# Patient Record
Sex: FEMALE | Race: WHITE | NOT HISPANIC OR LATINO | Employment: UNEMPLOYED | URBAN - NONMETROPOLITAN AREA
[De-identification: names, ages, dates, MRNs, and addresses within clinical notes are randomized per-mention and may not be internally consistent; named-entity substitution may affect disease eponyms.]

---

## 2023-03-10 ENCOUNTER — HOSPITAL ENCOUNTER (INPATIENT)
Facility: HOSPITAL | Age: 68
LOS: 6 days | Discharge: ANOTHER HOSPITAL | DRG: 948 | End: 2023-03-16
Attending: FAMILY MEDICINE | Admitting: FAMILY MEDICINE
Payer: MEDICARE

## 2023-03-10 PROBLEM — R53.81 DEBILITY: Status: ACTIVE | Noted: 2023-03-10

## 2023-03-10 LAB
GLUCOSE BLDC GLUCOMTR-MCNC: 105 MG/DL (ref 70–130)
GLUCOSE BLDC GLUCOMTR-MCNC: 122 MG/DL (ref 70–130)

## 2023-03-10 PROCEDURE — 82962 GLUCOSE BLOOD TEST: CPT

## 2023-03-10 PROCEDURE — 94799 UNLISTED PULMONARY SVC/PX: CPT

## 2023-03-10 PROCEDURE — 94660 CPAP INITIATION&MGMT: CPT

## 2023-03-10 PROCEDURE — 99223 1ST HOSP IP/OBS HIGH 75: CPT | Performed by: FAMILY MEDICINE

## 2023-03-10 PROCEDURE — 92610 EVALUATE SWALLOWING FUNCTION: CPT

## 2023-03-10 RX ORDER — AMOXICILLIN 250 MG
2 CAPSULE ORAL 2 TIMES DAILY
Status: DISCONTINUED | OUTPATIENT
Start: 2023-03-10 | End: 2023-03-16 | Stop reason: HOSPADM

## 2023-03-10 RX ORDER — SPIRONOLACTONE 25 MG/1
50 TABLET ORAL DAILY
Status: DISCONTINUED | OUTPATIENT
Start: 2023-03-11 | End: 2023-03-16

## 2023-03-10 RX ORDER — BUSPIRONE HYDROCHLORIDE 5 MG/1
5 TABLET ORAL 3 TIMES DAILY
COMMUNITY
Start: 2023-01-12 | End: 2023-03-22 | Stop reason: HOSPADM

## 2023-03-10 RX ORDER — MELATONIN
1 DAILY
COMMUNITY
Start: 2023-01-11

## 2023-03-10 RX ORDER — ISOSORBIDE MONONITRATE 30 MG/1
1 TABLET, EXTENDED RELEASE ORAL DAILY
Status: ON HOLD | COMMUNITY
Start: 2023-01-11 | End: 2023-03-22 | Stop reason: SDUPTHER

## 2023-03-10 RX ORDER — OMEGA-3S/DHA/EPA/FISH OIL/D3 300MG-1000
1000 CAPSULE ORAL DAILY
Status: DISCONTINUED | OUTPATIENT
Start: 2023-03-10 | End: 2023-03-16 | Stop reason: HOSPADM

## 2023-03-10 RX ORDER — ATENOLOL 50 MG/1
50 TABLET ORAL DAILY
COMMUNITY
Start: 2023-01-11 | End: 2023-03-22 | Stop reason: HOSPADM

## 2023-03-10 RX ORDER — INSULIN ASPART 100 [IU]/ML
0-9 INJECTION, SOLUTION INTRAVENOUS; SUBCUTANEOUS
Status: DISCONTINUED | OUTPATIENT
Start: 2023-03-10 | End: 2023-03-14

## 2023-03-10 RX ORDER — FERROUS SULFATE 325(65) MG
325 TABLET ORAL 2 TIMES DAILY WITH MEALS
Status: DISCONTINUED | OUTPATIENT
Start: 2023-03-10 | End: 2023-03-16 | Stop reason: HOSPADM

## 2023-03-10 RX ORDER — PANTOPRAZOLE SODIUM 40 MG/1
40 TABLET, DELAYED RELEASE ORAL
Status: DISCONTINUED | OUTPATIENT
Start: 2023-03-10 | End: 2023-03-16 | Stop reason: HOSPADM

## 2023-03-10 RX ORDER — HYDROXYZINE PAMOATE 25 MG/1
1 CAPSULE ORAL 3 TIMES DAILY
COMMUNITY
Start: 2023-01-12

## 2023-03-10 RX ORDER — MINOXIDIL 10 MG/1
7.5 TABLET ORAL EVERY 12 HOURS SCHEDULED
Status: DISCONTINUED | OUTPATIENT
Start: 2023-03-10 | End: 2023-03-11

## 2023-03-10 RX ORDER — ISOSORBIDE DINITRATE 20 MG/1
20 TABLET ORAL
Status: DISCONTINUED | OUTPATIENT
Start: 2023-03-10 | End: 2023-03-16 | Stop reason: HOSPADM

## 2023-03-10 RX ORDER — CLOPIDOGREL BISULFATE 75 MG/1
75 TABLET ORAL DAILY
Status: DISCONTINUED | OUTPATIENT
Start: 2023-03-11 | End: 2023-03-16 | Stop reason: HOSPADM

## 2023-03-10 RX ORDER — GUAIFENESIN 600 MG/1
600 TABLET, EXTENDED RELEASE ORAL EVERY 12 HOURS SCHEDULED
Status: DISCONTINUED | OUTPATIENT
Start: 2023-03-10 | End: 2023-03-16 | Stop reason: HOSPADM

## 2023-03-10 RX ORDER — ASPIRIN 81 MG/1
81 TABLET, COATED ORAL DAILY
COMMUNITY
Start: 2023-01-11

## 2023-03-10 RX ORDER — CLONIDINE HYDROCHLORIDE 0.1 MG/1
1 TABLET ORAL 3 TIMES DAILY
COMMUNITY
Start: 2023-01-11 | End: 2023-03-22 | Stop reason: HOSPADM

## 2023-03-10 RX ORDER — CLOPIDOGREL BISULFATE 75 MG/1
1 TABLET ORAL DAILY
COMMUNITY
Start: 2023-01-11 | End: 2023-03-22 | Stop reason: HOSPADM

## 2023-03-10 RX ORDER — HYDROXYZINE HYDROCHLORIDE 25 MG/1
25 TABLET, FILM COATED ORAL 3 TIMES DAILY PRN
Status: DISCONTINUED | OUTPATIENT
Start: 2023-03-10 | End: 2023-03-16 | Stop reason: HOSPADM

## 2023-03-10 RX ORDER — BUMETANIDE 1 MG/1
4 TABLET ORAL DAILY
Status: DISCONTINUED | OUTPATIENT
Start: 2023-03-11 | End: 2023-03-16 | Stop reason: HOSPADM

## 2023-03-10 RX ORDER — DEXTROSE MONOHYDRATE 25 G/50ML
25 INJECTION, SOLUTION INTRAVENOUS
Status: DISCONTINUED | OUTPATIENT
Start: 2023-03-10 | End: 2023-03-16 | Stop reason: HOSPADM

## 2023-03-10 RX ORDER — DOCUSATE SODIUM 100 MG/1
100 CAPSULE, LIQUID FILLED ORAL 2 TIMES DAILY
Status: DISCONTINUED | OUTPATIENT
Start: 2023-03-10 | End: 2023-03-16 | Stop reason: HOSPADM

## 2023-03-10 RX ORDER — HYDRALAZINE HYDROCHLORIDE 50 MG/1
1 TABLET, FILM COATED ORAL 3 TIMES DAILY
COMMUNITY
Start: 2023-01-11 | End: 2023-03-22 | Stop reason: HOSPADM

## 2023-03-10 RX ORDER — CLONIDINE HYDROCHLORIDE 0.3 MG/1
0.3 TABLET ORAL EVERY 8 HOURS SCHEDULED
Status: DISCONTINUED | OUTPATIENT
Start: 2023-03-10 | End: 2023-03-16 | Stop reason: HOSPADM

## 2023-03-10 RX ORDER — ASPIRIN 81 MG/1
81 TABLET ORAL DAILY
Status: DISCONTINUED | OUTPATIENT
Start: 2023-03-10 | End: 2023-03-16 | Stop reason: HOSPADM

## 2023-03-10 RX ORDER — UREA 10 %
1 LOTION (ML) TOPICAL 2 TIMES DAILY
COMMUNITY
Start: 2023-01-11 | End: 2023-03-22 | Stop reason: HOSPADM

## 2023-03-10 RX ORDER — HYDRALAZINE HYDROCHLORIDE 50 MG/1
100 TABLET, FILM COATED ORAL EVERY 6 HOURS SCHEDULED
Status: DISCONTINUED | OUTPATIENT
Start: 2023-03-10 | End: 2023-03-16 | Stop reason: HOSPADM

## 2023-03-10 RX ORDER — NICOTINE POLACRILEX 4 MG
15 LOZENGE BUCCAL
Status: DISCONTINUED | OUTPATIENT
Start: 2023-03-10 | End: 2023-03-16 | Stop reason: HOSPADM

## 2023-03-10 RX ORDER — ATORVASTATIN CALCIUM 40 MG/1
80 TABLET, FILM COATED ORAL NIGHTLY
Status: DISCONTINUED | OUTPATIENT
Start: 2023-03-10 | End: 2023-03-16 | Stop reason: HOSPADM

## 2023-03-10 RX ORDER — ASCORBIC ACID 500 MG
500 TABLET ORAL 2 TIMES DAILY
COMMUNITY
Start: 2023-01-11 | End: 2023-03-22 | Stop reason: HOSPADM

## 2023-03-10 RX ORDER — AMLODIPINE BESYLATE 10 MG/1
10 TABLET ORAL
Status: DISCONTINUED | OUTPATIENT
Start: 2023-03-11 | End: 2023-03-16 | Stop reason: HOSPADM

## 2023-03-10 RX ORDER — METOPROLOL TARTRATE 50 MG/1
50 TABLET, FILM COATED ORAL EVERY 8 HOURS
Status: DISCONTINUED | OUTPATIENT
Start: 2023-03-10 | End: 2023-03-16 | Stop reason: HOSPADM

## 2023-03-10 RX ORDER — POLYETHYLENE GLYCOL 3350 17 G/17G
17 POWDER, FOR SOLUTION ORAL DAILY PRN
Status: DISCONTINUED | OUTPATIENT
Start: 2023-03-10 | End: 2023-03-14

## 2023-03-10 RX ORDER — BUSPIRONE HYDROCHLORIDE 5 MG/1
7.5 TABLET ORAL 3 TIMES DAILY
Status: DISCONTINUED | OUTPATIENT
Start: 2023-03-10 | End: 2023-03-16 | Stop reason: HOSPADM

## 2023-03-10 RX ORDER — BUMETANIDE 1 MG/1
2 TABLET ORAL EVERY EVENING
Status: DISCONTINUED | OUTPATIENT
Start: 2023-03-10 | End: 2023-03-12

## 2023-03-10 RX ORDER — METOPROLOL SUCCINATE 25 MG/1
1 TABLET, EXTENDED RELEASE ORAL DAILY
COMMUNITY
Start: 2023-01-11 | End: 2023-03-22 | Stop reason: HOSPADM

## 2023-03-10 RX ORDER — NIFEDIPINE 30 MG/1
1 TABLET, EXTENDED RELEASE ORAL DAILY
COMMUNITY
Start: 2023-01-11 | End: 2023-03-22 | Stop reason: HOSPADM

## 2023-03-10 RX ORDER — ACETAMINOPHEN 325 MG/1
650 TABLET ORAL EVERY 4 HOURS PRN
Status: DISCONTINUED | OUTPATIENT
Start: 2023-03-10 | End: 2023-03-16 | Stop reason: HOSPADM

## 2023-03-10 RX ADMIN — ISOSORBIDE DINITRATE 20 MG: 20 TABLET ORAL at 18:15

## 2023-03-10 RX ADMIN — GUAIFENESIN 600 MG: 600 TABLET, EXTENDED RELEASE ORAL at 20:55

## 2023-03-10 RX ADMIN — METOPROLOL TARTRATE 50 MG: 50 TABLET, FILM COATED ORAL at 16:55

## 2023-03-10 RX ADMIN — HYDRALAZINE HYDROCHLORIDE 100 MG: 50 TABLET, FILM COATED ORAL at 23:39

## 2023-03-10 RX ADMIN — METOPROLOL TARTRATE 50 MG: 50 TABLET, FILM COATED ORAL at 21:14

## 2023-03-10 RX ADMIN — CLONIDINE HYDROCHLORIDE 0.3 MG: 0.3 TABLET ORAL at 16:55

## 2023-03-10 RX ADMIN — MINOXIDIL 7.5 MG: 10 TABLET ORAL at 20:56

## 2023-03-10 RX ADMIN — APIXABAN 5 MG: 5 TABLET, FILM COATED ORAL at 20:55

## 2023-03-10 RX ADMIN — BUSPIRONE HYDROCHLORIDE 7.5 MG: 5 TABLET ORAL at 16:54

## 2023-03-10 RX ADMIN — BUMETANIDE 2 MG: 1 TABLET ORAL at 16:51

## 2023-03-10 RX ADMIN — ASPIRIN 81 MG: 81 TABLET, COATED ORAL at 18:13

## 2023-03-10 RX ADMIN — HYDRALAZINE HYDROCHLORIDE 100 MG: 50 TABLET, FILM COATED ORAL at 18:12

## 2023-03-10 RX ADMIN — CLONIDINE HYDROCHLORIDE 0.3 MG: 0.3 TABLET ORAL at 21:14

## 2023-03-10 RX ADMIN — ACETAMINOPHEN 650 MG: 325 TABLET ORAL at 20:53

## 2023-03-10 RX ADMIN — Medication 1000 UNITS: at 18:13

## 2023-03-10 RX ADMIN — BUSPIRONE HYDROCHLORIDE 7.5 MG: 5 TABLET ORAL at 20:55

## 2023-03-10 RX ADMIN — PANTOPRAZOLE SODIUM 40 MG: 40 TABLET, DELAYED RELEASE ORAL at 16:55

## 2023-03-10 RX ADMIN — DOCUSATE SODIUM 50 MG AND SENNOSIDES 8.6 MG 2 TABLET: 8.6; 5 TABLET, FILM COATED ORAL at 20:54

## 2023-03-10 RX ADMIN — ATORVASTATIN CALCIUM 80 MG: 40 TABLET, FILM COATED ORAL at 20:55

## 2023-03-10 RX ADMIN — DOCUSATE SODIUM 100 MG: 100 CAPSULE ORAL at 20:55

## 2023-03-10 RX ADMIN — FERROUS SULFATE TAB 325 MG (65 MG ELEMENTAL FE) 325 MG: 325 (65 FE) TAB at 18:13

## 2023-03-10 NOTE — SIGNIFICANT NOTE
03/10/23 1340   Living Environment   People in Home spouse   Name(s) of People in Home Sree Amaya-spouse   Current Living Arrangements home   Primary Care Provided by self   Provides Primary Care For no one   Family Caregiver if Needed spouse   Family Caregiver Names Sree Amaya-spouse   Quality of Family Relationships helpful;involved;supportive   Able to Return to Prior Arrangements yes   Living Arrangement Comments SS spoke to pt who states living at home with spouse Sree Amaya.  Pt has one child  and one son Xavi Ortiz living.  Son lives in [place], [place].  Pt receives Social Security income, Medicare A and B.  Pt does not have prescription insurance.  Pt says her PCP is Dr. Jay in [place], [place].  [facility] uses [facility] in [place].  Spouse Aki Amaya is POA.  Pt does not have a living will.  Pt used rollator at times and independent with ADL's prior to admission.  Pt and spouse do not have a vehicle.  Spouse walks to stores.  Pt voiced having transportation barriers to MD appointments but she is aware of [facility].   Resource/Environmental Concerns   Resource/Environmental Concerns reliable transportation   Transportation Concerns other (see comments)  (Pt and spouse do not have a vehicle.  Friend may transport pt home at discharge or she may need public transit.)   Transition Planning   Patient/Family Anticipates Transition to home with family   Patient/Family Anticipated Services at Transition home health care   Transportation Anticipated family or friend will provide;public transportation  (Pt says a friend may transport her home at discharge but if friend is not available she will need public transportation.)   Discharge Needs Assessment (IRF)   Current Outpatient/Agency/Support Group   (Pt did not receive home health or outpatient therapy prior to admission.)   Concerns to be Addressed adjustment to diagnosis/illness   Equipment Currently Used at Home glucometer;bp  cuff;commode;oxygen;rollator;walker, rolling;other (see comments)  ([employer] provides home O2 at 3L NC continuous.  Pt has an elevated toilet seat.)   Current Discharge Risk chronically ill   Discharge Coordination/Progress Pt plans to return home with spouse Aki assisting with caregiving needs.  Team conference to be held on 3-14-23.  SS will follow and assist with discharge planning.

## 2023-03-10 NOTE — H&P
[facility] HOSPITALIST HISTORY AND PHYSICAL    Patient Identification:  Name:  Pati Cortes  Age:  67 y.o.  Sex:  female  :  1955  MRN:  [id number]   Visit Number:  [id number]  Room number:  108/1S  Primary Care Physician:  Hitesh Fisher MD     Subjective     3/10/2023   Chief complaint:  No chief complaint on file.      History of presenting illness:  67 y.o. female  This pt is seen today for post admission physician evaluation to the inpatient rehabilitation facility.  Patient is a 67-year-old female with a history of hypertension, hyperlipidemia, CAD with stent in , chronic kidney disease stage IV and depression.  Patient states that she back in January developed chest pain and thinks that she had a heart attack.  Patient did develop acute hypoxic respiratory failure and eventually did have COVID-19 pneumonia and required intubation with mechanical ventilation.  Patient was transferred to [facility] on  and she did have a left pleural effusion and pulmonary edema with an echocardiogram showing EF of 20 to 25%.  Patient was treated with remdesivir and Decadron.  She was also treated for superimposed bacterial pneumonia with Zosyn and this was later changed to Levaquin and doxycycline.  Patient was extubated on .  Patient states that at baseline she is normally on 3 L per nasal cannula at home but is requiring 4 to 5 L at this time.  They discussed left heart catheterization but they decided against this due to renal dysfunction.  She has been diuresed during this time and is has required hemodialysis although she has not required this for the last several days apparently.  They did leave dialysis catheter in place at this time.  Patient did have worsening anemia during her stay in EGD showed normal esophagus esophagus but did have findings consistent with gastritis.  Patient was transferred to [facility] on .  Patient was found  to have a DVT in the right internal jugular neck catheter was removed was placed on heparin drip and was transitioned to Eliquis on February 28.  Patient apparently did receive hemodialysis on February 24 with last hemodialysis being on February 28.  Apparently her renal function has improved somewhat.  Was treated for UTI with cefepime.  Patient did have episode of encephalopathy which was thought to be secondary to cefepime and she did not but this is improved over the last several days and it was thought patient be a good candidate for inpatient physical rehab at this point.  Patient states she does use BiPAP at night      Patient continued to progress medically.  Physical therapy and Occupational therapy evaluations were completed with recommended acute inpatient rehabilitation referral for continued functional mobility intervention and reeducation.  Acute rehab referral ordered for continued medical monitoring and management post prolonged hospitalization, continued respiratory status monitoring, lab monitoring, pain mgt needs, bowel/bladder care with new medication education, skin monitoring and breakdown prevention along with ongoing medical comorbidities that require ongoing care.    The preadmission mini-FIM score as assessed by the referring facility as follows: Eating 4; grooming 3; bathing 3; dressing upper body 3; dressing lower body 2; transfers to bed chair wheelchair 3; transfers to toilet to; locomotion 3; bladder management 3; bowel management 3; comprehension 7; expression 7; substractions 7; problem solving 7; memory 7.  Estimated length of stay 14 days    ---------------------------------------------------------------------------------------------------------------------   Review of Systems:  General: Generalized weakness  HEENT: Negative  Heart: History of coronary disease with stent placement in the LAD in 2003.  CHF.  Has some shortness of breath but denies edema in the lower  extremities  Lungs: Chronically requires O2--3 L at home.  Currently on 5 L at this time  Abdomen: Negative except for GERD symptoms  : Negative, has history of recurrent UTIs  Musculoskeletal: Osteoarthritis involving the knees  Neuro: Generalized weakness  Skin: Negative  ---------------------------------------------------------------------------------------------------------------------   Past Medical History:   Diagnosis Date   • Arthritis    • Diabetes mellitus (HCC)    • Stroke (HCC)      History reviewed. No pertinent surgical history.  History reviewed. No pertinent family history.  Social History     Socioeconomic History   • Marital status:    Tobacco Use   • Smoking status: Never   • Smokeless tobacco: Never   Substance and Sexual Activity   • Alcohol use: Never   • Drug use: Never   • Sexual activity: Defer     ---------------------------------------------------------------------------------------------------------------------   Allergies:  Patient has no known allergies.  ---------------------------------------------------------------------------------------------------------------------   Medications below are reported home medications pulling from within the system; at this time, these medications have not been reconciled unless otherwise specified and are in the verification process for further verifcation as current home medications.    Prior to Admission Medications     None        Objective     Vital Signs:       No data found.     on   ;      There is no height or weight on file to calculate BMI.    Wt Readings from Last 3 Encounters:   No data found for Wt      ---------------------------------------------------------------------------------------------------------------------     Physical exam:  Constitutional:   Chronically ill-appearing female in no acute distress  HEENT: Normocephalic atraumatic  Neck: Supple   Cardiovascular: Regular rate and rhythm with a grade 3/6 systolic  ejection murmur  Pulmonary/Chest: Clear to auscultation  Abdominal: Positive bowel sounds soft.   Musculoskeletal: No arthropathy  Neurological: Generalized weakness but no focal deficits 4 out of 5 strength noted  Skin: No rash  Peripheral vascular: No edema  Genitourinary::  ---------------------------------------------------------------------------------------------------------------------    No orders to display           Last echocardiogram:    --------------------------------------------------------------------------------------------------------------------  Labs:                Invalid input(s): PROTCrCl cannot be calculated (No successful lab value found.).  No results found for: AMMONIA          No results found for: HGBA1C, POCGLU  No results found for: TSH, FREET4  No results found for: PREGTESTUR, PREGSERUM, HCG, HCGQUANT  Pain Management Panel    There is no flowsheet data to display.       Brief Urine Lab Results     None        No results found for: BLOODCX  No results found for: URINECX  No results found for: WOUNDCX  No results found for: STOOLCX    Last Urine Toxicity    There is no flowsheet data to display.         I have personally looked at the labs and they are summarized above.  ----------------------------------------------------------------------------------------------------------------------  Detailed radiology reports for the last 24 hours:    Imaging Results (Last 24 Hours)     ** No results found for the last 24 hours. **        Final impressions for the last 30 days of radiology reports:    CT Abdomen Pelvis Without Contrast    Result Date: 2/7/2023  Pleural effusions and bibasilar consolidation probably secondary to pneumonia. Follow-up to complete resolution recommended. Anasarca and ascites of uncertain etiology. Mildly distended urinary bladder despite Funes catheter placement. The Funes may be obstructed. Images reviewed, interpreted, and dictated by JANIA Adrian,  MD    XR Abdomen 1 View    Result Date: 2/10/2023  The enteric feeding tube tip is just postpyloric, terminating in the first portion of the duodenum. Images personally reviewed, interpreted and dictated by WOLF Skelton M.D.    IR CENTRAL VENOUS    Result Date: 2/21/2023  Successful placement of a R IJ 24 cm tunneled catheter with ultrasound and fluoroscopic guidance. The patient tolerated the procedure well and left the department in good condition. There were no immediate complications. Images reviewed, interpreted, and dictated by Momo Schreiber MD    XR chest AP portable    Result Date: 2/17/2023  1. Cardiomegaly without edema. 2. Mild improved aeration of the lung bases with persistent residual atelectasis and/or pneumonia. 3. Trace effusions. Images reviewed, interpreted, dictated and electronically signed by Zeeshan Walker MD    FL esoph swallow funct with cine video    Result Date: 2/16/2023  Modified barium swallow under fluoroscopic guidance. Please see speech pathologist's report for further details and recommendations. Images reviewed, interpreted, and dictated by Dr. Zeeshan Walker. Transcribed by Kevin Mejia PA-C.    XR chest AP portable    Result Date: 2/16/2023  1. Cardiomegaly. 2. Basilar airspace opacities, left greater than right which may represent atelectasis or pneumonia. Overall, there is improved aeration compared to the prior study. Images reviewed, interpreted, dictated and electronically signed by Zeeshan Walker MD    XR chest AP portable    Result Date: 2/14/2023  Decreased lung volumes with persistent effusions/pneumonia, left greater than right. Images reviewed, interpreted, and dictated by Dr. Zeeshan Walker. Transcribed by Josh Montero PA-C.    XR chest AP portable    Result Date: 2/10/2023  No significant interval change. Films reviewed, interpreted, and dictated by Dr. Goldsmith. Transcribed by Gricel Nj PA-C.    XR chest AP portable    Result Date: 2/9/2023  Cardiomegaly with  improving but persistent vascular congestion and pulmonary edema. Overall, there is improved aeration of the lungs. Images reviewed, interpreted, dictated and electronically signed by Zeeshan Walker MD    IR CENTRAL VENOUS    Result Date: 2/8/2023  Successful placement of a non-tunneled central venous access catheter/temporary dialysis catheter via the right internal jugular vein without complications. Line is ready to use. Images personally reviewed, interpreted and dictated by BOB Diaz.    XR chest AP portable    Result Date: 2/8/2023  Interval worsening as above. Continued follow up recommended. Images reviewed, interpreted, and dictated by Dr. JANIA Adrian. Transcribed by Shilpi Kirk PA-C.    I have personally looked at the radiology images and read the final radiology report.    Assessment & Plan    Debility after prolonged hospitalization will require physical therapy 90 minutes/day 5 to 6 days/week for up with therapeutic exercise, range of motion, endurance, gait training, safety, stair navigation and strengthening.  Will require occupational therapy 90 minutes/day 5 to 6 days/week with dressing, ADLs, feeding, home skills, safety and toileting techniques.  We expect patient to be able to safely perform actives of daily living using adaptive equipment for improved functional mobility.  Independent and safe bowel bladder function.  Able to navigate home community territory safe without injury or falls.  Anticipate patient going home with assistance [place]oc require home health with PT, nursing services.  May require bedside commode and wheelchair and walker at discharge.    Acute on chronic hypoxic respiratory failure--continue O2 at this time.  Will continue diuresis as much of this is likely secondary to CHF.  Patient also probably has some postinflammatory changes from COVID-19.  Patient was treated for bacterial pneumonia as well during her acute care stay.    CHF with reduced  EF--history of EF of 20 to 25%.  Past history of coronary disease--continue patient on Bumex 4 mg in the morning 2 mg in the afternoon.  Hydralazine 100 mg 4 times daily, Isordil 20 mg 3 times daily; metoprolol 50 mg 3 times daily; Aldactone 50 mg daily    Chronic kidney disease recent acute kidney injury ((stage IV)--hemodialysis catheter is still in place.  Patient last hemodialysis was on February 28.  We will continue patient on Bumex and Aldactone at this time.  We will to monitor potassium levels very closely.  Consult nephrology    Severe hypertension Norvasc 10 mg daily clonidine 0.3 mg 3 times daily hydralazine 100 mg 4 times a day, metoprolol 50 mg 3 times daily, minoxidil 7.5 mg twice daily    Coronary artery disease continue Lipitor 80 mg daily, metoprolol, Isordil    History of DVT Eliquis 5 mg twice daily    Diabetes mellitus sliding scale coverage    GERD continue Protonix 40 mg twice daily        VTE Prophylaxis:   Mechanical Order History:     None      Pharmalogical Order History:      Ordered     Dose Route Frequency Stop    03/10/23 7043  apixaban (ELIQUIS) tablet 5 mg         5 mg PO Every 12 Hours Scheduled --                Falls Risk Assessment high risk for fall secondary to generalized weakness        Kody Cowart MD  [facility]  03/10/23  14:00 EST

## 2023-03-10 NOTE — PROGRESS NOTES
Patient Assessment Instrument  Quality Indicators - Admission FY 2023    Section A. Ethnicity/ Race/Language  Ethnicity:  Race:  Preferred Language:    Section A. Transportation  Issues Due to Lack of Transportation:   Yes, it has kept me from medical  appointments or from getting my medications    Section B. Hearing and Vision        Section B. Health Literacy        Section C. Cognitive Patterns      Section C. Signs and Symptoms of Delirium (from CAM)      Section D. Mood      Section D. Social Isolation      Section [id number]. Prior Functioning      Section [id number]. Prior Device Use  Walker    Section MG0611. Self Care Performance      Section [id number]. Self Care Discharge Goals      Section KR3391. Mobility Performance      Section NS1479. Mobility Discharge Goals      Section H. Bladder and Bowel      Section I. Active Diagnosis      Section J. Health Conditions      Section J. Health Conditions (Pain)      Section K. Swallowing/Nutritional Status    Nutritional Approaches on Admission:    Section M. Skin Conditions      Section N. Medication        Section O. Special Treatments, Procedures, and Programs    Signed by: VITALIY Louise

## 2023-03-10 NOTE — PROGRESS NOTES
Case Management  Inpatient Rehabilitation Plan of Care and Discharge Plan Note    Rehabilitation Diagnosis:  debility  Date of Onset:  1-21-23    Medical Summary:  debility, acute respiratory failure    Plan of Care      Expected Intensity:  Average of 3 hours of therapy 5 days/week.  Interdisciplinary Team:  Interdisciplinary Team: Medical Supervision and 24 Hour Rehabilitation Nursing.,  Physical Therapy:, Occupational Therapy:, Social Work, Therapeutic Recreation.,  Respiratory Therapy.  Physical Therapy Intensity/Duration: PT 1.5 hours per day/5 days per week  Occupational Therapy Intensity/Duration: OT 1.5 hours per day/5 days per week  Estimated Length of Stay/Anticipated Discharge Date: 14 days  Anticipated Discharge Destination:  Anticipated discharge destination from inpatient rehabilitation is community  discharge with assistance. Pt plans to return home with spouse assisting with  caregiving needs.      Based on the patient's medical and functional status, their prognosis and  expected level of functional improvement is:  fair-good    Signed by: VITALIY Louise

## 2023-03-10 NOTE — PROGRESS NOTES
"Patient Assessment Instrument  Quality Indicators - Admission FY 2023    Section A. Ethnicity/ Race/Language  Ethnicity:  Not of , /a, [place] Origin  Race:  White  Preferred Language:  Requests  to Communicate:   No    Section A. Transportation      Section B. Hearing and Vision  Expression of Ideas and Wants: Expresses complex messages without difficulty and  with speech that is clear and easy to understand.  Understanding Verbal and Non-Verbal Content: Understands: Clear comprehension  without cues or repetitions.  Ability to Hear:  Adequate - no difficulty in normal conversation, social  interaction, listening to TV  Ability to See in Adequate Light:  Adequate - sees fine detail, such as regular  print in newspapers/books    Section B. Health Literacy  Frequency of Needing Assistance Reading:  Rarely      Section C. Cognitive Patterns  Brief Interview for Mental Status (BIMS) was conducted.  Repetition of Three Words: Three words  Able to report correct year: Correct  Able to report correct month: Accurate within 5 days  Able to report correct day of the week: Correct  Able to recall \"sock\": Yes, no cue required  Able to recall \"blue\": Yes, no cue required  Able to recall \"bed\": Yes, no cue required    BIMS SUMMARY SCORE: 15 Cognitively intact Patient was able to complete the Brief  Interview for Mental Status    Section C. Signs and Symptoms of Delirium (from CAM)  Evidence of Acute Change in Mental Status:   No  Inattention: Behavior not present  Thinking Disorganized or Incoherent:   Behavior not present  Altered Level of Consciousness:   Behavior not present    Section D. Mood  Presence of little interest or pleasure in doing things:   No  Frequency of having little interest or pleasure in doing things:   Never or 1  day  Presence of feeling down, depressed, or hopeless:   No  Frequency of feeling down, depressed, or hopeless:   Never or 1 day   Interview Ended. Above responses do " not meet criteria to continue.  Total Severity Score:   00    Section D. Social Isolation  Frequecy of Feeling Lonely or Isolated:  Never    Section [id number]. Prior Functioning      Section NA3767. Prior Device Use      Section FQ9760. Self Care Performance      Section LP0639. Self Care Discharge Goals      Section HZ9209. Mobility Performance      Section CP7072. Mobility Discharge Goals      Section H. Bladder and Bowel      Section I. Active Diagnosis      Section J. Health Conditions      Section J. Health Conditions (Pain)  Pain Effect on Sleep:   Occasionally  Pain Interference with Therapy Activities:   Rarely or not at all  Pain Interference with Day-to-Day Activities:   Rarely or not at all    Section K. Swallowing/Nutritional Status    Nutritional Approaches on Admission:    Section M. Skin Conditions  Unhealed Pressure Ulcer/Injuries at Stage 1 or Higher on Admission:  No.    Section N. Medication    Potential Clinically Significant Medication Issues: No issues found during  review      Section O. Special Treatments, Procedures, and Programs  Dialysis: Hemodialysis   Oxygen Therapy: Continuous    Signed by: Darlyn Alarcon RN

## 2023-03-10 NOTE — THERAPY EVALUATION
Inpatient Rehabilitation - Speech Language Pathology   Swallow Initial Evaluation [facility]   CLINICAL DYSPHAGIA ASSESSMENT     Patient Name: Pati Cortes  : 1955  MRN: [id number]  Today's Date: 3/10/2023             Admit Date: 3/10/2023    Visit Dx:   No diagnosis found.  Patient Active Problem List   Diagnosis   • Debility     Past Medical History:   Diagnosis Date   • Arthritis    • Diabetes mellitus (HCC)    • Stroke (HCC)      History reviewed. No pertinent surgical history.    Pati Cortes  is seen at bedside this pm on inpatient physical rehabilitation unit to assess safety/efficacy of swallowing fnx, determine safest/least restrictive diet tolerance.     PMH is significant for h/o HTN, HLD, CAD w/ stent in , CKD IV, depression.  She reported h/o chest pain 2023, and thinks that she had a heart attack.  She developed acute hypoxic respiratory failure, COVID-19, COVID-19 PNA and required oral intubation with mechanical ventilation. She was transferred to [facility] on , w/ L pleural effusion and pulmonary edema. She was also treated for superimposed bacterial pneumonia. She was extubated on . She did require HD at times. She is s/p EGD 2/2 worsening anemia which revealed a normal esophagus, findings consistent w/ gastritis. She was transferred to [facility] on , she was found to have a DVT in the right internal jugular, thus neck catheter was removed and she was placed on heparin drip and has since transitioned to Eliquis. She was also treated for UTI. W/ improving status she was thought patient be a good candidate for inpatient physical rehab at this point, transferred to [facility] inpatient physical rehab on this date. She is referred for dysphagia assessment. She is currently on a mechanical soft diet, chopped meats, thin liquids. She denies any overt s/s aspiration w/ po intake, eager for possible diet upgrade. Per chart review she did  participate in MBS at previous facility 2/16/23 w/ no aspiration.     Social History     Socioeconomic History   • Marital status:    Tobacco Use   • Smoking status: Never   • Smokeless tobacco: Never   Substance and Sexual Activity   • Alcohol use: Never   • Drug use: Never   • Sexual activity: Defer      No recent chest xray available for review.     Diet Orders (active) (From admission, onward)     Start     Ordered    03/10/23 1343  Diet: Cardiac Diets, Diabetic Diets; Healthy Heart (2-3 Na+); Consistent Carbohydrate; Texture: Soft to Chew (NDD 3); Soft to Chew: Chopped Meat; Fluid Consistency: Thin (IDDSI 0)  Diet Effective Now         03/10/23 1343              She is observed on 4.5L O2 via NC w/o complications.     Pt is positioned upright and centered in bed to accept multiple po presentations of ice chips, solid cracker, puree and thin liquids via spoon, cup and straw. She is able to self feed.     Facial/oral structures are symmetrical upon observation. Lingual protrusion reveals no deviation. Oral mucosa are moist, pink, and clean. Secretions are clear, thin, and well controlled. OROM/EDIN is wfl to imitate oral postures. Gag is not assessed. Volitional cough is intact w/ adequate  intensity, clear in quality, non-productive. Voice is adequate in intensity, clear in quality w/ intelligible speech. She is oriented to person, place and time, follows directives and participates in conversational exchanges.     Upon po presentations, adequate bolus anticipation and acceptance w/ good labial seal for bolus clearance via spoon bowl, cup rim stability and suction via straw. Bolus formation, manipulation and control are wfl w/ rotary mastication pattern. A-p transit is timely w/o oral residue. No overt s/s aspiration evidenced before the swallow.     Pharyngeal swallow is timely w/ adequate hyolaryngeal elevation per palpation. No overt s/s aspiration evidenced across this assessment. No silent aspiration  suspected as pt is w/o changes in vocal quality, respirations or secretions post po presentations. Pt denies odynophagia.    Impression: Per this assessment, Ms. Cortes presents w/ wfl oropharyngeal swallow w/o s/s aspiration. No s/s indicative of silent aspiration. No odynophagia reported. Recommend upgrade to least restrictive regular consistency, thin liquids.      SLP Recommendation and Plan  1. Regular consistency, thin liquids.    2. Meds whole in puree/thins.   3. Upright and centered for all po intake.  4. LOREN precautions.  5. Oral care protocol.  No further formal SLP f/u warranted at this time.    D/w pt results and recommendations w/ verbal agreement.    Thank you for allowing me to participate in the care of your patient-  Dana White M.A., CCC-SLP      EDUCATION  The patient has been educated in the following areas:   Dysphagia (Swallowing Impairment).     Time Calculation:    Time Calculation- SLP     Row Name 03/10/23 1611             Time Calculation- SLP    SLP Start Time 1600  -CJ      SLP Stop Time 1612  -      SLP Time Calculation (min) 12 min  -            User Key  (r) = Recorded By, (t) = Taken By, (c) = Cosigned By    Initials Name Provider Type    Dana Koo MA,CCC-SLP Speech and Language Pathologist                Therapy Charges for Today     Code Description Service Date Service Provider Modifiers Qty    [id number]  ST EVAL ORAL PHARYNG SWALLOW 1 3/10/2023 Dana White MA,CCC-SLP GN 1               Dana White MA,LIDA-SLP  3/10/2023

## 2023-03-10 NOTE — PLAN OF CARE
Problem: Rehabilitation (IRF) Plan of Care  Goal: Plan of Care Review  Outcome: Ongoing, Progressing     Problem: Rehabilitation (IRF) Plan of Care  Goal: Patient-Specific Goal (Individualized)  Outcome: Ongoing, Progressing   Goal Outcome Evaluation:

## 2023-03-10 NOTE — PROGRESS NOTES
Occupational Therapy:    Physical Therapy:    Speech Language Pathology: Individual: 12 minutes.    Signed by: EMILIA Gonzalez

## 2023-03-10 NOTE — PROGRESS NOTES
Rehabilitation Nursing  Inpatient Rehabilitation Plan of Care Note    Plan of Care  Body Function Structure    Skin Integrity (Active)  Current Status (3/10/2023 12:36:00 PM): Risk for skin breakdown  Weekly Goal: No skin breakdown  Discharge Goal: No skin breakdown    Safety    Potential for Injury (Active)  Current Status (3/10/2023 12:36:00 PM): Potential for falls  Weekly Goal: No falls  Discharge Goal: No falls    Signed by: Darlyn Alarcon RN

## 2023-03-11 ENCOUNTER — APPOINTMENT (OUTPATIENT)
Dept: GENERAL RADIOLOGY | Facility: HOSPITAL | Age: 68
DRG: 948 | End: 2023-03-11
Payer: MEDICARE

## 2023-03-11 LAB
ALBUMIN SERPL-MCNC: 2.5 G/DL (ref 3.5–5.2)
ALBUMIN/GLOB SERPL: 0.9 G/DL
ALP SERPL-CCNC: 106 U/L (ref 39–117)
ALT SERPL W P-5'-P-CCNC: 5 U/L (ref 1–33)
ANION GAP SERPL CALCULATED.3IONS-SCNC: 10.9 MMOL/L (ref 5–15)
AST SERPL-CCNC: 12 U/L (ref 1–32)
BASOPHILS # BLD AUTO: 0.06 10*3/MM3 (ref 0–0.2)
BASOPHILS NFR BLD AUTO: 0.6 % (ref 0–1.5)
BILIRUB SERPL-MCNC: 0.3 MG/DL (ref 0–1.2)
BUN SERPL-MCNC: 47 MG/DL (ref 8–23)
BUN/CREAT SERPL: 23.4 (ref 7–25)
CALCIUM SPEC-SCNC: 9.2 MG/DL (ref 8.6–10.5)
CHLORIDE SERPL-SCNC: 95 MMOL/L (ref 98–107)
CO2 SERPL-SCNC: 29.1 MMOL/L (ref 22–29)
CREAT SERPL-MCNC: 2.01 MG/DL (ref 0.57–1)
DEPRECATED RDW RBC AUTO: 53.6 FL (ref 37–54)
EGFRCR SERPLBLD CKD-EPI 2021: 26.6 ML/MIN/1.73
EOSINOPHIL # BLD AUTO: 0.07 10*3/MM3 (ref 0–0.4)
EOSINOPHIL NFR BLD AUTO: 0.8 % (ref 0.3–6.2)
ERYTHROCYTE [DISTWIDTH] IN BLOOD BY AUTOMATED COUNT: 16 % (ref 12.3–15.4)
GLOBULIN UR ELPH-MCNC: 2.9 GM/DL
GLUCOSE BLDC GLUCOMTR-MCNC: 106 MG/DL (ref 70–130)
GLUCOSE BLDC GLUCOMTR-MCNC: 124 MG/DL (ref 70–130)
GLUCOSE BLDC GLUCOMTR-MCNC: 97 MG/DL (ref 70–130)
GLUCOSE SERPL-MCNC: 93 MG/DL (ref 65–99)
HCT VFR BLD AUTO: 25.1 % (ref 34–46.6)
HGB BLD-MCNC: 7.7 G/DL (ref 12–15.9)
IMM GRANULOCYTES # BLD AUTO: 0.21 10*3/MM3 (ref 0–0.05)
IMM GRANULOCYTES NFR BLD AUTO: 2.3 % (ref 0–0.5)
IRON 24H UR-MRATE: 29 MCG/DL (ref 37–145)
IRON SATN MFR SERPL: 14 % (ref 20–50)
LYMPHOCYTES # BLD AUTO: 2.1 10*3/MM3 (ref 0.7–3.1)
LYMPHOCYTES NFR BLD AUTO: 22.7 % (ref 19.6–45.3)
MAGNESIUM SERPL-MCNC: 2 MG/DL (ref 1.6–2.4)
MCH RBC QN AUTO: 28.3 PG (ref 26.6–33)
MCHC RBC AUTO-ENTMCNC: 30.7 G/DL (ref 31.5–35.7)
MCV RBC AUTO: 92.3 FL (ref 79–97)
MONOCYTES # BLD AUTO: 1.03 10*3/MM3 (ref 0.1–0.9)
MONOCYTES NFR BLD AUTO: 11.1 % (ref 5–12)
NEUTROPHILS NFR BLD AUTO: 5.8 10*3/MM3 (ref 1.7–7)
NEUTROPHILS NFR BLD AUTO: 62.5 % (ref 42.7–76)
NRBC BLD AUTO-RTO: 0 /100 WBC (ref 0–0.2)
PLATELET # BLD AUTO: 470 10*3/MM3 (ref 140–450)
PMV BLD AUTO: 10.1 FL (ref 6–12)
POTASSIUM SERPL-SCNC: 3.8 MMOL/L (ref 3.5–5.2)
PROT SERPL-MCNC: 5.4 G/DL (ref 6–8.5)
RBC # BLD AUTO: 2.72 10*6/MM3 (ref 3.77–5.28)
SODIUM SERPL-SCNC: 135 MMOL/L (ref 136–145)
TIBC SERPL-MCNC: 213 MCG/DL (ref 298–536)
TRANSFERRIN SERPL-MCNC: 143 MG/DL (ref 200–360)
WBC NRBC COR # BLD: 9.27 10*3/MM3 (ref 3.4–10.8)

## 2023-03-11 PROCEDURE — 83540 ASSAY OF IRON: CPT | Performed by: INTERNAL MEDICINE

## 2023-03-11 PROCEDURE — 94660 CPAP INITIATION&MGMT: CPT

## 2023-03-11 PROCEDURE — 94799 UNLISTED PULMONARY SVC/PX: CPT

## 2023-03-11 PROCEDURE — 82962 GLUCOSE BLOOD TEST: CPT

## 2023-03-11 PROCEDURE — 71045 X-RAY EXAM CHEST 1 VIEW: CPT

## 2023-03-11 PROCEDURE — 97110 THERAPEUTIC EXERCISES: CPT

## 2023-03-11 PROCEDURE — 97162 PT EVAL MOD COMPLEX 30 MIN: CPT

## 2023-03-11 PROCEDURE — 90662 IIV NO PRSV INCREASED AG IM: CPT | Performed by: FAMILY MEDICINE

## 2023-03-11 PROCEDURE — 85025 COMPLETE CBC W/AUTO DIFF WBC: CPT | Performed by: FAMILY MEDICINE

## 2023-03-11 PROCEDURE — 84466 ASSAY OF TRANSFERRIN: CPT | Performed by: INTERNAL MEDICINE

## 2023-03-11 PROCEDURE — 94761 N-INVAS EAR/PLS OXIMETRY MLT: CPT

## 2023-03-11 PROCEDURE — 80053 COMPREHEN METABOLIC PANEL: CPT | Performed by: FAMILY MEDICINE

## 2023-03-11 PROCEDURE — G0008 ADMIN INFLUENZA VIRUS VAC: HCPCS | Performed by: FAMILY MEDICINE

## 2023-03-11 PROCEDURE — 97530 THERAPEUTIC ACTIVITIES: CPT | Performed by: OCCUPATIONAL THERAPIST

## 2023-03-11 PROCEDURE — 83735 ASSAY OF MAGNESIUM: CPT | Performed by: FAMILY MEDICINE

## 2023-03-11 PROCEDURE — 97116 GAIT TRAINING THERAPY: CPT

## 2023-03-11 PROCEDURE — 25010000002 INFLUENZA VAC HIGH-DOSE QUAD 0.7 ML SUSPENSION PREFILLED SYRINGE: Performed by: FAMILY MEDICINE

## 2023-03-11 PROCEDURE — 97166 OT EVAL MOD COMPLEX 45 MIN: CPT | Performed by: OCCUPATIONAL THERAPIST

## 2023-03-11 PROCEDURE — 97535 SELF CARE MNGMENT TRAINING: CPT | Performed by: OCCUPATIONAL THERAPIST

## 2023-03-11 PROCEDURE — 99231 SBSQ HOSP IP/OBS SF/LOW 25: CPT | Performed by: FAMILY MEDICINE

## 2023-03-11 RX ORDER — MINOXIDIL 10 MG/1
5 TABLET ORAL EVERY 12 HOURS SCHEDULED
Status: DISCONTINUED | OUTPATIENT
Start: 2023-03-11 | End: 2023-03-12

## 2023-03-11 RX ORDER — CHOLECALCIFEROL (VITAMIN D3) 125 MCG
5 CAPSULE ORAL NIGHTLY
Status: DISCONTINUED | OUTPATIENT
Start: 2023-03-11 | End: 2023-03-16 | Stop reason: HOSPADM

## 2023-03-11 RX ADMIN — PANTOPRAZOLE SODIUM 40 MG: 40 TABLET, DELAYED RELEASE ORAL at 18:06

## 2023-03-11 RX ADMIN — CLOPIDOGREL BISULFATE 75 MG: 75 TABLET, FILM COATED ORAL at 09:48

## 2023-03-11 RX ADMIN — HYDRALAZINE HYDROCHLORIDE 100 MG: 50 TABLET, FILM COATED ORAL at 23:28

## 2023-03-11 RX ADMIN — BUSPIRONE HYDROCHLORIDE 7.5 MG: 5 TABLET ORAL at 09:44

## 2023-03-11 RX ADMIN — METOPROLOL TARTRATE 50 MG: 50 TABLET, FILM COATED ORAL at 14:00

## 2023-03-11 RX ADMIN — ACETAMINOPHEN 650 MG: 325 TABLET ORAL at 13:55

## 2023-03-11 RX ADMIN — CLONIDINE HYDROCHLORIDE 0.3 MG: 0.3 TABLET ORAL at 14:00

## 2023-03-11 RX ADMIN — FERROUS SULFATE TAB 325 MG (65 MG ELEMENTAL FE) 325 MG: 325 (65 FE) TAB at 09:43

## 2023-03-11 RX ADMIN — DOCUSATE SODIUM 100 MG: 100 CAPSULE ORAL at 09:48

## 2023-03-11 RX ADMIN — MINOXIDIL 5 MG: 10 TABLET ORAL at 21:04

## 2023-03-11 RX ADMIN — METOPROLOL TARTRATE 50 MG: 50 TABLET, FILM COATED ORAL at 05:26

## 2023-03-11 RX ADMIN — ISOSORBIDE DINITRATE 20 MG: 20 TABLET ORAL at 18:06

## 2023-03-11 RX ADMIN — BUMETANIDE 2 MG: 1 TABLET ORAL at 18:04

## 2023-03-11 RX ADMIN — HYDRALAZINE HYDROCHLORIDE 100 MG: 50 TABLET, FILM COATED ORAL at 05:25

## 2023-03-11 RX ADMIN — BUSPIRONE HYDROCHLORIDE 7.5 MG: 5 TABLET ORAL at 21:04

## 2023-03-11 RX ADMIN — GUAIFENESIN 600 MG: 600 TABLET, EXTENDED RELEASE ORAL at 09:48

## 2023-03-11 RX ADMIN — INFLUENZA A VIRUS A/VICTORIA/2570/2019 IVR-215 (H1N1) ANTIGEN (FORMALDEHYDE INACTIVATED), INFLUENZA A VIRUS A/DARWIN/9/2021 SAN-010 (H3N2) ANTIGEN (FORMALDEHYDE INACTIVATED), INFLUENZA B VIRUS B/PHUKET/3073/2013 ANTIGEN (FORMALDEHYDE INACTIVATED), AND INFLUENZA B VIRUS B/MICHIGAN/01/2021 ANTIGEN (FORMALDEHYDE INACTIVATED) 0.7 ML: 60; 60; 60; 60 INJECTION, SUSPENSION INTRAMUSCULAR at 05:46

## 2023-03-11 RX ADMIN — MINOXIDIL 7.5 MG: 10 TABLET ORAL at 09:48

## 2023-03-11 RX ADMIN — ACETAMINOPHEN 650 MG: 325 TABLET ORAL at 21:03

## 2023-03-11 RX ADMIN — ISOSORBIDE DINITRATE 20 MG: 20 TABLET ORAL at 14:00

## 2023-03-11 RX ADMIN — ATORVASTATIN CALCIUM 80 MG: 40 TABLET, FILM COATED ORAL at 21:04

## 2023-03-11 RX ADMIN — BUSPIRONE HYDROCHLORIDE 7.5 MG: 5 TABLET ORAL at 18:03

## 2023-03-11 RX ADMIN — HYDRALAZINE HYDROCHLORIDE 100 MG: 50 TABLET, FILM COATED ORAL at 13:59

## 2023-03-11 RX ADMIN — HYDROXYZINE HYDROCHLORIDE 25 MG: 25 TABLET ORAL at 21:03

## 2023-03-11 RX ADMIN — PANTOPRAZOLE SODIUM 40 MG: 40 TABLET, DELAYED RELEASE ORAL at 06:30

## 2023-03-11 RX ADMIN — CLONIDINE HYDROCHLORIDE 0.3 MG: 0.3 TABLET ORAL at 05:25

## 2023-03-11 RX ADMIN — CLONIDINE HYDROCHLORIDE 0.3 MG: 0.3 TABLET ORAL at 21:04

## 2023-03-11 RX ADMIN — ISOSORBIDE DINITRATE 20 MG: 20 TABLET ORAL at 09:43

## 2023-03-11 RX ADMIN — APIXABAN 5 MG: 5 TABLET, FILM COATED ORAL at 21:03

## 2023-03-11 RX ADMIN — Medication 5 MG: at 21:04

## 2023-03-11 RX ADMIN — ASPIRIN 81 MG: 81 TABLET, COATED ORAL at 09:44

## 2023-03-11 RX ADMIN — METOPROLOL TARTRATE 50 MG: 50 TABLET, FILM COATED ORAL at 21:04

## 2023-03-11 RX ADMIN — Medication 1000 UNITS: at 09:47

## 2023-03-11 RX ADMIN — HYDRALAZINE HYDROCHLORIDE 100 MG: 50 TABLET, FILM COATED ORAL at 18:06

## 2023-03-11 RX ADMIN — SPIRONOLACTONE 50 MG: 25 TABLET ORAL at 09:49

## 2023-03-11 RX ADMIN — DOCUSATE SODIUM 50 MG AND SENNOSIDES 8.6 MG 2 TABLET: 8.6; 5 TABLET, FILM COATED ORAL at 09:49

## 2023-03-11 RX ADMIN — AMLODIPINE BESYLATE 10 MG: 10 TABLET ORAL at 09:43

## 2023-03-11 RX ADMIN — FERROUS SULFATE TAB 325 MG (65 MG ELEMENTAL FE) 325 MG: 325 (65 FE) TAB at 18:06

## 2023-03-11 RX ADMIN — BUMETANIDE 4 MG: 1 TABLET ORAL at 09:44

## 2023-03-11 RX ADMIN — GUAIFENESIN 600 MG: 600 TABLET, EXTENDED RELEASE ORAL at 21:04

## 2023-03-11 RX ADMIN — APIXABAN 5 MG: 5 TABLET, FILM COATED ORAL at 09:44

## 2023-03-11 NOTE — THERAPY EVALUATION
Inpatient Rehabilitation - Physical Therapy Initial Evaluation        [facility]     Patient Name: Pati Cortes  : 1955  MRN: [id number]    Today's Date: 3/11/2023                    Admit Date: 3/10/2023      Visit Dx:   No diagnosis found.    Patient Active Problem List   Diagnosis   • Debility       Past Medical History:   Diagnosis Date   • Arthritis    • Diabetes mellitus (HCC)    • Stroke (HCC)        History reviewed. No pertinent surgical history.    PT ASSESSMENT (last 12 hours)     IRF PT Evaluation and Treatment     Row Name 23 1500          PT Time and Intention    Document Type initial evaluation;daily treatment  -AG     Mode of Treatment individual therapy;physical therapy  -AG     Patient/Family/Caregiver Comments/Observations pt. supine on 5L O2 nc; anxious and emotional, however, agreeable to participation.  -AG     Row Name 23 1500          General Information    Patient Profile Reviewed yes  -AG     Existing Precautions/Restrictions fall;oxygen therapy device and L/min  -AG     Row Name 23 1500          Previous Level of Function/Home Environm    BADLs, Premorbid Functional Level partial assistance  -AG     Bed Mobility, Premorbid Functional Level independent  -AG     Transfers, Premorbid Functional Level independent  -AG     Household Ambulation, Premorbid Functional Level independent;uses device or equipment  use of rollator  -AG     Stairs, Premorbid Functional Level partial assistance;uses device or equipment  -AG     Community Ambulation, Premorbid Functional Level --  did not perform per pt. report  -AG     Row Name 23 1500          Living Environment    Current Living Arrangements home  -AG     Home Accessibility stairs to enter home  -AG     People in Home child(jovan), adult;significant other  patient's son lives in the home  -AG     Primary Care Provided by self;spouse/significant other  -AG     Row Name 23 1500          Home Main Entrance    Number of  Stairs, Main Entrance four  -AG     Stair Railings, Main Entrance railings on both sides of stairs  -[facility] Name 03/11/23 1500          Home Use of Assistive/Adaptive Equipment    Equipment Currently Used at Home bp cuff;glucometer;oxygen;rollator;commode  BSC  -[facility] Name 03/11/23 1500          Pain Assessment    Pretreatment Pain Rating 0/10 - no pain  -AG     Posttreatment Pain Rating 0/10 - no pain  -AG     Row Name 03/11/23 1500          Cognition/Psychosocial    Affect/Mental Status (Cognition) anxious;emotionally labile  -     Orientation Status (Cognition) oriented x 3  -AG     Follows Commands (Cognition) verbal cues/prompting required;WFL  -     Personal Safety Interventions fall prevention program maintained;gait belt;nonskid shoes/slippers when out of bed;supervised activity  -[facility] Name 03/11/23 1500          Range of Motion Comprehensive    General Range of Motion no range of motion deficits identified  -AG     Row Name 03/11/23 1500          Strength Comprehensive (MMT)    General Manual Muscle Testing (MMT) Assessment lower extremity strength deficits identified  -AG     Row Name 03/11/23 1500          Lower Extremity (Manual Muscle Testing)    Lower Extremity: Manual Muscle Testing (MMT) --  B LE grossly 4-/5  -[facility] Name 03/11/23 1500          Sensory    Additional Documentation Sensory Assessment (Somatosensory) (Group)  -AG     Row Name 03/11/23 1500          Sensory Assessment (Somatosensory)    Sensory Assessment (Somatosensory) LE sensation intact  -AG     Row Name 03/11/23 1500          Mobility    Extremity Weight-bearing Status right lower extremity;left lower extremity  -     Left Lower Extremity (Weight-bearing Status) full weight-bearing (FWB)  -     Right Lower Extremity (Weight-bearing Status) full weight-bearing (FWB)  -AG     Row Name 03/11/23 1500          Bed Mobility    Bed Mobility supine-sit;sit-supine;scooting/bridging;rolling right;rolling left  -      Rolling Left [place] (Bed Mobility) standby assist  -AG     Rolling Right [place] (Bed Mobility) standby assist  -AG     Scooting/Bridging [place] (Bed Mobility) standby assist  -AG     Supine-Sit [place] (Bed Mobility) standby assist  -AG     Sit-Supine [place] (Bed Mobility) standby assist  -AG     Assistive Device (Bed Mobility) bed rails  -AG     Row Name 03/11/23 1500          Transfer Assessment/Treatment    Transfers bed-chair transfer;sit-stand transfer;stand-sit transfer;stand pivot/stand step transfer  -AG     Row Name 03/11/23 1500          Bed-Chair Transfer    Bed-Chair [place] (Transfers) verbal cues;nonverbal cues (demo/gesture);minimum assist (75% patient effort)  -AG     Assistive Device (Bed-Chair Transfers) walker, front-wheeled  -AG     Row Name 03/11/23 1500          Sit-Stand Transfer    Sit-Stand [place] (Transfers) verbal cues;nonverbal cues (demo/gesture);minimum assist (75% patient effort)  -AG     Assistive Device (Sit-Stand Transfers) walker, front-wheeled  -AG     Row Name 03/11/23 1500          Stand-Sit Transfer    Stand-Sit [place] (Transfers) verbal cues;nonverbal cues (demo/gesture);minimum assist (75% patient effort)  -AG     Assistive Device (Stand-Sit Transfers) walker, front-wheeled  -AG     Row Name 03/11/23 1500          Stand Pivot/Stand Step Transfer    Stand Pivot/Stand Step [place] (Transfers) verbal cues;nonverbal cues (demo/gesture);minimum assist (75% patient effort)  -AG     Assistive Device (Stand Pivot Stand Step Transfer) walker, front-wheeled  -AG     Row Name 03/11/23 1500          Toilet Transfer    Type (Toilet Transfer) stand pivot/stand step  -AG     [place] Level (Toilet Transfer) verbal cues;nonverbal cues (demo/gesture);minimum assist (75% patient effort)  -AG     Row Name 03/11/23 1500          Gait/Stairs (Locomotion)    Gait/Stairs Locomotion gait/ambulation independence;gait/ambulation assistive  device;gait pattern;distance ambulated;gait deviations  -AG     [place] Level (Gait) verbal cues;nonverbal cues (demo/gesture);minimum assist (75% patient effort)  -AG     Assistive Device (Gait) walker, front-wheeled  -AG     Distance in Feet (Gait) 5  -AG     Pattern (Gait) step-to  -AG     Row Name 03/11/23 1500          Safety Issues, Functional Mobility    Impairments Affecting Function (Mobility) balance;endurance/activity tolerance;strength  -AG     Row Name 03/11/23 1500          Balance    Balance Assessment sitting static balance;sitting dynamic balance;sit to stand dynamic balance;standing static balance;standing dynamic balance  -AG     Static Sitting Balance independent  -AG     Position, Sitting Balance sitting in chair;sitting edge of bed  -AG     Sit to Stand Dynamic Balance standby assist  -AG     Static Standing Balance verbal cues;non-verbal cues (demo/gesture);contact guard;minimal assist  -AG     Dynamic Standing Balance verbal cues;non-verbal cues (demo/gesture);minimal assist  -AG     Position/Device Used, Standing Balance walker, front-wheeled  -AG     Row Name 03/11/23 1500          Motor Skills    Motor Skills functional endurance;coordination  -AG     Therapeutic Exercise hip;knee;ankle  -AG     Row Name 03/11/23 1500          Hip (Therapeutic Exercise)    Hip (Therapeutic Exercise) strengthening exercise  -AG     Hip Strengthening (Therapeutic Exercise) bilateral;flexion;extension;aBduction;aDduction;marching while seated;sitting  -AG     Row Name 03/11/23 1500          Knee (Therapeutic Exercise)    Knee (Therapeutic Exercise) strengthening exercise  -AG     Knee Strengthening (Therapeutic Exercise) bilateral;flexion;extension;marching while seated;LAQ (long arc quad);sitting  -AG     Row Name 03/11/23 1500          Ankle (Therapeutic Exercise)    Ankle (Therapeutic Exercise) strengthening exercise  -AG     Ankle Strengthening (Therapeutic Exercise) bilateral;dorsiflexion;sitting   -AG     Row Name 03/11/23 1500          Positioning and Restraints    Pre-Treatment Position in bed  -AG     Post Treatment Position wheelchair  -AG     In Wheelchair sitting;with OT;legs elevated  -AG     Row Name 03/11/23 1500          Therapy Assessment/Plan (PT)    Patient's Goals For Discharge return home  -AG     Row Name 03/11/23 1500          Therapy Assessment/Plan (PT)    Functional Level at Time of Evaluation (PT) min A  -AG     PT Diagnosis (PT) impaired functional mobility, endurance  -AG     Rehab Potential/Prognosis (PT) good, to achieve stated therapy goals  -AG     Frequency of Treatment (PT) 5 times per week  -AG     Estimated Duration of Therapy (PT) 2 weeks;3 weeks  -AG     Problem List (PT) problems related to;balance;mobility;strength  functional endurance  -AG     Activity Limitations Related to Problem List (PT) unable to ambulate safely;unable to transfer safely;BADLs not performed adequately or safely  -AG     Comment, Therapy Assessment/Plan (PT) PT evaluation complete. Pt. exhibits decr LE strength, functional endurance and mobility.  Pt. will benefit from skilled PT to address deficits.  -AG     Row Name 03/11/23 1500          Therapy Plan Review/Discharge Plan (PT)    Therapy Plan Review (PT) evaluation/treatment results reviewed;care plan/treatment goals reviewed;risks/benefits reviewed;current/potential barriers reviewed;participants voiced agreement with care plan;participants included;patient  -AG     Anticipated Equipment Needs at Discharge (PT Eval) wheelchair  TBD  -AG     Anticipated Discharge Disposition (PT) home with assist;home with home health  -AG     Row Name 03/11/23 1500          IRF PT Goals    Bed Mobility Goal Selection (PT-IRF) bed mobility, PT goal 1  -AG     Transfer Goal Selection (PT-IRF) transfers, PT goal 1  -AG     Gait (Walking Locomotion) Goal Selection (PT-IRF) gait, PT goal 1  -AG     Row Name 03/11/23 1500          Bed Mobility Goal 1 (PT-IRF)     Activity/Assistive Device (Bed Mobility Goal 1, PT-IRF) rolling to left;rolling to right;scooting;sit to supine;supine to sit  -AG     [place] Level (Bed Mobility Goal 1, PT-IRF) independent  -AG     Time Frame (Bed Mobility Goal 1, PT-IRF) by discharge  -AG     Row Name 03/11/23 1500          Transfer Goal 1 (PT-IRF)    Activity/Assistive Device (Transfer Goal 1, PT-IRF) sit-to-stand/stand-to-sit;bed-to-chair/chair-to-bed;toilet  appropriate a.d.  -AG     [place] Level (Transfer Goal 1, PT-IRF) standby assist  -AG     Time Frame (Transfer Goal 1, PT-IRF) by discharge  -AG     Row Name 03/11/23 1500          Gait/Walking Locomotion Goal 1 (PT-IRF)    Activity/Assistive Device (Gait/Walking Locomotion Goal 1, PT-IRF) gait (walking locomotion);assistive device use;decrease fall risk;diminish gait deviation;improve balance and speed;increase endurance/gait distance  appropriate a.d.  -AG     Gait/Walking Locomotion Distance Goal 1 (PT-IRF) 150  -AG     [place] Level (Gait/Walking Locomotion Goal 1, PT-IRF) standby assist  -AG     Time Frame (Gait/Walking Locomotion Goal 1, PT-IRF) by discharge  -AG           User Key  (r) = Recorded By, (t) = Taken By, (c) = Cosigned By    Initials Name Provider Type    Andreina Phelps PT Physical Therapist                 Physical Therapy Education     Title: PT OT SLP Therapies (Done)     Topic: Physical Therapy (Done)     Point: Mobility training (Done)     Learning Progress Summary           Patient Acceptance, E,D, VU,NR by  at 3/11/2023 1455                   Point: Home exercise program (Done)     Learning Progress Summary           Patient Acceptance, E,D, VU,NR by  at 3/11/2023 1455                   Point: Body mechanics (Done)     Learning Progress Summary           Patient Acceptance, E,D, VU,NR by  at 3/11/2023 1455                   Point: Precautions (Done)     Learning Progress Summary           Patient Acceptance, E,D, VU,NR by  at 3/11/2023  1455                               User Key     Initials Effective Dates Name Provider Type Discipline    AG 06/16/21 -  Andreina Daigle, PT Physical Therapist PT                PT Recommendation and Plan    Frequency of Treatment (PT): 5 times per week  Anticipated Equipment Needs at Discharge (PT Eval): wheelchair (TBD)                  Time Calculation:      PT Charges     Row Name 03/11/23 1457             Time Calculation    PT Received On 03/11/23  -      PT - Next Appointment 03/13/23  -      PT Goal Re-Cert Due Date 03/18/23  -            User Key  (r) = Recorded By, (t) = Taken By, (c) = Cosigned By    Initials Name Provider Type    AG Andreina Daigle, PT Physical Therapist                Therapy Charges for Today     Code Description Service Date Service Provider Modifiers Qty    86236968512 HC PT EVAL MOD COMPLEXITY 3 3/11/2023 Andreina Daigle, PT GP 1    91140551340 HC GAIT TRAINING EA 15 MIN 3/11/2023 Andreina Daigle, PT GP 1    [id number]  PT THER PROC EA 15 MIN 3/11/2023 Andreina Daigle, PT GP 2                   Andreina Daigle PT  3/11/2023

## 2023-03-11 NOTE — PLAN OF CARE
Goal Outcome Evaluation:  Plan of Care Reviewed With: patient        Progress: no change  Outcome Evaluation: NEW ADMIT; PLAN OF CARE INITIATED; MONITOR

## 2023-03-11 NOTE — PROGRESS NOTES
Patient Assessment Instrument  Quality Indicators - Admission FY 2023    Section A. Ethnicity/ Race/Language  Ethnicity:  Race:  Preferred Language:    Section A. Transportation      Section B. Hearing and Vision        Section B. Health Literacy        Section C. Cognitive Patterns      Section C. Signs and Symptoms of Delirium (from CAM)      Section D. Mood      Section D. Social Isolation      Section [id number]. Prior Functioning      Section [id number]. Prior Device Use      Section [id number]. Self Care Performance     Eating: [place] provides verbal cues and/or touching/steadying and/or contact  guard assistance as patient completes activity.   Oral Hygiene: [place] provides verbal cues and/or touching/steadying and/or  contact guard assistance as patient completes activity.   Toileting Hygiene: [place] does all of the effort. Patient does none of the  effort to complete the activity. Or, the assistance of 2 or more helpers is  required for the patient to complete the activity.   Shower/Bathe Self: [place] does more than half the effort. [place] lifts or holds  trunk or limbs and provides more than half the effort.   Upper Body Dressing: [place] does less than half the effort. [place] lifts, holds  or supports trunk or limbs but provides less than half the effort.   Lower Body Dressing: [place] does more than half the effort. [place] lifts or  holds trunk or limbs and provides more than half the effort.   Putting On/Taking Off Footwear: [place] does all of the effort. Patient does  none of the effort to complete the activity. Or, the assistance of 2 or more  helpers is required for the patient to complete the activity.    Section [id number]. Self Care Discharge Goals     Eating: [place] sets up or cleans up; patient completes activity. [place] assists  only prior to or following the activity.   Oral Hygiene: [place] sets up or cleans up; patient completes activity. [place]  assists only prior to or following the activity.   Toileting  Hygiene: [place] does less than half the effort. [place] lifts, holds  or supports trunk or limbs but provides less than half the effort.   Shower/Bathe Self: [place] does less than half the effort. [place] lifts, holds  or supports trunk or limbs but provides less than half the effort.   Upper Body Dressing: [place] sets up or cleans up; patient completes activity.  [place] assists only prior to or following the activity.   Lower Body Dressing: [place] does less than half the effort. [place] lifts, holds  or supports trunk or limbs but provides less than half the effort.   Putting On/Taking Off Footwear: [place] does less than half the effort. [place]  lifts, holds or supports trunk or limbs but provides less than half the effort.    Section [id number]. Mobility Performance      Section [id number]. Mobility Discharge Goals      Section H. Bladder and Bowel      Section I. Active Diagnosis      Section J. Health Conditions      Section J. Health Conditions (Pain)      Section K. Swallowing/Nutritional Status    Nutritional Approaches on Admission:    Section M. Skin Conditions      Section N. Medication        Section O. Special Treatments, Procedures, and Programs    Signed by: Keli Xie, Occupational Therapist

## 2023-03-11 NOTE — PLAN OF CARE
Goal Outcome Evaluation:  Plan of Care Reviewed With: patient        Progress: improving     Problem: Rehabilitation (IRF) Plan of Care  Goal: Plan of Care Review  Outcome: Ongoing, Progressing  Flowsheets (Taken 3/11/2023 1246)  Progress: improving  Plan of Care Reviewed With: patient  Goal: Patient-Specific Goal (Individualized)  Outcome: Ongoing, Progressing  Goal: Absence of New-Onset Illness or Injury  Outcome: Ongoing, Progressing  Intervention: Prevent Fall and Fall Injury  Recent Flowsheet Documentation  Taken 3/11/2023 1403 by Ruiz Raygoza, RN  Safety Promotion/Fall Prevention: safety round/check completed  Taken 3/11/2023 1205 by Ruiz Raygoza, RN  Safety Promotion/Fall Prevention: safety round/check completed  Taken 3/11/2023 1002 by Ruiz Raygoza, RN  Safety Promotion/Fall Prevention: safety round/check completed  Taken 3/11/2023 0750 by Ruiz Raygoza, RN  Safety Promotion/Fall Prevention:   safety round/check completed   nonskid shoes/slippers when out of bed   gait belt   fall prevention program maintained   clutter free environment maintained   assistive device/personal items within reach  Intervention: Prevent VTE (Venous Thromboembolism)  Recent Flowsheet Documentation  Taken 3/11/2023 0750 by Ruiz Raygoza, RN  VTE Prevention/Management: (Eliquis) other (see comments)  Goal: Optimal Comfort and Wellbeing  Outcome: Ongoing, Progressing  Goal: Home and Community Transition Plan Established  Outcome: Ongoing, Progressing  Goal: Plan of Care Review  Outcome: Ongoing, Progressing  Flowsheets (Taken 3/11/2023 1246)  Progress: improving  Plan of Care Reviewed With: patient  Goal: Patient-Specific Goal (Individualized)  Outcome: Ongoing, Progressing  Goal: Absence of New-Onset Illness or Injury  Outcome: Ongoing, Progressing  Intervention: Prevent Fall and Fall Injury  Recent Flowsheet Documentation  Taken 3/11/2023 1403 by Ruiz Raygoza, RN  Safety Promotion/Fall Prevention: safety  round/check completed  Taken 3/11/2023 1205 by Ruiz Raygoza, RN  Safety Promotion/Fall Prevention: safety round/check completed  Taken 3/11/2023 1002 by Ruiz Raygoza RN  Safety Promotion/Fall Prevention: safety round/check completed  Taken 3/11/2023 0750 by Ruiz Raygoza RN  Safety Promotion/Fall Prevention:   safety round/check completed   nonskid shoes/slippers when out of bed   gait belt   fall prevention program maintained   clutter free environment maintained   assistive device/personal items within reach  Intervention: Prevent VTE (Venous Thromboembolism)  Recent Flowsheet Documentation  Taken 3/11/2023 0750 by Ruiz Raygoza, RN  VTE Prevention/Management: (Eliquis) other (see comments)  Goal: Optimal Comfort and Wellbeing  Outcome: Ongoing, Progressing  Goal: Home and Community Transition Plan Established  Outcome: Ongoing, Progressing     Problem: Skin Injury Risk Increased  Goal: Skin Health and Integrity  Outcome: Ongoing, Progressing  Intervention: Promote and Optimize Oral Intake  Recent Flowsheet Documentation  Taken 3/11/2023 0750 by Ruiz Raygoza, RN  Oral Nutrition Promotion: physical activity promoted  Intervention: Optimize Skin Protection  Recent Flowsheet Documentation  Taken 3/11/2023 0750 by Ruiz Raygoza, RN  Pressure Reduction Techniques: pressure points protected  Pressure Reduction Devices: pressure-redistributing mattress utilized  Skin Protection: incontinence pads utilized     Problem: Mobility Impairment  Goal: Optimal Mobility Bypro and Safety  Outcome: Ongoing, Progressing     Problem: Fall Injury Risk  Goal: Absence of Fall and Fall-Related Injury  Outcome: Ongoing, Progressing  Intervention: Promote Injury-Free Environment  Recent Flowsheet Documentation  Taken 3/11/2023 1403 by Ruiz Raygoza, RN  Safety Promotion/Fall Prevention: safety round/check completed  Taken 3/11/2023 1205 by Ruiz Raygoza, RN  Safety Promotion/Fall Prevention: safety  round/check completed  Taken 3/11/2023 1002 by Ruiz Raygoza, RN  Safety Promotion/Fall Prevention: safety round/check completed  Taken 3/11/2023 0750 by Ruiz Raygoza, RN  Safety Promotion/Fall Prevention:   safety round/check completed   nonskid shoes/slippers when out of bed   gait belt   fall prevention program maintained   clutter free environment maintained   assistive device/personal items within reach

## 2023-03-11 NOTE — THERAPY EVALUATION
Inpatient Rehabilitation - Occupational Therapy Initial Evaluation     Ruben     Patient Name: Pati Cortes  : 1955  MRN: [id number]    Today's Date: 3/11/2023                 Admit Date: 3/10/2023       No diagnosis found.    Patient Active Problem List   Diagnosis   • Debility       Past Medical History:   Diagnosis Date   • Arthritis    • Diabetes mellitus (HCC)    • Stroke (HCC)        History reviewed. No pertinent surgical history.          IRF OT ASSESSMENT FLOWSHEET (last 12 hours)     IRF OT Evaluation and Treatment     Row Name 23 1200          OT Time and Intention    Document Type initial evaluation;daily treatment  -     Mode of Treatment individual therapy;occupational therapy  -     Patient Effort good  -     Symptoms Noted During/After Treatment fatigue  -     Row Name 23 1200          General Information    Patient/Family/Caregiver Comments/Observations patient agreeable to therapy. patient tolerated with rest breaks. patient seen by OT with diagnosis of debility due to multiple med problems and hospitalization. patient seen up in chair in therapy gym.  -     Existing Precautions/Restrictions fall;oxygen therapy device and L/min  -     Row Name 23 1200          Previous Level of Function/Home Environm    BADLs, Premorbid Functional Level partial assistance  -     Row Name 23 1200          Living Environment    Current Living Arrangements home  -     People in Home child(jovan), adult;significant other  -     Primary Care Provided by spouse/significant other;self  -     Row Name 23 1200          Home Use of Assistive/Adaptive Equipment    Equipment Currently Used at Home walker, rolling;commode  -     Row Name 23 1200          Cognition/Psychosocial    Affect/Mental Status (Cognition) anxious  -     Orientation Status (Cognition) oriented x 3  -     Follows Commands (Cognition) verbal cues/prompting required;WFL  -     Row  Name 03/11/23 [facility]          Range of Motion Comprehensive    General Range of Motion bilateral upper extremity ROM WFL  -AH     Row Name 03/11/23 1200          Strength Comprehensive (MMT)    General Manual Muscle Testing (MMT) Assessment upper extremity strength deficits identified  3+/5 BUE  -AH     Row Name 03/11/23 [facility]          Bathing    [place] Level (Bathing) maximum assist (25% patient effort)  -AH     Row Name 03/11/23 [facility]          Upper Body Dressing    [place] Level (Upper Body Dressing) minimum assist (75% or more patient effort)  -AH     Row Name 03/11/23 [facility]          Lower Body Dressing    [place] Level (Lower Body Dressing) maximum assist (25% patient effort)  -AH     Row Name 03/11/23 [facility]          Grooming    [place] Level (Grooming) minimum assist (75% patient effort)  -AH     Row Name 03/11/23 [facility]          Toileting    [place] Level (Toileting) dependent (less than 25% patient effort)  -AH     Row Name 03/11/23 [facility]          Self-Feeding    [place] Level (Self-Feeding) supervision  -AH     Row Name 03/11/23 [facility]          Chair-Bed Transfer    Chair-Bed [place] (Transfers) minimum assist (75% patient effort)  -AH     Row Name 03/11/23 [facility]          Toilet Transfer    Type (Toilet Transfer) stand pivot/stand step  -     [place] Level (Toilet Transfer) minimum assist (75% patient effort);2 person assist;verbal cues  -AH     Row Name 03/11/23 1200          Motor Skills    Motor Skills coordination;functional endurance  -     Therapeutic Exercise shoulder;elbow/forearm;wrist;hand  BUE ROM, gmc,fmc, strengthening  -AH     Row Name 03/11/23 1200          Positioning and Restraints    Post Treatment Position bed  -     In Bed supine;call light within reach;encouraged to call for assist  -AH     Row Name 03/11/23 1200          Therapy Assessment/Plan (OT)    Patient's Goals For Discharge return home;take care of myself at home  -AH     Row Name 03/11/23  1200          Therapy Assessment/Plan (OT)    Rehab Potential/Prognosis (OT) good, to achieve stated therapy goals;adequate, monitor progress closely  -     Frequency of Treatment (OT) 5 times per week  -     Estimated Duration of Therapy (OT) 2 weeks;3 weeks  -     Problem List (OT) sensation;coordination  -     Activity Limitations Related to Problem List (OT) BADLs not performed adequately or safely  -     Planned Therapy Interventions (OT) activity tolerance training;adaptive equipment training;BADL retraining;patient/caregiver education/training;ROM/therapeutic exercise;strengthening exercise  -[facility] Name 03/11/23 1200          Therapy Plan Review/Discharge Plan (OT)    Therapy Plan Review (OT) patient  -     Anticipated Discharge Disposition (OT) home with assist  -     Row Name 03/11/23 1200          Bathing Goal 1 (OT-IRF)    Activity/Device (Bathing Goal 1, OT-IRF) bathing skills, all  -     [place] Level (Bathing Goal 1, OT-IRF) moderate assist (50-74% patient effort)  -     Row Name 03/11/23 1200          Bathing Goal 2 (OT-IRF)    Activity/Device (Bathing Goal 2, OT-IRF) bathing skills, all  -     [place] Level (Bathing Goal 2, OT-IRF) minimum assist (75% or more patient effort)  -     Row Name 03/11/23 1200          LB Dressing Goal 1 (OT-IRF)    Activity/Device (LB Dressing Goal 1, OT-IRF) lower body dressing  -     [place] (LB Dressing Goal 1, OT-IRF) moderate assist (50-74% patient effort)  -     Row Name 03/11/23 1200          LB Dressing Goal 2 (OT-IRF)    Activity/Device (LB Dressing Goal 2, OT-IRF) lower body dressing  -     [place] (LB Dressing Goal 2, OT-IRF) minimum assist (75% or more patient effort)  -     Row Name 03/11/23 1200          Toileting Goal 1 (OT-IRF)    Activity/Device (Toileting Goal 1, OT-IRF) toileting skills, all  -     [place] Level (Toileting Goal 1, OT-IRF) moderate assist (50-74% patient effort)  -     Row  Name 03/11/23 1200          Toileting Goal 2 (OT-IRF)    Activity/Device (Toileting Goal 2, OT-IRF) toileting skills, all  -     [place] Level (Toileting Goal 2, OT-IRF) minimum assist (75% or more patient effort)  -           User Key  (r) = Recorded By, (t) = Taken By, (c) = Cosigned By    Initials Name Effective Dates     Gail Xie OT 06/16/21 -                          OT Recommendation and Plan    Planned Therapy Interventions (OT): activity tolerance training, adaptive equipment training, BADL retraining, patient/caregiver education/training, ROM/therapeutic exercise, strengthening exercise                    Time Calculation:      Time Calculation- OT     Row Name 03/11/23 1215             Time Calculation-     OT Start Time 1000  -      OT Stop Time 1130  -      OT Time Calculation (min) 90 min  -            User Key  (r) = Recorded By, (t) = Taken By, (c) = Cosigned By    Initials Name Provider Type     Gail Xie, OT Occupational Therapist              Therapy Charges for Today     Code Description Service Date Service Provider Modifiers Qty    71902580454  OT THERAPEUTIC ACT EA 15 MIN 3/11/2023 Gail Xie OT GO 2    [id number]  OT SELF CARE/MGMT/TRAIN EA 15 MIN 3/11/2023 Gail Xie OT GO 2    [id number]  OT EVAL MOD COMPLEXITY 2 3/11/2023 Gail Xie OT GO 1                   Gail Xie OT  3/11/2023

## 2023-03-11 NOTE — PROGRESS NOTES
Inpatient Rehabilitation Functional Measures Assessment and Plan of Care    Plan of Care  Self Care    [OT] Bathing(Active)  Current Status(03/11/2023): max  Weekly Goal(03/18/2023): mod  Discharge Goal: min    Functional Measures  NICK Eating:  NICK Grooming:  NICK Bathing:  NICK Upper Body Dressing:  NICK Lower Body Dressing:  NICK Toileting:    NICK Bladder Management  Level of Assistance:  Frequency/Number of Accidents this Shift:    NICK Bowel Management  Level of Assistance:  Frequency/Number of Accidents this Shift:    NICK Bed/Chair/Wheelchair Transfer:  NICK Toilet Transfer:  NICK Tub/Shower Transfer:    Previously Documented Mode of Locomotion at Discharge:  [facility] Expected Mode of Locomotion at Discharge:  NICK Walk/Wheelchair:  NICK Stairs:    NICK Comprehension:  NICK Expression:  NICK Social Interaction:  NICK Problem Solving:  NICK Memory:    Therapy Mode Minutes  Occupational Therapy: Individual: 90 minutes.  Physical Therapy:  Speech Language Pathology:    Discharge Functional Goals:    Signed by: Keli Xie, Occupational Therapist

## 2023-03-11 NOTE — CONSULTS
Nephrology Consultation Note    Referring Provider: Dr. Kody Cowart  Reason for Consultation: Acute renal failure    Chief complaint here for rehab    Subjective .     History of present illness: Patient says that she has been comatose for several days ago she has no recollection of what all has gone on.  She thinks she went to the hospital with chest pain and shortness of breath and was told to have COVID-19 pneumonia.  This was on 1/8/2023.  This was in [place].  She was sent to [facility] where she probably had to be put on a ventilator she thinks.  She did receive acute dialysis today but her last dialysis was apparently on 28 February according to the nurse.  She still has a dialysis catheter in situ.  The patient is here for rehab.  She denies any chest pain or nausea or vomiting or diarrhea or hematemesis or melena.  She has dyspnea class IV as she is visibly dyspneic in bed.  She says she is able to move all extremities.  She does not have any lateralizing weakness.  I have reviewed the history from Dr. Cowart.  She is here at [facility] for rehab.    Discharge summary from [facility] reveals acute hypoxic hypercapnic respiratory failure secondary to COVID-pneumonia with possible superimposed bacterial pneumonia and systolic and diastolic CHF exacerbation.  She was extubated on 1/24/2023.  She did get remdesivir and Decadron.  Ejection fraction is listed as 30%.  She is listed as having CKD stage IV at baseline.  Also listed are coronary artery disease with stent and resolved sepsis and hypertensive emergency and metabolic encephalopathy.    Hemodynamic data reviewed    Patient denies any other complaints and other systems reviewed and largely negative    History  Past Medical History:   Diagnosis Date   • Arthritis    • Diabetes mellitus (HCC)    • Stroke (HCC)    , History reviewed. No pertinent surgical history., History reviewed. No pertinent family history.,    Social History     Tobacco Use   • Smoking status: Never   • Smokeless tobacco: Never   Vaping Use   • Vaping Use: Never used   Substance Use Topics   • Alcohol use: Never   • Drug use: Never    and Allergies:  Patient has no known allergies.      Vital Signs   Temp:  [98.6 °F (37 °C)-98.7 °F (37.1 °C)] 98.7 °F (37.1 °C)  Heart Rate:  [58-79] 69  Resp:  [18-20] 18  BP: (133-165)/(57-67) 152/62    Physical Exam:     General Appearance:    Alert, cooperative, in no acute distress, oriented times three but she is orthopneic/tachypneic   Head:    Normocephalic, without obvious abnormality   Eyes:            normal, no icterus, + pallor, pupils CCERLA   Ears:    Ears appear intact    Throat:   No oral lesions, no thrush, oral mucosa moist   Neck:   no JVD, retraction of accessory muscles       Lungs:    Rare posterior basal crackles    Heart:    Regular rhythm and normal rate, normal S1 and S2   Chest Wall:    No abnormalities observed   Abdomen:     Normal bowel sounds, no tenderness   Rectal:     Deferred   Extremities:  No edema       Skin:   No petechiae   Lymph nodes:   No cervical palpable adenopathy   Neurologic:  Appropriate x3     Results Review:   I reviewed the patient's new clinical results.      Lab Results (last 24 hours)     Procedure Component Value Units Date/Time    POC Glucose Once [[id number]]  (Normal) Collected: 03/11/23 1103    Specimen: Blood Updated: 03/11/23 1110     Glucose 124 mg/dL      Comment: Meter: [id number] : [id number] SALLY LEWIS       POC Glucose Once [[id number]]  (Normal) Collected: 03/11/23 0614    Specimen: Blood Updated: 03/11/23 0624     Glucose 106 mg/dL      Comment: Meter: [id number] : [id number] Rojas [facility]       Comprehensive Metabolic Panel [[id number]]  (Abnormal) Collected: 03/11/23 0136    Specimen: Blood Updated: 03/11/23 0232     Glucose 93 mg/dL      BUN 47 mg/dL      Creatinine 2.01 mg/dL      Sodium 135 mmol/L      Potassium 3.8 mmol/L      Chloride 95  mmol/L      CO2 29.1 mmol/L      Calcium 9.2 mg/dL      Total Protein 5.4 g/dL      Albumin 2.5 g/dL      ALT (SGPT) 5 U/L      AST (SGOT) 12 U/L      Alkaline Phosphatase 106 U/L      Total Bilirubin 0.3 mg/dL      Globulin 2.9 gm/dL      A/G Ratio 0.9 g/dL      BUN/Creatinine Ratio 23.4     Anion Gap 10.9 mmol/L      eGFR 26.6 mL/min/1.73     Narrative:      GFR Normal >60  Chronic Kidney Disease <60  Kidney Failure <15      Magnesium [[id number]]  (Normal) Collected: 03/11/23 0136    Specimen: Blood Updated: 03/11/23 0232     Magnesium 2.0 mg/dL     CBC Auto Differential [[id number]]  (Abnormal) Collected: 03/11/23 0136    Specimen: Blood Updated: 03/11/23 0212     WBC 9.27 10*3/mm3      RBC 2.72 10*6/mm3      Hemoglobin 7.7 g/dL      Hematocrit 25.1 %      MCV 92.3 fL      MCH 28.3 pg      MCHC 30.7 g/dL      RDW 16.0 %      RDW-SD 53.6 fl      MPV 10.1 fL      Platelets 470 10*3/mm3      Neutrophil % 62.5 %      Lymphocyte % 22.7 %      Monocyte % 11.1 %      Eosinophil % 0.8 %      Basophil % 0.6 %      Immature Grans % 2.3 %      Neutrophils, Absolute 5.80 10*3/mm3      Lymphocytes, Absolute 2.10 10*3/mm3      Monocytes, Absolute 1.03 10*3/mm3      Eosinophils, Absolute 0.07 10*3/mm3      Basophils, Absolute 0.06 10*3/mm3      Immature Grans, Absolute 0.21 10*3/mm3      nRBC 0.0 /100 WBC     POC Glucose Once [[id number]]  (Normal) Collected: 03/10/23 2027    Specimen: Blood Updated: 03/10/23 2053     Glucose 122 mg/dL      Comment: Meter: [id number] : [id number] Christopher Reynolds       POC Glucose Once [[id number]]  (Normal) Collected: 03/10/23 1608    Specimen: Blood Updated: 03/10/23 1616     Glucose 105 mg/dL      Comment: Meter: [id number] : [id number] MANDO SANTANA             Imaging Results (Last 24 Hours)     ** No results found for the last 24 hours. **                    Assessment and Plan:    1.  CKD stage IV  2.  Resolved acute tubular necrosis  3.  Personal history of acute dialysis  4.   Acute on chronic hypoxic respiratory failure  5.  Systolic plus diastolic CHF compensated  6.  Left ventricular ejection fraction 30%  7.  Coronary artery disease with history of stent  8.  Type 2 diabetes  9.  Anemia of acute on chronic disease  10.  Debility and deconditioning      There is no need for dialysis at this time.  We will check iron saturation.  She is on an unusual combination of blood pressure medications which include hydralazine and minoxidil and I am uncomfortable with this so I am going to gradually taper off the minoxidil as long as her blood pressure remains okay.  For now we will leave her on the same dose of diuretics and see how she does.  We will get a baseline x-ray chest.        Clement Maciel MD  03/11/23  15:12 EST

## 2023-03-11 NOTE — PROGRESS NOTES
[facility]  PROGRESS NOTE     Patient Identification:  Name:  Pati Cortes  Age:  67 y.o.  Sex:  female  :  1955  MRN:  [id number]  Visit Number:  [id number]  ROOM: [facility]     Primary Care Provider:  Gasper Reyes MD    Length of stay in inpatient status:  1    Subjective     Chief Compliant:  No chief complaint on file.      History of Presenting Illness: 67-year-old female who is being treated for debility.  Patient had a long bout with acute on chronic hypoxic respiratory failure likely secondary to CHF as well as COVID-19 with secondary bacterial pneumonia.  Patient does have CHF with reduced EF, chronic kidney disease with recent acute kidney injury.  Patient treated for hypertension, CAD, diabetes mellitus, history of DVT, GERD.  Patient states she did not sleep well last evening.  Patient has no other acute complaints but is is a little emotional this morning states she is just extremely tired    Objective     Current Hospital Meds:amLODIPine, 10 mg, Oral, Q24H  apixaban, 5 mg, Oral, Q12H  aspirin, 81 mg, Oral, Daily  atorvastatin, 80 mg, Oral, Nightly  bumetanide, 2 mg, Oral, Q PM  bumetanide, 4 mg, Oral, Daily  busPIRone, 7.5 mg, Oral, TID  cholecalciferol, 1,000 Units, Oral, Daily  cloNIDine, 0.3 mg, Per PEG Tube, Q8H  clopidogrel, 75 mg, Per PEG Tube, Daily  docusate sodium, 100 mg, Oral, BID  ferrous sulfate, 325 mg, Oral, BID With Meals  guaiFENesin, 600 mg, Oral, Q12H  hydrALAZINE, 100 mg, Oral, Q6H  Insulin Aspart, 0-9 Units, Subcutaneous, TID AC  isosorbide dinitrate, 20 mg, Oral, TID - Nitrates  melatonin, 5 mg, Oral, Nightly  metoprolol tartrate, 50 mg, Oral, Q8H  minoxidil, 7.5 mg, Oral, Q12H  pantoprazole, 40 mg, Oral, BID AC  senna-docusate sodium, 2 tablet, Oral, BID  spironolactone, 50 mg, Oral, Daily       ----------------------------------------------------------------------------------------------------------------------  Vital Signs:  Temp:  [98.1 °F (36.7  °C)-98.7 °F (37.1 °C)] 98.7 °F (37.1 °C)  Heart Rate:  [58-79] 64  Resp:  [18-22] 18  BP: (133-165)/(57-67) 159/64  SpO2:  [92 %-96 %] 92 %  on  Flow (L/min):  [4.5] 4.5;   Device (Oxygen Therapy): nasal cannula  Body mass index is 27.79 kg/m².    Wt Readings from Last 3 Encounters:   03/10/23 71.2 kg (156 lb 14.4 oz)     Intake & Output (last 3 days)       03/08 0701 03/09 0700 03/09 0701  03/10 0700 03/10 0701 03/11 0700 03/11 0701  03/12 0700    P.O.   480 240    Total Intake(mL/kg)   480 (6.7) 240 (3.4)    Net   +480 +240            Urine Unmeasured Occurrence   2 x         Diet: Cardiac Diets, Diabetic Diets; Healthy Heart (2-3 Na+); Consistent Carbohydrate; Texture: Regular Texture (IDDSI 7); Fluid Consistency: Thin (IDDSI 0)  ----------------------------------------------------------------------------------------------------------------------  Physical exam:  Constitutional:   Chronically ill-appearing female in no distress  HEENT: Normocephalic atraumatic  Neck: Supple   Cardiovascular: Regular rate and rhythm with a grade 3/6 systolic ejection murmur  Pulmonary/Chest: Fairly clear to auscultation slightly decreased breath sounds in the bases  Abdominal:  .  Positive bowel sounds soft  Musculoskeletal: No arthropathy  Neurological: Generalized weakness  Skin: No rash  Peripheral vascular:  Genitourinary:  ----------------------------------------------------------------------------------------------------------------------    Last echocardiogram:    ----------------------------------------------------------------------------------------------------------------------  Results from last 7 days   Lab Units 03/11/23  0136   WBC 10*3/mm3 9.27   HEMOGLOBIN g/dL 7.7*   HEMATOCRIT % 25.1*   MCV fL 92.3   MCHC g/dL 30.7*   PLATELETS 10*3/mm3 470*         Results from last 7 days   Lab Units 03/11/23  0136   SODIUM mmol/L 135*   POTASSIUM mmol/L 3.8   MAGNESIUM mg/dL 2.0   CHLORIDE mmol/L 95*   CO2 mmol/L 29.1*   BUN  mg/dL 47*   CREATININE mg/dL 2.01*   CALCIUM mg/dL 9.2   GLUCOSE mg/dL 93   ALBUMIN g/dL 2.5*   BILIRUBIN mg/dL 0.3   ALK PHOS U/L 106   AST (SGOT) U/L 12   ALT (SGPT) U/L 5   Estimated Creatinine Clearance: 25.3 mL/min (A) (by C-G formula based on SCr of 2.01 mg/dL (H)).  No results found for: AMMONIA              Glucose   Date/Time Value Ref Range Status   03/11/2023 0614 106 70 - 130 mg/dL Final     Comment:     Meter: [id number] : [id number] Rojas Krueger   03/10/2023 2027 122 70 - 130 mg/dL Final     Comment:     Meter: [id number] : [id number] White Susan Ann   03/10/2023 1608 105 70 - 130 mg/dL Final     Comment:     Meter: [id number] : [id number] MANDO SANTANA     No results found for: TSH, FREET4  No results found for: PREGTESTUR, PREGSERUM, HCG, HCGQUANT  Pain Management Panel    There is no flowsheet data to display.       Brief Urine Lab Results     None        No results found for: BLOODCX      No results found for: URINECX  No results found for: WOUNDCX  No results found for: STOOLCX        I have personally looked at the labs and they are summarized above.  ----------------------------------------------------------------------------------------------------------------------  Detailed radiology reports for the last 24 hours:    Imaging Results (Last 24 Hours)     ** No results found for the last 24 hours. **        Final impressions for the last 30 days of radiology reports:    XR Abdomen 1 View    Result Date: 2/10/2023  The enteric feeding tube tip is just postpyloric, terminating in the first portion of the duodenum. Images personally reviewed, interpreted and dictated by WOLF LERMA CENTRAL VENOUS    Result Date: 2/21/2023  Successful placement of a R IJ 24 cm tunneled catheter with ultrasound and fluoroscopic guidance. The patient tolerated the procedure well and left the department in good condition. There were no immediate complications. Images reviewed,  interpreted, and dictated by Momo Schreiber MD    XR chest AP portable    Result Date: 2/17/2023  1. Cardiomegaly without edema. 2. Mild improved aeration of the lung bases with persistent residual atelectasis and/or pneumonia. 3. Trace effusions. Images reviewed, interpreted, dictated and electronically signed by Zeeshan Walker MD    FL esoph swallow funct with cine video    Result Date: 2/16/2023  Modified barium swallow under fluoroscopic guidance. Please see speech pathologist's report for further details and recommendations. Images reviewed, interpreted, and dictated by Dr. Zeeshan Walker. Transcribed by Kevin Mejia PA-C.    XR chest AP portable    Result Date: 2/16/2023  1. Cardiomegaly. 2. Basilar airspace opacities, left greater than right which may represent atelectasis or pneumonia. Overall, there is improved aeration compared to the prior study. Images reviewed, interpreted, dictated and electronically signed by Zeeshan Walker MD    XR chest AP portable    Result Date: 2/14/2023  Decreased lung volumes with persistent effusions/pneumonia, left greater than right. Images reviewed, interpreted, and dictated by Dr. Zeeshan Walker. Transcribed by Josh Montero PA-C.    XR chest AP portable    Result Date: 2/10/2023  No significant interval change. Films reviewed, interpreted, and dictated by Dr. Goldsmith. Transcribed by Gricel Nj PA-C.    XR chest AP portable    Result Date: 2/9/2023  Cardiomegaly with improving but persistent vascular congestion and pulmonary edema. Overall, there is improved aeration of the lungs. Images reviewed, interpreted, dictated and electronically signed by Zeeshan Walker MD    IR CENTRAL VENOUS    Result Date: 2/8/2023  Successful placement of a non-tunneled central venous access catheter/temporary dialysis catheter via the right internal jugular vein without complications. Line is ready to use. Images personally reviewed, interpreted and dictated by BOB Diaz.    I have  personally looked at the radiology images and read the final radiology report.    Assessment & Plan    Debility after prolonged hospitalization--patient to get PT and OT evaluation and work today.    Acute on chronic hypoxic respiratory failure continue O2 at 4.5 L per nasal cannula at this time patient may have some postinflammatory changes from her COVID-19.  Apparently her baseline at home was 3 L of O2 per nasal cannula.    CHF reduced EF has EF of 20 to 25% patient is currently on Bumex 4 mg in the morning 2 mg in the afternoon along with hydralazine, Isordil, metoprolol and Aldactone.  Patient appears to be compensated at this time    Chronic kidney disease with recent acute kidney injury stage IV--hemodialysis catheter is in place.  Last hemodialysis was on February 28.  Creatinine is improved from most recent measurement at outside hospital.  It appears that most recent measurement creatinine was approximately 2.4.  Will consult nephrology for help with follow-up.  If it appears that patient will no longer require required dialysis will hopefully be able to remove dialysis catheter within the next couple of days    Severe hypertension--fair control patient is on multiple medications including Norvasc, clonidine, hydralazine, metoprolol, minoxidil.  Patient also on diuretics    CAD continue Lipitor, metoprolol and Isordil    History of DVT Eliquis 5 mg twice daily    Diabetes mellitus well controlled    GERD Protonix 40 mg twice daily    Insomnia add melatonin    VTE Prophylaxis:   Mechanical Order History:     None      Pharmalogical Order History:      Ordered     Dose Route Frequency Stop    03/10/23 1787  apixaban (ELIQUIS) tablet 5 mg         5 mg PO Every 12 Hours Scheduled --                    Kody Cowart MD  [facility] Hospitalist  03/11/23  10:49 EST

## 2023-03-12 LAB
ANION GAP SERPL CALCULATED.3IONS-SCNC: 10 MMOL/L (ref 5–15)
BASOPHILS # BLD AUTO: 0.04 10*3/MM3 (ref 0–0.2)
BASOPHILS NFR BLD AUTO: 0.4 % (ref 0–1.5)
BUN SERPL-MCNC: 51 MG/DL (ref 8–23)
BUN/CREAT SERPL: 23.2 (ref 7–25)
CALCIUM SPEC-SCNC: 8.9 MG/DL (ref 8.6–10.5)
CHLORIDE SERPL-SCNC: 97 MMOL/L (ref 98–107)
CO2 SERPL-SCNC: 29 MMOL/L (ref 22–29)
CREAT SERPL-MCNC: 2.2 MG/DL (ref 0.57–1)
DEPRECATED RDW RBC AUTO: 54.3 FL (ref 37–54)
EGFRCR SERPLBLD CKD-EPI 2021: 23.9 ML/MIN/1.73
EOSINOPHIL # BLD AUTO: 0.09 10*3/MM3 (ref 0–0.4)
EOSINOPHIL NFR BLD AUTO: 1 % (ref 0.3–6.2)
ERYTHROCYTE [DISTWIDTH] IN BLOOD BY AUTOMATED COUNT: 15.9 % (ref 12.3–15.4)
GLUCOSE BLDC GLUCOMTR-MCNC: 108 MG/DL (ref 70–130)
GLUCOSE BLDC GLUCOMTR-MCNC: 121 MG/DL (ref 70–130)
GLUCOSE BLDC GLUCOMTR-MCNC: 98 MG/DL (ref 70–130)
GLUCOSE SERPL-MCNC: 110 MG/DL (ref 65–99)
HCT VFR BLD AUTO: 26.1 % (ref 34–46.6)
HGB BLD-MCNC: 8 G/DL (ref 12–15.9)
IMM GRANULOCYTES # BLD AUTO: 0.18 10*3/MM3 (ref 0–0.05)
IMM GRANULOCYTES NFR BLD AUTO: 1.9 % (ref 0–0.5)
LYMPHOCYTES # BLD AUTO: 1.86 10*3/MM3 (ref 0.7–3.1)
LYMPHOCYTES NFR BLD AUTO: 20 % (ref 19.6–45.3)
MCH RBC QN AUTO: 28.8 PG (ref 26.6–33)
MCHC RBC AUTO-ENTMCNC: 30.7 G/DL (ref 31.5–35.7)
MCV RBC AUTO: 93.9 FL (ref 79–97)
MONOCYTES # BLD AUTO: 0.93 10*3/MM3 (ref 0.1–0.9)
MONOCYTES NFR BLD AUTO: 10 % (ref 5–12)
NEUTROPHILS NFR BLD AUTO: 6.22 10*3/MM3 (ref 1.7–7)
NEUTROPHILS NFR BLD AUTO: 66.7 % (ref 42.7–76)
NRBC BLD AUTO-RTO: 0.2 /100 WBC (ref 0–0.2)
PLATELET # BLD AUTO: 487 10*3/MM3 (ref 140–450)
PMV BLD AUTO: 10 FL (ref 6–12)
POTASSIUM SERPL-SCNC: 3.7 MMOL/L (ref 3.5–5.2)
RBC # BLD AUTO: 2.78 10*6/MM3 (ref 3.77–5.28)
SODIUM SERPL-SCNC: 136 MMOL/L (ref 136–145)
WBC NRBC COR # BLD: 9.32 10*3/MM3 (ref 3.4–10.8)

## 2023-03-12 PROCEDURE — 85025 COMPLETE CBC W/AUTO DIFF WBC: CPT | Performed by: FAMILY MEDICINE

## 2023-03-12 PROCEDURE — 80048 BASIC METABOLIC PNL TOTAL CA: CPT | Performed by: FAMILY MEDICINE

## 2023-03-12 PROCEDURE — 99231 SBSQ HOSP IP/OBS SF/LOW 25: CPT | Performed by: FAMILY MEDICINE

## 2023-03-12 PROCEDURE — 94799 UNLISTED PULMONARY SVC/PX: CPT

## 2023-03-12 PROCEDURE — 82962 GLUCOSE BLOOD TEST: CPT

## 2023-03-12 PROCEDURE — 25010000002 NA FERRIC GLUC CPLX PER 12.5 MG: Performed by: INTERNAL MEDICINE

## 2023-03-12 PROCEDURE — 94761 N-INVAS EAR/PLS OXIMETRY MLT: CPT

## 2023-03-12 PROCEDURE — 94660 CPAP INITIATION&MGMT: CPT

## 2023-03-12 RX ADMIN — BUSPIRONE HYDROCHLORIDE 7.5 MG: 5 TABLET ORAL at 15:07

## 2023-03-12 RX ADMIN — METOPROLOL TARTRATE 50 MG: 50 TABLET, FILM COATED ORAL at 21:01

## 2023-03-12 RX ADMIN — PANTOPRAZOLE SODIUM 40 MG: 40 TABLET, DELAYED RELEASE ORAL at 17:50

## 2023-03-12 RX ADMIN — Medication 1000 UNITS: at 08:49

## 2023-03-12 RX ADMIN — HYDRALAZINE HYDROCHLORIDE 100 MG: 50 TABLET, FILM COATED ORAL at 05:23

## 2023-03-12 RX ADMIN — PANTOPRAZOLE SODIUM 40 MG: 40 TABLET, DELAYED RELEASE ORAL at 06:32

## 2023-03-12 RX ADMIN — ACETAMINOPHEN 650 MG: 325 TABLET ORAL at 21:01

## 2023-03-12 RX ADMIN — BUMETANIDE 2 MG: 1 TABLET ORAL at 17:50

## 2023-03-12 RX ADMIN — FERROUS SULFATE TAB 325 MG (65 MG ELEMENTAL FE) 325 MG: 325 (65 FE) TAB at 08:47

## 2023-03-12 RX ADMIN — BUMETANIDE 4 MG: 1 TABLET ORAL at 08:48

## 2023-03-12 RX ADMIN — GUAIFENESIN 600 MG: 600 TABLET, EXTENDED RELEASE ORAL at 08:49

## 2023-03-12 RX ADMIN — APIXABAN 5 MG: 5 TABLET, FILM COATED ORAL at 21:02

## 2023-03-12 RX ADMIN — BUSPIRONE HYDROCHLORIDE 7.5 MG: 5 TABLET ORAL at 08:48

## 2023-03-12 RX ADMIN — SODIUM CHLORIDE 250 MG: 9 INJECTION, SOLUTION INTRAVENOUS at 21:07

## 2023-03-12 RX ADMIN — FERROUS SULFATE TAB 325 MG (65 MG ELEMENTAL FE) 325 MG: 325 (65 FE) TAB at 17:50

## 2023-03-12 RX ADMIN — CLONIDINE HYDROCHLORIDE 0.3 MG: 0.3 TABLET ORAL at 15:07

## 2023-03-12 RX ADMIN — AMLODIPINE BESYLATE 10 MG: 10 TABLET ORAL at 08:47

## 2023-03-12 RX ADMIN — ATORVASTATIN CALCIUM 80 MG: 40 TABLET, FILM COATED ORAL at 21:02

## 2023-03-12 RX ADMIN — HYDRALAZINE HYDROCHLORIDE 100 MG: 50 TABLET, FILM COATED ORAL at 23:01

## 2023-03-12 RX ADMIN — CLONIDINE HYDROCHLORIDE 0.3 MG: 0.3 TABLET ORAL at 21:01

## 2023-03-12 RX ADMIN — ISOSORBIDE DINITRATE 20 MG: 20 TABLET ORAL at 17:51

## 2023-03-12 RX ADMIN — SPIRONOLACTONE 50 MG: 25 TABLET ORAL at 08:50

## 2023-03-12 RX ADMIN — ASPIRIN 81 MG: 81 TABLET, COATED ORAL at 08:48

## 2023-03-12 RX ADMIN — GUAIFENESIN 600 MG: 600 TABLET, EXTENDED RELEASE ORAL at 21:02

## 2023-03-12 RX ADMIN — Medication 5 MG: at 21:01

## 2023-03-12 RX ADMIN — ISOSORBIDE DINITRATE 20 MG: 20 TABLET ORAL at 08:47

## 2023-03-12 RX ADMIN — BUSPIRONE HYDROCHLORIDE 7.5 MG: 5 TABLET ORAL at 21:02

## 2023-03-12 RX ADMIN — ISOSORBIDE DINITRATE 20 MG: 20 TABLET ORAL at 12:18

## 2023-03-12 RX ADMIN — METOPROLOL TARTRATE 50 MG: 50 TABLET, FILM COATED ORAL at 15:07

## 2023-03-12 RX ADMIN — APIXABAN 5 MG: 5 TABLET, FILM COATED ORAL at 08:48

## 2023-03-12 RX ADMIN — METOPROLOL TARTRATE 50 MG: 50 TABLET, FILM COATED ORAL at 05:23

## 2023-03-12 RX ADMIN — HYDROXYZINE HYDROCHLORIDE 25 MG: 25 TABLET ORAL at 21:01

## 2023-03-12 RX ADMIN — CLONIDINE HYDROCHLORIDE 0.3 MG: 0.3 TABLET ORAL at 05:23

## 2023-03-12 RX ADMIN — CLOPIDOGREL BISULFATE 75 MG: 75 TABLET, FILM COATED ORAL at 08:49

## 2023-03-12 NOTE — PHARMACY PATIENT ASSISTANCE
Pharmacy consulted for pricing of Eliquis, patient does not have prescription insurance. Added a 30-day free-trial card for discharge.    Thank you,    Nae Oropeza [facility]  03/12/23  17:59 EDT

## 2023-03-12 NOTE — PROGRESS NOTES
[facility]  PROGRESS NOTE     Patient Identification:  Name:  Pati Cortes  Age:  67 y.o.  Sex:  female  :  1955  MRN:  [id number]  Visit Number:  [id number]  ROOM: 108/     Primary Care Provider:  Gasper Reyes MD    Length of stay in inpatient status:  2    Subjective     Chief Compliant:  No chief complaint on file.      History of Presenting Illness:  66yo female---feeling much better today.  Still requiring 4.5L o2.  No new complaints.    Objective     Current Hospital Meds:amLODIPine, 10 mg, Oral, Q24H  apixaban, 5 mg, Oral, Q12H  aspirin, 81 mg, Oral, Daily  atorvastatin, 80 mg, Oral, Nightly  bumetanide, 2 mg, Oral, Q PM  bumetanide, 4 mg, Oral, Daily  busPIRone, 7.5 mg, Oral, TID  cholecalciferol, 1,000 Units, Oral, Daily  cloNIDine, 0.3 mg, Per PEG Tube, Q8H  clopidogrel, 75 mg, Per PEG Tube, Daily  docusate sodium, 100 mg, Oral, BID  ferrous sulfate, 325 mg, Oral, BID With Meals  guaiFENesin, 600 mg, Oral, Q12H  hydrALAZINE, 100 mg, Oral, Q6H  Insulin Aspart, 0-9 Units, Subcutaneous, TID AC  isosorbide dinitrate, 20 mg, Oral, TID - Nitrates  melatonin, 5 mg, Oral, Nightly  metoprolol tartrate, 50 mg, Oral, Q8H  minoxidil, 5 mg, Oral, Q12H  pantoprazole, 40 mg, Oral, BID AC  senna-docusate sodium, 2 tablet, Oral, BID  spironolactone, 50 mg, Oral, Daily       ----------------------------------------------------------------------------------------------------------------------  Vital Signs:  Temp:  [98.2 °F (36.8 °C)-98.4 °F (36.9 °C)] 98.2 °F (36.8 °C)  Heart Rate:  [62-72] 66  Resp:  [20] 20  BP: (118-162)/(49-62) 118/55  SpO2:  [92 %-98 %] 95 %  on  Flow (L/min):  [4.5] 4.5;   Device (Oxygen Therapy): nasal cannula  Body mass index is 27.79 kg/m².    Wt Readings from Last 3 Encounters:   03/10/23 71.2 kg (156 lb 14.4 oz)     Intake & Output (last 3 days)        0701  03/10 0700 03/10 0701   07 0701   0700  07 07    P.O.  480 1372 001     Total Intake(mL/kg)  480 (6.7) 1080 (15.2) 360 (5.1)    Net  +480 +1080 +360            Urine Unmeasured Occurrence  2 x 5 x 1 x    Stool Unmeasured Occurrence   2 x         Diet: Cardiac Diets, Diabetic Diets; Healthy Heart (2-3 Na+); Consistent Carbohydrate; Texture: Regular Texture (IDDSI 7); Fluid Consistency: Thin (IDDSI 0)  ----------------------------------------------------------------------------------------------------------------------  Physical exam:  Constitutional:  chronically ill female in nad  HEENT:nc/at  Neck:  Supple    Cardiovascular:  rrr with III/VI SHANNA  Pulmonary/Chest:  Decreased breath sounds in bases but clear  Abdominal:  . Positive bs soft  Musculoskeletal:no arthropathy  Neurological:no focal deficits  Skin: no rash  Peripheral vascular:no edema  Genitourinary:  ----------------------------------------------------------------------------------------------------------------------    Last echocardiogram:    ----------------------------------------------------------------------------------------------------------------------  Results from last 7 days   Lab Units 03/12/23 0049 03/11/23 0136   WBC 10*3/mm3 9.32 9.27   HEMOGLOBIN g/dL 8.0* 7.7*   HEMATOCRIT % 26.1* 25.1*   MCV fL 93.9 92.3   MCHC g/dL 30.7* 30.7*   PLATELETS 10*3/mm3 487* 470*         Results from last 7 days   Lab Units 03/12/23 0049 03/11/23 0136   SODIUM mmol/L 136 135*   POTASSIUM mmol/L 3.7 3.8   MAGNESIUM mg/dL  --  2.0   CHLORIDE mmol/L 97* 95*   CO2 mmol/L 29.0 29.1*   BUN mg/dL 51* 47*   CREATININE mg/dL 2.20* 2.01*   CALCIUM mg/dL 8.9 9.2   GLUCOSE mg/dL 110* 93   ALBUMIN g/dL  --  2.5*   BILIRUBIN mg/dL  --  0.3   ALK PHOS U/L  --  106   AST (SGOT) U/L  --  12   ALT (SGPT) U/L  --  5   Estimated Creatinine Clearance: 23.1 mL/min (A) (by C-G formula based on SCr of 2.2 mg/dL (H)).  No results found for: AMMONIA              Glucose   Date/Time Value Ref Range Status   03/12/2023 1117 121 70 - 130 mg/dL Final      Comment:     Meter: [id number] : [id number] charli robertson   03/12/2023 0618 108 70 - 130 mg/dL Final     Comment:     Meter: [id number] : [id number] Rabia Raya   03/11/2023 1626 97 70 - 130 mg/dL Final     Comment:     Meter: [id number] : [id number] charli robertson   03/11/2023 1103 124 70 - 130 mg/dL Final     Comment:     Meter: [id number] : [id number] SALLY LEWIS   03/11/2023 0614 106 70 - 130 mg/dL Final     Comment:     Meter: [id number] : [id number] Rojas Krueger   03/10/2023 2027 122 70 - 130 mg/dL Final     Comment:     Meter: [id number] : [id number] Christopher Davis Gail   03/10/2023 1608 105 70 - 130 mg/dL Final     Comment:     Meter: [id number] : [id number] MANDO SANTANA     No results found for: TSH, FREET4  No results found for: PREGTESTUR, PREGSERUM, HCG, HCGQUANT  Pain Management Panel    There is no flowsheet data to display.       Brief Urine Lab Results     None        No results found for: BLOODCX      No results found for: URINECX  No results found for: WOUNDCX  No results found for: STOOLCX        I have personally looked at the labs and they are summarized above.  ----------------------------------------------------------------------------------------------------------------------  Detailed radiology reports for the last 24 hours:    Imaging Results (Last 24 Hours)     Procedure Component Value Units Date/Time    XR Chest 1 View [[id number]] Collected: 03/11/23 1831     Updated: 03/11/23 1833    Narrative:      CR Chest 1 Vw    INDICATION:   CHF     COMPARISON:    None available.    FINDINGS:  Portable AP view(s) of the chest.    Right approach central venous catheter with distal tip overlying the superior vena cava.    Enlarged cardiac silhouette. Aortic calcifications. Diffuse perihilar and bibasilar predominant interstitial opacities with moderate left and trace right pleural effusions. No pneumothorax.       Impression:      Moderate pulmonary edema  with moderate left and trace right pleural effusions.    Signer Name: Jerry Balderas MD   Signed: 3/11/2023 6:31 PM   Workstation Name: [id number]    [facility]        Final impressions for the last 30 days of radiology reports:    XR Abdomen 1 View    Result Date: 2/10/2023  The enteric feeding tube tip is just postpyloric, terminating in the first portion of the duodenum. Images personally reviewed, interpreted and dictated by WOLF Skelton M.D.    XR Chest 1 View    Result Date: 3/11/2023  Moderate pulmonary edema with moderate left and trace right pleural effusions. Signer Name: Jerry Balderas MD  Signed: 3/11/2023 6:31 PM  Workstation Name: [id number]  [facility]    IR CENTRAL VENOUS    Result Date: 2/21/2023  Successful placement of a R IJ 24 cm tunneled catheter with ultrasound and fluoroscopic guidance. The patient tolerated the procedure well and left the department in good condition. There were no immediate complications. Images reviewed, interpreted, and dictated by Momo Schreiber MD    XR chest AP portable    Result Date: 2/17/2023  1. Cardiomegaly without edema. 2. Mild improved aeration of the lung bases with persistent residual atelectasis and/or pneumonia. 3. Trace effusions. Images reviewed, interpreted, dictated and electronically signed by Zeeshan Walker MD    FL esoph swallow funct with cine video    Result Date: 2/16/2023  Modified barium swallow under fluoroscopic guidance. Please see speech pathologist's report for further details and recommendations. Images reviewed, interpreted, and dictated by Dr. Zeeshan Walker. Transcribed by Kevin Mejia PA-C.    XR chest AP portable    Result Date: 2/16/2023  1. Cardiomegaly. 2. Basilar airspace opacities, left greater than right which may represent atelectasis or pneumonia. Overall, there is improved aeration compared to the prior study. Images reviewed, interpreted, dictated and electronically signed by Zeeshan  MD Denise    XR chest AP portable    Result Date: 2/14/2023  Decreased lung volumes with persistent effusions/pneumonia, left greater than right. Images reviewed, interpreted, and dictated by Dr. Zeeshan Walker. Transcribed by Josh Montero PA-C.    XR chest AP portable    Result Date: 2/10/2023  No significant interval change. Films reviewed, interpreted, and dictated by Dr. Goldsmith. Transcribed by Gricel Nj PA-C.    I have personally looked at the radiology images and read the final radiology report.    Assessment & Plan    Debility--stby a for bed mobility; Dewey for transfers; walked 5 ft with FWW, Dewey.  Required maxA for bathing; Dewey upper body dressing; maxA for Lower body dressing; Dewey grooming; dependent for toileting.    Acute on chronic resp failure(hypoxic)--does have bilateral pleural effusions.  Hx of covid 19pna/secondary bacterial pna.  Baseline o2 requiriment of 3L.    CKD stage IV, with esperanza--last HD on 2/28/23.  Pt being followed by DR Maciel.    Severe htn--controlled.  Nephrology stopped minoxidil yesterday    Cad--lipitor/metoprolol/isordil    DM not requiring coverage at this time.    gerd--protonix    Insomnia--melatonin    VTE Prophylaxis:   Mechanical Order History:     None      Pharmalogical Order History:      Ordered     Dose Route Frequency Stop    03/10/23 1343  apixaban (ELIQUIS) tablet 5 mg         5 mg PO Every 12 Hours Scheduled --                  Kody Cowart MD  [facility]  03/12/23  12:57 EDT

## 2023-03-12 NOTE — PROGRESS NOTES
Nephrology Progress Note    Interval History:     Patient Complaints: No complaints.  No shortness of breath.  No nausea or vomiting.        Vital Signs  Temp:  [98.2 °F (36.8 °C)] 98.2 °F (36.8 °C)  Heart Rate:  [59-68] 59  Resp:  [20] 20  BP: (118-152)/(49-65) 129/50    Physical Exam:    General:           No distress      HEENT:  Pallor               Neck:  No JVD       Lungs:    Decreased breath sounds bases   Heart:   no rub       Abdomen:   Normal bowel sounds, soft non-tender, non-distended, no guarding       Extremities:  No edema       Skin: No petechiae       Neurologic:  Alert and oriented        Results Review:    I reviewed the patient's new clinical results.    Lab Results (last 24 hours)     Procedure Component Value Units Date/Time    POC Glucose Once [[id number]]  (Normal) Collected: 03/12/23 1633    Specimen: Blood Updated: 03/12/23 1641     Glucose 98 mg/dL      Comment: Meter: [id number] : [id number] charli marcelo       POC Glucose Once [[id number]]  (Normal) Collected: 03/12/23 1117    Specimen: Blood Updated: 03/12/23 1127     Glucose 121 mg/dL      Comment: Meter: [id number] : [id number] charli marcelo       POC Glucose Once [[id number]]  (Normal) Collected: 03/12/23 0618    Specimen: Blood Updated: 03/12/23 0625     Glucose 108 mg/dL      Comment: Meter: [id number] : [id number] [facility]       Basic Metabolic Panel [[id number]]  (Abnormal) Collected: 03/12/23 0049    Specimen: Blood Updated: 03/12/23 0317     Glucose 110 mg/dL      BUN 51 mg/dL      Creatinine 2.20 mg/dL      Sodium 136 mmol/L      Potassium 3.7 mmol/L      Comment: Slight hemolysis detected by analyzer. Results may be affected.        Chloride 97 mmol/L      CO2 29.0 mmol/L      Calcium 8.9 mg/dL      BUN/Creatinine Ratio 23.2     Anion Gap 10.0 mmol/L      eGFR 23.9 mL/min/1.73     Narrative:      GFR Normal >60  Chronic Kidney Disease  <60  Kidney Failure <15      CBC & Differential [[id number]]  (Abnormal) Collected: 03/12/23 0049    Specimen: Blood Updated: 03/12/23 0156    Narrative:      The following orders were created for panel order CBC & Differential.  Procedure                               Abnormality         Status                     ---------                               -----------         ------                     CBC Auto Differential[[id number]]        Abnormal            Final result                 Please view results for these tests on the individual orders.    CBC Auto Differential [[id number]]  (Abnormal) Collected: 03/12/23 0049    Specimen: Blood Updated: 03/12/23 0156     WBC 9.32 10*3/mm3      RBC 2.78 10*6/mm3      Hemoglobin 8.0 g/dL      Hematocrit 26.1 %      MCV 93.9 fL      MCH 28.8 pg      MCHC 30.7 g/dL      RDW 15.9 %      RDW-SD 54.3 fl      MPV 10.0 fL      Platelets 487 10*3/mm3      Neutrophil % 66.7 %      Lymphocyte % 20.0 %      Monocyte % 10.0 %      Eosinophil % 1.0 %      Basophil % 0.4 %      Immature Grans % 1.9 %      Neutrophils, Absolute 6.22 10*3/mm3      Lymphocytes, Absolute 1.86 10*3/mm3      Monocytes, Absolute 0.93 10*3/mm3      Eosinophils, Absolute 0.09 10*3/mm3      Basophils, Absolute 0.04 10*3/mm3      Immature Grans, Absolute 0.18 10*3/mm3      nRBC 0.2 /100 WBC           Imaging Results (Last 24 Hours)     ** No results found for the last 24 hours. **          Assessment and Plan:    1.  CKD stage IV  2.  Resolved acute tubular necrosis  3.  Personal history of acute dialysis  4.  Acute on chronic hypoxic respiratory failure  5.  Systolic plus diastolic CHF compensated  6.  Left ventricular ejection fraction 30%  7.  Coronary artery disease with history of stent  8.  Type 2 diabetes  9.  Anemia of acute on chronic disease with iron deficiency.  Ordered Ferrlecit 250 mg 3/12/2023  10.  Debility and deconditioning    No need for dialysis yet.  Dialysis nurse will need to flush the  catheter and change the dressing in the morning so please keep this in mind.    Will order parenteral iron    Her blood pressure is doing reasonably well and so I Carolyn go ahead and discontinue the minoxidil.  She is on hydralazine.      Clement Maciel MD  03/12/23  19:18 EDT

## 2023-03-12 NOTE — PLAN OF CARE
Goal Outcome Evaluation:  Plan of Care Reviewed With: patient        Progress: improving  Outcome Evaluation: patient resting in bed at this time. continue plan of care.

## 2023-03-13 LAB
GLUCOSE BLDC GLUCOMTR-MCNC: 101 MG/DL (ref 70–130)
GLUCOSE BLDC GLUCOMTR-MCNC: 103 MG/DL (ref 70–130)
GLUCOSE BLDC GLUCOMTR-MCNC: 106 MG/DL (ref 70–130)

## 2023-03-13 PROCEDURE — 97530 THERAPEUTIC ACTIVITIES: CPT

## 2023-03-13 PROCEDURE — 94799 UNLISTED PULMONARY SVC/PX: CPT

## 2023-03-13 PROCEDURE — 97110 THERAPEUTIC EXERCISES: CPT

## 2023-03-13 PROCEDURE — 97535 SELF CARE MNGMENT TRAINING: CPT

## 2023-03-13 PROCEDURE — 97116 GAIT TRAINING THERAPY: CPT

## 2023-03-13 PROCEDURE — 82962 GLUCOSE BLOOD TEST: CPT

## 2023-03-13 PROCEDURE — 25010000002 NA FERRIC GLUC CPLX PER 12.5 MG: Performed by: INTERNAL MEDICINE

## 2023-03-13 PROCEDURE — 99232 SBSQ HOSP IP/OBS MODERATE 35: CPT | Performed by: INTERNAL MEDICINE

## 2023-03-13 PROCEDURE — 94660 CPAP INITIATION&MGMT: CPT

## 2023-03-13 RX ORDER — CALCIUM CARBONATE 200(500)MG
2 TABLET,CHEWABLE ORAL 3 TIMES DAILY PRN
Status: DISCONTINUED | OUTPATIENT
Start: 2023-03-13 | End: 2023-03-16 | Stop reason: HOSPADM

## 2023-03-13 RX ADMIN — ANTACID TABLETS 2 TABLET: 500 TABLET, CHEWABLE ORAL at 21:26

## 2023-03-13 RX ADMIN — HYDROXYZINE HYDROCHLORIDE 25 MG: 25 TABLET ORAL at 17:20

## 2023-03-13 RX ADMIN — ISOSORBIDE DINITRATE 20 MG: 20 TABLET ORAL at 13:11

## 2023-03-13 RX ADMIN — APIXABAN 5 MG: 5 TABLET, FILM COATED ORAL at 08:23

## 2023-03-13 RX ADMIN — APIXABAN 5 MG: 5 TABLET, FILM COATED ORAL at 20:55

## 2023-03-13 RX ADMIN — AMLODIPINE BESYLATE 10 MG: 10 TABLET ORAL at 08:23

## 2023-03-13 RX ADMIN — ISOSORBIDE DINITRATE 20 MG: 20 TABLET ORAL at 17:19

## 2023-03-13 RX ADMIN — HYDRALAZINE HYDROCHLORIDE 100 MG: 50 TABLET, FILM COATED ORAL at 23:13

## 2023-03-13 RX ADMIN — BUSPIRONE HYDROCHLORIDE 7.5 MG: 5 TABLET ORAL at 20:55

## 2023-03-13 RX ADMIN — ASPIRIN 81 MG: 81 TABLET, COATED ORAL at 08:24

## 2023-03-13 RX ADMIN — CLONIDINE HYDROCHLORIDE 0.3 MG: 0.3 TABLET ORAL at 15:01

## 2023-03-13 RX ADMIN — FERROUS SULFATE TAB 325 MG (65 MG ELEMENTAL FE) 325 MG: 325 (65 FE) TAB at 17:19

## 2023-03-13 RX ADMIN — BUSPIRONE HYDROCHLORIDE 7.5 MG: 5 TABLET ORAL at 15:01

## 2023-03-13 RX ADMIN — Medication 5 MG: at 20:55

## 2023-03-13 RX ADMIN — GUAIFENESIN 600 MG: 600 TABLET, EXTENDED RELEASE ORAL at 08:24

## 2023-03-13 RX ADMIN — HYDRALAZINE HYDROCHLORIDE 100 MG: 50 TABLET, FILM COATED ORAL at 13:10

## 2023-03-13 RX ADMIN — METOPROLOL TARTRATE 50 MG: 50 TABLET, FILM COATED ORAL at 15:01

## 2023-03-13 RX ADMIN — SODIUM CHLORIDE 250 MG: 9 INJECTION, SOLUTION INTRAVENOUS at 14:25

## 2023-03-13 RX ADMIN — Medication 1000 UNITS: at 08:24

## 2023-03-13 RX ADMIN — CLONIDINE HYDROCHLORIDE 0.3 MG: 0.3 TABLET ORAL at 21:10

## 2023-03-13 RX ADMIN — HYDRALAZINE HYDROCHLORIDE 100 MG: 50 TABLET, FILM COATED ORAL at 17:19

## 2023-03-13 RX ADMIN — PANTOPRAZOLE SODIUM 40 MG: 40 TABLET, DELAYED RELEASE ORAL at 06:32

## 2023-03-13 RX ADMIN — PANTOPRAZOLE SODIUM 40 MG: 40 TABLET, DELAYED RELEASE ORAL at 17:19

## 2023-03-13 RX ADMIN — METOPROLOL TARTRATE 50 MG: 50 TABLET, FILM COATED ORAL at 21:10

## 2023-03-13 RX ADMIN — BUMETANIDE 4 MG: 1 TABLET ORAL at 08:23

## 2023-03-13 RX ADMIN — SPIRONOLACTONE 50 MG: 25 TABLET ORAL at 08:24

## 2023-03-13 RX ADMIN — HYDROXYZINE HYDROCHLORIDE 25 MG: 25 TABLET ORAL at 06:24

## 2023-03-13 RX ADMIN — FERROUS SULFATE TAB 325 MG (65 MG ELEMENTAL FE) 325 MG: 325 (65 FE) TAB at 08:23

## 2023-03-13 RX ADMIN — GUAIFENESIN 600 MG: 600 TABLET, EXTENDED RELEASE ORAL at 20:55

## 2023-03-13 RX ADMIN — BUSPIRONE HYDROCHLORIDE 7.5 MG: 5 TABLET ORAL at 08:24

## 2023-03-13 RX ADMIN — CLOPIDOGREL BISULFATE 75 MG: 75 TABLET, FILM COATED ORAL at 08:24

## 2023-03-13 RX ADMIN — ISOSORBIDE DINITRATE 20 MG: 20 TABLET ORAL at 08:23

## 2023-03-13 RX ADMIN — ATORVASTATIN CALCIUM 80 MG: 40 TABLET, FILM COATED ORAL at 20:55

## 2023-03-13 RX ADMIN — METOPROLOL TARTRATE 50 MG: 50 TABLET, FILM COATED ORAL at 05:23

## 2023-03-13 NOTE — PROGRESS NOTES
Assisted By: aDrlyn MCGOWAN    CC: Follow-up on debility status post prolonged hospitalization.    Interview History/HPI: Patient states she is breathing okay, she is tolerating her diet, she does not think she has any ankle edema, no acute pain complaints no new events overnight.  She states she wears oxygen at home.          Current Hospital Meds:  amLODIPine, 10 mg, Oral, Q24H  apixaban, 5 mg, Oral, Q12H  aspirin, 81 mg, Oral, Daily  atorvastatin, 80 mg, Oral, Nightly  bumetanide, 4 mg, Oral, Daily  busPIRone, 7.5 mg, Oral, TID  cholecalciferol, 1,000 Units, Oral, Daily  cloNIDine, 0.3 mg, Per PEG Tube, Q8H  clopidogrel, 75 mg, Per PEG Tube, Daily  docusate sodium, 100 mg, Oral, BID  ferric gluconate, 250 mg, Intravenous, Once  ferrous sulfate, 325 mg, Oral, BID With Meals  guaiFENesin, 600 mg, Oral, Q12H  hydrALAZINE, 100 mg, Oral, Q6H  Insulin Aspart, 0-9 Units, Subcutaneous, TID AC  isosorbide dinitrate, 20 mg, Oral, TID - Nitrates  melatonin, 5 mg, Oral, Nightly  metoprolol tartrate, 50 mg, Oral, Q8H  pantoprazole, 40 mg, Oral, BID AC  senna-docusate sodium, 2 tablet, Oral, BID  spironolactone, 50 mg, Oral, Daily         Vitals:    03/13/23 1310   BP: 163/64   Pulse: 64   Resp:    Temp:    SpO2:          Intake/Output Summary (Last 24 hours) at 3/13/2023 1447  Last data filed at 3/13/2023 1310  Gross per 24 hour   Intake 1080 ml   Output --   Net 1080 ml       EXAM: Lungs have bilateral breath sounds diminished especially at the left base but otherwise clear with adequate air excursion no rhonchi rales or wheezing heard, heart regular rate and rhythm without murmur rub or gallop, she can speak in full sentence without difficulty, she has no accessory muscle use.  No ankle edema, strength overall symmetric.  Saturation 96% on 4-1/2 L, temperature is 98.1.  Respiratory rate is 18.      Diet: Cardiac Diets, Diabetic Diets; Healthy Heart (2-3 Na+); Consistent Carbohydrate; Texture: Regular Texture (IDDSI 7); Fluid  Consistency: Thin (IDDSI 0)        LABS:     Lab Results (last 48 hours)     Procedure Component Value Units Date/Time    POC Glucose Once [[id number]]  (Normal) Collected: 03/13/23 1116    Specimen: Blood Updated: 03/13/23 1133     Glucose 103 mg/dL      Comment: Meter: [id number] : [id number] Tequila [place]       POC Glucose Once [[id number]]  (Normal) Collected: 03/13/23 0605    Specimen: Blood Updated: 03/13/23 0615     Glucose 101 mg/dL      Comment: Meter: [id number] : [id number] saeed jorge alberto       POC Glucose Once [[id number]]  (Normal) Collected: 03/12/23 1633    Specimen: Blood Updated: 03/12/23 1641     Glucose 98 mg/dL      Comment: Meter: [id number] : [id number] charli marcelo       POC Glucose Once [[id number]]  (Normal) Collected: 03/12/23 1117    Specimen: Blood Updated: 03/12/23 1127     Glucose 121 mg/dL      Comment: Meter: [id number] : [id number] charli marcelo       POC Glucose Once [[id number]]  (Normal) Collected: 03/12/23 0618    Specimen: Blood Updated: 03/12/23 0625     Glucose 108 mg/dL      Comment: Meter: [id number] : [id number] [facility]       Basic Metabolic Panel [[id number]]  (Abnormal) Collected: 03/12/23 0049    Specimen: Blood Updated: 03/12/23 0317     Glucose 110 mg/dL      BUN 51 mg/dL      Creatinine 2.20 mg/dL      Sodium 136 mmol/L      Potassium 3.7 mmol/L      Comment: Slight hemolysis detected by analyzer. Results may be affected.        Chloride 97 mmol/L      CO2 29.0 mmol/L      Calcium 8.9 mg/dL      BUN/Creatinine Ratio 23.2     Anion Gap 10.0 mmol/L      eGFR 23.9 mL/min/1.73     Narrative:      GFR Normal >60  Chronic Kidney Disease <60  Kidney Failure <15      CBC & Differential [[id number]]  (Abnormal) Collected: 03/12/23 0049    Specimen: Blood Updated: 03/12/23 0156    Narrative:      The following orders were created for panel order CBC & Differential.  Procedure                               Abnormality         Status                      ---------                               -----------         ------                     CBC Auto Differential[[id number]]        Abnormal            Final result                 Please view results for these tests on the individual orders.    CBC Auto Differential [[id number]]  (Abnormal) Collected: 03/12/23 0049    Specimen: Blood Updated: 03/12/23 0156     WBC 9.32 10*3/mm3      RBC 2.78 10*6/mm3      Hemoglobin 8.0 g/dL      Hematocrit 26.1 %      MCV 93.9 fL      MCH 28.8 pg      MCHC 30.7 g/dL      RDW 15.9 %      RDW-SD 54.3 fl      MPV 10.0 fL      Platelets 487 10*3/mm3      Neutrophil % 66.7 %      Lymphocyte % 20.0 %      Monocyte % 10.0 %      Eosinophil % 1.0 %      Basophil % 0.4 %      Immature Grans % 1.9 %      Neutrophils, Absolute 6.22 10*3/mm3      Lymphocytes, Absolute 1.86 10*3/mm3      Monocytes, Absolute 0.93 10*3/mm3      Eosinophils, Absolute 0.09 10*3/mm3      Basophils, Absolute 0.04 10*3/mm3      Immature Grans, Absolute 0.18 10*3/mm3      nRBC 0.2 /100 WBC     POC Glucose Once [[id number]]  (Normal) Collected: 03/11/23 1626    Specimen: Blood Updated: 03/11/23 1634     Glucose 97 mg/dL      Comment: Meter: [id number] : [id number] charli robertson       Iron Profile [[id number]]  (Abnormal) Collected: 03/11/23 0136    Specimen: Blood Updated: 03/11/23 1544     Iron 29 mcg/dL      Iron Saturation 14 %      Transferrin 143 mg/dL      TIBC 213 mcg/dL                Radiology:    Imaging Results (Last 72 Hours)     Procedure Component Value Units Date/Time    XR Chest 1 View [[id number]] Collected: 03/11/23 1831     Updated: 03/11/23 1833    Narrative:      CR Chest 1 Vw    INDICATION:   CHF     COMPARISON:    None available.    FINDINGS:  Portable AP view(s) of the chest.    Right approach central venous catheter with distal tip overlying the superior vena cava.    Enlarged cardiac silhouette. Aortic calcifications. Diffuse perihilar and bibasilar predominant interstitial opacities with  moderate left and trace right pleural effusions. No pneumothorax.       Impression:      Moderate pulmonary edema with moderate left and trace right pleural effusions.    Signer Name: Jerry Balderas MD   Signed: 3/11/2023 6:31 PM   Workstation Name: [id number]    [facility]              Assessment/Plan:   Debility associated with prolonged hospitalization, patient has been admitted to the acute inpatient rehab facility for intensive occupational physical therapy.  With OT today, patient required mod to max assist with lower extremity dressing, mod assist with functional transfers using rolling walker.  With physical therapy on Saturday, bed mobility she was standby assist, bed to chair min assist, sit to stand min assist, stand to sit min assist stand pivot, stand step, min assist, with gait she is min assist, she walked 5 feet front wheeled walker.    Acute on chronic hypoxic respiratory failure.  Chest x-ray on March 11 showed moderate pulmonary edema with moderate left and trace right pleural effusion, I have reviewed this x-ray image.  Patient is on Bumex daily.  She has no edema.  Per nephrology holding dialysis at this time.  Patient had COVID-19 and required intubation and mechanical ventilation.  EF was 20 to 25% by echo there.  Patient was treated with remdesivir and Decadron.  She has also been treated for superimposed bacterial pneumonia.  Extubated January 24 and going to recheck an echocardiogram, EF being confirmed at 2025%, patient may need a LifeVest and would consider cardiology input    Chronic kidney disease stage IV, recheck BMP in the a.m.  Patient did receive hemodialysis for SWATI, last dialysis believed to be February 28.    Hypertension, patient is on amlodipine, Catapres, hydralazine, Aldactone, Lopressor,Marginally controlled, follow    Anemia, continue IV iron at nephrology discretion    History of right IJ DVT, continue full dose Eliquis therapy    Esophageal  thickening by EGD, will need outpatient follow-up for possible endoscopy.    HFrEF, I am going to check an echocardiogram looking at her ejection fraction, patient because of renal failure is not on ACE or ARB candidate, she is not an Entresto candidate, she is on hydralazine and Isordil.    Coronary artery disease status post stents, patient actually is on triple therapy at this time.  We discussed with the patient who her cardiologist is and consider discussing with them there thoughts on either stopping aspirin or Plavix.    Diabetes, excellent control on moderate dose sliding scale alone, am going to decrease this to low-dose, if glucoses remain controlled possibly could stop this.  Check A1c.    Elmer Cummings MD

## 2023-03-13 NOTE — PROGRESS NOTES
Patient Assessment Instrument  Quality Indicators - Admission FY 2023    Section A. Ethnicity/ Race/Language  Ethnicity:  Race:  Preferred Language:  English  Requests  to Communicate:   No    Section A. Transportation      Section B. Hearing and Vision        Section B. Health Literacy        Section C. Cognitive Patterns      Section C. Signs and Symptoms of Delirium (from CAM)      Section D. Mood      Section D. Social Isolation      Section TM3987. Prior Functioning    Self Care: Patient completed all the activities by themself, with or without an  assistive device, with no assistance from a helper.  Indoor Mobility: Patient completed the activities by themself, with or without  an assistive device, with no assistance from a helper.  Stairs: Patient completed the activities by themself, with or without an  assistive device, with no assistance from a helper.  Functional Cognition: Patient needed partial assistance from another person to  complete activities.    Section JR1609. Prior Device Use      Section EM1713. Self Care Performance      Section NR2574. Self Care Discharge Goals      Section AS3218. Mobility Performance      Section NE2206. Mobility Discharge Goals      Section H. Bladder and Bowel  Bladder Continence: Always incontinent.  Bowel Continence: Occasionally incontinent (one episode of bowel incontinence).    Section I. Active Diagnosis  Comorbidities and Co-existing Conditions:   Diabetes Mellitus (DM) - e.g.,  diabetic retinopathy, nephropathy, and neuropathy).    Section J. Health Conditions  Patient has not had any falls in the past year. Patient has not had major  surgery during the 100 days prior to admission.    Section J. Health Conditions (Pain)      Section K. Swallowing/Nutritional Status  Regular food (solids and liquids swallowed safely without supervision or  modified food or liquid consistency).  Nutritional Approaches on Admission:  Nutritional Approaches on  Admission:  Therapeutic diet (e.g., low salt, diabetic, low cholesterol)    Section M. Skin Conditions      Section N. Medication    Potential Clinically Significant Medication Issues: No issues found during  review  Patient is taking medications in the following pharmacological classification:  E. Anticoagulant An indication is noted for all medications in the Anticoagulant  drug class. I. Antiplatelet An indication is NOT noted for all medications in  the Antiplatelet drug class. J. Hypoglycemic (including insulin) An indication  is noted for all medications in the Hypoglycemic drug class.  Potential Clinically Significant Medication Issues: No issues found during  review    Section O. Special Treatments, Procedures, and Programs  Non-Invasive Mechanical Ventilator: BiPAP   IV Medications: Other   IV Access: Peripheral    Signed by: Eloisa White, Supervisor

## 2023-03-13 NOTE — PROGRESS NOTES
Physical Medicine and Rehabilitation  Inpatient Rehabilitation Interdisciplinary Plan of Care    Demographics            Age: 67Y            Gender: Female    Admission Date: 3/10/2023 12:38:00 PM  Rehabilitation Diagnosis:  debility    Plan of Care  Anticipated Discharge Date/Estimated Length of Stay: 14 days  Anticipated Discharge Destination: Community discharge with assistance  Discharge Plan : Pt plans to return home with spouse assisting with caregiving  needs.  Medical Necessity Expected Level Rationale: fair-good  Intensity and Duration: an average of 3 hours/5 days per week  Medical Supervision and 24 Hour Rehab Nursing: x  Physical Therapy: x  PT Intensity/Duration: PT 1.5 hours per day/5 days per week  Occupational Therapy: x  OT Intensity/Duration: OT 1.5 hours per day/5 days per week  Social Work: x  Therapeutic Recreation: x  Respiratory Therapy: x  Updated (if changes indicated)  No changes to plan.    Based on the patient's medical and functional status, their prognosis and  expected level of functional improvement is: fair-good    Interdisciplinary Problem/Goals/Status  Mobility    [PT] Bed Mobility (Active)  Current Status (3/11/2023 12:00:00 AM): SBA  Weekly Goal: n/a  Discharge Goal: independent    [PT] Bed/Chair/Wheelchair (Active)  Current Status (3/11/2023 12:00:00 AM): min A w/ RW  Weekly Goal: n/a  Discharge Goal: SBA w/ AAD    [PT] Walk (Active)  Current Status (3/11/2023 12:00:00 AM): 5 ft, RW, min A  Weekly Goal: n/a  Discharge Goal: 150 ft, SBA, AAD    Self Care    [OT] Bathing (Active)  Current Status (3/11/2023 12:00:00 AM): max  Weekly Goal: mod  Discharge Goal: min    Body Function Structure    [RN] Skin Integrity (Active)  Current Status (3/10/2023 12:36:00 PM): Risk for skin breakdown  Weekly Goal: No skin breakdown  Discharge Goal: No skin breakdown    Safety    [RN] Potential for Injury (Active)  Current Status (3/10/2023 12:36:00 PM): Potential for falls  Weekly Goal: No  falls  Discharge Goal: No falls    Medical Problems Resp failure, CKD, HTN, CAD s/p stents, DM    Comments:    Signed by: Elmer Cummings MD

## 2023-03-13 NOTE — PROGRESS NOTES
[facility] CM[facility] Coordinator  Inpatient Rehabilitation Admission    Ethnic Group: White.  Marital Status:  Marital Status: .    IRF Admission Date:  03/10/2023  Admission Class: Initial Rehab.  [facility]    Pre-Hospital Living: Home. Pre-Hospital Living  With: (2) Family/Relatives.    Payment Sources: Primary: Medicare Fee for Service  Secondary: Not Listed.  Impairment Group: 16 Debility (non-cardiac, non-pulmonary)  Date of Onset of Impairment: 01/21/2023    Etiologic Diagnosis Code(s):  Rank Code      Description  1    U07.1     COVID-19    Comorbidities:      Height on Admission: 63 inches.  Weight on Admission: 157 pounds.    Are there any arthritis conditions recorded for Impairment Group, Etiologic  Diagnosis, or Comorbid Conditions that meet all of the regulatory requirements  for IRF classification (in 42 .29(b)(2)(x), (xi), and xii))?    NICK Bladder Accidents:  0 - Accidents.  Bladder Score = 1.  Five (5) or more  bladder accidents.  NICK Bowel Accident: 0 -Accidents.  Bowel Score = 5.  One (1) bowel accident.    Signed by: Eloisa White, Supervisor     Detail Level: Detailed Detail Level: Zone

## 2023-03-13 NOTE — THERAPY TREATMENT NOTE
Inpatient Rehabilitation - Occupational Therapy Treatment Note     [place]     Patient Name: Pati Cortes  : 1955  MRN: [id number]    Today's Date: 3/13/2023                 Admit Date: 3/10/2023       No diagnosis found.    Patient Active Problem List   Diagnosis   • Debility       Past Medical History:   Diagnosis Date   • Arthritis    • Diabetes mellitus (HCC)    • Stroke (HCC)        History reviewed. No pertinent surgical history.          IRF OT ASSESSMENT FLOWSHEET (last 12 hours)     IRF OT Evaluation and Treatment     Row Name 23 1428 23 1344       OT Time and Intention    Document Type daily treatment  -TM daily treatment  -LM    Mode of Treatment occupational therapy  -TM occupational therapy  -LM    Patient Effort adequate  -TM good  -LM    Symptoms Noted During/After Treatment none  -TM --    Row Name 23 1428 23 1345       General Information    General Observations of Patient Pt agreeable for therapy.  -TM --    Existing Precautions/Restrictions fall;oxygen therapy device and L/min  -TM fall;oxygen therapy device and L/min  -LM    Comment, General Information -- Patient seen this am for adl retraining, fxl mobility, fxl activity tolerance, light therex.  patient required mod/max assist with LE dressing, mod assist with fxl transfer using rw.  Dowel therex, handgripper.  Frequent rest breaks.  -LM    Row Name 23 1428 23 7845       Cognition/Psychosocial    Affect/Mental Status (Cognition) -- anxious;emotionally labile  -LM    Orientation Status (Cognition) oriented to;person;place;situation  -TM oriented x 3  -LM    Follows Commands (Cognition) follows one-step commands;verbal cues/prompting required  -TM --    Row Name 23 1428          Motor Skills    Motor Skills coordination;functional endurance;motor control/coordination interventions  -TM     Motor Control/Coordination Interventions therapeutic exercise/ROM;fine motor manipulation/dexterity  activities;gross motor coordination activities;other (see comments)  BUE ther ex/act, GMC/FMC  -TM     Row Name 03/13/23 1344          Positioning and Restraints    Post Treatment Position wheelchair  -LM     In Wheelchair with OT  -LM           User Key  (r) = Recorded By, (t) = Taken By, (c) = Cosigned By    Initials Name Effective Dates     Ros Doe OT 06/16/21 -     Luz Mcdermott OT 06/16/21 -                  Occupational Therapy Education     Title: PT OT SLP Therapies (Done)     Topic: Occupational Therapy (Done)     Point: ADL training (Done)     Description:   Instruct learner(s) on proper safety adaptation and remediation techniques during self care or transfers.   Instruct in proper use of assistive devices.              Learning Progress Summary           Patient Acceptance, E,D, VU,NR by  at 3/13/2023 1427                   Point: Precautions (Done)     Description:   Instruct learner(s) on prescribed precautions during self-care and functional transfers.              Learning Progress Summary           Patient Acceptance, E,D, VU,NR by  at 3/13/2023 1427                               User Key     Initials Effective Dates Name Provider Type Discipline     06/16/21 -  Ros Doe, OT Occupational Therapist OT                    OT Recommendation and Plan                         Time Calculation:      Time Calculation- OT     Row Name 03/13/23 1426 03/13/23 1348          Time Calculation- OT    OT Start Time 1000  -TM 0915  -LM     OT Stop Time 1045  -TM 1000  -LM     OT Time Calculation (min) 45 min  -TM 45 min  -LM     Total Timed Code Minutes- OT 45 minute(s)  -TM 45 minute(s)  -LM           User Key  (r) = Recorded By, (t) = Taken By, (c) = Cosigned By    Initials Name Provider Type     Ros Doe OT Occupational Therapist    LM Luz Miller OT Occupational Therapist              Therapy Charges for Today     Code Description Service Date Service  Provider Modifiers Qty    [id number]  OT THERAPEUTIC ACT EA 15 MIN 3/13/2023 Ros Doe OT GO 3                   Ros Doe OT  3/13/2023

## 2023-03-13 NOTE — PROGRESS NOTES
Inpatient Rehabilitation Functional Measures Assessment    Functional Measures  NICK Eating:  NICK Grooming:  NICK Bathing:  NICK Upper Body Dressing:  NICK Lower Body Dressing:  NICK Toileting:    NICK Bladder Management  Level of Assistance:  Frequency/Number of Accidents this Shift:    NICK Bowel Management  Level of Assistance:  Frequency/Number of Accidents this Shift:    NICK Bed/Chair/Wheelchair Transfer:  NICK Toilet Transfer:  NICK Tub/Shower Transfer:    Previously Documented Mode of Locomotion at Discharge:  [facility] Expected Mode of Locomotion at Discharge:  [facility] Walk/Wheelchair:  [facility] Stairs:    [facility] Comprehension:  [facility] Expression:  [facility] Social Interaction:  [facility] Problem Solving:  NICK Memory:    Therapy Mode Minutes  Occupational Therapy: Individual: 90 minutes.  Physical Therapy:  Speech Language Pathology:    Discharge Functional Goals:    Signed by: Luz Miller, Occupational Therapist

## 2023-03-13 NOTE — THERAPY TREATMENT NOTE
Inpatient Rehabilitation - Physical Therapy Treatment Note        Ruben     Patient Name: Pati Cortes  : 1955  MRN: [id number]    Today's Date: 3/13/2023                    Admit Date: 3/10/2023      Visit Dx:   No diagnosis found.    Patient Active Problem List   Diagnosis   • Debility       Past Medical History:   Diagnosis Date   • Arthritis    • Diabetes mellitus (HCC)    • Stroke (HCC)        History reviewed. No pertinent surgical history.    PT ASSESSMENT (last 12 hours)     IRF PT Evaluation and Treatment     Row Name 23 1529 23 1440       PT Time and Intention    Document Type daily treatment  1st Session  -LL daily treatment  -    Mode of Treatment individual therapy;physical therapy  -LL individual therapy;physical therapy  -HC    Patient/Family/Caregiver Comments/Observations Patient reports ongoing weakness and debility but is agreeable to PT participation.  Patient requires frequent & extended rest breaks.  -LL Pt and RN in agreement for PM PT session. Pt walked 40' x 2 with min A and a long sitting rest break secondary to weakness. Attempted standing exercises, but Pt reports she is was dizzy. /59, completed sitting bean bag tosses and pickup with reacher until tolerance.  -HC    Row Name 23 1529 23 1440       General Information    Patient Profile Reviewed yes  -LL yes  -HC    Existing Precautions/Restrictions fall;oxygen therapy device and L/min  -LL fall;oxygen therapy device and L/min  -HC    Row Name 23 1529 23 1440       Living Environment    Primary Care Provided by self;spouse/significant other  -LL self;spouse/significant other  -    Row Name 23 1529 23 1440       Home Use of Assistive/Adaptive Equipment    Equipment Currently Used at Home bp cuff;glucometer;oxygen;rollator;commode  BSC  - bp cuff;glucometer;oxygen;rollator;commode  BSC  -HC    Row Name 23 1529 23 1440       Pain Assessment    Pretreatment  Pain Rating 0/10 - no pain  -LL 0/10 - no pain  -HC    Posttreatment Pain Rating 0/10 - no pain  -LL 0/10 - no pain  -    Row Name 03/13/23 1529 03/13/23 1440       Cognition/Psychosocial    Affect/Mental Status (Cognition) anxious;emotionally labile  -LL anxious;emotionally labile  -HC    Orientation Status (Cognition) oriented x 3  -LL oriented x 3  -HC    Follows Commands (Cognition) verbal cues/prompting required;WFL  -LL verbal cues/prompting required;WFL  -HC    Personal Safety Interventions fall prevention program maintained;gait belt;nonskid shoes/slippers when out of bed;supervised activity  -LL fall prevention program maintained;gait belt;nonskid shoes/slippers when out of bed;supervised activity  -    Row Name 03/13/23 1529 03/13/23 1440       Range of Motion Comprehensive    General Range of Motion no range of motion deficits identified  -LL no range of motion deficits identified  -    Row Name 03/13/23 1529 03/13/23 1440       Strength Comprehensive (MMT)    General Manual Muscle Testing (MMT) Assessment lower extremity strength deficits identified  -LL lower extremity strength deficits identified  -    Row Name 03/13/23 1529 03/13/23 1440       Lower Extremity (Manual Muscle Testing)    Lower Extremity: Manual Muscle Testing (MMT) --  B LE grossly 4-/5  -LL --  B LE grossly 4-/5  -    Row Name 03/13/23 1529 03/13/23 1440       Mobility    Extremity Weight-bearing Status right lower extremity;left lower extremity  -LL right lower extremity;left lower extremity  -HC    Left Lower Extremity (Weight-bearing Status) full weight-bearing (FWB)  -LL full weight-bearing (FWB)  -HC    Right Lower Extremity (Weight-bearing Status) full weight-bearing (FWB)  -LL full weight-bearing (FWB)  -    Row Name 03/13/23 1529 03/13/23 1440       Bed Mobility    Bed Mobility supine-sit;sit-supine;scooting/bridging;rolling right;rolling left  -LL supine-sit;sit-supine;scooting/bridging;rolling right;rolling left   -HC    Scooting/Bridging [place] (Bed Mobility) standby assist  -LL standby assist  -HC    Supine-Sit [place] (Bed Mobility) standby assist;contact guard  -LL --    Sit-Supine [place] (Bed Mobility) standby assist;contact guard  -LL standby assist;contact guard  -HC    Assistive Device (Bed Mobility) bed rails  -LL bed rails  -HC    Comment, (Bed Mobility) Bed mobility for LB dressing  -LL --    Row Name 03/13/23 1529 03/13/23 1440       Transfer Assessment/Treatment    Transfers bed-chair transfer;sit-stand transfer;stand-sit transfer;stand pivot/stand step transfer  -LL bed-chair transfer;sit-stand transfer;stand-sit transfer;stand pivot/stand step transfer  -    Row Name 03/13/23 1529          Bed-Chair Transfer    Bed-Chair [place] (Transfers) verbal cues;nonverbal cues (demo/gesture);minimum assist (75% patient effort)  -     Assistive Device (Bed-Chair Transfers) walker, front-wheeled  -LL     Row Name 03/13/23 1440          Chair-Bed Transfer    Chair-Bed [place] (Transfers) minimum assist (75% patient effort)  -     Row Name 03/13/23 1529 03/13/23 1440       Sit-Stand Transfer    Sit-Stand [place] (Transfers) verbal cues;nonverbal cues (demo/gesture);minimum assist (75% patient effort)  -LL --    Assistive Device (Sit-Stand Transfers) walker, front-wheeled  -LL walker, front-wheeled  -HC    Row Name 03/13/23 1529 03/13/23 1440       Stand-Sit Transfer    Stand-Sit [place] (Transfers) verbal cues;nonverbal cues (demo/gesture);minimum assist (75% patient effort)  -LL verbal cues;nonverbal cues (demo/gesture);minimum assist (75% patient effort)  -    Assistive Device (Stand-Sit Transfers) walker, front-wheeled  -LL walker, front-wheeled  -HC    Row Name 03/13/23 1529 03/13/23 1440       Stand Pivot/Stand Step Transfer    Stand Pivot/Stand Step [place] (Transfers) verbal cues;nonverbal cues (demo/gesture);minimum assist (75% patient effort)  -LL verbal  cues;nonverbal cues (demo/gesture);minimum assist (75% patient effort)  -HC    Assistive Device (Stand Pivot Stand Step Transfer) walker, front-wheeled  -LL walker, front-wheeled  -HC    Row Name 03/13/23 1440          Gait/Stairs (Locomotion)    Gait/Stairs Locomotion gait/ambulation independence;gait/ambulation assistive device;gait pattern;distance ambulated;gait deviations  -HC     [place] Level (Gait) verbal cues;nonverbal cues (demo/gesture);minimum assist (75% patient effort)  -HC     Assistive Device (Gait) walker, front-wheeled  -HC     Distance in Feet (Gait) 40' x 2  -HC     Pattern (Gait) step-to  -HC     Row Name 03/13/23 1529 03/13/23 1440       Safety Issues, Functional Mobility    Impairments Affecting Function (Mobility) balance;endurance/activity tolerance;strength  -LL balance;endurance/activity tolerance;strength  -HC    Row Name 03/13/23 1440          Balance    Comment, Balance Sitting bean bag toss and  with reacher.  -HC     Row Name 03/13/23 1529          Hip (Therapeutic Exercise)    Hip Strengthening (Therapeutic Exercise) bilateral;flexion;extension;aBduction;aDduction;marching while seated;sitting;1 lb free weight;resistance band;green  -     Row Name 03/13/23 1529          Knee (Therapeutic Exercise)    Knee Strengthening (Therapeutic Exercise) bilateral;flexion;extension;marching while seated;LAQ (long arc quad);hamstring curls;sitting;1 lb free weight;resistance band;green  -     Row Name 03/13/23 1529          Ankle (Therapeutic Exercise)    Ankle Strengthening (Therapeutic Exercise) bilateral;dorsiflexion;plantarflexion;sitting;1 lb free weight  -     Row Name 03/13/23 1529 03/13/23 1440       Positioning and Restraints    Pre-Treatment Position in bed  -LL other (comment)  WC from PT  -HC    Post Treatment Position wheelchair  -LL bed  -HC    In Bed -- fowlers;call light within reach;encouraged to call for assist;side rails up x2  -HC    In Wheelchair sitting;with  PT  -LL --    Row Name 03/13/23 1529 03/13/23 1440       Therapy Assessment/Plan (PT)    Patient's Goals For Discharge return home  -LL return home  -HC    Row Name 03/13/23 1529 03/13/23 1440       Therapy Assessment/Plan (PT)    Rehab Potential/Prognosis (PT) good, to achieve stated therapy goals  -LL good, to achieve stated therapy goals  -HC    Frequency of Treatment (PT) 5 times per week  -LL 5 times per week  -HC    Estimated Duration of Therapy (PT) 2 weeks;3 weeks  -LL 2 weeks;3 weeks  -HC    Problem List (PT) problems related to;balance;mobility;strength  functional endurance  -LL problems related to;balance;mobility;strength  functional endurance  -HC    Activity Limitations Related to Problem List (PT) unable to ambulate safely;unable to transfer safely;BADLs not performed adequately or safely  -LL unable to ambulate safely;unable to transfer safely;BADLs not performed adequately or safely  -    Row Name 03/13/23 1529 03/13/23 1440       Therapy Plan Review/Discharge Plan (PT)    Anticipated Equipment Needs at Discharge (PT Eval) wheelchair  TBD  -LL wheelchair  TBD  -HC    Anticipated Discharge Disposition (PT) home with assist;home with home health  -LL home with assist;home with home health  -HC    Row Name 03/13/23 1529 03/13/23 1440       IRF PT Goals    Bed Mobility Goal Selection (PT-IRF) bed mobility, PT goal 1  -LL bed mobility, PT goal 1  -HC    Transfer Goal Selection (PT-IRF) transfers, PT goal 1  -LL transfers, PT goal 1  -HC    Gait (Walking Locomotion) Goal Selection (PT-IRF) gait, PT goal 1  -LL gait, PT goal 1  -HC    Row Name 03/13/23 1529 03/13/23 1440       Bed Mobility Goal 1 (PT-IRF)    Activity/Assistive Device (Bed Mobility Goal 1, PT-IRF) rolling to left;rolling to right;scooting;sit to supine;supine to sit  -LL rolling to left;rolling to right;scooting;sit to supine;supine to sit  -HC    [place] Level (Bed Mobility Goal 1, PT-IRF) independent  -LL independent  -HC    Time  Frame (Bed Mobility Goal 1, PT-IRF) by discharge  - by discharge  -    Row Name 03/13/23 1529 03/13/23 1440       Transfer Goal 1 (PT-IRF)    Activity/Assistive Device (Transfer Goal 1, PT-IRF) sit-to-stand/stand-to-sit;bed-to-chair/chair-to-bed;toilet  appropriate a.d.  -LL sit-to-stand/stand-to-sit;bed-to-chair/chair-to-bed;toilet  appropriate a.d.  -HC    [place] Level (Transfer Goal 1, PT-IRF) standby assist  -LL standby assist  -HC    Time Frame (Transfer Goal 1, PT-IRF) by discharge  - by discharge  -    Row Name 03/13/23 1529 03/13/23 1440       Gait/Walking Locomotion Goal 1 (PT-IRF)    Activity/Assistive Device (Gait/Walking Locomotion Goal 1, PT-IRF) gait (walking locomotion);assistive device use;decrease fall risk;diminish gait deviation;improve balance and speed;increase endurance/gait distance  appropriate a.d.  -LL gait (walking locomotion);assistive device use;decrease fall risk;diminish gait deviation;improve balance and speed;increase endurance/gait distance  appropriate a.d.  -HC    Gait/Walking Locomotion Distance Goal 1 (PT-IRF) 150  -  -HC    [place] Level (Gait/Walking Locomotion Goal 1, PT-IRF) standby assist  -LL standby assist  -HC    Time Frame (Gait/Walking Locomotion Goal 1, PT-IRF) by discharge  - by discharge  -          User Key  (r) = Recorded By, (t) = Taken By, (c) = Cosigned By    Initials Name Provider Type    Magdalena Izquierdo PTA Physical Therapist Assistant     Radha Ruelas PTA Physical Therapist Assistant                 Physical Therapy Education     Title: PT OT SLP Therapies (Done)     Topic: Physical Therapy (Done)     Point: Mobility training (Done)     Learning Progress Summary           Patient Acceptance, E,TB, VU,DU,NR by  at 3/13/2023 1453    Acceptance, E,TB, VU by  at 3/12/2023 2026    Acceptance, E,D, VU,NR by  at 3/11/2023 1455                   Point: Home exercise program (Done)     Learning Progress Summary            Patient Acceptance, E,TB, VU,DU,NR by  at 3/13/2023 1453    Acceptance, E,TB, VU by  at 3/12/2023 2026    Acceptance, E,D, VU,NR by  at 3/11/2023 1455                   Point: Body mechanics (Done)     Learning Progress Summary           Patient Acceptance, E,TB, VU,DU,NR by HC at 3/13/2023 1453    Acceptance, E,TB, VU by  at 3/12/2023 2026    Acceptance, E,D, VU,NR by AG at 3/11/2023 1455                   Point: Precautions (Done)     Learning Progress Summary           Patient Acceptance, E,TB, VU,DU,NR by  at 3/13/2023 1453    Acceptance, E,TB, VU by  at 3/12/2023 2026    Acceptance, E,D, VU,NR by  at 3/11/2023 1455                               User Key     Initials Effective Dates Name Provider Type Discipline     06/16/21 -  Shalonda English RN Registered Nurse Nurse     06/16/21 -  Andreina Daigle, PT Physical Therapist PT     01/20/23 -  Radha Ruelas PTA Physical Therapist Assistant PT                PT Recommendation and Plan    Frequency of Treatment (PT): 5 times per week  Anticipated Equipment Needs at Discharge (PT Eval): wheelchair (TBD)                  Time Calculation:      PT Charges     Row Name 03/13/23 1532 03/13/23 1453          Time Calculation    Start Time 1245  -LL 1330  -     Stop Time 1330  -LL 1415  -HC     Time Calculation (min) 45 min  -LL 45 min  -     PT Received On -- 03/13/23  -        Time Calculation- PT    Total Timed Code Minutes- PT 45 minute(s)  -LL 45 minute(s)  -           User Key  (r) = Recorded By, (t) = Taken By, (c) = Cosigned By    Initials Name Provider Type     Magdalena De Los Santos PTA Physical Therapist Assistant     Radha Ruelas PTA Physical Therapist Assistant                Therapy Charges for Today     Code Description Service Date Service Provider Modifiers Qty    80697823375  PT THERAPEUTIC ACT EA 15 MIN 3/13/2023 Magdalena De Los Santos PTA GP, CQ 2    [id number]  PT THER PROC EA 15 MIN 3/13/2023 Filiberto  Magdalena, LETTY GP, CQ 1                   Magdalena. Filiberto, LETTY  3/13/2023

## 2023-03-13 NOTE — THERAPY TREATMENT NOTE
Inpatient Rehabilitation - Physical Therapy Treatment Note        Ruben     Patient Name: Pati Cortes  : 1955  MRN: [id number]    Today's Date: 3/13/2023                    Admit Date: 3/10/2023      Visit Dx:   No diagnosis found.    Patient Active Problem List   Diagnosis   • Debility       Past Medical History:   Diagnosis Date   • Arthritis    • Diabetes mellitus (HCC)    • Stroke (HCC)        History reviewed. No pertinent surgical history.    PT ASSESSMENT (last 12 hours)     IRF PT Evaluation and Treatment     Row Name 23 1440          PT Time and Intention    Document Type daily treatment  -     Mode of Treatment individual therapy;physical therapy  -     Patient/Family/Caregiver Comments/Observations Pt and RN in agreement for PM PT session. Pt walked 40' x 2 with min A and a long sitting rest break secondary to weakness. Attempted standing exercises, but Pt reports she is was dizzy. /59, completed sitting bean bag tosses and pickup with reacher until tolerance.  -     Row Name 23 1440          General Information    Patient Profile Reviewed yes  -HC     Existing Precautions/Restrictions fall;oxygen therapy device and L/min  -     Row Name 23 1440          Living Environment    Primary Care Provided by self;spouse/significant other  -     Row Name 23 1440          Home Use of Assistive/Adaptive Equipment    Equipment Currently Used at Home bp cuff;glucometer;oxygen;rollator;commode  BSC  -     Row Name 23 1440          Pain Assessment    Pretreatment Pain Rating 0/10 - no pain  -     Posttreatment Pain Rating 0/10 - no pain  -     Row Name 23 1440          Cognition/Psychosocial    Affect/Mental Status (Cognition) anxious;emotionally labile  -     Orientation Status (Cognition) oriented x 3  -HC     Follows Commands (Cognition) verbal cues/prompting required;WFL  -     Personal Safety Interventions fall prevention program  maintained;gait belt;nonskid shoes/slippers when out of bed;supervised activity  -[facility] Name 03/13/23 1440          Range of Motion Comprehensive    General Range of Motion no range of motion deficits identified  -[facility] Name 03/13/23 1440          Strength Comprehensive (MMT)    General Manual Muscle Testing (MMT) Assessment lower extremity strength deficits identified  -[facility] Name 03/13/23 1440          Lower Extremity (Manual Muscle Testing)    Lower Extremity: Manual Muscle Testing (MMT) --  B LE grossly 4-/5  -[facility] Name 03/13/23 1440          Mobility    Extremity Weight-bearing Status right lower extremity;left lower extremity  -     Left Lower Extremity (Weight-bearing Status) full weight-bearing (FWB)  -     Right Lower Extremity (Weight-bearing Status) full weight-bearing (FWB)  -[facility] Name 03/13/23 1440          Bed Mobility    Bed Mobility supine-sit;sit-supine;scooting/bridging;rolling right;rolling left  -     Scooting/Bridging [place] (Bed Mobility) standby assist  -     Sit-Supine [place] (Bed Mobility) standby assist;contact guard  -     Assistive Device (Bed Mobility) bed rails  -[facility] Name 03/13/23 1440          Transfer Assessment/Treatment    Transfers bed-chair transfer;sit-stand transfer;stand-sit transfer;stand pivot/stand step transfer  -[facility] Name 03/13/23 1440          Chair-Bed Transfer    Chair-Bed [place] (Transfers) minimum assist (75% patient effort)  -[facility] Name 03/13/23 1440          Sit-Stand Transfer    Assistive Device (Sit-Stand Transfers) walker, front-wheeled  -[facility] Name 03/13/23 1440          Stand-Sit Transfer    Stand-Sit [place] (Transfers) verbal cues;nonverbal cues (demo/gesture);minimum assist (75% patient effort)  -     Assistive Device (Stand-Sit Transfers) walker, front-wheeled  -     Row Name 03/13/23 1440          Stand Pivot/Stand Step Transfer    Stand Pivot/Stand Step [place]  (Transfers) verbal cues;nonverbal cues (demo/gesture);minimum assist (75% patient effort)  -     Assistive Device (Stand Pivot Stand Step Transfer) walker, front-wheeled  -HC     Row Name 03/13/23 1440          Gait/Stairs (Locomotion)    Gait/Stairs Locomotion gait/ambulation independence;gait/ambulation assistive device;gait pattern;distance ambulated;gait deviations  -     [place] Level (Gait) verbal cues;nonverbal cues (demo/gesture);minimum assist (75% patient effort)  -     Assistive Device (Gait) walker, front-wheeled  -HC     Distance in Feet (Gait) 40' x 2  -     Pattern (Gait) step-to  -HC     Row Name 03/13/23 1440          Safety Issues, Functional Mobility    Impairments Affecting Function (Mobility) balance;endurance/activity tolerance;strength  -     Row Name 03/13/23 1440          Balance    Comment, Balance Sitting bean bag toss and  with reacher.  -     Row Name 03/13/23 1440          Positioning and Restraints    Pre-Treatment Position other (comment)  WC from PT  -HC     Post Treatment Position bed  -HC     In Bed fowlers;call light within reach;encouraged to call for assist;side rails up x2  -     Row Name 03/13/23 1440          Therapy Assessment/Plan (PT)    Patient's Goals For Discharge return home  -     Row Name 03/13/23 1440          Therapy Assessment/Plan (PT)    Rehab Potential/Prognosis (PT) good, to achieve stated therapy goals  -     Frequency of Treatment (PT) 5 times per week  -     Estimated Duration of Therapy (PT) 2 weeks;3 weeks  -     Problem List (PT) problems related to;balance;mobility;strength  functional endurance  -     Activity Limitations Related to Problem List (PT) unable to ambulate safely;unable to transfer safely;BADLs not performed adequately or safely  -     Row Name 03/13/23 1440          Therapy Plan Review/Discharge Plan (PT)    Anticipated Equipment Needs at Discharge (PT Eval) wheelchair  TBD  -     Anticipated  Discharge Disposition (PT) home with assist;home with home health  -     Row Name 03/13/23 1440          IRF PT Goals    Bed Mobility Goal Selection (PT-IRF) bed mobility, PT goal 1  -     Transfer Goal Selection (PT-IRF) transfers, PT goal 1  -HC     Gait (Walking Locomotion) Goal Selection (PT-IRF) gait, PT goal 1  -     Row Name 03/13/23 1440          Bed Mobility Goal 1 (PT-IRF)    Activity/Assistive Device (Bed Mobility Goal 1, PT-IRF) rolling to left;rolling to right;scooting;sit to supine;supine to sit  -     [place] Level (Bed Mobility Goal 1, PT-IRF) independent  -HC     Time Frame (Bed Mobility Goal 1, PT-IRF) by discharge  -     Row Name 03/13/23 1440          Transfer Goal 1 (PT-IRF)    Activity/Assistive Device (Transfer Goal 1, PT-IRF) sit-to-stand/stand-to-sit;bed-to-chair/chair-to-bed;toilet  appropriate a.d.  -HC     [place] Level (Transfer Goal 1, PT-IRF) standby assist  -HC     Time Frame (Transfer Goal 1, PT-IRF) by discharge  -     Row Name 03/13/23 1440          Gait/Walking Locomotion Goal 1 (PT-IRF)    Activity/Assistive Device (Gait/Walking Locomotion Goal 1, PT-IRF) gait (walking locomotion);assistive device use;decrease fall risk;diminish gait deviation;improve balance and speed;increase endurance/gait distance  appropriate a.d.  -     Gait/Walking Locomotion Distance Goal 1 (PT-IRF) 150  -HC     [place] Level (Gait/Walking Locomotion Goal 1, PT-IRF) standby assist  -HC     Time Frame (Gait/Walking Locomotion Goal 1, PT-IRF) by discharge  -           User Key  (r) = Recorded By, (t) = Taken By, (c) = Cosigned By    Initials Name Provider Type     Radha Ruelas PTA Physical Therapist Assistant                 Physical Therapy Education     Title: PT OT SLP Therapies (Done)     Topic: Physical Therapy (Done)     Point: Mobility training (Done)     Learning Progress Summary           Patient Acceptance, E,TB, VU,DU,NR by  at 3/13/2023 5499     Acceptance, E,TB, VU by DG at 3/12/2023 2026    Acceptance, E,D, VU,NR by AG at 3/11/2023 1455                   Point: Home exercise program (Done)     Learning Progress Summary           Patient Acceptance, E,TB, VU,DU,NR by  at 3/13/2023 1453    Acceptance, E,TB, VU by DG at 3/12/2023 2026    Acceptance, E,D, VU,NR by AG at 3/11/2023 1455                   Point: Body mechanics (Done)     Learning Progress Summary           Patient Acceptance, E,TB, VU,DU,NR by HC at 3/13/2023 1453    Acceptance, E,TB, VU by DG at 3/12/2023 2026    Acceptance, E,D, VU,NR by AG at 3/11/2023 1455                   Point: Precautions (Done)     Learning Progress Summary           Patient Acceptance, E,TB, VU,DU,NR by  at 3/13/2023 1453    Acceptance, E,TB, VU by  at 3/12/2023 2026    Acceptance, E,D, VU,NR by AG at 3/11/2023 1455                               User Key     Initials Effective Dates Name Provider Type Discipline     06/16/21 -  Shalonda English, RN Registered Nurse Nurse     06/16/21 -  Andreina Daigle, PT Physical Therapist PT     01/20/23 -  Radha Ruelas PTA Physical Therapist Assistant PT                PT Recommendation and Plan    Frequency of Treatment (PT): 5 times per week  Anticipated Equipment Needs at Discharge (PT Eval): wheelchair (TBD)                  Time Calculation:      PT Charges     Row Name 03/13/23 1453             Time Calculation    Start Time 1330  -HC      Stop Time 1415  -      Time Calculation (min) 45 min  -      PT Received On 03/13/23  -         Time Calculation- PT    Total Timed Code Minutes- PT 45 minute(s)  -            User Key  (r) = Recorded By, (t) = Taken By, (c) = Cosigned By    Initials Name Provider Type     Radha Ruelas PTA Physical Therapist Assistant                Therapy Charges for Today     Code Description Service Date Service Provider Modifiers Qty    62710116138  GAIT TRAINING EA 15 MIN 3/13/2023 Radha Ruelas PTA  GP, CQ 2    [id number]  PT THERAPEUTIC ACT EA 15 MIN 3/13/2023 Radha Ruelas, PTA GP, CQ 1                   Radha Ruelas, LETTY  3/13/2023

## 2023-03-13 NOTE — PROGRESS NOTES
Rehabilitation Nursing  Inpatient Rehabilitation Plan of Care Note    Plan of Care  Copy from POCBody Function Structure    Skin Integrity (Active)  Current Status (3/10/2023 12:36:00 PM): Risk for skin breakdown  Weekly Goal: No skin breakdown  Discharge Goal: No skin breakdown    Safety    Potential for Injury (Active)  Current Status (3/10/2023 12:36:00 PM): Potential for falls  Weekly Goal: No falls  Discharge Goal: No falls    Signed by: Pati Xie Nurse

## 2023-03-13 NOTE — PROGRESS NOTES
Nephrology Progress Note      Subjective     Patient denied any chest pain or shortness of breath     Objective       Vital signs :     Temp:  [98.1 °F (36.7 °C)-98.7 °F (37.1 °C)] 98.1 °F (36.7 °C)  Heart Rate:  [57-66] 59  Resp:  [18] 18  BP: (118-151)/(50-77) 147/58    Intake/Output                       03/11/23 0701 - 03/12/23 0700 03/12/23 0701 - 03/13/23 0700     7514-5749 4075-4922 Total 8284-7602 7104-9841 Total                 Intake    P.O.  600  480 1080  1320  240 1560    Total Intake  3459 331 2199       Output    Total Output -- -- -- -- -- --           Physical Exam:    General Appearance : no chest pain or shortness of breath.   Lungs : clear to auscultation, respirations regular  Heart :  regular rhythm & normal rate, normal S1, S2 and no murmur, no rub  Abdomen : soft, non distended  Extremities : no edema,   Neurologic :   orientated to person, place, time and situation, Grossly no focal deficits        Laboratory Data :     Albumin Albumin   Date Value Ref Range Status   03/11/2023 2.5 (L) 3.5 - 5.2 g/dL Final      Magnesium Magnesium   Date Value Ref Range Status   03/11/2023 2.0 1.6 - 2.4 mg/dL Final          PTH               No results found for: PTH    CBC and coagulation:  Results from last 7 days   Lab Units 03/12/23 0049 03/11/23 0136   WBC 10*3/mm3 9.32 9.27   HEMOGLOBIN g/dL 8.0* 7.7*   HEMATOCRIT % 26.1* 25.1*   MCV fL 93.9 92.3   MCHC g/dL 30.7* 30.7*   PLATELETS 10*3/mm3 487* 470*     Acid/base balance:      Renal and electrolytes:    Results from last 7 days   Lab Units 03/12/23 0049 03/11/23 0136   SODIUM mmol/L 136 135*   POTASSIUM mmol/L 3.7 3.8   MAGNESIUM mg/dL  --  2.0   CHLORIDE mmol/L 97* 95*   CO2 mmol/L 29.0 29.1*   BUN mg/dL 51* 47*   CREATININE mg/dL 2.20* 2.01*   CALCIUM mg/dL 8.9 9.2     Estimated Creatinine Clearance: 23.1 mL/min (A) (by C-G formula based on SCr of 2.2 mg/dL (H)).  @GFRCG:3@   Liver and pancreatic function:  Results from last 7 days    Lab Units 03/11/23  0136   ALBUMIN g/dL 2.5*   BILIRUBIN mg/dL 0.3   ALK PHOS U/L 106   AST (SGOT) U/L 12   ALT (SGPT) U/L 5         Cardiac:      Liver and pancreatic function:  Results from last 7 days   Lab Units 03/11/23  0136   ALBUMIN g/dL 2.5*   BILIRUBIN mg/dL 0.3   ALK PHOS U/L 106   AST (SGOT) U/L 12   ALT (SGPT) U/L 5       Medications :     amLODIPine, 10 mg, Oral, Q24H  apixaban, 5 mg, Oral, Q12H  aspirin, 81 mg, Oral, Daily  atorvastatin, 80 mg, Oral, Nightly  bumetanide, 4 mg, Oral, Daily  busPIRone, 7.5 mg, Oral, TID  cholecalciferol, 1,000 Units, Oral, Daily  cloNIDine, 0.3 mg, Per PEG Tube, Q8H  clopidogrel, 75 mg, Per PEG Tube, Daily  docusate sodium, 100 mg, Oral, BID  ferrous sulfate, 325 mg, Oral, BID With Meals  guaiFENesin, 600 mg, Oral, Q12H  hydrALAZINE, 100 mg, Oral, Q6H  Insulin Aspart, 0-9 Units, Subcutaneous, TID AC  isosorbide dinitrate, 20 mg, Oral, TID - Nitrates  melatonin, 5 mg, Oral, Nightly  metoprolol tartrate, 50 mg, Oral, Q8H  pantoprazole, 40 mg, Oral, BID AC  senna-docusate sodium, 2 tablet, Oral, BID  spironolactone, 50 mg, Oral, Daily             Assessment & Plan     1.  CKD stage IV  2.  Resolved acute tubular necrosis  3.  Personal history of acute dialysis  4.  Acute on chronic hypoxic respiratory failure  5.  Systolic plus diastolic CHF compensated  6.  Left ventricular ejection fraction 30%  7.  Coronary artery disease with history of stent  8.  Type 2 diabetes  9.  Anemia of acute on chronic disease with iron deficiency.  Ordered Ferrlecit 250 mg 3/12/2023  10.  Debility and deconditioning     Stable renal funtions, continue holding dialysis.   Tunneled dialysis cath dressing and flushing today.   Continue on IV iron today as well.       Micha Carey MD  03/13/23  08:27 EDT

## 2023-03-13 NOTE — PLAN OF CARE
Goal Outcome Evaluation:            Pt resting quietly in bed with no complaints noted at this time. Continue current plan of care.

## 2023-03-13 NOTE — PROGRESS NOTES
Rehabilitation Nursing  Inpatient Rehabilitation Plan of Care Note    Plan of Care  Copy from POC    Body Function Structure    Skin Integrity (Active)  Current Status (3/10/2023 12:36:00 PM): Risk for skin breakdown  Weekly Goal: No skin breakdown  Discharge Goal: No skin breakdown    Safety    Potential for Injury (Active)  Current Status (3/10/2023 12:36:00 PM): Potential for falls  Weekly Goal: No falls  Discharge Goal: No falls    RN Interventions    Safety -  RN: assess safety q 1hr; call light and items within reach; side rails up x2  [RN]    Body Function Structure -  RN: assess skin q shift; daily skin care; turn patient q 2 hr [RN]    Signed by: Nurse Cynthia

## 2023-03-13 NOTE — PROGRESS NOTES
Occupational Therapy:    Physical Therapy: Individual: 90 minutes.    Speech Language Pathology:    Signed by: Radha Ruelas PTA

## 2023-03-13 NOTE — THERAPY TREATMENT NOTE
Inpatient Rehabilitation - Occupational Therapy Treatment Note     [place]     Patient Name: Pati Cortes  : 1955  MRN: [id number]    Today's Date: 3/13/2023                 Admit Date: 3/10/2023       No diagnosis found.    Patient Active Problem List   Diagnosis   • Debility       Past Medical History:   Diagnosis Date   • Arthritis    • Diabetes mellitus (HCC)    • Stroke (HCC)        History reviewed. No pertinent surgical history.          IRF OT ASSESSMENT FLOWSHEET (last 12 hours)     IRF OT Evaluation and Treatment     Row Name 23 1344          OT Time and Intention    Document Type daily treatment  -LM     Mode of Treatment occupational therapy  -LM     Patient Effort good  -LM     Row Name 23 1344          General Information    Existing Precautions/Restrictions fall;oxygen therapy device and L/min  -LM     Comment, General Information Patient seen this am for adl retraining, fxl mobility, fxl activity tolerance, light therex.  patient required mod/max assist with LE dressing, mod assist with fxl transfer using rw.  Dowel therex, handgripper.  Frequent rest breaks.  -LM     Row Name 23 1344          Cognition/Psychosocial    Affect/Mental Status (Cognition) anxious;emotionally labile  -LM     Orientation Status (Cognition) oriented x 3  -LM     Row Name 23 1344          Positioning and Restraints    Post Treatment Position wheelchair  -LM     In Wheelchair with OT  -LM           User Key  (r) = Recorded By, (t) = Taken By, (c) = Cosigned By    Initials Name Effective Dates    LM Luz Miller, OT 21 -                          OT Recommendation and Plan                         Time Calculation:      Time Calculation- OT     Row Name 23 1348             Time Calculation- OT    OT Start Time 0915  -LM      OT Stop Time 1000  -LM      OT Time Calculation (min) 45 min  -LM      Total Timed Code Minutes- OT 45 minute(s)  -LM            User Key  (r) = Recorded By,  (t) = Taken By, (c) = Cosigned By    Initials Name Provider Type     Luz Miller, OT Occupational Therapist              Therapy Charges for Today     Code Description Service Date Service Provider Modifiers Qty    93166413460 HC OT SELF CARE/MGMT/TRAIN EA 15 MIN 3/13/2023 Luz Miller, OT GO 2    81773463764 HC OT THER PROC EA 15 MIN 3/13/2023 Luz Miller OT GO 1                   Luz Miller OT  3/13/2023

## 2023-03-14 ENCOUNTER — APPOINTMENT (OUTPATIENT)
Dept: CARDIOLOGY | Facility: HOSPITAL | Age: 68
DRG: 948 | End: 2023-03-14
Payer: MEDICARE

## 2023-03-14 LAB
ALBUMIN SERPL-MCNC: 2.5 G/DL (ref 3.5–5.2)
ALBUMIN/GLOB SERPL: 0.7 G/DL
ALP SERPL-CCNC: 113 U/L (ref 39–117)
ALT SERPL W P-5'-P-CCNC: 7 U/L (ref 1–33)
ANION GAP SERPL CALCULATED.3IONS-SCNC: 9.4 MMOL/L (ref 5–15)
AST SERPL-CCNC: 11 U/L (ref 1–32)
BASOPHILS # BLD AUTO: 0.03 10*3/MM3 (ref 0–0.2)
BASOPHILS NFR BLD AUTO: 0.3 % (ref 0–1.5)
BH CV ECHO MEAS - ACS: 1.3 CM
BH CV ECHO MEAS - AO MAX PG: 11 MMHG
BH CV ECHO MEAS - AO MEAN PG: 6 MMHG
BH CV ECHO MEAS - AO ROOT DIAM: 2.8 CM
BH CV ECHO MEAS - AO V2 MAX: 166 CM/SEC
BH CV ECHO MEAS - AO V2 VTI: 39.2 CM
BH CV ECHO MEAS - AVA(I,D): 1.97 CM2
BH CV ECHO MEAS - EDV(CUBED): 137 ML
BH CV ECHO MEAS - EDV(MOD-SP4): 64.4 ML
BH CV ECHO MEAS - EF(MOD-SP4): 57.9 %
BH CV ECHO MEAS - ESV(CUBED): 34.2 ML
BH CV ECHO MEAS - ESV(MOD-SP4): 27.1 ML
BH CV ECHO MEAS - FS: 37.1 %
BH CV ECHO MEAS - IVS/LVPW: 0.71 CM
BH CV ECHO MEAS - IVSD: 1.17 CM
BH CV ECHO MEAS - LA DIMENSION: 4.9 CM
BH CV ECHO MEAS - LAT PEAK E' VEL: 4.4 CM/SEC
BH CV ECHO MEAS - LV DIASTOLIC VOL/BSA (35-75): 37.2 CM2
BH CV ECHO MEAS - LV MASS(C)D: 307 GRAMS
BH CV ECHO MEAS - LV MAX PG: 5.6 MMHG
BH CV ECHO MEAS - LV MEAN PG: 3 MMHG
BH CV ECHO MEAS - LV SYSTOLIC VOL/BSA (12-30): 15.7 CM2
BH CV ECHO MEAS - LV V1 MAX: 118 CM/SEC
BH CV ECHO MEAS - LV V1 VTI: 27.2 CM
BH CV ECHO MEAS - LVIDD: 5.2 CM
BH CV ECHO MEAS - LVIDS: 3.2 CM
BH CV ECHO MEAS - LVOT AREA: 2.8 CM2
BH CV ECHO MEAS - LVOT DIAM: 1.9 CM
BH CV ECHO MEAS - LVPWD: 1.6 CM
BH CV ECHO MEAS - MED PEAK E' VEL: 4.2 CM/SEC
BH CV ECHO MEAS - MV A MAX VEL: 122 CM/SEC
BH CV ECHO MEAS - MV E MAX VEL: 113 CM/SEC
BH CV ECHO MEAS - MV E/A: 0.93
BH CV ECHO MEAS - PA ACC TIME: 0.1 SEC
BH CV ECHO MEAS - PA PR(ACCEL): 36.3 MMHG
BH CV ECHO MEAS - RAP SYSTOLE: 10 MMHG
BH CV ECHO MEAS - RVSP: 35.4 MMHG
BH CV ECHO MEAS - SI(MOD-SP4): 21.6 ML/M2
BH CV ECHO MEAS - SV(LVOT): 77.1 ML
BH CV ECHO MEAS - SV(MOD-SP4): 37.3 ML
BH CV ECHO MEAS - TAPSE (>1.6): 2.07 CM
BH CV ECHO MEAS - TR MAX PG: 25.4 MMHG
BH CV ECHO MEAS - TR MAX VEL: 252 CM/SEC
BH CV ECHO MEASUREMENTS AVERAGE E/E' RATIO: 26.28
BILIRUB SERPL-MCNC: 0.2 MG/DL (ref 0–1.2)
BUN SERPL-MCNC: 54 MG/DL (ref 8–23)
BUN/CREAT SERPL: 22.6 (ref 7–25)
CALCIUM SPEC-SCNC: 8.4 MG/DL (ref 8.6–10.5)
CHLORIDE SERPL-SCNC: 99 MMOL/L (ref 98–107)
CO2 SERPL-SCNC: 27.6 MMOL/L (ref 22–29)
CREAT SERPL-MCNC: 2.39 MG/DL (ref 0.57–1)
DEPRECATED RDW RBC AUTO: 55.8 FL (ref 37–54)
EGFRCR SERPLBLD CKD-EPI 2021: 21.6 ML/MIN/1.73
EOSINOPHIL # BLD AUTO: 0.14 10*3/MM3 (ref 0–0.4)
EOSINOPHIL NFR BLD AUTO: 1.2 % (ref 0.3–6.2)
ERYTHROCYTE [DISTWIDTH] IN BLOOD BY AUTOMATED COUNT: 16.5 % (ref 12.3–15.4)
GLOBULIN UR ELPH-MCNC: 3.5 GM/DL
GLUCOSE BLDC GLUCOMTR-MCNC: 103 MG/DL (ref 70–130)
GLUCOSE BLDC GLUCOMTR-MCNC: 110 MG/DL (ref 70–130)
GLUCOSE BLDC GLUCOMTR-MCNC: 89 MG/DL (ref 70–130)
GLUCOSE SERPL-MCNC: 98 MG/DL (ref 65–99)
HBA1C MFR BLD: 5.1 % (ref 4.8–5.6)
HCT VFR BLD AUTO: 26.1 % (ref 34–46.6)
HGB BLD-MCNC: 7.8 G/DL (ref 12–15.9)
IMM GRANULOCYTES # BLD AUTO: 0.12 10*3/MM3 (ref 0–0.05)
IMM GRANULOCYTES NFR BLD AUTO: 1.1 % (ref 0–0.5)
LEFT ATRIUM VOLUME INDEX: 33.1 ML/M2
LYMPHOCYTES # BLD AUTO: 2.29 10*3/MM3 (ref 0.7–3.1)
LYMPHOCYTES NFR BLD AUTO: 20.2 % (ref 19.6–45.3)
MAXIMAL PREDICTED HEART RATE: 152 BPM
MCH RBC QN AUTO: 28 PG (ref 26.6–33)
MCHC RBC AUTO-ENTMCNC: 29.9 G/DL (ref 31.5–35.7)
MCV RBC AUTO: 93.5 FL (ref 79–97)
MONOCYTES # BLD AUTO: 0.98 10*3/MM3 (ref 0.1–0.9)
MONOCYTES NFR BLD AUTO: 8.6 % (ref 5–12)
NEUTROPHILS NFR BLD AUTO: 68.6 % (ref 42.7–76)
NEUTROPHILS NFR BLD AUTO: 7.8 10*3/MM3 (ref 1.7–7)
NRBC BLD AUTO-RTO: 0 /100 WBC (ref 0–0.2)
PLATELET # BLD AUTO: 501 10*3/MM3 (ref 140–450)
PMV BLD AUTO: 9.8 FL (ref 6–12)
POTASSIUM SERPL-SCNC: 4 MMOL/L (ref 3.5–5.2)
PROT SERPL-MCNC: 6 G/DL (ref 6–8.5)
RBC # BLD AUTO: 2.79 10*6/MM3 (ref 3.77–5.28)
SODIUM SERPL-SCNC: 136 MMOL/L (ref 136–145)
STRESS TARGET HR: 129 BPM
WBC NRBC COR # BLD: 11.36 10*3/MM3 (ref 3.4–10.8)

## 2023-03-14 PROCEDURE — 97535 SELF CARE MNGMENT TRAINING: CPT | Performed by: OCCUPATIONAL THERAPIST

## 2023-03-14 PROCEDURE — 94799 UNLISTED PULMONARY SVC/PX: CPT

## 2023-03-14 PROCEDURE — 97530 THERAPEUTIC ACTIVITIES: CPT | Performed by: OCCUPATIONAL THERAPIST

## 2023-03-14 PROCEDURE — 97530 THERAPEUTIC ACTIVITIES: CPT

## 2023-03-14 PROCEDURE — 93306 TTE W/DOPPLER COMPLETE: CPT | Performed by: SPECIALIST

## 2023-03-14 PROCEDURE — 97110 THERAPEUTIC EXERCISES: CPT | Performed by: OCCUPATIONAL THERAPIST

## 2023-03-14 PROCEDURE — 94660 CPAP INITIATION&MGMT: CPT

## 2023-03-14 PROCEDURE — 80053 COMPREHEN METABOLIC PANEL: CPT | Performed by: INTERNAL MEDICINE

## 2023-03-14 PROCEDURE — 83036 HEMOGLOBIN GLYCOSYLATED A1C: CPT | Performed by: INTERNAL MEDICINE

## 2023-03-14 PROCEDURE — 97110 THERAPEUTIC EXERCISES: CPT

## 2023-03-14 PROCEDURE — 97116 GAIT TRAINING THERAPY: CPT

## 2023-03-14 PROCEDURE — 85025 COMPLETE CBC W/AUTO DIFF WBC: CPT | Performed by: INTERNAL MEDICINE

## 2023-03-14 PROCEDURE — 93306 TTE W/DOPPLER COMPLETE: CPT

## 2023-03-14 PROCEDURE — 82962 GLUCOSE BLOOD TEST: CPT

## 2023-03-14 RX ORDER — POLYETHYLENE GLYCOL 3350 17 G/17G
17 POWDER, FOR SOLUTION ORAL DAILY
Status: DISCONTINUED | OUTPATIENT
Start: 2023-03-14 | End: 2023-03-16 | Stop reason: HOSPADM

## 2023-03-14 RX ADMIN — CLONIDINE HYDROCHLORIDE 0.3 MG: 0.3 TABLET ORAL at 21:06

## 2023-03-14 RX ADMIN — PANTOPRAZOLE SODIUM 40 MG: 40 TABLET, DELAYED RELEASE ORAL at 06:32

## 2023-03-14 RX ADMIN — FERROUS SULFATE TAB 325 MG (65 MG ELEMENTAL FE) 325 MG: 325 (65 FE) TAB at 17:36

## 2023-03-14 RX ADMIN — ATORVASTATIN CALCIUM 80 MG: 40 TABLET, FILM COATED ORAL at 20:44

## 2023-03-14 RX ADMIN — CLOPIDOGREL BISULFATE 75 MG: 75 TABLET, FILM COATED ORAL at 09:05

## 2023-03-14 RX ADMIN — GUAIFENESIN 600 MG: 600 TABLET, EXTENDED RELEASE ORAL at 20:44

## 2023-03-14 RX ADMIN — BUSPIRONE HYDROCHLORIDE 7.5 MG: 5 TABLET ORAL at 16:55

## 2023-03-14 RX ADMIN — Medication 5 MG: at 20:45

## 2023-03-14 RX ADMIN — ISOSORBIDE DINITRATE 20 MG: 20 TABLET ORAL at 17:36

## 2023-03-14 RX ADMIN — DOCUSATE SODIUM 100 MG: 100 CAPSULE ORAL at 09:04

## 2023-03-14 RX ADMIN — METOPROLOL TARTRATE 50 MG: 50 TABLET, FILM COATED ORAL at 05:36

## 2023-03-14 RX ADMIN — BUSPIRONE HYDROCHLORIDE 7.5 MG: 5 TABLET ORAL at 09:05

## 2023-03-14 RX ADMIN — METOPROLOL TARTRATE 50 MG: 50 TABLET, FILM COATED ORAL at 13:18

## 2023-03-14 RX ADMIN — PANTOPRAZOLE SODIUM 40 MG: 40 TABLET, DELAYED RELEASE ORAL at 16:55

## 2023-03-14 RX ADMIN — CLONIDINE HYDROCHLORIDE 0.3 MG: 0.3 TABLET ORAL at 05:36

## 2023-03-14 RX ADMIN — HYDRALAZINE HYDROCHLORIDE 100 MG: 50 TABLET, FILM COATED ORAL at 17:36

## 2023-03-14 RX ADMIN — METOPROLOL TARTRATE 50 MG: 50 TABLET, FILM COATED ORAL at 21:06

## 2023-03-14 RX ADMIN — HYDROXYZINE HYDROCHLORIDE 25 MG: 25 TABLET ORAL at 20:47

## 2023-03-14 RX ADMIN — ISOSORBIDE DINITRATE 20 MG: 20 TABLET ORAL at 09:04

## 2023-03-14 RX ADMIN — APIXABAN 5 MG: 5 TABLET, FILM COATED ORAL at 09:04

## 2023-03-14 RX ADMIN — SPIRONOLACTONE 50 MG: 25 TABLET ORAL at 09:05

## 2023-03-14 RX ADMIN — HYDRALAZINE HYDROCHLORIDE 100 MG: 50 TABLET, FILM COATED ORAL at 23:12

## 2023-03-14 RX ADMIN — ISOSORBIDE DINITRATE 20 MG: 20 TABLET ORAL at 12:01

## 2023-03-14 RX ADMIN — ACETAMINOPHEN 650 MG: 325 TABLET ORAL at 16:54

## 2023-03-14 RX ADMIN — HYDRALAZINE HYDROCHLORIDE 100 MG: 50 TABLET, FILM COATED ORAL at 12:01

## 2023-03-14 RX ADMIN — DOCUSATE SODIUM 50 MG AND SENNOSIDES 8.6 MG 2 TABLET: 8.6; 5 TABLET, FILM COATED ORAL at 09:04

## 2023-03-14 RX ADMIN — HYDROXYZINE HYDROCHLORIDE 25 MG: 25 TABLET ORAL at 05:38

## 2023-03-14 RX ADMIN — APIXABAN 5 MG: 5 TABLET, FILM COATED ORAL at 20:44

## 2023-03-14 RX ADMIN — HYDRALAZINE HYDROCHLORIDE 100 MG: 50 TABLET, FILM COATED ORAL at 05:36

## 2023-03-14 RX ADMIN — BUSPIRONE HYDROCHLORIDE 7.5 MG: 5 TABLET ORAL at 20:44

## 2023-03-14 RX ADMIN — FERROUS SULFATE TAB 325 MG (65 MG ELEMENTAL FE) 325 MG: 325 (65 FE) TAB at 09:04

## 2023-03-14 RX ADMIN — AMLODIPINE BESYLATE 10 MG: 10 TABLET ORAL at 09:04

## 2023-03-14 RX ADMIN — ASPIRIN 81 MG: 81 TABLET, COATED ORAL at 09:05

## 2023-03-14 RX ADMIN — CLONIDINE HYDROCHLORIDE 0.3 MG: 0.3 TABLET ORAL at 13:18

## 2023-03-14 RX ADMIN — BUMETANIDE 4 MG: 1 TABLET ORAL at 09:04

## 2023-03-14 RX ADMIN — GUAIFENESIN 600 MG: 600 TABLET, EXTENDED RELEASE ORAL at 09:05

## 2023-03-14 RX ADMIN — Medication 1000 UNITS: at 09:05

## 2023-03-14 NOTE — PROGRESS NOTES
[facility] Coordinator  Inpatient Rehabilitation Admission    Ethnic Group:  Marital Status:    [facility] Admission Date:  03/10/2023  Admission Class:  Admit From:    Pre-Hospital Living:    Payment Sources:  Impairment Group:  Date of Onset of Impairment:    Etiologic Diagnosis Code(s):  Rank Code      Description  1    U07.1     COVID-19    Comorbidities:  Rank Code      Description    1    N39.0     Urinary tract infection, site not specified  2    J15.9     Unspecified bacterial pneumonia  3    J96.21    Acute and chronic respiratory failure with                 hypoxia  4    J96.22    Acute and chronic respiratory failure with                 hypercapnia  5    I13.0     Hypertensive heart and chronic kidney disease                 with heart failure and stage 1 through stage 4                 chronic kidney disease, or unspecified chronic                 kidney disease  6    I50.40    Unspecified combined systolic (congestive) and                 diastolic (congestive) heart failure  7    E11.22    Type 2 diabetes mellitus with diabetic chronic                 kidney disease  8    N18.4     Chronic kidney disease, stage 4 (severe)  9    Z86.718   Personal history of other venous thrombosis and                 embolism  10   Z79.01    Long term (current) use of anticoagulants  11   E78.5     Hyperlipidemia, unspecified  12   I25.10    Atherosclerotic heart disease of native                 coronary artery without angina pectoris  13   Z95.5     Presence of coronary angioplasty implant and                 graft  14   Z86.73    Personal history of transient ischemic attack                 (TIA), and cerebral infarction without residual                 deficits  15   R53.81    Other malaise  16   Z79.899   Other long term (current) drug therapy  17   K21.9     Gastro-esophageal reflux disease without                 esophagitis    Height on Admission:  Weight on Admission:    Are there any arthritis conditions recorded for  Impairment Group, Etiologic  Diagnosis, or Comorbid Conditions that meet all of the regulatory requirements  for IRF classification (in 42 .29(b)(2)(x), (xi), and xii))?  No    NICK Bladder Accidents:   - Accidents.    NICK Bowel Accident:  -Accidents.    Signed by: Nurse Jakub

## 2023-03-14 NOTE — PROGRESS NOTES
Rehabilitation Nursing  Inpatient Rehabilitation Plan of Care Note    Plan of Care  Copy from POCBody Function Structure    Skin Integrity (Active)  Current Status (3/10/2023 12:36:00 PM): Risk for skin breakdown  Weekly Goal: No skin breakdown  Discharge Goal: No skin breakdown    Safety    Potential for Injury (Active)  Current Status (3/10/2023 12:36:00 PM): Potential for falls  Weekly Goal: No falls  Discharge Goal: No falls    Signed by: Vida Gama, Nurse

## 2023-03-14 NOTE — PROGRESS NOTES
Nephrology Progress Note      Subjective     No chest pain or shortness of breath.     Objective       Vital signs :     Temp:  [97.9 °F (36.6 °C)-98.1 °F (36.7 °C)] 97.9 °F (36.6 °C)  Heart Rate:  [58-65] 65  Resp:  [18] 18  BP: (149-163)/(56-91) 155/60    Intake/Output                             03/12/23 0701 - 03/13/23 0700 03/13/23 0701 - 03/14/23 0700 03/14/23 0701 - 03/15/23 0700     0227-8643 8714-9274 Total 6371-3741 9947-4197 Total 6093-2548 5078-0522 Total                    Intake    P.O.  1320  240 1560  780  120 900  360  -- 360    I.V.  --  -- --  120  -- 120  --  -- --    Total Intake 6446 785 0254  360 -- 360       Output    Urine  --  -- --  --  150 150  --  -- --    Total Output -- -- -- -- 150 150 -- -- --           Physical Exam:    General Appearance : no chest pain or shortness of breath.   Lungs : clear to auscultation, respirations regular  Heart :  regular rhythm & normal rate, normal S1, S2 and no murmur, no rub  Abdomen : soft, non distended  Extremities : no edema,   Neurologic :   orientated to person, place, time and situation, Grossly no focal deficits        Laboratory Data :     Albumin Albumin   Date Value Ref Range Status   03/14/2023 2.5 (L) 3.5 - 5.2 g/dL Final      Magnesium No results found for: MG       PTH               No results found for: PTH    CBC and coagulation:  Results from last 7 days   Lab Units 03/14/23  0136 03/12/23  0049 03/11/23  0136   WBC 10*3/mm3 11.36* 9.32 9.27   HEMOGLOBIN g/dL 7.8* 8.0* 7.7*   HEMATOCRIT % 26.1* 26.1* 25.1*   MCV fL 93.5 93.9 92.3   MCHC g/dL 29.9* 30.7* 30.7*   PLATELETS 10*3/mm3 501* 487* 470*     Acid/base balance:      Renal and electrolytes:    Results from last 7 days   Lab Units 03/14/23  0136 03/12/23  0049 03/11/23  0136   SODIUM mmol/L 136 136 135*   POTASSIUM mmol/L 4.0 3.7 3.8   MAGNESIUM mg/dL  --   --  2.0   CHLORIDE mmol/L 99 97* 95*   CO2 mmol/L 27.6 29.0 29.1*   BUN mg/dL 54* 51* 47*   CREATININE mg/dL  2.39* 2.20* 2.01*   CALCIUM mg/dL 8.4* 8.9 9.2     Estimated Creatinine Clearance: 21.1 mL/min (A) (by C-G formula based on SCr of 2.39 mg/dL (H)).  @GFRCG:3@   Liver and pancreatic function:  Results from last 7 days   Lab Units 03/14/23  0136 03/11/23  0136   ALBUMIN g/dL 2.5* 2.5*   BILIRUBIN mg/dL 0.2 0.3   ALK PHOS U/L 113 106   AST (SGOT) U/L 11 12   ALT (SGPT) U/L 7 5         Cardiac:      Liver and pancreatic function:  Results from last 7 days   Lab Units 03/14/23  0136 03/11/23  0136   ALBUMIN g/dL 2.5* 2.5*   BILIRUBIN mg/dL 0.2 0.3   ALK PHOS U/L 113 106   AST (SGOT) U/L 11 12   ALT (SGPT) U/L 7 5       Medications :     amLODIPine, 10 mg, Oral, Q24H  apixaban, 5 mg, Oral, Q12H  aspirin, 81 mg, Oral, Daily  atorvastatin, 80 mg, Oral, Nightly  bumetanide, 4 mg, Oral, Daily  busPIRone, 7.5 mg, Oral, TID  cholecalciferol, 1,000 Units, Oral, Daily  cloNIDine, 0.3 mg, Per PEG Tube, Q8H  clopidogrel, 75 mg, Per PEG Tube, Daily  docusate sodium, 100 mg, Oral, BID  ferrous sulfate, 325 mg, Oral, BID With Meals  guaiFENesin, 600 mg, Oral, Q12H  hydrALAZINE, 100 mg, Oral, Q6H  Insulin Aspart, 0-9 Units, Subcutaneous, TID AC  isosorbide dinitrate, 20 mg, Oral, TID - Nitrates  melatonin, 5 mg, Oral, Nightly  metoprolol tartrate, 50 mg, Oral, Q8H  pantoprazole, 40 mg, Oral, BID AC  senna-docusate sodium, 2 tablet, Oral, BID  spironolactone, 50 mg, Oral, Daily             Assessment & Plan     1.  CKD stage IV  2.  Resolved acute tubular necrosis  3.  Personal history of acute dialysis  4.  Acute on chronic hypoxic respiratory failure  5.  Systolic plus diastolic CHF compensated  6.  Left ventricular ejection fraction 30%  7.  Coronary artery disease with history of stent  8.  Type 2 diabetes  9.  Anemia of acute on chronic disease with iron deficiency.  Ordered Ferrlecit 250 mg 3/12/2023  10.  Debility and deconditioning     Renal functions grossly stable, non oliguric. No fluid overload clinically. Likely renal  recovery, continue holding dialysis for now.   Tunneled dialysis cath dressing and flushing today.         Micha Carey MD  03/14/23  13:31 EDT

## 2023-03-14 NOTE — PROGRESS NOTES
Rehabilitation Nursing  Inpatient Rehabilitation Plan of Care Note    Plan of Care  Copy from POC    Signed by: Vida Gama, Nurse

## 2023-03-14 NOTE — SIGNIFICANT NOTE
03/14/23 3895   Plan   Plan Team conference held today.  Pt is scheduled for discharge on 3-24-23.

## 2023-03-14 NOTE — PLAN OF CARE
Goal Outcome Evaluation:            Pt had some indigestion that was relieved. Resting quietly in bed with no complaints at this time. Continue current plan of care.

## 2023-03-14 NOTE — THERAPY TREATMENT NOTE
Inpatient Rehabilitation - Occupational Therapy Treatment Note     [place]     Patient Name: Pati Cortes  : 1955  MRN: [id number]    Today's Date: 3/14/2023                 Admit Date: 3/10/2023       No diagnosis found.    Patient Active Problem List   Diagnosis   • Debility       Past Medical History:   Diagnosis Date   • Arthritis    • Diabetes mellitus (HCC)    • Stroke (HCC)        History reviewed. No pertinent surgical history.          IRF OT ASSESSMENT FLOWSHEET (last 12 hours)     IRF OT Evaluation and Treatment     Row Name 23 1432          OT Time and Intention    Document Type daily treatment  -BF     Mode of Treatment occupational therapy  -BF     Patient Effort adequate  -BF     Symptoms Noted During/After Treatment none  -BF     Row Name 23 1432          General Information    Existing Precautions/Restrictions fall;oxygen therapy device and L/min  -BF     Row Name 23 1432          Cognition/Psychosocial    Orientation Status (Cognition) oriented x 3  -BF     Follows Commands (Cognition) follows one-step commands;verbal cues/prompting required  -BF     Row Name 23 1432          Chair-Bed Transfer    Chair-Bed [place] (Transfers) moderate assist (50% patient effort);verbal cues;nonverbal cues (demo/gesture)  -BF     Assistive Device (Chair-Bed Transfers) walker, front-wheeled;wheelchair  -BF     Row Name 23 1432          Motor Skills    Motor Control/Coordination Interventions fine motor manipulation/dexterity activities;gross motor coordination activities;therapeutic exercise/ROM  BUE GMC/FMC theract, light strengthening, handgripper therex  -BF     Row Name 23 1432          Positioning and Restraints    In Bed supine;call light within reach;encouraged to call for assist  -BF           User Key  (r) = Recorded By, (t) = Taken By, (c) = Cosigned By    Initials Name Effective Dates    BF Vanessa Vasquez, OT 21 -                   Occupational Therapy Education     Title: PT OT SLP Therapies (Done)     Topic: Occupational Therapy (Done)     Point: ADL training (Done)     Description:   Instruct learner(s) on proper safety adaptation and remediation techniques during self care or transfers.   Instruct in proper use of assistive devices.              Learning Progress Summary           Patient Acceptance, E, VU,NR by  at 3/14/2023 1432    Acceptance, E,TB, VU by  at 3/13/2023 2006    Acceptance, E,D, VU,NR by  at 3/13/2023 1427                   Point: Precautions (Done)     Description:   Instruct learner(s) on prescribed precautions during self-care and functional transfers.              Learning Progress Summary           Patient Acceptance, E, VU,NR by  at 3/14/2023 1432    Acceptance, E,TB, VU by  at 3/13/2023 2006    Acceptance, E,D, VU,NR by  at 3/13/2023 1427                               User Key     Initials Effective Dates Name Provider Type Discipline     06/16/21 -  Shalonda English RN Registered Nurse Nurse     06/16/21 -  Vanessa Vasquez OT Occupational Therapist OT     06/16/21 -  Ros Doe OT Occupational Therapist OT                    OT Recommendation and Plan                         Time Calculation:      Time Calculation- OT     Row Name 03/14/23 1435             Time Calculation- OT    OT Start Time 1115  -BF      OT Stop Time 1200  -BF      OT Time Calculation (min) 45 min  -BF      Total Timed Code Minutes- OT 45 minute(s)  -BF      OT Non-Billable Time (min) 10 min  -BF            User Key  (r) = Recorded By, (t) = Taken By, (c) = Cosigned By    Initials Name Provider Type     Vanessa Vasquez OT Occupational Therapist              Therapy Charges for Today     Code Description Service Date Service Provider Modifiers Qty    97640228094 HC OT SELF CARE/MGMT/TRAIN EA 15 MIN 3/14/2023 Vanessa Vasquez OT GO 1    84607764055 HC OT THERAPEUTIC ACT EA 15 MIN 3/14/2023  Vanessa Vasquez, OT GO 1    [id number]  OT THER PROC EA 15 MIN 3/14/2023 Vanessa Vasquez OT GO 1                   Vanessa Vasquez, OT  3/14/2023

## 2023-03-14 NOTE — PROGRESS NOTES
Assisted By: Physical therapy    CC: Follow-up on generalized debility    Interview History/HPI: Patient was seen while ambulating in the [facility], she was resting in a wheelchair intermittently, she is walking 40 to 60 feet at a time.  She does have some tachypnea with her movement but she was on 5 L and saturating 96%, we monitored her and turned today on 4 L and she remained adequately saturated about 96%, she is on 3.5 L at home.  She denies any acute pain.  She states she is tolerating her diet.          Current Hospital Meds:  amLODIPine, 10 mg, Oral, Q24H  apixaban, 5 mg, Oral, Q12H  aspirin, 81 mg, Oral, Daily  atorvastatin, 80 mg, Oral, Nightly  bumetanide, 4 mg, Oral, Daily  busPIRone, 7.5 mg, Oral, TID  cholecalciferol, 1,000 Units, Oral, Daily  cloNIDine, 0.3 mg, Per PEG Tube, Q8H  clopidogrel, 75 mg, Per PEG Tube, Daily  docusate sodium, 100 mg, Oral, BID  ferrous sulfate, 325 mg, Oral, BID With Meals  guaiFENesin, 600 mg, Oral, Q12H  hydrALAZINE, 100 mg, Oral, Q6H  Insulin Aspart, 0-9 Units, Subcutaneous, TID AC  isosorbide dinitrate, 20 mg, Oral, TID - Nitrates  melatonin, 5 mg, Oral, Nightly  metoprolol tartrate, 50 mg, Oral, Q8H  pantoprazole, 40 mg, Oral, BID AC  senna-docusate sodium, 2 tablet, Oral, BID  spironolactone, 50 mg, Oral, Daily         Vitals:    03/14/23 1318   BP: 155/60   Pulse:    Resp:    Temp:    SpO2:          Intake/Output Summary (Last 24 hours) at 3/14/2023 1632  Last data filed at 3/14/2023 1300  Gross per 24 hour   Intake 900 ml   Output 150 ml   Net 750 ml       EXAM: 97.9, 61, 18, saturations as above, she has some tachypnea with normal conversation after walking, she does use a walker at home, states she has a rollator.  Lungs have bilateral breath sounds diminished throughout but no rhonchi rales or wheezing heard, adequate air excursion, heart is a regular rate and rhythm without murmur rub or gallop, no edema.  Strength is symmetric.      Diet: Cardiac Diets, Diabetic  Diets; Healthy Heart (2-3 Na+); Consistent Carbohydrate; Texture: Regular Texture (IDDSI 7); Fluid Consistency: Thin (IDDSI 0)        LABS:     Lab Results (last 48 hours)     Procedure Component Value Units Date/Time    POC Glucose Once [[id number]]  (Normal) Collected: 03/14/23 1601    Specimen: Blood Updated: 03/14/23 1607     Glucose 89 mg/dL      Comment: Meter: [id number] : [id number] ZAVALADORETHA ALMANZARA       POC Glucose Once [[id number]]  (Normal) Collected: 03/14/23 1057    Specimen: Blood Updated: 03/14/23 1122     Glucose 110 mg/dL      Comment: Meter: [id number] : [id number] Tequila [place]       POC Glucose Once [[id number]]  (Normal) Collected: 03/14/23 0620    Specimen: Blood Updated: 03/14/23 0636     Glucose 103 mg/dL      Comment: Meter: [id number] : [id number] Jarred Crystal       Hemoglobin A1c [[id number]]  (Normal) Collected: 03/14/23 0136    Specimen: Blood Updated: 03/14/23 0255     Hemoglobin A1C 5.10 %     Narrative:      Hemoglobin A1C Ranges:    Increased Risk for Diabetes  5.7% to 6.4%  Diabetes                     >= 6.5%  Diabetic Goal                < 7.0%    Comprehensive Metabolic Panel [[id number]]  (Abnormal) Collected: 03/14/23 0136    Specimen: Blood Updated: 03/14/23 0244     Glucose 98 mg/dL      BUN 54 mg/dL      Creatinine 2.39 mg/dL      Sodium 136 mmol/L      Potassium 4.0 mmol/L      Chloride 99 mmol/L      CO2 27.6 mmol/L      Calcium 8.4 mg/dL      Total Protein 6.0 g/dL      Albumin 2.5 g/dL      ALT (SGPT) 7 U/L      AST (SGOT) 11 U/L      Alkaline Phosphatase 113 U/L      Total Bilirubin 0.2 mg/dL      Globulin 3.5 gm/dL      A/G Ratio 0.7 g/dL      BUN/Creatinine Ratio 22.6     Anion Gap 9.4 mmol/L      eGFR 21.6 mL/min/1.73     Narrative:      GFR Normal >60  Chronic Kidney Disease <60  Kidney Failure <15      CBC & Differential [[id number]]  (Abnormal) Collected: 03/14/23 0136    Specimen: Blood Updated: 03/14/23 0205    Narrative:      The following orders were  created for panel order CBC & Differential.  Procedure                               Abnormality         Status                     ---------                               -----------         ------                     CBC Auto Differential[[id number]]        Abnormal            Final result                 Please view results for these tests on the individual orders.    CBC Auto Differential [[id number]]  (Abnormal) Collected: 03/14/23 0136    Specimen: Blood Updated: 03/14/23 0205     WBC 11.36 10*3/mm3      RBC 2.79 10*6/mm3      Hemoglobin 7.8 g/dL      Hematocrit 26.1 %      MCV 93.5 fL      MCH 28.0 pg      MCHC 29.9 g/dL      RDW 16.5 %      RDW-SD 55.8 fl      MPV 9.8 fL      Platelets 501 10*3/mm3      Neutrophil % 68.6 %      Lymphocyte % 20.2 %      Monocyte % 8.6 %      Eosinophil % 1.2 %      Basophil % 0.3 %      Immature Grans % 1.1 %      Neutrophils, Absolute 7.80 10*3/mm3      Lymphocytes, Absolute 2.29 10*3/mm3      Monocytes, Absolute 0.98 10*3/mm3      Eosinophils, Absolute 0.14 10*3/mm3      Basophils, Absolute 0.03 10*3/mm3      Immature Grans, Absolute 0.12 10*3/mm3      nRBC 0.0 /100 WBC     POC Glucose Once [[id number]]  (Normal) Collected: 03/13/23 1620    Specimen: Blood Updated: 03/13/23 1632     Glucose 106 mg/dL      Comment: Meter: [id number] : [id number] charli robertson       POC Glucose Once [[id number]]  (Normal) Collected: 03/13/23 1116    Specimen: Blood Updated: 03/13/23 1133     Glucose 103 mg/dL      Comment: Meter: [id number] : [id number] Mandel Jenniffer       POC Glucose Once [[id number]]  (Normal) Collected: 03/13/23 0605    Specimen: Blood Updated: 03/13/23 0615     Glucose 101 mg/dL      Comment: Meter: [id number] : [id number] saeed jorge alberto       POC Glucose Once [[id number]]  (Normal) Collected: 03/12/23 1633    Specimen: Blood Updated: 03/12/23 1641     Glucose 98 mg/dL      Comment: Meter: [id number] : [id number] charli robertson                   Radiology:    Imaging Results (Last 72 Hours)     Procedure Component Value Units Date/Time    XR Chest 1 View [[id number]] Collected: 03/11/23 1831     Updated: 03/11/23 1833    Narrative:      CR Chest 1 Vw    INDICATION:   CHF     COMPARISON:    None available.    FINDINGS:  Portable AP view(s) of the chest.    Right approach central venous catheter with distal tip overlying the superior vena cava.    Enlarged cardiac silhouette. Aortic calcifications. Diffuse perihilar and bibasilar predominant interstitial opacities with moderate left and trace right pleural effusions. No pneumothorax.       Impression:      Moderate pulmonary edema with moderate left and trace right pleural effusions.    Signer Name: Jerry Balderas MD   Signed: 3/11/2023 6:31 PM   Workstation Name: [id number]    [facility]          Results for orders placed during the hospital encounter of 03/10/23    Adult Transthoracic Echo Complete W/ Cont if Necessary Per Protocol    Interpretation Summary  •  Left ventricular systolic function is normal. Left ventricular ejection fraction appears to be 66 - 70%.  •  Left ventricular wall thickness is consistent with posterior asymmetric hypertrophy, with ventricular consistent sleep could be consistent with cardiac amyloidosis, clinical correlation is required.  •  Left ventricular diastolic function is consistent with (grade Ia w/high LAP) impaired relaxation.  •  The right atrial cavity is mildly  dilated.  •  Mild aortic valve stenosis is present.  •  Estimated right ventricular systolic pressure from tricuspid regurgitation is mildly elevated (35-45 mmHg).  •  There is a moderate (1-2cm) pericardial effusion. There is no evidence of cardiac tamponade.      Assessment/Plan:   Debility status post prolonged hospitalization, improving daily, her endurance is improving, I watched her walk 60 feet and 40 feet and 40 feet today.  Yesterday she walked 40 feet min assist with a  rolling walker.  With ADLs she is max with lower body, min upper body, max with toileting, min assist with transfers.  She was discussed in case conference, continue current occupational physical therapy plan    Acute on chronic hypoxic failure, combination of factors contributed to this including HFrEF, COVID-19 and pneumonia.  Patient appears to be almost back to baseline, she did have this reduced EF, I did repeat an echocardiogram and EF has recovered.  There was some diastolic dysfunction.  She did have a moderate pericardial effusion but no tamponade.  Certainly no need for life vest.    CKD4, creatinine slightly elevated over 2 days ago, holding on further dialysis at least as of this point per nephrology.  Patient does have a tunneled dialysis catheter in.  Patient is on Bumex at 4 mg daily.    Pericardial effusion, can be followed up in 1 to 2 weeks.  No evidence of tamponade.    A1c was normal, I have lowered her glucose checks to twice daily and discontinue the sliding scale, follow.    History of right internal jugular DVT, continue Eliquis therapy.    Constipation, will schedule MiraLAX.  She is on Tata-Colace, she has not had a bowel movement in about 2 days.    Hypertension, moderate control on amlodipine 10 mg, Bumex, Catapres, hydralazine 4 times a day metoprolol and Aldactone\    Coronary artery disease, she remains on DAPT, she is also on Eliquis however, possibly does not need the aspirin in addition to the Plavix being on Eliquis, will discuss further with cardiology.    Anemia, on iron, stable.    Elmer Cummings MD

## 2023-03-14 NOTE — PROGRESS NOTES
Occupational Therapy: Individual: 90 minutes.    Physical Therapy:    Speech Language Pathology:    Signed by: Vanessa Vasquez OT

## 2023-03-14 NOTE — THERAPY TREATMENT NOTE
Inpatient Rehabilitation - Physical Therapy Treatment Note        [facility]     Patient Name: Pati Cortes  : 1955  MRN: [id number]    Today's Date: 3/14/2023                    Admit Date: 3/10/2023      Visit Dx:   No diagnosis found.    Patient Active Problem List   Diagnosis   • Debility       Past Medical History:   Diagnosis Date   • Arthritis    • Diabetes mellitus (HCC)    • Stroke (HCC)        History reviewed. No pertinent surgical history.    PT ASSESSMENT (last 12 hours)     IRF PT Evaluation and Treatment     Row Name 23 1408          PT Time and Intention    Document Type daily treatment  1st Session  -     Mode of Treatment individual therapy;physical therapy  -     Patient/Family/Caregiver Comments/Observations Pt and RN in agreement for 1st PT session. Pt takes extra time for all activities secondary to SOB and low tolerance. Pt required moderate cueing from PTA for modivation. Pt transfered from supine to sit and from bed to chair with RW CGA/min A. Pt completed sitting exercises with multiple rest breaks. Wt and resistance with sitting exercises to increase strengtheing and tolerance.  -     Row Name 23 1408          General Information    Patient Profile Reviewed yes  -     Existing Precautions/Restrictions fall;oxygen therapy device and L/min  -     Row Name 23 1408          Living Environment    Primary Care Provided by self;spouse/significant other  -     Row Name 23 1408          Home Use of Assistive/Adaptive Equipment    Equipment Currently Used at Home bp cuff;glucometer;oxygen;rollator;commode  BSC  -     Row Name 23 1408          Pain Assessment    Pretreatment Pain Rating 0/10 - no pain  -     Posttreatment Pain Rating 0/10 - no pain  -     Row Name 23 1408          Cognition/Psychosocial    Affect/Mental Status (Cognition) anxious;emotionally labile  -     Orientation Status (Cognition) oriented x 3  -HC     Follows  Commands (Cognition) verbal cues/prompting required;WFL  -     Personal Safety Interventions fall prevention program maintained;gait belt;nonskid shoes/slippers when out of bed;supervised activity  -[facility] Name 03/14/23 1408          Range of Motion Comprehensive    General Range of Motion no range of motion deficits identified  -[facility] Name 03/14/23 1408          Strength Comprehensive (MMT)    General Manual Muscle Testing (MMT) Assessment lower extremity strength deficits identified  -[facility] Name 03/14/23 1408          Lower Extremity (Manual Muscle Testing)    Lower Extremity: Manual Muscle Testing (MMT) --  B LE grossly 4-/5  -[facility] Name 03/14/23 1408          Mobility    Extremity Weight-bearing Status right lower extremity;left lower extremity  -     Left Lower Extremity (Weight-bearing Status) full weight-bearing (FWB)  -     Right Lower Extremity (Weight-bearing Status) full weight-bearing (FWB)  -[facility] Name 03/14/23 1408          Bed Mobility    Bed Mobility supine-sit;sit-supine;scooting/bridging;rolling right;rolling left  -     Supine-Sit [place] (Bed Mobility) contact guard;minimum assist (75% patient effort)  -     Assistive Device (Bed Mobility) bed rails  -[facility] Name 03/14/23 1408          Transfer Assessment/Treatment    Transfers bed-chair transfer;sit-stand transfer;stand-sit transfer;stand pivot/stand step transfer  -[facility] Name 03/14/23 1408          Bed-Chair Transfer    Bed-Chair [place] (Transfers) verbal cues;nonverbal cues (demo/gesture);minimum assist (75% patient effort);contact guard  -     Assistive Device (Bed-Chair Transfers) walker, front-wheeled  -[facility] Name 03/14/23 1408          Sit-Stand Transfer    Sit-Stand [place] (Transfers) verbal cues;nonverbal cues (demo/gesture);minimum assist (75% patient effort)  -     Assistive Device (Sit-Stand Transfers) walker, front-wheeled  -[facility] Name 03/14/23 1408           Stand-Sit Transfer    Stand-Sit [place] (Transfers) verbal cues;nonverbal cues (demo/gesture);minimum assist (75% patient effort)  -     Assistive Device (Stand-Sit Transfers) walker, front-wheeled  -     Row Name 23          Stand Pivot/Stand Step Transfer    Stand Pivot/Stand Step [place] (Transfers) verbal cues;nonverbal cues (demo/gesture);minimum assist (75% patient effort);contact guard  -     Assistive Device (Stand Pivot Stand Step Transfer) walker, front-wheeled  -     Row Name 23 140          Safety Issues, Functional Mobility    Impairments Affecting Function (Mobility) balance;endurance/activity tolerance;strength  -     Row Name 23          Balance    Comment, Balance Sittin#: AP, LAQ, March. Ball sq. GTB: HS curls, hip abd, march.  -     Row Name 23          Hip (Therapeutic Exercise)    Hip Strengthening (Therapeutic Exercise) bilateral;flexion;extension;aBduction;aDduction;marching while seated;sitting;1 lb free weight;resistance band;green  -[facility] Name 23          Knee (Therapeutic Exercise)    Knee Strengthening (Therapeutic Exercise) bilateral;flexion;extension;marching while seated;LAQ (long arc quad);hamstring curls;sitting;1 lb free weight;resistance band;green  -[facility] Name 23          Ankle (Therapeutic Exercise)    Ankle Strengthening (Therapeutic Exercise) bilateral;dorsiflexion;plantarflexion;sitting;1 lb free weight  -     Row Name 23          Positioning and Restraints    Pre-Treatment Position in bed  -     Post Treatment Position wheelchair  -     In Wheelchair with PT  -     Row Name 23          Therapy Assessment/Plan (PT)    Patient's Goals For Discharge return home  -     Row Name 23          Therapy Assessment/Plan (PT)    Rehab Potential/Prognosis (PT) good, to achieve stated therapy goals  -     Frequency of Treatment (PT) 5 times per week   -     Estimated Duration of Therapy (PT) 2 weeks;3 weeks  -     Problem List (PT) problems related to;balance;mobility;strength  functional endurance  -     Activity Limitations Related to Problem List (PT) unable to ambulate safely;unable to transfer safely;BADLs not performed adequately or safely  -     Row Name 03/14/23 1408          Therapy Plan Review/Discharge Plan (PT)    Anticipated Equipment Needs at Discharge (PT Eval) wheelchair  TBD  -     Anticipated Discharge Disposition (PT) home with assist;home with home health  -     Row Name 03/14/23 1408          IRF PT Goals    Bed Mobility Goal Selection (PT-IRF) bed mobility, PT goal 1  -     Transfer Goal Selection (PT-IRF) transfers, PT goal 1  -     Gait (Walking Locomotion) Goal Selection (PT-IRF) gait, PT goal 1  -     Row Name 03/14/23 1408          Bed Mobility Goal 1 (PT-IRF)    Activity/Assistive Device (Bed Mobility Goal 1, PT-IRF) rolling to left;rolling to right;scooting;sit to supine;supine to sit  -     [place] (Bed Mobility Goal 1, PT-IRF) independent  -HC     Time Frame (Bed Mobility Goal 1, PT-IRF) by discharge  -     Row Name 03/14/23 1408          Transfer Goal 1 (PT-IRF)    Activity/Assistive Device (Transfer Goal 1, PT-IRF) sit-to-stand/stand-to-sit;bed-to-chair/chair-to-bed;toilet  appropriate a.d.  -HC     [place] (Transfer Goal 1, PT-IRF) standby assist  -HC     Time Frame (Transfer Goal 1, PT-IRF) by discharge  -     Row Name 03/14/23 1403          Gait/Walking Locomotion Goal 1 (PT-IRF)    Activity/Assistive Device (Gait/Walking Locomotion Goal 1, PT-IRF) gait (walking locomotion);assistive device use;decrease fall risk;diminish gait deviation;improve balance and speed;increase endurance/gait distance  appropriate a.d.  -HC     Gait/Walking Locomotion Distance Goal 1 (PT-IRF) 150  -HC     [place] (Gait/Walking Locomotion Goal 1, PT-IRF) standby assist  -HC     Time Frame  (Gait/Walking Locomotion Goal 1, PT-IRF) by discharge  -           User Key  (r) = Recorded By, (t) = Taken By, (c) = Cosigned By    Initials Name Provider Type    Radha Avalos PTA Physical Therapist Assistant                 Physical Therapy Education     Title: PT OT SLP Therapies (Done)     Topic: Physical Therapy (Done)     Point: Mobility training (Done)     Learning Progress Summary           Patient Acceptance, E,TB, VU,NR,DU by  at 3/14/2023 1415    Acceptance, E,TB, VU by DG at 3/13/2023 2006    Acceptance, E,TB, VU,DU,NR by  at 3/13/2023 1453    Acceptance, E,TB, VU by DG at 3/12/2023 2026    Acceptance, E,D, VU,NR by AG at 3/11/2023 1455                   Point: Home exercise program (Done)     Learning Progress Summary           Patient Acceptance, E,TB, VU,NR,DU by  at 3/14/2023 1415    Acceptance, E,TB, VU by DG at 3/13/2023 2006    Acceptance, E,TB, VU,DU,NR by  at 3/13/2023 1453    Acceptance, E,TB, VU by DG at 3/12/2023 2026    Acceptance, E,D, VU,NR by AG at 3/11/2023 1455                   Point: Body mechanics (Done)     Learning Progress Summary           Patient Acceptance, E,TB, VU,NR,DU by  at 3/14/2023 1415    Acceptance, E,TB, VU by DG at 3/13/2023 2006    Acceptance, E,TB, VU,DU,NR by  at 3/13/2023 1453    Acceptance, E,TB, VU by DG at 3/12/2023 2026    Acceptance, E,D, VU,NR by AG at 3/11/2023 1455                   Point: Precautions (Done)     Learning Progress Summary           Patient Acceptance, E,TB, VU,NR,DU by  at 3/14/2023 1415    Acceptance, E,TB, VU by DG at 3/13/2023 2006    Acceptance, E,TB, VU,DU,NR by  at 3/13/2023 1453    Acceptance, E,TB, VU by DG at 3/12/2023 2026    Acceptance, E,D, VU,NR by AG at 3/11/2023 4656                               User Key     Initials Effective Dates Name Provider Type Discipline     06/16/21 -  Shalonda English, RN Registered Nurse Nurse     06/16/21 -  Andreina Daigle, PT Physical Therapist PT     01/20/23  -  Radha Ruelas PTA Physical Therapist Assistant PT                PT Recommendation and Plan    Frequency of Treatment (PT): 5 times per week  Anticipated Equipment Needs at Discharge (PT Eval): wheelchair (TBD)                  Time Calculation:      PT Charges     Row Name 03/14/23 1415             Time Calculation    Start Time 1245  -HC      Stop Time 1330  -HC      Time Calculation (min) 45 min  -HC      PT Received On 03/14/23  -HC         Time Calculation- PT    Total Timed Code Minutes- PT 45 minute(s)  -HC            User Key  (r) = Recorded By, (t) = Taken By, (c) = Cosigned By    Initials Name Provider Type     Radha Ruelas, LETTY Physical Therapist Assistant                Therapy Charges for Today     Code Description Service Date Service Provider Modifiers Qty    28378957922  GAIT TRAINING EA 15 MIN 3/13/2023 Radha Ruelas PTA GP, CQ 2    [id number]  PT THERAPEUTIC ACT EA 15 MIN 3/13/2023 Radha Ruelas PTA GP, CQ 1    [id number]  PT THER PROC EA 15 MIN 3/14/2023 Radha Ruelas PTA GP, CQ 2    [id number]  PT THERAPEUTIC ACT EA 15 MIN 3/14/2023 Radha Ruelas PTA GP, CQ 1                   Radha Ruelas PTA  3/14/2023

## 2023-03-14 NOTE — THERAPY TREATMENT NOTE
Inpatient Rehabilitation - Physical Therapy Treatment Note        [place]     Patient Name: Pati Cortes  : 1955  MRN: [id number]    Today's Date: 3/14/2023                    Admit Date: 3/10/2023      Visit Dx:   No diagnosis found.    Patient Active Problem List   Diagnosis   • Debility       Past Medical History:   Diagnosis Date   • Arthritis    • Diabetes mellitus (HCC)    • Stroke (HCC)        History reviewed. No pertinent surgical history.    PT ASSESSMENT (last 12 hours)     IRF PT Evaluation and Treatment     Row Name 23 1421 23 1408       PT Time and Intention    Document Type daily treatment  2nd Session  -LL daily treatment  1st Session  -    Mode of Treatment individual therapy;physical therapy  -LL individual therapy;physical therapy  -HC    Patient/Family/Caregiver Comments/Observations Patient had no new c/o this date and was agreeable to PT session.  -LL Pt and RN in agreement for 1st PT session. Pt takes extra time for all activities secondary to SOB and low tolerance. Pt required moderate cueing from PTA for modivation. Pt transfered from supine to sit and from bed to chair with RW CGA/min A. Pt completed sitting exercises with multiple rest breaks. Wt and resistance with sitting exercises to increase strengtheing and tolerance.  -HC    Row Name 23 1421 23 1408       General Information    Patient Profile Reviewed yes  -LL yes  -HC    Existing Precautions/Restrictions fall;oxygen therapy device and L/min  -LL fall;oxygen therapy device and L/min  -HC    Row Name 23 1421 23 1408       Living Environment    Primary Care Provided by self;spouse/significant other  -LL self;spouse/significant other  -HC    Row Name 23 1421 23 1408       Home Use of Assistive/Adaptive Equipment    Equipment Currently Used at Home bp cuff;glucometer;oxygen;rollator;commode  BS  - bp cuff;glucometer;oxygen;rollator;commode  BS  -    Row Name 23  1421 03/14/23 1408       Pain Assessment    Pretreatment Pain Rating 0/10 - no pain  -LL 0/10 - no pain  -HC    Posttreatment Pain Rating 0/10 - no pain  -LL 0/10 - no pain  -    Row Name 03/14/23 1421 03/14/23 1408       Cognition/Psychosocial    Affect/Mental Status (Cognition) anxious  -LL anxious;emotionally labile  -HC    Orientation Status (Cognition) oriented x 3  -LL oriented x 3  -HC    Follows Commands (Cognition) verbal cues/prompting required;WFL  -LL verbal cues/prompting required;WFL  -HC    Personal Safety Interventions gait belt;nonskid shoes/slippers when out of bed;supervised activity  - fall prevention program maintained;gait belt;nonskid shoes/slippers when out of bed;supervised activity  -    Cognitive Function WFL  -LL --    Row Name 03/14/23 1421 03/14/23 1408       Range of Motion Comprehensive    General Range of Motion no range of motion deficits identified  -LL no range of motion deficits identified  -    Row Name 03/14/23 1421 03/14/23 1408       Strength Comprehensive (MMT)    General Manual Muscle Testing (MMT) Assessment lower extremity strength deficits identified  - lower extremity strength deficits identified  -    Row Name 03/14/23 1408          Lower Extremity (Manual Muscle Testing)    Lower Extremity: Manual Muscle Testing (MMT) --  B LE grossly 4-/5  -     Row Name 03/14/23 1421 03/14/23 1408       Mobility    Extremity Weight-bearing Status right lower extremity;left lower extremity  -LL right lower extremity;left lower extremity  -HC    Left Lower Extremity (Weight-bearing Status) full weight-bearing (FWB)  -LL full weight-bearing (FWB)  -    Right Lower Extremity (Weight-bearing Status) full weight-bearing (FWB)  -LL full weight-bearing (FWB)  -    Row Name 03/14/23 1421 03/14/23 1408       Bed Mobility    Bed Mobility supine-sit;sit-supine;scooting/bridging;rolling right;rolling left  -LL supine-sit;sit-supine;scooting/bridging;rolling right;rolling left   -HC    Supine-Sit [place] (Bed Mobility) -- contact guard;minimum assist (75% patient effort)  -    Sit-Supine [place] (Bed Mobility) contact guard;verbal cues  -LL --    Assistive Device (Bed Mobility) bed rails  -LL bed rails  -    Row Name 03/14/23 1421 03/14/23 1408       Transfer Assessment/Treatment    Transfers bed-chair transfer;sit-stand transfer;stand-sit transfer;stand pivot/stand step transfer;chair-bed transfer  -LL bed-chair transfer;sit-stand transfer;stand-sit transfer;stand pivot/stand step transfer  -    Row Name 03/14/23 1408          Bed-Chair Transfer    Bed-Chair [place] (Transfers) verbal cues;nonverbal cues (demo/gesture);minimum assist (75% patient effort);contact guard  -     Assistive Device (Bed-Chair Transfers) walker, front-wheeled  -     Row Name 03/14/23 1421          Chair-Bed Transfer    Chair-Bed [place] (Transfers) contact guard;minimum assist (75% patient effort);verbal cues;nonverbal cues (demo/gesture)  -     Assistive Device (Chair-Bed Transfers) walker, front-wheeled  -     Row Name 03/14/23 1421 03/14/23 1408       Sit-Stand Transfer    Sit-Stand [place] (Transfers) verbal cues;nonverbal cues (demo/gesture);minimum assist (75% patient effort)  -LL verbal cues;nonverbal cues (demo/gesture);minimum assist (75% patient effort)  -    Assistive Device (Sit-Stand Transfers) walker, front-wheeled  -LL walker, front-wheeled  -    Row Name 03/14/23 1421 03/14/23 1408       Stand-Sit Transfer    Stand-Sit [place] (Transfers) verbal cues;nonverbal cues (demo/gesture);minimum assist (75% patient effort)  -LL verbal cues;nonverbal cues (demo/gesture);minimum assist (75% patient effort)  -HC    Assistive Device (Stand-Sit Transfers) walker, front-wheeled  -LL walker, front-wheeled  -HC    Row Name 03/14/23 1421 03/14/23 1408       Stand Pivot/Stand Step Transfer    Stand Pivot/Stand Step [place] (Transfers) verbal cues;nonverbal cues  (demo/gesture);minimum assist (75% patient effort);contact guard  -LL verbal cues;nonverbal cues (demo/gesture);minimum assist (75% patient effort);contact guard  -HC    Assistive Device (Stand Pivot Stand Step Transfer) walker, front-wheeled  -LL walker, front-wheeled  -HC    Row Name 23 142          Gait/Stairs (Locomotion)    Gait/Stairs Locomotion gait/ambulation independence;gait/ambulation assistive device;gait pattern;distance ambulated;gait deviations  -LL     [place] Level (Gait) verbal cues;nonverbal cues (demo/gesture);minimum assist (75% patient effort)  -LL     Assistive Device (Gait) walker, front-wheeled  -LL     Distance in Feet (Gait) 40' x 2, 60'  -LL     Pattern (Gait) step-to  -LL     Deviations/Abnormal Patterns (Gait) gait speed decreased;steppage;stride length decreased  -     Row Name 23 14223 140       Safety Issues, Functional Mobility    Impairments Affecting Function (Mobility) balance;endurance/activity tolerance;strength  - balance;endurance/activity tolerance;strength  -    Row Name 23 140          Balance    Comment, Balance Sittin#: AP, LAQ, March. Ball sq. GTB: HS curls, hip abd, march.  -     Row Name 23          Hip (Therapeutic Exercise)    Hip Strengthening (Therapeutic Exercise) bilateral;flexion;extension;aBduction;aDduction;marching while seated;sitting;1 lb free weight;resistance band;green  -     Row Name 23 140          Knee (Therapeutic Exercise)    Knee Strengthening (Therapeutic Exercise) bilateral;flexion;extension;marching while seated;LAQ (long arc quad);hamstring curls;sitting;1 lb free weight;resistance band;green  -     Row Name 23 140          Ankle (Therapeutic Exercise)    Ankle Strengthening (Therapeutic Exercise) bilateral;dorsiflexion;plantarflexion;sitting;1 lb free weight  -     Row Name 23 14223 140       Positioning and Restraints    Pre-Treatment Position --    w/ PT  -LL in bed  -HC    Post Treatment Position bed  -LL wheelchair  -HC    In Bed supine;call light within reach;encouraged to call for assist;side rails up x2;heels elevated  -LL --    In Wheelchair -- with PT  -HC    Row Name 03/14/23 1421 03/14/23 1408       Therapy Assessment/Plan (PT)    Patient's Goals For Discharge return home  -LL return home  -    Row Name 03/14/23 1421 03/14/23 1408       Therapy Assessment/Plan (PT)    Rehab Potential/Prognosis (PT) good, to achieve stated therapy goals  -LL good, to achieve stated therapy goals  -HC    Frequency of Treatment (PT) 5 times per week  -LL 5 times per week  -HC    Estimated Duration of Therapy (PT) 2 weeks;3 weeks  -LL 2 weeks;3 weeks  -    Problem List (PT) problems related to;balance;mobility;strength  functional endurance  -LL problems related to;balance;mobility;strength  functional endurance  -HC    Activity Limitations Related to Problem List (PT) unable to ambulate safely;unable to transfer safely;BADLs not performed adequately or safely  -LL unable to ambulate safely;unable to transfer safely;BADLs not performed adequately or safely  -    Row Name 03/14/23 1421 03/14/23 1408       Therapy Plan Review/Discharge Plan (PT)    Anticipated Equipment Needs at Discharge (PT Eval) wheelchair  TBD  -LL wheelchair  TBD  -HC    Anticipated Discharge Disposition (PT) home with assist;home with home health  -LL home with assist;home with home health  -HC    Row Name 03/14/23 1421 03/14/23 1408       IRF PT Goals    Bed Mobility Goal Selection (PT-IRF) bed mobility, PT goal 1  -LL bed mobility, PT goal 1  -HC    Transfer Goal Selection (PT-IRF) transfers, PT goal 1  -LL transfers, PT goal 1  -HC    Gait (Walking Locomotion) Goal Selection (PT-IRF) gait, PT goal 1  -LL gait, PT goal 1  -HC    Row Name 03/14/23 1421 03/14/23 1408       Bed Mobility Goal 1 (PT-IRF)    Activity/Assistive Device (Bed Mobility Goal 1, PT-IRF) rolling to left;rolling to  right;scooting;sit to supine;supine to sit  -LL rolling to left;rolling to right;scooting;sit to supine;supine to sit  -HC    [place] Level (Bed Mobility Goal 1, PT-IRF) independent  -LL independent  -HC    Time Frame (Bed Mobility Goal 1, PT-IRF) by discharge  - by discharge  -    Row Name 03/14/23 1421 03/14/23 1408       Transfer Goal 1 (PT-IRF)    Activity/Assistive Device (Transfer Goal 1, PT-IRF) sit-to-stand/stand-to-sit;bed-to-chair/chair-to-bed;toilet  appropriate a.d.  -LL sit-to-stand/stand-to-sit;bed-to-chair/chair-to-bed;toilet  appropriate a.d.  -HC    [place] Level (Transfer Goal 1, PT-IRF) standby assist  -LL standby assist  -HC    Time Frame (Transfer Goal 1, PT-IRF) by discharge  - by discharge  -    Row Name 03/14/23 1421 03/14/23 1408       Gait/Walking Locomotion Goal 1 (PT-IRF)    Activity/Assistive Device (Gait/Walking Locomotion Goal 1, PT-IRF) gait (walking locomotion);assistive device use;decrease fall risk;diminish gait deviation;improve balance and speed;increase endurance/gait distance  appropriate a.d.  -LL gait (walking locomotion);assistive device use;decrease fall risk;diminish gait deviation;improve balance and speed;increase endurance/gait distance  appropriate a.d.  -HC    Gait/Walking Locomotion Distance Goal 1 (PT-IRF) 150  -  -HC    [place] Level (Gait/Walking Locomotion Goal 1, PT-IRF) standby assist  -LL standby assist  -HC    Time Frame (Gait/Walking Locomotion Goal 1, PT-IRF) by discharge  - by discharge  -          User Key  (r) = Recorded By, (t) = Taken By, (c) = Cosigned By    Initials Name Provider Type    Magdalena Izquierdo PTA Physical Therapist Assistant    Radha Avalos PTA Physical Therapist Assistant                 Physical Therapy Education     Title: PT OT SLP Therapies (Done)     Topic: Physical Therapy (Done)     Point: Mobility training (Done)     Learning Progress Summary           Patient Acceptance, E,TB,  VU,NR,DU by HC at 3/14/2023 1415    Acceptance, E,TB, VU by DG at 3/13/2023 2006    Acceptance, E,TB, VU,DU,NR by HC at 3/13/2023 1453    Acceptance, E,TB, VU by DG at 3/12/2023 2026    Acceptance, E,D, VU,NR by AG at 3/11/2023 1455                   Point: Home exercise program (Done)     Learning Progress Summary           Patient Acceptance, E,TB, VU,NR,DU by HC at 3/14/2023 1415    Acceptance, E,TB, VU by DG at 3/13/2023 2006    Acceptance, E,TB, VU,DU,NR by  at 3/13/2023 1453    Acceptance, E,TB, VU by DG at 3/12/2023 2026    Acceptance, E,D, VU,NR by AG at 3/11/2023 1455                   Point: Body mechanics (Done)     Learning Progress Summary           Patient Acceptance, E,TB, VU,NR,DU by  at 3/14/2023 1415    Acceptance, E,TB, VU by DG at 3/13/2023 2006    Acceptance, E,TB, VU,DU,NR by HC at 3/13/2023 1453    Acceptance, E,TB, VU by DG at 3/12/2023 2026    Acceptance, E,D, VU,NR by AG at 3/11/2023 1455                   Point: Precautions (Done)     Learning Progress Summary           Patient Acceptance, E,TB, VU,NR,DU by  at 3/14/2023 1415    Acceptance, E,TB, VU by  at 3/13/2023 2006    Acceptance, E,TB, VU,DU,NR by  at 3/13/2023 1453    Acceptance, E,TB, VU by DG at 3/12/2023 2026    Acceptance, E,D, VU,NR by AG at 3/11/2023 1455                               User Key     Initials Effective Dates Name Provider Type Discipline     06/16/21 -  Shalonda English, RN Registered Nurse Nurse     06/16/21 -  Andreina Daigle, PT Physical Therapist PT     01/20/23 -  Radha Ruelas, PTA Physical Therapist Assistant PT                PT Recommendation and Plan    Frequency of Treatment (PT): 5 times per week  Anticipated Equipment Needs at Discharge (PT Eval): wheelchair (TBD)                  Time Calculation:      PT Charges     Row Name 03/14/23 1427 03/14/23 1415          Time Calculation    Start Time 1330  -LL 1245  -HC     Stop Time 1415  -LL 1330  -HC     Time Calculation (min) 45 min   -LL 45 min  -HC     PT Received On -- 03/14/23  -HC        Time Calculation- PT    Total Timed Code Minutes- PT 45 minute(s)  -LL 45 minute(s)  -HC           User Key  (r) = Recorded By, (t) = Taken By, (c) = Cosigned By    Initials Name Provider Type    LL Magdalena De Los Santos, LETTY Physical Therapist Assistant     Radha Ruelas PTA Physical Therapist Assistant                Therapy Charges for Today     Code Description Service Date Service Provider Modifiers Qty    84577343340 HC PT THERAPEUTIC ACT EA 15 MIN 3/13/2023 Magdalena De Los Santos, LETTY GP, CQ 2    78735015552 HC PT THER PROC EA 15 MIN 3/13/2023 Magdalena De Los Santos, LETTY GP, CQ 1    94145014433 HC GAIT TRAINING EA 15 MIN 3/14/2023 Magdalena De Los Santos, LETTY GP, CQ 2    57869748852 HC PT THERAPEUTIC ACT EA 15 MIN 3/14/2023 Magdalena De Los Santos, LETTY GP, CQ 1                   Magdalena. LETTY [place]  3/14/2023

## 2023-03-14 NOTE — PROGRESS NOTES
Case Management  Inpatient Rehabilitation Team Conference    Conference Date/Time: 3/14/2023 7:11:31 AM    Team Conference Attendees:  MD Megan Jean SW Jessica Bill, RN,   JUAN M Regalado, PT  Keli Xie OT    Demographics            Age: 67Y            Gender: Female    Admission Date: 3/10/2023 12:38:00 PM  Rehabilitation Diagnosis:  debility  Comorbidities:      Plan of Care  Anticipated Discharge Date/Estimated Length of Stay: 14 days  Anticipated Discharge Destination: Community discharge with assistance  Discharge Plan : Pt plans to return home with spouse assisting with caregiving  needs.  Medical Necessity Expected Level Rationale: fair-good  Intensity and Duration: an average of 3 hours/5 days per week  Medical Supervision and 24 Hour Rehab Nursing: x  Physical Therapy: x  PT Intensity/Duration: PT 1.5 hours per day/5 days per week  Occupational Therapy: x  OT Intensity/Duration: OT 1.5 hours per day/5 days per week  Social Work: x  Therapeutic Recreation: x  Respiratory Therapy: x  Updated (if changes indicated)    Anticipated Discharge Date/Estimated Length of Stay:   3-24-23      Discharge Plan of Care:    Based on the patient's medical and functional status, their prognosis and  expected level of functional improvement is: fair-good      Interdisciplinary Problem/Goals/Status  Safety    [RN] Potential for Injury(Active)  Current Status(03/10/2023): Potential for falls  Weekly Goal(03/31/2023): No falls  Discharge Goal: No falls        Body Function Structure    [RN] Skin Integrity(Active)  Current Status(03/10/2023): Risk for skin breakdown  Weekly Goal(03/31/2023): No skin breakdown  Discharge Goal: No skin breakdown        Self Care    [OT] Bathing(Active)  Current Status(03/11/2023): max  Weekly Goal(03/18/2023): mod  Discharge Goal: min        Mobility    [PT] Bed/Chair/Wheelchair(Active)  Current Status(03/11/2023): min A w/ RW  Weekly  Goal(03/11/2023): n/a  Discharge Goal: SBA w/ AAD    [PT] Bed Mobility(Active)  Current Status(03/11/2023): SBA  Weekly Goal(03/11/2023): n/a  Discharge Goal: independent    [PT] Walk(Active)  Current Status(03/11/2023): 5 ft, RW, min A  Weekly Goal(03/11/2023): n/a  Discharge Goal: 150 ft, SBA, AAD    Comments: Pt plans to return home with spouse assisting with caregiving needs.    Signed by: VITALIY Louise    Physician CoSigned By: Elmer Cummings 03/14/2023 18:08:36

## 2023-03-14 NOTE — THERAPY TREATMENT NOTE
Inpatient Rehabilitation - Occupational Therapy Treatment Note     [place]     Patient Name: Pati Cortes  : 1955  MRN: [id number]    Today's Date: 3/14/2023                 Admit Date: 3/10/2023       No diagnosis found.    Patient Active Problem List   Diagnosis   • Debility       Past Medical History:   Diagnosis Date   • Arthritis    • Diabetes mellitus (HCC)    • Stroke (HCC)        History reviewed. No pertinent surgical history.          IRF OT ASSESSMENT FLOWSHEET (last 12 hours)     IRF OT Evaluation and Treatment     Row Name 23 1459 23 143       OT Time and Intention    Document Type daily treatment  - daily treatment  -    Mode of Treatment individual therapy;occupational therapy  - occupational therapy  -BF    Patient Effort good  - adequate  -    Symptoms Noted During/After Treatment -- none  -BF    Row Name 23 1459 23 143       General Information    Patient/Family/Caregiver Comments/Observations patient agreeable to therapy. patient seen up in chair in therapy gym. patient tolerated well with rest breaks.  - --    Existing Precautions/Restrictions fall;oxygen therapy device and L/min  - fall;oxygen therapy device and L/min  -    Row Name 23 1459 23 143       Cognition/Psychosocial    Affect/Mental Status (Cognition) [facility] --    Orientation Status (Cognition) -- oriented x 3  -    Follows Commands (Cognition) -- follows one-step commands;verbal cues/prompting required  -    Row Name 23 1459          Bed-Chair Transfer    Bed-Chair [place] (Transfers) minimum assist (75% patient effort);contact guard;verbal cues  -     Row Name 23 143          Chair-Bed Transfer    Chair-Bed [place] (Transfers) moderate assist (50% patient effort);verbal cues;nonverbal cues (demo/gesture)  -     Assistive Device (Chair-Bed Transfers) walker, front-wheeled;wheelchair  -     Row Name 23 1459 23 1436        Motor Skills    Motor Skills coordination;functional endurance  - --    Motor Control/Coordination Interventions -- fine motor manipulation/dexterity activities;gross motor coordination activities;therapeutic exercise/ROM  BUE GMC/FMC theract, light strengthening, handgripper therex  -    Therapeutic Exercise --  BUE ROM, gmc,fmc,  strength  - --    Row Name 03/14/23 1459 03/14/23 1432       Positioning and Restraints    Post Treatment Position wheelchair  - --    In Bed -- supine;call light within reach;encouraged to call for assist  -    In Wheelchair sitting;with OT  - --          User Key  (r) = Recorded By, (t) = Taken By, (c) = Cosigned By    Initials Name Effective Dates     Vanessa Vasquez, GRAEME 06/16/21 -      Gail Xie, GRAEME 06/16/21 -                  Occupational Therapy Education     Title: PT OT SLP Therapies (Done)     Topic: Occupational Therapy (Done)     Point: ADL training (Done)     Description:   Instruct learner(s) on proper safety adaptation and remediation techniques during self care or transfers.   Instruct in proper use of assistive devices.              Learning Progress Summary           Patient Acceptance, E, VU,NR by  at 3/14/2023 1432    Acceptance, E,TB, VU by  at 3/13/2023 2006    Acceptance, E,D, VU,NR by  at 3/13/2023 1427                   Point: Precautions (Done)     Description:   Instruct learner(s) on prescribed precautions during self-care and functional transfers.              Learning Progress Summary           Patient Acceptance, E, VU,NR by  at 3/14/2023 1432    Acceptance, E,TB, VU by  at 3/13/2023 2006    Acceptance, E,D, VU,NR by  at 3/13/2023 1427                               User Key     Initials Effective Dates Name Provider Type Discipline     06/16/21 -  Shalonda English, RN Registered Nurse Nurse     06/16/21 -  Vanessa Vasquez, GRAEME Occupational Therapist OT    TM 06/16/21 -  Ros Doe OT  Occupational Therapist OT                    OT Recommendation and Plan    Planned Therapy Interventions (OT): activity tolerance training, adaptive equipment training, BADL retraining, patient/caregiver education/training, ROM/therapeutic exercise, strengthening exercise                    Time Calculation:      Time Calculation- OT     Row Name 03/14/23 1501 03/14/23 1435          Time Calculation- OT    OT Start Time 0915  - 1115  -BF     OT Stop Time 1000  -AH 1200  -BF     OT Time Calculation (min) 45 min  - 45 min  -BF     Total Timed Code Minutes- OT -- 45 minute(s)  -BF     OT Non-Billable Time (min) -- 10 min  -BF           User Key  (r) = Recorded By, (t) = Taken By, (c) = Cosigned By    Initials Name Provider Type    Vanessa Melendez OT Occupational Therapist     Gail Xie OT Occupational Therapist              Therapy Charges for Today     Code Description Service Date Service Provider Modifiers Qty    77060446366 HC OT SELF CARE/MGMT/TRAIN EA 15 MIN 3/14/2023 Gail Xie OT GO 2    [id number]  OT THERAPEUTIC ACT EA 15 MIN 3/14/2023 Gail Xie OT GO 1                   Gail Xie OT  3/14/2023

## 2023-03-15 LAB
ANION GAP SERPL CALCULATED.3IONS-SCNC: 11 MMOL/L (ref 5–15)
BASOPHILS # BLD AUTO: 0.03 10*3/MM3 (ref 0–0.2)
BASOPHILS NFR BLD AUTO: 0.3 % (ref 0–1.5)
BUN SERPL-MCNC: 53 MG/DL (ref 8–23)
BUN/CREAT SERPL: 24.5 (ref 7–25)
CALCIUM SPEC-SCNC: 8.7 MG/DL (ref 8.6–10.5)
CHLORIDE SERPL-SCNC: 100 MMOL/L (ref 98–107)
CO2 SERPL-SCNC: 27 MMOL/L (ref 22–29)
CREAT SERPL-MCNC: 2.16 MG/DL (ref 0.57–1)
DEPRECATED RDW RBC AUTO: 57.2 FL (ref 37–54)
EGFRCR SERPLBLD CKD-EPI 2021: 24.4 ML/MIN/1.73
EOSINOPHIL # BLD AUTO: 0.2 10*3/MM3 (ref 0–0.4)
EOSINOPHIL NFR BLD AUTO: 1.8 % (ref 0.3–6.2)
ERYTHROCYTE [DISTWIDTH] IN BLOOD BY AUTOMATED COUNT: 16.6 % (ref 12.3–15.4)
GLUCOSE BLDC GLUCOMTR-MCNC: 120 MG/DL (ref 70–130)
GLUCOSE BLDC GLUCOMTR-MCNC: 127 MG/DL (ref 70–130)
GLUCOSE BLDC GLUCOMTR-MCNC: 98 MG/DL (ref 70–130)
GLUCOSE SERPL-MCNC: 102 MG/DL (ref 65–99)
HCT VFR BLD AUTO: 27.2 % (ref 34–46.6)
HGB BLD-MCNC: 8.2 G/DL (ref 12–15.9)
IMM GRANULOCYTES # BLD AUTO: 0.11 10*3/MM3 (ref 0–0.05)
IMM GRANULOCYTES NFR BLD AUTO: 1 % (ref 0–0.5)
LYMPHOCYTES # BLD AUTO: 2.43 10*3/MM3 (ref 0.7–3.1)
LYMPHOCYTES NFR BLD AUTO: 21.8 % (ref 19.6–45.3)
MCH RBC QN AUTO: 28.3 PG (ref 26.6–33)
MCHC RBC AUTO-ENTMCNC: 30.1 G/DL (ref 31.5–35.7)
MCV RBC AUTO: 93.8 FL (ref 79–97)
MONOCYTES # BLD AUTO: 1.02 10*3/MM3 (ref 0.1–0.9)
MONOCYTES NFR BLD AUTO: 9.1 % (ref 5–12)
NEUTROPHILS NFR BLD AUTO: 66 % (ref 42.7–76)
NEUTROPHILS NFR BLD AUTO: 7.38 10*3/MM3 (ref 1.7–7)
NRBC BLD AUTO-RTO: 0 /100 WBC (ref 0–0.2)
PLATELET # BLD AUTO: 506 10*3/MM3 (ref 140–450)
PMV BLD AUTO: 9.8 FL (ref 6–12)
POTASSIUM SERPL-SCNC: 4 MMOL/L (ref 3.5–5.2)
RBC # BLD AUTO: 2.9 10*6/MM3 (ref 3.77–5.28)
SODIUM SERPL-SCNC: 138 MMOL/L (ref 136–145)
WBC NRBC COR # BLD: 11.17 10*3/MM3 (ref 3.4–10.8)

## 2023-03-15 PROCEDURE — 94660 CPAP INITIATION&MGMT: CPT

## 2023-03-15 PROCEDURE — 80048 BASIC METABOLIC PNL TOTAL CA: CPT | Performed by: INTERNAL MEDICINE

## 2023-03-15 PROCEDURE — 94761 N-INVAS EAR/PLS OXIMETRY MLT: CPT

## 2023-03-15 PROCEDURE — 97535 SELF CARE MNGMENT TRAINING: CPT

## 2023-03-15 PROCEDURE — 97530 THERAPEUTIC ACTIVITIES: CPT

## 2023-03-15 PROCEDURE — 85025 COMPLETE CBC W/AUTO DIFF WBC: CPT | Performed by: INTERNAL MEDICINE

## 2023-03-15 PROCEDURE — 97110 THERAPEUTIC EXERCISES: CPT | Performed by: OCCUPATIONAL THERAPIST

## 2023-03-15 PROCEDURE — 97530 THERAPEUTIC ACTIVITIES: CPT | Performed by: OCCUPATIONAL THERAPIST

## 2023-03-15 PROCEDURE — 94799 UNLISTED PULMONARY SVC/PX: CPT

## 2023-03-15 PROCEDURE — 97110 THERAPEUTIC EXERCISES: CPT

## 2023-03-15 PROCEDURE — 97112 NEUROMUSCULAR REEDUCATION: CPT

## 2023-03-15 PROCEDURE — 82962 GLUCOSE BLOOD TEST: CPT

## 2023-03-15 PROCEDURE — 97535 SELF CARE MNGMENT TRAINING: CPT | Performed by: OCCUPATIONAL THERAPIST

## 2023-03-15 RX ADMIN — DOCUSATE SODIUM 50 MG AND SENNOSIDES 8.6 MG 2 TABLET: 8.6; 5 TABLET, FILM COATED ORAL at 08:41

## 2023-03-15 RX ADMIN — APIXABAN 5 MG: 5 TABLET, FILM COATED ORAL at 22:31

## 2023-03-15 RX ADMIN — CLONIDINE HYDROCHLORIDE 0.3 MG: 0.3 TABLET ORAL at 22:32

## 2023-03-15 RX ADMIN — HYDRALAZINE HYDROCHLORIDE 100 MG: 50 TABLET, FILM COATED ORAL at 17:08

## 2023-03-15 RX ADMIN — Medication 1000 UNITS: at 08:42

## 2023-03-15 RX ADMIN — ACETAMINOPHEN 650 MG: 325 TABLET ORAL at 18:22

## 2023-03-15 RX ADMIN — HYDRALAZINE HYDROCHLORIDE 100 MG: 50 TABLET, FILM COATED ORAL at 05:24

## 2023-03-15 RX ADMIN — HYDRALAZINE HYDROCHLORIDE 100 MG: 50 TABLET, FILM COATED ORAL at 12:10

## 2023-03-15 RX ADMIN — GUAIFENESIN 600 MG: 600 TABLET, EXTENDED RELEASE ORAL at 08:42

## 2023-03-15 RX ADMIN — ISOSORBIDE DINITRATE 20 MG: 20 TABLET ORAL at 17:08

## 2023-03-15 RX ADMIN — FERROUS SULFATE TAB 325 MG (65 MG ELEMENTAL FE) 325 MG: 325 (65 FE) TAB at 17:08

## 2023-03-15 RX ADMIN — ASPIRIN 81 MG: 81 TABLET, COATED ORAL at 08:42

## 2023-03-15 RX ADMIN — DOCUSATE SODIUM 100 MG: 100 CAPSULE ORAL at 08:42

## 2023-03-15 RX ADMIN — AMLODIPINE BESYLATE 10 MG: 10 TABLET ORAL at 08:42

## 2023-03-15 RX ADMIN — PANTOPRAZOLE SODIUM 40 MG: 40 TABLET, DELAYED RELEASE ORAL at 17:08

## 2023-03-15 RX ADMIN — SPIRONOLACTONE 50 MG: 25 TABLET ORAL at 08:42

## 2023-03-15 RX ADMIN — METOPROLOL TARTRATE 50 MG: 50 TABLET, FILM COATED ORAL at 05:24

## 2023-03-15 RX ADMIN — APIXABAN 5 MG: 5 TABLET, FILM COATED ORAL at 08:42

## 2023-03-15 RX ADMIN — BUSPIRONE HYDROCHLORIDE 7.5 MG: 5 TABLET ORAL at 22:30

## 2023-03-15 RX ADMIN — METOPROLOL TARTRATE 50 MG: 50 TABLET, FILM COATED ORAL at 22:31

## 2023-03-15 RX ADMIN — BUMETANIDE 4 MG: 1 TABLET ORAL at 08:42

## 2023-03-15 RX ADMIN — CLOPIDOGREL BISULFATE 75 MG: 75 TABLET, FILM COATED ORAL at 08:42

## 2023-03-15 RX ADMIN — CLONIDINE HYDROCHLORIDE 0.3 MG: 0.3 TABLET ORAL at 15:15

## 2023-03-15 RX ADMIN — CLONIDINE HYDROCHLORIDE 0.3 MG: 0.3 TABLET ORAL at 05:24

## 2023-03-15 RX ADMIN — FERROUS SULFATE TAB 325 MG (65 MG ELEMENTAL FE) 325 MG: 325 (65 FE) TAB at 08:41

## 2023-03-15 RX ADMIN — ACETAMINOPHEN 650 MG: 325 TABLET ORAL at 05:36

## 2023-03-15 RX ADMIN — ATORVASTATIN CALCIUM 80 MG: 40 TABLET, FILM COATED ORAL at 22:31

## 2023-03-15 RX ADMIN — Medication 5 MG: at 22:30

## 2023-03-15 RX ADMIN — GUAIFENESIN 600 MG: 600 TABLET, EXTENDED RELEASE ORAL at 22:30

## 2023-03-15 RX ADMIN — HYDROXYZINE HYDROCHLORIDE 25 MG: 25 TABLET ORAL at 22:30

## 2023-03-15 RX ADMIN — PANTOPRAZOLE SODIUM 40 MG: 40 TABLET, DELAYED RELEASE ORAL at 06:31

## 2023-03-15 RX ADMIN — BUSPIRONE HYDROCHLORIDE 7.5 MG: 5 TABLET ORAL at 15:15

## 2023-03-15 RX ADMIN — ISOSORBIDE DINITRATE 20 MG: 20 TABLET ORAL at 08:41

## 2023-03-15 RX ADMIN — METOPROLOL TARTRATE 50 MG: 50 TABLET, FILM COATED ORAL at 15:15

## 2023-03-15 RX ADMIN — ISOSORBIDE DINITRATE 20 MG: 20 TABLET ORAL at 12:10

## 2023-03-15 RX ADMIN — BUSPIRONE HYDROCHLORIDE 7.5 MG: 5 TABLET ORAL at 08:42

## 2023-03-15 NOTE — SIGNIFICANT NOTE
03/15/23 1470   Plan   Plan SS spoke to spouse Sree [phone] about how pt is doing in therapy and plans for discharge on 3-22-23.  Spouse says he will assist pt with ADL's if she is still needing assistance at home.  Spouse will see if their  is able to bring him to rehab to transport pt home at discharge.  Informed spouse to bring pt's portable O2 tank when she is discharged.  Pt and spouse do not have a vehicle.  Informed spouse if he is unable to get someone to transport pt home at discharge SS can arrange public transit.  Will follow.   Patient/Family in Agreement with Plan yes

## 2023-03-15 NOTE — PROGRESS NOTES
Nephrology Progress Note      Subjective     No chest pain or shortness of breath.     Objective       Vital signs :     Temp:  [97.9 °F (36.6 °C)-98.7 °F (37.1 °C)] 97.9 °F (36.6 °C)  Heart Rate:  [58-65] 60  Resp:  [18] 18  BP: (142-177)/(59-91) 150/59    Intake/Output                       03/13/23 0701 - 03/14/23 0700 03/14/23 0701 - 03/15/23 0700     7130-1276 0465-3495 Total 2648-0109 9123-8170 Total                 Intake    P.O.  780  120 900  720  120 840    I.V.  120  -- 120  --  -- --    Total Intake  720 120 840       Output    Urine  --  150 150  200  -- 200    Total Output -- 150 150 200 -- 200           Physical Exam:    General Appearance : no chest pain or shortness of breath.   Lungs : clear to auscultation, respirations regular  Heart :  regular rhythm & normal rate, normal S1, S2 and no murmur, no rub  Abdomen : soft, non distended  Extremities : no edema,   Neurologic :   orientated to person, place, time and situation, Grossly no focal deficits        Laboratory Data :     Albumin Albumin   Date Value Ref Range Status   03/14/2023 2.5 (L) 3.5 - 5.2 g/dL Final      Magnesium No results found for: MG       PTH               No results found for: PTH    CBC and coagulation:  Results from last 7 days   Lab Units 03/15/23  0138 03/14/23  0136 03/12/23  0049   WBC 10*3/mm3 11.17* 11.36* 9.32   HEMOGLOBIN g/dL 8.2* 7.8* 8.0*   HEMATOCRIT % 27.2* 26.1* 26.1*   MCV fL 93.8 93.5 93.9   MCHC g/dL 30.1* 29.9* 30.7*   PLATELETS 10*3/mm3 506* 501* 487*     Acid/base balance:      Renal and electrolytes:    Results from last 7 days   Lab Units 03/15/23  0138 03/14/23  0136 03/12/23  0049 03/11/23  0136   SODIUM mmol/L 138 136 136 135*   POTASSIUM mmol/L 4.0 4.0 3.7 3.8   MAGNESIUM mg/dL  --   --   --  2.0   CHLORIDE mmol/L 100 99 97* 95*   CO2 mmol/L 27.0 27.6 29.0 29.1*   BUN mg/dL 53* 54* 51* 47*   CREATININE mg/dL 2.16* 2.39* 2.20* 2.01*   CALCIUM mg/dL 8.7 8.4* 8.9 9.2     Estimated Creatinine  Clearance: 23.4 mL/min (A) (by C-G formula based on SCr of 2.16 mg/dL (H)).  @GFRCG:3@   Liver and pancreatic function:  Results from last 7 days   Lab Units 03/14/23  0136 03/11/23  0136   ALBUMIN g/dL 2.5* 2.5*   BILIRUBIN mg/dL 0.2 0.3   ALK PHOS U/L 113 106   AST (SGOT) U/L 11 12   ALT (SGPT) U/L 7 5         Cardiac:      Liver and pancreatic function:  Results from last 7 days   Lab Units 03/14/23  0136 03/11/23  0136   ALBUMIN g/dL 2.5* 2.5*   BILIRUBIN mg/dL 0.2 0.3   ALK PHOS U/L 113 106   AST (SGOT) U/L 11 12   ALT (SGPT) U/L 7 5       Medications :     amLODIPine, 10 mg, Oral, Q24H  apixaban, 5 mg, Oral, Q12H  aspirin, 81 mg, Oral, Daily  atorvastatin, 80 mg, Oral, Nightly  bumetanide, 4 mg, Oral, Daily  busPIRone, 7.5 mg, Oral, TID  cholecalciferol, 1,000 Units, Oral, Daily  cloNIDine, 0.3 mg, Per PEG Tube, Q8H  clopidogrel, 75 mg, Per PEG Tube, Daily  docusate sodium, 100 mg, Oral, BID  ferrous sulfate, 325 mg, Oral, BID With Meals  guaiFENesin, 600 mg, Oral, Q12H  hydrALAZINE, 100 mg, Oral, Q6H  isosorbide dinitrate, 20 mg, Oral, TID - Nitrates  melatonin, 5 mg, Oral, Nightly  metoprolol tartrate, 50 mg, Oral, Q8H  pantoprazole, 40 mg, Oral, BID AC  polyethylene glycol, 17 g, Oral, Daily  senna-docusate sodium, 2 tablet, Oral, BID  spironolactone, 50 mg, Oral, Daily             Assessment & Plan     1.  CKD stage IV  2.  Resolved acute tubular necrosis  3.  Personal history of acute dialysis  4.  Acute on chronic hypoxic respiratory failure  5.  Systolic plus diastolic CHF compensated  6.  Left ventricular ejection fraction 30%  7.  Coronary artery disease with history of stent  8.  Type 2 diabetes  9.  Anemia of acute on chronic disease with iron deficiency.  Ordered Ferrlecit 250 mg 3/12/2023  10.  Debility and deconditioning     Cr is better today, adequate urine output, continue holding dialysis.   Change diet to regular.         Micha Carey MD  03/15/23  08:09 EDT

## 2023-03-15 NOTE — THERAPY TREATMENT NOTE
Inpatient Rehabilitation - Physical Therapy Treatment Note        [facility]     Patient Name: Pati Cortes  : 1955  MRN: [id number]    Today's Date: 3/15/2023                    Admit Date: 3/10/2023      Visit Dx:   No diagnosis found.    Patient Active Problem List   Diagnosis   • Debility       Past Medical History:   Diagnosis Date   • Arthritis    • Diabetes mellitus (HCC)    • Stroke (HCC)        History reviewed. No pertinent surgical history.    PT ASSESSMENT (last 12 hours)     IRF PT Evaluation and Treatment     Row Name 03/15/23 1547 03/15/23 1141       PT Time and Intention    Document Type daily treatment  PM Session  -RM daily treatment  AM Session  -LL    Mode of Treatment individual therapy;physical therapy  -RM individual therapy;physical therapy  -LL    Patient/Family/Caregiver Comments/Observations Pt reports having bowel issues prior to treatment,  -RM Patient in bed prior to session and upon arrival requested the use of a bed pan.  Instructed patient on the benefits of using a BSC  instead of a bed pan, but she was insistent that she use a bed pan.  Eventually, patient was agreeable to use BSC.  -LL    Row Name 03/15/23 1547 03/15/23 1141       General Information    Patient Profile Reviewed yes  -RM yes  -LL    Existing Precautions/Restrictions fall;oxygen therapy device and L/min  -RM fall;oxygen therapy device and L/min  -LL    Row Name 03/15/23 1547 03/15/23 1141       Living Environment    Primary Care Provided by self;spouse/significant other  -RM self;spouse/significant other  -LL    Row Name 03/15/23 1547 03/15/23 1141       Home Use of Assistive/Adaptive Equipment    Equipment Currently Used at Home bp cuff;glucometer;oxygen;rollator;commode  BSC  -RM bp cuff;glucometer;oxygen;rollator;commode  BSC  -LL    Row Name 03/15/23 1547 03/15/23 1141       Pain Assessment    Pretreatment Pain Rating 0/10 - no pain  -RM 0/10 - no pain  -LL    Posttreatment Pain Rating 0/10 - no pain   -RM 0/10 - no pain  -LL    Row Name 03/15/23 1547 03/15/23 1141       Cognition/Psychosocial    Affect/Mental Status (Cognition) anxious;emotionally labile  -RM anxious  -LL    Orientation Status (Cognition) oriented x 3  -RM oriented x 3  -LL    Follows Commands (Cognition) verbal cues/prompting required;WFL  -RM verbal cues/prompting required;WFL  -LL    Personal Safety Interventions gait belt;nonskid shoes/slippers when out of bed;fall prevention program maintained  -RM gait belt;nonskid shoes/slippers when out of bed;supervised activity  -LL    Cognitive Function WFL  -RM WFL  -LL    Row Name 03/15/23 1547 03/15/23 1141       Range of Motion Comprehensive    General Range of Motion no range of motion deficits identified  -RM no range of motion deficits identified  -LL    Row Name 03/15/23 1547 03/15/23 1141       Strength Comprehensive (MMT)    General Manual Muscle Testing (MMT) Assessment lower extremity strength deficits identified  -RM lower extremity strength deficits identified  -LL    Row Name 03/15/23 1547 03/15/23 1141       Mobility    Extremity Weight-bearing Status right lower extremity;left lower extremity  -RM right lower extremity;left lower extremity  -LL    Left Lower Extremity (Weight-bearing Status) full weight-bearing (FWB)  -RM full weight-bearing (FWB)  -LL    Right Lower Extremity (Weight-bearing Status) full weight-bearing (FWB)  -RM full weight-bearing (FWB)  -LL    Row Name 03/15/23 1547 03/15/23 1141       Bed Mobility    Bed Mobility supine-sit;sit-supine;scooting/bridging;rolling right;rolling left  -RM supine-sit;sit-supine;scooting/bridging;rolling right;rolling left  -LL    Rolling Left [place] (Bed Mobility) supervision  performed bed mobility for toileting, cleaning, and dressing X 2 during this session  -RM supervision  -LL    Rolling Right [place] (Bed Mobility) supervision  -RM supervision  -LL    Scooting/Bridging [place] (Bed Mobility) independent  -RM --     Supine-Sit [place] (Bed Mobility) contact guard;verbal cues;nonverbal cues (demo/gesture);standby assist  -RM contact guard;verbal cues;nonverbal cues (demo/gesture)  -LL    Assistive Device (Bed Mobility) bed rails  -RM bed rails  -LL    Comment, (Bed Mobility) -- Bed mobility for use of bed pan and LB dressing  -LL    Row Name 03/15/23 1547 03/15/23 1141       Transfer Assessment/Treatment    Transfers bed-chair transfer;sit-stand transfer;stand-sit transfer;stand pivot/stand step transfer;chair-bed transfer;toilet transfer  -RM bed-chair transfer;sit-stand transfer;stand-sit transfer;stand pivot/stand step transfer;chair-bed transfer;toilet transfer  -LL    Row Name 03/15/23 1547 03/15/23 1141       Bed-Chair Transfer    Bed-Chair [place] (Transfers) minimum assist (75% patient effort);verbal cues;nonverbal cues (demo/gesture);contact guard  -RM minimum assist (75% patient effort);verbal cues;nonverbal cues (demo/gesture);contact guard  -LL    Assistive Device (Bed-Chair Transfers) wheelchair  -RM walker, front-wheeled  -LL    Row Name 03/15/23 1547          Chair-Bed Transfer    Chair-Bed [place] (Transfers) minimum assist (75% patient effort);contact guard;nonverbal cues (demo/gesture);verbal cues  -RM     Assistive Device (Chair-Bed Transfers) wheelchair  -RM     Row Name 03/15/23 1547 03/15/23 1141       Sit-Stand Transfer    Sit-Stand [place] (Transfers) verbal cues;nonverbal cues (demo/gesture);minimum assist (75% patient effort);contact guard  -RM verbal cues;nonverbal cues (demo/gesture);minimum assist (75% patient effort);contact guard  -LL    Assistive Device (Sit-Stand Transfers) wheelchair  -RM walker, front-wheeled  -LL    Row Name 03/15/23 1547 03/15/23 1141       Stand-Sit Transfer    Stand-Sit [place] (Transfers) verbal cues;nonverbal cues (demo/gesture);minimum assist (75% patient effort);contact guard  -RM verbal cues;nonverbal cues (demo/gesture);minimum assist (75%  patient effort);contact guard  -LL    Assistive Device (Stand-Sit Transfers) wheelchair  -RM walker, front-wheeled  -LL    Row Name 03/15/23 1547 03/15/23 1141       Stand Pivot/Stand Step Transfer    Stand Pivot/Stand Step [place] (Transfers) verbal cues;nonverbal cues (demo/gesture);minimum assist (75% patient effort);contact guard  -RM verbal cues;nonverbal cues (demo/gesture);minimum assist (75% patient effort);contact guard  -LL    Assistive Device (Stand Pivot Stand Step Transfer) wheelchair  -RM walker, front-wheeled  -LL    Row Name 03/15/23 1141          Toilet Transfer    Type (Toilet Transfer) stand pivot/stand step  -LL     [place] Level (Toilet Transfer) minimum assist (75% patient effort);contact guard;verbal cues;nonverbal cues (demo/gesture)  -LL     Assistive Device (Toilet Transfer) commode, bedside without drop arms;walker, front-wheeled  -LL     Row Name 03/15/23 1547 03/15/23 1141       Safety Issues, Functional Mobility    Impairments Affecting Function (Mobility) balance;endurance/activity tolerance;strength  -RM balance;endurance/activity tolerance;strength  -LL    Row Name 03/15/23 1547          Hip (Therapeutic Exercise)    Hip Strengthening (Therapeutic Exercise) bilateral;flexion;extension;aBduction;aDduction;heel slides;marching while seated;sitting;resistance band;green;2 sets;10 repetitions  -RM     Row Name 03/15/23 1547          Knee (Therapeutic Exercise)    Knee Strengthening (Therapeutic Exercise) bilateral;flexion;extension;heel slides;marching while seated;LAQ (long arc quad);hamstring curls;sitting;resistance band;green;2 sets;10 repetitions  -RM     Row Name 03/15/23 1547          Ankle (Therapeutic Exercise)    Ankle Strengthening (Therapeutic Exercise) bilateral;plantarflexion;dorsiflexion;sitting;2 sets;10 repetitions  -RM     Row Name 03/15/23 1547 03/15/23 1141       Positioning and Restraints    Pre-Treatment Position in bed  -RM in bed  -LL    Post Treatment  Position bed  -RM wheelchair  -LL    In Bed supine;call light within reach;encouraged to call for assist  -RM --    In Wheelchair -- sitting;with OT  -LL    Row Name 03/15/23 1547 03/15/23 1141       Therapy Assessment/Plan (PT)    Patient's Goals For Discharge return home  -RM return home  -LL    Row Name 03/15/23 1547 03/15/23 1141       Therapy Assessment/Plan (PT)    Rehab Potential/Prognosis (PT) good, to achieve stated therapy goals  -RM good, to achieve stated therapy goals  -LL    Frequency of Treatment (PT) 5 times per week  -RM 5 times per week  -LL    Estimated Duration of Therapy (PT) 2 weeks;3 weeks  -RM 2 weeks;3 weeks  -LL    Problem List (PT) problems related to;balance;mobility;strength  functional endurance  -RM problems related to;balance;mobility;strength  functional endurance  -LL    Activity Limitations Related to Problem List (PT) unable to ambulate safely;unable to transfer safely;BADLs not performed adequately or safely  -RM unable to ambulate safely;unable to transfer safely;BADLs not performed adequately or safely  -LL    Row Name 03/15/23 1547          Daily Progress Summary (PT)    Functional Goal Overall Progress (PT) progressing toward functional goals as expected  -RM     Daily Progress Summary (PT) Fair functional mobility noted this date; minimal assistance required for sit>stand. Good LE strength nored with TE. Continued skilled care required for further LE strengthening to ensure maximum safe function upon D/C.  -RM     Impairments Still Limiting Function (PT) functional activity tolerance impairment;pain;strength deficit  -RM     Recommendations (PT) Continue per current POC  -RM     Row Name 03/15/23 1547 03/15/23 1141       Therapy Plan Review/Discharge Plan (PT)    Anticipated Equipment Needs at Discharge (PT Eval) wheelchair  TBD  -RM wheelchair  TBD  -LL    Anticipated Discharge Disposition (PT) home with assist;home with home health  -RM home with assist;home with home  health  -LL    Row Name 03/15/23 1547 03/15/23 1141       IRF PT Goals    Bed Mobility Goal Selection (PT-IRF) bed mobility, PT goal 1  -RM bed mobility, PT goal 1  -LL    Transfer Goal Selection (PT-IRF) transfers, PT goal 1  -RM transfers, PT goal 1  -LL    Gait (Walking Locomotion) Goal Selection (PT-IRF) gait, PT goal 1  -RM gait, PT goal 1  -LL    Row Name 03/15/23 1547 03/15/23 1141       Bed Mobility Goal 1 (PT-IRF)    Activity/Assistive Device (Bed Mobility Goal 1, PT-IRF) rolling to left;rolling to right;scooting;sit to supine;supine to sit  -RM rolling to left;rolling to right;scooting;sit to supine;supine to sit  -LL    [place] Level (Bed Mobility Goal 1, PT-IRF) independent  -RM independent  -LL    Time Frame (Bed Mobility Goal 1, PT-IRF) by discharge  -RM by discharge  -LL    Row Name 03/15/23 1547 03/15/23 1141       Transfer Goal 1 (PT-IRF)    Activity/Assistive Device (Transfer Goal 1, PT-IRF) sit-to-stand/stand-to-sit;bed-to-chair/chair-to-bed;toilet  appropriate a.d.  -RM sit-to-stand/stand-to-sit;bed-to-chair/chair-to-bed;toilet  appropriate a.d.  -LL    [place] Level (Transfer Goal 1, PT-IRF) standby assist  -RM standby assist  -LL    Time Frame (Transfer Goal 1, PT-IRF) by discharge  -RM by discharge  -LL    Row Name 03/15/23 1547 03/15/23 1141       Gait/Walking Locomotion Goal 1 (PT-IRF)    Activity/Assistive Device (Gait/Walking Locomotion Goal 1, PT-IRF) gait (walking locomotion);assistive device use;decrease fall risk;diminish gait deviation;improve balance and speed;increase endurance/gait distance  appropriate a.d.  -RM gait (walking locomotion);assistive device use;decrease fall risk;diminish gait deviation;improve balance and speed;increase endurance/gait distance  appropriate a.d.  -LL    Gait/Walking Locomotion Distance Goal 1 (PT-IRF) 150  -  -LL    [place] Level (Gait/Walking Locomotion Goal 1, PT-IRF) standby assist  -RM standby assist  -LL    Time Frame  (Gait/Walking Locomotion Goal 1, PT-IRF) by discharge  -RM by discharge  -LL          User Key  (r) = Recorded By, (t) = Taken By, (c) = Cosigned By    Initials Name Provider Type    Magdalena Izquierdo, PTA Physical Therapist Assistant    Celine Maldonado PTA Physical Therapist Assistant                 Physical Therapy Education     Title: PT OT SLP Therapies (Done)     Topic: Physical Therapy (Done)     Point: Mobility training (Done)     Learning Progress Summary           Patient Acceptance, E,TB, VU by RM at 3/15/2023 1551    Acceptance, E,D, VU,NR by LL at 3/15/2023 1146    Acceptance, E,TB, VU by DG at 3/14/2023 2154    Acceptance, E,TB, VU by DG at 3/14/2023 2154    Acceptance, E,TB, VU,NR,DU by  at 3/14/2023 1415    Acceptance, E,TB, VU by DG at 3/13/2023 2006    Acceptance, E,TB, VU,DU,NR by HC at 3/13/2023 1453    Acceptance, E,TB, VU by DG at 3/12/2023 2026    Acceptance, E,D, VU,NR by AG at 3/11/2023 1455                   Point: Home exercise program (Done)     Learning Progress Summary           Patient Acceptance, E,TB, VU by RM at 3/15/2023 1551    Acceptance, E,D, VU,NR by LL at 3/15/2023 1146    Acceptance, E,TB, VU by DG at 3/14/2023 2154    Acceptance, E,TB, VU by DG at 3/14/2023 2154    Acceptance, E,TB, VU,NR,DU by HC at 3/14/2023 1415    Acceptance, E,TB, VU by DG at 3/13/2023 2006    Acceptance, E,TB, VU,DU,NR by HC at 3/13/2023 1453    Acceptance, E,TB, VU by DG at 3/12/2023 2026    Acceptance, E,D, VU,NR by AG at 3/11/2023 1455                   Point: Body mechanics (Done)     Learning Progress Summary           Patient Acceptance, E,TB, VU by RM at 3/15/2023 1551    Acceptance, E,D, VU,NR by LL at 3/15/2023 1146    Acceptance, E,TB, VU by DG at 3/14/2023 2154    Acceptance, E,TB, VU by DG at 3/14/2023 2154    Acceptance, E,TB, VU,NR,DU by HC at 3/14/2023 1415    Acceptance, E,TB, VU by DG at 3/13/2023 2006    Acceptance, E,TB, VU,DU,NR by HC at 3/13/2023 1453    Acceptance,  E,TB, VU by DG at 3/12/2023 2026    Acceptance, E,D, VU,NR by AG at 3/11/2023 1455                   Point: Precautions (Done)     Learning Progress Summary           Patient Acceptance, E,TB, VU by  at 3/15/2023 1551    Acceptance, E,D, VU,NR by  at 3/15/2023 1146    Acceptance, E,TB, VU by DG at 3/14/2023 2154    Acceptance, E,TB, VU by DG at 3/14/2023 2154    Acceptance, E,TB, VU,NR,DU by  at 3/14/2023 1415    Acceptance, E,TB, VU by DG at 3/13/2023 2006    Acceptance, E,TB, VU,DU,NR by HC at 3/13/2023 1453    Acceptance, E,TB, VU by  at 3/12/2023 2026    Acceptance, E,D, VU,NR by  at 3/11/2023 1455                               User Key     Initials Effective Dates Name Provider Type Discipline     06/16/21 -  Shalonda English RN Registered Nurse Nurse     06/16/21 -  Andreina Daigle, PT Physical Therapist PT    LL 05/02/16 -  Magdalena De Los Santos, LETTY Physical Therapist Assistant PT     02/17/23 -  Celine Piper, PTA Physical Therapist Assistant PT    HC 01/20/23 -  Radha Ruelas, LETTY Physical Therapist Assistant PT                PT Recommendation and Plan    Frequency of Treatment (PT): 5 times per week  Anticipated Equipment Needs at Discharge (PT Eval): wheelchair (TBD)  Daily Progress Summary (PT)  Functional Goal Overall Progress (PT): progressing toward functional goals as expected  Daily Progress Summary (PT): Fair functional mobility noted this date; minimal assistance required for sit>stand. Good LE strength nored with TE. Continued skilled care required for further LE strengthening to ensure maximum safe function upon D/C.  Impairments Still Limiting Function (PT): functional activity tolerance impairment, pain, strength deficit  Recommendations (PT): Continue per current POC               Time Calculation:      PT Charges     Row Name 03/15/23 1551 03/15/23 1146          Time Calculation    Start Time 1330  -RM 1000  -LL     Stop Time 1415  -RM 1045  -LL     Time  Calculation (min) 45 min  -RM 45 min  -LL     PT Received On 03/15/23  -RM 03/15/23  -LL     PT - Next Appointment 03/16/23  - --     PT Goal Re-Cert Due Date 03/17/23  - --        Time Calculation- PT    Total Timed Code Minutes- PT 45 minute(s)  -RM 45 minute(s)  -LL           User Key  (r) = Recorded By, (t) = Taken By, (c) = Cosigned By    Initials Name Provider Type     Magdalena De Los Santos PTA Physical Therapist Assistant    Celine Maldonado PTA Physical Therapist Assistant                Therapy Charges for Today     Code Description Service Date Service Provider Modifiers Qty    71812269795 HC PT THER PROC EA 15 MIN 3/15/2023 Celine Piper PTA GP, CQ 1    99002043866 HC PT NEUROMUSC RE EDUCATION EA 15 MIN 3/15/2023 Celine Piper PTA GP, CQ 1    33649576450 HC PT THERAPEUTIC ACT EA 15 MIN 3/15/2023 Celine Piper PTA GP, CQ 1                   Celine Piper PTA  3/15/2023

## 2023-03-15 NOTE — THERAPY TREATMENT NOTE
Inpatient Rehabilitation - Occupational Therapy Treatment Note     Ruben     Patient Name: Pati Cortes  : 1955  MRN: [id number]    Today's Date: 3/15/2023                 Admit Date: 3/10/2023       No diagnosis found.    Patient Active Problem List   Diagnosis   • Debility       Past Medical History:   Diagnosis Date   • Arthritis    • Diabetes mellitus (HCC)    • Stroke (HCC)        History reviewed. No pertinent surgical history.          IRF OT ASSESSMENT FLOWSHEET (last 12 hours)     IRF OT Evaluation and Treatment     Row Name 03/15/23 1500 03/15/23 1333       OT Time and Intention    Document Type daily treatment  - daily treatment  -HB    Mode of Treatment individual therapy;occupational therapy  - individual therapy;occupational therapy  -HB    Total Minutes, Occupational Therapy -- 45  -HB    Patient Effort good  - good  -HB    Symptoms Noted During/After Treatment -- none  -HB    Row Name 03/15/23 1500 03/15/23 1333       General Information    Patient Profile Reviewed -- yes  -HB    Patient/Family/Caregiver Comments/Observations patient agreeable to therapy. patient completed therapy up in chair with encouragment.  - Patient and RN okd OT this date.  -    General Observations of Patient -- Patient tolerated OT well with no adverse reactions.  -    Existing Precautions/Restrictions fall;oxygen therapy device and L/min  - fall;oxygen therapy device and L/min  3.5 liters via NC this date.  -HB    Row Name 03/15/23 1333          Pain Assessment    Pretreatment Pain Rating 0/10 - no pain  -HB     Posttreatment Pain Rating 0/10 - no pain  -HB     Row Name 03/15/23 1500 03/15/23 1333       Cognition/Psychosocial    Affect/Mental Status (Cognition) WFL  - WFL  -HB    Orientation Status (Cognition) -- oriented x 3  -HB    Follows Commands (Cognition) -- WFL  -HB    Row Name 03/15/23 1333          Grooming    [place] Level (Grooming) grooming skills;hair care,  combing/brushing;set up  -     Position (Grooming) supine  -     Row Name 03/15/23 1500          Chair-Bed Transfer    Chair-Bed [place] (Transfers) minimum assist (75% patient effort);verbal cues  -     Row Name 03/15/23 1500 03/15/23 1333       Motor Skills    Motor Skills coordination;functional endurance;neuro-muscular function  - coordination;functional endurance;motor control/coordination interventions  -    Motor Control/Coordination Interventions -- fine motor manipulation/dexterity activities;therapeutic exercise/ROM;gross motor coordination activities  -    Therapeutic Exercise shoulder;elbow/forearm;wrist;hand  BUE ROM, gmc,fmc, strengthening  - shoulder;elbow/forearm;hand;wrist  -    Additional Documentation -- --  BUE TA to increase ADL status/endurance; rest between tasks  -    Row Name 03/15/23 1500 03/15/23 1333       Positioning and Restraints    Pre-Treatment Position -- in bed  -    Post Treatment Position bed  - bed  -    In Bed sitting EOB;call light within reach;encouraged to call for assist  eating lunch  - encouraged to call for assist;call light within reach  -          User Key  (r) = Recorded By, (t) = Taken By, (c) = Cosigned By    Initials Name Effective Dates     Gail Xie, OT 06/16/21 -      Emily Daigle, OT 05/25/21 -                  Occupational Therapy Education     Title: PT OT SLP Therapies (Done)     Topic: Occupational Therapy (Done)     Point: ADL training (Done)     Description:   Instruct learner(s) on proper safety adaptation and remediation techniques during self care or transfers.   Instruct in proper use of assistive devices.              Learning Progress Summary           Patient Acceptance, E, VU,NR by HB at 3/15/2023 1338    Acceptance, E,TB, VU by DG at 3/14/2023 2154    Acceptance, E,TB, VU by DG at 3/14/2023 2154    Acceptance, E, VU,NR by  at 3/14/2023 1432    Acceptance, E,TB, VU by DG at 3/13/2023 2006     Acceptance, E,D, VU,NR by TM at 3/13/2023 1427                   Point: Precautions (Done)     Description:   Instruct learner(s) on prescribed precautions during self-care and functional transfers.              Learning Progress Summary           Patient Acceptance, E, VU,NR by  at 3/15/2023 1338    Acceptance, E,TB, VU by DG at 3/14/2023 2154    Acceptance, E,TB, VU by DG at 3/14/2023 2154    Acceptance, E, VU,NR by  at 3/14/2023 1432    Acceptance, E,TB, VU by  at 3/13/2023 2006    Acceptance, E,D, VU,NR by TM at 3/13/2023 1427                               User Key     Initials Effective Dates Name Provider Type Discipline     06/16/21 -  Shalonda English, RN Registered Nurse Nurse     06/16/21 -  Vanessa Vasquez, OT Occupational Therapist OT     06/16/21 -  Ros Doe, OT Occupational Therapist OT     05/25/21 -  Emily Daigle, OT Occupational Therapist OT                    OT Recommendation and Plan    Planned Therapy Interventions (OT): activity tolerance training, adaptive equipment training, BADL retraining, patient/caregiver education/training, ROM/therapeutic exercise, strengthening exercise                    Time Calculation:      Time Calculation- OT     Row Name 03/15/23 1527 03/15/23 1339          Time Calculation- OT    OT Start Time 1245  - 1245  -     OT Stop Time 1330  - 1330  -     OT Time Calculation (min) 45 min  - 45 min  -     Total Timed Code Minutes- OT -- 45 minute(s)  -           User Key  (r) = Recorded By, (t) = Taken By, (c) = Cosigned By    Initials Name Provider Type     Gail Xie OT Occupational Therapist     Emily Daigle, OT Occupational Therapist              Therapy Charges for Today     Code Description Service Date Service Provider Modifiers Qty    38846414033 HC OT SELF CARE/MGMT/TRAIN EA 15 MIN 3/14/2023 Gail Xie, OT GO 2    18578698869 HC OT THERAPEUTIC ACT EA 15 MIN 3/14/2023 Gail Xie,  OT GO 1    18380997272 HC OT SELF CARE/MGMT/TRAIN EA 15 MIN 3/15/2023 Gail Xie, OT GO 1    20636206173 HC OT THERAPEUTIC ACT EA 15 MIN 3/15/2023 Gail Xie OT GO 1    [id number]  OT THER PROC EA 15 MIN 3/15/2023 Gail Xie OT GO 1                   Gail Xie OT  3/15/2023

## 2023-03-15 NOTE — PROGRESS NOTES
Patient states she is doing well without complaints, she wishes to leave 2 days earlier so she can get a ride to [facility].  She states she is having no chest pain or palpitations, breathing is doing okay and she actually is down to her home level of oxygen currently.  Creatinine is stable to slightly improved.  She still has a dialysis catheter in place, dialysis however being held due to recovery of renal function.  EF as showed recovery lungs are clear, heart regular rate and rhythm.  She remains borderline hypotensive on current medications she is on high-dose multiple medications, if this persist will discuss further with nephrology, may have to consider something such as minoxidil.  Attempted to call patient's cardiologist Dr. Reyes but I could not get anyone to answer the office.  I was going to discuss with her patient now needed triple therapy

## 2023-03-15 NOTE — SIGNIFICANT NOTE
03/15/23 1000   Plan   Plan MD is agreeable to discharge pt on 3-22-23 and this was reviewed with pt.  Informed pt SS will contact her spouse today about plans.  Pt plans to return home with spouse who will assist with caregiving needs.   Patient/Family in Agreement with Plan yes

## 2023-03-15 NOTE — PROGRESS NOTES
Rehabilitation Nursing  Inpatient Rehabilitation Plan of Care Note    Plan of Care  Copy from POC    Body Function Structure    Skin Integrity (Active)  Current Status (3/10/2023 12:36:00 PM): Risk for skin breakdown  Weekly Goal: No skin breakdown  Discharge Goal: No skin breakdown    Safety    Potential for Injury (Active)  Current Status (3/10/2023 12:36:00 PM): Potential for falls  Weekly Goal: No falls  Discharge Goal: No falls    Signed by: Dorota Camarillo Nurse

## 2023-03-15 NOTE — THERAPY TREATMENT NOTE
Inpatient Rehabilitation - Physical Therapy Treatment Note        Ruben     Patient Name: Pati Cortes  : 1955  MRN: [id number]    Today's Date: 3/15/2023                    Admit Date: 3/10/2023      Visit Dx:   No diagnosis found.    Patient Active Problem List   Diagnosis   • Debility       Past Medical History:   Diagnosis Date   • Arthritis    • Diabetes mellitus (HCC)    • Stroke (HCC)        History reviewed. No pertinent surgical history.    PT ASSESSMENT (last 12 hours)     IRF PT Evaluation and Treatment     Row Name 03/15/23 1141          PT Time and Intention    Document Type daily treatment  AM Session  -LL     Mode of Treatment individual therapy;physical therapy  -LL     Patient/Family/Caregiver Comments/Observations Patient in bed prior to session and upon arrival requested the use of a bed pan.  Instructed patient on the benefits of using a BSC  instead of a bed pan, but she was insistent that she use a bed pan.  Eventually, patient was agreeable to use BSC.  -LL     Row Name 03/15/23 1141          General Information    Patient Profile Reviewed yes  -LL     Existing Precautions/Restrictions fall;oxygen therapy device and L/min  -     Row Name 03/15/23 1141          Living Environment    Primary Care Provided by self;spouse/significant other  -LL     Row Name 03/15/23 1141          Home Use of Assistive/Adaptive Equipment    Equipment Currently Used at Home bp cuff;glucometer;oxygen;rollator;commode  BSC  -     Row Name 03/15/23 1141          Pain Assessment    Pretreatment Pain Rating 0/10 - no pain  -LL     Posttreatment Pain Rating 0/10 - no pain  -LL     Row Name 03/15/23 1141          Cognition/Psychosocial    Affect/Mental Status (Cognition) anxious  -LL     Orientation Status (Cognition) oriented x 3  -LL     Follows Commands (Cognition) verbal cues/prompting required;WFL  -LL     Personal Safety Interventions gait belt;nonskid shoes/slippers when out of bed;supervised  activity  -LL     Cognitive Function WFL  -LL     Row Name 03/15/23 1141          Range of Motion Comprehensive    General Range of Motion no range of motion deficits identified  -     Row Name 03/15/23 1141          Strength Comprehensive (MMT)    General Manual Muscle Testing (MMT) Assessment lower extremity strength deficits identified  -     Row Name 03/15/23 1141          Mobility    Extremity Weight-bearing Status right lower extremity;left lower extremity  -LL     Left Lower Extremity (Weight-bearing Status) full weight-bearing (FWB)  -LL     Right Lower Extremity (Weight-bearing Status) full weight-bearing (FWB)  -LL     Row Name 03/15/23 1141          Bed Mobility    Bed Mobility supine-sit;sit-supine;scooting/bridging;rolling right;rolling left  -LL     Rolling Left [place] (Bed Mobility) supervision  -LL     Rolling Right [place] (Bed Mobility) supervision  -LL     Supine-Sit [place] (Bed Mobility) contact guard;verbal cues;nonverbal cues (demo/gesture)  -LL     Assistive Device (Bed Mobility) bed rails  -LL     Comment, (Bed Mobility) Bed mobility for use of bed pan and LB dressing  -     Row Name 03/15/23 1141          Transfer Assessment/Treatment    Transfers bed-chair transfer;sit-stand transfer;stand-sit transfer;stand pivot/stand step transfer;chair-bed transfer;toilet transfer  -     Row Name 03/15/23 1141          Bed-Chair Transfer    Bed-Chair [place] (Transfers) minimum assist (75% patient effort);verbal cues;nonverbal cues (demo/gesture);contact guard  -LL     Assistive Device (Bed-Chair Transfers) walker, front-wheeled  -LL     Row Name 03/15/23 1141          Sit-Stand Transfer    Sit-Stand [place] (Transfers) verbal cues;nonverbal cues (demo/gesture);minimum assist (75% patient effort);contact guard  -LL     Assistive Device (Sit-Stand Transfers) walker, front-wheeled  -LL     Row Name 03/15/23 1141          Stand-Sit Transfer    Stand-Sit [place]  (Transfers) verbal cues;nonverbal cues (demo/gesture);minimum assist (75% patient effort);contact guard  -LL     Assistive Device (Stand-Sit Transfers) walker, front-wheeled  -LL     Row Name 03/15/23 1141          Stand Pivot/Stand Step Transfer    Stand Pivot/Stand Step [place] (Transfers) verbal cues;nonverbal cues (demo/gesture);minimum assist (75% patient effort);contact guard  -LL     Assistive Device (Stand Pivot Stand Step Transfer) walker, front-wheeled  -LL     Row Name 03/15/23 1141          Toilet Transfer    Type (Toilet Transfer) stand pivot/stand step  -LL     [place] Level (Toilet Transfer) minimum assist (75% patient effort);contact guard;verbal cues;nonverbal cues (demo/gesture)  -LL     Assistive Device (Toilet Transfer) commode, bedside without drop arms;walker, front-wheeled  -LL     Row Name 03/15/23 1141          Safety Issues, Functional Mobility    Impairments Affecting Function (Mobility) balance;endurance/activity tolerance;strength  -LL     Row Name 03/15/23 1141          Positioning and Restraints    Pre-Treatment Position in bed  -LL     Post Treatment Position wheelchair  -LL     In Wheelchair sitting;with OT  -LL     Row Name 03/15/23 1141          Therapy Assessment/Plan (PT)    Patient's Goals For Discharge return home  -     Row Name 03/15/23 1141          Therapy Assessment/Plan (PT)    Rehab Potential/Prognosis (PT) good, to achieve stated therapy goals  -LL     Frequency of Treatment (PT) 5 times per week  -LL     Estimated Duration of Therapy (PT) 2 weeks;3 weeks  -LL     Problem List (PT) problems related to;balance;mobility;strength  functional endurance  -LL     Activity Limitations Related to Problem List (PT) unable to ambulate safely;unable to transfer safely;BADLs not performed adequately or safely  -LL     Row Name 03/15/23 1141          Therapy Plan Review/Discharge Plan (PT)    Anticipated Equipment Needs at Discharge (PT Eval) wheelchair  TBD  -LL      Anticipated Discharge Disposition (PT) home with assist;home with home health  -     Row Name 03/15/23 1141          IRF PT Goals    Bed Mobility Goal Selection (PT-IRF) bed mobility, PT goal 1  -LL     Transfer Goal Selection (PT-IRF) transfers, PT goal 1  -LL     Gait (Walking Locomotion) Goal Selection (PT-IRF) gait, PT goal 1  -LL     Row Name 03/15/23 1141          Bed Mobility Goal 1 (PT-IRF)    Activity/Assistive Device (Bed Mobility Goal 1, PT-IRF) rolling to left;rolling to right;scooting;sit to supine;supine to sit  -LL     [place] (Bed Mobility Goal 1, PT-IRF) independent  -LL     Time Frame (Bed Mobility Goal 1, PT-IRF) by discharge  -     Row Name 03/15/23 1141          Transfer Goal 1 (PT-IRF)    Activity/Assistive Device (Transfer Goal 1, PT-IRF) sit-to-stand/stand-to-sit;bed-to-chair/chair-to-bed;toilet  appropriate a.d.  -LL     [place] (Transfer Goal 1, PT-IRF) standby assist  -LL     Time Frame (Transfer Goal 1, PT-IRF) by discharge  -     Row Name 03/15/23 1141          Gait/Walking Locomotion Goal 1 (PT-IRF)    Activity/Assistive Device (Gait/Walking Locomotion Goal 1, PT-IRF) gait (walking locomotion);assistive device use;decrease fall risk;diminish gait deviation;improve balance and speed;increase endurance/gait distance  appropriate a.d.  -LL     Gait/Walking Locomotion Distance Goal 1 (PT-IRF) 150  -LL     [place] (Gait/Walking Locomotion Goal 1, PT-IRF) standby assist  -LL     Time Frame (Gait/Walking Locomotion Goal 1, PT-IRF) by discharge  -           User Key  (r) = Recorded By, (t) = Taken By, (c) = Cosigned By    Initials Name Provider Type    Magdalena Izquierdo PTA Physical Therapist Assistant                 Physical Therapy Education     Title: PT OT SLP Therapies (Done)     Topic: Physical Therapy (Done)     Point: Mobility training (Done)     Learning Progress Summary           Patient Acceptance, E,D, VU,NR by  at 3/15/2023 1140     Acceptance, E,TB, VU by DG at 3/14/2023 2154    Acceptance, E,TB, VU by DG at 3/14/2023 2154    Acceptance, E,TB, VU,NR,DU by HC at 3/14/2023 1415    Acceptance, E,TB, VU by DG at 3/13/2023 2006    Acceptance, E,TB, VU,DU,NR by HC at 3/13/2023 1453    Acceptance, E,TB, VU by DG at 3/12/2023 2026    Acceptance, E,D, VU,NR by AG at 3/11/2023 1455                   Point: Home exercise program (Done)     Learning Progress Summary           Patient Acceptance, E,D, VU,NR by LL at 3/15/2023 1146    Acceptance, E,TB, VU by DG at 3/14/2023 2154    Acceptance, E,TB, VU by DG at 3/14/2023 2154    Acceptance, E,TB, VU,NR,DU by HC at 3/14/2023 1415    Acceptance, E,TB, VU by DG at 3/13/2023 2006    Acceptance, E,TB, VU,DU,NR by HC at 3/13/2023 1453    Acceptance, E,TB, VU by DG at 3/12/2023 2026    Acceptance, E,D, VU,NR by AG at 3/11/2023 1455                   Point: Body mechanics (Done)     Learning Progress Summary           Patient Acceptance, E,D, VU,NR by LL at 3/15/2023 1146    Acceptance, E,TB, VU by DG at 3/14/2023 2154    Acceptance, E,TB, VU by DG at 3/14/2023 2154    Acceptance, E,TB, VU,NR,DU by HC at 3/14/2023 1415    Acceptance, E,TB, VU by DG at 3/13/2023 2006    Acceptance, E,TB, VU,DU,NR by HC at 3/13/2023 1453    Acceptance, E,TB, VU by DG at 3/12/2023 2026    Acceptance, E,D, VU,NR by AG at 3/11/2023 1455                   Point: Precautions (Done)     Learning Progress Summary           Patient Acceptance, E,D, VU,NR by LL at 3/15/2023 1146    Acceptance, E,TB, VU by DG at 3/14/2023 2154    Acceptance, E,TB, VU by DG at 3/14/2023 2154    Acceptance, E,TB, VU,NR,DU by HC at 3/14/2023 1415    Acceptance, E,TB, VU by DG at 3/13/2023 2006    Acceptance, E,TB, VU,DU,NR by HC at 3/13/2023 1453    Acceptance, E,TB, VU by DG at 3/12/2023 2026    Acceptance, E,D, VU,NR by  at 3/11/2023 1455                               User Key     Initials Effective Dates Name Provider Type Discipline    RA 06/16/21 -  Amador,  Shalonda LOPEZ RN Registered Nurse Nurse    AG 06/16/21 -  Andreina Daigle, PT Physical Therapist PT    LL 05/02/16 -  Magdalena De Los Santos PTA Physical Therapist Assistant PT    HC 01/20/23 -  Radha Ruelas PTA Physical Therapist Assistant PT                PT Recommendation and Plan    Frequency of Treatment (PT): 5 times per week  Anticipated Equipment Needs at Discharge (PT Eval): wheelchair (TBD)                  Time Calculation:      PT Charges     Row Name 03/15/23 1146             Time Calculation    Start Time 1000  -LL      Stop Time 1045  -LL      Time Calculation (min) 45 min  -LL      PT Received On 03/15/23  -LL         Time Calculation- PT    Total Timed Code Minutes- PT 45 minute(s)  -LL            User Key  (r) = Recorded By, (t) = Taken By, (c) = Cosigned By    Initials Name Provider Type    LL Magdalena De Los Santos PTA Physical Therapist Assistant                Therapy Charges for Today     Code Description Service Date Service Provider Modifiers Qty    54167037201 HC GAIT TRAINING EA 15 MIN 3/14/2023 Magdalena De Los Santos PTA GP, CQ 2    26592306834 HC PT THERAPEUTIC ACT EA 15 MIN 3/14/2023 Magdalena De Los Santos PTA GP, CQ 1    80614812616 HC PT THERAPEUTIC ACT EA 15 MIN 3/15/2023 Magdalena De Los Santos, LETTY GP, CQ 3                   Magdalena. LETTY De Los Santos  3/15/2023

## 2023-03-15 NOTE — THERAPY TREATMENT NOTE
Inpatient Rehabilitation - Occupational Therapy Treatment Note     Ruben     Patient Name: Pati Cortes  : 1955  MRN: [id number]    Today's Date: 3/15/2023                 Admit Date: 3/10/2023       No diagnosis found.    Patient Active Problem List   Diagnosis   • Debility       Past Medical History:   Diagnosis Date   • Arthritis    • Diabetes mellitus (HCC)    • Stroke (HCC)        History reviewed. No pertinent surgical history.          IRF OT ASSESSMENT FLOWSHEET (last 12 hours)     IRF OT Evaluation and Treatment     Row Name 03/15/23 2823          OT Time and Intention    Document Type daily treatment  -HB     Mode of Treatment individual therapy;occupational therapy  -HB     Total Minutes, Occupational Therapy 45  -HB     Patient Effort good  -HB     Symptoms Noted During/After Treatment none  -HB     Row Name 03/15/23 1333          General Information    Patient Profile Reviewed yes  -HB     Patient/Family/Caregiver Comments/Observations Patient and RN okd OT this date.  -HB     General Observations of Patient Patient tolerated OT well with no adverse reactions.  -HB     Existing Precautions/Restrictions fall;oxygen therapy device and L/min  3.5 liters via NC this date.  -HB     Row Name 03/15/23 1333          Pain Assessment    Pretreatment Pain Rating 0/10 - no pain  -HB     Posttreatment Pain Rating 0/10 - no pain  -HB     Row Name 03/15/23 1333          Cognition/Psychosocial    Affect/Mental Status (Cognition) WFL  -HB     Orientation Status (Cognition) oriented x 3  -HB     Follows Commands (Cognition) WFL  -HB     Row Name 03/15/23 1333          Grooming    [place] Level (Grooming) grooming skills;hair care, combing/brushing;set up  -HB     Position (Grooming) supine  -HB     Row Name 03/15/23 1333          Motor Skills    Motor Skills coordination;functional endurance;motor control/coordination interventions  -HB     Motor Control/Coordination Interventions fine motor  manipulation/dexterity activities;therapeutic exercise/ROM;gross motor coordination activities  -     Therapeutic Exercise shoulder;elbow/forearm;hand;wrist  -     Additional Documentation --  BUE TA to increase ADL status/endurance; rest between tasks  -     Row Name 03/15/23 1333          Positioning and Restraints    Pre-Treatment Position in bed  -     Post Treatment Position bed  -HB     In Bed encouraged to call for assist;call light within reach  -           User Key  (r) = Recorded By, (t) = Taken By, (c) = Cosigned By    Initials Name Effective Dates     Emily Daigle, OT 05/25/21 -                  Occupational Therapy Education     Title: PT OT SLP Therapies (Done)     Topic: Occupational Therapy (Done)     Point: ADL training (Done)     Description:   Instruct learner(s) on proper safety adaptation and remediation techniques during self care or transfers.   Instruct in proper use of assistive devices.              Learning Progress Summary           Patient Acceptance, E, VU,NR by  at 3/15/2023 1338    Acceptance, E,TB, VU by DG at 3/14/2023 2154    Acceptance, E,TB, VU by DG at 3/14/2023 2154    Acceptance, E, VU,NR by BF at 3/14/2023 1432    Acceptance, E,TB, VU by DG at 3/13/2023 2006    Acceptance, E,D, VU,NR by TM at 3/13/2023 1427                   Point: Precautions (Done)     Description:   Instruct learner(s) on prescribed precautions during self-care and functional transfers.              Learning Progress Summary           Patient Acceptance, E, VU,NR by  at 3/15/2023 1338    Acceptance, E,TB, VU by DG at 3/14/2023 2154    Acceptance, E,TB, VU by DG at 3/14/2023 2154    Acceptance, E, VU,NR by BF at 3/14/2023 1432    Acceptance, E,TB, VU by DG at 3/13/2023 2006    Acceptance, E,D, VU,NR by TM at 3/13/2023 1427                               User Key     Initials Effective Dates Name Provider Type Discipline     06/16/21 -  Shalonda English, RN Registered Nurse Nurse      06/16/21 -  Vanessa Vasquez OT Occupational Therapist OT     06/16/21 -  Ros Doe OT Occupational Therapist OT     05/25/21 -  Emily Daigle OT Occupational Therapist OT                    OT Recommendation and Plan                         Time Calculation:      Time Calculation- OT     Row Name 03/15/23 1339             Time Calculation- OT    OT Start Time 1245  -HB      OT Stop Time 1330  -HB      OT Time Calculation (min) 45 min  -HB      Total Timed Code Minutes- OT 45 minute(s)  -HB            User Key  (r) = Recorded By, (t) = Taken By, (c) = Cosigned By    Initials Name Provider Type     Emily Daigle, GRAEME Occupational Therapist              Therapy Charges for Today     Code Description Service Date Service Provider Modifiers Qty    13218820802 HC OT SELF CARE/MGMT/TRAIN EA 15 MIN 3/15/2023 Emily Daigle OT GO 1    13027691955 HC OT THER PROC EA 15 MIN 3/15/2023 Emily Daigle OT GO 1    51035997783 HC OT THERAPEUTIC ACT EA 15 MIN 3/15/2023 Emily Daigle OT GO 1                   Emily Daigle OT  3/15/2023

## 2023-03-15 NOTE — PROGRESS NOTES
Occupational Therapy:    Physical Therapy: Individual: 90 minutes.    Speech Language Pathology:    Signed by: Magdalena De Los Santos PTA

## 2023-03-15 NOTE — SIGNIFICANT NOTE
03/15/23 0920   Plan   Plan SS spoke to pt about how she is doing in therapy and plans for discharge on 3-24-23.  Pt requests to go home on 3-22-23 and says her  may be able to transport her spouse to rehab for discharge then transport home.  Informed pt SS will inform MD of her request.

## 2023-03-15 NOTE — PROGRESS NOTES
Occupational Therapy: Individual: 90 minutes.    Physical Therapy:    Speech Language Pathology:    Signed by: Emily Daigle OT

## 2023-03-16 ENCOUNTER — APPOINTMENT (OUTPATIENT)
Dept: GENERAL RADIOLOGY | Facility: HOSPITAL | Age: 68
DRG: 948 | End: 2023-03-16
Payer: MEDICARE

## 2023-03-16 ENCOUNTER — HOSPITAL ENCOUNTER (INPATIENT)
Facility: HOSPITAL | Age: 68
LOS: 6 days | Discharge: HOME | DRG: 280 | End: 2023-03-22
Attending: HOSPITALIST | Admitting: HOSPITALIST
Payer: MEDICARE

## 2023-03-16 VITALS
SYSTOLIC BLOOD PRESSURE: 184 MMHG | TEMPERATURE: 99.3 F | HEART RATE: 80 BPM | HEIGHT: 63 IN | BODY MASS INDEX: 27.3 KG/M2 | OXYGEN SATURATION: 96 % | RESPIRATION RATE: 20 BRPM | WEIGHT: 154.1 LBS | DIASTOLIC BLOOD PRESSURE: 75 MMHG

## 2023-03-16 DIAGNOSIS — R53.81 DEBILITY: ICD-10-CM

## 2023-03-16 DIAGNOSIS — I50.33 ACUTE ON CHRONIC DIASTOLIC HEART FAILURE: Primary | ICD-10-CM

## 2023-03-16 DIAGNOSIS — I21.4 NSTEMI (NON-ST ELEVATED MYOCARDIAL INFARCTION): ICD-10-CM

## 2023-03-16 LAB
ALBUMIN SERPL-MCNC: 2.8 G/DL (ref 3.5–5.2)
ALBUMIN/GLOB SERPL: 0.8 G/DL
ALP SERPL-CCNC: 103 U/L (ref 39–117)
ALT SERPL W P-5'-P-CCNC: 6 U/L (ref 1–33)
ANION GAP SERPL CALCULATED.3IONS-SCNC: 12.2 MMOL/L (ref 5–15)
APTT PPP: 45.4 SECONDS (ref 26.5–34.5)
AST SERPL-CCNC: 11 U/L (ref 1–32)
BASOPHILS # BLD AUTO: 0.03 10*3/MM3 (ref 0–0.2)
BASOPHILS NFR BLD AUTO: 0.2 % (ref 0–1.5)
BILIRUB SERPL-MCNC: 0.3 MG/DL (ref 0–1.2)
BUN SERPL-MCNC: 44 MG/DL (ref 8–23)
BUN/CREAT SERPL: 21.4 (ref 7–25)
CALCIUM SPEC-SCNC: 8.5 MG/DL (ref 8.6–10.5)
CHLORIDE SERPL-SCNC: 102 MMOL/L (ref 98–107)
CHOLEST SERPL-MCNC: 125 MG/DL (ref 0–200)
CO2 SERPL-SCNC: 24.8 MMOL/L (ref 22–29)
CREAT SERPL-MCNC: 2.06 MG/DL (ref 0.57–1)
DEPRECATED RDW RBC AUTO: 57.5 FL (ref 37–54)
EGFRCR SERPLBLD CKD-EPI 2021: 25.8 ML/MIN/1.73
EOSINOPHIL # BLD AUTO: 0.13 10*3/MM3 (ref 0–0.4)
EOSINOPHIL NFR BLD AUTO: 1 % (ref 0.3–6.2)
ERYTHROCYTE [DISTWIDTH] IN BLOOD BY AUTOMATED COUNT: 16.8 % (ref 12.3–15.4)
GEN 5 2HR TROPONIN T REFLEX: 96 NG/L
GLOBULIN UR ELPH-MCNC: 3.3 GM/DL
GLUCOSE BLDC GLUCOMTR-MCNC: 105 MG/DL (ref 70–130)
GLUCOSE BLDC GLUCOMTR-MCNC: 92 MG/DL (ref 70–130)
GLUCOSE SERPL-MCNC: 119 MG/DL (ref 65–99)
HCT VFR BLD AUTO: 27.4 % (ref 34–46.6)
HDLC SERPL-MCNC: 34 MG/DL (ref 40–60)
HGB BLD-MCNC: 8.4 G/DL (ref 12–15.9)
IMM GRANULOCYTES # BLD AUTO: 0.11 10*3/MM3 (ref 0–0.05)
IMM GRANULOCYTES NFR BLD AUTO: 0.8 % (ref 0–0.5)
INR PPP: 1.28 (ref 0.9–1.1)
LDLC SERPL CALC-MCNC: 67 MG/DL (ref 0–100)
LDLC/HDLC SERPL: 1.9 {RATIO}
LYMPHOCYTES # BLD AUTO: 2.09 10*3/MM3 (ref 0.7–3.1)
LYMPHOCYTES NFR BLD AUTO: 16.1 % (ref 19.6–45.3)
MCH RBC QN AUTO: 28.6 PG (ref 26.6–33)
MCHC RBC AUTO-ENTMCNC: 30.7 G/DL (ref 31.5–35.7)
MCV RBC AUTO: 93.2 FL (ref 79–97)
MONOCYTES # BLD AUTO: 0.87 10*3/MM3 (ref 0.1–0.9)
MONOCYTES NFR BLD AUTO: 6.7 % (ref 5–12)
NEUTROPHILS NFR BLD AUTO: 75.2 % (ref 42.7–76)
NEUTROPHILS NFR BLD AUTO: 9.76 10*3/MM3 (ref 1.7–7)
NRBC BLD AUTO-RTO: 0 /100 WBC (ref 0–0.2)
PLATELET # BLD AUTO: 432 10*3/MM3 (ref 140–450)
PMV BLD AUTO: 9.1 FL (ref 6–12)
POTASSIUM SERPL-SCNC: 4.2 MMOL/L (ref 3.5–5.2)
PROT SERPL-MCNC: 6.1 G/DL (ref 6–8.5)
PROTHROMBIN TIME: 16.3 SECONDS (ref 12.1–14.7)
QT INTERVAL: 410 MS
QTC INTERVAL: 461 MS
RBC # BLD AUTO: 2.94 10*6/MM3 (ref 3.77–5.28)
SODIUM SERPL-SCNC: 139 MMOL/L (ref 136–145)
TRIGL SERPL-MCNC: 132 MG/DL (ref 0–150)
TROPONIN T DELTA: -2 NG/L
TROPONIN T SERPL HS-MCNC: 98 NG/L
TROPONIN T SERPL HS-MCNC: 98 NG/L
VLDLC SERPL-MCNC: 24 MG/DL (ref 5–40)
WBC NRBC COR # BLD: 12.99 10*3/MM3 (ref 3.4–10.8)

## 2023-03-16 PROCEDURE — 97110 THERAPEUTIC EXERCISES: CPT

## 2023-03-16 PROCEDURE — 97535 SELF CARE MNGMENT TRAINING: CPT

## 2023-03-16 PROCEDURE — 94761 N-INVAS EAR/PLS OXIMETRY MLT: CPT

## 2023-03-16 PROCEDURE — 84484 ASSAY OF TROPONIN QUANT: CPT | Performed by: INTERNAL MEDICINE

## 2023-03-16 PROCEDURE — 80061 LIPID PANEL: CPT | Performed by: HOSPITALIST

## 2023-03-16 PROCEDURE — 93005 ELECTROCARDIOGRAM TRACING: CPT | Performed by: INTERNAL MEDICINE

## 2023-03-16 PROCEDURE — 94799 UNLISTED PULMONARY SVC/PX: CPT

## 2023-03-16 PROCEDURE — 85025 COMPLETE CBC W/AUTO DIFF WBC: CPT | Performed by: HOSPITALIST

## 2023-03-16 PROCEDURE — 97116 GAIT TRAINING THERAPY: CPT

## 2023-03-16 PROCEDURE — 97530 THERAPEUTIC ACTIVITIES: CPT | Performed by: OCCUPATIONAL THERAPIST

## 2023-03-16 PROCEDURE — 80053 COMPREHEN METABOLIC PANEL: CPT | Performed by: HOSPITALIST

## 2023-03-16 PROCEDURE — 94660 CPAP INITIATION&MGMT: CPT

## 2023-03-16 PROCEDURE — 86140 C-REACTIVE PROTEIN: CPT | Performed by: HOSPITALIST

## 2023-03-16 PROCEDURE — 85730 THROMBOPLASTIN TIME PARTIAL: CPT | Performed by: HOSPITALIST

## 2023-03-16 PROCEDURE — 97530 THERAPEUTIC ACTIVITIES: CPT

## 2023-03-16 PROCEDURE — 99239 HOSP IP/OBS DSCHRG MGMT >30: CPT | Performed by: INTERNAL MEDICINE

## 2023-03-16 PROCEDURE — 71045 X-RAY EXAM CHEST 1 VIEW: CPT

## 2023-03-16 PROCEDURE — 99223 1ST HOSP IP/OBS HIGH 75: CPT | Performed by: HOSPITALIST

## 2023-03-16 PROCEDURE — 82962 GLUCOSE BLOOD TEST: CPT

## 2023-03-16 PROCEDURE — 97110 THERAPEUTIC EXERCISES: CPT | Performed by: OCCUPATIONAL THERAPIST

## 2023-03-16 PROCEDURE — 85610 PROTHROMBIN TIME: CPT | Performed by: HOSPITALIST

## 2023-03-16 PROCEDURE — 84484 ASSAY OF TROPONIN QUANT: CPT | Performed by: HOSPITALIST

## 2023-03-16 RX ORDER — NICOTINE POLACRILEX 4 MG
15 LOZENGE BUCCAL
Status: DISCONTINUED | OUTPATIENT
Start: 2023-03-16 | End: 2023-03-22 | Stop reason: HOSPADM

## 2023-03-16 RX ORDER — PANTOPRAZOLE SODIUM 40 MG/1
40 TABLET, DELAYED RELEASE ORAL
Status: CANCELLED | OUTPATIENT
Start: 2023-03-17

## 2023-03-16 RX ORDER — METOPROLOL TARTRATE 50 MG/1
50 TABLET, FILM COATED ORAL EVERY 8 HOURS
Status: DISCONTINUED | OUTPATIENT
Start: 2023-03-17 | End: 2023-03-19

## 2023-03-16 RX ORDER — AMOXICILLIN 250 MG
2 CAPSULE ORAL 2 TIMES DAILY
Status: CANCELLED | OUTPATIENT
Start: 2023-03-17

## 2023-03-16 RX ORDER — FLUTICASONE PROPIONATE 50 MCG
2 SPRAY, SUSPENSION (ML) NASAL DAILY
Status: CANCELLED | OUTPATIENT
Start: 2023-03-17

## 2023-03-16 RX ORDER — CLONIDINE HYDROCHLORIDE 0.3 MG/1
0.3 TABLET ORAL EVERY 8 HOURS SCHEDULED
Status: DISCONTINUED | OUTPATIENT
Start: 2023-03-17 | End: 2023-03-22 | Stop reason: HOSPADM

## 2023-03-16 RX ORDER — FERROUS SULFATE 325(65) MG
325 TABLET ORAL 2 TIMES DAILY WITH MEALS
Status: DISCONTINUED | OUTPATIENT
Start: 2023-03-17 | End: 2023-03-22 | Stop reason: HOSPADM

## 2023-03-16 RX ORDER — ACETAMINOPHEN 325 MG/1
650 TABLET ORAL EVERY 4 HOURS PRN
Status: DISCONTINUED | OUTPATIENT
Start: 2023-03-16 | End: 2023-03-22 | Stop reason: HOSPADM

## 2023-03-16 RX ORDER — CALCIUM CARBONATE 200(500)MG
2 TABLET,CHEWABLE ORAL 3 TIMES DAILY PRN
Status: DISCONTINUED | OUTPATIENT
Start: 2023-03-16 | End: 2023-03-22 | Stop reason: HOSPADM

## 2023-03-16 RX ORDER — BUSPIRONE HYDROCHLORIDE 5 MG/1
7.5 TABLET ORAL 3 TIMES DAILY
Status: DISCONTINUED | OUTPATIENT
Start: 2023-03-17 | End: 2023-03-22 | Stop reason: HOSPADM

## 2023-03-16 RX ORDER — SODIUM CHLORIDE 9 MG/ML
40 INJECTION, SOLUTION INTRAVENOUS AS NEEDED
Status: DISCONTINUED | OUTPATIENT
Start: 2023-03-16 | End: 2023-03-22 | Stop reason: HOSPADM

## 2023-03-16 RX ORDER — POLYETHYLENE GLYCOL 3350 17 G/17G
17 POWDER, FOR SOLUTION ORAL DAILY
Status: DISCONTINUED | OUTPATIENT
Start: 2023-03-17 | End: 2023-03-22 | Stop reason: HOSPADM

## 2023-03-16 RX ORDER — ATORVASTATIN CALCIUM 40 MG/1
80 TABLET, FILM COATED ORAL NIGHTLY
Status: CANCELLED | OUTPATIENT
Start: 2023-03-17

## 2023-03-16 RX ORDER — ASPIRIN 81 MG/1
81 TABLET ORAL DAILY
Status: DISCONTINUED | OUTPATIENT
Start: 2023-03-17 | End: 2023-03-22 | Stop reason: HOSPADM

## 2023-03-16 RX ORDER — HYDRALAZINE HYDROCHLORIDE 50 MG/1
100 TABLET, FILM COATED ORAL EVERY 6 HOURS SCHEDULED
Status: DISCONTINUED | OUTPATIENT
Start: 2023-03-17 | End: 2023-03-22 | Stop reason: HOSPADM

## 2023-03-16 RX ORDER — SODIUM CHLORIDE 0.9 % (FLUSH) 0.9 %
10 SYRINGE (ML) INJECTION EVERY 12 HOURS SCHEDULED
Status: DISCONTINUED | OUTPATIENT
Start: 2023-03-17 | End: 2023-03-22 | Stop reason: HOSPADM

## 2023-03-16 RX ORDER — POLYETHYLENE GLYCOL 3350 17 G/17G
17 POWDER, FOR SOLUTION ORAL DAILY
Status: CANCELLED | OUTPATIENT
Start: 2023-03-17

## 2023-03-16 RX ORDER — CLONIDINE HYDROCHLORIDE 0.3 MG/1
0.3 TABLET ORAL EVERY 8 HOURS SCHEDULED
Status: CANCELLED | OUTPATIENT
Start: 2023-03-16

## 2023-03-16 RX ORDER — AMOXICILLIN 250 MG
2 CAPSULE ORAL 2 TIMES DAILY
Status: DISCONTINUED | OUTPATIENT
Start: 2023-03-17 | End: 2023-03-22 | Stop reason: HOSPADM

## 2023-03-16 RX ORDER — CALCIUM CARBONATE 200(500)MG
2 TABLET,CHEWABLE ORAL 3 TIMES DAILY PRN
Status: CANCELLED | OUTPATIENT
Start: 2023-03-16

## 2023-03-16 RX ORDER — HYDROXYZINE HYDROCHLORIDE 25 MG/1
25 TABLET, FILM COATED ORAL 3 TIMES DAILY PRN
Status: DISCONTINUED | OUTPATIENT
Start: 2023-03-16 | End: 2023-03-22 | Stop reason: HOSPADM

## 2023-03-16 RX ORDER — DEXTROSE MONOHYDRATE 25 G/50ML
25 INJECTION, SOLUTION INTRAVENOUS
Status: CANCELLED | OUTPATIENT
Start: 2023-03-16

## 2023-03-16 RX ORDER — ASPIRIN 325 MG
325 TABLET, DELAYED RELEASE (ENTERIC COATED) ORAL ONCE
Status: COMPLETED | OUTPATIENT
Start: 2023-03-16 | End: 2023-03-16

## 2023-03-16 RX ORDER — OMEGA-3S/DHA/EPA/FISH OIL/D3 300MG-1000
1000 CAPSULE ORAL DAILY
Status: DISCONTINUED | OUTPATIENT
Start: 2023-03-17 | End: 2023-03-22 | Stop reason: HOSPADM

## 2023-03-16 RX ORDER — ISOSORBIDE DINITRATE 20 MG/1
20 TABLET ORAL
Status: DISCONTINUED | OUTPATIENT
Start: 2023-03-17 | End: 2023-03-19

## 2023-03-16 RX ORDER — BUMETANIDE 1 MG/1
4 TABLET ORAL DAILY
Status: DISCONTINUED | OUTPATIENT
Start: 2023-03-17 | End: 2023-03-22 | Stop reason: HOSPADM

## 2023-03-16 RX ORDER — PANTOPRAZOLE SODIUM 40 MG/1
40 TABLET, DELAYED RELEASE ORAL
Status: DISCONTINUED | OUTPATIENT
Start: 2023-03-17 | End: 2023-03-22 | Stop reason: HOSPADM

## 2023-03-16 RX ORDER — HEPARIN SOD,PORCINE/0.9 % NACL 25000/250
12 INTRAVENOUS SOLUTION INTRAVENOUS
Status: DISCONTINUED | OUTPATIENT
Start: 2023-03-17 | End: 2023-03-17

## 2023-03-16 RX ORDER — SODIUM CHLORIDE 0.9 % (FLUSH) 0.9 %
10 SYRINGE (ML) INJECTION AS NEEDED
Status: DISCONTINUED | OUTPATIENT
Start: 2023-03-16 | End: 2023-03-22 | Stop reason: HOSPADM

## 2023-03-16 RX ORDER — GLUCAGON 1 MG/ML
1 KIT INJECTION
Status: DISCONTINUED | OUTPATIENT
Start: 2023-03-16 | End: 2023-03-22 | Stop reason: HOSPADM

## 2023-03-16 RX ORDER — BUSPIRONE HYDROCHLORIDE 5 MG/1
7.5 TABLET ORAL 3 TIMES DAILY
Status: CANCELLED | OUTPATIENT
Start: 2023-03-17

## 2023-03-16 RX ORDER — BUMETANIDE 1 MG/1
4 TABLET ORAL DAILY
Status: CANCELLED | OUTPATIENT
Start: 2023-03-17

## 2023-03-16 RX ORDER — CLOPIDOGREL BISULFATE 75 MG/1
75 TABLET ORAL DAILY
Status: DISCONTINUED | OUTPATIENT
Start: 2023-03-17 | End: 2023-03-19

## 2023-03-16 RX ORDER — SPIRONOLACTONE 100 MG/1
100 TABLET, FILM COATED ORAL DAILY
Status: DISCONTINUED | OUTPATIENT
Start: 2023-03-17 | End: 2023-03-16 | Stop reason: HOSPADM

## 2023-03-16 RX ORDER — NICOTINE POLACRILEX 4 MG
15 LOZENGE BUCCAL
Status: CANCELLED | OUTPATIENT
Start: 2023-03-16

## 2023-03-16 RX ORDER — HEPARIN SODIUM 5000 [USP'U]/ML
30 INJECTION, SOLUTION INTRAVENOUS; SUBCUTANEOUS AS NEEDED
Status: DISCONTINUED | OUTPATIENT
Start: 2023-03-16 | End: 2023-03-20

## 2023-03-16 RX ORDER — ONDANSETRON 4 MG/1
4 TABLET, FILM COATED ORAL EVERY 6 HOURS PRN
Status: DISCONTINUED | OUTPATIENT
Start: 2023-03-16 | End: 2023-03-22 | Stop reason: HOSPADM

## 2023-03-16 RX ORDER — HEPARIN SODIUM 5000 [USP'U]/ML
60 INJECTION, SOLUTION INTRAVENOUS; SUBCUTANEOUS AS NEEDED
Status: DISCONTINUED | OUTPATIENT
Start: 2023-03-16 | End: 2023-03-20

## 2023-03-16 RX ORDER — AMLODIPINE BESYLATE 10 MG/1
10 TABLET ORAL
Status: DISCONTINUED | OUTPATIENT
Start: 2023-03-17 | End: 2023-03-22 | Stop reason: HOSPADM

## 2023-03-16 RX ORDER — ONDANSETRON 4 MG/1
4 TABLET, FILM COATED ORAL EVERY 6 HOURS PRN
Status: DISCONTINUED | OUTPATIENT
Start: 2023-03-16 | End: 2023-03-16 | Stop reason: HOSPADM

## 2023-03-16 RX ORDER — GUAIFENESIN 600 MG/1
600 TABLET, EXTENDED RELEASE ORAL EVERY 12 HOURS SCHEDULED
Status: CANCELLED | OUTPATIENT
Start: 2023-03-17

## 2023-03-16 RX ORDER — HYDRALAZINE HYDROCHLORIDE 50 MG/1
100 TABLET, FILM COATED ORAL EVERY 6 HOURS SCHEDULED
Status: CANCELLED | OUTPATIENT
Start: 2023-03-17

## 2023-03-16 RX ORDER — DOCUSATE SODIUM 100 MG/1
100 CAPSULE, LIQUID FILLED ORAL 2 TIMES DAILY
Status: CANCELLED | OUTPATIENT
Start: 2023-03-17

## 2023-03-16 RX ORDER — CHOLECALCIFEROL (VITAMIN D3) 125 MCG
5 CAPSULE ORAL NIGHTLY
Status: DISCONTINUED | OUTPATIENT
Start: 2023-03-17 | End: 2023-03-22 | Stop reason: HOSPADM

## 2023-03-16 RX ORDER — FLUTICASONE PROPIONATE 50 MCG
2 SPRAY, SUSPENSION (ML) NASAL DAILY
Status: DISCONTINUED | OUTPATIENT
Start: 2023-03-17 | End: 2023-03-22 | Stop reason: HOSPADM

## 2023-03-16 RX ORDER — FERROUS SULFATE 325(65) MG
325 TABLET ORAL 2 TIMES DAILY WITH MEALS
Status: CANCELLED | OUTPATIENT
Start: 2023-03-17

## 2023-03-16 RX ORDER — ISOSORBIDE DINITRATE 20 MG/1
20 TABLET ORAL
Status: CANCELLED | OUTPATIENT
Start: 2023-03-17

## 2023-03-16 RX ORDER — SPIRONOLACTONE 100 MG/1
100 TABLET, FILM COATED ORAL DAILY
Status: CANCELLED | OUTPATIENT
Start: 2023-03-17

## 2023-03-16 RX ORDER — ASPIRIN 81 MG/1
81 TABLET ORAL DAILY
Status: CANCELLED | OUTPATIENT
Start: 2023-03-17

## 2023-03-16 RX ORDER — ATORVASTATIN CALCIUM 40 MG/1
80 TABLET, FILM COATED ORAL NIGHTLY
Status: DISCONTINUED | OUTPATIENT
Start: 2023-03-17 | End: 2023-03-22 | Stop reason: HOSPADM

## 2023-03-16 RX ORDER — METOPROLOL TARTRATE 50 MG/1
50 TABLET, FILM COATED ORAL EVERY 8 HOURS
Status: CANCELLED | OUTPATIENT
Start: 2023-03-16

## 2023-03-16 RX ORDER — HEPARIN SODIUM 5000 [USP'U]/ML
60 INJECTION, SOLUTION INTRAVENOUS; SUBCUTANEOUS ONCE
Status: COMPLETED | OUTPATIENT
Start: 2023-03-17 | End: 2023-03-17

## 2023-03-16 RX ORDER — CLOPIDOGREL BISULFATE 75 MG/1
75 TABLET ORAL DAILY
Status: CANCELLED | OUTPATIENT
Start: 2023-03-17

## 2023-03-16 RX ORDER — HYDROXYZINE HYDROCHLORIDE 25 MG/1
25 TABLET, FILM COATED ORAL 3 TIMES DAILY PRN
Status: CANCELLED | OUTPATIENT
Start: 2023-03-16

## 2023-03-16 RX ORDER — DEXTROSE MONOHYDRATE 25 G/50ML
25 INJECTION, SOLUTION INTRAVENOUS
Status: DISCONTINUED | OUTPATIENT
Start: 2023-03-16 | End: 2023-03-22 | Stop reason: HOSPADM

## 2023-03-16 RX ORDER — AMLODIPINE BESYLATE 10 MG/1
10 TABLET ORAL
Status: CANCELLED | OUTPATIENT
Start: 2023-03-17

## 2023-03-16 RX ORDER — CHOLECALCIFEROL (VITAMIN D3) 125 MCG
5 CAPSULE ORAL NIGHTLY
Status: CANCELLED | OUTPATIENT
Start: 2023-03-17

## 2023-03-16 RX ORDER — GUAIFENESIN 600 MG/1
600 TABLET, EXTENDED RELEASE ORAL EVERY 12 HOURS SCHEDULED
Status: DISCONTINUED | OUTPATIENT
Start: 2023-03-17 | End: 2023-03-22 | Stop reason: HOSPADM

## 2023-03-16 RX ORDER — NITROGLYCERIN 0.4 MG/1
0.4 TABLET SUBLINGUAL
Status: DISCONTINUED | OUTPATIENT
Start: 2023-03-16 | End: 2023-03-22 | Stop reason: HOSPADM

## 2023-03-16 RX ORDER — SPIRONOLACTONE 100 MG/1
100 TABLET, FILM COATED ORAL DAILY
Status: DISCONTINUED | OUTPATIENT
Start: 2023-03-17 | End: 2023-03-22 | Stop reason: HOSPADM

## 2023-03-16 RX ORDER — OMEGA-3S/DHA/EPA/FISH OIL/D3 300MG-1000
1000 CAPSULE ORAL DAILY
Status: CANCELLED | OUTPATIENT
Start: 2023-03-17

## 2023-03-16 RX ORDER — ONDANSETRON 4 MG/1
4 TABLET, FILM COATED ORAL EVERY 6 HOURS PRN
Status: CANCELLED | OUTPATIENT
Start: 2023-03-16

## 2023-03-16 RX ORDER — ACETAMINOPHEN 325 MG/1
650 TABLET ORAL EVERY 4 HOURS PRN
Status: CANCELLED | OUTPATIENT
Start: 2023-03-16

## 2023-03-16 RX ORDER — DOCUSATE SODIUM 100 MG/1
100 CAPSULE, LIQUID FILLED ORAL 2 TIMES DAILY
Status: DISCONTINUED | OUTPATIENT
Start: 2023-03-17 | End: 2023-03-22 | Stop reason: HOSPADM

## 2023-03-16 RX ORDER — FLUTICASONE PROPIONATE 50 MCG
2 SPRAY, SUSPENSION (ML) NASAL DAILY
Status: DISCONTINUED | OUTPATIENT
Start: 2023-03-16 | End: 2023-03-16 | Stop reason: HOSPADM

## 2023-03-16 RX ADMIN — METOPROLOL TARTRATE 50 MG: 50 TABLET, FILM COATED ORAL at 13:26

## 2023-03-16 RX ADMIN — PANTOPRAZOLE SODIUM 40 MG: 40 TABLET, DELAYED RELEASE ORAL at 17:08

## 2023-03-16 RX ADMIN — GUAIFENESIN 600 MG: 600 TABLET, EXTENDED RELEASE ORAL at 21:46

## 2023-03-16 RX ADMIN — CLONIDINE HYDROCHLORIDE 0.3 MG: 0.3 TABLET ORAL at 06:00

## 2023-03-16 RX ADMIN — METOPROLOL TARTRATE 50 MG: 50 TABLET, FILM COATED ORAL at 06:00

## 2023-03-16 RX ADMIN — CLONIDINE HYDROCHLORIDE 0.3 MG: 0.3 TABLET ORAL at 21:46

## 2023-03-16 RX ADMIN — Medication 1000 UNITS: at 09:35

## 2023-03-16 RX ADMIN — DOCUSATE SODIUM 50 MG AND SENNOSIDES 8.6 MG 2 TABLET: 8.6; 5 TABLET, FILM COATED ORAL at 09:36

## 2023-03-16 RX ADMIN — CLOPIDOGREL BISULFATE 75 MG: 75 TABLET, FILM COATED ORAL at 09:36

## 2023-03-16 RX ADMIN — ISOSORBIDE DINITRATE 20 MG: 20 TABLET ORAL at 17:09

## 2023-03-16 RX ADMIN — ATORVASTATIN CALCIUM 80 MG: 40 TABLET, FILM COATED ORAL at 21:47

## 2023-03-16 RX ADMIN — DOCUSATE SODIUM 50 MG AND SENNOSIDES 8.6 MG 2 TABLET: 8.6; 5 TABLET, FILM COATED ORAL at 21:45

## 2023-03-16 RX ADMIN — CLONIDINE HYDROCHLORIDE 0.3 MG: 0.3 TABLET ORAL at 13:26

## 2023-03-16 RX ADMIN — FERROUS SULFATE TAB 325 MG (65 MG ELEMENTAL FE) 325 MG: 325 (65 FE) TAB at 09:36

## 2023-03-16 RX ADMIN — BUMETANIDE 4 MG: 1 TABLET ORAL at 09:35

## 2023-03-16 RX ADMIN — HYDRALAZINE HYDROCHLORIDE 100 MG: 50 TABLET, FILM COATED ORAL at 06:00

## 2023-03-16 RX ADMIN — HYDRALAZINE HYDROCHLORIDE 100 MG: 50 TABLET, FILM COATED ORAL at 00:00

## 2023-03-16 RX ADMIN — GUAIFENESIN 600 MG: 600 TABLET, EXTENDED RELEASE ORAL at 09:35

## 2023-03-16 RX ADMIN — SPIRONOLACTONE 50 MG: 25 TABLET ORAL at 09:37

## 2023-03-16 RX ADMIN — BUSPIRONE HYDROCHLORIDE 7.5 MG: 5 TABLET ORAL at 15:36

## 2023-03-16 RX ADMIN — FLUTICASONE PROPIONATE 2 SPRAY: 50 SPRAY, METERED NASAL at 15:33

## 2023-03-16 RX ADMIN — HYDROXYZINE HYDROCHLORIDE 25 MG: 25 TABLET ORAL at 21:48

## 2023-03-16 RX ADMIN — METOPROLOL TARTRATE 50 MG: 50 TABLET, FILM COATED ORAL at 21:46

## 2023-03-16 RX ADMIN — ISOSORBIDE DINITRATE 20 MG: 20 TABLET ORAL at 13:26

## 2023-03-16 RX ADMIN — HYDRALAZINE HYDROCHLORIDE 100 MG: 50 TABLET, FILM COATED ORAL at 13:26

## 2023-03-16 RX ADMIN — AMLODIPINE BESYLATE 10 MG: 10 TABLET ORAL at 09:35

## 2023-03-16 RX ADMIN — BUSPIRONE HYDROCHLORIDE 7.5 MG: 5 TABLET ORAL at 09:35

## 2023-03-16 RX ADMIN — FERROUS SULFATE TAB 325 MG (65 MG ELEMENTAL FE) 325 MG: 325 (65 FE) TAB at 17:08

## 2023-03-16 RX ADMIN — ASPIRIN 325 MG: 325 TABLET, COATED ORAL at 21:50

## 2023-03-16 RX ADMIN — DOCUSATE SODIUM 100 MG: 100 CAPSULE ORAL at 09:36

## 2023-03-16 RX ADMIN — PANTOPRAZOLE SODIUM 40 MG: 40 TABLET, DELAYED RELEASE ORAL at 06:30

## 2023-03-16 RX ADMIN — DOCUSATE SODIUM 100 MG: 100 CAPSULE ORAL at 21:47

## 2023-03-16 RX ADMIN — APIXABAN 5 MG: 5 TABLET, FILM COATED ORAL at 09:35

## 2023-03-16 RX ADMIN — Medication 5 MG: at 21:46

## 2023-03-16 RX ADMIN — ASPIRIN 81 MG: 81 TABLET, COATED ORAL at 09:37

## 2023-03-16 RX ADMIN — HYDRALAZINE HYDROCHLORIDE 100 MG: 50 TABLET, FILM COATED ORAL at 17:09

## 2023-03-16 RX ADMIN — ACETAMINOPHEN 650 MG: 325 TABLET ORAL at 03:30

## 2023-03-16 RX ADMIN — BUSPIRONE HYDROCHLORIDE 7.5 MG: 5 TABLET ORAL at 21:46

## 2023-03-16 RX ADMIN — ISOSORBIDE DINITRATE 20 MG: 20 TABLET ORAL at 09:36

## 2023-03-16 NOTE — PROGRESS NOTES
Rehabilitation Nursing  Inpatient Rehabilitation Plan of Care Note    Plan of Care  Body Function Structure    Skin Integrity (Active)  Current Status (3/10/2023 12:36:00 PM): Risk for skin breakdown  Weekly Goal: No skin breakdown  Discharge Goal: No skin breakdown    Safety    Potential for Injury (Active)  Current Status (3/10/2023 12:36:00 PM): Potential for falls  Weekly Goal: No falls  Discharge Goal: No fallsC    Signed by: Cony Weller Nurse

## 2023-03-16 NOTE — PROGRESS NOTES
Occupational Therapy:    Physical Therapy: Individual: 105 minutes.    Speech Language Pathology:    Signed by: Magdalena De Los Santos PTA

## 2023-03-16 NOTE — THERAPY TREATMENT NOTE
Inpatient Rehabilitation - Occupational Therapy Treatment Note     [facility]     Patient Name: Pati Cortes  : 1955  MRN: [id number]    Today's Date: 3/16/2023                 Admit Date: 3/10/2023       No diagnosis found.    Patient Active Problem List   Diagnosis   • Debility       Past Medical History:   Diagnosis Date   • Arthritis    • Diabetes mellitus (HCC)    • Stroke (HCC)        History reviewed. No pertinent surgical history.          IRF OT ASSESSMENT FLOWSHEET (last 12 hours)     IRF OT Evaluation and Treatment     Row Name 23 1348          OT Time and Intention    Document Type daily treatment  -HB     Mode of Treatment individual therapy;occupational therapy  -HB     Total Minutes, Occupational Therapy 45  -HB     Patient Effort adequate  -HB     Symptoms Noted During/After Treatment none  -HB     Row Name 23 1348          General Information    Patient Profile Reviewed yes  -HB     Patient/Family/Caregiver Comments/Observations Patient and RN okd OT this date.  -HB     General Observations of Patient Patient tolerated OT well with no adverse reactions.  -HB     Existing Precautions/Restrictions fall;oxygen therapy device and L/min  3 liters via NC  -HB     Row Name 23 1348          Cognition/Psychosocial    Affect/Mental Status (Cognition) anxious  -HB     Orientation Status (Cognition) oriented x 3  -HB     Follows Commands (Cognition) verbal cues/prompting required  -HB     Row Name 23 134          Upper Body Dressing    [place] Level (Upper Body Dressing) upper body dressing skills;front opening garment;minimum assist (75% or more patient effort);set up assistance  -HB     Position (Upper Body Dressing) supported sitting  -HB     Row Name 23 1346          Self-Feeding    [place] Level (Self-Feeding) feeding skills;set up  -HB     Position (Self-Feeding) supported sitting  -HB     Row Name 23 1348          Bed Mobility    Supine-Sit  [place] (Bed Mobility) supervision;nonverbal cues (demo/gesture);verbal cues  -HB     Row Name 03/16/23 1348          Bed-Chair Transfer    Bed-Chair [place] (Transfers) verbal cues;nonverbal cues (demo/gesture);contact guard  -HB     Assistive Device (Bed-Chair Transfers) wheelchair  -HB     Row Name 03/16/23 1348          Sit-Stand Transfer    Sit-Stand [place] (Transfers) verbal cues;nonverbal cues (demo/gesture);contact guard  -HB     Assistive Device (Sit-Stand Transfers) wheelchair  -HB     Row Name 03/16/23 1348          Stand-Sit Transfer    Stand-Sit [place] (Transfers) minimum assist (75% patient effort);nonverbal cues (demo/gesture);verbal cues  -     Assistive Device (Stand-Sit Transfers) wheelchair  -HB     Row Name 03/16/23 1348          Motor Skills    Motor Skills functional endurance;motor control/coordination interventions  -     Motor Control/Coordination Interventions fine motor manipulation/dexterity activities;therapeutic exercise/ROM  -     Therapeutic Exercise shoulder;elbow/forearm;wrist;hand  -HB     Additional Documentation --  hand gripper; BUE TA to increase ADL status/endurance. rest between tasks  -     Row Name 03/16/23 1348          Positioning and Restraints    Pre-Treatment Position in bed  -HB     Post Treatment Position wheelchair  -     In Bed with PT  -HB           User Key  (r) = Recorded By, (t) = Taken By, (c) = Cosigned By    Initials Name Effective Dates     Emily Daigle, GRAEME 05/25/21 -                  Occupational Therapy Education     Title: PT OT SLP Therapies (Done)     Topic: Occupational Therapy (Done)     Point: ADL training (Done)     Description:   Instruct learner(s) on proper safety adaptation and remediation techniques during self care or transfers.   Instruct in proper use of assistive devices.              Learning Progress Summary           Patient Acceptance, E, VU,NR by HB at 3/16/2023 6127    Acceptance, E,TB, VU by DL at  3/15/2023 1910    Acceptance, E, VU,NR by HB at 3/15/2023 1338    Acceptance, E,TB, VU by DG at 3/14/2023 2154    Acceptance, E,TB, VU by DG at 3/14/2023 2154    Acceptance, E, VU,NR by BF at 3/14/2023 1432    Acceptance, E,TB, VU by DG at 3/13/2023 2006    Acceptance, E,D, VU,NR by TM at 3/13/2023 1427                   Point: Precautions (Done)     Description:   Instruct learner(s) on prescribed precautions during self-care and functional transfers.              Learning Progress Summary           Patient Acceptance, E, VU,NR by HB at 3/16/2023 1357    Acceptance, E,TB, VU by DL at 3/15/2023 1910    Acceptance, E, VU,NR by HB at 3/15/2023 1338    Acceptance, E,TB, VU by DG at 3/14/2023 2154    Acceptance, E,TB, VU by DG at 3/14/2023 2154    Acceptance, E, VU,NR by BF at 3/14/2023 1432    Acceptance, E,TB, VU by DG at 3/13/2023 2006    Acceptance, E,D, VU,NR by TM at 3/13/2023 1427                               User Key     Initials Effective Dates Name Provider Type Discipline    DG 06/16/21 -  Shalonda English, RN Registered Nurse Nurse     06/16/21 -  Vanessa Vasquez OT Occupational Therapist OT    TM 06/16/21 -  Ros Doe OT Occupational Therapist OT    HB 05/25/21 -  Emily Daigle, OT Occupational Therapist OT    DL 03/16/22 -  Chrystal De Los Santos RN Registered Nurse Nurse                    OT Recommendation and Plan                         Time Calculation:      Time Calculation- OT     Row Name 03/16/23 1357             Time Calculation- OT    OT Start Time 1245  -HB      OT Stop Time 1330  -HB      OT Time Calculation (min) 45 min  -HB      Total Timed Code Minutes- OT 45 minute(s)  -HB      OT Non-Billable Time (min) 10 min  -HB            User Key  (r) = Recorded By, (t) = Taken By, (c) = Cosigned By    Initials Name Provider Type    HB Emily Daigle, OT Occupational Therapist              Therapy Charges for Today     Code Description Service Date Service Provider Modifiers Qty     11907397425 HC OT SELF CARE/MGMT/TRAIN EA 15 MIN 3/15/2023 Emily Daigle, OT GO 1    78229597601 HC OT THER PROC EA 15 MIN 3/15/2023 Emily Daigle OT GO 1    99594450735 HC OT THERAPEUTIC ACT EA 15 MIN 3/15/2023 Emily Daigle, OT GO 1    94776693493 HC OT SELF CARE/MGMT/TRAIN EA 15 MIN 3/16/2023 Emily Daigle OT GO 1    23766101596 HC OT THER PROC EA 15 MIN 3/16/2023 Emily Daigle OT GO 1    62984017160 HC OT THERAPEUTIC ACT EA 15 MIN 3/16/2023 Emily Daigle OT GO 1                   Emily Daigle OT  3/16/2023

## 2023-03-16 NOTE — PROGRESS NOTES
Nephrology Progress Note      Subjective     No chest pain or shortness of breath.     Objective       Vital signs :     Temp:  [98.6 °F (37 °C)] 98.6 °F (37 °C)  Heart Rate:  [60-80] 80  Resp:  [18] 18  BP: (160-180)/(62-88) 171/88    Intake/Output                             03/14/23 0701 - 03/15/23 0700 03/15/23 0701 - 03/16/23 0700 03/16/23 0701 - 03/17/23 0700     9459-0478 0275-1367 Total 1711-0115 2052-7874 Total 2455-4982 4581-2051 Total                    Intake    P.O.  720  120 840  840  120 960  240  -- 240    Total Intake 720 120 840 840 120 960 240 -- 240       Output    Urine  200  -- 200  --  200 200  --  -- --    Total Output 200 -- 200 -- 200 200 -- -- --           Physical Exam:    General Appearance : no chest pain or shortness of breath.   Lungs : clear to auscultation, respirations regular  Heart :  regular rhythm & normal rate, normal S1, S2 and no murmur, no rub  Abdomen : soft, non distended  Extremities : no edema,   Neurologic :   orientated to person, place, time and situation, Grossly no focal deficits        Laboratory Data :     Albumin Albumin   Date Value Ref Range Status   03/14/2023 2.5 (L) 3.5 - 5.2 g/dL Final      Magnesium No results found for: MG       PTH               No results found for: PTH    CBC and coagulation:  Results from last 7 days   Lab Units 03/15/23  0138 03/14/23  0136 03/12/23  0049   WBC 10*3/mm3 11.17* 11.36* 9.32   HEMOGLOBIN g/dL 8.2* 7.8* 8.0*   HEMATOCRIT % 27.2* 26.1* 26.1*   MCV fL 93.8 93.5 93.9   MCHC g/dL 30.1* 29.9* 30.7*   PLATELETS 10*3/mm3 506* 501* 487*     Acid/base balance:      Renal and electrolytes:    Results from last 7 days   Lab Units 03/15/23  0138 03/14/23  0136 03/12/23  0049 03/11/23 0136   SODIUM mmol/L 138 136 136 135*   POTASSIUM mmol/L 4.0 4.0 3.7 3.8   MAGNESIUM mg/dL  --   --   --  2.0   CHLORIDE mmol/L 100 99 97* 95*   CO2 mmol/L 27.0 27.6 29.0 29.1*   BUN mg/dL 53* 54* 51* 47*   CREATININE mg/dL 2.16* 2.39* 2.20* 2.01*    CALCIUM mg/dL 8.7 8.4* 8.9 9.2     Estimated Creatinine Clearance: 23.4 mL/min (A) (by C-G formula based on SCr of 2.16 mg/dL (H)).  @GFRCG:3@   Liver and pancreatic function:  Results from last 7 days   Lab Units 03/14/23  0136 03/11/23  0136   ALBUMIN g/dL 2.5* 2.5*   BILIRUBIN mg/dL 0.2 0.3   ALK PHOS U/L 113 106   AST (SGOT) U/L 11 12   ALT (SGPT) U/L 7 5         Cardiac:      Liver and pancreatic function:  Results from last 7 days   Lab Units 03/14/23  0136 03/11/23  0136   ALBUMIN g/dL 2.5* 2.5*   BILIRUBIN mg/dL 0.2 0.3   ALK PHOS U/L 113 106   AST (SGOT) U/L 11 12   ALT (SGPT) U/L 7 5       Medications :     amLODIPine, 10 mg, Oral, Q24H  apixaban, 5 mg, Oral, Q12H  aspirin, 81 mg, Oral, Daily  atorvastatin, 80 mg, Oral, Nightly  bumetanide, 4 mg, Oral, Daily  busPIRone, 7.5 mg, Oral, TID  cholecalciferol, 1,000 Units, Oral, Daily  cloNIDine, 0.3 mg, Per PEG Tube, Q8H  clopidogrel, 75 mg, Per PEG Tube, Daily  docusate sodium, 100 mg, Oral, BID  ferrous sulfate, 325 mg, Oral, BID With Meals  guaiFENesin, 600 mg, Oral, Q12H  hydrALAZINE, 100 mg, Oral, Q6H  isosorbide dinitrate, 20 mg, Oral, TID - Nitrates  melatonin, 5 mg, Oral, Nightly  metoprolol tartrate, 50 mg, Oral, Q8H  pantoprazole, 40 mg, Oral, BID AC  polyethylene glycol, 17 g, Oral, Daily  senna-docusate sodium, 2 tablet, Oral, BID  spironolactone, 50 mg, Oral, Daily             Assessment & Plan     1.  CKD stage IV  2.  Resolved acute tubular necrosis  3.  Personal history of acute dialysis  4.  Acute on chronic hypoxic respiratory failure  5.  Systolic plus diastolic CHF compensated  6.  Left ventricular ejection fraction 30%  7.  Coronary artery disease with history of stent  8.  Type 2 diabetes  9.  Anemia of acute on chronic disease with iron deficiency.  Ordered Ferrlecit 250 mg 3/12/2023  10.  Debility and deconditioning     Continue holding dialysis, uncontrolled BP, increase aldactone to 100mg        Micha Carey MD  03/16/23  10:25  EDT

## 2023-03-16 NOTE — PLAN OF CARE
Goal Outcome Evaluation:  Plan of Care Reviewed With: patient resting in bed no complaints at present, continues to participate in therapy. Will continue to follow.

## 2023-03-16 NOTE — PLAN OF CARE
Problem: Rehabilitation (IRF) Plan of Care  Goal: Patient-Specific Goal (Individualized)  Outcome: Ongoing, Progressing     Problem: Rehabilitation (IRF) Plan of Care  Goal: Absence of New-Onset Illness or Injury  Outcome: Ongoing, Progressing  Intervention: Prevent Fall and Fall Injury  Recent Flowsheet Documentation  Taken 3/16/2023 0755 by Cony Weller RN  Safety Promotion/Fall Prevention:   fall prevention program maintained   nonskid shoes/slippers when out of bed   safety round/check completed   Goal Outcome Evaluation:

## 2023-03-16 NOTE — THERAPY TREATMENT NOTE
"Inpatient Rehabilitation - Physical Therapy Treatment Note        [place]     Patient Name: Pati Cortes  : 1955  MRN: [id number]    Today's Date: 3/16/2023                    Admit Date: 3/10/2023      Visit Dx:   No diagnosis found.    Patient Active Problem List   Diagnosis   • Debility       Past Medical History:   Diagnosis Date   • Arthritis    • Diabetes mellitus (HCC)    • Stroke (HCC)        History reviewed. No pertinent surgical history.    PT ASSESSMENT (last 12 hours)     IRF PT Evaluation and Treatment     Row Name 23          PT Time and Intention    Document Type daily treatment  AM Session  -     Mode of Treatment individual therapy;physical therapy  -     Patient/Family/Caregiver Comments/Observations Checked on PT twice in AM before agreeing to work. PTA and tech in room attempting to get Pt OOB, required modivation for participation and extra time for supine to sit. Once Pt was sitting EOB became agitated with tech and yelled, \"Do not touch me ,\" when tech attemepted to assist Pt to stand with gt belt. Pt did apologize to tech and became slighlty more complaint. Pt walked from bed to BR with CGA/min A. Pt completed Sitting exercises up in chair once in PT gym. Pt requested to attempt stairs completed 3 steps with min A and both hand rails.  -     Row Name 23          General Information    Patient Profile Reviewed yes  -     Existing Precautions/Restrictions fall;oxygen therapy device and L/min  -     Row Name 23          Living Environment    Primary Care Provided by self;spouse/significant other  -     Row Name 23          Home Use of Assistive/Adaptive Equipment    Equipment Currently Used at Home bp cuff;glucometer;oxygen;rollator;commode  BSC  -     Row Name 23          Pain Assessment    Pretreatment Pain Rating 0/10 - no pain  -     Posttreatment Pain Rating 0/10 - no pain  -     Row Name 23    "       Cognition/Psychosocial    Affect/Mental Status (Cognition) anxious;emotionally labile  -     Orientation Status (Cognition) oriented x 3  -     Follows Commands (Cognition) verbal cues/prompting required;WFL  -     Personal Safety Interventions fall prevention program maintained;gait belt;nonskid shoes/slippers when out of bed;supervised activity  -     Cognitive Function WFL  -     Row Name 03/16/23 0927          Range of Motion Comprehensive    General Range of Motion no range of motion deficits identified  -     Row Name 03/16/23 0927          Strength Comprehensive (MMT)    General Manual Muscle Testing (MMT) Assessment lower extremity strength deficits identified  -     Row Name 03/16/23 0927          Mobility    Extremity Weight-bearing Status right lower extremity;left lower extremity  -     Left Lower Extremity (Weight-bearing Status) full weight-bearing (FWB)  -     Right Lower Extremity (Weight-bearing Status) full weight-bearing (FWB)  -     Row Name 03/16/23 0927          Bed Mobility    Bed Mobility supine-sit;sit-supine;scooting/bridging;rolling right;rolling left  -     Rolling Left [place] (Bed Mobility) standby assist  -     Rolling Right [place] (Bed Mobility) standby assist  -     Scooting/Bridging [place] (Bed Mobility) independent  -     Supine-Sit [place] (Bed Mobility) verbal cues;nonverbal cues (demo/gesture);modified independence  -     Sit-Supine [place] (Bed Mobility) modified independence;standby assist  -     Assistive Device (Bed Mobility) bed rails  -     Row Name 03/16/23 0927          Transfer Assessment/Treatment    Transfers bed-chair transfer;sit-stand transfer;stand-sit transfer;stand pivot/stand step transfer;chair-bed transfer;toilet transfer  -     Row Name 03/16/23 0927          Chair-Bed Transfer    Chair-Bed [place] (Transfers) minimum assist (75% patient effort);contact guard;nonverbal cues  (demo/gesture);verbal cues  -HC     Assistive Device (Chair-Bed Transfers) wheelchair;walker, front-wheeled  -HC     Row Name 03/16/23 0927          Sit-Stand Transfer    Sit-Stand [place] (Transfers) verbal cues;nonverbal cues (demo/gesture);minimum assist (75% patient effort);contact guard  -HC     Assistive Device (Sit-Stand Transfers) wheelchair  -HC     Row Name 03/16/23 0927          Stand-Sit Transfer    Stand-Sit [place] (Transfers) verbal cues;nonverbal cues (demo/gesture);minimum assist (75% patient effort);contact guard  -HC     Assistive Device (Stand-Sit Transfers) wheelchair  -HC     Row Name 03/16/23 0927          Stand Pivot/Stand Step Transfer    Stand Pivot/Stand Step [place] (Transfers) verbal cues;nonverbal cues (demo/gesture);minimum assist (75% patient effort);contact guard  -HC     Assistive Device (Stand Pivot Stand Step Transfer) wheelchair  -HC     Row Name 03/16/23 0927          Toilet Transfer    Type (Toilet Transfer) stand pivot/stand step  -HC     [place] Level (Toilet Transfer) minimum assist (75% patient effort);contact guard;verbal cues;nonverbal cues (demo/gesture)  -HC     Assistive Device (Toilet Transfer) commode;grab bars/safety frame;walker, front-wheeled  -HC     Row Name 03/16/23 0927          Gait/Stairs (Locomotion)    Gait/Stairs Locomotion gait/ambulation independence;gait/ambulation assistive device;gait pattern;distance ambulated;gait deviations  -     [place] Level (Gait) verbal cues;nonverbal cues (demo/gesture);contact guard;minimum assist (75% patient effort)  -HC     Assistive Device (Gait) walker, front-wheeled  -     Distance in Feet (Gait) 10' from bed to BR  -HC     Pattern (Gait) step-through  -HC     Deviations/Abnormal Patterns (Gait) gait speed decreased;steppage;stride length decreased  -HC     Handrail Location (Stairs) both sides  -HC     Number of Steps (Stairs) 3  -HC     Ascending Technique (Stairs) step-to-step  -HC      Descending Technique (Stairs) step-to-step  -     Stairs, Safety Issues balance decreased during turns  -[facility] Name 03/16/23 0927          Safety Issues, Functional Mobility    Impairments Affecting Function (Mobility) balance;endurance/activity tolerance;strength  -[facility] Name 03/16/23 0927          Balance    Comment, Balance Sitting 1#: AP, LAQ< March. Ball sq. GTB: HS curls, hip abd, march.  -[facility] Name 03/16/23 0927          Hip (Therapeutic Exercise)    Hip Strengthening (Therapeutic Exercise) bilateral;extension;flexion;aBduction;aDduction;marching while seated;sitting;1 lb free weight;resistance band;green  -[facility] Name 03/16/23 0927          Knee (Therapeutic Exercise)    Knee Strengthening (Therapeutic Exercise) bilateral;flexion;extension;marching while seated;LAQ (long arc quad);hamstring curls;sitting;1 lb free weight;resistance band;green  -[facility] Name 03/16/23 0927          Ankle (Therapeutic Exercise)    Ankle Strengthening (Therapeutic Exercise) bilateral;dorsiflexion;plantarflexion;1 lb free weight;sitting  -[facility] Name 03/16/23 0927          Positioning and Restraints    Pre-Treatment Position in bed  -     Post Treatment Position bed  -     In Bed fowlers;call light within reach;encouraged to call for assist;side rails up x2  -[facility] Name 03/16/23 0927          Therapy Assessment/Plan (PT)    Patient's Goals For Discharge return home  -[facility] Name 03/16/23 0927          Therapy Assessment/Plan (PT)    Rehab Potential/Prognosis (PT) good, to achieve stated therapy goals  -     Frequency of Treatment (PT) 5 times per week  -     Estimated Duration of Therapy (PT) 2 weeks;3 weeks  -     Problem List (PT) problems related to;balance;mobility;strength  functional endurance  -     Activity Limitations Related to Problem List (PT) unable to ambulate safely;unable to transfer safely;BADLs not performed adequately or safely  -[facility] Name 03/16/23 0927           Daily Progress Summary (PT)    Impairments Still Limiting Function (PT) functional activity tolerance impairment;pain;strength deficit  -     Row Name 03/16/23 0927          Therapy Plan Review/Discharge Plan (PT)    Anticipated Equipment Needs at Discharge (PT Eval) wheelchair  TBD  -     Anticipated Discharge Disposition (PT) home with assist;home with home health  -     Row Name 03/16/23 0927          IRF PT Goals    Bed Mobility Goal Selection (PT-IRF) bed mobility, PT goal 1  -HC     Transfer Goal Selection (PT-IRF) transfers, PT goal 1  -HC     Gait (Walking Locomotion) Goal Selection (PT-IRF) gait, PT goal 1  -     Row Name 03/16/23 0927          Bed Mobility Goal 1 (PT-IRF)    Activity/Assistive Device (Bed Mobility Goal 1, PT-IRF) rolling to left;rolling to right;scooting;sit to supine;supine to sit  -     [place] Level (Bed Mobility Goal 1, PT-IRF) independent  -HC     Time Frame (Bed Mobility Goal 1, PT-IRF) by discharge  -     Row Name 03/16/23 0927          Transfer Goal 1 (PT-IRF)    Activity/Assistive Device (Transfer Goal 1, PT-IRF) sit-to-stand/stand-to-sit;bed-to-chair/chair-to-bed;toilet  appropriate a.d.  -HC     [place] Level (Transfer Goal 1, PT-IRF) standby assist  -HC     Time Frame (Transfer Goal 1, PT-IRF) by discharge  -     Row Name 03/16/23 0927          Gait/Walking Locomotion Goal 1 (PT-IRF)    Activity/Assistive Device (Gait/Walking Locomotion Goal 1, PT-IRF) gait (walking locomotion);assistive device use;decrease fall risk;diminish gait deviation;improve balance and speed;increase endurance/gait distance  appropriate a.d.  -HC     Gait/Walking Locomotion Distance Goal 1 (PT-IRF) 150  -HC     [place] Level (Gait/Walking Locomotion Goal 1, PT-IRF) standby assist  -HC     Time Frame (Gait/Walking Locomotion Goal 1, PT-IRF) by discharge  -           User Key  (r) = Recorded By, (t) = Taken By, (c) = Cosigned By    Initials Name Provider Type      Radha Ruelas, LETTY Physical Therapist Assistant                 Physical Therapy Education     Title: PT OT SLP Therapies (Done)     Topic: Physical Therapy (Done)     Point: Mobility training (Done)     Learning Progress Summary           Patient Nonacceptance, E,TB, NR,VU by HC at 3/16/2023 0939    Acceptance, E,TB, VU by DL at 3/15/2023 1910    Acceptance, E,TB, VU by RM at 3/15/2023 1551    Acceptance, E,D, VU,NR by LL at 3/15/2023 1146    Acceptance, E,TB, VU by DG at 3/14/2023 2154    Acceptance, E,TB, VU by DG at 3/14/2023 2154    Acceptance, E,TB, VU,NR,DU by HC at 3/14/2023 1415    Acceptance, E,TB, VU by DG at 3/13/2023 2006    Acceptance, E,TB, VU,DU,NR by HC at 3/13/2023 1453    Acceptance, E,TB, VU by DG at 3/12/2023 2026    Acceptance, E,D, VU,NR by AG at 3/11/2023 1455                   Point: Home exercise program (Done)     Learning Progress Summary           Patient Nonacceptance, E,TB, NR,VU by HC at 3/16/2023 0939    Acceptance, E,TB, VU by DL at 3/15/2023 1910    Acceptance, E,TB, VU by RM at 3/15/2023 1551    Acceptance, E,D, VU,NR by LL at 3/15/2023 1146    Acceptance, E,TB, VU by DG at 3/14/2023 2154    Acceptance, E,TB, VU by DG at 3/14/2023 2154    Acceptance, E,TB, VU,NR,DU by HC at 3/14/2023 1415    Acceptance, E,TB, VU by DG at 3/13/2023 2006    Acceptance, E,TB, VU,DU,NR by HC at 3/13/2023 1453    Acceptance, E,TB, VU by DG at 3/12/2023 2026    Acceptance, E,D, VU,NR by AG at 3/11/2023 1455                   Point: Body mechanics (Done)     Learning Progress Summary           Patient Nonacceptance, E,TB, NR,VU by HC at 3/16/2023 0939    Acceptance, E,TB, VU by DL at 3/15/2023 1910    Acceptance, E,TB, VU by RM at 3/15/2023 1551    Acceptance, E,D, VU,NR by LL at 3/15/2023 1146    Acceptance, E,TB, VU by DG at 3/14/2023 2154    Acceptance, E,TB, VU by DG at 3/14/2023 2154    Acceptance, E,TB, VU,NR,DU by HC at 3/14/2023 1415    Acceptance, E,TB, VU by DG at 3/13/2023 2006     Acceptance, E,TB, VU,DU,NR by HC at 3/13/2023 1453    Acceptance, E,TB, VU by DG at 3/12/2023 2026    Acceptance, E,D, VU,NR by AG at 3/11/2023 1455                   Point: Precautions (Done)     Learning Progress Summary           Patient Nonacceptance, E,TB, NR,VU by HC at 3/16/2023 0939    Acceptance, E,TB, VU by DL at 3/15/2023 1910    Acceptance, E,TB, VU by RM at 3/15/2023 1551    Acceptance, E,D, VU,NR by LL at 3/15/2023 1146    Acceptance, E,TB, VU by DG at 3/14/2023 2154    Acceptance, E,TB, VU by DG at 3/14/2023 2154    Acceptance, E,TB, VU,NR,DU by HC at 3/14/2023 1415    Acceptance, E,TB, VU by DG at 3/13/2023 2006    Acceptance, E,TB, VU,DU,NR by HC at 3/13/2023 1453    Acceptance, E,TB, VU by DG at 3/12/2023 2026    Acceptance, E,D, VU,NR by AG at 3/11/2023 1455                               User Key     Initials Effective Dates Name Provider Type Discipline    DG 06/16/21 -  Shalonda English, RN Registered Nurse Nurse     06/16/21 -  Andreina Daigle, PT Physical Therapist PT    LL 05/02/16 -  Magdalena De Los Santos, LETTY Physical Therapist Assistant PT    RM 02/17/23 -  Celine Piper, PTA Physical Therapist Assistant PT    HC 01/20/23 -  Radha Ruelas PTA Physical Therapist Assistant PT    DL 03/16/22 -  Chrystal De Los Santos, RN Registered Nurse Nurse                PT Recommendation and Plan    Frequency of Treatment (PT): 5 times per week  Anticipated Equipment Needs at Discharge (PT Eval): wheelchair (TBD)  Daily Progress Summary (PT)  Impairments Still Limiting Function (PT): functional activity tolerance impairment, pain, strength deficit               Time Calculation:      PT Charges     Row Name 03/16/23 0939             Time Calculation    Start Time 0815  -HC      Stop Time 0915  -HC      Time Calculation (min) 60 min  -HC      PT Received On 03/16/23  -HC         Time Calculation- PT    Total Timed Code Minutes- PT 60 minute(s)  -HC            User Key  (r) = Recorded By, (t) = Taken  By, (c) = Cosigned By    Initials Name Provider Type     Radha Ruelas, LETTY Physical Therapist Assistant                Therapy Charges for Today     Code Description Service Date Service Provider Modifiers Qty    69280962357 HC PT THER PROC EA 15 MIN 3/16/2023 Radha Ruelas PTA GP, CQ 2    73919462408 HC PT THERAPEUTIC ACT EA 15 MIN 3/16/2023 Radha Ruelas PTA GP, CQ 2                   Radha Ruelas PTA  3/16/2023

## 2023-03-16 NOTE — PROGRESS NOTES
"Patient was evaluated, she states she is breathing okay, she was complaining of nasal congestion, she does have humidified oxygen, she would like\" nose spray\".  I have added Flonase.  She remains hypertensive, I did discuss with nephrology about evaluating her antihypertensives to see what he would recommend to add next.  She is already on high-dose hydralazine, amlodipine, Bumex, Catapres, metoprolol, and Aldactone.  He did increase Aldactone 100 mg today  "

## 2023-03-16 NOTE — PROGRESS NOTES
"Nursing informed me that the patient was having some \"soreness\" with breathing.  I went in to see the patient and she was mildly tachypneic but she states it feels like when she has had pleurisy before.  She states she because of nasal congestion has been breathing harder through her mouth, chest wall is mildly sore at the costochondral junction on the left.  Lungs are diminished at the bases but clear anteriorly.  Heart regular rate and rhythm.  She has had some known pleural effusions.  She is on Bumex 4 mg a day as well as Aldactone for diuretics.  Checking chest x-ray, EKG and troponin.  Pulmonary embolus would be very low likelihood considering she is fully anticoagulated on Eliquis.  "

## 2023-03-16 NOTE — THERAPY TREATMENT NOTE
Inpatient Rehabilitation - Occupational Therapy Treatment Note     [place]     Patient Name: Pati Cortes  : 1955  MRN: [id number]    Today's Date: 3/16/2023                 Admit Date: 3/10/2023       No diagnosis found.    Patient Active Problem List   Diagnosis   • Debility       Past Medical History:   Diagnosis Date   • Arthritis    • Diabetes mellitus (HCC)    • Stroke (HCC)        History reviewed. No pertinent surgical history.          IRF OT ASSESSMENT FLOWSHEET (last 12 hours)     IRF OT Evaluation and Treatment     Row Name 23 1500 23 1348       OT Time and Intention    Document Type daily treatment  - daily treatment  -HB    Mode of Treatment individual therapy;occupational therapy  -AH individual therapy;occupational therapy  -HB    Total Minutes, Occupational Therapy -- 45  -HB    Patient Effort adequate  -AH adequate  -HB    Symptoms Noted During/After Treatment -- none  -HB    Row Name 23 1500 23 1348       General Information    Patient Profile Reviewed -- yes  -HB    Patient/Family/Caregiver Comments/Observations patient agreeable to therapy. patient seen bedside due to complaints of not feeling well this am.  - Patient and RN okd OT this date.  -HB    General Observations of Patient -- Patient tolerated OT well with no adverse reactions.  -    Existing Precautions/Restrictions fall;oxygen therapy device and L/min  - fall;oxygen therapy device and L/min  3 liters via NC  -HB    Row Name 23 1500 23 1348       Cognition/Psychosocial    Affect/Mental Status (Cognition) WFL  - anxious  -HB    Orientation Status (Cognition) -- oriented x 3  -HB    Follows Commands (Cognition) -- verbal cues/prompting required  -    Row Name 23 1348          Upper Body Dressing    [place] Level (Upper Body Dressing) upper body dressing skills;front opening garment;minimum assist (75% or more patient effort);set up assistance  -HB     Position (Upper  Body Dressing) supported sitting  -     Row Name 03/16/23 1348          Self-Feeding    [place] Level (Self-Feeding) feeding skills;set up  -HB     Position (Self-Feeding) supported sitting  -HB     Row Name 03/16/23 1348          Bed Mobility    Supine-Sit [place] (Bed Mobility) supervision;nonverbal cues (demo/gesture);verbal cues  -     Row Name 03/16/23 1348          Bed-Chair Transfer    Bed-Chair [place] (Transfers) verbal cues;nonverbal cues (demo/gesture);contact guard  -HB     Assistive Device (Bed-Chair Transfers) wheelchair  -HB     Row Name 03/16/23 1348          Sit-Stand Transfer    Sit-Stand [place] (Transfers) verbal cues;nonverbal cues (demo/gesture);contact guard  -HB     Assistive Device (Sit-Stand Transfers) wheelchair  -HB     Row Name 03/16/23 1348          Stand-Sit Transfer    Stand-Sit [place] (Transfers) minimum assist (75% patient effort);nonverbal cues (demo/gesture);verbal cues  -     Assistive Device (Stand-Sit Transfers) wheelchair  -HB     Row Name 03/16/23 1500 03/16/23 1348       Motor Skills    Motor Skills coordination;functional endurance  - functional endurance;motor control/coordination interventions  -    Motor Control/Coordination Interventions -- fine motor manipulation/dexterity activities;therapeutic exercise/ROM  -    Therapeutic Exercise shoulder;elbow/forearm;wrist;hand  BUE ROM, reaching fmc, strength  - shoulder;elbow/forearm;wrist;hand  -HB    Additional Documentation -- --  hand gripper; BUE TA to increase ADL status/endurance. rest between tasks  -    Row Name 03/16/23 1500 03/16/23 1348       Positioning and Restraints    Pre-Treatment Position -- in bed  -HB    Post Treatment Position bed  - wheelchair  -    In Bed supine;call light within reach;encouraged to call for assist  - with PT  -HB          User Key  (r) = Recorded By, (t) = Taken By, (c) = Cosigned By    Initials Name Effective Dates    Gail Roca  GRAEME Dickey 06/16/21 -     HB Emily Daigle OT 05/25/21 -                  Occupational Therapy Education     Title: PT OT SLP Therapies (Done)     Topic: Occupational Therapy (Done)     Point: ADL training (Done)     Description:   Instruct learner(s) on proper safety adaptation and remediation techniques during self care or transfers.   Instruct in proper use of assistive devices.              Learning Progress Summary           Patient Acceptance, E, VU,NR by HB at 3/16/2023 1357    Acceptance, E,TB, VU by DL at 3/15/2023 1910    Acceptance, E, VU,NR by HB at 3/15/2023 1338    Acceptance, E,TB, VU by DG at 3/14/2023 2154    Acceptance, E,TB, VU by DG at 3/14/2023 2154    Acceptance, E, VU,NR by BF at 3/14/2023 1432    Acceptance, E,TB, VU by DG at 3/13/2023 2006    Acceptance, E,D, VU,NR by TM at 3/13/2023 1427                   Point: Precautions (Done)     Description:   Instruct learner(s) on prescribed precautions during self-care and functional transfers.              Learning Progress Summary           Patient Acceptance, E, VU,NR by HB at 3/16/2023 1357    Acceptance, E,TB, VU by DL at 3/15/2023 1910    Acceptance, E, VU,NR by HB at 3/15/2023 1338    Acceptance, E,TB, VU by DG at 3/14/2023 2154    Acceptance, E,TB, VU by DG at 3/14/2023 2154    Acceptance, E, VU,NR by BF at 3/14/2023 1432    Acceptance, E,TB, VU by DG at 3/13/2023 2006    Acceptance, E,D, VU,NR by TM at 3/13/2023 1427                               User Key     Initials Effective Dates Name Provider Type Discipline    DG 06/16/21 -  Shalonda English, RN Registered Nurse Nurse    BF 06/16/21 -  Vanessa Vaqsuez OT Occupational Therapist OT    TM 06/16/21 -  Ros Doe OT Occupational Therapist OT    HB 05/25/21 -  Emily Daigle OT Occupational Therapist OT    DL 03/16/22 -  De Los Santos, Chrystal, RN Registered Nurse Nurse                    OT Recommendation and Plan    Planned Therapy Interventions (OT): activity tolerance  training, adaptive equipment training, BADL retraining, patient/caregiver education/training, ROM/therapeutic exercise, strengthening exercise                    Time Calculation:      Time Calculation- OT     Row Name 03/16/23 1517 03/16/23 1357          Time Calculation- OT    OT Start Time 1045  - 1245  -     OT Stop Time 1130  - 1330  -HB     OT Time Calculation (min) 45 min  - 45 min  -     Total Timed Code Minutes- OT -- 45 minute(s)  -HB     OT Non-Billable Time (min) -- 10 min  -HB           User Key  (r) = Recorded By, (t) = Taken By, (c) = Cosigned By    Initials Name Provider Type     Gail Xie, OT Occupational Therapist     Emily Daigle OT Occupational Therapist              Therapy Charges for Today     Code Description Service Date Service Provider Modifiers Qty    10288199090 HC OT SELF CARE/MGMT/TRAIN EA 15 MIN 3/15/2023 Gail Xie, OT GO 1    03622957938 HC OT THERAPEUTIC ACT EA 15 MIN 3/15/2023 Gail Xie, OT GO 1    09110405825 HC OT THER PROC EA 15 MIN 3/15/2023 Gail Xie, OT GO 1    90210380321 HC OT THERAPEUTIC ACT EA 15 MIN 3/16/2023 Gail Xie, OT GO 1    62302299878 HC OT THER PROC EA 15 MIN 3/16/2023 Gail Xie, OT GO 2                   Gail Xie OT  3/16/2023

## 2023-03-16 NOTE — THERAPY TREATMENT NOTE
"Inpatient Rehabilitation - Physical Therapy Treatment Note        [place]     Patient Name: Pati Cortes  : 1955  MRN: [id number]    Today's Date: 3/16/2023                    Admit Date: 3/10/2023      Visit Dx:   No diagnosis found.    Patient Active Problem List   Diagnosis   • Debility       Past Medical History:   Diagnosis Date   • Arthritis    • Diabetes mellitus (HCC)    • Stroke (HCC)        History reviewed. No pertinent surgical history.    PT ASSESSMENT (last 12 hours)     IRF PT Evaluation and Treatment     Row Name 23 1438 23 0927       PT Time and Intention    Document Type daily treatment  PM Session  -LL daily treatment  AM Session  -HC    Mode of Treatment individual therapy;physical therapy  -LL individual therapy;physical therapy  -HC    Patient/Family/Caregiver Comments/Observations Patient and RN agreeable for participation with PT in PM.  Patient c.o not feeling very well and was not very motivated for participation with therapy in PM.  -LL Checked on PT twice in AM before agreeing to work. PTA and tech in room attempting to get Pt OOB, required modivation for participation and extra time for supine to sit. Once Pt was sitting EOB became agitated with tech and yelled, \"Do not touch me ,\" when tech attemepted to assist Pt to stand with gt belt. Pt did apologize to tech and became slighlty more complaint. Pt walked from bed to BR with CGA/min A. Pt completed Sitting exercises up in chair once in PT gym. Pt requested to attempt stairs completed 3 steps with min A and both hand rails.  -HC    Row Name 23 1438 23 0927       General Information    Patient Profile Reviewed yes  -LL yes  -HC    Existing Precautions/Restrictions fall;oxygen therapy device and L/min  -LL fall;oxygen therapy device and L/min  -HC    Row Name 23 1438 23 0927       Living Environment    Primary Care Provided by self;spouse/significant other  -LL self;spouse/significant other  " -    Row Name 03/16/23 1438 03/16/23 0927       Home Use of Assistive/Adaptive Equipment    Equipment Currently Used at Home bp cuff;glucometer;oxygen;rollator;commode  BSC  - bp cuff;glucometer;oxygen;rollator;commode  BSC  -    Row Name 03/16/23 1438 03/16/23 0927       Pain Assessment    Pretreatment Pain Rating 0/10 - no pain  -LL 0/10 - no pain  -HC    Posttreatment Pain Rating 0/10 - no pain  -LL 0/10 - no pain  -    Row Name 03/16/23 1438 03/16/23 0927       Cognition/Psychosocial    Affect/Mental Status (Cognition) anxious;emotionally labile  -LL anxious;emotionally labile  -HC    Orientation Status (Cognition) oriented x 3  -LL oriented x 3  -HC    Follows Commands (Cognition) verbal cues/prompting required;WFL  -LL verbal cues/prompting required;WFL  -HC    Personal Safety Interventions fall prevention program maintained;gait belt;nonskid shoes/slippers when out of bed;supervised activity  -LL fall prevention program maintained;gait belt;nonskid shoes/slippers when out of bed;supervised activity  -HC    Cognitive Function WFL  -LL WFL  -HC    Row Name 03/16/23 1438 03/16/23 0927       Range of Motion Comprehensive    General Range of Motion no range of motion deficits identified  -LL no range of motion deficits identified  -    Row Name 03/16/23 1438 03/16/23 0927       Strength Comprehensive (MMT)    General Manual Muscle Testing (MMT) Assessment lower extremity strength deficits identified  -LL lower extremity strength deficits identified  -    Row Name 03/16/23 1438 03/16/23 0927       Mobility    Extremity Weight-bearing Status right lower extremity;left lower extremity  -LL right lower extremity;left lower extremity  -HC    Left Lower Extremity (Weight-bearing Status) full weight-bearing (FWB)  -LL full weight-bearing (FWB)  -HC    Right Lower Extremity (Weight-bearing Status) full weight-bearing (FWB)  -LL full weight-bearing (FWB)  -    Row Name 03/16/23 1438 03/16/23 0927       Bed  Mobility    Bed Mobility -- supine-sit;sit-supine;scooting/bridging;rolling right;rolling left  -HC    Rolling Left [place] (Bed Mobility) -- standby assist  -HC    Rolling Right [place] (Bed Mobility) -- standby assist  -HC    Scooting/Bridging [place] (Bed Mobility) -- independent  -HC    Supine-Sit [place] (Bed Mobility) -- verbal cues;nonverbal cues (demo/gesture);modified independence  -HC    Sit-Supine [place] (Bed Mobility) -- modified independence;standby assist  -HC    Assistive Device (Bed Mobility) -- bed rails  -    Comment, (Bed Mobility) Not observed this date - patient [facility]  - --    Row Name 03/16/23 1438 03/16/23 0927       Transfer Assessment/Treatment    Transfers bed-chair transfer;sit-stand transfer;stand-sit transfer;stand pivot/stand step transfer;chair-bed transfer;toilet transfer  - bed-chair transfer;sit-stand transfer;stand-sit transfer;stand pivot/stand step transfer;chair-bed transfer;toilet transfer  -    Row Name 03/16/23 0927          Chair-Bed Transfer    Chair-Bed [place] (Transfers) minimum assist (75% patient effort);contact guard;nonverbal cues (demo/gesture);verbal cues  -     Assistive Device (Chair-Bed Transfers) wheelchair;walker, front-wheeled  -     Row Name 03/16/23 1438 03/16/23 0927       Sit-Stand Transfer    Sit-Stand [place] (Transfers) verbal cues;nonverbal cues (demo/gesture);contact guard  - verbal cues;nonverbal cues (demo/gesture);minimum assist (75% patient effort);contact guard  -    Assistive Device (Sit-Stand Transfers) wheelchair  -LL wheelchair  -    Comment, (Sit-Stand Transfer) x 3  -LL --    Row Name 03/16/23 1438 03/16/23 0927       Stand-Sit Transfer    Stand-Sit [place] (Transfers) verbal cues;nonverbal cues (demo/gesture);minimum assist (75% patient effort);contact guard  -LL verbal cues;nonverbal cues (demo/gesture);minimum assist (75% patient effort);contact guard  -HC    Assistive Device  (Stand-Sit Transfers) wheelchair  -LL wheelchair  -HC    Comment, (Stand-Sit Transfer) x 3  -LL --    Row Name 03/16/23 1438 03/16/23 0927       Stand Pivot/Stand Step Transfer    Stand Pivot/Stand Step [place] (Transfers) verbal cues;nonverbal cues (demo/gesture);minimum assist (75% patient effort);contact guard  -LL verbal cues;nonverbal cues (demo/gesture);minimum assist (75% patient effort);contact guard  -    Assistive Device (Stand Pivot Stand Step Transfer) wheelchair  -LL wheelchair  -HC    Row Name 03/16/23 0927          Toilet Transfer    Type (Toilet Transfer) stand pivot/stand step  -HC     [place] Level (Toilet Transfer) minimum assist (75% patient effort);contact guard;verbal cues;nonverbal cues (demo/gesture)  -HC     Assistive Device (Toilet Transfer) commode;grab bars/safety frame;walker, front-wheeled  -HC     Row Name 03/16/23 1438 03/16/23 0927       Gait/Stairs (Locomotion)    Gait/Stairs Locomotion gait/ambulation independence;gait/ambulation assistive device;gait pattern;distance ambulated;gait deviations  - gait/ambulation independence;gait/ambulation assistive device;gait pattern;distance ambulated;gait deviations  -    [place] Level (Gait) verbal cues;nonverbal cues (demo/gesture);contact guard  -LL verbal cues;nonverbal cues (demo/gesture);contact guard;minimum assist (75% patient effort)  -    Assistive Device (Gait) walker, front-wheeled  -LL walker, front-wheeled  -    Distance in Feet (Gait) 40' x 2  -LL 10' from bed to BR  -HC    Pattern (Gait) step-through  -LL step-through  -HC    Deviations/Abnormal Patterns (Gait) gait speed decreased;steppage;stride length decreased  -LL gait speed decreased;steppage;stride length decreased  -HC    Handrail Location (Stairs) -- both sides  -HC    Number of Steps (Stairs) -- 3  -HC    Ascending Technique (Stairs) -- step-to-step  -HC    Descending Technique (Stairs) -- step-to-step  -HC    Stairs, Safety Issues -- balance  decreased during turns  -    Row Name 03/16/23 1438 03/16/23 0927       Safety Issues, Functional Mobility    Impairments Affecting Function (Mobility) balance;endurance/activity tolerance;strength  -LL balance;endurance/activity tolerance;strength  -[facility] Name 03/16/23 1438 03/16/23 0927       Balance    Comment, Balance Unsupported sitting 2# ball: chest press, biceps curl  -LL Sitting 1#: AP, LAQ< March. Ball sq. GTB: HS curls, hip abd, march.  -[facility] Name 03/16/23 0927          Hip (Therapeutic Exercise)    Hip Strengthening (Therapeutic Exercise) bilateral;extension;flexion;aBduction;aDduction;marching while seated;sitting;1 lb free weight;resistance band;green  -[facility] Name 03/16/23 0927          Knee (Therapeutic Exercise)    Knee Strengthening (Therapeutic Exercise) bilateral;flexion;extension;marching while seated;LAQ (long arc quad);hamstring curls;sitting;1 lb free weight;resistance band;green  -[facility] Name 03/16/23 0927          Ankle (Therapeutic Exercise)    Ankle Strengthening (Therapeutic Exercise) bilateral;dorsiflexion;plantarflexion;1 lb free weight;sitting  -[facility] Name 03/16/23 143 03/16/23 0927       Positioning and Restraints    Pre-Treatment Position --  WC w/ OT  -LL in bed  -HC    Post Treatment Position wheelchair  -LL bed  -HC    In Bed -- fowlers;call light within reach;encouraged to call for assist;side rails up x2  -HC    In Wheelchair sitting;legs elevated  -LL --    [facility] Name 03/16/23 1438 03/16/23 0927       Therapy Assessment/Plan (PT)    Patient's Goals For Discharge return home  -LL return home  -[facility] Name 03/16/23 1438 03/16/23 0927       Therapy Assessment/Plan (PT)    Rehab Potential/Prognosis (PT) good, to achieve stated therapy goals  -LL good, to achieve stated therapy goals  -HC    Frequency of Treatment (PT) 5 times per week  -LL 5 times per week  -HC    Estimated Duration of Therapy (PT) 2 weeks;3 weeks  -LL 2 weeks;3 weeks  -HC    Problem List  (PT) problems related to;balance;mobility;strength  functional endurance  - problems related to;balance;mobility;strength  functional endurance  -    Activity Limitations Related to Problem List (PT) unable to ambulate safely;unable to transfer safely;BADLs not performed adequately or safely  -LL unable to ambulate safely;unable to transfer safely;BADLs not performed adequately or safely  -    Row Name 03/16/23 1438 03/16/23 0927       Daily Progress Summary (PT)    Impairments Still Limiting Function (PT) functional activity tolerance impairment;pain;strength deficit  -LL functional activity tolerance impairment;pain;strength deficit  -HC    Row Name 03/16/23 1438 03/16/23 0927       Therapy Plan Review/Discharge Plan (PT)    Anticipated Equipment Needs at Discharge (PT Eval) wheelchair  TBD  -LL wheelchair  TBD  -HC    Anticipated Discharge Disposition (PT) home with assist;home with home health  -LL home with assist;home with home health  -HC    Row Name 03/16/23 1438 03/16/23 0927       IRF PT Goals    Bed Mobility Goal Selection (PT-IRF) bed mobility, PT goal 1  -LL bed mobility, PT goal 1  -HC    Transfer Goal Selection (PT-IRF) transfers, PT goal 1  -LL transfers, PT goal 1  -HC    Gait (Walking Locomotion) Goal Selection (PT-IRF) gait, PT goal 1  -LL gait, PT goal 1  -HC    Row Name 03/16/23 1438 03/16/23 0927       Bed Mobility Goal 1 (PT-IRF)    Activity/Assistive Device (Bed Mobility Goal 1, PT-IRF) rolling to left;rolling to right;scooting;sit to supine;supine to sit  -LL rolling to left;rolling to right;scooting;sit to supine;supine to sit  -HC    [place] Level (Bed Mobility Goal 1, PT-IRF) independent  -LL independent  -HC    Time Frame (Bed Mobility Goal 1, PT-IRF) by discharge  -LL by discharge  -    Row Name 03/16/23 1438 03/16/23 0927       Transfer Goal 1 (PT-IRF)    Activity/Assistive Device (Transfer Goal 1, PT-IRF) sit-to-stand/stand-to-sit;bed-to-chair/chair-to-bed;toilet   appropriate a.d.  -LL sit-to-stand/stand-to-sit;bed-to-chair/chair-to-bed;toilet  appropriate a.d.  -HC    [place] Level (Transfer Goal 1, PT-IRF) standby assist  -LL standby assist  -HC    Time Frame (Transfer Goal 1, PT-IRF) by discharge  - by discharge  -    Row Name 03/16/23 1438 03/16/23 0927       Gait/Walking Locomotion Goal 1 (PT-IRF)    Activity/Assistive Device (Gait/Walking Locomotion Goal 1, PT-IRF) gait (walking locomotion);assistive device use;decrease fall risk;diminish gait deviation;improve balance and speed;increase endurance/gait distance  appropriate a.d.  -LL gait (walking locomotion);assistive device use;decrease fall risk;diminish gait deviation;improve balance and speed;increase endurance/gait distance  appropriate a.d.  -HC    Gait/Walking Locomotion Distance Goal 1 (PT-IRF) 150  -  -HC    [place] Level (Gait/Walking Locomotion Goal 1, PT-IRF) standby assist  -LL standby assist  -HC    Time Frame (Gait/Walking Locomotion Goal 1, PT-IRF) by discharge  - by discharge  -          User Key  (r) = Recorded By, (t) = Taken By, (c) = Cosigned By    Initials Name Provider Type     Magdalena De Los Santos PTA Physical Therapist Assistant    Radha Avalos PTA Physical Therapist Assistant                 Physical Therapy Education     Title: PT OT SLP Therapies (Done)     Topic: Physical Therapy (Done)     Point: Mobility training (Done)     Learning Progress Summary           Patient Acceptance, E,D, VU,NR by  at 3/16/2023 1451    Nonacceptance, E,TB, NR,VU by  at 3/16/2023 0939    Acceptance, E,TB, VU by DL at 3/15/2023 1910    Acceptance, E,TB, VU by  at 3/15/2023 1551    Acceptance, E,D, VU,NR by LL at 3/15/2023 1146    Acceptance, E,TB, VU by DG at 3/14/2023 2154    Acceptance, E,TB, VU by DG at 3/14/2023 2154    Acceptance, E,TB, VU,NR,DU by HC at 3/14/2023 1415    Acceptance, E,TB, VU by DG at 3/13/2023 2006    Acceptance, E,TB, KELVIN,DU,NR by HC at 3/13/2023  1453    Acceptance, E,TB, VU by DG at 3/12/2023 2026    Acceptance, E,D, VU,NR by AG at 3/11/2023 1455                   Point: Home exercise program (Done)     Learning Progress Summary           Patient Acceptance, E,D, VU,NR by LL at 3/16/2023 1451    Nonacceptance, E,TB, NR,VU by HC at 3/16/2023 0939    Acceptance, E,TB, VU by DL at 3/15/2023 1910    Acceptance, E,TB, VU by RM at 3/15/2023 1551    Acceptance, E,D, VU,NR by LL at 3/15/2023 1146    Acceptance, E,TB, VU by DG at 3/14/2023 2154    Acceptance, E,TB, VU by DG at 3/14/2023 2154    Acceptance, E,TB, VU,NR,DU by HC at 3/14/2023 1415    Acceptance, E,TB, VU by DG at 3/13/2023 2006    Acceptance, E,TB, VU,DU,NR by HC at 3/13/2023 1453    Acceptance, E,TB, VU by DG at 3/12/2023 2026    Acceptance, E,D, VU,NR by AG at 3/11/2023 1455                   Point: Body mechanics (Done)     Learning Progress Summary           Patient Acceptance, E,D, VU,NR by LL at 3/16/2023 1451    Nonacceptance, E,TB, NR,VU by HC at 3/16/2023 0939    Acceptance, E,TB, VU by DL at 3/15/2023 1910    Acceptance, E,TB, VU by RM at 3/15/2023 1551    Acceptance, E,D, VU,NR by LL at 3/15/2023 1146    Acceptance, E,TB, VU by DG at 3/14/2023 2154    Acceptance, E,TB, VU by DG at 3/14/2023 2154    Acceptance, E,TB, VU,NR,DU by HC at 3/14/2023 1415    Acceptance, E,TB, VU by DG at 3/13/2023 2006    Acceptance, E,TB, VU,DU,NR by HC at 3/13/2023 1453    Acceptance, E,TB, VU by DG at 3/12/2023 2026    Acceptance, E,D, VU,NR by AG at 3/11/2023 1455                   Point: Precautions (Done)     Learning Progress Summary           Patient Acceptance, E,D, VU,NR by LL at 3/16/2023 1451    Nonacceptance, E,TB, NR,VU by HC at 3/16/2023 0939    Acceptance, E,TB, VU by DL at 3/15/2023 1910    Acceptance, E,TB, VU by RM at 3/15/2023 1551    Acceptance, E,D, VU,NR by LL at 3/15/2023 1146    Acceptance, E,TB, VU by DG at 3/14/2023 2154    Acceptance, E,TB, VU by DG at 3/14/2023 2154    Acceptance, E,TB,  VU,NR,DU by  at 3/14/2023 1415    Acceptance, E,TB, VU by  at 3/13/2023 2006    Acceptance, E,TB, VU,DU,NR by  at 3/13/2023 1453    Acceptance, E,TB, VU by  at 3/12/2023 2026    Acceptance, E,D, VU,NR by  at 3/11/2023 1455                               User Key     Initials Effective Dates Name Provider Type Discipline     06/16/21 -  Shalonda English, RN Registered Nurse Nurse     06/16/21 -  Andreina Daigle, PT Physical Therapist PT    LL 05/02/16 -  Magdalena De Los Santos, LETTY Physical Therapist Assistant PT     02/17/23 -  Celine Piper, PTA Physical Therapist Assistant PT     01/20/23 -  Radha Ruelas PTA Physical Therapist Assistant PT    DL 03/16/22 -  Chrystal De Los Santos RN Registered Nurse Nurse                PT Recommendation and Plan    Frequency of Treatment (PT): 5 times per week  Anticipated Equipment Needs at Discharge (PT Eval): wheelchair (TBD)  Daily Progress Summary (PT)  Impairments Still Limiting Function (PT): functional activity tolerance impairment, pain, strength deficit               Time Calculation:      PT Charges     Row Name 03/16/23 1451 03/16/23 0939          Time Calculation    Start Time 1330  -LL 0815  -     Stop Time 1415  -LL 0915  -     Time Calculation (min) 45 min  -LL 60 min  -     PT Received On -- 03/16/23  -        Time Calculation- PT    Total Timed Code Minutes- PT 45 minute(s)  -LL 60 minute(s)  -           User Key  (r) = Recorded By, (t) = Taken By, (c) = Cosigned By    Initials Name Provider Type     Magdalena De Los Santos, LETTY Physical Therapist Assistant     Radha Ruelas PTA Physical Therapist Assistant                Therapy Charges for Today     Code Description Service Date Service Provider Modifiers Qty    97276864105  PT THERAPEUTIC ACT EA 15 MIN 3/15/2023 Magdalena De Los Santos PTA GP, CQ 3    56592125950 HC GAIT TRAINING EA 15 MIN 3/16/2023 Magdalena De Los Santos PTA GP, CQ 2    [id number]  PT THERAPEUTIC ACT EA 15 MIN  3/16/2023 Magdalena De Los Santos, LETTY GP, CQ 1                   Magdalena. LETTY De Los Santos  3/16/2023

## 2023-03-16 NOTE — NURSING NOTE
Patient called out request Tylenol for HA, B/P improved from 180/77 HR 65.  B/P now 171/79. Will continue to monitor.

## 2023-03-17 ENCOUNTER — APPOINTMENT (OUTPATIENT)
Dept: NUCLEAR MEDICINE | Facility: HOSPITAL | Age: 68
DRG: 280 | End: 2023-03-17
Payer: MEDICARE

## 2023-03-17 ENCOUNTER — APPOINTMENT (OUTPATIENT)
Dept: CARDIOLOGY | Facility: HOSPITAL | Age: 68
DRG: 280 | End: 2023-03-17
Payer: MEDICARE

## 2023-03-17 LAB
APTT PPP: 41.9 SECONDS (ref 26.5–34.5)
APTT PPP: 45.5 SECONDS (ref 26.5–34.5)
APTT PPP: 50.1 SECONDS (ref 26.5–34.5)
BH CV NUCLEAR PRIOR STUDY: 3
BH CV REST NUCLEAR ISOTOPE DOSE: 9.5 MCI
BH CV STRESS BP STAGE 1: NORMAL
BH CV STRESS COMMENTS STAGE 1: NORMAL
BH CV STRESS DOSE REGADENOSON STAGE 1: 0.4
BH CV STRESS DURATION MIN STAGE 1: 0
BH CV STRESS DURATION SEC STAGE 1: 10
BH CV STRESS HR STAGE 1: 82
BH CV STRESS NUCLEAR ISOTOPE DOSE: 27.5 MCI
BH CV STRESS PROTOCOL 1: NORMAL
BH CV STRESS RECOVERY BP: NORMAL MMHG
BH CV STRESS RECOVERY HR: 83 BPM
BH CV STRESS STAGE 1: 1
CRP SERPL-MCNC: 1.79 MG/DL (ref 0–0.5)
GLUCOSE BLDC GLUCOMTR-MCNC: 100 MG/DL (ref 70–130)
GLUCOSE BLDC GLUCOMTR-MCNC: 204 MG/DL (ref 70–130)
GLUCOSE BLDC GLUCOMTR-MCNC: 93 MG/DL (ref 70–130)
GLUCOSE BLDC GLUCOMTR-MCNC: 99 MG/DL (ref 70–130)
LV EF NUC BP: 63 %
MAXIMAL PREDICTED HEART RATE: 152 BPM
NT-PROBNP SERPL-MCNC: ABNORMAL PG/ML (ref 0–900)
PERCENT MAX PREDICTED HR: 53.95 %
PROCALCITONIN SERPL-MCNC: 0.07 NG/ML (ref 0–0.25)
STRESS BASELINE BP: NORMAL MMHG
STRESS BASELINE HR: 68 BPM
STRESS PERCENT HR: 63 %
STRESS POST PEAK BP: NORMAL MMHG
STRESS POST PEAK HR: 82 BPM
STRESS TARGET HR: 129 BPM
TROPONIN T SERPL HS-MCNC: 101 NG/L

## 2023-03-17 PROCEDURE — A9500 TC99M SESTAMIBI: HCPCS | Performed by: INTERNAL MEDICINE

## 2023-03-17 PROCEDURE — 99223 1ST HOSP IP/OBS HIGH 75: CPT | Performed by: INTERNAL MEDICINE

## 2023-03-17 PROCEDURE — 93017 CV STRESS TEST TRACING ONLY: CPT

## 2023-03-17 PROCEDURE — 94761 N-INVAS EAR/PLS OXIMETRY MLT: CPT

## 2023-03-17 PROCEDURE — 84484 ASSAY OF TROPONIN QUANT: CPT | Performed by: HOSPITALIST

## 2023-03-17 PROCEDURE — 0 TECHNETIUM SESTAMIBI: Performed by: INTERNAL MEDICINE

## 2023-03-17 PROCEDURE — 25010000002 HYDRALAZINE PER 20 MG: Performed by: HOSPITALIST

## 2023-03-17 PROCEDURE — 82962 GLUCOSE BLOOD TEST: CPT

## 2023-03-17 PROCEDURE — 25010000002 REGADENOSON 0.4 MG/5ML SOLUTION: Performed by: INTERNAL MEDICINE

## 2023-03-17 PROCEDURE — 84145 PROCALCITONIN (PCT): CPT | Performed by: HOSPITALIST

## 2023-03-17 PROCEDURE — 87040 BLOOD CULTURE FOR BACTERIA: CPT | Performed by: HOSPITALIST

## 2023-03-17 PROCEDURE — 94799 UNLISTED PULMONARY SVC/PX: CPT

## 2023-03-17 PROCEDURE — 83880 ASSAY OF NATRIURETIC PEPTIDE: CPT | Performed by: INTERNAL MEDICINE

## 2023-03-17 PROCEDURE — 99233 SBSQ HOSP IP/OBS HIGH 50: CPT | Performed by: INTERNAL MEDICINE

## 2023-03-17 PROCEDURE — 93016 CV STRESS TEST SUPVJ ONLY: CPT | Performed by: INTERNAL MEDICINE

## 2023-03-17 PROCEDURE — 85730 THROMBOPLASTIN TIME PARTIAL: CPT | Performed by: HOSPITALIST

## 2023-03-17 PROCEDURE — 78452 HT MUSCLE IMAGE SPECT MULT: CPT | Performed by: INTERNAL MEDICINE

## 2023-03-17 PROCEDURE — 93018 CV STRESS TEST I&R ONLY: CPT | Performed by: INTERNAL MEDICINE

## 2023-03-17 PROCEDURE — 25010000002 HEPARIN (PORCINE) PER 1000 UNITS: Performed by: HOSPITALIST

## 2023-03-17 PROCEDURE — 78452 HT MUSCLE IMAGE SPECT MULT: CPT

## 2023-03-17 PROCEDURE — 85730 THROMBOPLASTIN TIME PARTIAL: CPT | Performed by: INTERNAL MEDICINE

## 2023-03-17 RX ORDER — HYDRALAZINE HYDROCHLORIDE 20 MG/ML
10 INJECTION INTRAMUSCULAR; INTRAVENOUS EVERY 6 HOURS PRN
Status: DISCONTINUED | OUTPATIENT
Start: 2023-03-17 | End: 2023-03-22 | Stop reason: HOSPADM

## 2023-03-17 RX ORDER — ECHINACEA PURPUREA EXTRACT 125 MG
1 TABLET ORAL AS NEEDED
Status: DISCONTINUED | OUTPATIENT
Start: 2023-03-17 | End: 2023-03-22 | Stop reason: HOSPADM

## 2023-03-17 RX ORDER — HEPARIN SODIUM 10000 [USP'U]/100ML
12 INJECTION, SOLUTION INTRAVENOUS
Status: DISCONTINUED | OUTPATIENT
Start: 2023-03-17 | End: 2023-03-18

## 2023-03-17 RX ADMIN — HEPARIN SODIUM 2100 UNITS: 5000 INJECTION, SOLUTION INTRAVENOUS; SUBCUTANEOUS at 10:41

## 2023-03-17 RX ADMIN — Medication 10 ML: at 08:27

## 2023-03-17 RX ADMIN — GUAIFENESIN 600 MG: 600 TABLET, EXTENDED RELEASE ORAL at 08:26

## 2023-03-17 RX ADMIN — FERROUS SULFATE TAB 325 MG (65 MG ELEMENTAL FE) 325 MG: 325 (65 FE) TAB at 17:30

## 2023-03-17 RX ADMIN — PANTOPRAZOLE SODIUM 40 MG: 40 TABLET, DELAYED RELEASE ORAL at 16:53

## 2023-03-17 RX ADMIN — METOPROLOL TARTRATE 50 MG: 50 TABLET, FILM COATED ORAL at 06:43

## 2023-03-17 RX ADMIN — HYDRALAZINE HYDROCHLORIDE 10 MG: 20 INJECTION INTRAMUSCULAR; INTRAVENOUS at 03:09

## 2023-03-17 RX ADMIN — ASPIRIN 81 MG: 81 TABLET ORAL at 08:26

## 2023-03-17 RX ADMIN — ISOSORBIDE DINITRATE 20 MG: 20 TABLET ORAL at 17:30

## 2023-03-17 RX ADMIN — HEPARIN SODIUM 14 UNITS/KG/HR: 5000 INJECTION INTRAVENOUS; SUBCUTANEOUS at 10:41

## 2023-03-17 RX ADMIN — HYDRALAZINE HYDROCHLORIDE 100 MG: 50 TABLET, FILM COATED ORAL at 17:30

## 2023-03-17 RX ADMIN — CLONIDINE HYDROCHLORIDE 0.3 MG: 0.3 TABLET ORAL at 06:42

## 2023-03-17 RX ADMIN — BUSPIRONE HYDROCHLORIDE 7.5 MG: 5 TABLET ORAL at 20:43

## 2023-03-17 RX ADMIN — HYDROXYZINE HYDROCHLORIDE 25 MG: 25 TABLET ORAL at 20:47

## 2023-03-17 RX ADMIN — METOPROLOL TARTRATE 50 MG: 50 TABLET, FILM COATED ORAL at 21:33

## 2023-03-17 RX ADMIN — CLOPIDOGREL BISULFATE 75 MG: 75 TABLET, FILM COATED ORAL at 08:26

## 2023-03-17 RX ADMIN — HEPARIN SODIUM 2100 UNITS: 5000 INJECTION, SOLUTION INTRAVENOUS; SUBCUTANEOUS at 16:53

## 2023-03-17 RX ADMIN — BUMETANIDE 4 MG: 1 TABLET ORAL at 08:26

## 2023-03-17 RX ADMIN — HEPARIN SODIUM 4200 UNITS: 5000 INJECTION INTRAVENOUS; SUBCUTANEOUS at 00:19

## 2023-03-17 RX ADMIN — METOPROLOL TARTRATE 50 MG: 50 TABLET, FILM COATED ORAL at 14:48

## 2023-03-17 RX ADMIN — AMLODIPINE BESYLATE 10 MG: 10 TABLET ORAL at 08:26

## 2023-03-17 RX ADMIN — ACETAMINOPHEN 650 MG: 325 TABLET ORAL at 12:31

## 2023-03-17 RX ADMIN — GUAIFENESIN 600 MG: 600 TABLET, EXTENDED RELEASE ORAL at 20:43

## 2023-03-17 RX ADMIN — BUSPIRONE HYDROCHLORIDE 7.5 MG: 5 TABLET ORAL at 16:53

## 2023-03-17 RX ADMIN — HEPARIN SODIUM 12 UNITS/KG/HR: 5000 INJECTION INTRAVENOUS; SUBCUTANEOUS at 00:20

## 2023-03-17 RX ADMIN — ACETAMINOPHEN 650 MG: 325 TABLET ORAL at 21:37

## 2023-03-17 RX ADMIN — HYDRALAZINE HYDROCHLORIDE 10 MG: 20 INJECTION INTRAMUSCULAR; INTRAVENOUS at 10:44

## 2023-03-17 RX ADMIN — CLONIDINE HYDROCHLORIDE 0.3 MG: 0.3 TABLET ORAL at 21:33

## 2023-03-17 RX ADMIN — REGADENOSON 0.4 MG: 0.08 INJECTION, SOLUTION INTRAVENOUS at 15:26

## 2023-03-17 RX ADMIN — Medication 1000 UNITS: at 08:25

## 2023-03-17 RX ADMIN — Medication 5 MG: at 20:43

## 2023-03-17 RX ADMIN — HYDRALAZINE HYDROCHLORIDE 100 MG: 50 TABLET, FILM COATED ORAL at 06:43

## 2023-03-17 RX ADMIN — BUSPIRONE HYDROCHLORIDE 7.5 MG: 5 TABLET ORAL at 08:26

## 2023-03-17 RX ADMIN — TECHNETIUM TC 99M SESTAMIBI 1 DOSE: 1 INJECTION INTRAVENOUS at 15:26

## 2023-03-17 RX ADMIN — PANTOPRAZOLE SODIUM 40 MG: 40 TABLET, DELAYED RELEASE ORAL at 07:25

## 2023-03-17 RX ADMIN — TECHNETIUM TC 99M SESTAMIBI 1 DOSE: 1 INJECTION INTRAVENOUS at 10:25

## 2023-03-17 RX ADMIN — ISOSORBIDE DINITRATE 20 MG: 20 TABLET ORAL at 12:27

## 2023-03-17 RX ADMIN — FERROUS SULFATE TAB 325 MG (65 MG ELEMENTAL FE) 325 MG: 325 (65 FE) TAB at 07:25

## 2023-03-17 RX ADMIN — HYDRALAZINE HYDROCHLORIDE 100 MG: 50 TABLET, FILM COATED ORAL at 11:27

## 2023-03-17 RX ADMIN — Medication 10 ML: at 00:05

## 2023-03-17 RX ADMIN — ATORVASTATIN CALCIUM 80 MG: 40 TABLET, FILM COATED ORAL at 20:43

## 2023-03-17 RX ADMIN — HYDROXYZINE HYDROCHLORIDE 25 MG: 25 TABLET ORAL at 12:34

## 2023-03-17 RX ADMIN — FLUTICASONE PROPIONATE 2 SPRAY: 50 SPRAY, METERED NASAL at 08:26

## 2023-03-17 RX ADMIN — HYDRALAZINE HYDROCHLORIDE 100 MG: 50 TABLET, FILM COATED ORAL at 00:03

## 2023-03-17 RX ADMIN — SPIRONOLACTONE 100 MG: 100 TABLET ORAL at 08:25

## 2023-03-17 RX ADMIN — ISOSORBIDE DINITRATE 20 MG: 20 TABLET ORAL at 07:25

## 2023-03-17 RX ADMIN — CLONIDINE HYDROCHLORIDE 0.3 MG: 0.3 TABLET ORAL at 13:37

## 2023-03-17 NOTE — PROGRESS NOTES
Nephrology Progress Note      Subjective     Pt has been transferred to [facility] due to chest pain for concern of NSTMI, pt continue to have mild chest pain and heaviness     Objective       Vital signs :     Temp:  [97.7 °F (36.5 °C)-99.3 °F (37.4 °C)] 98.3 °F (36.8 °C)  Heart Rate:  [66-80] 66  Resp:  [18-20] 18  BP: (178-193)/(72-78) 193/72    Intake/Output                 03/16/23 0701 - 03/17/23 0700     9780-0999 [id number] Total              Intake    P.O.  --  240 240    Total Intake -- 240 240       Output    Urine  --  100 100    Total Output -- 100 100           Physical Exam:    General Appearance : no chest pain or shortness of breath.   Lungs : clear to auscultation, respirations regular  Heart :  regular rhythm & normal rate, normal S1, S2 and no murmur, no rub  Abdomen : soft, non distended  Extremities : no edema,   Neurologic :   orientated to person, place, time and situation, Grossly no focal deficits        Laboratory Data :     Albumin Albumin   Date Value Ref Range Status   03/16/2023 2.8 (L) 3.5 - 5.2 g/dL Final      Magnesium No results found for: MG       PTH               No results found for: PTH    CBC and coagulation:  Results from last 7 days   Lab Units 03/17/23  0007 03/16/23  2316 03/15/23  0138 03/14/23  0136   PROCALCITONIN ng/mL 0.07  --   --   --    CRP mg/dL  --  1.79*  --   --    WBC 10*3/mm3  --  12.99* 11.17* 11.36*   HEMOGLOBIN g/dL  --  8.4* 8.2* 7.8*   HEMATOCRIT %  --  27.4* 27.2* 26.1*   MCV fL  --  93.2 93.8 93.5   MCHC g/dL  --  30.7* 30.1* 29.9*   PLATELETS 10*3/mm3  --  432 506* 501*   INR   --  1.28*  --   --      Acid/base balance:      Renal and electrolytes:    Results from last 7 days   Lab Units 03/16/23  2316 03/15/23  0138 03/14/23  0136 03/12/23  0049 03/11/23 0136   SODIUM mmol/L 139 138 136 136 135*   POTASSIUM mmol/L 4.2 4.0 4.0 3.7 3.8   MAGNESIUM mg/dL  --   --   --   --  2.0   CHLORIDE mmol/L 102 100 99 97* 95*   CO2 mmol/L 24.8 27.0 27.6 29.0 29.1*   BUN  mg/dL 44* 53* 54* 51* 47*   CREATININE mg/dL 2.06* 2.16* 2.39* 2.20* 2.01*   CALCIUM mg/dL 8.5* 8.7 8.4* 8.9 9.2     Estimated Creatinine Clearance: 24.5 mL/min (A) (by C-G formula based on SCr of 2.06 mg/dL (H)).  @GFRCG:3@   Liver and pancreatic function:  Results from last 7 days   Lab Units 03/16/23 2316 03/14/23 0136 03/11/23 0136   ALBUMIN g/dL 2.8* 2.5* 2.5*   BILIRUBIN mg/dL 0.3 0.2 0.3   ALK PHOS U/L 103 113 106   AST (SGOT) U/L 11 11 12   ALT (SGPT) U/L 6 7 5         Cardiac:      Liver and pancreatic function:  Results from last 7 days   Lab Units 03/16/23 2316 03/14/23 0136 03/11/23 0136   ALBUMIN g/dL 2.8* 2.5* 2.5*   BILIRUBIN mg/dL 0.3 0.2 0.3   ALK PHOS U/L 103 113 106   AST (SGOT) U/L 11 11 12   ALT (SGPT) U/L 6 7 5       Medications :     amLODIPine, 10 mg, Oral, Q24H  aspirin, 81 mg, Oral, Daily  atorvastatin, 80 mg, Oral, Nightly  bumetanide, 4 mg, Oral, Daily  busPIRone, 7.5 mg, Oral, TID  cholecalciferol, 1,000 Units, Oral, Daily  cloNIDine, 0.3 mg, Per PEG Tube, Q8H  clopidogrel, 75 mg, Per PEG Tube, Daily  docusate sodium, 100 mg, Oral, BID  ferrous sulfate, 325 mg, Oral, BID With Meals  fluticasone, 2 spray, Each Nare, Daily  guaiFENesin, 600 mg, Oral, Q12H  hydrALAZINE, 100 mg, Oral, Q6H  isosorbide dinitrate, 20 mg, Oral, TID - Nitrates  melatonin, 5 mg, Oral, Nightly  metoprolol tartrate, 50 mg, Oral, Q8H  pantoprazole, 40 mg, Oral, BID AC  polyethylene glycol, 17 g, Oral, Daily  senna-docusate sodium, 2 tablet, Oral, BID  sodium chloride, 10 mL, Intravenous, Q12H  spironolactone, 100 mg, Oral, Daily      heparin, 12 Units/kg/hr, Last Rate: 12 Units/kg/hr (03/17/23 0020)          Assessment & Plan     1.  CKD stage IV  2.  Resolved acute tubular necrosis  3.  Personal history of acute dialysis  4.  Acute on chronic hypoxic respiratory failure  5.  Systolic plus diastolic CHF compensated  6.  Left ventricular ejection fraction 30%  7.  Coronary artery disease with history of  stent  8.  Type 2 diabetes  9.  Anemia of acute on chronic disease with iron deficiency.  Ordered Ferrlecit 250 mg 3/12/2023  10. Debility and deconditioning     Pt has history of CAD and PCI and has had multiple discussion to evaluated likely ischemic cardiac symptoms with cardiac cath but she used to refuse with concern of worsening CKD. As she has dialysis cath placed and we can do dialysis post cath to avoid contrast induced nephropathy. Educated and counseled the patient, she is agreeable to proceed to cardiac cath.   Discussed with Dr. Slaughter, to inform us about timing of cardiac cath and will plan for dialysis.   Continue holding dialysis, uncontrolled BP, continue on [id number]        Micha Carey MD  03/17/23  08:12 EDT

## 2023-03-17 NOTE — PLAN OF CARE
Goal Outcome Evaluation:     Pt admitted to 3S this shift. Patient stated wanting to wait to inform family until the morning. Patient resting in bed. No complaints or requests. No indicators of acute distress noted. Will continue to follow plan of care.

## 2023-03-17 NOTE — PROGRESS NOTES
I have reviewed troponins, delta is only -2.  Ekg normal and pain atypical.  HS trop I were 2500, 2000, and 1060 in order in [facility] in January although these are in Pcg/ml, the overall range is the same as ours here. .  Patient has CKD !V so I feel these trop elevations are either chronic disease related or residual from previous MI.  EF had recovered and normal this week.  CXR showed congestion and effusion, patient is on Bumex and on baseline O2.  These are the only trops to be drawn at our facility.  D/W RN, CP free at present.Patient on B blocker, eliquis, aspirin, statin, and isordil. Patient was seen by Cards at [facility] but refused cath due to renal failure. Have briefly d/w Dr Padron, will recheck trop in AM

## 2023-03-17 NOTE — CASE MANAGEMENT/SOCIAL WORK
Discharge Planning Assessment   [place]     Patient Name: Pati Cortes  MRN: [id number]  Today's Date: 3/17/2023    Admit Date: 3/16/2023    Plan: SS received consult per Case Management for discharge planning.  SS spoke with pt at bedside.  Pt resides at home with spouseSere at [address] [place] [address] and plans to return home at discharge.  Pt currently does not utilize home health services.  Pt currently utilizes glucometer, bp cuff, bedside commode, home 02 at 3 liters nc, rollator and elevated toilet seat via [employer].  Pt utilizes [facility] in [place].  Pt spouse is POA.  Pt does not have living will.  Pt and spouse do not have vehicle and walk to grocery store.  Pt aware of [facility].  Pt's PCP Gasper Reyes.  SS will follow and assist with discharge needs.   Discharge Needs Assessment     Row Name 03/17/23 1154       Living Environment    People in Home spouse    Name(s) of People in Home Sree Amaya    Primary Care Provided by self;spouse/significant other    Provides Primary Care For no one    Family Caregiver Names Sree Amaya spouse    Quality of Family Relationships helpful;involved;supportive       Resource/Environmental Concerns    Resource/Environmental Concerns none    Transportation Concerns none       Transition Planning    Patient/Family Anticipates Transition to home with family    Patient/Family Anticipated Services at Transition none    Transportation Anticipated family or friend will provide       Discharge Needs Assessment    Equipment Currently Used at Home glucometer;commode;bp cuff;rollator;oxygen;walker, rolling    Outpatient/Agency/Support Group Needs homecare agency               Discharge Plan     Row Name 03/17/23 3141       Plan    Plan SS received consult per Case Management for discharge planning.  SS spoke with pt at bedside.  Pt resides at home with spouse, Sree Amaya at [address] [place] [address] and plans to return home at discharge.  Pt currently does not  utilize home health services.  Pt currently utilizes glucometer, bp cuff, bedside commode, home 02 at 3 liters nc, rollator and elevated toilet seat via [facility].  Pt utilizes [facility] in [place].  Pt spouse is POA.  Pt does not have living will.  Pt and spouse do not have vehicle and walk to grocery store.  Pt aware of [facility].  Pt's PCP Gasper Reyes.  SS will follow and assist with discharge needs.              Continued Care and Services - Admitted Since 3/16/2023    Coordination has not been started for this encounter.       Expected Discharge Date and Time     Expected Discharge Date Expected Discharge Time    Mar 18, 2023          Demographic Summary     Row Name 03/17/23 1154       General Information    Admission Type inpatient    Referral Source nursing    Reason for Consult discharge planning    Preferred Language English              Massiel Moraes, BSW

## 2023-03-17 NOTE — PROGRESS NOTES
Patient Assessment Instrument  Quality Indicators - Discharge FY 2023    Section A. Transportation  Issues Due to Lack of Transportation:  Yes, it has kept me from medical  appointments or from getting my medications    Section A. Medication List  Subsequent Provider:  02 - [facility] [[email or web address]  Medication List to Subsequent Provider at Discharge:  Yes - Current reconciled  medication list provided to the subsequent provider  Route(s) of Medication List Transmission to Subsequent Provider:  Electronic  Health Record  Medication List to Patient:  Not applicable.  Patient discharged to a subsequent  provider as defined in 44D    Section B. Health Literacy      Section C. BIMS      Section C. Signs and Symptoms of Delirium (from CAM)      Section D. Mood      Section D. Social Isolation      Section GG. Self-Care Performance      Section GG. Mobility Performance      Section J. Health Conditions Discharge  Fall(s) Since Admission:  No    Section J. Health Conditions (Pain)      Section K. Swallowing/Nutritional Status  Nutritional Approaches Past 7 Days:  Therapeutic diet (e.g., low salt, diabetic,  low cholesterol)  Nutritional Approaches at Discharge:  Therapeutic diet (e.g., low salt,  diabetic, low cholesterol)    Section M. Skin Conditions Discharge  Unhealed Pressure Ulcer(s)/Injurie(s) at Stage 1 or Higher:  No    . Current Number of Unhealed Pressure Ulcers      Section N. Medication    Medication Intervention: Not applicable - There were no potential clinically  significant medication issues identified since admission or patient is not  taking any medications.  Patient is taking medications in the following pharmacological classification:  E. Anticoagulant An indication is noted for all medications in the Anticoagulant  drug class. I. Antiplatelet An indication is noted for all medications in the  Antiplatelet drug class. J. Hypoglycemic (including insulin) An indication is  noted for all  medications in the Hypoglycemic drug class.    Section O. Special Treatments, Procedures, and Programs  Oxygen Therapy: Continuous   Non-Invasive Mechanical Ventilator: BiPAP   IV Access: Peripheral    Signed by: Eloisa White, Supervisor

## 2023-03-17 NOTE — NURSING NOTE
Pt complaining of left back shoulder pain; told CNA to come and get nurse her chest was hurting; on arrival pt states it was not her chest, but her left back and shoulder area; BP elevated; pt denies nausea and SOA at this time; pt wanting her blood pressure medications; Dr Cummings made aware.

## 2023-03-17 NOTE — NURSING NOTE
Dr Lopez here to assess patient; pt will be transferred to 3S Room 305A; pt refused calling  tonight to make aware of room change, states it will just make him panic; will pass in report to nurse; pt is alert and oriented.

## 2023-03-17 NOTE — CONSULTS
Patient information:  Date of Admit: 3/16/2023  Date of Consult: 03/17/23  Hospitalist:Provider, No Known  Gasper Reyes MD  No Known Provider  1  MRN:  [id number]  Visit Number:  [id number]    Assessment      1. Chest pains with some radiation to her left shoulder and back with some typical and some atypical features for CCS class IV angina  2. Elevated troponin/possible ACS/acute non-STEMI (type I versus type II) with a troponin of 96 and 101 with a delta of 5 in the last 24 hours.  3. ASCVD, status post previous stenting (remote)  4. Previous LV systolic dysfunction/cardiomyopathy which seems improved on based on the recent echo Doppler study here,possible mild decompensation although ProBNP is quite high.  5. Chronic kidney disease (stage IIIb).  6. Normochromic normocytic anemia.  7. Recent COVID infection with pneumonia in January 2023 when she was noted to have severe LV systolic dysfunction at [facility].  8. Finding of moderate size circumferential pericardial effusion with no evidence of cardiac tamponade on recent echo Doppler study.    Recommendations     1. For her coronary disease, continue with aspirin, clopidogrel and atorvastatin and change metoprolol to carvedilol for better efficacy for her history of cardiomyopathy  2. Evaluate further for any evidence of significant myocardial ischemia with a Lexiscan sestamibi study  3. For her history of cardiomyopathy, change metoprolol to carvedilol and continue with hydralazine and oral nitrates as she is not a candidate for ACE inhibitor/ARB/Entresto due to advanced chronic kidney disease.  4. For her uncontrolled hypertension, I agree with amlodipine and change metoprolol to carvedilol and continue with hydralazine and clonidine.  5. May continue with IV heparin for now and may switch back to Eliquis if no further cardiac evaluation with cardiac catheterization is needed.  6. Continue to monitor the pericardial effusion  "clinically with periodical echo Doppler studies and consider further evaluation and intervention if there is progression.  7. IV diuretic as needed and tolerated.    Reason for consultation: Chest and shoulder pain with elevated troponins    Subjective   ADMISSION INFORMATION:  Subjective     History of Present Illness    Pati Cortes is a 67 y.o. female with a past medical history significant for chronic kidney disease stage IV (history of dialysis and recent hospitalization at an outside facility however patient reported that she has not had dialysis in a couple weeks and her creatinine baseline is around 2), history of CAD status post stent in remote history, hypertension, systolic HFimpEF (low EF January 2023 from echo from outside facility however appears normalized 03/14/2023 with echo here), History of DVT in right IJ January 2023, cystic kidney disease,  Patient [facility] inpatient rehab facility on 03/10/2023 for debility status post prolonged hospitalization at a outlying facility.  However on on 3/16/2023 she complained of chest pain radiating to her left shoulder and back described as soreness.  EKG was normal, chest x-ray showed some congestion, and her troponin was initially 98 with a reflex of 96 and consistent with a flat trend.  Her Eliquis (for history of DVT) was stopped and she did not receive the evening dose on 03/16/2023.  Patient is now on IV heparin drip.  Patient was admitted to telemetry floor and Cardiology has been consulted for further evaluation and management.  I have discussed with Dr. Artis and Dr. Lopez last night.  Patient has been n.p.o. since midnight.  Patient is lying in bed resting quietly.  No acute distress noted at this time.  Head of bed is elevated to approximately 30 degrees.  Patient denies chest pain, shortness of breath, or palpitations.  Patient reports, \"constant pain it in my back between his shoulders and I am congested in my " "nose\".  Patient denies injury.  However tenderness is noted when her back is palpated.  Patient does give history that she had a stent placed over 20 years ago in [place].  She reports she follows with her primary cardiologist Dr. Johnson in [facility] at least monthly.  She denies smoking or history of diabetes.  She also gives history of dark stools but relates this to the fact that she is \"on iron\".  She reports that she has had a recent EGD and colonoscopy which were negative other than having a \"few polyps removed\".     Patient's blood pressure is noted to be high with 182/76 as last recorded.    PLAN: Stress test for today      Cardiac risk factors:arteriosclerotic heart disease and hypertension      Last Echo: Results for orders placed during the hospital encounter of 03/10/23    Adult Transthoracic Echo Complete W/ Cont if Necessary Per Protocol    Interpretation Summary  •  Left ventricular systolic function is normal. Left ventricular ejection fraction appears to be 66 - 70%.  •  Left ventricular wall thickness is consistent with posterior asymmetric hypertrophy, with ventricular consistent sleep could be consistent with cardiac amyloidosis, clinical correlation is required.  •  Left ventricular diastolic function is consistent with (grade Ia w/high LAP) impaired relaxation.  •  The right atrial cavity is mildly  dilated.  •  Mild aortic valve stenosis is present.  •  Estimated right ventricular systolic pressure from tricuspid regurgitation is mildly elevated (35-45 mmHg).  •  There is a moderate (1-2cm) pericardial effusion. There is no evidence of cardiac tamponade.        01/21/2023      Measurements Summary:    LVEDd: 4.01 cm         LVESd: 3.8 cm           IVSEd: 1.44 cm    AO Root:2.99 cm        LVPWd: 1.66 cm    Contractility Score    Global Left Ventricular Hypokinesis was noted.    LV regional wall motion: (0-Not visualized 1-Normal 2-Hypokinesis    3-Akinesis 4-Dyskinesis 5-Aneurysm) "    Left Ventricle    Peak E-wave: 0.85   Peak A-wave: 1.24 m/s   E/A ratio: 0.68    m/s                 Volume [id number]   LV length: 8.59 cm    ml    Volume [id number] ml    LVOT diameter: 2 cm    Normal sized left ventricle.    Moderate left ventricular hypertrophy.    Visually estimated ejection fraction 30% +/- 5%.    Abnormal left ventricular systolic function; severely abnormal systolic    strain pattern.    Increased left atrial pressure (Grade II diastolic dysfunction).    No left ventricular masses or thrombi.    Right Ventricle    Diastolic dimension: 4.95 cm    Dilated right ventricle.    Abnormal TAPSE; abnormal right ventricular function.    Left Atrium    LA dimension: 3.2 cm                LA volume:92.1 ml    LA/Aorta: 1.07    Severely abnormal left atrial volume index 49ml/m2.    Intact atrial septum.    No atrial mass or thrombus.    Right Atrium    Normal sized right atrium.    Intact atrial septum.    No atrial mass or thrombus.    Mitral Valve    Deceleration time:     Area PHT: 2.11 cm^2  Mean velocity:    233.31 msec            P1/2t: 104.19 msec   0.56 m/s    Area (continuity):   Mean gradient:    1.85 cm^2            1.61 mmHg    Peak gradient:    6.15 mmHg    Thickened mitral valve leaflets.    Mild (1+) mitral regurgitation.    No mitral stenosis.    No masses or vegetations seen.    Aortic Valve    AV VTI: 28.25   Area continuity: 2.09   Peak velocity: 1.65    cm              cm^2                    m/s    LVOT VTI: 18.83 Mean velocity: 0.96 m/s Peak gradient: 10.9    cm                                      mmHg    Mean gradient: 4.39    mmHg    Sclerotic aortic valve leaflets.    No aortic regurgitation.    No aortic stenosis.    No masses or vegetations seen.    Tricuspid Valve    Estimated RAP: 15 mmHg    Structurally normal tricuspid valve.    Mild (1+) tricuspid regurgitation.    No tricuspid stenosis.    No masses or vegetations seen.    Pulmonic Valve    Acceleration time:  110.73 msec    Structurally normal pulmonic valve.    Mild pulmonic regurgitation (1+).    No pulmonic stenosis.    No masses or vegetations seen.   Great Vessels   Aorta    Aortic Root: 2.99 cm    LVOT Diameter: 2 cm   Visualized aorta is normal.   Normal aortic root.   No evidence of dissection.   Dilated IVC with no inspiratory collapse.   Elevated central venous pressure (>15mmHg).    Pericardium / Pleura   Small circumferential pericardial effusion measuring 0.9cm posterior and   0.6cm anterior.   Pleural effusion noted.    Other    No masses or thrombi.    No intracardiac shunt.    ----------------------------------------------------------------    Electronically signed by YOSEPH CHANDRA MD(Interpreting Physician)    on 01/21/2023 11:36    ----------------------------------------------------------------  Exam End: 01/21/23 09:30    Specimen Collected: 01/21/23 09:03 Last Resulted: 01/21/23 11:36   Received From: [facility]  Result Received: 03/10/23 12:43       Last Stress:  No results found for this or any previous visit.      Last Cath: No results found for this or any previous visit.       Past Medical History:   Diagnosis Date   • Arthritis    • Chronic respiratory failure with hypoxia     after acute respiratory failure in recent hospitalization in 1/2023 secondary to COVID-19, weaned from vent to 3L NC with bipap at night   • CKD (chronic kidney disease), stage IV (HCC)     recently on dialysis for SWATI at [facility], has right upper chest wall dialysis catheter, no dialysis in a couple weeks per patient, baseline creatine reportedly has settled around 2   • History of coronary artery disease     sp stent placement remotely   • History of DVT in adulthood     in right IJ, discovered during recent hospitalization in Jan 2023   • Hypertension    • Polycystic kidney disease    • Systolic CHF (HCC)     diagnosed based on ECHO showing low EF at outside hospital in 1/2023, but with normalized  EF on ECHO here on 3/14/23, but with diastolic CHF noted     Past Surgical History:   Procedure Laterality Date   • APPENDECTOMY     • CARDIAC CATHETERIZATION     • HYSTERECTOMY      partial, still has left ovary   • INSERTION HEMODIALYSIS CATHETER       Family History   Problem Relation Age of Onset   • Heart disease Mother    • Stroke Mother    • Heart disease Father      Social History     Tobacco Use   • Smoking status: Former     Types: Cigarettes   • Smokeless tobacco: Never   Vaping Use   • Vaping Use: Never used   Substance Use Topics   • Alcohol use: Never   • Drug use: Never     Medications Prior to Admission   Medication Sig Dispense Refill Last Dose   • Aspirin Low Dose 81 MG EC tablet Take 1 tablet by mouth Daily.   Unknown   • atenolol (TENORMIN) 50 MG tablet Take 1 tablet by mouth Daily.   Unknown   • busPIRone (BUSPAR) 5 MG tablet Take 1 tablet by mouth 3 (Three) Times a Day.   Unknown   • cholecalciferol (VITAMIN D3) 25 MCG (1000 UT) tablet Take 1 tablet by mouth Daily.   Unknown   • cloNIDine (CATAPRES) 0.1 MG tablet Take 1 tablet by mouth 3 (Three) Times a Day.   Unknown   • clopidogrel (PLAVIX) 75 MG tablet Take 1 tablet by mouth Daily.   Unknown   • GNP Vitamin C 500 MG tablet Take 1 tablet by mouth 2 (Two) Times a Day.   Unknown   • hydrALAZINE (APRESOLINE) 50 MG tablet Take 1 tablet by mouth 3 (Three) Times a Day.   Unknown   • hydrOXYzine pamoate (VISTARIL) 25 MG capsule Take 1 capsule by mouth 3 (Three) Times a Day.   Unknown   • isosorbide mononitrate (IMDUR) 30 MG 24 hr tablet Take 1 tablet by mouth Daily.   Unknown   • metoprolol succinate XL (TOPROL-XL) 25 MG 24 hr tablet Take 1 tablet by mouth Daily.   Unknown   • NIFEdipine XL (PROCARDIA XL) 30 MG 24 hr tablet Take 1 tablet by mouth Daily.   Unknown   • Zinc Sulfate 220 (50 Zn) MG tablet Take 1 tablet by mouth 2 (Two) Times a Day.   Unknown     Allergies:  Patient has no known allergies.    Review of Systems   Constitutional: Negative  for activity change and appetite change.   HENT: Negative for facial swelling and trouble swallowing.         Nasal congestion.   Eyes: Negative for visual disturbance.   Respiratory: Negative for shortness of breath.    Cardiovascular: Negative for chest pain.   Gastrointestinal: Negative for blood in stool, nausea and vomiting.   Endocrine: Negative.    Genitourinary: Negative.  Negative for dysuria and hematuria.   Musculoskeletal: Positive for back pain. Negative for arthralgias.        Shoulder pain   Skin: Negative for color change.   Allergic/Immunologic: Negative.    Neurological: Negative for dizziness, syncope, light-headedness and headaches.   Psychiatric/Behavioral: Negative for agitation and behavioral problems.     Objective     Objective      Vital Signs  Temp:  [97.7 °F (36.5 °C)-99.3 °F (37.4 °C)] 98.1 °F (36.7 °C)  Heart Rate:  [66-80] 77  Resp:  [18-20] 18  BP: (172-193)/(72-84) 182/76  Vital Signs (last 72 hrs)       03/14 0700  03/15 0659 03/15 0700  03/16 0659 03/16 0700  03/17 0659 03/17 0700  03/17 0811   Most Recent      Temp (°F)       97.7      98.3     98.3 (36.8) 03/17 0700    Heart Rate     67 -  69      66     66 03/17 0700    Resp       20      18     18 03/17 0700    BP     178/78 -  184/75      193/72     193/72 03/17 0700    SpO2 (%)       93      95     95 03/17 0700        There is no height or weight on file to calculate BMI.    Intake/Output Summary (Last 24 hours) at 3/17/2023 1130  Last data filed at 3/17/2023 1014  Gross per 24 hour   Intake 240 ml   Output 100 ml   Net 140 ml     Physical Exam  Constitutional:       Appearance: Normal appearance.   HENT:      Head: Normocephalic and atraumatic.      Nose: Nose normal.      Mouth/Throat:      Mouth: Mucous membranes are moist.      Pharynx: Oropharynx is clear.   Eyes:      Conjunctiva/sclera: Conjunctivae normal.   Cardiovascular:      Rate and Rhythm: Normal rate and regular rhythm.      Pulses: Normal pulses.      Heart  sounds: Murmur heard.      Comments: Systolic murmur grade 3/6 best heard at LLSB and right intercostal space.   Pulmonary:      Effort: Pulmonary effort is normal.   Abdominal:      General: Bowel sounds are normal.      Palpations: Abdomen is soft.   Musculoskeletal:         General: Tenderness present. Normal range of motion.      Cervical back: Normal range of motion and neck supple.      Comments: Tenderness is noted with palpation to upper back between shoulders facial grimacing and withdrawal noted.   Skin:     General: Skin is warm and dry.   Neurological:      General: No focal deficit present.      Mental Status: She is alert and oriented to person, place, and time.   Psychiatric:         Mood and Affect: Mood normal.         Behavior: Behavior normal.         Results review     Results Review:    I have reviewed the patient's new clinical results.  Results from last 7 days   Lab Units 03/17/23  0824 03/16/23 2316 03/16/23  1934 03/16/23  1730   HSTROP T ng/L 101* 98* 96* 98*     Results from last 7 days   Lab Units 03/16/23  2316 03/15/23  0138 03/14/23  0136 03/12/23  0049 03/11/23 0136   WBC 10*3/mm3 12.99* 11.17* 11.36* 9.32 9.27   HEMOGLOBIN g/dL 8.4* 8.2* 7.8* 8.0* 7.7*   PLATELETS 10*3/mm3 432 506* 501* 487* 470*     Results from last 7 days   Lab Units 03/16/23  2316 03/15/23  0138 03/14/23  0136 03/12/23 0049 03/11/23 0136   SODIUM mmol/L 139 138 136 136 135*   POTASSIUM mmol/L 4.2 4.0 4.0 3.7 3.8   CHLORIDE mmol/L 102 100 99 97* 95*   CO2 mmol/L 24.8 27.0 27.6 29.0 29.1*   BUN mg/dL 44* 53* 54* 51* 47*   CREATININE mg/dL 2.06* 2.16* 2.39* 2.20* 2.01*   CALCIUM mg/dL 8.5* 8.7 8.4* 8.9 9.2   GLUCOSE mg/dL 119* 102* 98 110* 93   ALT (SGPT) U/L 6  --  7  --  5   AST (SGOT) U/L 11  --  11  --  12     Lab Results   Component Value Date    INR 1.28 (H) 03/16/2023    INR 1.06 02/21/2023    INR 1.07 01/27/2023    INR 1.25 (H) 01/21/2023     Lab Results   Component Value Date    MG 2.0 03/11/2023    MG  1.9 2023    MG 2.2 2023     Lab Results   Component Value Date    TRIG 132 2023    HDL 34 (L) 2023    LDL 67 2023      No results found for: PROBNP  Pain Management Panel    There is no flowsheet data to display.         EC2023      ECG/EMG Results (last 24 hours)     ** No results found for the last 24 hours. **          TELEMETRY: SR 70s        Imaging Results (Last 72 Hours)     ** No results found for the last 72 hours. **          CURRENT MEDICATIONS:  amLODIPine, 10 mg, Oral, Q24H  aspirin, 81 mg, Oral, Daily  atorvastatin, 80 mg, Oral, Nightly  bumetanide, 4 mg, Oral, Daily  busPIRone, 7.5 mg, Oral, TID  cholecalciferol, 1,000 Units, Oral, Daily  cloNIDine, 0.3 mg, Per PEG Tube, Q8H  clopidogrel, 75 mg, Per PEG Tube, Daily  docusate sodium, 100 mg, Oral, BID  ferrous sulfate, 325 mg, Oral, BID With Meals  fluticasone, 2 spray, Each Nare, Daily  guaiFENesin, 600 mg, Oral, Q12H  hydrALAZINE, 100 mg, Oral, Q6H  isosorbide dinitrate, 20 mg, Oral, TID - Nitrates  melatonin, 5 mg, Oral, Nightly  metoprolol tartrate, 50 mg, Oral, Q8H  pantoprazole, 40 mg, Oral, BID AC  polyethylene glycol, 17 g, Oral, Daily  senna-docusate sodium, 2 tablet, Oral, BID  sodium chloride, 10 mL, Intravenous, Q12H  spironolactone, 100 mg, Oral, Daily      heparin, 12 Units/kg/hr, Last Rate: 14 Units/kg/hr (23 1041)        I have discussed my impression and recommendations with the patient.      Thank you very much for asking us to be involved in this patient's care.  We will follow along with you.    Electronically signed by ALVARADO Troncoso, 23, 11:32 AM EDT.          Please note that portions of this note were copied and has been reviewed and is accurate as of date.      Please note that portions of this note were completed with a voice recognition program.

## 2023-03-17 NOTE — PROGRESS NOTES
[facility] HOSPITALIST PROGRESS NOTE     Patient Identification:  Name:  Pati Cortes  Age:  67 y.o.  Sex:  female  :  1955  MRN:  [id number]  Visit Number:  [id number]  ROOM: [address] [place] [address]     Primary Care Provider:  Gasper Reyes MD    Length of stay in inpatient status:  1    Subjective     Chief Compliant:  Chest Pain    History of Presenting Illness:    Patient remains ill but stable today, no acute events overnight, no new complaints, denies any fevers or chills, does still have intermittent, vague chest pain, blood pressure has been trending up despite home medications, have added as needed Hydralazine, Cardiology consulted and following, plan for stress testing as has been ordered though no formal recommendations yet, discussed with Nephrology and recommended could do LHC if needed as patient has catheter still and would plan on dialysis thereafter, hemoglobin stable, tolerating triple therapy for now, follow up Cardiology recommendations on if needs or not, will need full anticoagulation at least for now due to remote history DVT.   Objective     Current Hospital Meds:  amLODIPine, 10 mg, Oral, Q24H  aspirin, 81 mg, Oral, Daily  atorvastatin, 80 mg, Oral, Nightly  bumetanide, 4 mg, Oral, Daily  busPIRone, 7.5 mg, Oral, TID  cholecalciferol, 1,000 Units, Oral, Daily  cloNIDine, 0.3 mg, Per PEG Tube, Q8H  clopidogrel, 75 mg, Per PEG Tube, Daily  docusate sodium, 100 mg, Oral, BID  ferrous sulfate, 325 mg, Oral, BID With Meals  fluticasone, 2 spray, Each Nare, Daily  guaiFENesin, 600 mg, Oral, Q12H  hydrALAZINE, 100 mg, Oral, Q6H  isosorbide dinitrate, 20 mg, Oral, TID - Nitrates  melatonin, 5 mg, Oral, Nightly  metoprolol tartrate, 50 mg, Oral, Q8H  pantoprazole, 40 mg, Oral, BID AC  polyethylene glycol, 17 g, Oral, Daily  senna-docusate sodium, 2 tablet, Oral, BID  sodium chloride, 10 mL, Intravenous, Q12H  spironolactone, 100 mg, Oral, Daily      heparin, 12  Units/kg/hr      ----------------------------------------------------------------------------------------------------------------------  Vital Signs:  Temp:  [97.7 °F (36.5 °C)-99.3 °F (37.4 °C)] 98.1 °F (36.7 °C)  Heart Rate:  [66-83] 83  Resp:  [18-20] 18  BP: (172-195)/(72-84) 195/83  SpO2:  [93 %-96 %] 95 %  on  Flow (L/min):  [3] 3;   Device (Oxygen Therapy): nasal cannula  There is no height or weight on file to calculate BMI.      Intake/Output Summary (Last 24 hours) at 3/17/2023 1319  Last data filed at 3/17/2023 1014  Gross per 24 hour   Intake 240 ml   Output 100 ml   Net 140 ml      ----------------------------------------------------------------------------------------------------------------------  Physical exam:  Constitutional:  Elderly.  No acute distress.      HENT:  Head:  Normocephalic and atraumatic.  Mouth:  Moist mucous membranes. Mouth: Poor dentition  Eyes:  Conjunctivae and EOM are normal. No scleral icterus.    Neck:  Neck supple.  No JVD present.    Cardiovascular:  Normal rate, regular rhythm and normal heart sounds with no murmur.  Pulmonary/Chest:  No respiratory distress, no wheezes, no crackles, on 3LNC  Abdominal:  Soft. No distension and no tenderness.   Musculoskeletal:  No tenderness, and no deformity.  No red or swollen joints anywhere.    Neurological:  Alert and oriented to person, place.  No cranial nerve deficit.    Skin:  Skin is warm and dry. No rash noted. No pallor.   Peripheral vascular:  No clubbing, no cyanosis, trace edema.  Psychiatric: Appropriate mood and affect  Edited by: Sam Slaughter MD at 3/17/2023 1329  ----------------------------------------------------------------------------------------------------------------------  WBC/HGB/HCT/PLT   12.99/8.4/27.4/432 (03/16 2316)  BUN/CREAT/GLUC/ALT/AST/MAYRA/LIP    44/2.06/119/6/11/--/-- (03/16 2316)  LYTES - Na/K/Cl/CO2: 139/4.2/102/24.8 (03/16 2316)  COAG - PT/INR/PTT: --/--/45.5* (03/17 0824)     No results found  for: URINECX  No results found for: BLOODCX    I have personally looked at the labs and they are summarized above.  ----------------------------------------------------------------------------------------------------------------------  Detailed radiology reports for the last 24 hours:  XR Chest 1 View    Result Date: 3/16/2023  1.  Vascular congestion or volume overload with interstitial edema and pleural effusions, slightly increased since 3/11/2023. 2.  Infiltrate or atelectasis in the left lung base, unchanged. 3.  Dialysis catheter. Signer Name: Joe Salcido MD  Signed: 3/16/2023 7:26 PM  Workstation Name: [id number]  [facility]    Assessment & Plan    67F Former Smoker [facility] Polycystic Kidney Disease, COPD, Hypertension, Dyslipidemia, Coronary Artery Disease status post PCI, recently admitted outside hospital for Acute Hypoxic Respiratory Failure due to Coronavirus 19 Pneumonia requiring intubation and mechanical ventilation though notably extubated 1/24 now with Chronic Hypoxic Respiratory Failure and requiring Non-invasive Positive Pressure Ventilation nightly, also with prolonged complicated hospitalization due to RIJ DVT on oral anticoagulation, Acute HFrEF with noted LVEF 20-25%, NSTEMI though unable to undergo ischemic workup due to acute renal failure requiring iHD though last session 2/28, has been admitted to inpatient rehab at this facility on 3/10/23 for debility, began having chest/back/shoulder pain 3/16, admitted inpatient for further cardiac workup.     #Hypertension/Dyslipidemia/Coronary Artery Disease status post PCI, now with Atypical Chest Pain and elevated HS Troponins, in setting of remote history NSTEMI, severe cardiomyopathy with acute HFrEF, though now with recovered EF, possibly prior low EF due to Coronavirus infection, and now noted posterior asymmetric hypertrophy, rule out amyloidosis   #Mild Pulmonary Hypertension   #Mod Pericardial Effusion without  Tamponade (1-2cm)  - Labs showed HS Troponins 98->96->98->101, BNP pending   - EKG showed normal sinus rhythm no dynamic ST changes  - Echocardiogram showed LVEF 66-70%, posterior asymmetric hypertrophy, possibly consistent with cardiac amyloidosis, diastolic dysfunction, dilated right atrial cavity, mild AS, RVSP 35-45mmHg, 1-2cm mod pericardial effusion without tamponade  - Cardiology consulted and following, have ordered stress testing, formal recommendations pending  - Continue home Aspirin 81, Statin, Plavix  - Continue home beta blocker  - No home ACEI/ARB/ARNI due to renal failure  - Continue home Amlodipine, Clonidine, Hydralazine, Imdur, Aldactone  - Blood pressure has remained elevated have added as needed IV Hydralazine  - Continue home Bumex, trend creatinine and urine output   - Continue Heparin drip for now, follow up Cardiology recommendations on need for triple therapy  - Continue to monitor on telemetry, strict I/O's, trend heart rate and blood pressure    #Chronic Kidney Disease stage IV, Remote history ATN requiring iHD   - Baseline creatinine probably around 2.0-2.2, was up to 2.4 on admission; Now 2.0  - Nephrology consulted and following, recommended could do LHC if required and would do hemodialysis thereafter as she still has catheter in place  - Continue mIVFs, Trend Cr and urine output, avoid nephrotoxins, NSAIDs, dehydration and contrast as able.    #Chronic Hypoxic Respiratory Failure due to COPD/Emphysema without Acute Exacerbation and Recent Coronavirus 19 infection, also requiring Non-invasive Positive Pressure Ventilation nightly   - Patient reportedly on 3LNC at home; Continue home 02, bipap nightly as needed   - Continue home regimen, Add Duonebs as needed   - Will order COPD rescue kit at time of discharge     #Chronic Normocytic Anemia due to Anemia of Chronic Disease with Iron Deficiency   - Hemoglobin 7-8, appears around baseline, trend hemoglobin daily, monitor for bleeding,  transfuse hemoglobin < 7 or symptomatic    #Mild Leukocytosis, elevated CRP  - No active signs and symptoms of infection, monitor off antibiotics for now    #Remote History RIJ DVT  - Continue Heparin drip, transition back to [facility] as able    #Debility  - Consult PT/OT following workup, anticipate would benefit from return to inpatient rehab     #Former Smoker  - Recommended cessation, nicotine replacement therapy as needed    F: NPO for now  E: Monitor & Replace as needed   N: NPO Diet NPO Type: Sips with Meds  PPx: Heparin drip   Code Status (Patient has no pulse and is not breathing): CPR (Attempt to Resuscitate)  Medical Interventions (Patient has pulse or is breathing): Full Support     Dispo: Pending workup and clinical improvement    *This patient is considered high risk secondary to atypical chest pain, elevated HS troponins, Hypertension requiring as needed medications, requiring further cardiac workup including imaging.     Edited by: Sam Slaughter MD at 3/17/2023 8523  [facility]

## 2023-03-17 NOTE — DISCHARGE SUMMARY
Date of admission: 3/10/2023  Date of discharge: 3/16/2023    Principal diagnosis: Debility status post prolonged hospitalization  Secondary diagnoses:  Acute on chronic hypoxic respiratory failure  Home O2 dependent COPD  HFrEF now with preserved EF  Chronic kidney disease stage IV  Severe hypertension  Coronary artery disease status post remote stenting  Recent non-ST elevation microinfarction at [place] no intervention done as patient apparently did not want intervention due to kidney disease  History of right IJ DVT  Right dialysis catheter in  Diabetes  Gastroesophageal reflux disease  Troponinemia with chest pain necessitating moved to acute unit  Constipation  Pericardial effusion by echo    Procedures:  2D echocardiogram  Chest x-ray    Exam:  See my notes from earlier today  Latest vital signs: 184/75, 80, on her home oxygen of 3 L 95% saturated, temperature 99.3     Hospital course: Patient was admitted after prolonged hospitalization both at short-term hospital in an LTAC.  She was transferred here for further rehabilitation.  She underwent intensive occupational physical therapy.  She was also seen by speech therapy initially but was thought to be without signs or symptoms of aspiration and was placed immediately on a regular diet and thin liquids.  Initially with Occupational Therapy, she was max assist for bathing, min assist upper body dressing, max lower body dressing min for grooming, dependent with toileting, supervision for self-feeding, initially with physical therapy patient was standby assist for bed mobility, min assist for transfers and initially walked 5 feet with front wheeled walker min assist.  After intensive therapy patient has been making significant progress although she had to be transferred out of our acute inpatient rehab prior to completion of our program.  She is now min assist for upper body dressing.  We have not come pleated the ADL recheck in several days but she is at  "last check max assist for bathing, max assist for lower body dressing.  Min assist for upper body dressing and min assist for grooming.  With physical therapy she is now walking 40 feet x 2 with a front wheeled walker.  Patient has known coronary artery disease.  She had a stent remotely in the past.  She had a non-STEMI at the [facility] but apparently did not want to consider intervention because of her kidney dysfunction.  She has evidence of fluid overload by chest x-ray, she is on Bumex, nephrology has been following while here.  She does have a dialysis catheter but does not require dialysis therapy.  Reportedly at the [facility] facility EF was 20 to 25%.  EF was normal at our facility.Patient had developed this DVT and was on triple therapy now.  I had attempted to call her cardiologist in [facility] but could not get through to the office.  I was thinking that possibly aspirin and Plavix could be stopped.  She initially was requiring 4 to 5 L of oxygen but we have been able to get her back down her home dose of oxygen at 3 L now.  She has been doing well however this evening she was complaining of just the chest \"soreness\".  And work-up for this had checked an EKG that was normal, chest x-ray showed some congestion but I did not think it changed significantly.  I did check however a troponin that was 98.  I rechecked a troponin 2 hours later that was 96.  Although the delta was not significant certainly these are elevated troponins.  Unfortunately the troponins at the [facility] were a different high-sensitivity troponin.  This was reported and picograms per cc.  Hours are nanograms per cc.  It is difficult to compare this to each other.  I was going observe the patient on the acute inpatient rehab facility however nursing called me and stated that patient had complained of chest discomfort in her left shoulder and back as well.  This being the case with a positive troponin decision was " made in the patient's best interest to transfer her to telemetry unit to the care of Dr. Lopez for further cardiology evaluation.  I discussed this case with Dr. Lopez.  Patient told me she wished that I would not call her .  Patient has severe hypertension and is on multiple medications.  I discussed this with her nephrologist this morning who did increase her Aldactone.  Patient may ultimately require minoxidil.  She apparently had been on this earlier in the visit.  Her Eliquis was stopped, she did not receive her dosing this evening, I relayed this to Dr. Rosado who is going to consider IV heparin.    Disposition [facility] telemetry unit care of Dr. Lopez    This was a greater than 30-minute discharge

## 2023-03-17 NOTE — PLAN OF CARE
Problem: Adult Inpatient Plan of Care  Goal: Plan of Care Review  Outcome: Ongoing, Progressing  Flowsheets (Taken 3/16/2023 1931)  Progress: no change  Plan of Care Reviewed With: patient  Outcome Evaluation: CONTINUE POC  Goal: Patient-Specific Goal (Individualized)  Outcome: Ongoing, Progressing  Goal: Absence of Hospital-Acquired Illness or Injury  Outcome: Ongoing, Progressing  Intervention: Identify and Manage Fall Risk  Recent Flowsheet Documentation  Taken 3/16/2023 2250 by Leah Durbin RN  Safety Promotion/Fall Prevention: (PT TRANSFERRED TO [address] [place] [address] 305A)  • safety round/check completed  • patient off unit  Taken 3/16/2023 2200 by Leah Durbin, RN  Safety Promotion/Fall Prevention: safety round/check completed  Taken 3/16/2023 2000 by Leah Durbin RN  Safety Promotion/Fall Prevention: safety round/check completed  Intervention: Prevent and Manage VTE (Venous Thromboembolism) Risk  Recent Flowsheet Documentation  Taken 3/16/2023 1931 by Leah Durbin RN  VTE Prevention/Management: (SEE MAR) --  Intervention: Prevent Infection  Recent Flowsheet Documentation  Taken 3/16/2023 2000 by Leah Durbin, RN  Infection Prevention:  • hand hygiene promoted  • rest/sleep promoted  Goal: Optimal Comfort and Wellbeing  Outcome: Ongoing, Progressing  Goal: Readiness for Transition of Care  Outcome: Ongoing, Progressing   Goal Outcome Evaluation:  Plan of Care Reviewed With: patient        Progress: no change  Outcome Evaluation: CONTINUE POC

## 2023-03-17 NOTE — H&P
Hospitalist History and Physical        Patient Identification  Name: Pati Cortes  Age/Sex: 67 y.o. female  :  1955        MRN: [id number]  Visit Number: [id number]  Admit Date: 3/16/2023   PCP: Gasper Reyes MD          Chief complaint chest/shoulder pain    History of Present Illness:  Patient is a 67 y.o. female with history of CAD s/p remote stent, HTN, and polycystic kidney disease, who was recently hospitalized for acute hypoxic respiratory failure secondary to COVID-19 pneumonia at [facility] in 2023 requiring intubation and mechanical ventilation. Hospitalization was complicated by NSTEMI with acute systolic CHF, with ECHO showing EF of 20-25%. Her respiratory failure ultimately improved and she was extubated on 23. She was weaned to 4-5L NC. Heart cath was discussed but ultimately they decided against this due to acute renal failure requiring hemodialysis, for which she had a right upper chest wall HD catheter placed. Hospital stay was also complicated by development of worsening anemia for which EGD was performed that showed gastritis. She was [facility] to an LTAC on . She was found to have a DVT in her right IJ central line which was removed and she was placed on IV heparin infusion. Heparin was transitioned to eliquis on . With intermittent dialysis and diuresis, her renal function did slowly improve and her last dialysis session was . She was transferred to Inpatient Rehab at [facility] on 3/10/23 for debility. She has been receiving diuresis from nephrology for ongoing fluid overload but has not required any further dialysis and renal function appears to have stabilized with baseline creatinine now around 2.0. She has been weaned from 4-5L NC to 3L NC during her time in rehab. Rehab documentation reports she is on 3L NC at home at baseline for COPD, but patient denies history of COPD and says she was not on oxygen until she was admitted for  COVID-19 pneumonia. She continues to use bipap at night since being extubated. Meanwhile ECHO performed here on 3/14/23 showd normal EF 66-70%, grade 1a diastolic dysfunction, posterior asymmetric hypertrophy that could be consistent with cardiac amyloidosis, mild aortic valve stenosis, and moderate 1-2 cm pericardial effusion without tamponade.     Today, patient began to complain of soreness in her left lateral chest, shoulder and back. EKG was unremarkable and CXR showed some congestion without any significant change appreciated compared to prior imaging. Troponin was elevated at 98 and trended down to 96 two hours later. Troponin was elevated at [facility] as well, but they used a different assay so it is difficult to compare levels. Inpatient rehab physician spoke with on-call cardiologist Dr Padron who agreed to see the patient in consultation and review her ECHO in the morning given the significant discrepancy in EF measurements compared to her ECHO at [facility]. The initial plan was to keep her in the rehab unit and repeat a troponin level in the morning. However, later in the evening the patient once again complained of pain in her left lateral chest, shoulder and back, so the decision was made to transfer her to the telemetry unit for further cardiology evaluation. Patient tells me that in both situations today, her pain occurred while at rest. She reports the pain is primarily in her back, and she has reproducible pain with palpation over her left scapula. She reports associated dry mouth, diaphoresis, and nausea with the episodes of pain. She denies dyspnea or lower extremity edema. She denies fever, chills, or cough.     Review of Systems  Review of Systems   Constitutional: Positive for diaphoresis (associated with chest/shoulder/back pain). Negative for activity change, appetite change, chills, fatigue, fever and unexpected weight change.   HENT: Negative for congestion, postnasal drip, rhinorrhea,  sinus pressure, sinus pain and sore throat.    Eyes: Negative for photophobia, pain, discharge, redness, itching and visual disturbance.   Respiratory: Negative for cough, shortness of breath and wheezing.    Cardiovascular: Positive for chest pain (lateral chest wall/anterior shoulder and back). Negative for palpitations and leg swelling.   Gastrointestinal: Positive for nausea. Negative for abdominal distention, abdominal pain, constipation, diarrhea and vomiting.   Endocrine: Negative for cold intolerance, heat intolerance, polydipsia, polyphagia and polyuria.   Genitourinary: Negative for difficulty urinating, dysuria, flank pain, frequency and hematuria.   Musculoskeletal: Negative for arthralgias, back pain, joint swelling, myalgias, neck pain and neck stiffness.   Skin: Negative for color change, pallor, rash and wound.   Neurological: Negative for dizziness, tremors, seizures, syncope, weakness, light-headedness, numbness and headaches.   Hematological: Negative for adenopathy. Does not bruise/bleed easily.   Psychiatric/Behavioral: Negative for agitation, behavioral problems and confusion.       History  Past Medical History:   Diagnosis Date   • Arthritis    • Chronic respiratory failure with hypoxia     after acute respiratory failure in recent hospitalization in 1/2023 secondary to COVID-19, weaned from vent to 3L NC with bipap at night   • CKD (chronic kidney disease), stage IV (HCC)     recently on dialysis for SWATI at [facility], has right upper chest wall dialysis catheter, no dialysis in a couple weeks per patient, baseline creatine reportedly has settled around 2   • History of coronary artery disease     sp stent placement remotely   • History of DVT in adulthood     in right IJ, discovered during recent hospitalization in Jan 2023   • Hypertension    • Polycystic kidney disease    • Systolic CHF (HCC)     diagnosed based on ECHO showing low EF at outside hospital in 1/2023, but with  normalized EF on ECHO here on 3/14/23, but with diastolic CHF noted     *  Gastritis per EGD at outside facility    Past Surgical History:   Procedure Laterality Date   • APPENDECTOMY     • CARDIAC CATHETERIZATION     • HYSTERECTOMY      partial, still has left ovary   • INSERTION HEMODIALYSIS CATHETER       Family History   Problem Relation Age of Onset   • Heart disease Mother    • Stroke Mother    • Heart disease Father      Social History     Tobacco Use   • Smoking status: Former     Types: Cigarettes   • Smokeless tobacco: Never   Vaping Use   • Vaping Use: Never used   Substance Use Topics   • Alcohol use: Never   • Drug use: Never     Medications Prior to Admission   Medication Sig Dispense Refill Last Dose   • Aspirin Low Dose 81 MG EC tablet Take 1 tablet by mouth Daily.      • atenolol (TENORMIN) 50 MG tablet Take 1 tablet by mouth Daily.      • busPIRone (BUSPAR) 5 MG tablet Take 1 tablet by mouth 3 (Three) Times a Day.      • cholecalciferol (VITAMIN D3) 25 MCG (1000 UT) tablet Take 1 tablet by mouth Daily.      • cloNIDine (CATAPRES) 0.1 MG tablet Take 1 tablet by mouth 3 (Three) Times a Day.      • clopidogrel (PLAVIX) 75 MG tablet Take 1 tablet by mouth Daily.      • GNP Vitamin C 500 MG tablet Take 1 tablet by mouth 2 (Two) Times a Day.      • hydrALAZINE (APRESOLINE) 50 MG tablet Take 1 tablet by mouth 3 (Three) Times a Day.      • hydrOXYzine pamoate (VISTARIL) 25 MG capsule Take 1 capsule by mouth 3 (Three) Times a Day.      • isosorbide mononitrate (IMDUR) 30 MG 24 hr tablet Take 1 tablet by mouth Daily.      • metoprolol succinate XL (TOPROL-XL) 25 MG 24 hr tablet Take 1 tablet by mouth Daily.      • NIFEdipine XL (PROCARDIA XL) 30 MG 24 hr tablet Take 1 tablet by mouth Daily.      • Zinc Sulfate 220 (50 Zn) MG tablet Take 1 tablet by mouth 2 (Two) Times a Day.        Allergies:  Patient has no known allergies.    Objective     Vital Signs  Temp:  [97.7 °F (36.5 °C)-99.3 °F (37.4 °C)] 97.7 °F  (36.5 °C)  Heart Rate:  [60-80] 67  Resp:  [18-20] 20  BP: (165-184)/(62-88) 184/75  There is no height or weight on file to calculate BMI.    Physical Exam:  Physical Exam  Constitutional:       General: She is not in acute distress.     Appearance: Normal appearance. She is ill-appearing (chronically so).   HENT:      Head: Normocephalic and atraumatic.      Right Ear: External ear normal.      Left Ear: External ear normal.      Nose: Nose normal.      Mouth/Throat:      Mouth: Mucous membranes are moist.      Pharynx: Oropharynx is clear.   Eyes:      Extraocular Movements: Extraocular movements intact.      Conjunctiva/sclera: Conjunctivae normal.      Pupils: Pupils are equal, round, and reactive to light.   Cardiovascular:      Rate and Rhythm: Normal rate and regular rhythm.      Pulses: Normal pulses.      Heart sounds: Normal heart sounds. No murmur heard.  Pulmonary:      Effort: Pulmonary effort is normal. No respiratory distress.      Breath sounds: Normal breath sounds. No wheezing.   Chest:      Chest wall: Tenderness (left shoulder and left scapula more so than chest) present.   Abdominal:      General: Abdomen is flat. Bowel sounds are normal. There is no distension.      Palpations: Abdomen is soft.      Tenderness: There is no abdominal tenderness.   Musculoskeletal:         General: Normal range of motion.      Cervical back: Normal range of motion and neck supple. No tenderness.      Right lower leg: Edema (trace to 1+) present.      Left lower leg: Edema (trace to 1+) present.   Lymphadenopathy:      Cervical: No cervical adenopathy.   Skin:     General: Skin is warm and dry.      Capillary Refill: Capillary refill takes less than 2 seconds.   Neurological:      General: No focal deficit present.      Mental Status: She is alert and oriented to person, place, and time.   Psychiatric:         Mood and Affect: Mood normal.         Behavior: Behavior normal.         Thought Content: Thought  content normal.         Judgment: Judgment normal.           Results Review:       Lab Results:  Results from last 7 days   Lab Units 03/16/23  2316 03/15/23  0138 03/14/23  0136 03/12/23  0049 03/11/23  0136   WBC 10*3/mm3 12.99* 11.17* 11.36* 9.32 9.27   HEMOGLOBIN g/dL 8.4* 8.2* 7.8* 8.0* 7.7*   PLATELETS 10*3/mm3 432 506* 501* 487* 470*         Results from last 7 days   Lab Units 03/16/23  2316 03/15/23  0138 03/14/23  0136 03/12/23  0049 03/11/23  0136   SODIUM mmol/L 139 138 136 136 135*   POTASSIUM mmol/L 4.2 4.0 4.0 3.7 3.8   CHLORIDE mmol/L 102 100 99 97* 95*   CO2 mmol/L 24.8 27.0 27.6 29.0 29.1*   BUN mg/dL 44* 53* 54* 51* 47*   CREATININE mg/dL 2.06* 2.16* 2.39* 2.20* 2.01*   CALCIUM mg/dL 8.5* 8.7 8.4* 8.9 9.2   GLUCOSE mg/dL 119* 102* 98 110* 93     Results from last 7 days   Lab Units 03/11/23 0136   MAGNESIUM mg/dL 2.0     Hemoglobin A1C   Date Value Ref Range Status   03/14/2023 5.10 4.80 - 5.60 % Final     Results from last 7 days   Lab Units 03/16/23 2316 03/14/23 0136 03/11/23 0136   BILIRUBIN mg/dL 0.3 0.2 0.3   ALK PHOS U/L 103 113 106   AST (SGOT) U/L 11 11 12   ALT (SGPT) U/L 6 7 5     Results from last 7 days   Lab Units 03/16/23 2316 03/16/23  1934 03/16/23  1730   HSTROP T ng/L 98* 96* 98*         Results from last 7 days   Lab Units 03/16/23 2316   INR  1.28*           I have reviewed the patient's laboratory results.    Imaging:  Imaging Results (Last 72 Hours)     ** No results found for the last 72 hours. **      CXR 3/16/23 1840  1.  Vascular congestion or volume overload with interstitial edema and pleural effusions, slightly increased since 3/11/2023.  2.  Infiltrate or atelectasis in the left lung base, unchanged.  3.  Dialysis catheter.    I have personally reviewed the patient's radiologic imaging.        EKG:   Normal sinus rhythm, HR 76, QTc 461  Normal ECG  No previous ECGs available  Confirmed by Brittany Najera ([id number]) on 3/16/2023 10:48:13 PM    I have personally  reviewed the patient's EKG.        Assessment & Plan     - NSTEMI (non-ST elevated myocardial infarction) (HCC): continue ASA, plavix, statin. Hold eliquis (last dose this morning) and transition to IV heparin drip now per cardiology recommendation. Repeat troponin in the morning. Dr Padron, cardiology, is aware of the patient and will see in consultation in the morning. Will keep NPO after midnight per his recommendation. NSTEMI could be type II from renal failure, CHF/fluid overload or uncontrolled HTN, but since having angina-equivalent symptoms and since had reported NSTEMI while at [facility] in Jan 2023 but workup was reportedly deferred because of her renal failure at the time, will treat as true NSTEMI for now until further evaluation can be performed.     - CKD: developed SWATI during recent hospitalization requiring initiation of HD, but has had improvement in renal function and has not required HD in a few weeks now. Creatine appears to be settling around 2.0 with GFR 25, classifying her as stage IV CKD. Nephrology following and diuresing for ongoing fluid overload and pericardial effusion. Continue to monitor. Appreciate nephrology assistance.  - Chronic hypoxia after suffering acute hypoxic respiratory failure secondary to COVID-19 pneumonia in 1/23. Continue 3L NC which appears to be baseline now, with bipap at night PRN. CXR this evening note felt to show significant change from prior exam and denies any respiratory symptoms. Diurese per nephrology.   - Systolic CHF diagnosed per ECHO during January 2023 hospitalization, with normalization of EF per ECHO here on 3/14/23 with grade 1a diastolic dysfunction and pericardial effusion: Dr Padron to review ECHO in the morning given significant discrepancy in EF compared to 1/2023 ECHO (20-25% then vs 66-70% now). Nephrology still diuresing as noted above.  - Hypertension: BP remains uncontrolled, 180s systolic this evening in inpatient rehab. Nephrology  increased spironolactone dose this morning. Continue to monitor. Will make IV hydralazine available PRN for SBP>170, as uncontrolled BP could be contributing to troponin elevation as mentioned above, not to mention it could be contributing to her angina equivalent symptoms as well.     - Mild leukocytosis: suspect reactive from new onset chest pain. CXR does not show definitive pneumonia and patient denies respiratory symptoms of fever. Will check CRP, procal, lactate and blood cultures to evaluate further. Continue to trend WBC.     DVT/GI Prophylaxis: IV Heparin drip now; PO protonix    Estimated Length of Stay >2 midnights    I discussed the patient's findings, assessment and plan with the patient, inpatient rehab provider Dr Cummings, and cardiologist Dr Padorn.    * patient is high risk due to NSTEMI    Javier Lopez MD  03/16/23  23:55 EDT

## 2023-03-18 LAB
ALBUMIN SERPL-MCNC: 2.5 G/DL (ref 3.5–5.2)
ALBUMIN/GLOB SERPL: 0.8 G/DL
ALP SERPL-CCNC: 94 U/L (ref 39–117)
ALT SERPL W P-5'-P-CCNC: 6 U/L (ref 1–33)
ANION GAP SERPL CALCULATED.3IONS-SCNC: 12.3 MMOL/L (ref 5–15)
APTT PPP: 47 SECONDS (ref 26.5–34.5)
AST SERPL-CCNC: 29 U/L (ref 1–32)
BASOPHILS # BLD AUTO: 0.04 10*3/MM3 (ref 0–0.2)
BASOPHILS NFR BLD AUTO: 0.4 % (ref 0–1.5)
BILIRUB SERPL-MCNC: 0.2 MG/DL (ref 0–1.2)
BUN SERPL-MCNC: 42 MG/DL (ref 8–23)
BUN/CREAT SERPL: 20.3 (ref 7–25)
CALCIUM SPEC-SCNC: 8.8 MG/DL (ref 8.6–10.5)
CHLORIDE SERPL-SCNC: 102 MMOL/L (ref 98–107)
CO2 SERPL-SCNC: 22.7 MMOL/L (ref 22–29)
CREAT SERPL-MCNC: 2.07 MG/DL (ref 0.57–1)
DEPRECATED RDW RBC AUTO: 57.9 FL (ref 37–54)
EGFRCR SERPLBLD CKD-EPI 2021: 25.7 ML/MIN/1.73
EOSINOPHIL # BLD AUTO: 0.21 10*3/MM3 (ref 0–0.4)
EOSINOPHIL NFR BLD AUTO: 1.8 % (ref 0.3–6.2)
ERYTHROCYTE [DISTWIDTH] IN BLOOD BY AUTOMATED COUNT: 17 % (ref 12.3–15.4)
GLOBULIN UR ELPH-MCNC: 3.3 GM/DL
GLUCOSE BLDC GLUCOMTR-MCNC: 118 MG/DL (ref 70–130)
GLUCOSE BLDC GLUCOMTR-MCNC: 176 MG/DL (ref 70–130)
GLUCOSE BLDC GLUCOMTR-MCNC: 82 MG/DL (ref 70–130)
GLUCOSE BLDC GLUCOMTR-MCNC: 95 MG/DL (ref 70–130)
GLUCOSE SERPL-MCNC: 81 MG/DL (ref 65–99)
HCT VFR BLD AUTO: 27.6 % (ref 34–46.6)
HGB BLD-MCNC: 8.2 G/DL (ref 12–15.9)
IMM GRANULOCYTES # BLD AUTO: 0.08 10*3/MM3 (ref 0–0.05)
IMM GRANULOCYTES NFR BLD AUTO: 0.7 % (ref 0–0.5)
LYMPHOCYTES # BLD AUTO: 1.91 10*3/MM3 (ref 0.7–3.1)
LYMPHOCYTES NFR BLD AUTO: 16.8 % (ref 19.6–45.3)
MCH RBC QN AUTO: 27.7 PG (ref 26.6–33)
MCHC RBC AUTO-ENTMCNC: 29.7 G/DL (ref 31.5–35.7)
MCV RBC AUTO: 93.2 FL (ref 79–97)
MONOCYTES # BLD AUTO: 0.87 10*3/MM3 (ref 0.1–0.9)
MONOCYTES NFR BLD AUTO: 7.6 % (ref 5–12)
NEUTROPHILS NFR BLD AUTO: 72.7 % (ref 42.7–76)
NEUTROPHILS NFR BLD AUTO: 8.27 10*3/MM3 (ref 1.7–7)
NRBC BLD AUTO-RTO: 0 /100 WBC (ref 0–0.2)
PLATELET # BLD AUTO: 445 10*3/MM3 (ref 140–450)
PMV BLD AUTO: 9.9 FL (ref 6–12)
POTASSIUM SERPL-SCNC: 4.5 MMOL/L (ref 3.5–5.2)
PROT SERPL-MCNC: 5.8 G/DL (ref 6–8.5)
RBC # BLD AUTO: 2.96 10*6/MM3 (ref 3.77–5.28)
SODIUM SERPL-SCNC: 137 MMOL/L (ref 136–145)
WBC NRBC COR # BLD: 11.38 10*3/MM3 (ref 3.4–10.8)

## 2023-03-18 PROCEDURE — 25010000002 HEPARIN (PORCINE) PER 1000 UNITS: Performed by: HOSPITALIST

## 2023-03-18 PROCEDURE — 85730 THROMBOPLASTIN TIME PARTIAL: CPT | Performed by: INTERNAL MEDICINE

## 2023-03-18 PROCEDURE — 94660 CPAP INITIATION&MGMT: CPT

## 2023-03-18 PROCEDURE — 25010000002 HYDRALAZINE PER 20 MG: Performed by: HOSPITALIST

## 2023-03-18 PROCEDURE — 80053 COMPREHEN METABOLIC PANEL: CPT | Performed by: INTERNAL MEDICINE

## 2023-03-18 PROCEDURE — 94799 UNLISTED PULMONARY SVC/PX: CPT

## 2023-03-18 PROCEDURE — 82962 GLUCOSE BLOOD TEST: CPT

## 2023-03-18 PROCEDURE — 99233 SBSQ HOSP IP/OBS HIGH 50: CPT | Performed by: INTERNAL MEDICINE

## 2023-03-18 PROCEDURE — 85025 COMPLETE CBC W/AUTO DIFF WBC: CPT | Performed by: INTERNAL MEDICINE

## 2023-03-18 PROCEDURE — 25010000002 FUROSEMIDE PER 20 MG: Performed by: INTERNAL MEDICINE

## 2023-03-18 RX ORDER — FUROSEMIDE 10 MG/ML
80 INJECTION INTRAMUSCULAR; INTRAVENOUS ONCE
Status: COMPLETED | OUTPATIENT
Start: 2023-03-18 | End: 2023-03-18

## 2023-03-18 RX ADMIN — BUSPIRONE HYDROCHLORIDE 7.5 MG: 5 TABLET ORAL at 10:04

## 2023-03-18 RX ADMIN — CLONIDINE HYDROCHLORIDE 0.3 MG: 0.3 TABLET ORAL at 05:14

## 2023-03-18 RX ADMIN — METOPROLOL TARTRATE 50 MG: 50 TABLET, FILM COATED ORAL at 13:18

## 2023-03-18 RX ADMIN — AMLODIPINE BESYLATE 10 MG: 10 TABLET ORAL at 10:04

## 2023-03-18 RX ADMIN — HYDRALAZINE HYDROCHLORIDE 100 MG: 50 TABLET, FILM COATED ORAL at 05:14

## 2023-03-18 RX ADMIN — Medication 1000 UNITS: at 10:03

## 2023-03-18 RX ADMIN — ISOSORBIDE DINITRATE 20 MG: 20 TABLET ORAL at 13:18

## 2023-03-18 RX ADMIN — FUROSEMIDE 80 MG: 10 INJECTION, SOLUTION INTRAVENOUS at 12:55

## 2023-03-18 RX ADMIN — HYDROXYZINE HYDROCHLORIDE 25 MG: 25 TABLET ORAL at 18:24

## 2023-03-18 RX ADMIN — FLUTICASONE PROPIONATE 2 SPRAY: 50 SPRAY, METERED NASAL at 10:05

## 2023-03-18 RX ADMIN — CLOPIDOGREL BISULFATE 75 MG: 75 TABLET, FILM COATED ORAL at 10:03

## 2023-03-18 RX ADMIN — FERROUS SULFATE TAB 325 MG (65 MG ELEMENTAL FE) 325 MG: 325 (65 FE) TAB at 18:11

## 2023-03-18 RX ADMIN — HYDRALAZINE HYDROCHLORIDE 100 MG: 50 TABLET, FILM COATED ORAL at 00:06

## 2023-03-18 RX ADMIN — HYDROXYZINE HYDROCHLORIDE 25 MG: 25 TABLET ORAL at 10:11

## 2023-03-18 RX ADMIN — BUSPIRONE HYDROCHLORIDE 7.5 MG: 5 TABLET ORAL at 18:12

## 2023-03-18 RX ADMIN — BUSPIRONE HYDROCHLORIDE 7.5 MG: 5 TABLET ORAL at 20:30

## 2023-03-18 RX ADMIN — PANTOPRAZOLE SODIUM 40 MG: 40 TABLET, DELAYED RELEASE ORAL at 10:05

## 2023-03-18 RX ADMIN — CLONIDINE HYDROCHLORIDE 0.3 MG: 0.3 TABLET ORAL at 22:37

## 2023-03-18 RX ADMIN — GUAIFENESIN 600 MG: 600 TABLET, EXTENDED RELEASE ORAL at 10:03

## 2023-03-18 RX ADMIN — METOPROLOL TARTRATE 50 MG: 50 TABLET, FILM COATED ORAL at 05:16

## 2023-03-18 RX ADMIN — ATORVASTATIN CALCIUM 80 MG: 40 TABLET, FILM COATED ORAL at 20:30

## 2023-03-18 RX ADMIN — ISOSORBIDE DINITRATE 20 MG: 20 TABLET ORAL at 18:12

## 2023-03-18 RX ADMIN — HEPARIN SODIUM 2100 UNITS: 5000 INJECTION, SOLUTION INTRAVENOUS; SUBCUTANEOUS at 00:05

## 2023-03-18 RX ADMIN — ISOSORBIDE DINITRATE 20 MG: 20 TABLET ORAL at 10:04

## 2023-03-18 RX ADMIN — HYDRALAZINE HYDROCHLORIDE 100 MG: 50 TABLET, FILM COATED ORAL at 18:11

## 2023-03-18 RX ADMIN — HYDRALAZINE HYDROCHLORIDE 100 MG: 50 TABLET, FILM COATED ORAL at 13:17

## 2023-03-18 RX ADMIN — FERROUS SULFATE TAB 325 MG (65 MG ELEMENTAL FE) 325 MG: 325 (65 FE) TAB at 10:04

## 2023-03-18 RX ADMIN — METOPROLOL TARTRATE 50 MG: 50 TABLET, FILM COATED ORAL at 22:37

## 2023-03-18 RX ADMIN — BUMETANIDE 4 MG: 1 TABLET ORAL at 10:04

## 2023-03-18 RX ADMIN — PANTOPRAZOLE SODIUM 40 MG: 40 TABLET, DELAYED RELEASE ORAL at 18:24

## 2023-03-18 RX ADMIN — Medication 10 ML: at 20:32

## 2023-03-18 RX ADMIN — Medication 10 ML: at 10:05

## 2023-03-18 RX ADMIN — SPIRONOLACTONE 100 MG: 100 TABLET ORAL at 10:03

## 2023-03-18 RX ADMIN — GUAIFENESIN 600 MG: 600 TABLET, EXTENDED RELEASE ORAL at 20:31

## 2023-03-18 RX ADMIN — Medication 5 MG: at 20:30

## 2023-03-18 RX ADMIN — HYDRALAZINE HYDROCHLORIDE 10 MG: 20 INJECTION INTRAMUSCULAR; INTRAVENOUS at 03:23

## 2023-03-18 RX ADMIN — ASPIRIN 81 MG: 81 TABLET ORAL at 10:04

## 2023-03-18 NOTE — PLAN OF CARE
Goal Outcome Evaluation:               Pt resting in bed. Heparin drip dc'd today. Pt was given IV lasix per Dr order. No s/s acute distress noted. Call light in reach. Care ongoing.

## 2023-03-18 NOTE — PROGRESS NOTES
Nephrology Progress Note      Subjective     Patient is feeling much better denies any chest pain according to her she has decided to do medical management rather than    Objective       Vital signs :     Temp:  [97.9 °F (36.6 °C)-98.2 °F (36.8 °C)] 98 °F (36.7 °C)  Heart Rate:  [61-83] 61  Resp:  [18-20] 18  BP: (160-195)/() 184/79    Intake/Output                             03/16/23 0701 - 03/17/23 0700 03/17/23 0701 - 03/18/23 0700 03/18/23 0701 - 03/19/23 0700     [id number] 9547-1271 Total 1465-9372 3646-5016 Total 2459-1233 7598-1002 Total                    Intake    P.O.  --  240 240  0  590 590  255  -- 255    Total Intake -- 240 240 0 590 590 255 -- 255       Output    Urine  --  100 100  --  420 420  --  -- --    Total Output -- 100 100 -- 420 420 -- -- --           Physical Exam:    General Appearance : no chest pain or shortness of breath.   Lungs : clear to auscultation, respirations regular  Heart :  regular rhythm & normal rate, normal S1, S2 and no murmur, no rub  Abdomen : soft, non distended  Extremities : no edema, right IJ catheter  Neurologic :   orientated to person, place, time and situation, Grossly no focal deficits        Laboratory Data :     Albumin Albumin   Date Value Ref Range Status   03/18/2023 2.5 (L) 3.5 - 5.2 g/dL Final   03/16/2023 2.8 (L) 3.5 - 5.2 g/dL Final      Magnesium No results found for: MG       PTH               No results found for: PTH    CBC and coagulation:  Results from last 7 days   Lab Units 03/18/23  0801 03/17/23  0007 03/16/23  2316 03/15/23  0138   PROCALCITONIN ng/mL  --  0.07  --   --    CRP mg/dL  --   --  1.79*  --    WBC 10*3/mm3 11.38*  --  12.99* 11.17*   HEMOGLOBIN g/dL 8.2*  --  8.4* 8.2*   HEMATOCRIT % 27.6*  --  27.4* 27.2*   MCV fL 93.2  --  93.2 93.8   MCHC g/dL 29.7*  --  30.7* 30.1*   PLATELETS 10*3/mm3 445  --  432 506*   INR   --   --  1.28*  --      Acid/base balance:      Renal and electrolytes:    Results from last 7 days   Lab  Units 03/18/23  0801 03/16/23  2316 03/15/23  0138 03/14/23  0136 03/12/23  0049   SODIUM mmol/L 137 139 138 136 136   POTASSIUM mmol/L 4.5 4.2 4.0 4.0 3.7   CHLORIDE mmol/L 102 102 100 99 97*   CO2 mmol/L 22.7 24.8 27.0 27.6 29.0   BUN mg/dL 42* 44* 53* 54* 51*   CREATININE mg/dL 2.07* 2.06* 2.16* 2.39* 2.20*   CALCIUM mg/dL 8.8 8.5* 8.7 8.4* 8.9     Estimated Creatinine Clearance: 24.6 mL/min (A) (by C-G formula based on SCr of 2.07 mg/dL (H)).  @GFRCG:3@   Liver and pancreatic function:  Results from last 7 days   Lab Units 03/18/23  0801 03/16/23 2316 03/14/23  0136   ALBUMIN g/dL 2.5* 2.8* 2.5*   BILIRUBIN mg/dL 0.2 0.3 0.2   ALK PHOS U/L 94 103 113   AST (SGOT) U/L 29 11 11   ALT (SGPT) U/L 6 6 7         Cardiac:  Results from last 7 days   Lab Units 03/17/23  0824   PROBNP pg/mL >70,000.0*     Liver and pancreatic function:  Results from last 7 days   Lab Units 03/18/23  0801 03/16/23 2316 03/14/23  0136   ALBUMIN g/dL 2.5* 2.8* 2.5*   BILIRUBIN mg/dL 0.2 0.3 0.2   ALK PHOS U/L 94 103 113   AST (SGOT) U/L 29 11 11   ALT (SGPT) U/L 6 6 7       Medications :     amLODIPine, 10 mg, Oral, Q24H  aspirin, 81 mg, Oral, Daily  atorvastatin, 80 mg, Oral, Nightly  bumetanide, 4 mg, Oral, Daily  busPIRone, 7.5 mg, Oral, TID  cholecalciferol, 1,000 Units, Oral, Daily  cloNIDine, 0.3 mg, Per PEG Tube, Q8H  clopidogrel, 75 mg, Per PEG Tube, Daily  docusate sodium, 100 mg, Oral, BID  ferrous sulfate, 325 mg, Oral, BID With Meals  fluticasone, 2 spray, Each Nare, Daily  furosemide, 80 mg, Intravenous, Once  guaiFENesin, 600 mg, Oral, Q12H  hydrALAZINE, 100 mg, Oral, Q6H  isosorbide dinitrate, 20 mg, Oral, TID - Nitrates  melatonin, 5 mg, Oral, Nightly  metoprolol tartrate, 50 mg, Oral, Q8H  pantoprazole, 40 mg, Oral, BID AC  polyethylene glycol, 17 g, Oral, Daily  senna-docusate sodium, 2 tablet, Oral, BID  sodium chloride, 10 mL, Intravenous, Q12H  spironolactone, 100 mg, Oral, Daily      heparin, 12 Units/kg/hr, Last  Rate: 18 Units/kg/hr (03/18/23 0004)          Assessment & Plan     1.  CKD stage IV  2.  Resolved acute tubular necrosis  3.  Personal history of acute dialysis  4.  Acute on chronic hypoxic respiratory failure  5.  Systolic plus diastolic CHF compensated  6.  Left ventricular ejection fraction 30%  7.  Coronary artery disease with history of stent  8.  Type 2 diabetes  9.  Anemia of acute on chronic disease with iron deficiency.    Received Ferrlecit 250 mg 3/12/2023  10. Debility and deconditioning     Patient's creatinine is stable at 2.0 patient states that she has opted for medical management for coronary artery disease as pt has history of CAD and PCI and has had multiple discussion to evaluated likely ischemic cardiac symptoms with cardiac cath but she used to refuse with concern of worsening CKD. As she has dialysis cath placed and we can do dialysis post cath to avoid contrast induced nephropathy. Educated and counseled the patient, she is agreeable to proceed to cardiac cath.     Continue holding dialysis, uncontrolled BP, continue on [id number]    Prognosis guarded    Plan of care was discussed with the patient she understood verbalized agreed    S Adelfo Dodson MD  03/18/23  11:17 EDT

## 2023-03-18 NOTE — PROGRESS NOTES
LOS: 2 days     Name: Pati Cortes  Age/Sex: 67 y.o. female  :  1955        PCP: Gasper Reyes MD  REF: No Known Provider    Principal Problem:    NSTEMI (non-ST elevated myocardial infarction) (HCC)      Reason for follow-up: Chest pain and elevated troponin     Subjective     Subjective     Pati Cortes is a 67 y.o. female with a past medical history significant for chronic kidney disease stage IV (history of dialysis and recent hospitalization at an outside facility however patient reported that she has not had dialysis in a couple weeks and her creatinine baseline is around 2), history of CAD status post stent in remote history, hypertension, systolic HFimpEF (low EF 2023 from echo from outside facility however appears normalized 2023 with echo here), History of DVT in right IJ 2023, cystic kidney disease,  Patient presented to [facility]) inpatient rehab facility on 03/10/2023 for debility status post prolonged hospitalization at a outlying facility.  However on on 3/16/2023 she complained of chest pain radiating to her left shoulder and back described as soreness    Interval History: Nuclear stress test showed small size moderate ischemia located in the apex.  Noted to be hypertensive.  High-sensitivity troponin up to 101 from 98.  On IV heparin.  Creatinine 2.06 today.  Patient continues to decline left heart catheterization and would like to have this performed with her primary cardiologist.  Denies any chest pain.    Vital Signs  Temp:  [97.9 °F (36.6 °C)-98.2 °F (36.8 °C)] 98.2 °F (36.8 °C)  Heart Rate:  [66-83] 66  Resp:  [18-20] 18  BP: (160-195)/() 185/77     Vital Signs (last 72 hrs)       03/15 0700   0659  0700   0659  0700   0659  0700   0810   Most Recent      Temp (°F)     97.7    97.9 -  98.3      98.2     98.2 (36.8)  0706    Heart Rate   67 -  69    66 -  83      66     66  0706     Resp     20    18 -  20      18     18 03/18 0706    BP   178/78 -  184/75    160/75 -  195/83      185/77     185/77 03/18 0706    SpO2 (%)     93    95 -  98      98     98 03/18 0706        Documented weights    03/17/23 1331 03/18/23 0510   Weight: 69.9 kg (154 lb 1.6 oz) 70.9 kg (156 lb 3.2 oz)      Body mass index is 27.68 kg/m².    Intake/Output Summary (Last 24 hours) at 3/18/2023 0920  Last data filed at 3/18/2023 0436  Gross per 24 hour   Intake 590 ml   Output 420 ml   Net 170 ml     Objective    Objective       Physical Exam:     General Appearance:    Alert, cooperative, in no acute distress   Head:    Normocephalic, without obvious abnormality, atraumatic   Eyes:            Conjunctivae and sclerae normal, no   icterus, no pallor, corneas clear.   Neck:   No adenopathy, supple, trachea midline, no thyromegaly, no   carotid bruit, no JVD   Lungs:     Clear to auscultation,respirations regular, even and                  unlabored    Heart:    Regular rhythm and normal rate, normal S1 and S2, no            murmur, no gallop, no rub, no click   Chest Wall:    No abnormalities observed   Abdomen:     Normal bowel sounds, no masses, no organomegaly, soft        nontender, nondistended, no guarding, no rebound                tenderness   Extremities:   Moves all extremities well, no edema, no cyanosis, no             redness   Pulses:   Pulses palpable and equal bilaterally   Skin:   No bleeding, bruising or rash       Neurologic:   Alert and oriented      Results review       Results Review:   Results from last 7 days   Lab Units 03/18/23  0801 03/16/23  2316 03/15/23  0138 03/14/23  0136 03/12/23  0049   WBC 10*3/mm3 11.38* 12.99* 11.17* 11.36* 9.32   HEMOGLOBIN g/dL 8.2* 8.4* 8.2* 7.8* 8.0*   PLATELETS 10*3/mm3 445 432 506* 501* 487*     Results from last 7 days   Lab Units 03/16/23 2316 03/15/23  0138 03/14/23 0136 03/12/23  0049   SODIUM mmol/L 139 138 136 136   POTASSIUM mmol/L 4.2 4.0 4.0 3.7    CHLORIDE mmol/L 102 100 99 97*   CO2 mmol/L 24.8 27.0 27.6 29.0   BUN mg/dL 44* 53* 54* 51*   CREATININE mg/dL 2.06* 2.16* 2.39* 2.20*   CALCIUM mg/dL 8.5* 8.7 8.4* 8.9   GLUCOSE mg/dL 119* 102* 98 110*   ALT (SGPT) U/L 6  --  7  --    AST (SGOT) U/L 11  --  11  --      Results from last 7 days   Lab Units 03/17/23  0824 03/16/23  2316 03/16/23  1934 03/16/23  1730   HSTROP T ng/L 101* 98* 96* 98*     Lab Results   Component Value Date    INR 1.28 (H) 03/16/2023    INR 1.06 02/21/2023    INR 1.07 01/27/2023    INR 1.25 (H) 01/21/2023     Lab Results   Component Value Date    MG 2.0 03/11/2023    MG 1.9 02/17/2023    MG 2.2 02/16/2023     Lab Results   Component Value Date    TRIG 132 03/16/2023    HDL 34 (L) 03/16/2023    LDL 67 03/16/2023      Imaging Results (Last 48 Hours)     ** No results found for the last 48 hours. **        No results found for: BNP           Echo   Results for orders placed during the hospital encounter of 03/10/23    Adult Transthoracic Echo Complete W/ Cont if Necessary Per Protocol    Interpretation Summary  •  Left ventricular systolic function is normal. Left ventricular ejection fraction appears to be 66 - 70%.  •  Left ventricular wall thickness is consistent with posterior asymmetric hypertrophy, with ventricular consistent sleep could be consistent with cardiac amyloidosis, clinical correlation is required.  •  Left ventricular diastolic function is consistent with (grade Ia w/high LAP) impaired relaxation.  •  The right atrial cavity is mildly  dilated.  •  Mild aortic valve stenosis is present.  •  Estimated right ventricular systolic pressure from tricuspid regurgitation is mildly elevated (35-45 mmHg).  •  There is a moderate (1-2cm) pericardial effusion. There is no evidence of cardiac tamponade.       I reviewed the patient's new clinical results.    Telemetry:  NSR 60-70 bpm        Medication Review:   amLODIPine, 10 mg, Oral, Q24H  aspirin, 81 mg, Oral,  Daily  atorvastatin, 80 mg, Oral, Nightly  bumetanide, 4 mg, Oral, Daily  busPIRone, 7.5 mg, Oral, TID  cholecalciferol, 1,000 Units, Oral, Daily  cloNIDine, 0.3 mg, Per PEG Tube, Q8H  clopidogrel, 75 mg, Per PEG Tube, Daily  docusate sodium, 100 mg, Oral, BID  ferrous sulfate, 325 mg, Oral, BID With Meals  fluticasone, 2 spray, Each Nare, Daily  furosemide, 80 mg, Intravenous, Once  guaiFENesin, 600 mg, Oral, Q12H  hydrALAZINE, 100 mg, Oral, Q6H  isosorbide dinitrate, 20 mg, Oral, TID - Nitrates  melatonin, 5 mg, Oral, Nightly  metoprolol tartrate, 50 mg, Oral, Q8H  pantoprazole, 40 mg, Oral, BID AC  polyethylene glycol, 17 g, Oral, Daily  senna-docusate sodium, 2 tablet, Oral, BID  sodium chloride, 10 mL, Intravenous, Q12H  spironolactone, 100 mg, Oral, Daily        heparin, 12 Units/kg/hr, Last Rate: 18 Units/kg/hr (03/18/23 0004)        Assessment      1. Chest pain with both typical and atypical features for angin  2. NSTEMI.  Type I versus type II.  3. Abnormal MPI study showing a small sized apical ischemia.  4. ASCVD s/p stent-remote.  Details not known.  5. Uncontrolled hypertension  6. Kidney disease  7. Heart failure with improved EF.  Compensated.  8. Moderate pericardial effusion.    Plan     1. Patient refused cardiac cath and wants to be followed up with her primary cardiologist.  2. Continue current medical therapy for IHD which include aspirin, statin, metoprolol and isosorbide mononitrate.  3. On multiple antihypertensive medications for HTN.  Refractory.  Will monitor.      Electronically signed by ALVARADO Rivas, 03/18/23, 8:10 AM EDT.     I reviewed notes done by Ms. Driss CHILDERS.  I concur with her note.  I examined the patient and made assessment and plan.    Please note that portions of this note were completed with a voice recognition program.

## 2023-03-18 NOTE — PLAN OF CARE
Goal Outcome Evaluation:              Pt had c/o of pain, PRN medication given (see MAR). Heparin drip infusing. Telemetry monitoring maintained. No s/s of acute distress noted at this time. No complaints verbalized at this time. Plan of care ongoing.

## 2023-03-18 NOTE — PLAN OF CARE
Goal Outcome Evaluation:              Outcome Evaluation: Pt resting in bed this shift. AOx4, blood pressure elevated during beginning of shift, provider aware, see orders. VSS but somewhat elevated after stress test. Pt recieving continuous heparin and tolerating well, no s/s of bleeding noted. Complaints of nasal congestion and malaise, improved before stress test w/ PRN medications. IV access and telemetry monitoring patent and maintained, will continue to follow plan of care.

## 2023-03-18 NOTE — PROGRESS NOTES
[facility] HOSPITALIST PROGRESS NOTE     Patient Identification:  Name:  Pati Cortes  Age:  67 y.o.  Sex:  female  :  1955  MRN:  [id number]  Visit Number:  [id number]  ROOM: [address] [place] [address]     Primary Care Provider:  Gasper Reyes MD    Length of stay in inpatient status:  2    Subjective     Chief Compliant:  Chest Pain    History of Presenting Illness:    Patient remains ill but stable today, no acute events overnight, no new complaints, denies any fevers or chills, reports her should pain resolved yesterday, Cardiology consulted and following, ordered stress testing, test yesterday completed and showing intermediate risk, I discussed these results with patient today, she stated she was unsure about having LHC, wanted to speak with her primary Cardiologist, follow up Cardiology plan today, blood pressure remains elevated, beta blocker was changed to Coreg yesterday, follow up titrations today, creatinine stable at 2.07, WBC count stable, patient remains afebrile, likely would benefit from LHC with plans for dialysis thereafter though per our conversation today she may opt for medical management. She is tentatively planning to return home middle next week, follow up Monday on any plans to return back to inpatient rehab prior to returning home. Remains on Heparin drip at least for now, though if no interventions planned for new abnormal stress test, will switch to Eliquis. Follow up Cardiology recommendations on any further indicated workup for Echocardiogram read on possible amyloidosis as well.   Objective     Current Hospital Meds:  amLODIPine, 10 mg, Oral, Q24H  aspirin, 81 mg, Oral, Daily  atorvastatin, 80 mg, Oral, Nightly  bumetanide, 4 mg, Oral, Daily  busPIRone, 7.5 mg, Oral, TID  cholecalciferol, 1,000 Units, Oral, Daily  cloNIDine, 0.3 mg, Per PEG Tube, Q8H  clopidogrel, 75 mg, Per PEG Tube, Daily  docusate sodium, 100 mg, Oral, BID  ferrous sulfate, 325 mg, Oral, BID With  Meals  fluticasone, 2 spray, Each Nare, Daily  guaiFENesin, 600 mg, Oral, Q12H  hydrALAZINE, 100 mg, Oral, Q6H  isosorbide dinitrate, 20 mg, Oral, TID - Nitrates  melatonin, 5 mg, Oral, Nightly  metoprolol tartrate, 50 mg, Oral, Q8H  pantoprazole, 40 mg, Oral, BID AC  polyethylene glycol, 17 g, Oral, Daily  senna-docusate sodium, 2 tablet, Oral, BID  sodium chloride, 10 mL, Intravenous, Q12H  spironolactone, 100 mg, Oral, Daily    ----------------------------------------------------------------------------------------------------------------------  Vital Signs:  Temp:  [97.9 °F (36.6 °C)-98.2 °F (36.8 °C)] 98 °F (36.7 °C)  Heart Rate:  [61-79] 61  Resp:  [18-20] 18  BP: (160-185)/() 184/79  SpO2:  [97 %-98 %] 98 %  on  Flow (L/min):  [2-3] 3;   Device (Oxygen Therapy): nasal cannula  Body mass index is 27.68 kg/m².      Intake/Output Summary (Last 24 hours) at 3/18/2023 1307  Last data filed at 3/18/2023 1000  Gross per 24 hour   Intake 845 ml   Output 420 ml   Net 425 ml      ----------------------------------------------------------------------------------------------------------------------  Physical exam:  Constitutional:  Elderly.  No acute distress.      HENT:  Head:  Normocephalic and atraumatic.  Mouth:  Moist mucous membranes. Mouth: Poor dentition  Eyes:  Conjunctivae and EOM are normal. No scleral icterus.    Neck:  Neck supple.  No JVD present.    Cardiovascular:  Normal rate, regular rhythm and normal heart sounds with no murmur.  Pulmonary/Chest:  No respiratory distress, no wheezes, no crackles, on 3LNC  Abdominal:  Soft. No distension and no tenderness.   Musculoskeletal:  No tenderness, and no deformity.  No red or swollen joints anywhere.    Neurological:  Alert and oriented to person, place.  No gross focal deficits   Skin:  Skin is warm and dry. No rash noted. No pallor.   Peripheral vascular:  No clubbing, no cyanosis, trace edema.  Psychiatric: Appropriate mood and affect  Edited by:  Sam Slaughter MD at 3/18/2023 1306  ----------------------------------------------------------------------------------------------------------------------  WBC/HGB/HCT/PLT   11.38/8.2/27.6/445 (03/18 0801)  BUN/CREAT/GLUC/ALT/AST/MAYRA/LIP    42/2.07/81/6/29/--/-- (03/18 0801)  LYTES - Na/K/Cl/CO2: 137/4.5/102/22.7 (03/18 0801)  COAG - PT/INR/PTT: --/--/47.0* (03/18 0706)     No results found for: URINECX  Blood Culture   Date Value Ref Range Status   03/17/2023 No growth at 24 hours  Preliminary   03/17/2023 No growth at 24 hours  Preliminary     I have personally looked at the labs and they are summarized above.  ----------------------------------------------------------------------------------------------------------------------  Detailed radiology reports for the last 24 hours:  XR Chest 1 View    Result Date: 3/16/2023  1.  Vascular congestion or volume overload with interstitial edema and pleural effusions, slightly increased since 3/11/2023. 2.  Infiltrate or atelectasis in the left lung base, unchanged. 3.  Dialysis catheter. Signer Name: Joe Salcido MD  Signed: 3/16/2023 7:26 PM  Workstation Name: [id number]  [facility]    Assessment & Plan    67F Former Smoker PMH Polycystic Kidney Disease, COPD, Hypertension, Dyslipidemia, Coronary Artery Disease status post PCI, recently admitted outside hospital for Acute Hypoxic Respiratory Failure due to Coronavirus 19 Pneumonia requiring intubation and mechanical ventilation though notably extubated 1/24 now with Chronic Hypoxic Respiratory Failure and requiring Non-invasive Positive Pressure Ventilation nightly, also with prolonged complicated hospitalization due to RIJ DVT on oral anticoagulation, Acute HFrEF with noted LVEF 20-25%, NSTEMI though unable to undergo ischemic workup due to acute renal failure requiring iHD though last session 2/28, has been admitted to inpatient rehab at this facility on 3/10/23 for debility, began  having chest/back/shoulder pain 3/16, admitted inpatient for further cardiac workup.     #Hypertension/Dyslipidemia/Coronary Artery Disease status post PCI, now with Atypical Chest Pain and elevated HS Troponins, in setting of remote history NSTEMI, severe cardiomyopathy with acute HFrEF, though now with recovered EF, possibly prior low EF due to Coronavirus infection, and now noted posterior asymmetric hypertrophy, rule out amyloidosis, now with abnormal stress testing  #Mild Pulmonary Hypertension   #Mod Pericardial Effusion without Tamponade (1-2cm)  - Labs showed HS Troponins 98->96->98->101, BNP pending   - EKG showed normal sinus rhythm no dynamic ST changes  - Echocardiogram showed LVEF 66-70%, posterior asymmetric hypertrophy, possibly consistent with cardiac amyloidosis, diastolic dysfunction, dilated right atrial cavity, mild AS, RVSP 35-45mmHg, 1-2cm mod pericardial effusion without tamponade  - Cardiology consulted and following, ordered stress testing 3/17, updated recommendations pending  - Continue home Aspirin 81, Statin, Plavix  - Continue beta blocker but has been changed to Coreg for improved blood pressure control  - No home ACEI/ARB/ARNI due to renal failure  - Continue home Amlodipine, Clonidine, Hydralazine, Imdur, Aldactone  - Blood pressure has remains elevated, continue as needed IV Hydralazine, follow up Cardiology recommendations today   - Continue home Bumex, trend creatinine and urine output   - Continue Heparin drip for now, patient stated would not want [facility] today, follow Cardiology recommendations today and plan pending their conversation with patient, likely transition back to home eliquis pending plan.   - Continue to monitor on telemetry, strict I/O's, trend heart rate and blood pressure    #Chronic Kidney Disease stage IV, Remote history ATN requiring iHD   - Baseline creatinine probably around 2.0-2.2, was up to 2.4 on admission; Now 2.07  - Nephrology consulted and following,  recommended could do LHC if required and would do hemodialysis thereafter as she still has catheter in place  - Trend Cr and urine output, avoid nephrotoxins, NSAIDs, dehydration and contrast as able.    #Chronic Hypoxic Respiratory Failure due to COPD/Emphysema without Acute Exacerbation and Recent Coronavirus 19 infection, also requiring Non-invasive Positive Pressure Ventilation nightly   - Patient reportedly on 3LNC at home; Continue home 02, bipap nightly as needed   - Continue home regimen, Add Duonebs as needed   - Will order COPD rescue kit at time of discharge     #Chronic Normocytic Anemia due to Anemia of Chronic Disease with Iron Deficiency   - Hemoglobin 7-8, appears around baseline, trend hemoglobin daily, monitor for bleeding, transfuse hemoglobin < 7 or symptomatic    #Mild Leukocytosis, elevated CRP  - No active signs and symptoms of infection, monitor off antibiotics    #Remote History RIJ DVT  - Continue Heparin drip, transition back to Eliquis as able    #Debility  - Consult PT/OT following workup, anticipate would benefit from return to inpatient rehab     #Former Smoker  - Recommended cessation, nicotine replacement therapy as needed    F: Oral  E: Monitor & Replace as needed   N: Diet: Regular/House Diet; Texture: Regular Texture (IDDSI 7); Fluid Consistency: Thin (IDDSI 0)   PPx: Heparin drip   Code Status (Patient has no pulse and is not breathing): CPR (Attempt to Resuscitate)  Medical Interventions (Patient has pulse or is breathing): Full Support     Dispo: Pending clinical improvement, plan for LHC or not, possibly return to inpatient rehab early next week    *This patient is considered high risk secondary to atypical chest pain, elevated HS troponins, Hypertension requiring as needed medications, requiring further cardiac workup including imaging.     Edited by: aSm Slaughter MD at 3/18/2023 1307  [facility]

## 2023-03-19 LAB
ALBUMIN SERPL-MCNC: 2.6 G/DL (ref 3.5–5.2)
ALBUMIN/GLOB SERPL: 0.8 G/DL
ALP SERPL-CCNC: 96 U/L (ref 39–117)
ALT SERPL W P-5'-P-CCNC: <5 U/L (ref 1–33)
ANION GAP SERPL CALCULATED.3IONS-SCNC: 12 MMOL/L (ref 5–15)
AST SERPL-CCNC: 10 U/L (ref 1–32)
BASOPHILS # BLD AUTO: 0.06 10*3/MM3 (ref 0–0.2)
BASOPHILS NFR BLD AUTO: 0.5 % (ref 0–1.5)
BILIRUB SERPL-MCNC: 0.2 MG/DL (ref 0–1.2)
BUN SERPL-MCNC: 43 MG/DL (ref 8–23)
BUN/CREAT SERPL: 21.2 (ref 7–25)
CALCIUM SPEC-SCNC: 8.8 MG/DL (ref 8.6–10.5)
CHLORIDE SERPL-SCNC: 104 MMOL/L (ref 98–107)
CO2 SERPL-SCNC: 23 MMOL/L (ref 22–29)
CREAT SERPL-MCNC: 2.03 MG/DL (ref 0.57–1)
DEPRECATED RDW RBC AUTO: 57.1 FL (ref 37–54)
EGFRCR SERPLBLD CKD-EPI 2021: 26.3 ML/MIN/1.73
EOSINOPHIL # BLD AUTO: 0.24 10*3/MM3 (ref 0–0.4)
EOSINOPHIL NFR BLD AUTO: 2 % (ref 0.3–6.2)
ERYTHROCYTE [DISTWIDTH] IN BLOOD BY AUTOMATED COUNT: 16.7 % (ref 12.3–15.4)
GLOBULIN UR ELPH-MCNC: 3.3 GM/DL
GLUCOSE BLDC GLUCOMTR-MCNC: 129 MG/DL (ref 70–130)
GLUCOSE BLDC GLUCOMTR-MCNC: 83 MG/DL (ref 70–130)
GLUCOSE BLDC GLUCOMTR-MCNC: 98 MG/DL (ref 70–130)
GLUCOSE SERPL-MCNC: 79 MG/DL (ref 65–99)
HCT VFR BLD AUTO: 28.1 % (ref 34–46.6)
HGB BLD-MCNC: 8.4 G/DL (ref 12–15.9)
IMM GRANULOCYTES # BLD AUTO: 0.06 10*3/MM3 (ref 0–0.05)
IMM GRANULOCYTES NFR BLD AUTO: 0.5 % (ref 0–0.5)
LYMPHOCYTES # BLD AUTO: 2.13 10*3/MM3 (ref 0.7–3.1)
LYMPHOCYTES NFR BLD AUTO: 17.4 % (ref 19.6–45.3)
MCH RBC QN AUTO: 27.6 PG (ref 26.6–33)
MCHC RBC AUTO-ENTMCNC: 29.9 G/DL (ref 31.5–35.7)
MCV RBC AUTO: 92.4 FL (ref 79–97)
MONOCYTES # BLD AUTO: 1.07 10*3/MM3 (ref 0.1–0.9)
MONOCYTES NFR BLD AUTO: 8.7 % (ref 5–12)
NEUTROPHILS NFR BLD AUTO: 70.9 % (ref 42.7–76)
NEUTROPHILS NFR BLD AUTO: 8.69 10*3/MM3 (ref 1.7–7)
NRBC BLD AUTO-RTO: 0 /100 WBC (ref 0–0.2)
PLATELET # BLD AUTO: 412 10*3/MM3 (ref 140–450)
PMV BLD AUTO: 9.8 FL (ref 6–12)
POTASSIUM SERPL-SCNC: 3.9 MMOL/L (ref 3.5–5.2)
PROT SERPL-MCNC: 5.9 G/DL (ref 6–8.5)
RBC # BLD AUTO: 3.04 10*6/MM3 (ref 3.77–5.28)
SODIUM SERPL-SCNC: 139 MMOL/L (ref 136–145)
WBC NRBC COR # BLD: 12.25 10*3/MM3 (ref 3.4–10.8)

## 2023-03-19 PROCEDURE — 94660 CPAP INITIATION&MGMT: CPT

## 2023-03-19 PROCEDURE — 63710000001 ONDANSETRON PER 8 MG: Performed by: HOSPITALIST

## 2023-03-19 PROCEDURE — 99232 SBSQ HOSP IP/OBS MODERATE 35: CPT | Performed by: INTERNAL MEDICINE

## 2023-03-19 PROCEDURE — 80053 COMPREHEN METABOLIC PANEL: CPT | Performed by: INTERNAL MEDICINE

## 2023-03-19 PROCEDURE — 82962 GLUCOSE BLOOD TEST: CPT

## 2023-03-19 PROCEDURE — 94799 UNLISTED PULMONARY SVC/PX: CPT

## 2023-03-19 PROCEDURE — 85025 COMPLETE CBC W/AUTO DIFF WBC: CPT | Performed by: INTERNAL MEDICINE

## 2023-03-19 RX ORDER — CARVEDILOL 6.25 MG/1
12.5 TABLET ORAL 2 TIMES DAILY WITH MEALS
Status: DISCONTINUED | OUTPATIENT
Start: 2023-03-19 | End: 2023-03-21

## 2023-03-19 RX ADMIN — ISOSORBIDE DINITRATE 30 MG: 10 TABLET ORAL at 13:52

## 2023-03-19 RX ADMIN — FERROUS SULFATE TAB 325 MG (65 MG ELEMENTAL FE) 325 MG: 325 (65 FE) TAB at 09:53

## 2023-03-19 RX ADMIN — ACETAMINOPHEN 650 MG: 325 TABLET ORAL at 18:45

## 2023-03-19 RX ADMIN — CLONIDINE HYDROCHLORIDE 0.3 MG: 0.3 TABLET ORAL at 06:47

## 2023-03-19 RX ADMIN — BUSPIRONE HYDROCHLORIDE 7.5 MG: 5 TABLET ORAL at 09:49

## 2023-03-19 RX ADMIN — SPIRONOLACTONE 100 MG: 100 TABLET ORAL at 09:47

## 2023-03-19 RX ADMIN — GUAIFENESIN 600 MG: 600 TABLET, EXTENDED RELEASE ORAL at 20:18

## 2023-03-19 RX ADMIN — Medication 10 ML: at 10:05

## 2023-03-19 RX ADMIN — FLUTICASONE PROPIONATE 2 SPRAY: 50 SPRAY, METERED NASAL at 10:04

## 2023-03-19 RX ADMIN — BUSPIRONE HYDROCHLORIDE 7.5 MG: 5 TABLET ORAL at 20:18

## 2023-03-19 RX ADMIN — CARVEDILOL 12.5 MG: 6.25 TABLET, FILM COATED ORAL at 17:57

## 2023-03-19 RX ADMIN — HYDRALAZINE HYDROCHLORIDE 100 MG: 50 TABLET, FILM COATED ORAL at 06:47

## 2023-03-19 RX ADMIN — ONDANSETRON HYDROCHLORIDE 4 MG: 4 TABLET, FILM COATED ORAL at 22:09

## 2023-03-19 RX ADMIN — ISOSORBIDE DINITRATE 30 MG: 10 TABLET ORAL at 09:50

## 2023-03-19 RX ADMIN — FERROUS SULFATE TAB 325 MG (65 MG ELEMENTAL FE) 325 MG: 325 (65 FE) TAB at 17:59

## 2023-03-19 RX ADMIN — APIXABAN 5 MG: 5 TABLET, FILM COATED ORAL at 20:18

## 2023-03-19 RX ADMIN — PANTOPRAZOLE SODIUM 40 MG: 40 TABLET, DELAYED RELEASE ORAL at 17:57

## 2023-03-19 RX ADMIN — HYDRALAZINE HYDROCHLORIDE 100 MG: 50 TABLET, FILM COATED ORAL at 17:57

## 2023-03-19 RX ADMIN — HYDRALAZINE HYDROCHLORIDE 100 MG: 50 TABLET, FILM COATED ORAL at 00:15

## 2023-03-19 RX ADMIN — CLONIDINE HYDROCHLORIDE 0.3 MG: 0.3 TABLET ORAL at 20:18

## 2023-03-19 RX ADMIN — CLOPIDOGREL BISULFATE 75 MG: 75 TABLET, FILM COATED ORAL at 09:53

## 2023-03-19 RX ADMIN — APIXABAN 5 MG: 5 TABLET, FILM COATED ORAL at 13:52

## 2023-03-19 RX ADMIN — Medication 5 MG: at 20:17

## 2023-03-19 RX ADMIN — METOPROLOL TARTRATE 50 MG: 50 TABLET, FILM COATED ORAL at 06:47

## 2023-03-19 RX ADMIN — HYDRALAZINE HYDROCHLORIDE 100 MG: 50 TABLET, FILM COATED ORAL at 13:52

## 2023-03-19 RX ADMIN — ISOSORBIDE DINITRATE 30 MG: 10 TABLET ORAL at 17:58

## 2023-03-19 RX ADMIN — GUAIFENESIN 600 MG: 600 TABLET, EXTENDED RELEASE ORAL at 09:53

## 2023-03-19 RX ADMIN — Medication 1000 UNITS: at 09:53

## 2023-03-19 RX ADMIN — ATORVASTATIN CALCIUM 80 MG: 40 TABLET, FILM COATED ORAL at 20:17

## 2023-03-19 RX ADMIN — BUSPIRONE HYDROCHLORIDE 7.5 MG: 5 TABLET ORAL at 17:58

## 2023-03-19 RX ADMIN — ASPIRIN 81 MG: 81 TABLET ORAL at 09:53

## 2023-03-19 RX ADMIN — AMLODIPINE BESYLATE 10 MG: 10 TABLET ORAL at 09:53

## 2023-03-19 RX ADMIN — PANTOPRAZOLE SODIUM 40 MG: 40 TABLET, DELAYED RELEASE ORAL at 09:47

## 2023-03-19 RX ADMIN — HYDROXYZINE HYDROCHLORIDE 25 MG: 25 TABLET ORAL at 13:59

## 2023-03-19 RX ADMIN — CLONIDINE HYDROCHLORIDE 0.3 MG: 0.3 TABLET ORAL at 13:51

## 2023-03-19 RX ADMIN — BUMETANIDE 4 MG: 1 TABLET ORAL at 09:47

## 2023-03-19 NOTE — PROGRESS NOTES
LOS: 3 days     Name: Pati Cortes  Age/Sex: 67 y.o. female  :  1955        PCP: Gasper Reyes MD  REF: No Known Provider    Principal Problem:    NSTEMI (non-ST elevated myocardial infarction) (HCC)      Reason for follow-up: Chest pain and elevated troponin    Subjective     Subjective     Pati Cortes is a 67 y.o. female with a past medical history significant for chronic kidney disease stage IV (history of dialysis and recent hospitalization at an outside facility however patient reported that she has not had dialysis in a couple weeks and her creatinine baseline is around 2), history of CAD status post stent in remote history, hypertension, systolic HFimpEF (low EF 2023 from echo from outside facility however appears normalized 2023 with echo here), History of DVT in right IJ 2023, cystic kidney disease,  Patient presented to [facility] inpatient rehab facility on 03/10/2023 for debility status post prolonged hospitalization at a outlying facility.  However on on 3/16/2023 she complained of chest pain radiating to her left shoulder and back described as soreness    Interval History: Patient states she is doing well today.  Denies any chest pain or shortness of breath. Remains hypertensive.  Kidney function stable.  Hemoglobin stable at 8.4.    Vital Signs  Temp:  [98 °F (36.7 °C)-98.3 °F (36.8 °C)] 98.2 °F (36.8 °C)  Heart Rate:  [61-71] 71  Resp:  [18-20] 18  BP: (173-194)/(71-84) 194/83  Vital Signs (last 72 hrs)        0700  03/17 0659  0700  18 0659 18 0700  / 0659 / 0700   0811   Most Recent      Temp (°F)   97.7    97.9 -  98.3    98 -  98.3       98.2 (36.8)  0647    Heart Rate 67 -  69    66 -  83    61 -  71       71 / 0647    Resp   20    18 -  20    18 -  20       18  0647    /78 -  184/75    160/75 -  195/83    173/71 -  194/83       194/83  0647    SpO2 (%)   93    95 -  98    96 -  98        97 03/19 0647        Documented weights    03/17/23 1331 03/18/23 0510 03/19/23 0500   Weight: 69.9 kg (154 lb 1.6 oz) 70.9 kg (156 lb 3.2 oz) 71.7 kg (158 lb 1.6 oz)      Body mass index is 28.01 kg/m².    Intake/Output Summary (Last 24 hours) at 3/19/2023 0811  Last data filed at 3/19/2023 0500  Gross per 24 hour   Intake 955 ml   Output 700 ml   Net 255 ml     Objective    Objective       Physical Exam:     General Appearance:    Alert, cooperative, in no acute distress   Head:    Normocephalic, without obvious abnormality, atraumatic   Eyes:            Conjunctivae and sclerae normal, no   icterus, no pallor, corneas clear.   Neck:   No adenopathy, supple, trachea midline, no thyromegaly, no   carotid bruit, no JVD   Lungs:     Clear to auscultation,respirations regular, even and                  unlabored    Heart:    Regular rhythm and normal rate, normal S1 and S2, no            murmur, no gallop, no rub, no click   Chest Wall:    No abnormalities observed   Abdomen:     Normal bowel sounds, no masses, no organomegaly, soft        nontender, nondistended, no guarding, no rebound                tenderness   Extremities:   Moves all extremities well, no edema, no cyanosis, no             redness   Pulses:   Pulses palpable and equal bilaterally   Skin:   No bleeding, bruising or rash       Neurologic:   Alert and oriented      Results review       Results Review:   Results from last 7 days   Lab Units 03/19/23  0326 03/18/23  0801 03/16/23  2316 03/15/23  0138 03/14/23  0136   WBC 10*3/mm3 12.25* 11.38* 12.99* 11.17* 11.36*   HEMOGLOBIN g/dL 8.4* 8.2* 8.4* 8.2* 7.8*   PLATELETS 10*3/mm3 412 445 432 506* 501*     Results from last 7 days   Lab Units 03/19/23  0326 03/18/23  0801 03/16/23 2316 03/15/23  0138 03/14/23  0136   SODIUM mmol/L 139 137 139 138 136   POTASSIUM mmol/L 3.9 4.5 4.2 4.0 4.0   CHLORIDE mmol/L 104 102 102 100 99   CO2 mmol/L 23.0 22.7 24.8 27.0 27.6   BUN mg/dL 43* 42* 44* 53* 54*    CREATININE mg/dL 2.03* 2.07* 2.06* 2.16* 2.39*   CALCIUM mg/dL 8.8 8.8 8.5* 8.7 8.4*   GLUCOSE mg/dL 79 81 119* 102* 98   ALT (SGPT) U/L <5 6 6  --  7   AST (SGOT) U/L 10 29 11  --  11     Results from last 7 days   Lab Units 03/17/23  0824 03/16/23  2316 03/16/23  1934 03/16/23  1730   HSTROP T ng/L 101* 98* 96* 98*     Lab Results   Component Value Date    INR 1.28 (H) 03/16/2023    INR 1.06 02/21/2023    INR 1.07 01/27/2023    INR 1.25 (H) 01/21/2023     Lab Results   Component Value Date    MG 2.0 03/11/2023    MG 1.9 02/17/2023    MG 2.2 02/16/2023     Lab Results   Component Value Date    TRIG 132 03/16/2023    HDL 34 (L) 03/16/2023    LDL 67 03/16/2023      Imaging Results (Last 48 Hours)     ** No results found for the last 48 hours. **        No results found for: BNP           Echo   Results for orders placed during the hospital encounter of 03/10/23    Adult Transthoracic Echo Complete W/ Cont if Necessary Per Protocol    Interpretation Summary  •  Left ventricular systolic function is normal. Left ventricular ejection fraction appears to be 66 - 70%.  •  Left ventricular wall thickness is consistent with posterior asymmetric hypertrophy, with ventricular consistent sleep could be consistent with cardiac amyloidosis, clinical correlation is required.  •  Left ventricular diastolic function is consistent with (grade Ia w/high LAP) impaired relaxation.  •  The right atrial cavity is mildly  dilated.  •  Mild aortic valve stenosis is present.  •  Estimated right ventricular systolic pressure from tricuspid regurgitation is mildly elevated (35-45 mmHg).  •  There is a moderate (1-2cm) pericardial effusion. There is no evidence of cardiac tamponade.       I reviewed the patient's new clinical results.    Telemetry: Normal sinus rhythm 60s     Medication Review:   amLODIPine, 10 mg, Oral, Q24H  aspirin, 81 mg, Oral, Daily  atorvastatin, 80 mg, Oral, Nightly  bumetanide, 4 mg, Oral, Daily  busPIRone, 7.5 mg,  Oral, TID  cholecalciferol, 1,000 Units, Oral, Daily  cloNIDine, 0.3 mg, Per PEG Tube, Q8H  clopidogrel, 75 mg, Per PEG Tube, Daily  docusate sodium, 100 mg, Oral, BID  ferrous sulfate, 325 mg, Oral, BID With Meals  fluticasone, 2 spray, Each Nare, Daily  guaiFENesin, 600 mg, Oral, Q12H  hydrALAZINE, 100 mg, Oral, Q6H  isosorbide dinitrate, 30 mg, Oral, TID - Nitrates  melatonin, 5 mg, Oral, Nightly  metoprolol tartrate, 50 mg, Oral, Q8H  pantoprazole, 40 mg, Oral, BID AC  polyethylene glycol, 17 g, Oral, Daily  senna-docusate sodium, 2 tablet, Oral, BID  sodium chloride, 10 mL, Intravenous, Q12H  spironolactone, 100 mg, Oral, Daily             Assessment      1. Chest pain with both typical and atypical features for angina.  2. NSTEMI.  Type I versus type II.  3. Abnormal MPI study showing a small sized apical ischemia.  4. ASCVD s/p stent-remote.  Details not known.  5. Uncontrolled hypertension  6. Kidney disease  7. Heart failure with improved EF.  Compensated.  8. Moderate pericardial effusion.  9. Myocardial appearance-suspicious for cardiac amyloidosis on TTE    Plan       1. Patient refused cardiac catheterization and she wants to to be followed up with her primary cardiologist at [place], [place]  2. Continue current medical therapy for IHD which include aspirin, statin, metoprolol and Imdur. As Eliquis is resumed for history of DVT, Plavix was discontinued to reduce the risk of bleed.  3. BP still high.  On multiple antihypertensive medications at maximum dose.  Switched metoprolol to carvedilol because of its better efficacy in controlling HTN due to its vasodilating property.  4. Further work-up for suspected cardiac amyloidosis per primary cardiologist who resume the care tomorrow      Electronically signed by ALVARADO Rivas, 03/19/23, 8:11 AM EDT.     I reviewed notes done by Ms. Driss CHILDERS.  I concur with her note.  I examined the patient and made assessment and plan.    Please note that portions  of this note were completed with a voice recognition program.

## 2023-03-19 NOTE — NURSING NOTE
Patients IV was occluded on assessment, IV removed. Patient does not want to be stuck again for another IV. Dr. Slaughter aware, okay to leave IV out for now.

## 2023-03-19 NOTE — PLAN OF CARE
Goal Outcome Evaluation:              Outcome Evaluation: Pt resting in bed at this time with no concerns or complaints. No s/s of acute distress noted. Will continue to follow plan of care.

## 2023-03-19 NOTE — PROGRESS NOTES
[facility] HOSPITALIST PROGRESS NOTE     Patient Identification:  Name:  Pati Cortes  Age:  67 y.o.  Sex:  female  :  1955  MRN:  [id number]  Visit Number:  [id number]  ROOM: [address] [place] [address]     Primary Care Provider:  Gasper Reyes MD    Length of stay in inpatient status:  3    Subjective     Chief Compliant:  Chest Pain    History of Presenting Illness:    Patient remains ill but stable today, no acute events overnight, no new complaints, denies any fevers or chills, blood pressure elevated today and increased isordil dose, blood pressure now improved, creatinine stable at baseline, patient deferred C and Cardiology electing medical management, asked team today to weigh in on Echocardiogram read of amyloidosis and any need for further workup, also asked team to weigh in on need for triple therapy, appears now has had eliquis resumed and plavix discontinued, follow up formal Cardiology recommendations though, follow up with Social Work tomorrow on plan to return to inpatient rehab vs possibly home with Home Health soon.   Objective     Current Hospital Meds:  amLODIPine, 10 mg, Oral, Q24H  apixaban, 5 mg, Oral, Q12H  aspirin, 81 mg, Oral, Daily  atorvastatin, 80 mg, Oral, Nightly  bumetanide, 4 mg, Oral, Daily  busPIRone, 7.5 mg, Oral, TID  carvedilol, 12.5 mg, Oral, BID With Meals  cholecalciferol, 1,000 Units, Oral, Daily  cloNIDine, 0.3 mg, Per PEG Tube, Q8H  docusate sodium, 100 mg, Oral, BID  ferrous sulfate, 325 mg, Oral, BID With Meals  fluticasone, 2 spray, Each Nare, Daily  guaiFENesin, 600 mg, Oral, Q12H  hydrALAZINE, 100 mg, Oral, Q6H  isosorbide dinitrate, 30 mg, Oral, TID - Nitrates  melatonin, 5 mg, Oral, Nightly  pantoprazole, 40 mg, Oral, BID AC  polyethylene glycol, 17 g, Oral, Daily  senna-docusate sodium, 2 tablet, Oral, BID  sodium chloride, 10 mL, Intravenous, Q12H  spironolactone, 100 mg, Oral,  Daily    ----------------------------------------------------------------------------------------------------------------------  Vital Signs:  Temp:  [98 °F (36.7 °C)-98.3 °F (36.8 °C)] 98.3 °F (36.8 °C)  Heart Rate:  [67-71] 68  Resp:  [18-20] 20  BP: (156-194)/(68-84) 156/68  SpO2:  [96 %-97 %] 96 %  on  Flow (L/min):  [3] 3;   Device (Oxygen Therapy): nasal cannula  Body mass index is 28.01 kg/m².      Intake/Output Summary (Last 24 hours) at 3/19/2023 1221  Last data filed at 3/19/2023 0500  Gross per 24 hour   Intake 700 ml   Output 700 ml   Net 0 ml      ----------------------------------------------------------------------------------------------------------------------  Physical exam:  Constitutional:  Elderly.  No acute distress.      HENT:  Head:  Normocephalic and atraumatic.  Mouth:  Moist mucous membranes. Mouth: Poor dentition  Eyes:  Conjunctivae and EOM are normal. No scleral icterus.    Neck:  Neck supple.  No JVD present.    Cardiovascular:  Normal rate, regular rhythm and normal heart sounds with no murmur.  Pulmonary/Chest:  No respiratory distress, no wheezes, no crackles, on 3LNC  Abdominal:  Soft. No distension and no tenderness.   Musculoskeletal:  No tenderness, and no deformity.  No red or swollen joints anywhere.    Neurological:  Alert and oriented to person, place.  No gross focal deficits   Skin:  Skin is warm and dry. No rash noted. No pallor.   Peripheral vascular:  No clubbing, no cyanosis, trace edema.  Psychiatric: Appropriate mood and affect    *Examination stable today 3/19  Edited by: Sam Slaughter MD at 3/19/2023 1219  ----------------------------------------------------------------------------------------------------------------------  WBC/HGB/HCT/PLT   12.25/8.4/28.1/412 (03/19 0326)  BUN/CREAT/GLUC/ALT/AST/MAYRA/LIP    43/2.03/79/<5/10/--/-- (03/19 0326)  LYTES - Na/K/Cl/CO2: 139/3.9/104/23.0 (03/19 0326)     No results found for: URINECX  Blood Culture   Date Value Ref Range  Status   03/17/2023 No growth at 2 days  Preliminary   03/17/2023 No growth at 2 days  Preliminary     I have personally looked at the labs and they are summarized above.  ----------------------------------------------------------------------------------------------------------------------  Detailed radiology reports for the last 24 hours:  No radiology results for the last day  Assessment & Plan    67F Former Smoker [facility] Polycystic Kidney Disease, COPD, Hypertension, Dyslipidemia, Coronary Artery Disease status post PCI, recently admitted outside hospital for Acute Hypoxic Respiratory Failure due to Coronavirus 19 Pneumonia requiring intubation and mechanical ventilation though notably extubated 1/24 now with Chronic Hypoxic Respiratory Failure and requiring Non-invasive Positive Pressure Ventilation nightly, also with prolonged complicated hospitalization due to RIJ DVT on oral anticoagulation, Acute HFrEF with noted LVEF 20-25%, NSTEMI though unable to undergo ischemic workup due to acute renal failure requiring iHD though last session 2/28, has been admitted to inpatient rehab at this facility on 3/10/23 for debility, began having chest/back/shoulder pain 3/16, admitted inpatient for further cardiac workup.     #Hypertension/Dyslipidemia/Coronary Artery Disease status post PCI, now with Atypical Chest Pain and elevated HS Troponins, in setting of remote history NSTEMI, severe cardiomyopathy with acute HFrEF, though now with recovered EF, possibly prior low EF due to Coronavirus infection, and now noted posterior asymmetric hypertrophy, rule out amyloidosis, now with abnormal stress testing  #Mild Pulmonary Hypertension   #Mod Pericardial Effusion without Tamponade (1-2cm)  - Labs showed HS Troponins 98->96->98->101, BNP pending   - EKG showed normal sinus rhythm no dynamic ST changes  - Echocardiogram showed LVEF 66-70%, posterior asymmetric hypertrophy, possibly consistent with cardiac amyloidosis, diastolic  dysfunction, dilated right atrial cavity, mild AS, RVSP 35-45mmHg, 1-2cm mod pericardial effusion without tamponade  - Cardiology consulted and following, ordered stress testing 3/17, patient deferring LHC for medical management for now, have asked team today to weigh in on concern for amyloidosis on echocardiogram read, also asked them to weigh in on need for triple therapy and appears plavix has been stopped though formal recommendations still pending  - Continue home Aspirin 81, Statin  - Continue beta blocker but has been changed to Coreg for improved blood pressure control  - No home ACEI/ARB/ARNI due to renal failure  - Continue home Amlodipine, Clonidine, Hydralazine, Aldactone, increased dose of isordil today   - Continue home Bumex, trend creatinine and urine output   - Resumed home Eliquis today  - Continue to monitor on telemetry, strict I/O's, trend heart rate and blood pressure    #Chronic Kidney Disease stage IV, Remote history ATN requiring iHD   - Baseline creatinine probably around 2.0-2.2, was up to 2.4 on admission, now within baseline  - Nephrology consulted and followed, recommended could do LHC if required and would do hemodialysis thereafter as she still has catheter in place, though patient deferring LHC  - Trend Cr and urine output, avoid nephrotoxins, NSAIDs, dehydration and contrast as able.    #Chronic Hypoxic Respiratory Failure due to COPD/Emphysema without Acute Exacerbation and Recent Coronavirus 19 infection, also requiring Non-invasive Positive Pressure Ventilation nightly   - Patient reportedly on 3LNC at home; Continue home 02, bipap nightly as needed   - Continue home regimen, Add Duonebs as needed   - Will need to consider COPD rescue kit at time of discharge     #Chronic Normocytic Anemia due to Anemia of Chronic Disease with Iron Deficiency   - Hemoglobin 7-8, appears around baseline, trend hemoglobin daily, monitor for bleeding, transfuse hemoglobin < 7 or  symptomatic    #Mild Leukocytosis, elevated CRP  - No active signs and symptoms of infection, monitor off antibiotics    #Remote History RIJ DVT  - Resumed on Eliquis as per above    #Debility  - Consult PT/OT following workup, anticipate would benefit from return to inpatient rehab, though also patient has plan to return home on Wednesday, follow up plan tomorrow on if she could return to inpatient rehab soon or if would just discharge home from inpatient stay.     #Former Smoker  - Recommended cessation, nicotine replacement therapy as needed    F: Oral  E: Monitor & Replace as needed   N: Diet: Regular/House Diet; Texture: Regular Texture (IDDSI 7); Fluid Consistency: Thin (IDDSI 0)   PPx: Heparin drip   Code Status (Patient has no pulse and is not breathing): CPR (Attempt to Resuscitate)  Medical Interventions (Patient has pulse or is breathing): Full Support     Dispo: Pending clinical improvement, return to inpatient rehab vs home with Home Health     *This patient is considered high risk secondary to atypical chest pain, elevated HS troponins, Hypertension requiring as needed medications, requiring further cardiac workup including imaging.     Edited by: Sam Slaughter MD at 3/19/2023 1221  [facility]

## 2023-03-19 NOTE — PROGRESS NOTES
Nephrology Progress Note      Subjective     Patient is feeling much better denies any chest pain no shortness of    Objective       Vital signs :     Temp:  [98 °F (36.7 °C)-98.3 °F (36.8 °C)] 98.3 °F (36.8 °C)  Heart Rate:  [67-71] 68  Resp:  [18-20] 20  BP: (156-194)/(68-84) 156/68    Intake/Output                       03/17/23 0701 - 03/18/23 0700 03/18/23 0701 - 03/19/23 0700     8510-0607 6229-2770 Total 1636-9231 0948-2234 Total                 Intake    P.O.  0  590 590  715  240 955    Total Intake 0 590 590 715 240 955       Output    Urine  --  420 420  --  700 700    Total Output -- 420 420 -- 700 700           Physical Exam:    General Appearance : no chest pain or shortness of breath.   Lungs : clear to auscultation, respirations regular  Heart :  regular rhythm & normal rate, normal S1, S2 and no murmur, no rub  Abdomen : soft, non distended  Extremities : no edema, right IJ catheter  Neurologic :   orientated to person, place, time and situation, Grossly no focal deficits        Laboratory Data :     Albumin Albumin   Date Value Ref Range Status   03/19/2023 2.6 (L) 3.5 - 5.2 g/dL Final   03/18/2023 2.5 (L) 3.5 - 5.2 g/dL Final   03/16/2023 2.8 (L) 3.5 - 5.2 g/dL Final      Magnesium No results found for: MG       PTH               No results found for: PTH    CBC and coagulation:  Results from last 7 days   Lab Units 03/19/23  0326 03/18/23  0801 03/17/23  0007 03/16/23  2316   PROCALCITONIN ng/mL  --   --  0.07  --    CRP mg/dL  --   --   --  1.79*   WBC 10*3/mm3 12.25* 11.38*  --  12.99*   HEMOGLOBIN g/dL 8.4* 8.2*  --  8.4*   HEMATOCRIT % 28.1* 27.6*  --  27.4*   MCV fL 92.4 93.2  --  93.2   MCHC g/dL 29.9* 29.7*  --  30.7*   PLATELETS 10*3/mm3 412 445  --  432   INR   --   --   --  1.28*     Acid/base balance:      Renal and electrolytes:    Results from last 7 days   Lab Units 03/19/23  0326 03/18/23  0801 03/16/23  2316 03/15/23  0138 03/14/23  0136   SODIUM mmol/L 139 137 139 138 136    POTASSIUM mmol/L 3.9 4.5 4.2 4.0 4.0   CHLORIDE mmol/L 104 102 102 100 99   CO2 mmol/L 23.0 22.7 24.8 27.0 27.6   BUN mg/dL 43* 42* 44* 53* 54*   CREATININE mg/dL 2.03* 2.07* 2.06* 2.16* 2.39*   CALCIUM mg/dL 8.8 8.8 8.5* 8.7 8.4*     Estimated Creatinine Clearance: 25.2 mL/min (A) (by C-G formula based on SCr of 2.03 mg/dL (H)).  @GFRCG:3@   Liver and pancreatic function:  Results from last 7 days   Lab Units 03/19/23  0326 03/18/23  0801 03/16/23  2316   ALBUMIN g/dL 2.6* 2.5* 2.8*   BILIRUBIN mg/dL 0.2 0.2 0.3   ALK PHOS U/L 96 94 103   AST (SGOT) U/L 10 29 11   ALT (SGPT) U/L <5 6 6         Cardiac:  Results from last 7 days   Lab Units 03/17/23  0824   PROBNP pg/mL >70,000.0*     Liver and pancreatic function:  Results from last 7 days   Lab Units 03/19/23  0326 03/18/23  0801 03/16/23  2316   ALBUMIN g/dL 2.6* 2.5* 2.8*   BILIRUBIN mg/dL 0.2 0.2 0.3   ALK PHOS U/L 96 94 103   AST (SGOT) U/L 10 29 11   ALT (SGPT) U/L <5 6 6       Medications :     amLODIPine, 10 mg, Oral, Q24H  apixaban, 5 mg, Oral, Q12H  aspirin, 81 mg, Oral, Daily  atorvastatin, 80 mg, Oral, Nightly  bumetanide, 4 mg, Oral, Daily  busPIRone, 7.5 mg, Oral, TID  carvedilol, 12.5 mg, Oral, BID With Meals  cholecalciferol, 1,000 Units, Oral, Daily  cloNIDine, 0.3 mg, Per PEG Tube, Q8H  docusate sodium, 100 mg, Oral, BID  ferrous sulfate, 325 mg, Oral, BID With Meals  fluticasone, 2 spray, Each Nare, Daily  guaiFENesin, 600 mg, Oral, Q12H  hydrALAZINE, 100 mg, Oral, Q6H  isosorbide dinitrate, 30 mg, Oral, TID - Nitrates  melatonin, 5 mg, Oral, Nightly  pantoprazole, 40 mg, Oral, BID AC  polyethylene glycol, 17 g, Oral, Daily  senna-docusate sodium, 2 tablet, Oral, BID  sodium chloride, 10 mL, Intravenous, Q12H  spironolactone, 100 mg, Oral, Daily             Assessment & Plan     1.  CKD stage IV  2.  Resolved acute tubular necrosis  3.  Personal history of acute dialysis  4.  Acute on chronic hypoxic respiratory failure  5.  Systolic plus  diastolic CHF compensated  6.  Left ventricular ejection fraction 30%  7.  Coronary artery disease with history of stent  8.  Type 2 diabetes  9.  Anemia of acute on chronic disease with iron deficiency.    Received Ferrlecit 250 mg 3/12/2023  10. Debility and deconditioning     Patient's creatinine is stable at 2.0 patient states that she has opted for medical management for coronary artery disease,  Cardiology following and adjusting medications as pt has history of CAD and PCI and has had multiple discussion to evaluated likely ischemic cardiac symptoms with cardiac cath but she used to refuse with concern of worsening CKD. As she has dialysis cath placed and we can do dialysis post cath to avoid contrast induced nephropathy.  But patient has opted for medical management educated and counseled the patient, she is agreeable to proceed to cardiac cath.     Continue holding dialysis, uncontrolled BP, continue on [id number]    Prognosis guarded    Plan of care was discussed with the patient she understood verbalized agreed    S Adelfo Dodson MD  03/19/23  12:13 EDT

## 2023-03-19 NOTE — PLAN OF CARE
Goal Outcome Evaluation:  Plan of Care Reviewed With: patient        Progress: no change  Outcome Evaluation: Patient rested in bed during shift. Patient ambulated in room. PRN Atrarax given. No complaints at this time. Will continue with plan of care.

## 2023-03-20 LAB
ALBUMIN SERPL-MCNC: 2.6 G/DL (ref 3.5–5.2)
ALBUMIN/GLOB SERPL: 0.9 G/DL
ALP SERPL-CCNC: 91 U/L (ref 39–117)
ALT SERPL W P-5'-P-CCNC: <5 U/L (ref 1–33)
ANION GAP SERPL CALCULATED.3IONS-SCNC: 10.4 MMOL/L (ref 5–15)
APTT PPP: 40.6 SECONDS (ref 26.5–34.5)
AST SERPL-CCNC: 12 U/L (ref 1–32)
BILIRUB SERPL-MCNC: 0.2 MG/DL (ref 0–1.2)
BUN SERPL-MCNC: 41 MG/DL (ref 8–23)
BUN/CREAT SERPL: 19.1 (ref 7–25)
CALCIUM SPEC-SCNC: 8.5 MG/DL (ref 8.6–10.5)
CHLORIDE SERPL-SCNC: 104 MMOL/L (ref 98–107)
CO2 SERPL-SCNC: 24.6 MMOL/L (ref 22–29)
CREAT SERPL-MCNC: 2.15 MG/DL (ref 0.57–1)
DEPRECATED RDW RBC AUTO: 56.6 FL (ref 37–54)
EGFRCR SERPLBLD CKD-EPI 2021: 24.5 ML/MIN/1.73
ERYTHROCYTE [DISTWIDTH] IN BLOOD BY AUTOMATED COUNT: 16.7 % (ref 12.3–15.4)
GLOBULIN UR ELPH-MCNC: 3 GM/DL
GLUCOSE BLDC GLUCOMTR-MCNC: 147 MG/DL (ref 70–130)
GLUCOSE BLDC GLUCOMTR-MCNC: 95 MG/DL (ref 70–130)
GLUCOSE SERPL-MCNC: 94 MG/DL (ref 65–99)
HCT VFR BLD AUTO: 25.1 % (ref 34–46.6)
HGB BLD-MCNC: 7.5 G/DL (ref 12–15.9)
INR PPP: 1.39 (ref 0.9–1.1)
MAGNESIUM SERPL-MCNC: 1.7 MG/DL (ref 1.6–2.4)
MCH RBC QN AUTO: 27.9 PG (ref 26.6–33)
MCHC RBC AUTO-ENTMCNC: 29.9 G/DL (ref 31.5–35.7)
MCV RBC AUTO: 93.3 FL (ref 79–97)
NT-PROBNP SERPL-MCNC: ABNORMAL PG/ML (ref 0–900)
PLATELET # BLD AUTO: 316 10*3/MM3 (ref 140–450)
PMV BLD AUTO: 9.6 FL (ref 6–12)
POTASSIUM SERPL-SCNC: 4.2 MMOL/L (ref 3.5–5.2)
PROT SERPL-MCNC: 5.6 G/DL (ref 6–8.5)
PROTHROMBIN TIME: 17.4 SECONDS (ref 12.1–14.7)
RBC # BLD AUTO: 2.69 10*6/MM3 (ref 3.77–5.28)
SODIUM SERPL-SCNC: 139 MMOL/L (ref 136–145)
TSH SERPL DL<=0.05 MIU/L-ACNC: 6.2 UIU/ML (ref 0.27–4.2)
WBC NRBC COR # BLD: 10.06 10*3/MM3 (ref 3.4–10.8)

## 2023-03-20 PROCEDURE — 83735 ASSAY OF MAGNESIUM: CPT | Performed by: INTERNAL MEDICINE

## 2023-03-20 PROCEDURE — 25010000002 HEPARIN (PORCINE) 25000-0.45 UT/250ML-% SOLUTION: Performed by: INTERNAL MEDICINE

## 2023-03-20 PROCEDURE — 99232 SBSQ HOSP IP/OBS MODERATE 35: CPT | Performed by: SPECIALIST

## 2023-03-20 PROCEDURE — 80053 COMPREHEN METABOLIC PANEL: CPT | Performed by: INTERNAL MEDICINE

## 2023-03-20 PROCEDURE — 85730 THROMBOPLASTIN TIME PARTIAL: CPT | Performed by: INTERNAL MEDICINE

## 2023-03-20 PROCEDURE — 94761 N-INVAS EAR/PLS OXIMETRY MLT: CPT

## 2023-03-20 PROCEDURE — 82962 GLUCOSE BLOOD TEST: CPT

## 2023-03-20 PROCEDURE — 94799 UNLISTED PULMONARY SVC/PX: CPT

## 2023-03-20 PROCEDURE — 83880 ASSAY OF NATRIURETIC PEPTIDE: CPT | Performed by: NURSE PRACTITIONER

## 2023-03-20 PROCEDURE — 99232 SBSQ HOSP IP/OBS MODERATE 35: CPT | Performed by: INTERNAL MEDICINE

## 2023-03-20 PROCEDURE — 99221 1ST HOSP IP/OBS SF/LOW 40: CPT | Performed by: SURGERY

## 2023-03-20 PROCEDURE — 84443 ASSAY THYROID STIM HORMONE: CPT | Performed by: INTERNAL MEDICINE

## 2023-03-20 PROCEDURE — 85610 PROTHROMBIN TIME: CPT | Performed by: INTERNAL MEDICINE

## 2023-03-20 PROCEDURE — 85027 COMPLETE CBC AUTOMATED: CPT | Performed by: INTERNAL MEDICINE

## 2023-03-20 RX ORDER — HEPARIN SODIUM 5000 [USP'U]/ML
60 INJECTION, SOLUTION INTRAVENOUS; SUBCUTANEOUS AS NEEDED
Status: DISCONTINUED | OUTPATIENT
Start: 2023-03-20 | End: 2023-03-22 | Stop reason: HOSPADM

## 2023-03-20 RX ORDER — HEPARIN SODIUM 10000 [USP'U]/100ML
12 INJECTION, SOLUTION INTRAVENOUS
Status: DISCONTINUED | OUTPATIENT
Start: 2023-03-20 | End: 2023-03-22 | Stop reason: HOSPADM

## 2023-03-20 RX ORDER — HEPARIN SODIUM 5000 [USP'U]/ML
30 INJECTION, SOLUTION INTRAVENOUS; SUBCUTANEOUS AS NEEDED
Status: DISCONTINUED | OUTPATIENT
Start: 2023-03-20 | End: 2023-03-22 | Stop reason: HOSPADM

## 2023-03-20 RX ADMIN — GUAIFENESIN 600 MG: 600 TABLET, EXTENDED RELEASE ORAL at 21:39

## 2023-03-20 RX ADMIN — HYDRALAZINE HYDROCHLORIDE 100 MG: 50 TABLET, FILM COATED ORAL at 12:41

## 2023-03-20 RX ADMIN — APIXABAN 5 MG: 5 TABLET, FILM COATED ORAL at 08:50

## 2023-03-20 RX ADMIN — BUSPIRONE HYDROCHLORIDE 7.5 MG: 5 TABLET ORAL at 08:49

## 2023-03-20 RX ADMIN — BUSPIRONE HYDROCHLORIDE 7.5 MG: 5 TABLET ORAL at 21:39

## 2023-03-20 RX ADMIN — Medication 10 ML: at 21:41

## 2023-03-20 RX ADMIN — CLONIDINE HYDROCHLORIDE 0.3 MG: 0.3 TABLET ORAL at 15:06

## 2023-03-20 RX ADMIN — CARVEDILOL 12.5 MG: 6.25 TABLET, FILM COATED ORAL at 08:48

## 2023-03-20 RX ADMIN — BUMETANIDE 4 MG: 1 TABLET ORAL at 08:49

## 2023-03-20 RX ADMIN — ACETAMINOPHEN 650 MG: 325 TABLET ORAL at 20:28

## 2023-03-20 RX ADMIN — ASPIRIN 81 MG: 81 TABLET ORAL at 08:50

## 2023-03-20 RX ADMIN — FLUTICASONE PROPIONATE 2 SPRAY: 50 SPRAY, METERED NASAL at 08:50

## 2023-03-20 RX ADMIN — ISOSORBIDE DINITRATE 30 MG: 10 TABLET ORAL at 08:48

## 2023-03-20 RX ADMIN — BUSPIRONE HYDROCHLORIDE 7.5 MG: 5 TABLET ORAL at 15:06

## 2023-03-20 RX ADMIN — HYDRALAZINE HYDROCHLORIDE 100 MG: 50 TABLET, FILM COATED ORAL at 05:47

## 2023-03-20 RX ADMIN — GUAIFENESIN 600 MG: 600 TABLET, EXTENDED RELEASE ORAL at 08:51

## 2023-03-20 RX ADMIN — HYDROXYZINE HYDROCHLORIDE 25 MG: 25 TABLET ORAL at 05:47

## 2023-03-20 RX ADMIN — SPIRONOLACTONE 100 MG: 100 TABLET ORAL at 08:50

## 2023-03-20 RX ADMIN — ISOSORBIDE DINITRATE 30 MG: 10 TABLET ORAL at 12:41

## 2023-03-20 RX ADMIN — CARVEDILOL 12.5 MG: 6.25 TABLET, FILM COATED ORAL at 17:19

## 2023-03-20 RX ADMIN — HEPARIN SODIUM 12 UNITS/KG/HR: 10000 INJECTION, SOLUTION INTRAVENOUS at 21:45

## 2023-03-20 RX ADMIN — ATORVASTATIN CALCIUM 80 MG: 40 TABLET, FILM COATED ORAL at 21:39

## 2023-03-20 RX ADMIN — POLYETHYLENE GLYCOL 3350 17 G: 17 POWDER, FOR SOLUTION ORAL at 08:50

## 2023-03-20 RX ADMIN — CLONIDINE HYDROCHLORIDE 0.3 MG: 0.3 TABLET ORAL at 05:47

## 2023-03-20 RX ADMIN — HYDRALAZINE HYDROCHLORIDE 100 MG: 50 TABLET, FILM COATED ORAL at 00:32

## 2023-03-20 RX ADMIN — AMLODIPINE BESYLATE 10 MG: 10 TABLET ORAL at 08:49

## 2023-03-20 RX ADMIN — CLONIDINE HYDROCHLORIDE 0.3 MG: 0.3 TABLET ORAL at 21:39

## 2023-03-20 RX ADMIN — Medication 1000 UNITS: at 08:51

## 2023-03-20 RX ADMIN — HYDRALAZINE HYDROCHLORIDE 100 MG: 50 TABLET, FILM COATED ORAL at 17:19

## 2023-03-20 RX ADMIN — FERROUS SULFATE TAB 325 MG (65 MG ELEMENTAL FE) 325 MG: 325 (65 FE) TAB at 17:20

## 2023-03-20 RX ADMIN — PANTOPRAZOLE SODIUM 40 MG: 40 TABLET, DELAYED RELEASE ORAL at 17:20

## 2023-03-20 RX ADMIN — PANTOPRAZOLE SODIUM 40 MG: 40 TABLET, DELAYED RELEASE ORAL at 08:49

## 2023-03-20 RX ADMIN — ISOSORBIDE DINITRATE 30 MG: 10 TABLET ORAL at 17:19

## 2023-03-20 RX ADMIN — ACETAMINOPHEN 650 MG: 325 TABLET ORAL at 09:55

## 2023-03-20 RX ADMIN — Medication 5 MG: at 21:39

## 2023-03-20 RX ADMIN — HYDROXYZINE HYDROCHLORIDE 25 MG: 25 TABLET ORAL at 17:20

## 2023-03-20 RX ADMIN — FERROUS SULFATE TAB 325 MG (65 MG ELEMENTAL FE) 325 MG: 325 (65 FE) TAB at 08:49

## 2023-03-20 NOTE — CONSULTS
[facility]   Consult Note    Patient Name: Pati Cortes  : 1955  MRN: [id number]  Primary Care Physician:  Gasper Reyes MD  Referring Physician: No Known Provider  Date of admission: 3/16/2023    Consults  Subjective   Subjective     Reason for Consult/ Chief Complaint: no longer needed dialysis catheter    History of Present Illness  Pati Cortes is a 67 y.o. female with significant CHF and CRF who had a tunneled dialysis catheter placed a couple of months ago in [place] while hospitalized for COVID. She has been in the LTACH and rehab and so far the catheter is not being used. She has had a DVT and it was requested the dialysis catheter be removed. She has been on aspirin and eliquis and has high central pressures due to her heart disease. The catheter is not infected.    Review of Systems   Constitutional: Positive for fatigue.   HENT: Negative for hearing loss and trouble swallowing.    Respiratory: Positive for chest tightness and shortness of breath.    Cardiovascular: Positive for leg swelling.   Gastrointestinal: Negative for abdominal distention, anal bleeding and diarrhea.   Musculoskeletal: Negative for joint swelling.   Skin: Negative for color change and wound.   Neurological: Positive for weakness. Negative for tremors.   Psychiatric/Behavioral: Negative for behavioral problems.        Personal History     Past Medical History:   Diagnosis Date   • Arthritis    • Chronic respiratory failure with hypoxia     after acute respiratory failure in recent hospitalization in 2023 secondary to COVID-19, weaned from vent to 3L NC with bipap at night   • CKD (chronic kidney disease), stage IV (HCC)     recently on dialysis for SWATI at [facility], has right upper chest wall dialysis catheter, no dialysis in a couple weeks per patient, baseline creatine reportedly has settled around 2   • History of coronary artery disease     sp stent placement remotely   • History of DVT in adulthood      in right IJ, discovered during recent hospitalization in Jan 2023   • Hypertension    • Polycystic kidney disease    • Systolic CHF (HCC)     diagnosed based on ECHO showing low EF at outside hospital in 1/2023, but with normalized EF on ECHO here on 3/14/23, but with diastolic CHF noted       Past Surgical History:   Procedure Laterality Date   • APPENDECTOMY     • CARDIAC CATHETERIZATION     • HYSTERECTOMY      partial, still has left ovary   • INSERTION HEMODIALYSIS CATHETER         Family History: family history includes Heart disease in her father and mother; Stroke in her mother. Otherwise pertinent FHx was reviewed and not pertinent to current issue.    Social History:  reports that she has quit smoking. Her smoking use included cigarettes. She has never used smokeless tobacco. She reports that she does not drink alcohol and does not use drugs.    Home Medications:   NIFEdipine XL, Zinc Sulfate, ascorbic acid, aspirin, atenolol, busPIRone, cholecalciferol, cloNIDine, clopidogrel, hydrALAZINE, hydrOXYzine pamoate, isosorbide mononitrate, and metoprolol succinate XL    Allergies:  No Known Allergies    Objective    Objective     Vitals:  Temp:  [97.5 °F (36.4 °C)-98.9 °F (37.2 °C)] 98 °F (36.7 °C)  Heart Rate:  [67-74] 74  Resp:  [18-20] 20  BP: (145-165)/(59-79) 145/59  Flow (L/min):  [2-3] 2    Physical Exam  Constitutional:       General: She is not in acute distress.     Appearance: Normal appearance. She is not toxic-appearing.   HENT:      Head: Normocephalic.      Right Ear: External ear normal.      Left Ear: External ear normal.      Nose: Nose normal.   Eyes:      General: No scleral icterus.     Pupils: Pupils are equal, round, and reactive to light.   Cardiovascular:      Rate and Rhythm: Normal rate.      Pulses: Normal pulses.   Pulmonary:      Effort: Pulmonary effort is normal.      Breath sounds: No rhonchi.   Abdominal:      General: Abdomen is flat.      Palpations: Abdomen is soft.       Tenderness: There is no abdominal tenderness.   Musculoskeletal:      Cervical back: No rigidity.   Skin:     Coloration: Skin is not jaundiced.   Neurological:      Mental Status: She is alert.         Result Review    Result Review:  I have personally reviewed the results from the time of this admission to 3/20/2023 14:08 EDT and agree with these findings:  []  Laboratory list / accordion  []  Microbiology  []  Radiology  []  EKG/Telemetry   []  Cardiology/Vascular   []  Pathology  []  Old records  []  Other:  Most notable findings include:       Assessment & Plan   Assessment / Plan     Brief Patient Summary:  Pati Cortes is a 67 y.o. female who has a no longer needed tunneled dialysis catheter, but is on anticoagulation and likely has high venous pressures due to her heart disease.    Active Hospital Problems:  Active Hospital Problems    Diagnosis    • **NSTEMI (non-ST elevated myocardial infarction) (HCC)      Plan: Hold the eliquis a couple of days before removing the catheter.      Aki Merrill MD

## 2023-03-20 NOTE — PHARMACY PATIENT ASSISTANCE
Pharmacy checked on cost of Eliquis. Patient has no active prescription insurance.  A 30 day free trial card is available for use in [facility] for discharge. Patient assistance technician spoke to patient about cost and patient reported it will not be affordable. Patient assistance technician also spoke to patient about bringing in income information to apply for assistance programs, but patient declined. Patient reported to assistance technician that she would rather be on something more affordable such as warfarin and would be willing to have INR checks.    Thank you,  Brandie Livingston, PharmD

## 2023-03-20 NOTE — DISCHARGE INSTRUCTIONS
You have been enrolled into the transition from hospital to home program.  A nurse (Jayleen Jones) will be contacting you after you discharge to home to set up a time to come out to your home for a follow-up visit.  If you have any questions or concerns you can call this number anytime and a nurse will contact you:  [facility]  [phone].

## 2023-03-20 NOTE — CASE MANAGEMENT/SOCIAL WORK
Discharge Planning Assessment  [facility]     Patient Name: Pati Cortes  MRN: [id number]  Today's Date: 3/20/2023    Admit Date: 3/16/2023    Plan: SS spoke with pt at bedside on this date. Pt states she plans to return home at discharge. Pt states she does not want to return to inpatient rehab at discharge. Pt lives at home with spouse, Sree Amaya at [address] [place] [address]. Pt currently does not utilize home health services. Pt currently utilizes glucometer, bp cuff, bedside commode, home 02 at 3 liters nc, rollator and elevated toilet seat via [employer]. Pt utilizes [facility] in [place]. Pt spouse is POA. Pt does not have living will. Pt and spouse do not have vehicle and walk to grocery store. Pt aware of [facility]. Pt's PCP Gasper Reyes. SS will follow.     Discharge Plan     Row Name 03/20/23 1610       Plan    Plan SS spoke with pt at bedside on this date. Pt states she plans to return home at discharge. Pt states she does not want to return to inpatient rehab at discharge. Pt lives at home with spouse, Sree Amaya at [address] [place] [address]. Pt currently does not utilize home health services. Pt currently utilizes glucometer, bp cuff, bedside commode, home 02 at 3 liters nc, rollator and elevated toilet seat via [employer]. Pt utilizes [facility] in [place]. Pt spouse is POA. Pt does not have living will. Pt and spouse do not have vehicle and walk to grocery store. Pt aware of [facility]. Pt's PCP Gasper Reyes. SS will follow.              VIRGILIO Frausto

## 2023-03-20 NOTE — PROGRESS NOTES
"Patient information:   LOS: 4 days     Name: Pati Cortes  Age/Sex: 67 y.o. female  :  1955        PCP: Gasper Reyes MD  Attending: No Known Provider  MRN:  [id number]  Visit Number:  [id number]    Principal Problem:    NSTEMI (non-ST elevated myocardial infarction) (HCC)      Reason for follow-up: Chest pain and elevated troponin    Subjective   ADMISSION INFORMATION:  HPI:  Subjective   Pati Cortes is a 67 y.o. female with a past medical history significant for chronic kidney disease stage IV (history of dialysis and recent hospitalization at an outside facility however patient reported that she has not had dialysis in a couple weeks and her creatinine baseline is around 2), history of CAD status post stent in remote history, hypertension, systolic HFimpEF (low EF 2023 from echo from outside facility however appears normalized 2023 with echo here), History of DVT in right IJ 2023, cystic kidney disease,  Patient presented to [facility] inpatient rehab facility on 03/10/2023 for debility status post prolonged hospitalization at an outlying facility.  However on on 3/16/2023 she complained of chest pain radiating to her left shoulder and back described as soreness.  Patient was transferred to acute care on the telemetry floor and cardiology was consulted for further evaluation and management.     Interval History:   Creatinine is 2.15.  Potassium is 4.2.  Patient diuresed and -390 mL overnight which is noted on her flow sheet.  Her hemoglobin is 7.5 which is a slight drop from 8.4.  proBNP is 30,084.0 which is improved from greater than 70,000 on 2023.  Patient is lying in bed resting quietly.  No acute distress noted at this time.  Patient denies any chest pain, shortness of breath, or palpitations.  Head of bed is elevated approximately 30 degrees.  Patient reports,\" I am doing good\" however she continues to complain of nasal congestion.  " Supplemental oxygen via nasal cannula in use.    Vital Signs  Temp:  [97.5 °F (36.4 °C)-98.9 °F (37.2 °C)] 97.5 °F (36.4 °C)  Heart Rate:  [67-73] 69  Resp:  [18-20] 18  BP: (150-165)/(60-79) 155/79  Vital Signs (last 72 hrs)       03/17 0700  03/18 0659 03/18 0700  03/19 0659 03/19 0700  03/20 0659 03/20 0700 03/20 0728   Most Recent      Temp (°F) 97.9 -  98.3    98 -  98.3    97.5 -  98.9       97.5 (36.4) 03/20 0637    Heart Rate 66 -  83    61 -  71    68 -  73       69 03/20 0637    Resp 18 -  20    18 -  20    18 -  20       18 03/20 0637    /75 -  195/83    173/71 -  194/83    150/63 -  165/76       155/79 03/20 0637    SpO2 (%) 95 -  98    96 -  98      96       96 03/20 0637        Body mass index is 27.8 kg/m².    Intake/Output Summary (Last 24 hours) at 3/20/2023 0855  Last data filed at 3/20/2023 0800  Gross per 24 hour   Intake 940 ml   Output 850 ml   Net 90 ml     Objective  Objective     Physical Exam:      General Appearance:    Alert, cooperative, in no acute distress.   Head:    Normocephalic, without obvious abnormality, atraumatic.   Eyes:                          Conjunctivae and sclerae normal, no icterus, no pallor, corneas clear.   Neck:   No adenopathy, supple, trachea midline, no thyromegaly, no carotid bruit, no JVD.   Lungs:     Clear to auscultation, respirations regular, even and             unlabored.    Heart:    Regular rhythm and normal rate, normal S1 and S2, no          murmur, no gallop, no rub, no click   Chest Wall:    No abnormalities observed.   Abdomen:     Normal bowel sounds, no masses, no organomegaly, soft nontender, nondistended, no guarding, no rebound tenderness.   Extremities:   Moves all extremities well, no edema, no cyanosis, no            redness.   Pulses:   Pulses palpable and equal bilaterally.   Skin:   No bleeding, bruising or rash.   Neurologic:   Alert and Oriented x 3, Speech Clear & comprehensive.       Results review   Results Review:   Results  from last 7 days   Lab Units 03/20/23  0203 03/19/23  0326 03/18/23  0801 03/16/23  2316 03/15/23  0138 03/14/23  0136   WBC 10*3/mm3 10.06 12.25* 11.38* 12.99* 11.17* 11.36*   HEMOGLOBIN g/dL 7.5* 8.4* 8.2* 8.4* 8.2* 7.8*   PLATELETS 10*3/mm3 316 412 445 432 506* 501*     Results from last 7 days   Lab Units 03/20/23  0203 03/19/23  0326 03/18/23  0801 03/16/23  2316 03/15/23  0138 03/14/23  0136   SODIUM mmol/L 139 139 137 139 138 136   POTASSIUM mmol/L 4.2 3.9 4.5 4.2 4.0 4.0   CHLORIDE mmol/L 104 104 102 102 100 99   CO2 mmol/L 24.6 23.0 22.7 24.8 27.0 27.6   BUN mg/dL 41* 43* 42* 44* 53* 54*   CREATININE mg/dL 2.15* 2.03* 2.07* 2.06* 2.16* 2.39*   CALCIUM mg/dL 8.5* 8.8 8.8 8.5* 8.7 8.4*   GLUCOSE mg/dL 94 79 81 119* 102* 98   ALT (SGPT) U/L <5 <5 6 6  --  7   AST (SGOT) U/L 12 10 29 11  --  11     Results from last 7 days   Lab Units 03/17/23  0824 03/16/23  2316 03/16/23  1934 03/16/23  1730   HSTROP T ng/L 101* 98* 96* 98*     Lab Results   Component Value Date    PROBNP 30,084.0 (H) 03/20/2023    PROBNP >70,000.0 (H) 03/17/2023        Lab Results   Component Value Date    INR 1.28 (H) 03/16/2023    INR 1.06 02/21/2023    INR 1.07 01/27/2023    INR 1.25 (H) 01/21/2023     Lab Results   Component Value Date    MG 2.0 03/11/2023    MG 1.9 02/17/2023    MG 2.2 02/16/2023     Lab Results   Component Value Date    TRIG 132 03/16/2023    HDL 34 (L) 03/16/2023    LDL 67 03/16/2023      Pain Management Panel    There is no flowsheet data to display.       Imaging Results (Last 48 Hours)     ** No results found for the last 48 hours. **        ECHO:  Results for orders placed during the hospital encounter of 03/10/23    Adult Transthoracic Echo Complete W/ Cont if Necessary Per Protocol    Interpretation Summary  •  Left ventricular systolic function is normal. Left ventricular ejection fraction appears to be 66 - 70%.  •  Left ventricular wall thickness is consistent with posterior asymmetric hypertrophy, with  ventricular consistent sleep could be consistent with cardiac amyloidosis, clinical correlation is required.  •  Left ventricular diastolic function is consistent with (grade Ia w/high LAP) impaired relaxation.  •  The right atrial cavity is mildly  dilated.  •  Mild aortic valve stenosis is present.  •  Estimated right ventricular systolic pressure from tricuspid regurgitation is mildly elevated (35-45 mmHg).  •  There is a moderate (1-2cm) pericardial effusion. There is no evidence of cardiac tamponade.      STRESS TEST:  Results for orders placed during the hospital encounter of 03/16/23    Stress Test With Myocardial Perfusion One Day    Interpretation Summary  Images from the original result were not included.    •  A pharmacological stress test was performed using regadenoson  •  Findings consistent with an indeterminate ECG stress test.  •  Myocardial perfusion imaging indicates a small-sized, mild degree of ischemia located in the apex.  •  Normal LV cavity size. Normal LV wall motion noted.  •  Left ventricular ejection fraction is normal (Calculated EF = 63%).  •  Impressions are consistent with an intermediate risk study.      HEART CATH:  No results found for this or any previous visit.      Telemetry: SR 60's         I reviewed the patient's new clinical results.    Medication Review:   amLODIPine, 10 mg, Oral, Q24H  apixaban, 5 mg, Oral, Q12H  aspirin, 81 mg, Oral, Daily  atorvastatin, 80 mg, Oral, Nightly  bumetanide, 4 mg, Oral, Daily  busPIRone, 7.5 mg, Oral, TID  carvedilol, 12.5 mg, Oral, BID With Meals  cholecalciferol, 1,000 Units, Oral, Daily  cloNIDine, 0.3 mg, Per PEG Tube, Q8H  docusate sodium, 100 mg, Oral, BID  ferrous sulfate, 325 mg, Oral, BID With Meals  fluticasone, 2 spray, Each Nare, Daily  guaiFENesin, 600 mg, Oral, Q12H  hydrALAZINE, 100 mg, Oral, Q6H  isosorbide dinitrate, 30 mg, Oral, TID - Nitrates  melatonin, 5 mg, Oral, Nightly  pantoprazole, 40 mg, Oral, BID AC  polyethylene  glycol, 17 g, Oral, Daily  senna-docusate sodium, 2 tablet, Oral, BID  sodium chloride, 10 mL, Intravenous, Q12H  spironolactone, 100 mg, Oral, Daily         Assessment    Assessment:  1.  Patient admitted with chest pain with typical and atypical features, stress test was abnormal with mild apical ischemia  2.  NSTEMI likely type II  3.  ASCVD status post PCI in the remote past  4.  Essential hypertension with poor control  5.  CKD  6.  Admitted with HFpEF, compensated  7.  Moderate pericardial effusion  8.  Concern for possible cardiac amyloidosis  9.  Anemia  10.  History of deep vein thrombosis            Plan   Recommendations:  1.  Patient again refusing any cardiac work-up here she wants to be seen by her primary cardiologist at [facility] she is denying any chest pain or other complaints and she is asking for being discharged home  2.  Blood pressure remains relatively high mostly in maximum medications she is not a good candidate for ACE or ARB at the moment because of renal impairment if heart rate remains above 65 consider increasing the dose of carvedilol in a.m. tomorrow to 25 mg twice daily  3.  As the patient is refusing to have any kind of cardiac work-up we will follow at a distance please call if assistance is required            I discussed the patients findings and my recommendations with patient and family.    Electronically signed by ALVARADO Troncoso, 03/20/23, 10:27 AM EDT.  Electronically signed by Joann Garcia MD, 03/20/23, 10:34 AM EDT.              Please note that portions of this note were completed with a voice recognition program.    Please note that portions of this note were copied and has been reviewed and is accurate as of date.

## 2023-03-20 NOTE — PROGRESS NOTES
[facility] HOSPITALIST PROGRESS NOTE     Patient Identification:  Name:  Pati Cortes  Age:  67 y.o.  Sex:  female  :  1955  MRN:  [id number]  Visit Number:  [id number]  ROOM: [address] [place] [address]     Primary Care Provider:  Gasper Reyes MD     Date of Admission: 3/16/2023    Length of stay in inpatient status:  4    Subjective     Chief Compliant: Chest and shoulder pain that developed while and inpatient rehab    History of Presenting Illness: Today, the patient states that she is doing better.  She denies chest pain, trouble breathing.  She is tolerating her diet.  She denies nausea, vomiting, and diarrhea.  She is not coughing.  She is anxious to go home as soon as possible.  She states that she has been up ambulating some.    Procedures:  Received Ferrlecit 250 mg 3/12/2023    Objective     Current Hospital Meds:  amLODIPine, 10 mg, Oral, Q24H  aspirin, 81 mg, Oral, Daily  atorvastatin, 80 mg, Oral, Nightly  bumetanide, 4 mg, Oral, Daily  busPIRone, 7.5 mg, Oral, TID  carvedilol, 12.5 mg, Oral, BID With Meals  cholecalciferol, 1,000 Units, Oral, Daily  cloNIDine, 0.3 mg, Per PEG Tube, Q8H  docusate sodium, 100 mg, Oral, BID  ferrous sulfate, 325 mg, Oral, BID With Meals  fluticasone, 2 spray, Each Nare, Daily  guaiFENesin, 600 mg, Oral, Q12H  hydrALAZINE, 100 mg, Oral, Q6H  isosorbide dinitrate, 30 mg, Oral, TID - Nitrates  melatonin, 5 mg, Oral, Nightly  pantoprazole, 40 mg, Oral, BID AC  polyethylene glycol, 17 g, Oral, Daily  senna-docusate sodium, 2 tablet, Oral, BID  sodium chloride, 10 mL, Intravenous, Q12H  spironolactone, 100 mg, Oral, Daily       Current Antimicrobial Therapy:  Anti-Infectives (From admission, onward)    None        Current Diuretic Therapy:  Diuretics (From admission, onward)    Ordered     Dose/Rate Route Frequency Start Stop    23 0834  furosemide (LASIX) injection 80 mg        Ordering Provider: Mamadou Padron MD    80 mg Intravenous Once 23 0952  03/18/23 1255    03/16/23 2300  bumetanide (BUMEX) tablet 4 mg        Ordering Provider: Javier Lopez MD    4 mg Oral Daily 03/17/23 0900      03/16/23 2300  spironolactone (ALDACTONE) tablet 100 mg        Ordering Provider: Javier Lopez MD    100 mg Oral Daily 03/17/23 0900          ----------------------------------------------------------------------------------------------------------------------  Vital Signs:  Temp:  [97.5 °F (36.4 °C)-98.9 °F (37.2 °C)] 97.8 °F (36.6 °C)  Heart Rate:  [67-76] 76  Resp:  [18-20] 18  BP: (138-165)/(59-79) 158/70  SpO2:  [91 %-98 %] 94 %  on  Flow (L/min):  [2-3] 2;   Device (Oxygen Therapy): nasal cannula  Body mass index is 27.8 kg/m².    Wt Readings from Last 3 Encounters:   03/20/23 71.2 kg (156 lb 14.4 oz)   03/13/23 69.9 kg (154 lb 1.6 oz)     Intake & Output (last 3 days)       03/17 0701  03/18 0700 03/18 0701  03/19 0700 03/19 0701  03/20 0700 03/20 0701 03/21 0700    P.O. 590 955 460 960    I.V. (mL/kg)   0 (0)     Total Intake(mL/kg) 590 (8.3) 955 (13.3) 460 (6.5) 960 (13.5)    Urine (mL/kg/hr) 420 (0.2) 700 (0.4) 850 (0.5)     Stool  0      Total Output 420 700 850     Net +170 +255 -390 +960            Urine Unmeasured Occurrence  2 x      Stool Unmeasured Occurrence  3 x          Diet: Regular/House Diet; Texture: Regular Texture (IDDSI 7); Fluid Consistency: Thin (IDDSI 0)  ----------------------------------------------------------------------------------------------------------------------  Physical Exam  Vitals reviewed.   Constitutional:       General: She is not in acute distress.     Appearance: She is not ill-appearing, toxic-appearing or diaphoretic.   HENT:      Head: Normocephalic and atraumatic.      Right Ear: External ear normal.      Left Ear: External ear normal.      Nose: Nose normal.   Eyes:      General: No scleral icterus.        Right eye: No discharge.         Left eye: No discharge.      Pupils: Pupils are equal, round,  and reactive to light.   Cardiovascular:      Rate and Rhythm: Normal rate and regular rhythm.      Pulses: Normal pulses.      Heart sounds: No murmur heard.  Pulmonary:      Effort: Pulmonary effort is normal. No respiratory distress.      Breath sounds: No wheezing or rales.   Abdominal:      General: Bowel sounds are normal. There is no distension.      Palpations: Abdomen is soft.   Musculoskeletal:         General: No swelling, deformity or signs of injury.   Skin:     Capillary Refill: Capillary refill takes less than 2 seconds.      Coloration: Skin is not jaundiced or pale.   Neurological:      Mental Status: She is alert and oriented to person, place, and time. Mental status is at baseline.      Cranial Nerves: No cranial nerve deficit.   Psychiatric:         Mood and Affect: Mood normal.         Behavior: Behavior normal.       ----------------------------------------------------------------------------------------------------------------------  Tele: Normal sinus rhythm with heart rate 60s to 70s.  Personally reviewed the telemetry strips.  ----------------------------------------------------------------------------------------------------------------------  LABS:    CBC and coagulation:  Results from last 7 days   Lab Units 03/20/23  0203 03/19/23  0326 03/18/23  0801 03/17/23  0007 03/16/23  2316 03/15/23  0138 03/14/23  0136   PROCALCITONIN ng/mL  --   --   --  0.07  --   --   --    CRP mg/dL  --   --   --   --  1.79*  --   --    WBC 10*3/mm3 10.06 12.25* 11.38*  --  12.99* 11.17* 11.36*   HEMOGLOBIN g/dL 7.5* 8.4* 8.2*  --  8.4* 8.2* 7.8*   HEMATOCRIT % 25.1* 28.1* 27.6*  --  27.4* 27.2* 26.1*   MCV fL 93.3 92.4 93.2  --  93.2 93.8 93.5   MCHC g/dL 29.9* 29.9* 29.7*  --  30.7* 30.1* 29.9*   PLATELETS 10*3/mm3 316 412 445  --  432 506* 501*   INR   --   --   --   --  1.28*  --   --      Renal and electrolytes:  Results from last 7 days   Lab Units 03/20/23  0203 03/19/23  0326 03/18/23  0801  03/16/23  2316 03/15/23  0138 03/14/23  0136   SODIUM mmol/L 139 139 137 139 138 136   POTASSIUM mmol/L 4.2 3.9 4.5 4.2 4.0 4.0   CHLORIDE mmol/L 104 104 102 102 100 99   CO2 mmol/L 24.6 23.0 22.7 24.8 27.0 27.6   BUN mg/dL 41* 43* 42* 44* 53* 54*   CREATININE mg/dL 2.15* 2.03* 2.07* 2.06* 2.16* 2.39*   CALCIUM mg/dL 8.5* 8.8 8.8 8.5* 8.7 8.4*   GLUCOSE mg/dL 94 79 81 119* 102* 98     Estimated Creatinine Clearance: 23.7 mL/min (A) (by C-G formula based on SCr of 2.15 mg/dL (H)).    Liver and pancreatic function:  Results from last 7 days   Lab Units 03/20/23  0203 03/19/23  0326 03/18/23  0801 03/16/23  2316 03/14/23  0136   ALBUMIN g/dL 2.6* 2.6* 2.5* 2.8* 2.5*   BILIRUBIN mg/dL 0.2 0.2 0.2 0.3 0.2   ALK PHOS U/L 91 96 94 103 113   AST (SGOT) U/L 12 10 29 11 11   ALT (SGPT) U/L <5 <5 6 6 7     Endocrine function:  Lab Results   Component Value Date    HGBA1C 5.10 03/14/2023     Point of care bedside glucose levels:  Results from last 7 days   Lab Units 03/20/23  0642 03/19/23  1712 03/19/23  1114 03/19/23  0653 03/18/23  1935 03/18/23  1655 03/18/23  1025 03/18/23  0709 03/17/23 2002 03/17/23  1700 03/17/23  1020 03/17/23  0710 03/16/23  1622 03/16/23  0600   GLUCOSE mg/dL 95 98 129 83 176* 82 118 95 204* 100 99 93 105 92     Glucose levels from [facility]:  Results from last 7 days   Lab Units 03/20/23  0203 03/19/23  0326 03/18/23  0801 03/16/23  2316 03/15/23  0138 03/14/23  0136   GLUCOSE mg/dL 94 79 81 119* 102* 98     Cardiac:  Results from last 7 days   Lab Units 03/20/23  0203 03/17/23  0824 03/16/23  2316 03/16/23  1934 03/16/23  1730   HSTROP T ng/L  --  101* 98* 96* 98*   PROBNP pg/mL 30,084.0* >70,000.0*  --   --   --      Results from last 7 days   Lab Units 03/16/23  2316   CHOLESTEROL mg/dL 125   TRIGLYCERIDES mg/dL 132   HDL CHOL mg/dL 34*   LDL CHOL mg/dL 67     Cultures:  Microbiology Results (last 10 days)     Procedure Component Value - Date/Time    Blood Culture - Blood, Arm, Right [[id number]]   (Normal) Collected: 03/17/23 0020    Lab Status: Preliminary result Specimen: Blood from Arm, Right Updated: 03/20/23 0030     Blood Culture No growth at 3 days    Blood Culture - Blood, Arm, Left [[id number]]  (Normal) Collected: 03/17/23 0007    Lab Status: Preliminary result Specimen: Blood from Arm, Left Updated: 03/20/23 0030     Blood Culture No growth at 3 days        I have personally looked at the labs and they are summarized above.    Assessment & Plan      -Type II Non-ST elevation MI that was present on admission  -Acute exacerbation of heart failure with improved ejection fraction that was present on admission (Previous LV systolic dysfunction/cardiomyopathy)   -Stress test this admission abnormal with mild apical ischemia seen, patient declining left heart catheterization at this time  -History of coronary artery disease status post stent in the distant past  -History of chronic kidney disease stage IV with recent acute kidney injury/ATN that developed during her last hospitalization in January 2023 requiring hemodialysis initially, currently not requiring hemodialysis now that the acute tubular necrosis has resolved  -Chronic hypoxic respiratory failure that developed after COVID-19 pneumonia in January 2023, currently utilizing 3 L/min nasal cannula oxygen  -History of essential hypertension with poor control at baseline  -History of DVT in right IJ in January 2023  -History of type 2 diabetes mellitus  -Acute on chronic anemia of chronic disease with iron deficiency  -Debility and deconditioning    Her volume status seems improved and since her creatinine is not worsening we will continue with the Bumex 4 mg daily.  We will continue with strict input and output measurements.  Patient is not anticipated to need any further hemodialysis and thus nephrology is asked us to remove the dialysis catheter.  I contacted general surgery and the patient needs to be off of Eliquis for 48 hours.  Therefore, we  will stop Eliquis and give a heparin drip for bridging.  We will continue to trend the creatinine and the electrolytes in light of the continuing and chronic diuresis.  We will also continue to trend the hemoglobin level, which has been between 7 and 8 during this entire hospitalization with no bleeding discovered.  Blood pressures are improved and we will continue to monitor these; however, her blood pressures are still higher than goal and thus cardiology stated that we could increase the Coreg to 25 mg twice a day if her heart rates remain above 65.  Glucose levels are at goal and in fact the hemoglobin A1c is less than 6 this admission.  Therefore, we will continue to monitor her glucose levels closely.  We will repeat blood work in the morning.    VTE Prophylaxis:   Mechanical Order History:     None      Pharmalogical Order History:      Ordered     Dose Route Frequency Stop    03/19/23 1129  apixaban (ELIQUIS) tablet 5 mg  Status:  Discontinued         5 mg PO Every 12 Hours Scheduled 03/20/23 1415    03/17/23 1236  heparin 47889 units/250 mL (100 units/mL) in 0.45 % NaCl infusion  8.38 mL/hr,   Status:  Discontinued         12 Units/kg/hr IV Titrated 03/18/23 1120    03/16/23 2306  heparin (porcine) 5000 UNIT/ML injection 4,200 Units         60 Units/kg IV Once 03/17/23 0019    03/16/23 2306  heparin 31526 units/250 mL (100 units/mL) in 0.9% NaCl infusion  8.38 mL/hr,   Status:  Discontinued         12 Units/kg/hr IV Titrated 03/17/23 1237    03/16/23 2306  heparin (porcine) 5000 UNIT/ML injection 4,200 Units  Status:  Discontinued         60 Units/kg IV As Needed 03/20/23 1508    03/16/23 2306  heparin (porcine) 5000 UNIT/ML injection 2,100 Units  Status:  Discontinued         30 Units/kg IV As Needed 03/20/23 1508            Disposition: Patient would like to go home once hemodialysis catheter has been removed    Saurabh Huerta MD  [facility] Hospitalist  03/20/23  16:13 EDT

## 2023-03-20 NOTE — PROGRESS NOTES
Nephrology Progress Note      Subjective     No chest pain or shortness of breath. Doing fine,     Objective       Vital signs :     Temp:  [97.5 °F (36.4 °C)-98.9 °F (37.2 °C)] 97.5 °F (36.4 °C)  Heart Rate:  [67-73] 69  Resp:  [18-20] 18  BP: (150-165)/(60-79) 155/79    Intake/Output                       03/18/23 0701 - 03/19/23 0700 03/19/23 0701 - 03/20/23 0700     2861-7812 1115-8815 Total 3652-2751 2994-2492 Total                 Intake    P.O.  715  240 955  460  -- 460    I.V.  --  -- --  0  -- 0    Total Intake 715 240 955 460 -- 460       Output    Urine  --  700 700  --  850 850    Total Output -- 700 700 -- 850 850           Physical Exam:    General Appearance : no chest pain or shortness of breath.   Lungs : clear to auscultation, respirations regular  Heart :  regular rhythm & normal rate, normal S1, S2 and no murmur, no rub  Abdomen : soft, non distended  Extremities : no edema, right IJ catheter  Neurologic :   orientated to person, place, time and situation, Grossly no focal deficits        Laboratory Data :     Albumin Albumin   Date Value Ref Range Status   03/20/2023 2.6 (L) 3.5 - 5.2 g/dL Final   03/19/2023 2.6 (L) 3.5 - 5.2 g/dL Final   03/18/2023 2.5 (L) 3.5 - 5.2 g/dL Final      Magnesium No results found for: MG       PTH               No results found for: PTH    CBC and coagulation:  Results from last 7 days   Lab Units 03/20/23  0203 03/19/23  0326 03/18/23  0801 03/17/23  0007 03/16/23  2316   PROCALCITONIN ng/mL  --   --   --  0.07  --    CRP mg/dL  --   --   --   --  1.79*   WBC 10*3/mm3 10.06 12.25* 11.38*  --  12.99*   HEMOGLOBIN g/dL 7.5* 8.4* 8.2*  --  8.4*   HEMATOCRIT % 25.1* 28.1* 27.6*  --  27.4*   MCV fL 93.3 92.4 93.2  --  93.2   MCHC g/dL 29.9* 29.9* 29.7*  --  30.7*   PLATELETS 10*3/mm3 316 412 445  --  432   INR   --   --   --   --  1.28*     Acid/base balance:      Renal and electrolytes:    Results from last 7 days   Lab Units 03/20/23  0203 03/19/23  0326  03/18/23  0801 03/16/23  2316 03/15/23  0138   SODIUM mmol/L 139 139 137 139 138   POTASSIUM mmol/L 4.2 3.9 4.5 4.2 4.0   CHLORIDE mmol/L 104 104 102 102 100   CO2 mmol/L 24.6 23.0 22.7 24.8 27.0   BUN mg/dL 41* 43* 42* 44* 53*   CREATININE mg/dL 2.15* 2.03* 2.07* 2.06* 2.16*   CALCIUM mg/dL 8.5* 8.8 8.8 8.5* 8.7     Estimated Creatinine Clearance: 23.7 mL/min (A) (by C-G formula based on SCr of 2.15 mg/dL (H)).  @GFRCG:3@   Liver and pancreatic function:  Results from last 7 days   Lab Units 03/20/23  0203 03/19/23  0326 03/18/23  0801   ALBUMIN g/dL 2.6* 2.6* 2.5*   BILIRUBIN mg/dL 0.2 0.2 0.2   ALK PHOS U/L 91 96 94   AST (SGOT) U/L 12 10 29   ALT (SGPT) U/L <5 <5 6         Cardiac:  Results from last 7 days   Lab Units 03/17/23  0824   PROBNP pg/mL >70,000.0*     Liver and pancreatic function:  Results from last 7 days   Lab Units 03/20/23  0203 03/19/23  0326 03/18/23  0801   ALBUMIN g/dL 2.6* 2.6* 2.5*   BILIRUBIN mg/dL 0.2 0.2 0.2   ALK PHOS U/L 91 96 94   AST (SGOT) U/L 12 10 29   ALT (SGPT) U/L <5 <5 6       Medications :     amLODIPine, 10 mg, Oral, Q24H  apixaban, 5 mg, Oral, Q12H  aspirin, 81 mg, Oral, Daily  atorvastatin, 80 mg, Oral, Nightly  bumetanide, 4 mg, Oral, Daily  busPIRone, 7.5 mg, Oral, TID  carvedilol, 12.5 mg, Oral, BID With Meals  cholecalciferol, 1,000 Units, Oral, Daily  cloNIDine, 0.3 mg, Per PEG Tube, Q8H  docusate sodium, 100 mg, Oral, BID  ferrous sulfate, 325 mg, Oral, BID With Meals  fluticasone, 2 spray, Each Nare, Daily  guaiFENesin, 600 mg, Oral, Q12H  hydrALAZINE, 100 mg, Oral, Q6H  isosorbide dinitrate, 30 mg, Oral, TID - Nitrates  melatonin, 5 mg, Oral, Nightly  pantoprazole, 40 mg, Oral, BID AC  polyethylene glycol, 17 g, Oral, Daily  senna-docusate sodium, 2 tablet, Oral, BID  sodium chloride, 10 mL, Intravenous, Q12H  spironolactone, 100 mg, Oral, Daily             Assessment & Plan     1.  CKD stage IV  2.  Resolved acute tubular necrosis  3.  SWATI and received dialysis  DAMASO  4.  Acute on chronic hypoxic respiratory failure  5.  Systolic plus diastolic CHF compensated  6.  Left ventricular ejection fraction 30%  7.  Coronary artery disease with history of stent  8.  Type 2 diabetes  9.  Anemia of acute on chronic disease with iron deficiency.    Received Ferrlecit 250 mg 3/12/2023  10. Debility and deconditioning     Cr is stable around 2.0 now. Adequate urine output. Not getting PCI/cardiac cath. So will get TDC to be removed.   Pt needs to seen by nephrologist, educated and counseled to get apt in her city.   GS consult for TDC removal.     Continue holding dialysis, uncontrolled BP, continue on [id number]    Prognosis guarded    Plan of care was discussed with the patient she understood verbalized agreed    Micha Carey MD  03/20/23  08:17 EDT

## 2023-03-20 NOTE — PLAN OF CARE
Goal Outcome Evaluation:      Patient is lying in bed with no S&S of distress. No complaints at this time. Will continue to follow plan of care.

## 2023-03-20 NOTE — NURSING NOTE
Pt transferred from 305A to 304B this shift. Pt requests to wait until tomorrow morning to tell her  about the room change.

## 2023-03-21 LAB
ANION GAP SERPL CALCULATED.3IONS-SCNC: 9.7 MMOL/L (ref 5–15)
APTT PPP: 50 SECONDS (ref 26.5–34.5)
APTT PPP: 50.3 SECONDS (ref 26.5–34.5)
APTT PPP: 59.4 SECONDS (ref 26.5–34.5)
BASOPHILS # BLD AUTO: 0.05 10*3/MM3 (ref 0–0.2)
BASOPHILS NFR BLD AUTO: 0.4 % (ref 0–1.5)
BUN SERPL-MCNC: 41 MG/DL (ref 8–23)
BUN/CREAT SERPL: 20.4 (ref 7–25)
CALCIUM SPEC-SCNC: 8.6 MG/DL (ref 8.6–10.5)
CHLORIDE SERPL-SCNC: 103 MMOL/L (ref 98–107)
CO2 SERPL-SCNC: 25.3 MMOL/L (ref 22–29)
CREAT SERPL-MCNC: 2.01 MG/DL (ref 0.57–1)
DEPRECATED RDW RBC AUTO: 56.6 FL (ref 37–54)
EGFRCR SERPLBLD CKD-EPI 2021: 26.6 ML/MIN/1.73
EOSINOPHIL # BLD AUTO: 0.22 10*3/MM3 (ref 0–0.4)
EOSINOPHIL NFR BLD AUTO: 2 % (ref 0.3–6.2)
ERYTHROCYTE [DISTWIDTH] IN BLOOD BY AUTOMATED COUNT: 16.6 % (ref 12.3–15.4)
GLUCOSE BLDC GLUCOMTR-MCNC: 91 MG/DL (ref 70–130)
GLUCOSE SERPL-MCNC: 91 MG/DL (ref 65–99)
HCT VFR BLD AUTO: 27 % (ref 34–46.6)
HGB BLD-MCNC: 8.2 G/DL (ref 12–15.9)
IMM GRANULOCYTES # BLD AUTO: 0.05 10*3/MM3 (ref 0–0.05)
IMM GRANULOCYTES NFR BLD AUTO: 0.4 % (ref 0–0.5)
LYMPHOCYTES # BLD AUTO: 2.35 10*3/MM3 (ref 0.7–3.1)
LYMPHOCYTES NFR BLD AUTO: 21 % (ref 19.6–45.3)
MAGNESIUM SERPL-MCNC: 1.5 MG/DL (ref 1.6–2.4)
MCH RBC QN AUTO: 28.2 PG (ref 26.6–33)
MCHC RBC AUTO-ENTMCNC: 30.4 G/DL (ref 31.5–35.7)
MCV RBC AUTO: 92.8 FL (ref 79–97)
MONOCYTES # BLD AUTO: 0.92 10*3/MM3 (ref 0.1–0.9)
MONOCYTES NFR BLD AUTO: 8.2 % (ref 5–12)
NEUTROPHILS NFR BLD AUTO: 68 % (ref 42.7–76)
NEUTROPHILS NFR BLD AUTO: 7.61 10*3/MM3 (ref 1.7–7)
NRBC BLD AUTO-RTO: 0 /100 WBC (ref 0–0.2)
PHOSPHATE SERPL-MCNC: 4.4 MG/DL (ref 2.5–4.5)
PLATELET # BLD AUTO: 318 10*3/MM3 (ref 140–450)
PMV BLD AUTO: 9.7 FL (ref 6–12)
POTASSIUM SERPL-SCNC: 4.5 MMOL/L (ref 3.5–5.2)
RBC # BLD AUTO: 2.91 10*6/MM3 (ref 3.77–5.28)
SODIUM SERPL-SCNC: 138 MMOL/L (ref 136–145)
WBC NRBC COR # BLD: 11.2 10*3/MM3 (ref 3.4–10.8)

## 2023-03-21 PROCEDURE — 25010000002 MAGNESIUM SULFATE 2 GM/50ML SOLUTION: Performed by: INTERNAL MEDICINE

## 2023-03-21 PROCEDURE — 25010000002 HEPARIN (PORCINE) PER 1000 UNITS: Performed by: INTERNAL MEDICINE

## 2023-03-21 PROCEDURE — 80048 BASIC METABOLIC PNL TOTAL CA: CPT | Performed by: INTERNAL MEDICINE

## 2023-03-21 PROCEDURE — 83735 ASSAY OF MAGNESIUM: CPT | Performed by: INTERNAL MEDICINE

## 2023-03-21 PROCEDURE — 99232 SBSQ HOSP IP/OBS MODERATE 35: CPT | Performed by: INTERNAL MEDICINE

## 2023-03-21 PROCEDURE — 85730 THROMBOPLASTIN TIME PARTIAL: CPT | Performed by: INTERNAL MEDICINE

## 2023-03-21 PROCEDURE — 25010000002 MAGNESIUM SULFATE IN D5W 1G/100ML (PREMIX) 1-5 GM/100ML-% SOLUTION: Performed by: INTERNAL MEDICINE

## 2023-03-21 PROCEDURE — 99231 SBSQ HOSP IP/OBS SF/LOW 25: CPT | Performed by: SURGERY

## 2023-03-21 PROCEDURE — 82962 GLUCOSE BLOOD TEST: CPT

## 2023-03-21 PROCEDURE — 85025 COMPLETE CBC W/AUTO DIFF WBC: CPT | Performed by: INTERNAL MEDICINE

## 2023-03-21 PROCEDURE — 94799 UNLISTED PULMONARY SVC/PX: CPT

## 2023-03-21 PROCEDURE — 84100 ASSAY OF PHOSPHORUS: CPT | Performed by: INTERNAL MEDICINE

## 2023-03-21 PROCEDURE — 25010000002 HEPARIN (PORCINE) 25000-0.45 UT/250ML-% SOLUTION: Performed by: INTERNAL MEDICINE

## 2023-03-21 PROCEDURE — 94761 N-INVAS EAR/PLS OXIMETRY MLT: CPT

## 2023-03-21 RX ORDER — MAGNESIUM SULFATE HEPTAHYDRATE 40 MG/ML
2 INJECTION, SOLUTION INTRAVENOUS ONCE
Status: COMPLETED | OUTPATIENT
Start: 2023-03-21 | End: 2023-03-21

## 2023-03-21 RX ORDER — MAGNESIUM SULFATE HEPTAHYDRATE 40 MG/ML
4 INJECTION, SOLUTION INTRAVENOUS AS NEEDED
Status: DISCONTINUED | OUTPATIENT
Start: 2023-03-21 | End: 2023-03-22 | Stop reason: HOSPADM

## 2023-03-21 RX ORDER — MAGNESIUM SULFATE HEPTAHYDRATE 40 MG/ML
2 INJECTION, SOLUTION INTRAVENOUS AS NEEDED
Status: DISCONTINUED | OUTPATIENT
Start: 2023-03-21 | End: 2023-03-22 | Stop reason: HOSPADM

## 2023-03-21 RX ORDER — MAGNESIUM SULFATE 1 G/100ML
1 INJECTION INTRAVENOUS AS NEEDED
Status: DISCONTINUED | OUTPATIENT
Start: 2023-03-21 | End: 2023-03-22 | Stop reason: HOSPADM

## 2023-03-21 RX ORDER — MAGNESIUM SULFATE 1 G/100ML
1 INJECTION INTRAVENOUS
Status: COMPLETED | OUTPATIENT
Start: 2023-03-21 | End: 2023-03-21

## 2023-03-21 RX ORDER — CARVEDILOL 25 MG/1
25 TABLET ORAL 2 TIMES DAILY WITH MEALS
Status: DISCONTINUED | OUTPATIENT
Start: 2023-03-21 | End: 2023-03-22 | Stop reason: HOSPADM

## 2023-03-21 RX ADMIN — MAGNESIUM SULFATE HEPTAHYDRATE 1 G: 1 INJECTION, SOLUTION INTRAVENOUS at 18:19

## 2023-03-21 RX ADMIN — ISOSORBIDE DINITRATE 30 MG: 10 TABLET ORAL at 09:03

## 2023-03-21 RX ADMIN — FLUTICASONE PROPIONATE 2 SPRAY: 50 SPRAY, METERED NASAL at 09:07

## 2023-03-21 RX ADMIN — Medication 1000 UNITS: at 10:56

## 2023-03-21 RX ADMIN — HYDROXYZINE HYDROCHLORIDE 25 MG: 25 TABLET ORAL at 13:54

## 2023-03-21 RX ADMIN — HEPARIN SODIUM 16 UNITS/KG/HR: 10000 INJECTION, SOLUTION INTRAVENOUS at 21:58

## 2023-03-21 RX ADMIN — ISOSORBIDE DINITRATE 30 MG: 10 TABLET ORAL at 12:26

## 2023-03-21 RX ADMIN — HEPARIN SODIUM 2100 UNITS: 5000 INJECTION INTRAVENOUS; SUBCUTANEOUS at 04:57

## 2023-03-21 RX ADMIN — HEPARIN SODIUM 16 UNITS/KG/HR: 10000 INJECTION, SOLUTION INTRAVENOUS at 13:56

## 2023-03-21 RX ADMIN — PANTOPRAZOLE SODIUM 40 MG: 40 TABLET, DELAYED RELEASE ORAL at 09:06

## 2023-03-21 RX ADMIN — CARVEDILOL 25 MG: 25 TABLET, FILM COATED ORAL at 18:19

## 2023-03-21 RX ADMIN — HYDROXYZINE HYDROCHLORIDE 25 MG: 25 TABLET ORAL at 21:04

## 2023-03-21 RX ADMIN — MAGNESIUM SULFATE HEPTAHYDRATE 1 G: 1 INJECTION, SOLUTION INTRAVENOUS at 20:41

## 2023-03-21 RX ADMIN — SPIRONOLACTONE 100 MG: 100 TABLET ORAL at 09:03

## 2023-03-21 RX ADMIN — HYDRALAZINE HYDROCHLORIDE 100 MG: 50 TABLET, FILM COATED ORAL at 23:54

## 2023-03-21 RX ADMIN — HYDROXYZINE HYDROCHLORIDE 25 MG: 25 TABLET ORAL at 05:01

## 2023-03-21 RX ADMIN — HEPARIN SODIUM 2100 UNITS: 5000 INJECTION INTRAVENOUS; SUBCUTANEOUS at 13:54

## 2023-03-21 RX ADMIN — BUSPIRONE HYDROCHLORIDE 7.5 MG: 5 TABLET ORAL at 09:07

## 2023-03-21 RX ADMIN — FERROUS SULFATE TAB 325 MG (65 MG ELEMENTAL FE) 325 MG: 325 (65 FE) TAB at 09:06

## 2023-03-21 RX ADMIN — Medication 5 MG: at 20:40

## 2023-03-21 RX ADMIN — HYDRALAZINE HYDROCHLORIDE 100 MG: 50 TABLET, FILM COATED ORAL at 00:12

## 2023-03-21 RX ADMIN — BUMETANIDE 4 MG: 1 TABLET ORAL at 09:06

## 2023-03-21 RX ADMIN — PANTOPRAZOLE SODIUM 40 MG: 40 TABLET, DELAYED RELEASE ORAL at 17:35

## 2023-03-21 RX ADMIN — CARVEDILOL 12.5 MG: 6.25 TABLET, FILM COATED ORAL at 09:06

## 2023-03-21 RX ADMIN — ACETAMINOPHEN 650 MG: 325 TABLET ORAL at 10:56

## 2023-03-21 RX ADMIN — Medication 10 ML: at 09:07

## 2023-03-21 RX ADMIN — GUAIFENESIN 600 MG: 600 TABLET, EXTENDED RELEASE ORAL at 09:06

## 2023-03-21 RX ADMIN — CLONIDINE HYDROCHLORIDE 0.3 MG: 0.3 TABLET ORAL at 21:04

## 2023-03-21 RX ADMIN — CLONIDINE HYDROCHLORIDE 0.3 MG: 0.3 TABLET ORAL at 16:11

## 2023-03-21 RX ADMIN — AMLODIPINE BESYLATE 10 MG: 10 TABLET ORAL at 09:06

## 2023-03-21 RX ADMIN — ACETAMINOPHEN 650 MG: 325 TABLET ORAL at 18:27

## 2023-03-21 RX ADMIN — BUSPIRONE HYDROCHLORIDE 7.5 MG: 5 TABLET ORAL at 16:11

## 2023-03-21 RX ADMIN — ISOSORBIDE DINITRATE 30 MG: 10 TABLET ORAL at 19:39

## 2023-03-21 RX ADMIN — HYDRALAZINE HYDROCHLORIDE 100 MG: 50 TABLET, FILM COATED ORAL at 18:19

## 2023-03-21 RX ADMIN — GUAIFENESIN 600 MG: 600 TABLET, EXTENDED RELEASE ORAL at 20:40

## 2023-03-21 RX ADMIN — ATORVASTATIN CALCIUM 80 MG: 40 TABLET, FILM COATED ORAL at 20:41

## 2023-03-21 RX ADMIN — MAGNESIUM SULFATE HEPTAHYDRATE 2 G: 40 INJECTION, SOLUTION INTRAVENOUS at 06:08

## 2023-03-21 RX ADMIN — HYDRALAZINE HYDROCHLORIDE 100 MG: 50 TABLET, FILM COATED ORAL at 05:01

## 2023-03-21 RX ADMIN — FERROUS SULFATE TAB 325 MG (65 MG ELEMENTAL FE) 325 MG: 325 (65 FE) TAB at 18:18

## 2023-03-21 RX ADMIN — CLONIDINE HYDROCHLORIDE 0.3 MG: 0.3 TABLET ORAL at 05:01

## 2023-03-21 RX ADMIN — ASPIRIN 81 MG: 81 TABLET ORAL at 09:06

## 2023-03-21 RX ADMIN — MAGNESIUM SULFATE HEPTAHYDRATE 1 G: 1 INJECTION, SOLUTION INTRAVENOUS at 19:39

## 2023-03-21 RX ADMIN — ACETAMINOPHEN 650 MG: 325 TABLET ORAL at 06:13

## 2023-03-21 RX ADMIN — HYDRALAZINE HYDROCHLORIDE 100 MG: 50 TABLET, FILM COATED ORAL at 12:26

## 2023-03-21 RX ADMIN — Medication 10 ML: at 20:41

## 2023-03-21 RX ADMIN — BUSPIRONE HYDROCHLORIDE 7.5 MG: 5 TABLET ORAL at 20:40

## 2023-03-21 NOTE — SIGNIFICANT NOTE
Patient declined evaluation stating she was able to complete simple functional mobility, did not want to move from fowlers position, and has all DME in place at home.

## 2023-03-21 NOTE — PROGRESS NOTES
[facility] HOSPITALIST PROGRESS NOTE     Patient Identification:  Name:  Pati Cortes  Age:  67 y.o.  Sex:  female  :  1955  MRN:  [id number]  Visit Number:  [id number]  ROOM: [address] [place] [address]     Primary Care Provider:  Gasper Reyes MD     Date of Admission: 3/16/2023    Length of stay in inpatient status:  5    Subjective     Chief Compliant:  Chest and shoulder pain that developed while and inpatient rehab    History of Presenting Illness: Today, the patient states that she is doing well.  She denies chest pain, trouble breathing, coughing, nausea, vomiting, and diarrhea.  She is tolerating her diet with no choking episodes.     Consults:  Jian Najera and Radha with cardiology  Jian Carey and Ivory with nephrology  Dr. Merrill with general surgery    Procedures:  Received Ferrlecit 250 mg 3/12/2023    Objective     Current Hospital Meds:  amLODIPine, 10 mg, Oral, Q24H  aspirin, 81 mg, Oral, Daily  atorvastatin, 80 mg, Oral, Nightly  bumetanide, 4 mg, Oral, Daily  busPIRone, 7.5 mg, Oral, TID  carvedilol, 12.5 mg, Oral, BID With Meals  cholecalciferol, 1,000 Units, Oral, Daily  cloNIDine, 0.3 mg, Per PEG Tube, Q8H  docusate sodium, 100 mg, Oral, BID  ferrous sulfate, 325 mg, Oral, BID With Meals  fluticasone, 2 spray, Each Nare, Daily  guaiFENesin, 600 mg, Oral, Q12H  hydrALAZINE, 100 mg, Oral, Q6H  isosorbide dinitrate, 30 mg, Oral, TID - Nitrates  melatonin, 5 mg, Oral, Nightly  pantoprazole, 40 mg, Oral, BID AC  polyethylene glycol, 17 g, Oral, Daily  senna-docusate sodium, 2 tablet, Oral, BID  sodium chloride, 10 mL, Intravenous, Q12H  spironolactone, 100 mg, Oral, Daily    heparin, 12 Units/kg/hr, Last Rate: 14 Units/kg/hr (23 2390)      Current Antimicrobial Therapy:  Anti-Infectives (From admission, onward)    None        Current Diuretic Therapy:  Diuretics (From admission, onward)    Ordered     Dose/Rate Route Frequency Start Stop    23 4483  furosemide (LASIX)  injection 80 mg        Ordering Provider: Mamadou Padron MD    80 mg Intravenous Once 03/18/23 0930 03/18/23 1255    03/16/23 2300  bumetanide (BUMEX) tablet 4 mg        Ordering Provider: Javier Lopez MD    4 mg Oral Daily 03/17/23 0900      03/16/23 2300  spironolactone (ALDACTONE) tablet 100 mg        Ordering Provider: Javier Lopez MD    100 mg Oral Daily 03/17/23 0900          ----------------------------------------------------------------------------------------------------------------------  Vital Signs:  Temp:  [97.8 °F (36.6 °C)-98.3 °F (36.8 °C)] 97.8 °F (36.6 °C)  Heart Rate:  [65-79] 65  Resp:  [18-20] 20  BP: (138-178)/(62-74) 142/64  SpO2:  [91 %-98 %] 98 %  on  Flow (L/min):  [2] 2;   Device (Oxygen Therapy): nasal cannula  Body mass index is 28.05 kg/m².    Wt Readings from Last 3 Encounters:   03/21/23 71.8 kg (158 lb 4.8 oz)   03/13/23 69.9 kg (154 lb 1.6 oz)     Intake & Output (last 3 days)       03/18 0701  03/19 0700 03/19 0701 03/20 0700 03/20 0701 03/21 0700 03/21 0701 03/22 0700    P.O. 955 460 960 480    I.V. (mL/kg)  0 (0)  50 (0.7)    Total Intake(mL/kg) 955 (13.3) 460 (6.5) 960 (13.4) 530 (7.4)    Urine (mL/kg/hr) 700 (0.4) 850 (0.5) 1550 (0.9)     Stool 0       Total Output      Net +255 -390 -590 +530            Urine Unmeasured Occurrence 2 x  2 x     Stool Unmeasured Occurrence 3 x           Diet: Regular/House Diet; Texture: Regular Texture (IDDSI 7); Fluid Consistency: Thin (IDDSI 0)  ----------------------------------------------------------------------------------------------------------------------  Physical Exam; her exam is same as yesterday.  Constitutional:       General: She is not in acute distress.     Appearance: She is not ill-appearing, toxic-appearing or diaphoretic.   Cardiovascular:      Rate and Rhythm: Normal rate and regular rhythm.      Pulses: Normal pulses.      Heart sounds: No murmur heard.  Pulmonary:      Effort:  Pulmonary effort is normal. No respiratory distress.      Breath sounds: No wheezing or rales.   Abdominal:      General: Bowel sounds are normal. There is no distension.      Palpations: Abdomen is soft.   Musculoskeletal:         General: No swelling, deformity or signs of injury.   Skin:     Capillary Refill: Capillary refill takes less than 2 seconds.      Coloration: Skin is not jaundiced or pale.   Neurological:      Mental Status: She is alert and oriented to person, place, and time. Mental status is at baseline.      Cranial Nerves: No cranial nerve deficit.   Psychiatric:         Mood and Affect: Mood normal.         Behavior: Behavior normal.   ----------------------------------------------------------------------------------------------------------------------  Tele: Normal sinus rhythm with heart rate 60s to 70s.  I personally reviewed the telemetry strips.  ----------------------------------------------------------------------------------------------------------------------  LABS:    CBC and coagulation:  Results from last 7 days   Lab Units 03/21/23  0346 03/20/23  1659 03/20/23  0203 03/19/23  0326 03/18/23  0801 03/17/23  0007 03/16/23  2316 03/15/23  0138   PROCALCITONIN ng/mL  --   --   --   --   --  0.07  --   --    CRP mg/dL  --   --   --   --   --   --  1.79*  --    WBC 10*3/mm3 11.20*  --  10.06 12.25* 11.38*  --  12.99* 11.17*   HEMOGLOBIN g/dL 8.2*  --  7.5* 8.4* 8.2*  --  8.4* 8.2*   HEMATOCRIT % 27.0*  --  25.1* 28.1* 27.6*  --  27.4* 27.2*   MCV fL 92.8  --  93.3 92.4 93.2  --  93.2 93.8   MCHC g/dL 30.4*  --  29.9* 29.9* 29.7*  --  30.7* 30.1*   PLATELETS 10*3/mm3 318  --  316 412 445  --  432 506*   INR   --  1.39*  --   --   --   --  1.28*  --      Renal and electrolytes:  Results from last 7 days   Lab Units 03/21/23  0346 03/20/23  0203 03/19/23  0326 03/18/23  0801 03/16/23  2316 03/15/23  0138   SODIUM mmol/L 138 139 139 137 139 138   POTASSIUM mmol/L 4.5 4.2 3.9 4.5 4.2 4.0    MAGNESIUM mg/dL 1.5* 1.7  --   --   --   --    CHLORIDE mmol/L 103 104 104 102 102 100   CO2 mmol/L 25.3 24.6 23.0 22.7 24.8 27.0   BUN mg/dL 41* 41* 43* 42* 44* 53*   CREATININE mg/dL 2.01* 2.15* 2.03* 2.07* 2.06* 2.16*   CALCIUM mg/dL 8.6 8.5* 8.8 8.8 8.5* 8.7   PHOSPHORUS mg/dL 4.4  --   --   --   --   --    GLUCOSE mg/dL 91 94 79 81 119* 102*     Estimated Creatinine Clearance: 25.5 mL/min (A) (by C-G formula based on SCr of 2.01 mg/dL (H)).    Endocrine function:  Point of care bedside glucose levels:  Results from last 7 days   Lab Units 03/21/23  0754 03/20/23  1918 03/20/23  0642 03/19/23  1712 03/19/23  1114 03/19/23  0653 03/18/23  1935 03/18/23  1655 03/18/23  1025 03/18/23  0709 03/17/23  2002 03/17/23  1700 03/17/23  1020 03/17/23  0710   GLUCOSE mg/dL 91 147* 95 98 129 83 176* 82 118 95 204* 100 99 93     Glucose levels from the [facility]:  Results from last 7 days   Lab Units 03/21/23  0346 03/20/23  0203 03/19/23  0326 03/18/23  0801 03/16/23  2316 03/15/23  0138   GLUCOSE mg/dL 91 94 79 81 119* 102*     Lab Results   Component Value Date    TSH 6.200 (H) 03/20/2023     Cardiac:  Results from last 7 days   Lab Units 03/20/23  0203 03/17/23  0824 03/16/23  2316 03/16/23  1934 03/16/23  1730   HSTROP T ng/L  --  101* 98* 96* 98*   PROBNP pg/mL 30,084.0* >70,000.0*  --   --   --        I have personally looked at the labs and they are summarized above.    Assessment & Plan      -Type II Non-ST elevation MI that was present on admission  -Acute exacerbation of heart failure with improved ejection fraction that was present on admission (Previous LV systolic dysfunction/cardiomyopathy)   -Stress test this admission abnormal with mild apical ischemia seen, patient declining left heart catheterization at this time  -Acute hypomagnesemia  -History of coronary artery disease status post stent in the distant past  -History of chronic kidney disease stage IV with recent acute kidney injury/ATN that developed during  her last hospitalization in January 2023 requiring hemodialysis initially, currently not requiring hemodialysis now that the acute tubular necrosis has resolved  -Chronic hypoxic respiratory failure that developed after COVID-19 pneumonia in January 2023, currently utilizing 3 L/min nasal cannula oxygen  -History of essential hypertension with poor control at baseline  -History of DVT in right IJ in January 2023  -History of type 2 diabetes mellitus  -Acute on chronic anemia of chronic disease with iron deficiency  -Debility and deconditioning    She is to have the hemodialysis catheter removed tomorrow.  Afterwards, she may be able to go home tomorrow.  Patient will start her oral anticoagulation the day after her procedure.  Please note that the creatinine is about the same despite the Bumex and spironolactone.  Thus, we will continue with these medications.  Since the patient's heart rates have remained 65 and above and the blood pressures are still elevated, I will double the Coreg dose and we will continue to monitor the blood pressures and heart rates closely.  Please note that the glucose levels are at goal and stable.  I have written for low-dose magnesium replacement.  We will repeat the blood work morning.    Please note that due to the severity of the patient's lungs disease and her coronary artery disease, it is very almost impossible for the patient to ambulate out of her home for appointments with just a cane and a walker.  Thus, the patient will need a wheelchair for mobility out of the home.    VTE Prophylaxis:   Mechanical Order History:     None      Pharmalogical Order History:      Ordered     Dose Route Frequency Stop    03/20/23 1653  heparin 12777 units/250 mL (100 units/mL) in 0.45 % NaCl infusion  8.54 mL/hr         12 Units/kg/hr IV Titrated --    03/20/23 1653  heparin (porcine) 5000 UNIT/ML injection 4,300 Units         60 Units/kg IV As Needed --    03/20/23 1653  heparin (porcine) 5000  UNIT/ML injection 2,100 Units         30 Units/kg IV As Needed --    03/19/23 1129  apixaban (ELIQUIS) tablet 5 mg  Status:  Discontinued         5 mg PO Every 12 Hours Scheduled 03/20/23 1415    03/17/23 1236  heparin 11240 units/250 mL (100 units/mL) in 0.45 % NaCl infusion  8.38 mL/hr,   Status:  Discontinued         12 Units/kg/hr IV Titrated 03/18/23 1120    03/16/23 2306  heparin (porcine) 5000 UNIT/ML injection 4,200 Units         60 Units/kg IV Once 03/17/23 0019    03/16/23 2306  heparin 59225 units/250 mL (100 units/mL) in 0.9% NaCl infusion  8.38 mL/hr,   Status:  Discontinued         12 Units/kg/hr IV Titrated 03/17/23 1237    03/16/23 2306  heparin (porcine) 5000 UNIT/ML injection 4,200 Units  Status:  Discontinued         60 Units/kg IV As Needed 03/20/23 1508    03/16/23 2306  heparin (porcine) 5000 UNIT/ML injection 2,100 Units  Status:  Discontinued         30 Units/kg IV As Needed 03/20/23 1508            Disposition: Home tomorrow once the hemodialysis catheter has been removed    Saurabh Huerta MD  [facility]  03/21/23  12:10 EDT

## 2023-03-21 NOTE — PROGRESS NOTES
Nephrology Progress Note      Subjective     No chest pain or shortness of breath. Cath is going to be removed tomorrow    Objective       Vital signs :     Temp:  [97.8 °F (36.6 °C)-98.3 °F (36.8 °C)] 97.8 °F (36.6 °C)  Heart Rate:  [67-79] 72  Resp:  [18] 18  BP: (138-178)/(62-74) 173/73    Intake/Output                             03/19/23 0701 - 03/20/23 0700 03/20/23 0701 - 03/21/23 0700 03/21/23 0701 - 03/22/23 0700     2134-4260 1549-4469 Total 3066-9708 5558-5996 Total 6072-6685 2022-8751 Total                    Intake    P.O.  460  -- 460  960  -- 960  480  -- 480    I.V.  0  -- 0  --  -- --  50  -- 50    Total Intake 460 -- 460 960 -- 960 530 -- 530       Output    Urine  --  850 850  800  750 1550  --  -- --    Total Output -- 850 850  -- -- --           Physical Exam:    General Appearance : no chest pain or shortness of breath.   Lungs : clear to auscultation, respirations regular  Heart :  regular rhythm & normal rate, normal S1, S2 and no murmur, no rub  Abdomen : soft, non distended  Extremities : no edema, right IJ catheter  Neurologic :   orientated to person, place, time and situation, Grossly no focal deficits        Laboratory Data :     Albumin Albumin   Date Value Ref Range Status   03/20/2023 2.6 (L) 3.5 - 5.2 g/dL Final   03/19/2023 2.6 (L) 3.5 - 5.2 g/dL Final      Magnesium Magnesium   Date Value Ref Range Status   03/21/2023 1.5 (L) 1.6 - 2.4 mg/dL Final   03/20/2023 1.7 1.6 - 2.4 mg/dL Final          PTH               No results found for: PTH    CBC and coagulation:  Results from last 7 days   Lab Units 03/21/23  0346 03/20/23  1659 03/20/23  0203 03/19/23  0326 03/18/23  0801 03/17/23  0007 03/16/23  2316   PROCALCITONIN ng/mL  --   --   --   --   --  0.07  --    CRP mg/dL  --   --   --   --   --   --  1.79*   WBC 10*3/mm3 11.20*  --  10.06 12.25*   < >  --  12.99*   HEMOGLOBIN g/dL 8.2*  --  7.5* 8.4*   < >  --  8.4*   HEMATOCRIT % 27.0*  --  25.1* 28.1*   < >  --  27.4*    MCV fL 92.8  --  93.3 92.4   < >  --  93.2   MCHC g/dL 30.4*  --  29.9* 29.9*   < >  --  30.7*   PLATELETS 10*3/mm3 318  --  316 412   < >  --  432   INR   --  1.39*  --   --   --   --  1.28*    < > = values in this interval not displayed.     Acid/base balance:      Renal and electrolytes:    Results from last 7 days   Lab Units 03/21/23  0346 03/20/23 0203 03/19/23 0326 03/18/23  0801 03/16/23  2316   SODIUM mmol/L 138 139 139 137 139   POTASSIUM mmol/L 4.5 4.2 3.9 4.5 4.2   MAGNESIUM mg/dL 1.5* 1.7  --   --   --    CHLORIDE mmol/L 103 104 104 102 102   CO2 mmol/L 25.3 24.6 23.0 22.7 24.8   BUN mg/dL 41* 41* 43* 42* 44*   CREATININE mg/dL 2.01* 2.15* 2.03* 2.07* 2.06*   CALCIUM mg/dL 8.6 8.5* 8.8 8.8 8.5*   PHOSPHORUS mg/dL 4.4  --   --   --   --      Estimated Creatinine Clearance: 25.5 mL/min (A) (by C-G formula based on SCr of 2.01 mg/dL (H)).  @GFRCG:3@   Liver and pancreatic function:  Results from last 7 days   Lab Units 03/20/23 0203 03/19/23  0326 03/18/23  0801   ALBUMIN g/dL 2.6* 2.6* 2.5*   BILIRUBIN mg/dL 0.2 0.2 0.2   ALK PHOS U/L 91 96 94   AST (SGOT) U/L 12 10 29   ALT (SGPT) U/L <5 <5 6         Cardiac:  Results from last 7 days   Lab Units 03/20/23 0203 03/17/23  0824   PROBNP pg/mL 30,084.0* >70,000.0*     Liver and pancreatic function:  Results from last 7 days   Lab Units 03/20/23 0203 03/19/23  0326 03/18/23  0801   ALBUMIN g/dL 2.6* 2.6* 2.5*   BILIRUBIN mg/dL 0.2 0.2 0.2   ALK PHOS U/L 91 96 94   AST (SGOT) U/L 12 10 29   ALT (SGPT) U/L <5 <5 6       Medications :     amLODIPine, 10 mg, Oral, Q24H  aspirin, 81 mg, Oral, Daily  atorvastatin, 80 mg, Oral, Nightly  bumetanide, 4 mg, Oral, Daily  busPIRone, 7.5 mg, Oral, TID  carvedilol, 12.5 mg, Oral, BID With Meals  cholecalciferol, 1,000 Units, Oral, Daily  cloNIDine, 0.3 mg, Per PEG Tube, Q8H  docusate sodium, 100 mg, Oral, BID  ferrous sulfate, 325 mg, Oral, BID With Meals  fluticasone, 2 spray, Each Nare, Daily  guaiFENesin, 600 mg,  Oral, Q12H  hydrALAZINE, 100 mg, Oral, Q6H  isosorbide dinitrate, 30 mg, Oral, TID - Nitrates  melatonin, 5 mg, Oral, Nightly  pantoprazole, 40 mg, Oral, BID AC  polyethylene glycol, 17 g, Oral, Daily  senna-docusate sodium, 2 tablet, Oral, BID  sodium chloride, 10 mL, Intravenous, Q12H  spironolactone, 100 mg, Oral, Daily      heparin, 12 Units/kg/hr, Last Rate: 14 Units/kg/hr (03/21/23 2240)          Assessment & Plan     1.  CKD stage IV  2.  Resolved acute tubular necrosis  3.  SWATI and received dialysis DAMASO  4.  Acute on chronic hypoxic respiratory failure  5.  Systolic plus diastolic CHF compensated  6.  Left ventricular ejection fraction 30%  7.  Coronary artery disease with history of stent  8.  Type 2 diabetes  9.  Anemia of acute on chronic disease with iron deficiency.    Received Ferrlecit 250 mg 3/12/2023  10. Debility and deconditioning     Cr is stable around 2.0, TDC to be removed tomorrow. To follow up with her PCP and renal apt on discharge in her home town.   Educated and counseled the patient.   I will sign off, please call if any questions.     Plan of care was discussed with the patient she understood verbalized agreed    Micha Carey MD  03/21/23  10:52 EDT

## 2023-03-21 NOTE — PLAN OF CARE
Goal Outcome Evaluation:   Pt has been resting this shift. Pt did complain of pain this shift. PRN medication was administered, see MAR. Pt has heparin infusing at 14 units/kg. No signs and symptoms of acute distress noted. No complaints of chest pain or SOB reported.

## 2023-03-21 NOTE — PROGRESS NOTES
Unused dialysis catheter    She is breathing much better. afeb and vss. I will remove the tunneled dialysis catheter tomorrow morning and if no bleeding she could resume the eliquis the next day.

## 2023-03-21 NOTE — PLAN OF CARE
Problem: Adult Inpatient Plan of Care  Goal: Absence of Hospital-Acquired Illness or Injury  Intervention: Identify and Manage Fall Risk  Recent Flowsheet Documentation  Taken 3/21/2023 1700 by Amber Saucedo RN  Safety Promotion/Fall Prevention: safety round/check completed  Taken 3/21/2023 1500 by Amber Saucedo RN  Safety Promotion/Fall Prevention: safety round/check completed  Taken 3/21/2023 1300 by Abmer Saucedo RN  Safety Promotion/Fall Prevention: safety round/check completed  Taken 3/21/2023 1100 by Amber Saucedo RN  Safety Promotion/Fall Prevention: safety round/check completed  Taken 3/21/2023 0900 by Amber Saucedo RN  Safety Promotion/Fall Prevention: safety round/check completed  Taken 3/21/2023 0720 by Amber Saucedo RN  Safety Promotion/Fall Prevention: safety round/check completed     Problem: Adult Inpatient Plan of Care  Goal: Absence of Hospital-Acquired Illness or Injury  Intervention: Prevent Skin Injury  Recent Flowsheet Documentation  Taken 3/21/2023 1700 by Amber Saucedo RN  Body Position: position changed independently  Taken 3/21/2023 1500 by Amber Saucedo RN  Body Position: position changed independently  Taken 3/21/2023 1300 by Amber Saucedo RN  Body Position: position changed independently  Taken 3/21/2023 1100 by Amber Saucedo RN  Body Position: sitting up in bed  Taken 3/21/2023 0900 by Amber Saucedo RN  Body Position: sitting up in bed  Taken 3/21/2023 0720 by Amber Saucedo RN  Body Position: sitting up in bed  Skin Protection: adhesive use limited   Goal Outcome Evaluation:      Sitting in bed c/o gave PRN meds for pain and anxiety. Will continueto monitorand follow paln of care  Problem: Adult Inpatient Plan of Care  Goal: Absence of Hospital-Acquired Illness or Injury  Intervention: Prevent Skin Injury  Recent Flowsheet Documentation  Taken 3/21/2023 1700 by Amber Saucedo RN  Body Position: position changed independently  Taken 3/21/2023 1500  by Amber Saucedo RN  Body Position: position changed independently  Taken 3/21/2023 1300 by Amber Saucedo RN  Body Position: position changed independently  Taken 3/21/2023 1100 by Amber Saucedo RN  Body Position: sitting up in bed  Taken 3/21/2023 0900 by Amber Saucedo RN  Body Position: sitting up in bed  Taken 3/21/2023 0720 by Amber Saucedo RN  Body Position: sitting up in bed  Skin Protection: adhesive use limited     Problem: Fall Injury Risk  Goal: Absence of Fall and Fall-Related Injury  Outcome: Ongoing, Progressing  Intervention: Identify and Manage Contributors  Recent Flowsheet Documentation  Taken 3/21/2023 1700 by Amber Saucedo RN  Medication Review/Management: medications reviewed  Taken 3/21/2023 1500 by Amber Sacuedo RN  Medication Review/Management: medications reviewed  Taken 3/21/2023 1300 by Amber Saucedo RN  Medication Review/Management: medications reviewed  Taken 3/21/2023 1100 by Amber Saucedo RN  Medication Review/Management: medications reviewed  Taken 3/21/2023 0900 by Amber Saucedo RN  Medication Review/Management: medications reviewed  Taken 3/21/2023 0720 by Amber Saucedo RN  Medication Review/Management: medications reviewed  Intervention: Promote Injury-Free Environment  Recent Flowsheet Documentation  Taken 3/21/2023 1700 by Amber Saucedo RN  Safety Promotion/Fall Prevention: safety round/check completed  Taken 3/21/2023 1500 by Amber Saucedo RN  Safety Promotion/Fall Prevention: safety round/check completed  Taken 3/21/2023 1300 by Amber Saucedo RN  Safety Promotion/Fall Prevention: safety round/check completed  Taken 3/21/2023 1100 by Amber Saucedo RN  Safety Promotion/Fall Prevention: safety round/check completed  Taken 3/21/2023 0900 by Amber Saucedo RN  Safety Promotion/Fall Prevention: safety round/check completed  Taken 3/21/2023 0720 by Amber Saucedo RN  Safety Promotion/Fall Prevention: safety round/check completed      Problem: COPD (Chronic Obstructive Pulmonary Disease) Comorbidity  Goal: Maintenance of COPD Symptom Control  Outcome: Ongoing, Progressing  Intervention: Maintain COPD-Symptom Control  Recent Flowsheet Documentation  Taken 3/21/2023 1700 by Amber Saucedo RN  Medication Review/Management: medications reviewed  Taken 3/21/2023 1500 by Amber Saucedo RN  Medication Review/Management: medications reviewed  Taken 3/21/2023 1300 by Amber Saucedo RN  Medication Review/Management: medications reviewed  Taken 3/21/2023 1100 by Amber Saucedo RN  Medication Review/Management: medications reviewed  Taken 3/21/2023 0900 by Amber Saucedo RN  Medication Review/Management: medications reviewed  Taken 3/21/2023 0720 by Amber Saucedo RN  Medication Review/Management: medications reviewed     Problem: COPD (Chronic Obstructive Pulmonary Disease) Comorbidity  Goal: Maintenance of COPD Symptom Control  Intervention: Maintain COPD-Symptom Control  Recent Flowsheet Documentation  Taken 3/21/2023 1700 by Amber Saucedo RN  Medication Review/Management: medications reviewed  Taken 3/21/2023 1500 by Amber Saucedo RN  Medication Review/Management: medications reviewed  Taken 3/21/2023 1300 by Amber Saucedo RN  Medication Review/Management: medications reviewed  Taken 3/21/2023 1100 by Amber Saucedo RN  Medication Review/Management: medications reviewed  Taken 3/21/2023 0900 by Amber Saucedo RN  Medication Review/Management: medications reviewed  Taken 3/21/2023 0720 by Amber Saucedo RN  Medication Review/Management: medications reviewed

## 2023-03-21 NOTE — NURSING NOTE
Transitional Care Note    Enrolled in [facility] Transitional Care Note    Enrolled in [facility] Transitional Care Services under theTCM Model to be followed for 6 weeks post discharge.  [facility] will assist with support and education at time of transition home from hospital.  Hospital  will follow throughout the stay at [facility].  Home  will visit within 48 hours of discharge and follow with home vitals and telephone contact for 6 weeks.      Patient admitted to [facility] via Emergency Department, complaints of chest pain shoulder pain.   Admitted for further treatment of NSTEMI    Explained transition to home program and Patient is agreeable to home visits.  Home  will be [facility]

## 2023-03-22 ENCOUNTER — READMISSION MANAGEMENT (OUTPATIENT)
Dept: CALL CENTER | Facility: HOSPITAL | Age: 68
End: 2023-03-22
Payer: MEDICARE

## 2023-03-22 VITALS
HEART RATE: 66 BPM | RESPIRATION RATE: 20 BRPM | HEIGHT: 63 IN | SYSTOLIC BLOOD PRESSURE: 147 MMHG | OXYGEN SATURATION: 96 % | TEMPERATURE: 97.3 F | DIASTOLIC BLOOD PRESSURE: 67 MMHG | BODY MASS INDEX: 27.52 KG/M2 | WEIGHT: 155.3 LBS

## 2023-03-22 LAB
ANION GAP SERPL CALCULATED.3IONS-SCNC: 11.3 MMOL/L (ref 5–15)
APTT PPP: 70.7 SECONDS (ref 26.5–34.5)
BACTERIA SPEC AEROBE CULT: NORMAL
BACTERIA SPEC AEROBE CULT: NORMAL
BUN SERPL-MCNC: 42 MG/DL (ref 8–23)
BUN/CREAT SERPL: 18 (ref 7–25)
CALCIUM SPEC-SCNC: 9 MG/DL (ref 8.6–10.5)
CHLORIDE SERPL-SCNC: 102 MMOL/L (ref 98–107)
CO2 SERPL-SCNC: 24.7 MMOL/L (ref 22–29)
CREAT SERPL-MCNC: 2.33 MG/DL (ref 0.57–1)
DEPRECATED RDW RBC AUTO: 56.7 FL (ref 37–54)
EGFRCR SERPLBLD CKD-EPI 2021: 22.3 ML/MIN/1.73
ERYTHROCYTE [DISTWIDTH] IN BLOOD BY AUTOMATED COUNT: 16.7 % (ref 12.3–15.4)
GLUCOSE BLDC GLUCOMTR-MCNC: 95 MG/DL (ref 70–130)
GLUCOSE SERPL-MCNC: 90 MG/DL (ref 65–99)
HCT VFR BLD AUTO: 28 % (ref 34–46.6)
HGB BLD-MCNC: 8.4 G/DL (ref 12–15.9)
MAGNESIUM SERPL-MCNC: 3 MG/DL (ref 1.6–2.4)
MCH RBC QN AUTO: 27.5 PG (ref 26.6–33)
MCHC RBC AUTO-ENTMCNC: 30 G/DL (ref 31.5–35.7)
MCV RBC AUTO: 91.8 FL (ref 79–97)
PHOSPHATE SERPL-MCNC: 4.4 MG/DL (ref 2.5–4.5)
PLATELET # BLD AUTO: 314 10*3/MM3 (ref 140–450)
PMV BLD AUTO: 10.1 FL (ref 6–12)
POTASSIUM SERPL-SCNC: 5.1 MMOL/L (ref 3.5–5.2)
RBC # BLD AUTO: 3.05 10*6/MM3 (ref 3.77–5.28)
SODIUM SERPL-SCNC: 138 MMOL/L (ref 136–145)
WBC NRBC COR # BLD: 10.77 10*3/MM3 (ref 3.4–10.8)

## 2023-03-22 PROCEDURE — 36589 REMOVAL TUNNELED CV CATH: CPT | Performed by: SURGERY

## 2023-03-22 PROCEDURE — 83735 ASSAY OF MAGNESIUM: CPT | Performed by: INTERNAL MEDICINE

## 2023-03-22 PROCEDURE — 84100 ASSAY OF PHOSPHORUS: CPT | Performed by: INTERNAL MEDICINE

## 2023-03-22 PROCEDURE — 82962 GLUCOSE BLOOD TEST: CPT

## 2023-03-22 PROCEDURE — 85730 THROMBOPLASTIN TIME PARTIAL: CPT | Performed by: INTERNAL MEDICINE

## 2023-03-22 PROCEDURE — 0 LIDOCAINE 1 % SOLUTION: Performed by: SURGERY

## 2023-03-22 PROCEDURE — 85027 COMPLETE CBC AUTOMATED: CPT | Performed by: INTERNAL MEDICINE

## 2023-03-22 PROCEDURE — 97162 PT EVAL MOD COMPLEX 30 MIN: CPT

## 2023-03-22 PROCEDURE — 99239 HOSP IP/OBS DSCHRG MGMT >30: CPT | Performed by: INTERNAL MEDICINE

## 2023-03-22 PROCEDURE — 80048 BASIC METABOLIC PNL TOTAL CA: CPT | Performed by: INTERNAL MEDICINE

## 2023-03-22 RX ORDER — SPIRONOLACTONE 100 MG/1
100 TABLET, FILM COATED ORAL DAILY
Qty: 30 TABLET | Refills: 0 | Status: SHIPPED | OUTPATIENT
Start: 2023-03-22 | End: 2023-04-21

## 2023-03-22 RX ORDER — PANTOPRAZOLE SODIUM 40 MG/1
40 TABLET, DELAYED RELEASE ORAL DAILY
Qty: 30 TABLET | Refills: 0 | Status: SHIPPED | OUTPATIENT
Start: 2023-03-22

## 2023-03-22 RX ORDER — ISOSORBIDE DINITRATE 30 MG/1
30 TABLET ORAL
Status: CANCELLED | OUTPATIENT
Start: 2023-03-22

## 2023-03-22 RX ORDER — FERROUS SULFATE 325(65) MG
325 TABLET ORAL 2 TIMES DAILY WITH MEALS
Qty: 60 TABLET | Refills: 0 | Status: SHIPPED | OUTPATIENT
Start: 2023-03-22

## 2023-03-22 RX ORDER — CARVEDILOL 25 MG/1
25 TABLET ORAL 2 TIMES DAILY WITH MEALS
Qty: 60 TABLET | Refills: 0 | Status: SHIPPED | OUTPATIENT
Start: 2023-03-22

## 2023-03-22 RX ORDER — NITROGLYCERIN 0.4 MG/1
0.4 TABLET SUBLINGUAL
Qty: 100 TABLET | Refills: 0 | Status: SHIPPED | OUTPATIENT
Start: 2023-03-22

## 2023-03-22 RX ORDER — BUSPIRONE HYDROCHLORIDE 7.5 MG/1
7.5 TABLET ORAL 3 TIMES DAILY
Qty: 90 TABLET | Refills: 0 | Status: SHIPPED | OUTPATIENT
Start: 2023-03-22 | End: 2023-04-21

## 2023-03-22 RX ORDER — LIDOCAINE HYDROCHLORIDE 10 MG/ML
5 INJECTION, SOLUTION EPIDURAL; INFILTRATION; INTRACAUDAL; PERINEURAL ONCE
Status: DISCONTINUED | OUTPATIENT
Start: 2023-03-22 | End: 2023-03-22

## 2023-03-22 RX ORDER — AMLODIPINE BESYLATE 10 MG/1
10 TABLET ORAL
Qty: 30 TABLET | Refills: 0 | Status: SHIPPED | OUTPATIENT
Start: 2023-03-22

## 2023-03-22 RX ORDER — ATORVASTATIN CALCIUM 80 MG/1
80 TABLET, FILM COATED ORAL NIGHTLY
Qty: 30 TABLET | Refills: 0 | Status: SHIPPED | OUTPATIENT
Start: 2023-03-22

## 2023-03-22 RX ORDER — CHOLECALCIFEROL (VITAMIN D3) 125 MCG
5 CAPSULE ORAL NIGHTLY
Qty: 30 EACH | Refills: 0 | Status: SHIPPED | OUTPATIENT
Start: 2023-03-22

## 2023-03-22 RX ORDER — HYDRALAZINE HYDROCHLORIDE 100 MG/1
100 TABLET, FILM COATED ORAL EVERY 12 HOURS
Qty: 120 TABLET | Refills: 0 | Status: SHIPPED | OUTPATIENT
Start: 2023-03-22

## 2023-03-22 RX ORDER — DOCUSATE SODIUM 100 MG/1
100 CAPSULE, LIQUID FILLED ORAL 2 TIMES DAILY PRN
Qty: 60 CAPSULE | Refills: 0 | Status: SHIPPED | OUTPATIENT
Start: 2023-03-22

## 2023-03-22 RX ORDER — BUMETANIDE 2 MG/1
4 TABLET ORAL DAILY
Qty: 60 TABLET | Refills: 0 | Status: SHIPPED | OUTPATIENT
Start: 2023-03-22 | End: 2023-04-21

## 2023-03-22 RX ORDER — LIDOCAINE HYDROCHLORIDE 10 MG/ML
20 INJECTION, SOLUTION INFILTRATION; PERINEURAL ONCE
Status: COMPLETED | OUTPATIENT
Start: 2023-03-22 | End: 2023-03-22

## 2023-03-22 RX ORDER — FLUTICASONE PROPIONATE 50 MCG
2 SPRAY, SUSPENSION (ML) NASAL DAILY
Qty: 16 G | Refills: 0 | Status: SHIPPED | OUTPATIENT
Start: 2023-03-22

## 2023-03-22 RX ORDER — ISOSORBIDE MONONITRATE 30 MG/1
30 TABLET, EXTENDED RELEASE ORAL EVERY 12 HOURS
Qty: 60 TABLET | Refills: 0 | Status: SHIPPED | OUTPATIENT
Start: 2023-03-22

## 2023-03-22 RX ORDER — CLONIDINE HYDROCHLORIDE 0.3 MG/1
0.3 TABLET ORAL EVERY 8 HOURS SCHEDULED
Qty: 90 TABLET | Refills: 0 | Status: SHIPPED | OUTPATIENT
Start: 2023-03-22

## 2023-03-22 RX ADMIN — HYDROXYZINE HYDROCHLORIDE 25 MG: 25 TABLET ORAL at 08:43

## 2023-03-22 RX ADMIN — LIDOCAINE HYDROCHLORIDE 7.5 ML: 10 INJECTION, SOLUTION INFILTRATION; PERINEURAL at 12:54

## 2023-03-22 RX ADMIN — GUAIFENESIN 600 MG: 600 TABLET, EXTENDED RELEASE ORAL at 08:35

## 2023-03-22 RX ADMIN — HYDRALAZINE HYDROCHLORIDE 100 MG: 50 TABLET, FILM COATED ORAL at 05:17

## 2023-03-22 RX ADMIN — Medication 10 ML: at 08:36

## 2023-03-22 RX ADMIN — BUMETANIDE 4 MG: 1 TABLET ORAL at 08:33

## 2023-03-22 RX ADMIN — FLUTICASONE PROPIONATE 2 SPRAY: 50 SPRAY, METERED NASAL at 08:36

## 2023-03-22 RX ADMIN — Medication 1000 UNITS: at 08:34

## 2023-03-22 RX ADMIN — ISOSORBIDE DINITRATE 30 MG: 10 TABLET ORAL at 12:54

## 2023-03-22 RX ADMIN — PANTOPRAZOLE SODIUM 40 MG: 40 TABLET, DELAYED RELEASE ORAL at 08:35

## 2023-03-22 RX ADMIN — CLONIDINE HYDROCHLORIDE 0.3 MG: 0.3 TABLET ORAL at 05:17

## 2023-03-22 RX ADMIN — SPIRONOLACTONE 100 MG: 100 TABLET ORAL at 08:34

## 2023-03-22 RX ADMIN — ACETAMINOPHEN 650 MG: 325 TABLET ORAL at 14:32

## 2023-03-22 RX ADMIN — AMLODIPINE BESYLATE 10 MG: 10 TABLET ORAL at 08:34

## 2023-03-22 RX ADMIN — CARVEDILOL 25 MG: 25 TABLET, FILM COATED ORAL at 08:35

## 2023-03-22 RX ADMIN — ISOSORBIDE DINITRATE 30 MG: 10 TABLET ORAL at 08:34

## 2023-03-22 RX ADMIN — ASPIRIN 81 MG: 81 TABLET ORAL at 12:54

## 2023-03-22 RX ADMIN — BUSPIRONE HYDROCHLORIDE 7.5 MG: 5 TABLET ORAL at 08:34

## 2023-03-22 RX ADMIN — ACETAMINOPHEN 650 MG: 325 TABLET ORAL at 08:44

## 2023-03-22 RX ADMIN — FERROUS SULFATE TAB 325 MG (65 MG ELEMENTAL FE) 325 MG: 325 (65 FE) TAB at 08:34

## 2023-03-22 RX ADMIN — HYDRALAZINE HYDROCHLORIDE 100 MG: 50 TABLET, FILM COATED ORAL at 12:54

## 2023-03-22 NOTE — DISCHARGE PLACEMENT REQUEST
"Margaux Cortes (67 y.o. Female)     Date of Birth   1955    Social Security Number       Address   [address] [place] [address]    Home Phone   [phone]    MRN   [id number]       Jewish   None    Marital Status                               Admission Date   3/16/23    Admission Type   Urgent    Admitting Provider   Javier Lopez MD    Attending Provider   Saurabh Huerta MD    Department, Room/Bed   [address], 3304/2S       Discharge Date       Discharge Disposition   Home or Self Care    Discharge Destination                               Attending Provider: Saurabh Huerta MD    Allergies: No Known Allergies    Isolation: None   Infection: None   Code Status: CPR    Ht: 160 cm (62.99\")   Wt: 70.4 kg (155 lb 4.8 oz)    Admission Cmt: None   Principal Problem: NSTEMI (non-ST elevated myocardial infarction) (HCC) [I21.4]                 Active Insurance as of 3/16/2023     Primary Coverage     Payor Plan Insurance Group Employer/Plan Group    MEDICARE MEDICARE A & B      Payor Plan Address Payor Plan Phone Number Payor Plan Fax Number Effective Dates    [address] [phone]  2019 - None Entered    [place] [id number]       Subscriber Name Subscriber Birth Date Member ID       MARGAUX CORTES 1955 [id number]                 Emergency Contacts      (Rel.) Home Phone Work Phone Mobile Phone    Sree Amaya (Spouse) [phone] -- --    Slime Rousseau (Friend) [phone] -- --        [address] [address]  Dept. Phone:  [phone]  Dept. Fax:  [phone] Date Ordered: Mar 22, 2023         Patient:  Margaux Cortes MRN:  [id number]   [address] [place] [address] :  1955  SSN:    Phone: [phone] Sex:  F     Weight: 70.4 kg (155 lb 4.8 oz)         Ht Readings from Last 1 Encounters:   23 160 cm (62.99\")         Wheelchair      (Order ID: " "[id number])    Diagnosis:  Acute on chronic diastolic heart failure (HCC) (I50.33 [ICD-10-CM] 428.33 [ICD-9-CM])  Debility (R53.81 [ICD-10-CM] 799.3 [ICD-9-CM])  NSTEMI (non-ST elevated myocardial infarction) (HCC) (I21.4 [ICD-10-CM] 410.70 [ICD-9-CM])   Quantity:  1     Equipment:  Lightweight Wheelchair  Wheelchair accessories:  Manual W/C Seat Widths 20-23 inches  Wheelchair accessories:  Anti-Tipping Device (x2)  Wheelchair accessories:  Skin Protection W/C Cushion < 22\" Gel or Foam  The face to face evaluation was performed on: 3/22/2023  Length of Need (99 Months = Lifetime): 99 Months = Lifetime        Authorizing Provider's Phone: [phone]  Authorizing Provider:Saurabh Huerta MD  Authorizing Provider's NPI: [id number]  Order Entered By: Saurabh Huerta MD 3/22/2023  1:18 PM     Electronically signed by: Saurabh Huerta MD 3/22/2023  1:18 PM     Saurabh Huerta MD   Physician  Hospitalist  Progress Notes      Addendum  Date of Service:  23  Creation Time:  23                                                                                                                                                                                                                                                                                                                                                                                                                            [facility] HOSPITALIST PROGRESS NOTE     Patient Identification:  Name:  Pati Cortes  Age:  67 y.o.  Sex:  female  :  1955  MRN:  [id number]  Visit Number:  [id number]  ROOM: [address] [place] [address]      Primary Care Provider:  Gasper Reyes MD     Date of Admission: 3/16/2023     Length of stay in inpatient status:  5     Subjective      Chief Compliant:  Chest and shoulder pain that developed while and inpatient rehab     History of Presenting Illness: Today, the patient states " that she is doing well.  She denies chest pain, trouble breathing, coughing, nausea, vomiting, and diarrhea.  She is tolerating her diet with no choking episodes.     Consults:  Jian Najera and Radha with cardiology  Jian Carey and Ivory with nephrology  Dr. Merrill with general surgery     Procedures:  Received Ferrlecit 250 mg 3/12/2023     Objective      Current Hospital Meds:  amLODIPine, 10 mg, Oral, Q24H  aspirin, 81 mg, Oral, Daily  atorvastatin, 80 mg, Oral, Nightly  bumetanide, 4 mg, Oral, Daily  busPIRone, 7.5 mg, Oral, TID  carvedilol, 12.5 mg, Oral, BID With Meals  cholecalciferol, 1,000 Units, Oral, Daily  cloNIDine, 0.3 mg, Per PEG Tube, Q8H  docusate sodium, 100 mg, Oral, BID  ferrous sulfate, 325 mg, Oral, BID With Meals  fluticasone, 2 spray, Each Nare, Daily  guaiFENesin, 600 mg, Oral, Q12H  hydrALAZINE, 100 mg, Oral, Q6H  isosorbide dinitrate, 30 mg, Oral, TID - Nitrates  melatonin, 5 mg, Oral, Nightly  pantoprazole, 40 mg, Oral, BID AC  polyethylene glycol, 17 g, Oral, Daily  senna-docusate sodium, 2 tablet, Oral, BID  sodium chloride, 10 mL, Intravenous, Q12H  spironolactone, 100 mg, Oral, Daily     heparin, 12 Units/kg/hr, Last Rate: 14 Units/kg/hr (03/21/23 6892)        Current Antimicrobial Therapy:      Anti-Infectives (From admission, onward)     None          Current Diuretic Therapy:             Diuretics (From admission, onward)     Ordered     Dose/Rate Route Frequency Start Stop     03/18/23 0834   furosemide (LASIX) injection 80 mg        Ordering Provider: Mamadou Padron MD    80 mg Intravenous Once 03/18/23 0930 03/18/23 1255     03/16/23 2300   bumetanide (BUMEX) tablet 4 mg        Ordering Provider: Javier Lopez MD    4 mg Oral Daily 03/17/23 0900       03/16/23 2300   spironolactone (ALDACTONE) tablet 100 mg        Ordering Provider: Javier Lopez MD    100 mg Oral Daily 03/17/23 0900             ----------------------------------------------------------------------------------------------------------------------  Vital Signs:  Temp:  [97.8 °F (36.6 °C)-98.3 °F (36.8 °C)] 97.8 °F (36.6 °C)  Heart Rate:  [65-79] 65  Resp:  [18-20] 20  BP: (138-178)/(62-74) 142/64  SpO2:  [91 %-98 %] 98 %  on  Flow (L/min):  [2] 2;   Device (Oxygen Therapy): nasal cannula  Body mass index is 28.05 kg/m².         Wt Readings from Last 3 Encounters:   03/21/23 71.8 kg (158 lb 4.8 oz)   03/13/23 69.9 kg (154 lb 1.6 oz)              Intake & Output (last 3 days)        03/18 0701  03/19 0700 03/19 0701 03/20 0700 03/20 0701  03/21 0700 03/21 0701 03/22 0700     P.O. 955 460 960 480     I.V. (mL/kg)   0 (0)   50 (0.7)     Total Intake(mL/kg) 955 (13.3) 460 (6.5) 960 (13.4) 530 (7.4)     Urine (mL/kg/hr) 700 (0.4) 850 (0.5) 1550 (0.9)       Stool 0           Total Output        Net +255 -390 -590 +530                   Urine Unmeasured Occurrence 2 x   2 x       Stool Unmeasured Occurrence 3 x                Diet: Regular/House Diet; Texture: Regular Texture (IDDSI 7); Fluid Consistency: Thin (IDDSI 0)  ----------------------------------------------------------------------------------------------------------------------  Physical Exam; her exam is same as yesterday.  Constitutional:       General: She is not in acute distress.     Appearance: She is not ill-appearing, toxic-appearing or diaphoretic.   Cardiovascular:      Rate and Rhythm: Normal rate and regular rhythm.      Pulses: Normal pulses.      Heart sounds: No murmur heard.  Pulmonary:      Effort: Pulmonary effort is normal. No respiratory distress.      Breath sounds: No wheezing or rales.   Abdominal:      General: Bowel sounds are normal. There is no distension.      Palpations: Abdomen is soft.   Musculoskeletal:         General: No swelling, deformity or signs of injury.   Skin:     Capillary Refill: Capillary refill takes less than 2 seconds.       Coloration: Skin is not jaundiced or pale.   Neurological:      Mental Status: She is alert and oriented to person, place, and time. Mental status is at baseline.      Cranial Nerves: No cranial nerve deficit.   Psychiatric:         Mood and Affect: Mood normal.         Behavior: Behavior normal.   ----------------------------------------------------------------------------------------------------------------------  Tele: Normal sinus rhythm with heart rate 60s to 70s.  I personally reviewed the telemetry strips.  ----------------------------------------------------------------------------------------------------------------------  LABS:     CBC and coagulation:              Results from last 7 days   Lab Units 03/21/23  0346 03/20/23  1659 03/20/23  0203 03/19/23  0326 03/18/23  0801 03/17/23  0007 03/16/23  2316 03/15/23  0138   PROCALCITONIN ng/mL  --   --   --   --   --  0.07  --   --    CRP mg/dL  --   --   --   --   --   --  1.79*  --    WBC 10*3/mm3 11.20*  --  10.06 12.25* 11.38*  --  12.99* 11.17*   HEMOGLOBIN g/dL 8.2*  --  7.5* 8.4* 8.2*  --  8.4* 8.2*   HEMATOCRIT % 27.0*  --  25.1* 28.1* 27.6*  --  27.4* 27.2*   MCV fL 92.8  --  93.3 92.4 93.2  --  93.2 93.8   MCHC g/dL 30.4*  --  29.9* 29.9* 29.7*  --  30.7* 30.1*   PLATELETS 10*3/mm3 318  --  316 412 445  --  432 506*   INR    --  1.39*  --   --   --   --  1.28*  --       Renal and electrolytes:            Results from last 7 days   Lab Units 03/21/23  0346 03/20/23  0203 03/19/23  0326 03/18/23  0801 03/16/23  2316 03/15/23  0138   SODIUM mmol/L 138 139 139 137 139 138   POTASSIUM mmol/L 4.5 4.2 3.9 4.5 4.2 4.0   MAGNESIUM mg/dL 1.5* 1.7  --   --   --   --    CHLORIDE mmol/L 103 104 104 102 102 100   CO2 mmol/L 25.3 24.6 23.0 22.7 24.8 27.0   BUN mg/dL 41* 41* 43* 42* 44* 53*   CREATININE mg/dL 2.01* 2.15* 2.03* 2.07* 2.06* 2.16*   CALCIUM mg/dL 8.6 8.5* 8.8 8.8 8.5* 8.7   PHOSPHORUS mg/dL 4.4  --   --   --   --   --    GLUCOSE mg/dL 91 94 79 81 119*  102*      Estimated Creatinine Clearance: 25.5 mL/min (A) (by C-G formula based on SCr of 2.01 mg/dL (H)).     Endocrine function:  Point of care bedside glucose levels:                    Results from last 7 days   Lab Units 03/21/23  0754 03/20/23  1918 03/20/23  0642 03/19/23  1712 03/19/23  1114 03/19/23  0653 03/18/23  1935 03/18/23  1655 03/18/23  1025 03/18/23  0709 03/17/23  2002 03/17/23  1700 03/17/23  1020 03/17/23  0710   GLUCOSE mg/dL 91 147* 95 98 129 83 176* 82 118 95 204* 100 99 93      Glucose levels from the [facility]:            Results from last 7 days   Lab Units 03/21/23  0346 03/20/23  0203 03/19/23  0326 03/18/23  0801 03/16/23  2316 03/15/23  0138   GLUCOSE mg/dL 91 94 79 81 119* 102*            Lab Results   Component Value Date     TSH 6.200 (H) 03/20/2023      Cardiac:           Results from last 7 days   Lab Units 03/20/23  0203 03/17/23  0824 03/16/23  2316 03/16/23  1934 03/16/23  1730   HSTROP T ng/L  --  101* 98* 96* 98*   PROBNP pg/mL 30,084.0* >70,000.0*  --   --   --          I have personally looked at the labs and they are summarized above.     Assessment & Plan       -Type II Non-ST elevation MI that was present on admission  -Acute exacerbation of heart failure with improved ejection fraction that was present on admission (Previous LV systolic dysfunction/cardiomyopathy)   -Stress test this admission abnormal with mild apical ischemia seen, patient declining left heart catheterization at this time  -Acute hypomagnesemia  -History of coronary artery disease status post stent in the distant past  -History of chronic kidney disease stage IV with recent acute kidney injury/ATN that developed during her last hospitalization in January 2023 requiring hemodialysis initially, currently not requiring hemodialysis now that the acute tubular necrosis has resolved  -Chronic hypoxic respiratory failure that developed after COVID-19 pneumonia in January 2023, currently utilizing 3 L/min nasal  cannula oxygen  -History of essential hypertension with poor control at baseline  -History of DVT in right IJ in January 2023  -History of type 2 diabetes mellitus  -Acute on chronic anemia of chronic disease with iron deficiency  -Debility and deconditioning     She is to have the hemodialysis catheter removed tomorrow.  Afterwards, she may be able to go home tomorrow.  Patient will start her oral anticoagulation the day after her procedure.  Please note that the creatinine is about the same despite the Bumex and spironolactone.  Thus, we will continue with these medications.  Since the patient's heart rates have remained 65 and above and the blood pressures are still elevated, I will double the Coreg dose and we will continue to monitor the blood pressures and heart rates closely.  Please note that the glucose levels are at goal and stable.  I have written for low-dose magnesium replacement.  We will repeat the blood work morning.     Please note that due to the severity of the patient's lungs disease and her coronary artery disease, it is very almost impossible for the patient to ambulate out of her home for appointments with just a cane and a walker.  Thus, the patient will need a wheelchair for mobility out of the home.     VTE Prophylaxis:       Mechanical Order History:      None               Pharmalogical Order History:        Ordered     Dose Route Frequency Stop     03/20/23 1653   heparin 87708 units/250 mL (100 units/mL) in 0.45 % NaCl infusion  8.54 mL/hr         12 Units/kg/hr IV Titrated --     03/20/23 1653   heparin (porcine) 5000 UNIT/ML injection 4,300 Units         60 Units/kg IV As Needed --     03/20/23 1653   heparin (porcine) 5000 UNIT/ML injection 2,100 Units         30 Units/kg IV As Needed --     03/19/23 1129   apixaban (ELIQUIS) tablet 5 mg  Status:  Discontinued         5 mg PO Every 12 Hours Scheduled 03/20/23 1415     03/17/23 1236   heparin 45920 units/250 mL (100 units/mL) in 0.45  % NaCl infusion  8.38 mL/hr,   Status:  Discontinued         12 Units/kg/hr IV Titrated 03/18/23 1120     03/16/23 2306   heparin (porcine) 5000 UNIT/ML injection 4,200 Units         60 Units/kg IV Once 03/17/23 0019     03/16/23 2306   heparin 65022 units/250 mL (100 units/mL) in 0.9% NaCl infusion  8.38 mL/hr,   Status:  Discontinued         12 Units/kg/hr IV Titrated 03/17/23 1237     03/16/23 2306   heparin (porcine) 5000 UNIT/ML injection 4,200 Units  Status:  Discontinued         60 Units/kg IV As Needed 03/20/23 1508     03/16/23 2306   heparin (porcine) 5000 UNIT/ML injection 2,100 Units  Status:  Discontinued         30 Units/kg IV As Needed 03/20/23 1508              Disposition: Home tomorrow once the hemodialysis catheter has been removed     Saurabh Huerta MD  [facility]  03/21/23  12:10 EDT             Revision History                                Insurance Information                MEDICARE/MEDICARE A & B Phone: [phone]    Subscriber: Pati Cortes Subscriber#: [id number]    Group#: -- Precert#: --

## 2023-03-22 NOTE — DISCHARGE SUMMARY
Baseline creatinine probably around 2.0-2.2, was up to 2.4 on admission      [facility] DISCHARGE SUMMARY    Patient Identification:  Name:  Pati Cortes  Age:  67 y.o.  Sex:  female  :  1955  MRN:  [id number]  Visit Number:  [id number]    Date of Admission: 3/16/2023  Date of Discharge: 3/22/2023    PCP: Gasper Reyes MD    DISCHARGE DIAGNOSES  -Type II Non-ST elevation MI that was present on admission  -Acute exacerbation of heart failure with improved ejection fraction that was present on admission (Previous LV systolic dysfunction/cardiomyopathy)   -History of heart failure with improved ejection fraction, with the original cause being ischemic cardiomyopathy and essential hypertension  -Stress test this admission abnormal with mild apical ischemia seen, patient declining left heart catheterization at this time  -Acute hypomagnesemia this admission, resolved with supplementation  -History of COPD without an acute exacerbation  -History of coronary artery disease status post stent in the distant past  -History of chronic kidney disease stage IV and polycystic kidney disease, with recent acute kidney injury/ATN that developed during her last hospitalization in 2023 requiring hemodialysis initially, currently not requiring hemodialysis now that the acute tubular necrosis has resolved  -Chronic hypoxic respiratory failure that developed after COVID-19 pneumonia in 2023, currently utilizing 3 L/min nasal cannula oxygen  -History of essential hypertension with poor control at baseline  -History of DVT in right IJ in 2023  -History of type 2 diabetes mellitus  -Acute on chronic anemia of chronic disease with iron deficiency  -Debility and deconditioning    CONSULTS   Jian Najera and Radha with cardiology  Jian Carey and Ivory with nephrology  Dr. Merrill with general surgery    PROCEDURES PERFORMED  3/12/2023:  Infusion of 250 mg of Ferrlecit  3/22/2023:   Removal of the right subclavian hemodialysis catheter     HOSPITAL COURSE  Patient is a 67 y.o. female presented to [facility] on 3/16/2023 with from our inpatient rehab with chest pain, shoulder, and back pain.  The patient was initially admitted to the inpatient rehab unit 3/10/2023 after a prolonged hospital stay at another facility with acute hypoxic respiratory failure due to COVID-19 pneumonia requiring intubation and mechanical ventilation; she then had resulting chronic hypoxic respiratory failure and requiring non-invasive positive pressure ventilation nightly.  During that hospitalization, she was also found to have a right IJ DVT that is currently being treated with oral anticoagulation, acute HFrEF exacerbation with noted LVEF 20-25%, NSTEMI without ischemic workup due to acute renal failure requiring iHD (last session 2/28).  Since the patient is high risk, she was admitted to the hospitalist service for further evaluation and treatment.  Serial troponins were obtained and they were found to be elevated; thus, cardiology was consulted.  Stress test revealed a small-sized, mild degree of ischemia located in the apex.  Patient has deferred the catheterization due to her current kidney function and would like to discuss this further with her primary cardiologist.      Echocardiogram with some concern for amyloidosis; this can also be addressed by her primary cardiologist.    Blood pressures were elevated and thus Norvasc was added, home clonidine, Imdur, and hydralazine doses were increased, metoprolol/atenolol was changed to Coreg, nifedipine was discontinued, and spironolactone and Bumex were added.  I did offer to set the patient up a heart failure appointment at our clinic but she does not have consistent transportation at home and it was be too taxing for her to follow up in that clinic.    On day of discharge, the patient was doing well.  She was able to perform her activities of daily  living.  She was tolerating her regular diet.  She was walking with an assistive device in her hospital room.  She was asking to go home.      VITAL SIGNS:  Temp:  [97.3 °F (36.3 °C)-98.2 °F (36.8 °C)] 97.3 °F (36.3 °C)  Heart Rate:  [63-83] 66  Resp:  [18-20] 20  BP: (141-185)/(55-79) 147/67  SpO2:  [96 %-97 %] 96 %  on  Flow (L/min):  [2] 2;   Device (Oxygen Therapy): nasal cannula    Body mass index is 27.52 kg/m².  Wt Readings from Last 3 Encounters:   03/22/23 70.4 kg (155 lb 4.8 oz)   03/13/23 69.9 kg (154 lb 1.6 oz)       PHYSICAL EXAM:  Constitutional:       General: She is not in acute distress.     Appearance: She is not ill-appearing, toxic-appearing or diaphoretic.   HENT:      Head: Normocephalic and atraumatic.      Right Ear: External ear normal.      Left Ear: External ear normal.      Nose: Nose normal.   Eyes:      General: No scleral icterus.        Right eye: No discharge.         Left eye: No discharge.      Pupils: Pupils are equal, round, and reactive to light.   Cardiovascular:      Rate and Rhythm: Normal rate and regular rhythm.      Pulses: Normal pulses.      Heart sounds: No murmur heard.  Pulmonary:      Effort: Pulmonary effort is normal. No respiratory distress.      Breath sounds: No wheezing or rales.   Abdominal:      General: Bowel sounds are normal. There is no distension.      Palpations: Abdomen is soft.   Musculoskeletal:         General: No swelling, deformity or signs of injury.   Skin:     Capillary Refill: Capillary refill takes less than 2 seconds.      Coloration: Skin is not jaundiced or pale.   Neurological:      Mental Status: She is alert and oriented to person, place, and time. Mental status is at baseline.      Cranial Nerves: No cranial nerve deficit.   Psychiatric:         Mood and Affect: Mood normal.         Behavior: Behavior normal.       DISCHARGE DISPOSITION   Stable       Discharge Medications        New Medications        Instructions Start Date    amLODIPine 10 MG tablet  Commonly known as: NORVASC   10 mg, Oral, Every 24 Hours Scheduled      atorvastatin 80 MG tablet  Commonly known as: LIPITOR   80 mg, Oral, Nightly      bumetanide 2 MG tablet  Commonly known as: BUMEX   4 mg, Oral, Daily      carvedilol 25 MG tablet  Commonly known as: COREG   25 mg, Oral, 2 Times Daily With Meals      docusate sodium 100 MG capsule   100 mg, Oral, 2 Times Daily PRN      ferrous sulfate 325 (65 FE) MG tablet   325 mg, Oral, 2 Times Daily With Meals      fluticasone 50 MCG/ACT nasal spray  Commonly known as: FLONASE   2 sprays, Each Nare, Daily      melatonin 5 MG tablet tablet   5 mg, Oral, Nightly      nitroglycerin 0.4 MG SL tablet  Commonly known as: NITROSTAT   0.4 mg, Sublingual, Every 5 Minutes PRN, Take no more than 3 doses in 15 minutes.      pantoprazole 40 MG EC tablet  Commonly known as: PROTONIX   40 mg, Oral, Daily      spironolactone 100 MG tablet  Commonly known as: ALDACTONE   100 mg, Oral, Daily             Changes to Medications        Instructions Start Date   busPIRone 7.5 MG tablet  Commonly known as: BUSPAR  What changed:   medication strength  how much to take   7.5 mg, Oral, 3 Times Daily      cloNIDine 0.3 MG tablet  Commonly known as: CATAPRES  What changed:   medication strength  how much to take  when to take this   0.3 mg, Oral, Every 8 Hours Scheduled      hydrALAZINE 100 MG tablet  Commonly known as: APRESOLINE  What changed:   medication strength  how much to take  when to take this   100 mg, Oral, Every 12 Hours      isosorbide mononitrate 30 MG 24 hr tablet  Commonly known as: IMDUR  What changed: when to take this   30 mg, Oral, Every 12 Hours             Continue These Medications        Instructions Start Date   Aspirin Low Dose 81 MG EC tablet  Generic drug: aspirin   81 mg, Oral, Daily      cholecalciferol 25 MCG (1000 UT) tablet  Commonly known as: VITAMIN D3   1 tablet, Oral, Daily      hydrOXYzine pamoate 25 MG capsule  Commonly  known as: VISTARIL   1 capsule, Oral, 3 Times Daily             Stop These Medications      atenolol 50 MG tablet  Commonly known as: TENORMIN     clopidogrel 75 MG tablet  Commonly known as: PLAVIX     GNP Vitamin C 500 MG tablet  Generic drug: ascorbic acid     metoprolol succinate XL 25 MG 24 hr tablet  Commonly known as: TOPROL-XL     NIFEdipine XL 30 MG 24 hr tablet  Commonly known as: PROCARDIA XL     Zinc Sulfate 220 (50 Zn) MG tablet              Diet Instructions       Diet: Regular/House Diet, Cardiac Diets; Healthy Heart (2-3 Na+); Texture: Regular Texture (IDDSI 7); Fluid Consistency: Thin (IDDSI 0)      Discharge Diet:  Regular/House Diet  Cardiac Diets       Cardiac Diet: Healthy Heart (2-3 Na+)    Texture: Regular Texture (IDDSI 7)    Fluid Consistency: Thin (IDDSI 0)          Activity Instructions       Activity as Tolerated            Additional Instructions for the Follow-ups that You Need to Schedule       Discharge Follow-up with PCP   As directed     Currently Documented PCP:    Gasper Reyes MD    PCP Phone Number:    [phone]     Follow Up Details: 2-7 days             TEST  RESULTS PENDING AT DISCHARGE:  None       CODE STATUS  Code Status and Medical Interventions:   Ordered at: 03/16/23 2300     Level Of Support Discussed With:    Patient     Code Status (Patient has no pulse and is not breathing):    CPR (Attempt to Resuscitate)     Medical Interventions (Patient has pulse or is breathing):    Full Support       The ASCVD Risk score (Arsalan ANDREWS, et al., 2019) failed to calculate for the following reasons:    The patient has a prior MI or stroke diagnosis       Saurabh Huerta MD  [facility] Hospitalist  03/22/23  10:51 EDT    Please note that this discharge summary required more than 30 minutes to complete.

## 2023-03-22 NOTE — PLAN OF CARE
Goal Outcome Evaluation:      Pt has been resting this shift. Pt has heparin currently infusing at 16 units/kg/hr. No signs and symptoms of acute distress noted. No complaints of chest pain or SOB reported.

## 2023-03-22 NOTE — DISCHARGE PLACEMENT REQUEST
"Margaux Cortes (67 y.o. Female)     Date of Birth   1955    Social Security Number       Address   [address] [place] [address]    Home Phone   [phone]    MRN   [id number]       Congregation   None    Marital Status                               Admission Date   3/16/23    Admission Type   Urgent    Admitting Provider   Javier Lopez MD    Attending Provider   Saurabh Huerta MD    Department, Room/Bed   [address], 3304/2S       Discharge Date       Discharge Disposition   Home or Self Care    Discharge Destination                               Attending Provider: Saurabh Huerta MD    Allergies: No Known Allergies    Isolation: None   Infection: None   Code Status: CPR    Ht: 160 cm (62.99\")   Wt: 70.4 kg (155 lb 4.8 oz)    Admission Cmt: None   Principal Problem: NSTEMI (non-ST elevated myocardial infarction) (HCC) [I21.4]                 Active Insurance as of 3/16/2023     Primary Coverage     Payor Plan Insurance Group Employer/Plan Group    MEDICARE MEDICARE A & B      Payor Plan Address Payor Plan Phone Number Payor Plan Fax Number Effective Dates    [address] [phone]  2019 - None Entered    [place] [id number]       Subscriber Name Subscriber Birth Date Member ID       MARGAUX CORTES 1955 [id number]                 Emergency Contacts      (Rel.) Home Phone Work Phone Mobile Phone    Sree Amaya (Spouse) [phone] -- --    Slime Rousseau (Friend) [phone] -- --        [address] [address]  Dept. Phone:  [phone]  Dept. Fax:  [phone] Date Ordered: Mar 22, 2023         Patient:  Margaux Cortes MRN:  [id number]   [address] [place] [address] :  1955  SSN:    Phone: [phone] Sex:  F     Weight: 70.4 kg (155 lb 4.8 oz)         Ht Readings from Last 1 Encounters:   23 160 cm (62.99\")         Wheelchair      (Order ID: " "[id number])    Diagnosis:  Acute on chronic diastolic heart failure (HCC) (I50.33 [ICD-10-CM] 428.33 [ICD-9-CM])  Debility (R53.81 [ICD-10-CM] 799.3 [ICD-9-CM])  NSTEMI (non-ST elevated myocardial infarction) (HCC) (I21.4 [ICD-10-CM] 410.70 [ICD-9-CM])   Quantity:  1     Equipment:  Lightweight Wheelchair  Wheelchair accessories:  Manual W/C Seat Widths 20-23 inches  Wheelchair accessories:  Anti-Tipping Device (x2)  Wheelchair accessories:  Skin Protection W/C Cushion < 22\" Gel or Foam  The face to face evaluation was performed on: 3/22/2023  Length of Need (99 Months = Lifetime): 99 Months = Lifetime        Authorizing Provider's Phone: [phone]  Authorizing Provider:Saurabh Huerta MD  Authorizing Provider's NPI: [id number]  Order Entered By: Saurabh Huerta MD 3/22/2023  1:18 PM     Electronically signed by: Saurabh Huerta MD 3/22/2023  1:18 PM            History & Physical      Javier Lopez MD at 23 2322          Hospitalist History and Physical        Patient Identification  Name: Pati Cortse  Age/Sex: 67 y.o. female  :  1955        MRN: [id number]  Visit Number: [id number]  Admit Date: 3/16/2023   PCP: Gasper Reyes MD          Chief complaint chest/shoulder pain    History of Present Illness:  Patient is a 67 y.o. female with history of CAD s/p remote stent, HTN, and polycystic kidney disease, who was recently hospitalized for acute hypoxic respiratory failure secondary to COVID-19 pneumonia at [facility] in 2023 requiring intubation and mechanical ventilation. Hospitalization was complicated by NSTEMI with acute systolic CHF, with ECHO showing EF of 20-25%. Her respiratory failure ultimately improved and she was extubated on 23. She was weaned to 4-5L NC. Heart cath was discussed but ultimately they decided against this due to acute renal failure requiring hemodialysis, for which she had a right upper chest wall HD catheter " placed. Hospital stay was also complicated by development of worsening anemia for which EGD was performed that showed gastritis. She was transferred from [facility] to an LTAC on 2/21. She was found to have a DVT in her right IJ central line which was removed and she was placed on IV heparin infusion. Heparin was transitioned to eliquis on 2/28. With intermittent dialysis and diuresis, her renal function did slowly improve and her last dialysis session was 2/28. She was transferred to Inpatient Rehab at [facility] on 3/10/23 for debility. She has been receiving diuresis from nephrology for ongoing fluid overload but has not required any further dialysis and renal function appears to have stabilized with baseline creatinine now around 2.0. She has been weaned from 4-5L NC to 3L NC during her time in rehab. Rehab documentation reports she is on 3L NC at home at baseline for COPD, but patient denies history of COPD and says she was not on oxygen until she was admitted for COVID-19 pneumonia. She continues to use bipap at night since being extubated. Meanwhile ECHO performed here on 3/14/23 showd normal EF 66-70%, grade 1a diastolic dysfunction, posterior asymmetric hypertrophy that could be consistent with cardiac amyloidosis, mild aortic valve stenosis, and moderate 1-2 cm pericardial effusion without tamponade.     Today, patient began to complain of soreness in her left lateral chest, shoulder and back. EKG was unremarkable and CXR showed some congestion without any significant change appreciated compared to prior imaging. Troponin was elevated at 98 and trended down to 96 two hours later. Troponin was elevated at [facility] as well, but they used a different assay so it is difficult to compare levels. Inpatient rehab physician spoke with on-call cardiologist Dr Padron who agreed to see the patient in consultation and review her ECHO in the morning given the significant discrepancy in EF measurements compared to her ECHO at   [facility]. The initial plan was to keep her in the rehab unit and repeat a troponin level in the morning. However, later in the evening the patient once again complained of pain in her left lateral chest, shoulder and back, so the decision was made to transfer her to the telemetry unit for further cardiology evaluation. Patient tells me that in both situations today, her pain occurred while at rest. She reports the pain is primarily in her back, and she has reproducible pain with palpation over her left scapula. She reports associated dry mouth, diaphoresis, and nausea with the episodes of pain. She denies dyspnea or lower extremity edema. She denies fever, chills, or cough.     Review of Systems  Review of Systems   Constitutional: Positive for diaphoresis (associated with chest/shoulder/back pain). Negative for activity change, appetite change, chills, fatigue, fever and unexpected weight change.   HENT: Negative for congestion, postnasal drip, rhinorrhea, sinus pressure, sinus pain and sore throat.    Eyes: Negative for photophobia, pain, discharge, redness, itching and visual disturbance.   Respiratory: Negative for cough, shortness of breath and wheezing.    Cardiovascular: Positive for chest pain (lateral chest wall/anterior shoulder and back). Negative for palpitations and leg swelling.   Gastrointestinal: Positive for nausea. Negative for abdominal distention, abdominal pain, constipation, diarrhea and vomiting.   Endocrine: Negative for cold intolerance, heat intolerance, polydipsia, polyphagia and polyuria.   Genitourinary: Negative for difficulty urinating, dysuria, flank pain, frequency and hematuria.   Musculoskeletal: Negative for arthralgias, back pain, joint swelling, myalgias, neck pain and neck stiffness.   Skin: Negative for color change, pallor, rash and wound.   Neurological: Negative for dizziness, tremors, seizures, syncope, weakness, light-headedness, numbness and headaches.    Hematological: Negative for adenopathy. Does not bruise/bleed easily.   Psychiatric/Behavioral: Negative for agitation, behavioral problems and confusion.       History  Past Medical History:   Diagnosis Date   • Arthritis    • Chronic respiratory failure with hypoxia     after acute respiratory failure in recent hospitalization in 1/2023 secondary to COVID-19, weaned from vent to 3L NC with bipap at night   • CKD (chronic kidney disease), stage IV (HCC)     recently on dialysis for SWATI at [facility], has right upper chest wall dialysis catheter, no dialysis in a couple weeks per patient, baseline creatine reportedly has settled around 2   • History of coronary artery disease     sp stent placement remotely   • History of DVT in adulthood     in right IJ, discovered during recent hospitalization in Jan 2023   • Hypertension    • Polycystic kidney disease    • Systolic CHF (HCC)     diagnosed based on ECHO showing low EF at outside hospital in 1/2023, but with normalized EF on ECHO here on 3/14/23, but with diastolic CHF noted     *  Gastritis per EGD at outside facility    Past Surgical History:   Procedure Laterality Date   • APPENDECTOMY     • CARDIAC CATHETERIZATION     • HYSTERECTOMY      partial, still has left ovary   • INSERTION HEMODIALYSIS CATHETER       Family History   Problem Relation Age of Onset   • Heart disease Mother    • Stroke Mother    • Heart disease Father      Social History     Tobacco Use   • Smoking status: Former     Types: Cigarettes   • Smokeless tobacco: Never   Vaping Use   • Vaping Use: Never used   Substance Use Topics   • Alcohol use: Never   • Drug use: Never     Medications Prior to Admission   Medication Sig Dispense Refill Last Dose   • Aspirin Low Dose 81 MG EC tablet Take 1 tablet by mouth Daily.      • atenolol (TENORMIN) 50 MG tablet Take 1 tablet by mouth Daily.      • busPIRone (BUSPAR) 5 MG tablet Take 1 tablet by mouth 3 (Three) Times a Day.      •  cholecalciferol (VITAMIN D3) 25 MCG (1000 UT) tablet Take 1 tablet by mouth Daily.      • cloNIDine (CATAPRES) 0.1 MG tablet Take 1 tablet by mouth 3 (Three) Times a Day.      • clopidogrel (PLAVIX) 75 MG tablet Take 1 tablet by mouth Daily.      • GNP Vitamin C 500 MG tablet Take 1 tablet by mouth 2 (Two) Times a Day.      • hydrALAZINE (APRESOLINE) 50 MG tablet Take 1 tablet by mouth 3 (Three) Times a Day.      • hydrOXYzine pamoate (VISTARIL) 25 MG capsule Take 1 capsule by mouth 3 (Three) Times a Day.      • isosorbide mononitrate (IMDUR) 30 MG 24 hr tablet Take 1 tablet by mouth Daily.      • metoprolol succinate XL (TOPROL-XL) 25 MG 24 hr tablet Take 1 tablet by mouth Daily.      • NIFEdipine XL (PROCARDIA XL) 30 MG 24 hr tablet Take 1 tablet by mouth Daily.      • Zinc Sulfate 220 (50 Zn) MG tablet Take 1 tablet by mouth 2 (Two) Times a Day.        Allergies:  Patient has no known allergies.    Objective      Vital Signs  Temp:  [97.7 °F (36.5 °C)-99.3 °F (37.4 °C)] 97.7 °F (36.5 °C)  Heart Rate:  [60-80] 67  Resp:  [18-20] 20  BP: (165-184)/(62-88) 184/75  There is no height or weight on file to calculate BMI.    Physical Exam:  Physical Exam  Constitutional:       General: She is not in acute distress.     Appearance: Normal appearance. She is ill-appearing (chronically so).   HENT:      Head: Normocephalic and atraumatic.      Right Ear: External ear normal.      Left Ear: External ear normal.      Nose: Nose normal.      Mouth/Throat:      Mouth: Mucous membranes are moist.      Pharynx: Oropharynx is clear.   Eyes:      Extraocular Movements: Extraocular movements intact.      Conjunctiva/sclera: Conjunctivae normal.      Pupils: Pupils are equal, round, and reactive to light.   Cardiovascular:      Rate and Rhythm: Normal rate and regular rhythm.      Pulses: Normal pulses.      Heart sounds: Normal heart sounds. No murmur heard.  Pulmonary:      Effort: Pulmonary effort is normal. No respiratory  distress.      Breath sounds: Normal breath sounds. No wheezing.   Chest:      Chest wall: Tenderness (left shoulder and left scapula more so than chest) present.   Abdominal:      General: Abdomen is flat. Bowel sounds are normal. There is no distension.      Palpations: Abdomen is soft.      Tenderness: There is no abdominal tenderness.   Musculoskeletal:         General: Normal range of motion.      Cervical back: Normal range of motion and neck supple. No tenderness.      Right lower leg: Edema (trace to 1+) present.      Left lower leg: Edema (trace to 1+) present.   Lymphadenopathy:      Cervical: No cervical adenopathy.   Skin:     General: Skin is warm and dry.      Capillary Refill: Capillary refill takes less than 2 seconds.   Neurological:      General: No focal deficit present.      Mental Status: She is alert and oriented to person, place, and time.   Psychiatric:         Mood and Affect: Mood normal.         Behavior: Behavior normal.         Thought Content: Thought content normal.         Judgment: Judgment normal.           Results Review:       Lab Results:  Results from last 7 days   Lab Units 03/16/23  2316 03/15/23  0138 03/14/23  0136 03/12/23  0049 03/11/23  0136   WBC 10*3/mm3 12.99* 11.17* 11.36* 9.32 9.27   HEMOGLOBIN g/dL 8.4* 8.2* 7.8* 8.0* 7.7*   PLATELETS 10*3/mm3 432 506* 501* 487* 470*         Results from last 7 days   Lab Units 03/16/23  2316 03/15/23  0138 03/14/23  0136 03/12/23  0049 03/11/23  0136   SODIUM mmol/L 139 138 136 136 135*   POTASSIUM mmol/L 4.2 4.0 4.0 3.7 3.8   CHLORIDE mmol/L 102 100 99 97* 95*   CO2 mmol/L 24.8 27.0 27.6 29.0 29.1*   BUN mg/dL 44* 53* 54* 51* 47*   CREATININE mg/dL 2.06* 2.16* 2.39* 2.20* 2.01*   CALCIUM mg/dL 8.5* 8.7 8.4* 8.9 9.2   GLUCOSE mg/dL 119* 102* 98 110* 93     Results from last 7 days   Lab Units 03/11/23 0136   MAGNESIUM mg/dL 2.0     Hemoglobin A1C   Date Value Ref Range Status   03/14/2023 5.10 4.80 - 5.60 % Final     Results from  last 7 days   Lab Units 03/16/23  2316 03/14/23  0136 03/11/23  0136   BILIRUBIN mg/dL 0.3 0.2 0.3   ALK PHOS U/L 103 113 106   AST (SGOT) U/L 11 11 12   ALT (SGPT) U/L 6 7 5     Results from last 7 days   Lab Units 03/16/23  2316 03/16/23  1934 03/16/23  1730   HSTROP T ng/L 98* 96* 98*         Results from last 7 days   Lab Units 03/16/23  2316   INR  1.28*           I have reviewed the patient's laboratory results.    Imaging:  Imaging Results (Last 72 Hours)     ** No results found for the last 72 hours. **      CXR 3/16/23 1840  1.  Vascular congestion or volume overload with interstitial edema and pleural effusions, slightly increased since 3/11/2023.  2.  Infiltrate or atelectasis in the left lung base, unchanged.  3.  Dialysis catheter.    I have personally reviewed the patient's radiologic imaging.        EKG:   Normal sinus rhythm, HR 76, QTc 461  Normal ECG  No previous ECGs available  Confirmed by Brittany Najera ([id number]) on 3/16/2023 10:48:13 PM    I have personally reviewed the patient's EKG.        Assessment & Plan     - NSTEMI (non-ST elevated myocardial infarction) (HCC): continue ASA, plavix, statin. Hold eliquis (last dose this morning) and transition to IV heparin drip now per cardiology recommendation. Repeat troponin in the morning. Dr Padron, cardiology, is aware of the patient and will see in consultation in the morning. Will keep NPO after midnight per his recommendation. NSTEMI could be type II from renal failure, CHF/fluid overload or uncontrolled HTN, but since having angina-equivalent symptoms and since had reported NSTEMI while at [facility] in Jan 2023 but workup was reportedly deferred because of her renal failure at the time, will treat as true NSTEMI for now until further evaluation can be performed.     - CKD: developed SWATI during recent hospitalization requiring initiation of HD, but has had improvement in renal function and has not required HD in a few weeks now. Creatine  appears to be settling around 2.0 with GFR 25, classifying her as stage IV CKD. Nephrology following and diuresing for ongoing fluid overload and pericardial effusion. Continue to monitor. Appreciate nephrology assistance.  - Chronic hypoxia after suffering acute hypoxic respiratory failure secondary to COVID-19 pneumonia in 1/23. Continue 3L NC which appears to be baseline now, with bipap at night PRN. CXR this evening note felt to show significant change from prior exam and denies any respiratory symptoms. Diurese per nephrology.   - Systolic CHF diagnosed per ECHO during January 2023 hospitalization, with normalization of EF per ECHO here on 3/14/23 with grade 1a diastolic dysfunction and pericardial effusion: Dr Padron to review ECHO in the morning given significant discrepancy in EF compared to 1/2023 ECHO (20-25% then vs 66-70% now). Nephrology still diuresing as noted above.  - Hypertension: BP remains uncontrolled, 180s systolic this evening in inpatient rehab. Nephrology increased spironolactone dose this morning. Continue to monitor. Will make IV hydralazine available PRN for SBP>170, as uncontrolled BP could be contributing to troponin elevation as mentioned above, not to mention it could be contributing to her angina equivalent symptoms as well.     - Mild leukocytosis: suspect reactive from new onset chest pain. CXR does not show definitive pneumonia and patient denies respiratory symptoms of fever. Will check CRP, procal, lactate and blood cultures to evaluate further. Continue to trend WBC.     DVT/GI Prophylaxis: IV Heparin drip now; PO protonix    Estimated Length of Stay >2 midnights    I discussed the patient's findings, assessment and plan with the patient, inpatient rehab provider Dr Cummings, and cardiologist Dr Padron.    * patient is high risk due to NSTEMI    Javier Lopez MD  03/16/23  23:55 EDT      Electronically signed by Javier Lopez MD at 03/17/23 001

## 2023-03-22 NOTE — PROCEDURES
No longer needed dialysis catheter    Physician Dr Merrill    Procedure  The area around the catheter was cleaned with a alcohol swab. The local was injected. The catheter was freed up and removed. The pressure dressing was applied, and no bleeding was noted.

## 2023-03-22 NOTE — THERAPY EVALUATION
Acute Care - Physical Therapy Initial Evaluation   [facility]     Patient Name: Pati Cortes  : 1955  MRN: [id number]  Today's Date: 3/22/2023   Onset of Illness/Injury or Date of Surgery: 23  Visit Dx:     ICD-10-CM ICD-9-CM   1. Acute on chronic diastolic heart failure (HCC)  I50.33 428.33   2. Debility  R53.81 799.3   3. NSTEMI (non-ST elevated myocardial infarction) ([facility])  I21.4 410.70     Patient Active Problem List   Diagnosis   • Debility   • NSTEMI (non-ST elevated myocardial infarction) ([facility])     Past Medical History:   Diagnosis Date   • Arthritis    • Chronic respiratory failure with hypoxia     after acute respiratory failure in recent hospitalization in 2023 secondary to COVID-19, weaned from vent to 3L NC with bipap at night   • CKD (chronic kidney disease), stage IV ([facility])     recently on dialysis for SWATI at [facility], has right upper chest wall dialysis catheter, no dialysis in a couple weeks per patient, baseline creatine reportedly has settled around 2   • History of coronary artery disease     sp stent placement remotely   • History of DVT in adulthood     in right IJ, discovered during recent hospitalization in 2023   • Hypertension    • Polycystic kidney disease    • Systolic CHF ([facility])     diagnosed based on ECHO showing low EF at outside hospital in 2023, but with normalized EF on ECHO here on 3/14/23, but with diastolic CHF noted     Past Surgical History:   Procedure Laterality Date   • APPENDECTOMY     • CARDIAC CATHETERIZATION     • HYSTERECTOMY      partial, still has left ovary   • INSERTION HEMODIALYSIS CATHETER       PT Assessment (last 12 hours)     PT Evaluation and Treatment     Row Name 23 0920          Physical Therapy Time and Intention    Subjective Information complains of;weakness;fatigue  -CT     Document Type evaluation  -CT     Mode of Treatment physical therapy  -CT     Patient Effort adequate  -CT     Symptoms Noted During/After Treatment  fatigue  -CT     Comment Pt tolerated eval well. Pt reports she has been hospitalized since January following COVID. Prior to that pt reports she occasionbally needed a little assist from spouse. Pt is CGA/Min A for mobility at time of eval. Pt is requesting to be d/c home so home with assist.  -CT     Row Name 03/22/23 0920          General Information    Patient Profile Reviewed yes  -CT     Onset of Illness/Injury or Date of Surgery 03/16/23  -CT     Referring Physician Huerta  -CT     Patient Observations alert;cooperative;agree to therapy  -CT     Prior Level of Function independent:  prior to January  -CT     Equipment Currently Used at Home cane, straight;rollator  -CT     Existing Precautions/Restrictions fall;oxygen therapy device and L/min  -CT     Equipment Issued to Patient gait belt  -CT     Risks Reviewed patient:  -CT     Benefits Reviewed patient:  -CT     Row Name 03/22/23 0920          Living Environment    Current Living Arrangements home  -CT     People in Home spouse  -CT     Row Name 03/22/23 0920          Cognition    Affect/Mental Status (Cognition) WFL  -CT     Orientation Status (Cognition) oriented x 3  -CT     Follows Commands (Cognition) verbal cues/prompting required;WFL  -CT     Row Name 03/22/23 0920          Range of Motion Comprehensive    Comment, General Range of Motion BLE grossly WFL  -CT     Row Name 03/22/23 0920          Strength Comprehensive (MMT)    Comment, General Manual Muscle Testing (MMT) Assessment BLE grossly 4-/5  -CT     Row Name 03/22/23 0920          Bed Mobility    Bed Mobility bed mobility (all) activities  -CT     All Activities, [place] (Bed Mobility) contact guard  -CT     Assistive Device (Bed Mobility) bed rails  -CT     Row Name 03/22/23 0920          Sit-Stand Transfer    Sit-Stand [place] (Transfers) contact guard  -CT     Assistive Device (Sit-Stand Transfers) walker, front-wheeled  -CT     Row Name 03/22/23 0920          Stand-Sit Transfer  "   Stand-Sit [place] (Transfers) contact guard  -CT     Assistive Device (Stand-Sit Transfers) walker, front-wheeled  -CT     Row Name 03/22/23 0920          Gait/Stairs (Locomotion)    [place] Level (Gait) contact guard  -CT     Assistive Device (Gait) walker, front-wheeled  -CT     Distance in Feet (Gait) 20  -CT     Pattern (Gait) step-to  -CT     Deviations/Abnormal Patterns (Gait) gait speed decreased  -CT     Bilateral Gait Deviations forward flexed posture;weight shift ability decreased  -CT     Row Name 03/22/23 0920          Balance    Balance Assessment sitting static balance;sitting dynamic balance;standing static balance;standing dynamic balance  -CT     Static Sitting Balance set-up  -CT     Dynamic Sitting Balance supervision  -CT     Position, Sitting Balance sitting edge of bed  -CT     Static Standing Balance contact guard  -CT     Position/Device Used, Standing Balance walker, front-wheeled  -CT     Row Name 03/22/23 0920          Coping    Observed Emotional State calm;cooperative  -CT     Verbalized Emotional State acceptance  -CT     Row Name 03/22/23 0920          Plan of Care Review    Plan of Care Reviewed With patient  -CT     Row Name 03/22/23 0920          Positioning and Restraints    Pre-Treatment Position in bed  -CT     Post Treatment Position bed  -CT     In Bed sitting EOB;call light within reach;encouraged to call for assist;notified nsg  -CT     Row Name 03/22/23 0920          Therapy Assessment/Plan (PT)    Patient/Family Therapy Goals Statement (PT) Pt goals are to \"go home\"  -CT     Functional Level at Time of Evaluation (PT) CGA  -CT     PT Diagnosis (PT) decreased functional mobility  -CT     Rehab Potential (PT) good, to achieve stated therapy goals  -CT     Criteria for Skilled Interventions Met (PT) yes;skilled treatment is necessary  -CT     Therapy Frequency (PT) 2 times/wk  2-5 times/wk  -CT     Predicted Duration of Therapy Intervention (PT) length of stay  " -CT     Row Name 03/22/23 0920          Therapy Plan Review/Discharge Plan (PT)    Therapy Plan Review (PT) evaluation/treatment results reviewed;care plan/treatment goals reviewed;risks/benefits reviewed;current/potential barriers reviewed;participants voiced agreement with care plan;participants included;patient  -CT     Row Name 03/22/23 0920          Physical Therapy Goals    Bed Mobility Goal Selection (PT) bed mobility, PT goal 1  -CT     Transfer Goal Selection (PT) transfer, PT goal 1  -CT     Gait Training Goal Selection (PT) gait training, PT goal 1  -CT     Row Name 03/22/23 0920          Bed Mobility Goal 1 (PT)    Activity/Assistive Device (Bed Mobility Goal 1, PT) bed mobility activities, all  -CT     [place] Level/Cues Needed (Bed Mobility Goal 1, PT) supervision required  -CT     Time Frame (Bed Mobility Goal 1, PT) by discharge  -CT     Row Name 03/22/23 0920          Transfer Goal 1 (PT)    Activity/Assistive Device (Transfer Goal 1, PT) sit-to-stand/stand-to-sit;bed-to-chair/chair-to-bed  -CT     [place] Level/Cues Needed (Transfer Goal 1, PT) supervision required  -CT     Time Frame (Transfer Goal 1, PT) by discharge  -CT     Row Name 03/22/23 0920          Gait Training Goal 1 (PT)    Activity/Assistive Device (Gait Training Goal 1, PT) gait (walking locomotion);assistive device use  -CT     [place] Level (Gait Training Goal 1, PT) supervision required  -CT     Distance (Gait Training Goal 1, PT) 50  -CT     Time Frame (Gait Training Goal 1, PT) by discharge  -CT           User Key  (r) = Recorded By, (t) = Taken By, (c) = Cosigned By    Initials Name Provider Type    CT Erin Patel, PT Physical Therapist                  PT Recommendation and Plan  Anticipated Discharge Disposition (PT): home with assist  Planned Therapy Interventions (PT): balance training, bed mobility training, gait training, home exercise program, manual therapy techniques, motor coordination training,  neuromuscular re-education, patient/family education, postural re-education, strengthening, transfer training  Therapy Frequency (PT): 2 times/wk (2-5 times/wk)  Plan of Care Reviewed With: patient       Time Calculation:    PT Charges     Row Name 03/22/23 1318             Time Calculation    PT Received On 03/22/23  -CT      PT Goal Re-Cert Due Date 04/05/23  -CT            User Key  (r) = Recorded By, (t) = Taken By, (c) = Cosigned By    Initials Name Provider Type    CT Erin Patel, PT Physical Therapist              Therapy Charges for Today     Code Description Service Date Service Provider Modifiers Qty    [id number] HC PT EVAL MOD COMPLEXITY 4 3/22/2023 Erin Patel, PT GP 1          PT G-Codes  AM-PAC 6 Clicks Score (PT): 16    Erin Patel PT  3/22/2023

## 2023-03-22 NOTE — PHARMACY PATIENT ASSISTANCE
Patient's family brought in financial information and we are attempting to get her set up with the  assistance program.  She has an appointment with her provider Dr. Jackman on 3/29.  They won't write a script for the program until they see her.  Our patient assistance technician, Rekha will be following up for the information needed to send in.   Today we will be using a free 30 day trial card.    Thank You;  Claudine Cazares, Kian  03/22/23  11:43 EDT

## 2023-03-22 NOTE — CASE MANAGEMENT/SOCIAL WORK
Discharge Planning Assessment   [facility]     Patient Name: Pati Cortes  MRN: [id number]  Today's Date: 3/22/2023    Admit Date: 3/16/2023       Discharge Needs Assessment    No documentation.                Discharge Plan     Row Name 03/22/23 6639       Plan    Plan Pt to be dc'd home with MD referral for wheelchair. Pt utilizes [employer] for her DME needs. MIRI faxed MD referral to [facility] [phone], per pt preference, and spoke with Tarsha Irby to deliver wheelchair to pts room on this date.    Row Name 03/22/23 3567       Plan    Final Discharge Disposition Code 01 - home or self-care    Final Note Pt to be discharged home on this date.  Pt's spouse at bedside and stated 's son will transport them home on this date.              Continued Care and Services - Admitted Since 3/16/2023    Coordination has not been started for this encounter.       Expected Discharge Date and Time     Expected Discharge Date Expected Discharge Time    Mar 22, 2023          Demographic Summary    No documentation.                Functional Status    No documentation.                Psychosocial    No documentation.                Abuse/Neglect    No documentation.                Legal    No documentation.                Substance Abuse    No documentation.                Patient Forms    No documentation.                   Jossy Flores RN

## 2023-03-22 NOTE — CASE MANAGEMENT/SOCIAL WORK
Discharge Planning Assessment   [place]     Patient Name: Pati Cortes  MRN: [id number]  Today's Date: 3/22/2023    Admit Date: 3/16/2023    Plan: SS spoke with pt at bedside on this date. Pt states she plans to return home at discharge. Pt states she does not want to return to inpatient rehab at discharge. Pt lives at home with spouse, Sree Amaya at [address] [place] [address]. Pt currently does not utilize home health services. Pt currently utilizes glucometer, bp cuff, bedside commode, home 02 at 3 liters nc, rollator and elevated toilet seat [facility]. [facility] [place]. Pt spouse is POA. Pt does not have living will. Pt and spouse do not have vehicle and walk to grocery store. Pt aware of [facility]. Pt's PCP Gasper Reyes. SS will follow.     Discharge Plan     Row Name 03/22/23 1255       Plan    Final Discharge Disposition Code 01 - home or self-care    Final Note Pt to be discharged home on this date.  Pt's spouse at bedside and stated 's son will transport them home on this date.              Massiel Moraes, BSW

## 2023-03-23 ENCOUNTER — NURSE TRIAGE (OUTPATIENT)
Dept: CALL CENTER | Facility: HOSPITAL | Age: 68
End: 2023-03-23
Payer: MEDICARE

## 2023-03-23 NOTE — TELEPHONE ENCOUNTER
"Caller states came home and not vistaril in bag. Caller aware will send message to case management to follow up with them. Advised to call back as needed.     Reason for Disposition  • Health Information question, no triage required and triager able to answer question    Additional Information  • Negative: [1] Caller is not with the adult (patient) AND [2] reporting urgent symptoms  • Negative: Lab result questions  • Negative: Medication questions  • Negative: Caller can't be reached by phone  • Negative: Caller has already spoken to PCP or another triager  • Negative: RN needs further essential information from caller in order to complete triage  • Negative: Requesting regular office appointment  • Negative: [1] Caller requesting NON-URGENT health information AND [2] PCP's office is the best resource    Answer Assessment - Initial Assessment Questions  1. REASON FOR CALL or QUESTION: \"What is your reason for calling today?\" or \"How can I best help you?\" or \"What question do you have that I can help answer?\"      Vistaril not in bag brought home    Protocols used: INFORMATION ONLY CALL - NO TRIAGE-ADULT-      "

## 2023-03-23 NOTE — TELEPHONE ENCOUNTER
Sree calling back, has not heard from anyone about Ptai's medication.  Explained needs to give  24 hours to respond.

## 2023-03-23 NOTE — OUTREACH NOTE
Prep Survey    Flowsheet Row Responses   [facility] facility patient discharged from? [place]   Is LACE score < 7 ? No   Eligibility Readm Mgmt   Discharge diagnosis Type II Non-ST elevation MI    Does the patient have one of the following disease processes/diagnoses(primary or secondary)? CHF   Does the patient have Home health ordered? No   Is there a DME ordered? Yes   What DME was ordered? W/c per [facility]    Medication alerts for this patient Bumex, Aldactone    Prep survey completed? Yes          Radha GUERRERO - Registered Nurse

## 2023-03-24 ENCOUNTER — NURSE TRIAGE (OUTPATIENT)
Dept: CALL CENTER | Facility: HOSPITAL | Age: 68
End: 2023-03-24
Payer: MEDICARE

## 2023-03-24 NOTE — TELEPHONE ENCOUNTER
Reason for Disposition  • [1] Prescription refill request for NON-ESSENTIAL medicine (i.e., no harm to patient if med not taken) AND [2] triager unable to refill per department policy    Additional Information  • Negative: [1] Intentional drug overdose AND [2] suicidal thoughts or ideas  • Negative: Drug overdose and triager unable to answer question  • Negative: Caller requesting information unrelated to medicine  • Negative: Caller requesting information about COVID-19 Vaccine  • Negative: Caller requesting information about Emergency Contraception  • Negative: Caller requesting information about Combined Birth Control Pills  • Negative: Caller requesting information about Progestin Birth Control Pills  • Negative: Caller requesting information about Post-Op pain or medicines  • Negative: Caller requesting a prescription antibiotic (such as Penicillin) for Strep throat and has a positive culture result  • Negative: Caller requesting a prescription anti-viral med (such as Tamiflu) and has influenza (flu)  symptoms  • Negative: Immunization reaction suspected  • Negative: Rash while taking a medicine or within 3 days of stopping it  • Negative: [1] Asthma and [2] having symptoms of asthma (cough, wheezing, etc.)  • Negative: [1] Symptom of illness (e.g., headache, abdominal pain, earache, vomiting) AND [2] more than mild  • Negative: Breastfeeding questions about mother's medicines and diet  • Negative: MORE THAN A DOUBLE DOSE of a prescription or over-the-counter (OTC) drug  • Negative: [1] DOUBLE DOSE (an extra dose or lesser amount) of prescription drug AND [2] any symptoms (e.g., dizziness, nausea, pain, sleepiness)  • Negative: [1] DOUBLE DOSE (an extra dose or lesser amount) of over-the-counter (OTC) drug AND [2] any symptoms (e.g., dizziness, nausea, pain, sleepiness)  • Negative: Took another person's prescription drug  • Negative: [1] DOUBLE DOSE (an extra dose or lesser amount) of prescription drug AND  "[2] NO symptoms (Exception: a double dose of antibiotics)  • Negative: Diabetes drug error or overdose (e.g., took wrong type of insulin or took extra dose)  • Negative: [1] Prescription refill request for ESSENTIAL medicine (i.e., likelihood of harm to patient if not taken) AND [2] triager unable to refill per department policy  • Negative: [1] Prescription not at pharmacy AND [2] was prescribed by PCP recently (Exception: triager has access to EMR and prescription is recorded there. Go to Home Care and confirm for pharmacy.)  • Negative: [1] Pharmacy calling with prescription question AND [2] triager unable to answer question  • Negative: [1] Caller has URGENT medicine question about med that PCP or specialist prescribed AND [2] triager unable to answer question  • Negative: Medicine patch causing local rash or itching  • Negative: [1] Caller has medicine question about med NOT prescribed by PCP AND [2] triager unable to answer question (e.g., compatibility with other med, storage)  • Negative: Prescription request for new medicine (not a refill)  • Negative: Prescription refill request for a CONTROLLED substance (e.g., narcotics, ADHD medicines)    Answer Assessment - Initial Assessment Questions  1. NAME of MEDICATION: \"What medicine are you calling about?\"      Vistaril  2. QUESTION: \"What is your question?\" (e.g., medication refill, side effect)      Not sent to pharmacy after discharge. Nurse looked it up and is listed as a home medication, there is not new order for it, will need to call PCP if needing this medication.   3. PRESCRIBING HCP: \"Who prescribed it?\" Reason: if prescribed by specialist, call should be referred to that group.      PCP is not listed on the medication  4. SYMPTOMS: \"Do you have any symptoms?\"      nausea  5. SEVERITY: If symptoms are present, ask \"Are they mild, moderate or severe?\"     Mild to moderate   6. PREGNANCY:  \"Is there any chance that you are pregnant?\" \"When was your last " "menstrual period?\"    Protocols used: MEDICATION QUESTION CALL-ADULT-      "

## 2023-03-30 NOTE — PROGRESS NOTES
Patient Assessment Instrument  Quality Indicators - Discharge FY 2023    Section A. Transportation      Section A. Medication List  Subsequent Provider:  02 - [facility]  Medication List to Subsequent Provider at Discharge:  Yes - Current reconciled  medication list provided to the subsequent provider  Route(s) of Medication List Transmission to Subsequent Provider:  Electronic  Health Record  Discharge Location:  99 - Unlisted  Medication List to Patient at Discharge:  Yes - Current reconciled medication  list provided to the patient, family and/or caregiver  Route(s) of Medication List Transmission to Patient:  Electronic Health Record    Section B. Health Literacy  Frequency of Needing Assistance Reading:  Sometimes    Section C. BIMS  Did not conduct Brief Interview for Mental Status (BIMS). Patient is  rarely/never understood, cannot respond verbally or in writing, or an   is needed, but not available.    Section C. Signs and Symptoms of Delirium (from CAM)  Acute Change in Mental Status:   No  Inattention:   Behavior not present  Disorganized Thinking:   Behavior not present  Altered Level of Consciousness:   Behavior not present    Section D. Mood  Presence of little interest or pleasure in doing things:   No  Frequency of having little interest or pleasure in doing things:   Never or 1  day  Presence of feeling down, depressed, or hopeless:   No  Frequency of feeling down, depressed, or hopeless:   Never or 1 day   Interview Ended. Above responses do not meet criteria to continue  Total Severity Score:   00    Section D. Social Isolation  Frequency of Feeling Lonely or Isolated:  Never    Section GG. Self-Care Performance      Section GG. Mobility Performance      Section J. Health Conditions Discharge      Section J. Health Conditions (Pain)      Section K. Swallowing/Nutritional Status  Nutritional Approaches Past 7 Days:  Nutritional Approaches at Discharge:    Section M. Skin  Conditions Discharge  Unhealed Pressure Ulcer(s)/Injurie(s) at Stage 1 or Higher:  No    . Current Number of Unhealed Pressure Ulcers    Number of Unhealed Stage 2: 0  Number of Unhealed Stage 3: 0  Number of Unhealed Stage 4: 0    Section N. Medication        Section O. Special Treatments, Procedures, and Programs  Dialysis: Hemodialysis    Signed by: Leah Durbin RN

## 2023-04-10 ENCOUNTER — READMISSION MANAGEMENT (OUTPATIENT)
Dept: CALL CENTER | Facility: HOSPITAL | Age: 68
End: 2023-04-10
Payer: MEDICARE

## 2023-04-10 NOTE — OUTREACH NOTE
CHF Week 3 Survey    Flowsheet Row Responses   [facility] patient discharged from? [facility]   Does the patient have one of the following disease processes/diagnoses(primary or secondary)? CHF   Week 3 attempt successful? Yes   Call start time 0827   Call end time 0829   Discharge diagnosis Type II Non-ST elevation MI    Person spoke with today (if not patient) and relationship     Is the patient taking all medications as directed (includes completed medication regime)? Yes   Does the patient have a primary care provider?  Yes   Pulse Ox monitoring None   What is the patient's perception of their health status since discharge? Improving   Is the patient able to teach back signs and symptoms of worsening condition? (i.e. weight gain, shortness of air, etc.) Yes   If the patient is a current smoker, are they able to teach back resources for cessation? Not a smoker   Is the patient/caregiver able to teach back the hierarchy of who to call/visit for symptoms/problems? PCP, Specialist, Home health nurse, Urgent Care, ED, 911 Yes   CHF Zone this Call Green Zone   Green Zone No new swelling -  feet, ankles and legs look normal for you, Physical activity level is normal for you, No new or worsening shortness of breath, Patient reports doing well   Green Zone Interventions Daily weight check, Low sodium diet, Meds as directed, Follow up visits planned   CHF Week 3 call completed? Yes   Revoked No further contact(revokes)-requires comment   Graduated/Revoked comments Call was brief with  and states she is doing a lot better.  She was able to get off the couch yesterday without any assistance.  Denies any leg or feet swelling.  Still a little unsteady on her feet and unable to get her on the scales daily.     Call end time 0829          Zandra SCALES - Licensed Nurse

## 2023-11-03 ENCOUNTER — TELEPHONE (OUTPATIENT)
Dept: PHARMACY | Facility: HOSPITAL | Age: 68
End: 2023-11-03

## 2023-11-03 NOTE — TELEPHONE ENCOUNTER
Patient was previously signed up to receive Eliquis from  patient assistance program and is now managed by her PCP. We received fax for enrollment in 2024, sent info to patients PCP so they can decide on re enrollment.

## 2025-04-11 ENCOUNTER — OUTSIDE FACILITY SERVICE (OUTPATIENT)
Dept: PULMONOLOGY | Facility: CLINIC | Age: 70
End: 2025-04-11
Payer: MEDICARE

## 2025-04-11 ENCOUNTER — HOSPITAL ENCOUNTER (OUTPATIENT)
Facility: HOSPITAL | Age: 70
Discharge: ANOTHER HOSPITAL | End: 2025-05-05
Attending: INTERNAL MEDICINE | Admitting: INTERNAL MEDICINE
Payer: COMMERCIAL

## 2025-04-11 ENCOUNTER — APPOINTMENT (OUTPATIENT)
Dept: GENERAL RADIOLOGY | Facility: HOSPITAL | Age: 70
End: 2025-04-11
Payer: COMMERCIAL

## 2025-04-11 DIAGNOSIS — I46.9 CARDIAC ARREST: Primary | ICD-10-CM

## 2025-04-11 LAB
A-A DO2: 359 MMHG (ref 0–300)
ALBUMIN SERPL-MCNC: 2.6 G/DL (ref 3.5–5.2)
ALBUMIN/GLOB SERPL: 0.9 G/DL
ALP SERPL-CCNC: 86 U/L (ref 39–117)
ALT SERPL W P-5'-P-CCNC: 11 U/L (ref 1–33)
ANION GAP SERPL CALCULATED.3IONS-SCNC: 14.8 MMOL/L (ref 5–15)
ARTERIAL PATENCY WRIST A: POSITIVE
AST SERPL-CCNC: 18 U/L (ref 1–32)
ATMOSPHERIC PRESS: 729 MMHG
ATMOSPHERIC PRESS: 731 MMHG
BASE EXCESS BLDA CALC-SCNC: -3.7 MMOL/L (ref 0–2)
BASE EXCESS BLDV CALC-SCNC: -2.7 MMOL/L (ref 0–2)
BASOPHILS # BLD AUTO: 0.06 10*3/MM3 (ref 0–0.2)
BASOPHILS NFR BLD AUTO: 0.5 % (ref 0–1.5)
BDY SITE: ABNORMAL
BDY SITE: ABNORMAL
BILIRUB SERPL-MCNC: 0.2 MG/DL (ref 0–1.2)
BUN SERPL-MCNC: 18 MG/DL (ref 8–23)
BUN/CREAT SERPL: 3.8 (ref 7–25)
CALCIUM SPEC-SCNC: 8.7 MG/DL (ref 8.6–10.5)
CHLORIDE SERPL-SCNC: 100 MMOL/L (ref 98–107)
CO2 BLDA-SCNC: 23 MMOL/L (ref 22–33)
CO2 BLDA-SCNC: 24.5 MMOL/L (ref 22–33)
CO2 SERPL-SCNC: 20.2 MMOL/L (ref 22–29)
COHGB MFR BLD: 0.9 % (ref 0–5)
COHGB MFR BLD: 1.3 % (ref 0–5)
CREAT SERPL-MCNC: 4.72 MG/DL (ref 0.57–1)
CRP SERPL-MCNC: 13.54 MG/DL (ref 0–0.5)
DEPRECATED RDW RBC AUTO: 53.6 FL (ref 37–54)
EGFRCR SERPLBLD CKD-EPI 2021: 9.4 ML/MIN/1.73
EOSINOPHIL # BLD AUTO: 0.17 10*3/MM3 (ref 0–0.4)
EOSINOPHIL NFR BLD AUTO: 1.3 % (ref 0.3–6.2)
ERYTHROCYTE [DISTWIDTH] IN BLOOD BY AUTOMATED COUNT: 15.6 % (ref 12.3–15.4)
GLOBULIN UR ELPH-MCNC: 2.9 GM/DL
GLUCOSE SERPL-MCNC: 74 MG/DL (ref 65–99)
HCO3 BLDA-SCNC: 23 MMOL/L (ref 20–26)
HCO3 BLDV-SCNC: 21.9 MMOL/L (ref 22–28)
HCT VFR BLD AUTO: 24.7 % (ref 34–46.6)
HCT VFR BLD CALC: 26.4 % (ref 38–51)
HGB BLD-MCNC: 7.6 G/DL (ref 12–15.9)
HGB BLDA-MCNC: 7.8 G/DL (ref 13.5–17.5)
HGB BLDA-MCNC: 8.6 G/DL (ref 13.5–17.5)
IMM GRANULOCYTES # BLD AUTO: 0.09 10*3/MM3 (ref 0–0.05)
IMM GRANULOCYTES NFR BLD AUTO: 0.7 % (ref 0–0.5)
INHALED O2 CONCENTRATION: 70 %
INHALED O2 CONCENTRATION: 70 %
LYMPHOCYTES # BLD AUTO: 1.05 10*3/MM3 (ref 0.7–3.1)
LYMPHOCYTES NFR BLD AUTO: 7.9 % (ref 19.6–45.3)
Lab: ABNORMAL
MCH RBC QN AUTO: 28.9 PG (ref 26.6–33)
MCHC RBC AUTO-ENTMCNC: 30.8 G/DL (ref 31.5–35.7)
MCV RBC AUTO: 93.9 FL (ref 79–97)
METHGB BLD QL: 0.4 % (ref 0–3)
METHGB BLD QL: 0.4 % (ref 0–3)
MODALITY: ABNORMAL
MODALITY: ABNORMAL
MONOCYTES # BLD AUTO: 1.15 10*3/MM3 (ref 0.1–0.9)
MONOCYTES NFR BLD AUTO: 8.7 % (ref 5–12)
NEUTROPHILS NFR BLD AUTO: 10.72 10*3/MM3 (ref 1.7–7)
NEUTROPHILS NFR BLD AUTO: 80.9 % (ref 42.7–76)
NOTE: ABNORMAL
NOTIFIED BY: ABNORMAL
NOTIFIED WHO: ABNORMAL
NRBC BLD AUTO-RTO: 0 /100 WBC (ref 0–0.2)
OXYHGB MFR BLDV: 81.5 % (ref 45–75)
OXYHGB MFR BLDV: 92 % (ref 94–99)
PCO2 BLDA: 49 MM HG (ref 35–45)
PCO2 BLDV: 36.1 MM HG (ref 41–51)
PCO2 TEMP ADJ BLD: ABNORMAL MM[HG]
PEEP RESPIRATORY: 10 CM[H2O]
PEEP RESPIRATORY: 10 CM[H2O]
PH BLDA: 7.28 PH UNITS (ref 7.35–7.45)
PH BLDV: 7.39 PH UNITS (ref 7.32–7.42)
PH, TEMP CORRECTED: ABNORMAL
PLATELET # BLD AUTO: 287 10*3/MM3 (ref 140–450)
PMV BLD AUTO: 9.9 FL (ref 6–12)
PO2 BLDA: 67.2 MM HG (ref 83–108)
PO2 BLDV: 42.2 MM HG (ref 27–53)
PO2 TEMP ADJ BLD: ABNORMAL MM[HG]
POTASSIUM SERPL-SCNC: 3.5 MMOL/L (ref 3.5–5.2)
PROT SERPL-MCNC: 5.5 G/DL (ref 6–8.5)
RBC # BLD AUTO: 2.63 10*6/MM3 (ref 3.77–5.28)
SAO2 % BLDCOA: 93.2 % (ref 94–99)
SAO2 % BLDCOV: 82.9 % (ref 45–75)
SET MECH RESP RATE: 22
SET MECH RESP RATE: 25
SODIUM SERPL-SCNC: 135 MMOL/L (ref 136–145)
VENTILATOR MODE: ABNORMAL
VENTILATOR MODE: ABNORMAL
VT ON VENT VENT: 420 ML
VT ON VENT VENT: 450 ML
WBC NRBC COR # BLD AUTO: 13.24 10*3/MM3 (ref 3.4–10.8)

## 2025-04-11 PROCEDURE — 80053 COMPREHEN METABOLIC PANEL: CPT | Performed by: INTERNAL MEDICINE

## 2025-04-11 PROCEDURE — 82805 BLOOD GASES W/O2 SATURATION: CPT

## 2025-04-11 PROCEDURE — 36415 COLL VENOUS BLD VENIPUNCTURE: CPT | Performed by: INTERNAL MEDICINE

## 2025-04-11 PROCEDURE — 93010 ELECTROCARDIOGRAM REPORT: CPT | Performed by: INTERNAL MEDICINE

## 2025-04-11 PROCEDURE — 82820 HEMOGLOBIN-OXYGEN AFFINITY: CPT

## 2025-04-11 PROCEDURE — 93005 ELECTROCARDIOGRAM TRACING: CPT | Performed by: INTERNAL MEDICINE

## 2025-04-11 PROCEDURE — 85025 COMPLETE CBC W/AUTO DIFF WBC: CPT | Performed by: INTERNAL MEDICINE

## 2025-04-11 PROCEDURE — 87070 CULTURE OTHR SPECIMN AEROBIC: CPT | Performed by: INTERNAL MEDICINE

## 2025-04-11 PROCEDURE — 71045 X-RAY EXAM CHEST 1 VIEW: CPT

## 2025-04-11 PROCEDURE — 83050 HGB METHEMOGLOBIN QUAN: CPT

## 2025-04-11 PROCEDURE — 86140 C-REACTIVE PROTEIN: CPT | Performed by: INTERNAL MEDICINE

## 2025-04-11 PROCEDURE — 87205 SMEAR GRAM STAIN: CPT | Performed by: INTERNAL MEDICINE

## 2025-04-11 PROCEDURE — 82375 ASSAY CARBOXYHB QUANT: CPT

## 2025-04-11 NOTE — Clinical Note
A sheath was successfully inserted using micropuncture technique with ultrasound guidance into the right femoral artery. Sheath insertion not delayed.

## 2025-04-11 NOTE — Clinical Note
Closure device additional comment: Sheath sutured in place per Kira Bernabe, RT(r); connected to pressure bag

## 2025-04-11 NOTE — Clinical Note
Suture was used to secure the sheath post procedure. Pressure Bag was used to stabalize the sheath post procedure.

## 2025-04-12 ENCOUNTER — OUTSIDE FACILITY SERVICE (OUTPATIENT)
Dept: PULMONOLOGY | Facility: CLINIC | Age: 70
End: 2025-04-12
Payer: MEDICARE

## 2025-04-12 LAB
GLUCOSE BLDC GLUCOMTR-MCNC: 80 MG/DL (ref 70–130)
HEMOCCULT STL QL: NEGATIVE

## 2025-04-12 PROCEDURE — 71045 X-RAY EXAM CHEST 1 VIEW: CPT | Performed by: RADIOLOGY

## 2025-04-12 PROCEDURE — 93010 ELECTROCARDIOGRAM REPORT: CPT | Performed by: INTERNAL MEDICINE

## 2025-04-12 PROCEDURE — 82272 OCCULT BLD FECES 1-3 TESTS: CPT | Performed by: INTERNAL MEDICINE

## 2025-04-12 PROCEDURE — 93005 ELECTROCARDIOGRAM TRACING: CPT | Performed by: PHYSICIAN ASSISTANT

## 2025-04-12 PROCEDURE — 82948 REAGENT STRIP/BLOOD GLUCOSE: CPT

## 2025-04-13 ENCOUNTER — OUTSIDE FACILITY SERVICE (OUTPATIENT)
Dept: PULMONOLOGY | Facility: CLINIC | Age: 70
End: 2025-04-13
Payer: MEDICARE

## 2025-04-13 LAB
25(OH)D3 SERPL-MCNC: 28.3 NG/ML (ref 30–100)
ALBUMIN SERPL-MCNC: 2.4 G/DL (ref 3.5–5.2)
ALBUMIN/GLOB SERPL: 0.9 G/DL
ALP SERPL-CCNC: 113 U/L (ref 39–117)
ALT SERPL W P-5'-P-CCNC: 14 U/L (ref 1–33)
ANION GAP SERPL CALCULATED.3IONS-SCNC: 12.8 MMOL/L (ref 5–15)
AST SERPL-CCNC: 20 U/L (ref 1–32)
BASOPHILS # BLD AUTO: 0.05 10*3/MM3 (ref 0–0.2)
BASOPHILS NFR BLD AUTO: 0.4 % (ref 0–1.5)
BILIRUB SERPL-MCNC: 0.2 MG/DL (ref 0–1.2)
BUN SERPL-MCNC: 26 MG/DL (ref 8–23)
BUN/CREAT SERPL: 4.3 (ref 7–25)
CALCIUM SPEC-SCNC: 8.5 MG/DL (ref 8.6–10.5)
CHLORIDE SERPL-SCNC: 101 MMOL/L (ref 98–107)
CO2 SERPL-SCNC: 21.2 MMOL/L (ref 22–29)
CREAT SERPL-MCNC: 6.07 MG/DL (ref 0.57–1)
CRP SERPL-MCNC: 16.93 MG/DL (ref 0–0.5)
DEPRECATED RDW RBC AUTO: 55.3 FL (ref 37–54)
EGFRCR SERPLBLD CKD-EPI 2021: 7 ML/MIN/1.73
EOSINOPHIL # BLD AUTO: 0.45 10*3/MM3 (ref 0–0.4)
EOSINOPHIL NFR BLD AUTO: 3.5 % (ref 0.3–6.2)
ERYTHROCYTE [DISTWIDTH] IN BLOOD BY AUTOMATED COUNT: 15.9 % (ref 12.3–15.4)
FERRITIN SERPL-MCNC: 1277 NG/ML (ref 13–150)
GLOBULIN UR ELPH-MCNC: 2.8 GM/DL
GLUCOSE SERPL-MCNC: 93 MG/DL (ref 65–99)
HCT VFR BLD AUTO: 26.3 % (ref 34–46.6)
HGB BLD-MCNC: 8.3 G/DL (ref 12–15.9)
IMM GRANULOCYTES # BLD AUTO: 0.09 10*3/MM3 (ref 0–0.05)
IMM GRANULOCYTES NFR BLD AUTO: 0.7 % (ref 0–0.5)
IRON 24H UR-MRATE: 17 MCG/DL (ref 37–145)
IRON SATN MFR SERPL: 11 % (ref 20–50)
LYMPHOCYTES # BLD AUTO: 1.32 10*3/MM3 (ref 0.7–3.1)
LYMPHOCYTES NFR BLD AUTO: 10.2 % (ref 19.6–45.3)
MCH RBC QN AUTO: 29.7 PG (ref 26.6–33)
MCHC RBC AUTO-ENTMCNC: 31.6 G/DL (ref 31.5–35.7)
MCV RBC AUTO: 94.3 FL (ref 79–97)
MONOCYTES # BLD AUTO: 1.2 10*3/MM3 (ref 0.1–0.9)
MONOCYTES NFR BLD AUTO: 9.3 % (ref 5–12)
NEUTROPHILS NFR BLD AUTO: 75.9 % (ref 42.7–76)
NEUTROPHILS NFR BLD AUTO: 9.78 10*3/MM3 (ref 1.7–7)
NRBC BLD AUTO-RTO: 0 /100 WBC (ref 0–0.2)
PHOSPHATE SERPL-MCNC: 3.1 MG/DL (ref 2.5–4.5)
PLATELET # BLD AUTO: 294 10*3/MM3 (ref 140–450)
PMV BLD AUTO: 10.5 FL (ref 6–12)
POTASSIUM SERPL-SCNC: 3.6 MMOL/L (ref 3.5–5.2)
PROT SERPL-MCNC: 5.2 G/DL (ref 6–8.5)
QT INTERVAL: 314 MS
QT INTERVAL: 384 MS
QTC INTERVAL: 440 MS
QTC INTERVAL: 462 MS
RBC # BLD AUTO: 2.79 10*6/MM3 (ref 3.77–5.28)
SODIUM SERPL-SCNC: 135 MMOL/L (ref 136–145)
TIBC SERPL-MCNC: 156 MCG/DL (ref 298–536)
TRANSFERRIN SERPL-MCNC: 105 MG/DL (ref 200–360)
WBC NRBC COR # BLD AUTO: 12.89 10*3/MM3 (ref 3.4–10.8)

## 2025-04-13 PROCEDURE — 82306 VITAMIN D 25 HYDROXY: CPT | Performed by: INTERNAL MEDICINE

## 2025-04-13 PROCEDURE — 36415 COLL VENOUS BLD VENIPUNCTURE: CPT | Performed by: INTERNAL MEDICINE

## 2025-04-13 PROCEDURE — 82728 ASSAY OF FERRITIN: CPT | Performed by: INTERNAL MEDICINE

## 2025-04-13 PROCEDURE — 80053 COMPREHEN METABOLIC PANEL: CPT | Performed by: INTERNAL MEDICINE

## 2025-04-13 PROCEDURE — 84100 ASSAY OF PHOSPHORUS: CPT | Performed by: INTERNAL MEDICINE

## 2025-04-13 PROCEDURE — 85025 COMPLETE CBC W/AUTO DIFF WBC: CPT | Performed by: INTERNAL MEDICINE

## 2025-04-13 PROCEDURE — 84466 ASSAY OF TRANSFERRIN: CPT | Performed by: INTERNAL MEDICINE

## 2025-04-13 PROCEDURE — 86140 C-REACTIVE PROTEIN: CPT | Performed by: INTERNAL MEDICINE

## 2025-04-13 PROCEDURE — 83540 ASSAY OF IRON: CPT | Performed by: INTERNAL MEDICINE

## 2025-04-14 ENCOUNTER — OUTSIDE FACILITY SERVICE (OUTPATIENT)
Dept: INFECTIOUS DISEASES | Facility: CLINIC | Age: 70
End: 2025-04-14
Payer: MEDICARE

## 2025-04-14 ENCOUNTER — OUTSIDE FACILITY SERVICE (OUTPATIENT)
Dept: PULMONOLOGY | Facility: CLINIC | Age: 70
End: 2025-04-14
Payer: MEDICARE

## 2025-04-14 LAB
ABO GROUP BLD: NORMAL
ABO GROUP BLD: NORMAL
ALBUMIN SERPL-MCNC: 2.2 G/DL (ref 3.5–5.2)
ALBUMIN/GLOB SERPL: 0.8 G/DL
ALP SERPL-CCNC: 120 U/L (ref 39–117)
ALT SERPL W P-5'-P-CCNC: 12 U/L (ref 1–33)
ANION GAP SERPL CALCULATED.3IONS-SCNC: 14.3 MMOL/L (ref 5–15)
AST SERPL-CCNC: 14 U/L (ref 1–32)
BACTERIA SPEC RESP CULT: NORMAL
BASOPHILS # BLD AUTO: 0.04 10*3/MM3 (ref 0–0.2)
BASOPHILS # BLD AUTO: 0.05 10*3/MM3 (ref 0–0.2)
BASOPHILS NFR BLD AUTO: 0.3 % (ref 0–1.5)
BASOPHILS NFR BLD AUTO: 0.4 % (ref 0–1.5)
BILIRUB SERPL-MCNC: 0.2 MG/DL (ref 0–1.2)
BLD GP AB SCN SERPL QL: NEGATIVE
BUN SERPL-MCNC: 30 MG/DL (ref 8–23)
BUN/CREAT SERPL: 4.5 (ref 7–25)
CALCIUM SPEC-SCNC: 8.3 MG/DL (ref 8.6–10.5)
CHLORIDE SERPL-SCNC: 101 MMOL/L (ref 98–107)
CO2 SERPL-SCNC: 20.7 MMOL/L (ref 22–29)
CREAT SERPL-MCNC: 6.71 MG/DL (ref 0.57–1)
CRP SERPL-MCNC: 11.33 MG/DL (ref 0–0.5)
DEPRECATED RDW RBC AUTO: 54.1 FL (ref 37–54)
DEPRECATED RDW RBC AUTO: 55 FL (ref 37–54)
EGFRCR SERPLBLD CKD-EPI 2021: 6.2 ML/MIN/1.73
EOSINOPHIL # BLD AUTO: 0.52 10*3/MM3 (ref 0–0.4)
EOSINOPHIL # BLD AUTO: 0.57 10*3/MM3 (ref 0–0.4)
EOSINOPHIL NFR BLD AUTO: 4.3 % (ref 0.3–6.2)
EOSINOPHIL NFR BLD AUTO: 4.8 % (ref 0.3–6.2)
ERYTHROCYTE [DISTWIDTH] IN BLOOD BY AUTOMATED COUNT: 16.1 % (ref 12.3–15.4)
ERYTHROCYTE [DISTWIDTH] IN BLOOD BY AUTOMATED COUNT: 16.1 % (ref 12.3–15.4)
GLOBULIN UR ELPH-MCNC: 2.6 GM/DL
GLUCOSE SERPL-MCNC: 103 MG/DL (ref 65–99)
GRAM STN SPEC: NORMAL
HAV IGM SERPL QL IA: NORMAL
HBV CORE IGM SERPL QL IA: NORMAL
HBV SURFACE AG SERPL QL IA: NORMAL
HCT VFR BLD AUTO: 22.7 % (ref 34–46.6)
HCT VFR BLD AUTO: 22.8 % (ref 34–46.6)
HCT VFR BLD AUTO: 28.2 % (ref 34–46.6)
HCV AB SER QL: NORMAL
HEMOCCULT STL QL: NEGATIVE
HGB BLD-MCNC: 7 G/DL (ref 12–15.9)
HGB BLD-MCNC: 7.1 G/DL (ref 12–15.9)
HGB BLD-MCNC: 8.9 G/DL (ref 12–15.9)
IMM GRANULOCYTES # BLD AUTO: 0.1 10*3/MM3 (ref 0–0.05)
IMM GRANULOCYTES # BLD AUTO: 0.11 10*3/MM3 (ref 0–0.05)
IMM GRANULOCYTES NFR BLD AUTO: 0.8 % (ref 0–0.5)
IMM GRANULOCYTES NFR BLD AUTO: 0.9 % (ref 0–0.5)
INR PPP: 1.08 (ref 0.9–1.1)
LYMPHOCYTES # BLD AUTO: 0.95 10*3/MM3 (ref 0.7–3.1)
LYMPHOCYTES # BLD AUTO: 1.23 10*3/MM3 (ref 0.7–3.1)
LYMPHOCYTES NFR BLD AUTO: 10.4 % (ref 19.6–45.3)
LYMPHOCYTES NFR BLD AUTO: 7.9 % (ref 19.6–45.3)
MCH RBC QN AUTO: 28.7 PG (ref 26.6–33)
MCH RBC QN AUTO: 28.7 PG (ref 26.6–33)
MCHC RBC AUTO-ENTMCNC: 30.7 G/DL (ref 31.5–35.7)
MCHC RBC AUTO-ENTMCNC: 31.3 G/DL (ref 31.5–35.7)
MCV RBC AUTO: 91.9 FL (ref 79–97)
MCV RBC AUTO: 93.4 FL (ref 79–97)
MONOCYTES # BLD AUTO: 1.01 10*3/MM3 (ref 0.1–0.9)
MONOCYTES # BLD AUTO: 1.08 10*3/MM3 (ref 0.1–0.9)
MONOCYTES NFR BLD AUTO: 8.4 % (ref 5–12)
MONOCYTES NFR BLD AUTO: 9.1 % (ref 5–12)
NEUTROPHILS NFR BLD AUTO: 74.4 % (ref 42.7–76)
NEUTROPHILS NFR BLD AUTO: 78.3 % (ref 42.7–76)
NEUTROPHILS NFR BLD AUTO: 8.81 10*3/MM3 (ref 1.7–7)
NEUTROPHILS NFR BLD AUTO: 9.38 10*3/MM3 (ref 1.7–7)
NRBC BLD AUTO-RTO: 0 /100 WBC (ref 0–0.2)
NRBC BLD AUTO-RTO: 0 /100 WBC (ref 0–0.2)
PLATELET # BLD AUTO: 271 10*3/MM3 (ref 140–450)
PLATELET # BLD AUTO: 275 10*3/MM3 (ref 140–450)
PMV BLD AUTO: 10 FL (ref 6–12)
PMV BLD AUTO: 10.6 FL (ref 6–12)
POTASSIUM SERPL-SCNC: 3.5 MMOL/L (ref 3.5–5.2)
PROT SERPL-MCNC: 4.8 G/DL (ref 6–8.5)
PROTHROMBIN TIME: 14.1 SECONDS (ref 11.6–15.1)
RBC # BLD AUTO: 2.44 10*6/MM3 (ref 3.77–5.28)
RBC # BLD AUTO: 2.47 10*6/MM3 (ref 3.77–5.28)
RH BLD: NEGATIVE
RH BLD: NEGATIVE
SODIUM SERPL-SCNC: 136 MMOL/L (ref 136–145)
T&S EXPIRATION DATE: NORMAL
WBC NRBC COR # BLD AUTO: 11.85 10*3/MM3 (ref 3.4–10.8)
WBC NRBC COR # BLD AUTO: 12 10*3/MM3 (ref 3.4–10.8)

## 2025-04-14 PROCEDURE — 86901 BLOOD TYPING SEROLOGIC RH(D): CPT | Performed by: PHYSICIAN ASSISTANT

## 2025-04-14 PROCEDURE — 86900 BLOOD TYPING SEROLOGIC ABO: CPT

## 2025-04-14 PROCEDURE — 85025 COMPLETE CBC W/AUTO DIFF WBC: CPT | Performed by: PHYSICIAN ASSISTANT

## 2025-04-14 PROCEDURE — 80053 COMPREHEN METABOLIC PANEL: CPT | Performed by: INTERNAL MEDICINE

## 2025-04-14 PROCEDURE — 85025 COMPLETE CBC W/AUTO DIFF WBC: CPT | Performed by: INTERNAL MEDICINE

## 2025-04-14 PROCEDURE — P9016 RBC LEUKOCYTES REDUCED: HCPCS

## 2025-04-14 PROCEDURE — 86901 BLOOD TYPING SEROLOGIC RH(D): CPT

## 2025-04-14 PROCEDURE — 85018 HEMOGLOBIN: CPT | Performed by: PHYSICIAN ASSISTANT

## 2025-04-14 PROCEDURE — 85610 PROTHROMBIN TIME: CPT | Performed by: INTERNAL MEDICINE

## 2025-04-14 PROCEDURE — 86923 COMPATIBILITY TEST ELECTRIC: CPT

## 2025-04-14 PROCEDURE — 97163 PT EVAL HIGH COMPLEX 45 MIN: CPT

## 2025-04-14 PROCEDURE — 82272 OCCULT BLD FECES 1-3 TESTS: CPT | Performed by: PHYSICIAN ASSISTANT

## 2025-04-14 PROCEDURE — 80074 ACUTE HEPATITIS PANEL: CPT | Performed by: INTERNAL MEDICINE

## 2025-04-14 PROCEDURE — 86850 RBC ANTIBODY SCREEN: CPT | Performed by: PHYSICIAN ASSISTANT

## 2025-04-14 PROCEDURE — 86140 C-REACTIVE PROTEIN: CPT | Performed by: INTERNAL MEDICINE

## 2025-04-14 PROCEDURE — 36415 COLL VENOUS BLD VENIPUNCTURE: CPT | Performed by: PHYSICIAN ASSISTANT

## 2025-04-14 PROCEDURE — 85014 HEMATOCRIT: CPT | Performed by: PHYSICIAN ASSISTANT

## 2025-04-14 PROCEDURE — 97167 OT EVAL HIGH COMPLEX 60 MIN: CPT

## 2025-04-14 PROCEDURE — 86900 BLOOD TYPING SEROLOGIC ABO: CPT | Performed by: PHYSICIAN ASSISTANT

## 2025-04-15 ENCOUNTER — OUTSIDE FACILITY SERVICE (OUTPATIENT)
Dept: INFECTIOUS DISEASES | Facility: CLINIC | Age: 70
End: 2025-04-15
Payer: MEDICARE

## 2025-04-15 ENCOUNTER — OUTSIDE FACILITY SERVICE (OUTPATIENT)
Dept: PULMONOLOGY | Facility: CLINIC | Age: 70
End: 2025-04-15
Payer: MEDICARE

## 2025-04-15 LAB
ALBUMIN SERPL-MCNC: 2.2 G/DL (ref 3.5–5.2)
ALBUMIN/GLOB SERPL: 0.7 G/DL
ALP SERPL-CCNC: 119 U/L (ref 39–117)
ALT SERPL W P-5'-P-CCNC: 11 U/L (ref 1–33)
ANION GAP SERPL CALCULATED.3IONS-SCNC: 14.2 MMOL/L (ref 5–15)
AST SERPL-CCNC: 10 U/L (ref 1–32)
BASOPHILS # BLD AUTO: 0.04 10*3/MM3 (ref 0–0.2)
BASOPHILS NFR BLD AUTO: 0.3 % (ref 0–1.5)
BH BB BLOOD EXPIRATION DATE: NORMAL
BH BB BLOOD TYPE BARCODE: 9500
BH BB DISPENSE STATUS: NORMAL
BH BB PRODUCT CODE: NORMAL
BH BB UNIT NUMBER: NORMAL
BILIRUB SERPL-MCNC: 0.2 MG/DL (ref 0–1.2)
BUN SERPL-MCNC: 38 MG/DL (ref 8–23)
BUN/CREAT SERPL: 5.3 (ref 7–25)
CALCIUM SPEC-SCNC: 8.2 MG/DL (ref 8.6–10.5)
CHLORIDE SERPL-SCNC: 97 MMOL/L (ref 98–107)
CO2 SERPL-SCNC: 19.8 MMOL/L (ref 22–29)
CREAT SERPL-MCNC: 7.23 MG/DL (ref 0.57–1)
CROSSMATCH INTERPRETATION: NORMAL
CRP SERPL-MCNC: 8.12 MG/DL (ref 0–0.5)
DEPRECATED RDW RBC AUTO: 55.8 FL (ref 37–54)
EGFRCR SERPLBLD CKD-EPI 2021: 5.7 ML/MIN/1.73
EOSINOPHIL # BLD AUTO: 0.59 10*3/MM3 (ref 0–0.4)
EOSINOPHIL NFR BLD AUTO: 4.8 % (ref 0.3–6.2)
ERYTHROCYTE [DISTWIDTH] IN BLOOD BY AUTOMATED COUNT: 16.1 % (ref 12.3–15.4)
GLOBULIN UR ELPH-MCNC: 3.1 GM/DL
GLUCOSE SERPL-MCNC: 106 MG/DL (ref 65–99)
HCT VFR BLD AUTO: 27.2 % (ref 34–46.6)
HGB BLD-MCNC: 8.5 G/DL (ref 12–15.9)
IMM GRANULOCYTES # BLD AUTO: 0.23 10*3/MM3 (ref 0–0.05)
IMM GRANULOCYTES NFR BLD AUTO: 1.9 % (ref 0–0.5)
LYMPHOCYTES # BLD AUTO: 1.01 10*3/MM3 (ref 0.7–3.1)
LYMPHOCYTES NFR BLD AUTO: 8.2 % (ref 19.6–45.3)
MCH RBC QN AUTO: 29.4 PG (ref 26.6–33)
MCHC RBC AUTO-ENTMCNC: 31.3 G/DL (ref 31.5–35.7)
MCV RBC AUTO: 94.1 FL (ref 79–97)
MONOCYTES # BLD AUTO: 1.14 10*3/MM3 (ref 0.1–0.9)
MONOCYTES NFR BLD AUTO: 9.2 % (ref 5–12)
NEUTROPHILS NFR BLD AUTO: 75.6 % (ref 42.7–76)
NEUTROPHILS NFR BLD AUTO: 9.37 10*3/MM3 (ref 1.7–7)
NRBC BLD AUTO-RTO: 0.2 /100 WBC (ref 0–0.2)
PLATELET # BLD AUTO: 280 10*3/MM3 (ref 140–450)
PMV BLD AUTO: 10.7 FL (ref 6–12)
POTASSIUM SERPL-SCNC: 3.8 MMOL/L (ref 3.5–5.2)
PROCALCITONIN SERPL-MCNC: 0.31 NG/ML (ref 0–0.25)
PROT SERPL-MCNC: 5.3 G/DL (ref 6–8.5)
RBC # BLD AUTO: 2.89 10*6/MM3 (ref 3.77–5.28)
SODIUM SERPL-SCNC: 131 MMOL/L (ref 136–145)
UNIT  ABO: NORMAL
UNIT  RH: NORMAL
WBC NRBC COR # BLD AUTO: 12.38 10*3/MM3 (ref 3.4–10.8)

## 2025-04-15 PROCEDURE — 85025 COMPLETE CBC W/AUTO DIFF WBC: CPT | Performed by: INTERNAL MEDICINE

## 2025-04-15 PROCEDURE — 87040 BLOOD CULTURE FOR BACTERIA: CPT

## 2025-04-15 PROCEDURE — 36415 COLL VENOUS BLD VENIPUNCTURE: CPT | Performed by: INTERNAL MEDICINE

## 2025-04-15 PROCEDURE — 36415 COLL VENOUS BLD VENIPUNCTURE: CPT

## 2025-04-15 PROCEDURE — 86140 C-REACTIVE PROTEIN: CPT | Performed by: INTERNAL MEDICINE

## 2025-04-15 PROCEDURE — 84145 PROCALCITONIN (PCT): CPT

## 2025-04-15 PROCEDURE — 80053 COMPREHEN METABOLIC PANEL: CPT | Performed by: INTERNAL MEDICINE

## 2025-04-16 ENCOUNTER — APPOINTMENT (OUTPATIENT)
Dept: GENERAL RADIOLOGY | Facility: HOSPITAL | Age: 70
End: 2025-04-16
Payer: COMMERCIAL

## 2025-04-16 ENCOUNTER — OUTSIDE FACILITY SERVICE (OUTPATIENT)
Dept: PULMONOLOGY | Facility: CLINIC | Age: 70
End: 2025-04-16
Payer: MEDICARE

## 2025-04-16 ENCOUNTER — APPOINTMENT (OUTPATIENT)
Dept: ULTRASOUND IMAGING | Facility: HOSPITAL | Age: 70
End: 2025-04-16
Payer: COMMERCIAL

## 2025-04-16 ENCOUNTER — OUTSIDE FACILITY SERVICE (OUTPATIENT)
Dept: INFECTIOUS DISEASES | Facility: CLINIC | Age: 70
End: 2025-04-16

## 2025-04-16 ENCOUNTER — OUTSIDE FACILITY SERVICE (OUTPATIENT)
Dept: SURGERY | Facility: CLINIC | Age: 70
End: 2025-04-16
Payer: MEDICARE

## 2025-04-16 LAB
ALBUMIN SERPL-MCNC: 2.2 G/DL (ref 3.5–5.2)
ALBUMIN/GLOB SERPL: 0.8 G/DL
ALP SERPL-CCNC: 117 U/L (ref 39–117)
ALT SERPL W P-5'-P-CCNC: 9 U/L (ref 1–33)
ANION GAP SERPL CALCULATED.3IONS-SCNC: 9.1 MMOL/L (ref 5–15)
AST SERPL-CCNC: 9 U/L (ref 1–32)
BASOPHILS # BLD AUTO: 0.05 10*3/MM3 (ref 0–0.2)
BASOPHILS NFR BLD AUTO: 0.4 % (ref 0–1.5)
BILIRUB SERPL-MCNC: 0.2 MG/DL (ref 0–1.2)
BUN SERPL-MCNC: 21 MG/DL (ref 8–23)
BUN/CREAT SERPL: 5.3 (ref 7–25)
CALCIUM SPEC-SCNC: 8.3 MG/DL (ref 8.6–10.5)
CHLORIDE SERPL-SCNC: 95 MMOL/L (ref 98–107)
CO2 SERPL-SCNC: 28.9 MMOL/L (ref 22–29)
CREAT SERPL-MCNC: 3.97 MG/DL (ref 0.57–1)
CRP SERPL-MCNC: 6.63 MG/DL (ref 0–0.5)
DEPRECATED RDW RBC AUTO: 53 FL (ref 37–54)
EGFRCR SERPLBLD CKD-EPI 2021: 11.6 ML/MIN/1.73
EOSINOPHIL # BLD AUTO: 0.4 10*3/MM3 (ref 0–0.4)
EOSINOPHIL NFR BLD AUTO: 3.3 % (ref 0.3–6.2)
ERYTHROCYTE [DISTWIDTH] IN BLOOD BY AUTOMATED COUNT: 15.9 % (ref 12.3–15.4)
GLOBULIN UR ELPH-MCNC: 2.8 GM/DL
GLUCOSE SERPL-MCNC: 105 MG/DL (ref 65–99)
HCT VFR BLD AUTO: 27.8 % (ref 34–46.6)
HGB BLD-MCNC: 8.7 G/DL (ref 12–15.9)
IMM GRANULOCYTES # BLD AUTO: 0.3 10*3/MM3 (ref 0–0.05)
IMM GRANULOCYTES NFR BLD AUTO: 2.4 % (ref 0–0.5)
LYMPHOCYTES # BLD AUTO: 0.98 10*3/MM3 (ref 0.7–3.1)
LYMPHOCYTES NFR BLD AUTO: 8 % (ref 19.6–45.3)
MCH RBC QN AUTO: 28.6 PG (ref 26.6–33)
MCHC RBC AUTO-ENTMCNC: 31.3 G/DL (ref 31.5–35.7)
MCV RBC AUTO: 91.4 FL (ref 79–97)
MONOCYTES # BLD AUTO: 1.42 10*3/MM3 (ref 0.1–0.9)
MONOCYTES NFR BLD AUTO: 11.6 % (ref 5–12)
NEUTROPHILS NFR BLD AUTO: 74.3 % (ref 42.7–76)
NEUTROPHILS NFR BLD AUTO: 9.14 10*3/MM3 (ref 1.7–7)
NRBC BLD AUTO-RTO: 0.4 /100 WBC (ref 0–0.2)
PLATELET # BLD AUTO: 265 10*3/MM3 (ref 140–450)
PMV BLD AUTO: 10.4 FL (ref 6–12)
POTASSIUM SERPL-SCNC: 3.8 MMOL/L (ref 3.5–5.2)
PROT SERPL-MCNC: 5 G/DL (ref 6–8.5)
RBC # BLD AUTO: 3.04 10*6/MM3 (ref 3.77–5.28)
SODIUM SERPL-SCNC: 133 MMOL/L (ref 136–145)
TRIGL SERPL-MCNC: 144 MG/DL (ref 0–150)
WBC NRBC COR # BLD AUTO: 12.29 10*3/MM3 (ref 3.4–10.8)

## 2025-04-16 PROCEDURE — 71045 X-RAY EXAM CHEST 1 VIEW: CPT

## 2025-04-16 PROCEDURE — OUTSIDEPOS PR OUTSIDE POS PLACEHOLDER: Performed by: INTERNAL MEDICINE

## 2025-04-16 PROCEDURE — 85025 COMPLETE CBC W/AUTO DIFF WBC: CPT | Performed by: INTERNAL MEDICINE

## 2025-04-16 PROCEDURE — 36415 COLL VENOUS BLD VENIPUNCTURE: CPT | Performed by: INTERNAL MEDICINE

## 2025-04-16 PROCEDURE — 87205 SMEAR GRAM STAIN: CPT

## 2025-04-16 PROCEDURE — 93971 EXTREMITY STUDY: CPT

## 2025-04-16 PROCEDURE — 71045 X-RAY EXAM CHEST 1 VIEW: CPT | Performed by: RADIOLOGY

## 2025-04-16 PROCEDURE — 87070 CULTURE OTHR SPECIMN AEROBIC: CPT

## 2025-04-16 PROCEDURE — 84478 ASSAY OF TRIGLYCERIDES: CPT | Performed by: INTERNAL MEDICINE

## 2025-04-16 PROCEDURE — 80053 COMPREHEN METABOLIC PANEL: CPT | Performed by: INTERNAL MEDICINE

## 2025-04-16 PROCEDURE — 86140 C-REACTIVE PROTEIN: CPT | Performed by: INTERNAL MEDICINE

## 2025-04-17 ENCOUNTER — OUTSIDE FACILITY SERVICE (OUTPATIENT)
Dept: INFECTIOUS DISEASES | Facility: CLINIC | Age: 70
End: 2025-04-17

## 2025-04-17 ENCOUNTER — OUTSIDE FACILITY SERVICE (OUTPATIENT)
Dept: PULMONOLOGY | Facility: CLINIC | Age: 70
End: 2025-04-17
Payer: MEDICARE

## 2025-04-17 LAB
ALBUMIN SERPL-MCNC: 2.2 G/DL (ref 3.5–5.2)
ALBUMIN/GLOB SERPL: 0.7 G/DL
ALP SERPL-CCNC: 119 U/L (ref 39–117)
ALT SERPL W P-5'-P-CCNC: 9 U/L (ref 1–33)
ANION GAP SERPL CALCULATED.3IONS-SCNC: 12.1 MMOL/L (ref 5–15)
AST SERPL-CCNC: 13 U/L (ref 1–32)
BASOPHILS # BLD AUTO: 0.06 10*3/MM3 (ref 0–0.2)
BASOPHILS NFR BLD AUTO: 0.4 % (ref 0–1.5)
BILIRUB SERPL-MCNC: 0.2 MG/DL (ref 0–1.2)
BUN SERPL-MCNC: 33 MG/DL (ref 8–23)
BUN/CREAT SERPL: 6.5 (ref 7–25)
CALCIUM SPEC-SCNC: 8.7 MG/DL (ref 8.6–10.5)
CHLORIDE SERPL-SCNC: 95 MMOL/L (ref 98–107)
CO2 SERPL-SCNC: 26.9 MMOL/L (ref 22–29)
CREAT SERPL-MCNC: 5.07 MG/DL (ref 0.57–1)
CRP SERPL-MCNC: 6.85 MG/DL (ref 0–0.5)
DEPRECATED RDW RBC AUTO: 54.4 FL (ref 37–54)
EGFRCR SERPLBLD CKD-EPI 2021: 8.7 ML/MIN/1.73
EOSINOPHIL # BLD AUTO: 0.41 10*3/MM3 (ref 0–0.4)
EOSINOPHIL NFR BLD AUTO: 2.9 % (ref 0.3–6.2)
ERYTHROCYTE [DISTWIDTH] IN BLOOD BY AUTOMATED COUNT: 16.1 % (ref 12.3–15.4)
GLOBULIN UR ELPH-MCNC: 3.1 GM/DL
GLUCOSE SERPL-MCNC: 116 MG/DL (ref 65–99)
HCT VFR BLD AUTO: 28.1 % (ref 34–46.6)
HGB BLD-MCNC: 8.6 G/DL (ref 12–15.9)
IMM GRANULOCYTES # BLD AUTO: 0.32 10*3/MM3 (ref 0–0.05)
IMM GRANULOCYTES NFR BLD AUTO: 2.2 % (ref 0–0.5)
LYMPHOCYTES # BLD AUTO: 0.9 10*3/MM3 (ref 0.7–3.1)
LYMPHOCYTES NFR BLD AUTO: 6.3 % (ref 19.6–45.3)
MCH RBC QN AUTO: 28.5 PG (ref 26.6–33)
MCHC RBC AUTO-ENTMCNC: 30.6 G/DL (ref 31.5–35.7)
MCV RBC AUTO: 93 FL (ref 79–97)
MONOCYTES # BLD AUTO: 1.54 10*3/MM3 (ref 0.1–0.9)
MONOCYTES NFR BLD AUTO: 10.8 % (ref 5–12)
NEUTROPHILS NFR BLD AUTO: 11.06 10*3/MM3 (ref 1.7–7)
NEUTROPHILS NFR BLD AUTO: 77.4 % (ref 42.7–76)
NRBC BLD AUTO-RTO: 0 /100 WBC (ref 0–0.2)
PLATELET # BLD AUTO: 262 10*3/MM3 (ref 140–450)
PMV BLD AUTO: 10.6 FL (ref 6–12)
POTASSIUM SERPL-SCNC: 3.7 MMOL/L (ref 3.5–5.2)
PROT SERPL-MCNC: 5.3 G/DL (ref 6–8.5)
RBC # BLD AUTO: 3.02 10*6/MM3 (ref 3.77–5.28)
SODIUM SERPL-SCNC: 134 MMOL/L (ref 136–145)
WBC NRBC COR # BLD AUTO: 14.29 10*3/MM3 (ref 3.4–10.8)

## 2025-04-17 PROCEDURE — 36415 COLL VENOUS BLD VENIPUNCTURE: CPT | Performed by: INTERNAL MEDICINE

## 2025-04-17 PROCEDURE — 85025 COMPLETE CBC W/AUTO DIFF WBC: CPT | Performed by: INTERNAL MEDICINE

## 2025-04-17 PROCEDURE — OUTSIDEPOS PR OUTSIDE POS PLACEHOLDER: Performed by: INTERNAL MEDICINE

## 2025-04-17 PROCEDURE — 80053 COMPREHEN METABOLIC PANEL: CPT | Performed by: INTERNAL MEDICINE

## 2025-04-17 PROCEDURE — 86140 C-REACTIVE PROTEIN: CPT | Performed by: INTERNAL MEDICINE

## 2025-04-17 PROCEDURE — 93971 EXTREMITY STUDY: CPT | Performed by: RADIOLOGY

## 2025-04-18 ENCOUNTER — OUTSIDE FACILITY SERVICE (OUTPATIENT)
Dept: INFECTIOUS DISEASES | Facility: CLINIC | Age: 70
End: 2025-04-18

## 2025-04-18 ENCOUNTER — OUTSIDE FACILITY SERVICE (OUTPATIENT)
Dept: PULMONOLOGY | Facility: CLINIC | Age: 70
End: 2025-04-18
Payer: MEDICARE

## 2025-04-18 LAB
ALBUMIN SERPL-MCNC: 2.2 G/DL (ref 3.5–5.2)
ALBUMIN/GLOB SERPL: 0.8 G/DL
ALP SERPL-CCNC: 119 U/L (ref 39–117)
ALT SERPL W P-5'-P-CCNC: 9 U/L (ref 1–33)
ANION GAP SERPL CALCULATED.3IONS-SCNC: 9.9 MMOL/L (ref 5–15)
AST SERPL-CCNC: 12 U/L (ref 1–32)
BACTERIA SPEC RESP CULT: NORMAL
BASOPHILS # BLD AUTO: 0.05 10*3/MM3 (ref 0–0.2)
BASOPHILS NFR BLD AUTO: 0.3 % (ref 0–1.5)
BILIRUB SERPL-MCNC: 0.2 MG/DL (ref 0–1.2)
BUN SERPL-MCNC: 26 MG/DL (ref 8–23)
BUN/CREAT SERPL: 7 (ref 7–25)
CALCIUM SPEC-SCNC: 8.3 MG/DL (ref 8.6–10.5)
CHLORIDE SERPL-SCNC: 91 MMOL/L (ref 98–107)
CO2 SERPL-SCNC: 31.1 MMOL/L (ref 22–29)
CREAT SERPL-MCNC: 3.73 MG/DL (ref 0.57–1)
CRP SERPL-MCNC: 8.98 MG/DL (ref 0–0.5)
DEPRECATED RDW RBC AUTO: 53 FL (ref 37–54)
EGFRCR SERPLBLD CKD-EPI 2021: 12.5 ML/MIN/1.73
EOSINOPHIL # BLD AUTO: 0.35 10*3/MM3 (ref 0–0.4)
EOSINOPHIL NFR BLD AUTO: 2.3 % (ref 0.3–6.2)
ERYTHROCYTE [DISTWIDTH] IN BLOOD BY AUTOMATED COUNT: 15.9 % (ref 12.3–15.4)
GLOBULIN UR ELPH-MCNC: 2.9 GM/DL
GLUCOSE SERPL-MCNC: 101 MG/DL (ref 65–99)
GRAM STN SPEC: NORMAL
HCT VFR BLD AUTO: 26 % (ref 34–46.6)
HGB BLD-MCNC: 8.2 G/DL (ref 12–15.9)
IMM GRANULOCYTES # BLD AUTO: 0.27 10*3/MM3 (ref 0–0.05)
IMM GRANULOCYTES NFR BLD AUTO: 1.8 % (ref 0–0.5)
LYMPHOCYTES # BLD AUTO: 1.44 10*3/MM3 (ref 0.7–3.1)
LYMPHOCYTES NFR BLD AUTO: 9.6 % (ref 19.6–45.3)
MCH RBC QN AUTO: 29 PG (ref 26.6–33)
MCHC RBC AUTO-ENTMCNC: 31.5 G/DL (ref 31.5–35.7)
MCV RBC AUTO: 91.9 FL (ref 79–97)
MONOCYTES # BLD AUTO: 1.56 10*3/MM3 (ref 0.1–0.9)
MONOCYTES NFR BLD AUTO: 10.4 % (ref 5–12)
NEUTROPHILS NFR BLD AUTO: 11.37 10*3/MM3 (ref 1.7–7)
NEUTROPHILS NFR BLD AUTO: 75.6 % (ref 42.7–76)
NRBC BLD AUTO-RTO: 0 /100 WBC (ref 0–0.2)
PLATELET # BLD AUTO: 262 10*3/MM3 (ref 140–450)
PMV BLD AUTO: 10.9 FL (ref 6–12)
POTASSIUM SERPL-SCNC: 3.5 MMOL/L (ref 3.5–5.2)
PROT SERPL-MCNC: 5.1 G/DL (ref 6–8.5)
QT INTERVAL: 456 MS
QTC INTERVAL: 478 MS
RBC # BLD AUTO: 2.83 10*6/MM3 (ref 3.77–5.28)
SODIUM SERPL-SCNC: 132 MMOL/L (ref 136–145)
WBC NRBC COR # BLD AUTO: 15.04 10*3/MM3 (ref 3.4–10.8)

## 2025-04-18 PROCEDURE — 86140 C-REACTIVE PROTEIN: CPT | Performed by: INTERNAL MEDICINE

## 2025-04-18 PROCEDURE — 93005 ELECTROCARDIOGRAM TRACING: CPT | Performed by: INTERNAL MEDICINE

## 2025-04-18 PROCEDURE — 93010 ELECTROCARDIOGRAM REPORT: CPT | Performed by: INTERNAL MEDICINE

## 2025-04-18 PROCEDURE — OUTSIDEPOS PR OUTSIDE POS PLACEHOLDER: Performed by: INTERNAL MEDICINE

## 2025-04-18 PROCEDURE — 85025 COMPLETE CBC W/AUTO DIFF WBC: CPT | Performed by: INTERNAL MEDICINE

## 2025-04-18 PROCEDURE — 36415 COLL VENOUS BLD VENIPUNCTURE: CPT | Performed by: INTERNAL MEDICINE

## 2025-04-18 PROCEDURE — 80053 COMPREHEN METABOLIC PANEL: CPT | Performed by: INTERNAL MEDICINE

## 2025-04-19 ENCOUNTER — OUTSIDE FACILITY SERVICE (OUTPATIENT)
Dept: PULMONOLOGY | Facility: CLINIC | Age: 70
End: 2025-04-19
Payer: MEDICARE

## 2025-04-19 LAB
ALBUMIN SERPL-MCNC: 2.2 G/DL (ref 3.5–5.2)
ALBUMIN/GLOB SERPL: 0.7 G/DL
ALP SERPL-CCNC: 116 U/L (ref 39–117)
ALT SERPL W P-5'-P-CCNC: 10 U/L (ref 1–33)
ANION GAP SERPL CALCULATED.3IONS-SCNC: 12.1 MMOL/L (ref 5–15)
AST SERPL-CCNC: 14 U/L (ref 1–32)
BASOPHILS # BLD AUTO: 0.04 10*3/MM3 (ref 0–0.2)
BASOPHILS NFR BLD AUTO: 0.3 % (ref 0–1.5)
BILIRUB SERPL-MCNC: 0.2 MG/DL (ref 0–1.2)
BUN SERPL-MCNC: 37 MG/DL (ref 8–23)
BUN/CREAT SERPL: 8 (ref 7–25)
CALCIUM SPEC-SCNC: 8.7 MG/DL (ref 8.6–10.5)
CHLORIDE SERPL-SCNC: 91 MMOL/L (ref 98–107)
CO2 SERPL-SCNC: 27.9 MMOL/L (ref 22–29)
CREAT SERPL-MCNC: 4.61 MG/DL (ref 0.57–1)
CRP SERPL-MCNC: 7.47 MG/DL (ref 0–0.5)
DEPRECATED RDW RBC AUTO: 52.8 FL (ref 37–54)
EGFRCR SERPLBLD CKD-EPI 2021: 9.7 ML/MIN/1.73
EOSINOPHIL # BLD AUTO: 0.41 10*3/MM3 (ref 0–0.4)
EOSINOPHIL NFR BLD AUTO: 2.7 % (ref 0.3–6.2)
ERYTHROCYTE [DISTWIDTH] IN BLOOD BY AUTOMATED COUNT: 15.7 % (ref 12.3–15.4)
GLOBULIN UR ELPH-MCNC: 3 GM/DL
GLUCOSE SERPL-MCNC: 99 MG/DL (ref 65–99)
HCT VFR BLD AUTO: 25.6 % (ref 34–46.6)
HGB BLD-MCNC: 8.2 G/DL (ref 12–15.9)
IMM GRANULOCYTES # BLD AUTO: 0.31 10*3/MM3 (ref 0–0.05)
IMM GRANULOCYTES NFR BLD AUTO: 2 % (ref 0–0.5)
LYMPHOCYTES # BLD AUTO: 1.15 10*3/MM3 (ref 0.7–3.1)
LYMPHOCYTES NFR BLD AUTO: 7.4 % (ref 19.6–45.3)
MCH RBC QN AUTO: 29.5 PG (ref 26.6–33)
MCHC RBC AUTO-ENTMCNC: 32 G/DL (ref 31.5–35.7)
MCV RBC AUTO: 92.1 FL (ref 79–97)
MONOCYTES # BLD AUTO: 1.72 10*3/MM3 (ref 0.1–0.9)
MONOCYTES NFR BLD AUTO: 11.1 % (ref 5–12)
NEUTROPHILS NFR BLD AUTO: 11.81 10*3/MM3 (ref 1.7–7)
NEUTROPHILS NFR BLD AUTO: 76.5 % (ref 42.7–76)
NRBC BLD AUTO-RTO: 0 /100 WBC (ref 0–0.2)
PLATELET # BLD AUTO: 275 10*3/MM3 (ref 140–450)
PMV BLD AUTO: 10.9 FL (ref 6–12)
POTASSIUM SERPL-SCNC: 3.4 MMOL/L (ref 3.5–5.2)
PROT SERPL-MCNC: 5.2 G/DL (ref 6–8.5)
RBC # BLD AUTO: 2.78 10*6/MM3 (ref 3.77–5.28)
SODIUM SERPL-SCNC: 131 MMOL/L (ref 136–145)
WBC NRBC COR # BLD AUTO: 15.44 10*3/MM3 (ref 3.4–10.8)

## 2025-04-19 PROCEDURE — 36415 COLL VENOUS BLD VENIPUNCTURE: CPT | Performed by: INTERNAL MEDICINE

## 2025-04-19 PROCEDURE — 86140 C-REACTIVE PROTEIN: CPT | Performed by: INTERNAL MEDICINE

## 2025-04-19 PROCEDURE — 85025 COMPLETE CBC W/AUTO DIFF WBC: CPT | Performed by: INTERNAL MEDICINE

## 2025-04-19 PROCEDURE — 80053 COMPREHEN METABOLIC PANEL: CPT | Performed by: INTERNAL MEDICINE

## 2025-04-20 ENCOUNTER — OUTSIDE FACILITY SERVICE (OUTPATIENT)
Dept: PULMONOLOGY | Facility: CLINIC | Age: 70
End: 2025-04-20
Payer: MEDICARE

## 2025-04-20 LAB
ALBUMIN SERPL-MCNC: 2.3 G/DL (ref 3.5–5.2)
ALBUMIN/GLOB SERPL: 0.8 G/DL
ALP SERPL-CCNC: 107 U/L (ref 39–117)
ALT SERPL W P-5'-P-CCNC: 13 U/L (ref 1–33)
ANION GAP SERPL CALCULATED.3IONS-SCNC: 10.7 MMOL/L (ref 5–15)
AST SERPL-CCNC: 18 U/L (ref 1–32)
BACTERIA SPEC AEROBE CULT: NORMAL
BACTERIA SPEC AEROBE CULT: NORMAL
BASOPHILS # BLD AUTO: 0.06 10*3/MM3 (ref 0–0.2)
BASOPHILS NFR BLD AUTO: 0.5 % (ref 0–1.5)
BILIRUB SERPL-MCNC: 0.2 MG/DL (ref 0–1.2)
BUN SERPL-MCNC: 30 MG/DL (ref 8–23)
BUN/CREAT SERPL: 7.9 (ref 7–25)
CALCIUM SPEC-SCNC: 8.8 MG/DL (ref 8.6–10.5)
CHLORIDE SERPL-SCNC: 91 MMOL/L (ref 98–107)
CO2 SERPL-SCNC: 28.3 MMOL/L (ref 22–29)
CREAT SERPL-MCNC: 3.81 MG/DL (ref 0.57–1)
CRP SERPL-MCNC: 7.02 MG/DL (ref 0–0.5)
DEPRECATED RDW RBC AUTO: 51.7 FL (ref 37–54)
EGFRCR SERPLBLD CKD-EPI 2021: 12.2 ML/MIN/1.73
EOSINOPHIL # BLD AUTO: 0.55 10*3/MM3 (ref 0–0.4)
EOSINOPHIL NFR BLD AUTO: 4.6 % (ref 0.3–6.2)
ERYTHROCYTE [DISTWIDTH] IN BLOOD BY AUTOMATED COUNT: 15.5 % (ref 12.3–15.4)
GLOBULIN UR ELPH-MCNC: 2.9 GM/DL
GLUCOSE SERPL-MCNC: 95 MG/DL (ref 65–99)
HCT VFR BLD AUTO: 26.8 % (ref 34–46.6)
HGB BLD-MCNC: 8.3 G/DL (ref 12–15.9)
IMM GRANULOCYTES # BLD AUTO: 0.23 10*3/MM3 (ref 0–0.05)
IMM GRANULOCYTES NFR BLD AUTO: 1.9 % (ref 0–0.5)
LYMPHOCYTES # BLD AUTO: 0.94 10*3/MM3 (ref 0.7–3.1)
LYMPHOCYTES NFR BLD AUTO: 7.9 % (ref 19.6–45.3)
MCH RBC QN AUTO: 28.4 PG (ref 26.6–33)
MCHC RBC AUTO-ENTMCNC: 31 G/DL (ref 31.5–35.7)
MCV RBC AUTO: 91.8 FL (ref 79–97)
MONOCYTES # BLD AUTO: 1.18 10*3/MM3 (ref 0.1–0.9)
MONOCYTES NFR BLD AUTO: 9.9 % (ref 5–12)
NEUTROPHILS NFR BLD AUTO: 75.2 % (ref 42.7–76)
NEUTROPHILS NFR BLD AUTO: 8.97 10*3/MM3 (ref 1.7–7)
NRBC BLD AUTO-RTO: 0 /100 WBC (ref 0–0.2)
PLATELET # BLD AUTO: 308 10*3/MM3 (ref 140–450)
PMV BLD AUTO: 11.2 FL (ref 6–12)
POTASSIUM SERPL-SCNC: 3.6 MMOL/L (ref 3.5–5.2)
PROT SERPL-MCNC: 5.2 G/DL (ref 6–8.5)
RBC # BLD AUTO: 2.92 10*6/MM3 (ref 3.77–5.28)
SODIUM SERPL-SCNC: 130 MMOL/L (ref 136–145)
WBC NRBC COR # BLD AUTO: 11.93 10*3/MM3 (ref 3.4–10.8)

## 2025-04-20 PROCEDURE — 36415 COLL VENOUS BLD VENIPUNCTURE: CPT | Performed by: INTERNAL MEDICINE

## 2025-04-20 PROCEDURE — 80053 COMPREHEN METABOLIC PANEL: CPT | Performed by: INTERNAL MEDICINE

## 2025-04-20 PROCEDURE — 85025 COMPLETE CBC W/AUTO DIFF WBC: CPT | Performed by: INTERNAL MEDICINE

## 2025-04-20 PROCEDURE — 86140 C-REACTIVE PROTEIN: CPT | Performed by: INTERNAL MEDICINE

## 2025-04-21 ENCOUNTER — OUTSIDE FACILITY SERVICE (OUTPATIENT)
Dept: PULMONOLOGY | Facility: CLINIC | Age: 70
End: 2025-04-21
Payer: MEDICARE

## 2025-04-21 ENCOUNTER — OUTSIDE FACILITY SERVICE (OUTPATIENT)
Dept: INFECTIOUS DISEASES | Facility: CLINIC | Age: 70
End: 2025-04-21
Payer: MEDICARE

## 2025-04-21 LAB
ALBUMIN SERPL-MCNC: 2.1 G/DL (ref 3.5–5.2)
ALBUMIN/GLOB SERPL: 0.7 G/DL
ALP SERPL-CCNC: 105 U/L (ref 39–117)
ALT SERPL W P-5'-P-CCNC: 11 U/L (ref 1–33)
ANION GAP SERPL CALCULATED.3IONS-SCNC: 12.5 MMOL/L (ref 5–15)
AST SERPL-CCNC: 16 U/L (ref 1–32)
ATMOSPHERIC PRESS: 728 MMHG
BASE EXCESS BLDV CALC-SCNC: 4.6 MMOL/L (ref 0–2)
BASOPHILS # BLD AUTO: 0.04 10*3/MM3 (ref 0–0.2)
BASOPHILS NFR BLD AUTO: 0.3 % (ref 0–1.5)
BDY SITE: ABNORMAL
BILIRUB SERPL-MCNC: 0.2 MG/DL (ref 0–1.2)
BUN SERPL-MCNC: 39 MG/DL (ref 8–23)
BUN/CREAT SERPL: 8.8 (ref 7–25)
CALCIUM SPEC-SCNC: 8.9 MG/DL (ref 8.6–10.5)
CHLORIDE SERPL-SCNC: 92 MMOL/L (ref 98–107)
CO2 BLDA-SCNC: 30.2 MMOL/L (ref 22–33)
CO2 SERPL-SCNC: 25.5 MMOL/L (ref 22–29)
COHGB MFR BLD: 1.3 % (ref 0–5)
CREAT SERPL-MCNC: 4.42 MG/DL (ref 0.57–1)
CRP SERPL-MCNC: 6.35 MG/DL (ref 0–0.5)
DEPRECATED RDW RBC AUTO: 53.6 FL (ref 37–54)
EGFRCR SERPLBLD CKD-EPI 2021: 10.2 ML/MIN/1.73
EOSINOPHIL # BLD AUTO: 0.55 10*3/MM3 (ref 0–0.4)
EOSINOPHIL NFR BLD AUTO: 4.3 % (ref 0.3–6.2)
ERYTHROCYTE [DISTWIDTH] IN BLOOD BY AUTOMATED COUNT: 15.7 % (ref 12.3–15.4)
GLOBULIN UR ELPH-MCNC: 3.1 GM/DL
GLUCOSE SERPL-MCNC: 97 MG/DL (ref 65–99)
HCO3 BLDV-SCNC: 28.9 MMOL/L (ref 22–28)
HCT VFR BLD AUTO: 26.4 % (ref 34–46.6)
HGB BLD-MCNC: 8.1 G/DL (ref 12–15.9)
HGB BLDA-MCNC: 8.5 G/DL (ref 13.5–17.5)
IMM GRANULOCYTES # BLD AUTO: 0.16 10*3/MM3 (ref 0–0.05)
IMM GRANULOCYTES NFR BLD AUTO: 1.3 % (ref 0–0.5)
INHALED O2 CONCENTRATION: 50 %
LYMPHOCYTES # BLD AUTO: 1.2 10*3/MM3 (ref 0.7–3.1)
LYMPHOCYTES NFR BLD AUTO: 9.5 % (ref 19.6–45.3)
Lab: ABNORMAL
MCH RBC QN AUTO: 28.5 PG (ref 26.6–33)
MCHC RBC AUTO-ENTMCNC: 30.7 G/DL (ref 31.5–35.7)
MCV RBC AUTO: 93 FL (ref 79–97)
METHGB BLD QL: 0.6 % (ref 0–3)
MODALITY: ABNORMAL
MONOCYTES # BLD AUTO: 1.13 10*3/MM3 (ref 0.1–0.9)
MONOCYTES NFR BLD AUTO: 8.9 % (ref 5–12)
NEUTROPHILS NFR BLD AUTO: 75.7 % (ref 42.7–76)
NEUTROPHILS NFR BLD AUTO: 9.6 10*3/MM3 (ref 1.7–7)
NRBC BLD AUTO-RTO: 0 /100 WBC (ref 0–0.2)
OXYHGB MFR BLDV: 66 % (ref 45–75)
PCO2 BLDV: 41.2 MM HG (ref 41–51)
PEEP RESPIRATORY: 10 CM[H2O]
PH BLDV: 7.45 PH UNITS (ref 7.32–7.42)
PLATELET # BLD AUTO: 346 10*3/MM3 (ref 140–450)
PMV BLD AUTO: 11.1 FL (ref 6–12)
PO2 BLDV: 35 MM HG (ref 27–53)
POTASSIUM SERPL-SCNC: 3.7 MMOL/L (ref 3.5–5.2)
PROT SERPL-MCNC: 5.2 G/DL (ref 6–8.5)
RBC # BLD AUTO: 2.84 10*6/MM3 (ref 3.77–5.28)
SAO2 % BLDCOV: 67.3 % (ref 45–75)
SET MECH RESP RATE: 25
SODIUM SERPL-SCNC: 130 MMOL/L (ref 136–145)
VENTILATOR MODE: ABNORMAL
VT ON VENT VENT: 400 ML
WBC NRBC COR # BLD AUTO: 12.68 10*3/MM3 (ref 3.4–10.8)

## 2025-04-21 PROCEDURE — 86140 C-REACTIVE PROTEIN: CPT | Performed by: INTERNAL MEDICINE

## 2025-04-21 PROCEDURE — 82805 BLOOD GASES W/O2 SATURATION: CPT

## 2025-04-21 PROCEDURE — 85025 COMPLETE CBC W/AUTO DIFF WBC: CPT | Performed by: INTERNAL MEDICINE

## 2025-04-21 PROCEDURE — 82820 HEMOGLOBIN-OXYGEN AFFINITY: CPT

## 2025-04-21 PROCEDURE — 80053 COMPREHEN METABOLIC PANEL: CPT | Performed by: INTERNAL MEDICINE

## 2025-04-21 PROCEDURE — 36415 COLL VENOUS BLD VENIPUNCTURE: CPT | Performed by: INTERNAL MEDICINE

## 2025-04-22 ENCOUNTER — OUTSIDE FACILITY SERVICE (OUTPATIENT)
Dept: PULMONOLOGY | Facility: CLINIC | Age: 70
End: 2025-04-22
Payer: MEDICARE

## 2025-04-22 ENCOUNTER — OUTSIDE FACILITY SERVICE (OUTPATIENT)
Dept: INFECTIOUS DISEASES | Facility: CLINIC | Age: 70
End: 2025-04-22

## 2025-04-22 LAB
ALBUMIN SERPL-MCNC: 2.3 G/DL (ref 3.5–5.2)
ALBUMIN/GLOB SERPL: 0.8 G/DL
ALP SERPL-CCNC: 121 U/L (ref 39–117)
ALT SERPL W P-5'-P-CCNC: 14 U/L (ref 1–33)
ANION GAP SERPL CALCULATED.3IONS-SCNC: 18.1 MMOL/L (ref 5–15)
AST SERPL-CCNC: 17 U/L (ref 1–32)
ATMOSPHERIC PRESS: 729 MMHG
BASE EXCESS BLDV CALC-SCNC: 2 MMOL/L (ref 0–2)
BASOPHILS # BLD AUTO: 0.05 10*3/MM3 (ref 0–0.2)
BASOPHILS NFR BLD AUTO: 0.4 % (ref 0–1.5)
BDY SITE: ABNORMAL
BILIRUB SERPL-MCNC: 0.2 MG/DL (ref 0–1.2)
BUN SERPL-MCNC: 47 MG/DL (ref 8–23)
BUN/CREAT SERPL: 9.2 (ref 7–25)
CALCIUM SPEC-SCNC: 9 MG/DL (ref 8.6–10.5)
CHLORIDE SERPL-SCNC: 92 MMOL/L (ref 98–107)
CO2 BLDA-SCNC: 26.5 MMOL/L (ref 22–33)
CO2 SERPL-SCNC: 22.9 MMOL/L (ref 22–29)
COHGB MFR BLD: 1.3 % (ref 0–5)
CREAT SERPL-MCNC: 5.12 MG/DL (ref 0.57–1)
CRP SERPL-MCNC: 6.61 MG/DL (ref 0–0.5)
DEPRECATED RDW RBC AUTO: 52.7 FL (ref 37–54)
EGFRCR SERPLBLD CKD-EPI 2021: 8.6 ML/MIN/1.73
EOSINOPHIL # BLD AUTO: 0.79 10*3/MM3 (ref 0–0.4)
EOSINOPHIL NFR BLD AUTO: 5.9 % (ref 0.3–6.2)
ERYTHROCYTE [DISTWIDTH] IN BLOOD BY AUTOMATED COUNT: 15.6 % (ref 12.3–15.4)
GLOBULIN UR ELPH-MCNC: 3 GM/DL
GLUCOSE SERPL-MCNC: 109 MG/DL (ref 65–99)
HCO3 BLDV-SCNC: 25.5 MMOL/L (ref 22–28)
HCT VFR BLD AUTO: 26.8 % (ref 34–46.6)
HGB BLD-MCNC: 8.4 G/DL (ref 12–15.9)
HGB BLDA-MCNC: 8.8 G/DL (ref 13.5–17.5)
IMM GRANULOCYTES # BLD AUTO: 0.16 10*3/MM3 (ref 0–0.05)
IMM GRANULOCYTES NFR BLD AUTO: 1.2 % (ref 0–0.5)
INHALED O2 CONCENTRATION: 50 %
LYMPHOCYTES # BLD AUTO: 1.08 10*3/MM3 (ref 0.7–3.1)
LYMPHOCYTES NFR BLD AUTO: 8.1 % (ref 19.6–45.3)
Lab: ABNORMAL
MCH RBC QN AUTO: 28.8 PG (ref 26.6–33)
MCHC RBC AUTO-ENTMCNC: 31.3 G/DL (ref 31.5–35.7)
MCV RBC AUTO: 91.8 FL (ref 79–97)
METHGB BLD QL: 0.7 % (ref 0–3)
MODALITY: ABNORMAL
MONOCYTES # BLD AUTO: 0.99 10*3/MM3 (ref 0.1–0.9)
MONOCYTES NFR BLD AUTO: 7.4 % (ref 5–12)
NEUTROPHILS NFR BLD AUTO: 10.29 10*3/MM3 (ref 1.7–7)
NEUTROPHILS NFR BLD AUTO: 77 % (ref 42.7–76)
NRBC BLD AUTO-RTO: 0 /100 WBC (ref 0–0.2)
OXYHGB MFR BLDV: 76.7 % (ref 45–75)
PCO2 BLDV: 34.5 MM HG (ref 41–51)
PEEP RESPIRATORY: 5 CM[H2O]
PH BLDV: 7.48 PH UNITS (ref 7.32–7.42)
PLATELET # BLD AUTO: 369 10*3/MM3 (ref 140–450)
PMV BLD AUTO: 10.7 FL (ref 6–12)
PO2 BLDV: 40.6 MM HG (ref 27–53)
POTASSIUM SERPL-SCNC: 3.6 MMOL/L (ref 3.5–5.2)
PROT SERPL-MCNC: 5.3 G/DL (ref 6–8.5)
RBC # BLD AUTO: 2.92 10*6/MM3 (ref 3.77–5.28)
SAO2 % BLDCOV: 78.3 % (ref 45–75)
SET MECH RESP RATE: 25
SODIUM SERPL-SCNC: 133 MMOL/L (ref 136–145)
VENTILATOR MODE: ABNORMAL
VT ON VENT VENT: 400 ML
WBC NRBC COR # BLD AUTO: 13.36 10*3/MM3 (ref 3.4–10.8)

## 2025-04-22 PROCEDURE — 82820 HEMOGLOBIN-OXYGEN AFFINITY: CPT

## 2025-04-22 PROCEDURE — OUTSIDEPOS PR OUTSIDE POS PLACEHOLDER: Performed by: INTERNAL MEDICINE

## 2025-04-22 PROCEDURE — 80053 COMPREHEN METABOLIC PANEL: CPT | Performed by: INTERNAL MEDICINE

## 2025-04-22 PROCEDURE — 36415 COLL VENOUS BLD VENIPUNCTURE: CPT | Performed by: INTERNAL MEDICINE

## 2025-04-22 PROCEDURE — 86140 C-REACTIVE PROTEIN: CPT | Performed by: INTERNAL MEDICINE

## 2025-04-22 PROCEDURE — 85025 COMPLETE CBC W/AUTO DIFF WBC: CPT | Performed by: INTERNAL MEDICINE

## 2025-04-22 PROCEDURE — 82805 BLOOD GASES W/O2 SATURATION: CPT

## 2025-04-23 ENCOUNTER — OUTSIDE FACILITY SERVICE (OUTPATIENT)
Dept: INFECTIOUS DISEASES | Facility: CLINIC | Age: 70
End: 2025-04-23

## 2025-04-23 ENCOUNTER — OUTSIDE FACILITY SERVICE (OUTPATIENT)
Dept: PULMONOLOGY | Facility: CLINIC | Age: 70
End: 2025-04-23
Payer: MEDICARE

## 2025-04-23 LAB
ALBUMIN SERPL-MCNC: 2.3 G/DL (ref 3.5–5.2)
ALBUMIN/GLOB SERPL: 0.8 G/DL
ALP SERPL-CCNC: 104 U/L (ref 39–117)
ALT SERPL W P-5'-P-CCNC: 14 U/L (ref 1–33)
ANION GAP SERPL CALCULATED.3IONS-SCNC: 12.2 MMOL/L (ref 5–15)
AST SERPL-CCNC: 16 U/L (ref 1–32)
BASOPHILS # BLD AUTO: 0.03 10*3/MM3 (ref 0–0.2)
BASOPHILS NFR BLD AUTO: 0.2 % (ref 0–1.5)
BILIRUB SERPL-MCNC: 0.2 MG/DL (ref 0–1.2)
BUN SERPL-MCNC: 25 MG/DL (ref 8–23)
BUN/CREAT SERPL: 7.3 (ref 7–25)
CALCIUM SPEC-SCNC: 8.6 MG/DL (ref 8.6–10.5)
CHLORIDE SERPL-SCNC: 91 MMOL/L (ref 98–107)
CO2 SERPL-SCNC: 28.8 MMOL/L (ref 22–29)
CREAT SERPL-MCNC: 3.42 MG/DL (ref 0.57–1)
CRP SERPL-MCNC: 6.36 MG/DL (ref 0–0.5)
DEPRECATED RDW RBC AUTO: 51.4 FL (ref 37–54)
EGFRCR SERPLBLD CKD-EPI 2021: 13.9 ML/MIN/1.73
EOSINOPHIL # BLD AUTO: 0.46 10*3/MM3 (ref 0–0.4)
EOSINOPHIL NFR BLD AUTO: 3.4 % (ref 0.3–6.2)
ERYTHROCYTE [DISTWIDTH] IN BLOOD BY AUTOMATED COUNT: 15.4 % (ref 12.3–15.4)
GLOBULIN UR ELPH-MCNC: 3 GM/DL
GLUCOSE SERPL-MCNC: 95 MG/DL (ref 65–99)
HCT VFR BLD AUTO: 25.3 % (ref 34–46.6)
HGB BLD-MCNC: 7.9 G/DL (ref 12–15.9)
IMM GRANULOCYTES # BLD AUTO: 0.13 10*3/MM3 (ref 0–0.05)
IMM GRANULOCYTES NFR BLD AUTO: 1 % (ref 0–0.5)
LYMPHOCYTES # BLD AUTO: 1.3 10*3/MM3 (ref 0.7–3.1)
LYMPHOCYTES NFR BLD AUTO: 9.6 % (ref 19.6–45.3)
MCH RBC QN AUTO: 28.4 PG (ref 26.6–33)
MCHC RBC AUTO-ENTMCNC: 31.2 G/DL (ref 31.5–35.7)
MCV RBC AUTO: 91 FL (ref 79–97)
MONOCYTES # BLD AUTO: 0.88 10*3/MM3 (ref 0.1–0.9)
MONOCYTES NFR BLD AUTO: 6.5 % (ref 5–12)
NEUTROPHILS NFR BLD AUTO: 10.71 10*3/MM3 (ref 1.7–7)
NEUTROPHILS NFR BLD AUTO: 79.3 % (ref 42.7–76)
NRBC BLD AUTO-RTO: 0 /100 WBC (ref 0–0.2)
PLATELET # BLD AUTO: 405 10*3/MM3 (ref 140–450)
PMV BLD AUTO: 10.6 FL (ref 6–12)
POTASSIUM SERPL-SCNC: 3.2 MMOL/L (ref 3.5–5.2)
PROT SERPL-MCNC: 5.3 G/DL (ref 6–8.5)
RBC # BLD AUTO: 2.78 10*6/MM3 (ref 3.77–5.28)
SODIUM SERPL-SCNC: 132 MMOL/L (ref 136–145)
WBC NRBC COR # BLD AUTO: 13.51 10*3/MM3 (ref 3.4–10.8)

## 2025-04-23 PROCEDURE — OUTSIDEPOS PR OUTSIDE POS PLACEHOLDER: Performed by: INTERNAL MEDICINE

## 2025-04-23 PROCEDURE — 80053 COMPREHEN METABOLIC PANEL: CPT | Performed by: INTERNAL MEDICINE

## 2025-04-23 PROCEDURE — 86140 C-REACTIVE PROTEIN: CPT | Performed by: INTERNAL MEDICINE

## 2025-04-23 PROCEDURE — 36415 COLL VENOUS BLD VENIPUNCTURE: CPT | Performed by: INTERNAL MEDICINE

## 2025-04-23 PROCEDURE — 85025 COMPLETE CBC W/AUTO DIFF WBC: CPT | Performed by: INTERNAL MEDICINE

## 2025-04-24 ENCOUNTER — OUTSIDE FACILITY SERVICE (OUTPATIENT)
Dept: PULMONOLOGY | Facility: CLINIC | Age: 70
End: 2025-04-24
Payer: MEDICARE

## 2025-04-24 ENCOUNTER — APPOINTMENT (OUTPATIENT)
Dept: GENERAL RADIOLOGY | Facility: HOSPITAL | Age: 70
End: 2025-04-24
Payer: COMMERCIAL

## 2025-04-24 ENCOUNTER — OUTSIDE FACILITY SERVICE (OUTPATIENT)
Dept: INFECTIOUS DISEASES | Facility: CLINIC | Age: 70
End: 2025-04-24
Payer: MEDICARE

## 2025-04-24 LAB
ALBUMIN SERPL-MCNC: 2.3 G/DL (ref 3.5–5.2)
ALBUMIN/GLOB SERPL: 0.8 G/DL
ALP SERPL-CCNC: 102 U/L (ref 39–117)
ALT SERPL W P-5'-P-CCNC: 16 U/L (ref 1–33)
ANION GAP SERPL CALCULATED.3IONS-SCNC: 14 MMOL/L (ref 5–15)
AST SERPL-CCNC: 17 U/L (ref 1–32)
BACTERIA UR QL AUTO: ABNORMAL /HPF
BASOPHILS # BLD AUTO: 0.06 10*3/MM3 (ref 0–0.2)
BASOPHILS NFR BLD AUTO: 0.4 % (ref 0–1.5)
BILIRUB SERPL-MCNC: 0.2 MG/DL (ref 0–1.2)
BILIRUB UR QL STRIP: NEGATIVE
BUN SERPL-MCNC: 34 MG/DL (ref 8–23)
BUN/CREAT SERPL: 7.7 (ref 7–25)
CALCIUM SPEC-SCNC: 9.1 MG/DL (ref 8.6–10.5)
CHLORIDE SERPL-SCNC: 92 MMOL/L (ref 98–107)
CLARITY UR: CLEAR
CO2 SERPL-SCNC: 27 MMOL/L (ref 22–29)
COLOR UR: YELLOW
CREAT SERPL-MCNC: 4.41 MG/DL (ref 0.57–1)
CRP SERPL-MCNC: 4.51 MG/DL (ref 0–0.5)
DEPRECATED RDW RBC AUTO: 51.9 FL (ref 37–54)
EGFRCR SERPLBLD CKD-EPI 2021: 10.2 ML/MIN/1.73
EOSINOPHIL # BLD AUTO: 0.55 10*3/MM3 (ref 0–0.4)
EOSINOPHIL NFR BLD AUTO: 3.8 % (ref 0.3–6.2)
ERYTHROCYTE [DISTWIDTH] IN BLOOD BY AUTOMATED COUNT: 15.3 % (ref 12.3–15.4)
GLOBULIN UR ELPH-MCNC: 3 GM/DL
GLUCOSE SERPL-MCNC: 99 MG/DL (ref 65–99)
GLUCOSE UR STRIP-MCNC: ABNORMAL MG/DL
HCT VFR BLD AUTO: 26.3 % (ref 34–46.6)
HGB BLD-MCNC: 8 G/DL (ref 12–15.9)
HGB UR QL STRIP.AUTO: NEGATIVE
HYALINE CASTS UR QL AUTO: ABNORMAL /LPF
IMM GRANULOCYTES # BLD AUTO: 0.13 10*3/MM3 (ref 0–0.05)
IMM GRANULOCYTES NFR BLD AUTO: 0.9 % (ref 0–0.5)
KETONES UR QL STRIP: ABNORMAL
LEUKOCYTE ESTERASE UR QL STRIP.AUTO: ABNORMAL
LYMPHOCYTES # BLD AUTO: 1.5 10*3/MM3 (ref 0.7–3.1)
LYMPHOCYTES NFR BLD AUTO: 10.4 % (ref 19.6–45.3)
MCH RBC QN AUTO: 28.3 PG (ref 26.6–33)
MCHC RBC AUTO-ENTMCNC: 30.4 G/DL (ref 31.5–35.7)
MCV RBC AUTO: 92.9 FL (ref 79–97)
MONOCYTES # BLD AUTO: 0.96 10*3/MM3 (ref 0.1–0.9)
MONOCYTES NFR BLD AUTO: 6.7 % (ref 5–12)
NEUTROPHILS NFR BLD AUTO: 11.17 10*3/MM3 (ref 1.7–7)
NEUTROPHILS NFR BLD AUTO: 77.8 % (ref 42.7–76)
NITRITE UR QL STRIP: NEGATIVE
NRBC BLD AUTO-RTO: 0 /100 WBC (ref 0–0.2)
PH UR STRIP.AUTO: 7.5 [PH] (ref 5–8)
PLATELET # BLD AUTO: 435 10*3/MM3 (ref 140–450)
PMV BLD AUTO: 10.5 FL (ref 6–12)
POTASSIUM SERPL-SCNC: 3.1 MMOL/L (ref 3.5–5.2)
PROT SERPL-MCNC: 5.3 G/DL (ref 6–8.5)
PROT UR QL STRIP: ABNORMAL
RBC # BLD AUTO: 2.83 10*6/MM3 (ref 3.77–5.28)
RBC # UR STRIP: ABNORMAL /HPF
REF LAB TEST METHOD: ABNORMAL
SODIUM SERPL-SCNC: 133 MMOL/L (ref 136–145)
SP GR UR STRIP: 1.01 (ref 1–1.03)
SQUAMOUS #/AREA URNS HPF: ABNORMAL /HPF
UROBILINOGEN UR QL STRIP: ABNORMAL
VANCOMYCIN TROUGH SERPL-MCNC: 22.1 MCG/ML (ref 5–20)
WBC # UR STRIP: ABNORMAL /HPF
WBC NRBC COR # BLD AUTO: 14.37 10*3/MM3 (ref 3.4–10.8)

## 2025-04-24 PROCEDURE — 87205 SMEAR GRAM STAIN: CPT | Performed by: INTERNAL MEDICINE

## 2025-04-24 PROCEDURE — 86140 C-REACTIVE PROTEIN: CPT | Performed by: INTERNAL MEDICINE

## 2025-04-24 PROCEDURE — 87070 CULTURE OTHR SPECIMN AEROBIC: CPT | Performed by: INTERNAL MEDICINE

## 2025-04-24 PROCEDURE — 80053 COMPREHEN METABOLIC PANEL: CPT | Performed by: INTERNAL MEDICINE

## 2025-04-24 PROCEDURE — 85025 COMPLETE CBC W/AUTO DIFF WBC: CPT | Performed by: INTERNAL MEDICINE

## 2025-04-24 PROCEDURE — 36415 COLL VENOUS BLD VENIPUNCTURE: CPT | Performed by: INTERNAL MEDICINE

## 2025-04-24 PROCEDURE — 81001 URINALYSIS AUTO W/SCOPE: CPT

## 2025-04-24 PROCEDURE — 80202 ASSAY OF VANCOMYCIN: CPT | Performed by: INTERNAL MEDICINE

## 2025-04-24 PROCEDURE — 87040 BLOOD CULTURE FOR BACTERIA: CPT | Performed by: INTERNAL MEDICINE

## 2025-04-24 PROCEDURE — 71045 X-RAY EXAM CHEST 1 VIEW: CPT

## 2025-04-24 PROCEDURE — 71045 X-RAY EXAM CHEST 1 VIEW: CPT | Performed by: RADIOLOGY

## 2025-04-24 RX ORDER — PROPOFOL 10 MG/ML
5-50 INJECTION, EMULSION INTRAVENOUS EVERY 4 HOURS PRN
Status: ON HOLD | COMMUNITY
End: 2025-05-07

## 2025-04-24 RX ORDER — HYDRALAZINE HYDROCHLORIDE 20 MG/ML
10 INJECTION INTRAMUSCULAR; INTRAVENOUS EVERY 4 HOURS PRN
Status: ON HOLD | COMMUNITY
End: 2025-05-07

## 2025-04-24 RX ORDER — DOCUSATE SODIUM 100 MG/1
100 CAPSULE, LIQUID FILLED ORAL 2 TIMES DAILY
Status: ON HOLD | COMMUNITY
End: 2025-05-07

## 2025-04-24 RX ORDER — AMOXICILLIN 250 MG
2 CAPSULE ORAL 2 TIMES DAILY
Status: ON HOLD | COMMUNITY
End: 2025-05-07

## 2025-04-24 RX ORDER — DEXTROSE MONOHYDRATE 25 G/50ML
50 INJECTION, SOLUTION INTRAVENOUS DAILY PRN
Status: ON HOLD | COMMUNITY
End: 2025-05-07

## 2025-04-24 RX ORDER — LISINOPRIL 20 MG/1
40 TABLET ORAL DAILY
Status: ON HOLD | COMMUNITY
End: 2025-05-07

## 2025-04-24 RX ORDER — ONDANSETRON 4 MG/1
4 TABLET, ORALLY DISINTEGRATING ORAL EVERY 6 HOURS PRN
Status: ON HOLD | COMMUNITY
End: 2025-05-07

## 2025-04-24 RX ORDER — METOPROLOL TARTRATE 25 MG/1
25 TABLET, FILM COATED ORAL 2 TIMES DAILY
Status: ON HOLD | COMMUNITY
End: 2025-05-07

## 2025-04-24 RX ORDER — NIFEDIPINE 30 MG/1
90 TABLET, EXTENDED RELEASE ORAL DAILY
Status: ON HOLD | COMMUNITY
End: 2025-05-07

## 2025-04-24 RX ORDER — CLONAZEPAM 1 MG/1
1 TABLET ORAL 4 TIMES DAILY
Status: ON HOLD | COMMUNITY
End: 2025-05-02

## 2025-04-24 RX ORDER — CLONIDINE HYDROCHLORIDE 0.1 MG/1
0.1 TABLET ORAL 3 TIMES DAILY
Status: ON HOLD | COMMUNITY
End: 2025-05-07

## 2025-04-24 RX ORDER — SODIUM CHLORIDE 0.9 % (FLUSH) 0.9 %
10 SYRINGE (ML) INJECTION EVERY 12 HOURS
Status: ON HOLD | COMMUNITY
End: 2025-05-07

## 2025-04-24 RX ORDER — PANTOPRAZOLE SODIUM 40 MG/10ML
40 INJECTION, POWDER, LYOPHILIZED, FOR SOLUTION INTRAVENOUS 2 TIMES DAILY
Status: ON HOLD | COMMUNITY
End: 2025-05-07

## 2025-04-24 RX ORDER — ACETAMINOPHEN 650 MG/20.3ML
650 SUSPENSION ORAL EVERY 4 HOURS PRN
Status: ON HOLD | COMMUNITY
End: 2025-05-07

## 2025-04-24 RX ORDER — OLANZAPINE 5 MG/1
10 TABLET, FILM COATED ORAL 2 TIMES DAILY
Status: ON HOLD | COMMUNITY
End: 2025-05-02

## 2025-04-24 RX ORDER — CALCITRIOL 0.25 UG/1
0.25 CAPSULE, LIQUID FILLED ORAL 3 TIMES WEEKLY
Status: ON HOLD | COMMUNITY

## 2025-04-24 RX ORDER — NITROGLYCERIN 0.4 MG/1
0.4 TABLET SUBLINGUAL
Status: ON HOLD | COMMUNITY
End: 2025-05-07

## 2025-04-24 RX ORDER — ATORVASTATIN CALCIUM 40 MG/1
40 TABLET, FILM COATED ORAL NIGHTLY
Status: ON HOLD | COMMUNITY

## 2025-04-24 RX ORDER — BUSPIRONE HYDROCHLORIDE 5 MG/1
10 TABLET ORAL 3 TIMES DAILY PRN
Status: ON HOLD | COMMUNITY
End: 2025-05-02

## 2025-04-24 RX ORDER — DOXAZOSIN 2 MG/1
2 TABLET ORAL NIGHTLY
Status: ON HOLD | COMMUNITY

## 2025-04-24 RX ORDER — HYDRALAZINE HYDROCHLORIDE 25 MG/1
25 TABLET, FILM COATED ORAL 3 TIMES DAILY
Status: ON HOLD | COMMUNITY
End: 2025-05-07

## 2025-04-24 RX ORDER — LORAZEPAM 2 MG/ML
1 INJECTION INTRAMUSCULAR
Status: ON HOLD | COMMUNITY
End: 2025-05-07

## 2025-04-24 RX ORDER — HYDROCODONE BITARTRATE AND ACETAMINOPHEN 7.5; 325 MG/1; MG/1
1 TABLET ORAL EVERY 6 HOURS PRN
Status: ON HOLD | COMMUNITY
End: 2025-05-07

## 2025-04-24 RX ORDER — NICOTINE POLACRILEX 4 MG
15 LOZENGE BUCCAL
Status: ON HOLD | COMMUNITY
End: 2025-05-07

## 2025-04-24 RX ORDER — BENZONATATE 100 MG/1
100 CAPSULE ORAL 3 TIMES DAILY PRN
Status: ON HOLD | COMMUNITY
End: 2025-05-07

## 2025-04-24 RX ORDER — SODIUM CHLORIDE 0.9 % (FLUSH) 0.9 %
10 SYRINGE (ML) INJECTION AS NEEDED
Status: ON HOLD | COMMUNITY
End: 2025-05-07

## 2025-04-24 RX ORDER — EPOETIN ALFA-EPBX 10000 [IU]/ML
2 INJECTION, SOLUTION INTRAVENOUS; SUBCUTANEOUS WEEKLY
Status: ON HOLD | COMMUNITY
End: 2025-05-02

## 2025-04-24 RX ORDER — GUAIFENESIN 200 MG/1
400 TABLET ORAL 4 TIMES DAILY
Status: ON HOLD | COMMUNITY
End: 2025-05-07

## 2025-04-25 ENCOUNTER — OUTSIDE FACILITY SERVICE (OUTPATIENT)
Dept: INFECTIOUS DISEASES | Facility: CLINIC | Age: 70
End: 2025-04-25

## 2025-04-25 ENCOUNTER — OUTSIDE FACILITY SERVICE (OUTPATIENT)
Dept: PULMONOLOGY | Facility: CLINIC | Age: 70
End: 2025-04-25
Payer: MEDICARE

## 2025-04-25 LAB
ALBUMIN SERPL-MCNC: 2.3 G/DL (ref 3.5–5.2)
ALBUMIN/GLOB SERPL: 0.8 G/DL
ALP SERPL-CCNC: 101 U/L (ref 39–117)
ALT SERPL W P-5'-P-CCNC: 15 U/L (ref 1–33)
ANION GAP SERPL CALCULATED.3IONS-SCNC: 8.6 MMOL/L (ref 5–15)
AST SERPL-CCNC: 16 U/L (ref 1–32)
BASOPHILS # BLD AUTO: 0.06 10*3/MM3 (ref 0–0.2)
BASOPHILS NFR BLD AUTO: 0.4 % (ref 0–1.5)
BILIRUB SERPL-MCNC: 0.2 MG/DL (ref 0–1.2)
BUN SERPL-MCNC: 22 MG/DL (ref 8–23)
BUN/CREAT SERPL: 7.1 (ref 7–25)
CALCIUM SPEC-SCNC: 8.7 MG/DL (ref 8.6–10.5)
CHLORIDE SERPL-SCNC: 89 MMOL/L (ref 98–107)
CO2 SERPL-SCNC: 30.4 MMOL/L (ref 22–29)
CREAT SERPL-MCNC: 3.11 MG/DL (ref 0.57–1)
CRP SERPL-MCNC: 4.1 MG/DL (ref 0–0.5)
DEPRECATED RDW RBC AUTO: 50.4 FL (ref 37–54)
EGFRCR SERPLBLD CKD-EPI 2021: 15.6 ML/MIN/1.73
EOSINOPHIL # BLD AUTO: 0.77 10*3/MM3 (ref 0–0.4)
EOSINOPHIL NFR BLD AUTO: 5.1 % (ref 0.3–6.2)
ERYTHROCYTE [DISTWIDTH] IN BLOOD BY AUTOMATED COUNT: 15 % (ref 12.3–15.4)
GLOBULIN UR ELPH-MCNC: 2.8 GM/DL
GLUCOSE SERPL-MCNC: 115 MG/DL (ref 65–99)
HCT VFR BLD AUTO: 24.3 % (ref 34–46.6)
HGB BLD-MCNC: 7.7 G/DL (ref 12–15.9)
IMM GRANULOCYTES # BLD AUTO: 0.16 10*3/MM3 (ref 0–0.05)
IMM GRANULOCYTES NFR BLD AUTO: 1.1 % (ref 0–0.5)
LYMPHOCYTES # BLD AUTO: 1.44 10*3/MM3 (ref 0.7–3.1)
LYMPHOCYTES NFR BLD AUTO: 9.6 % (ref 19.6–45.3)
MCH RBC QN AUTO: 29.1 PG (ref 26.6–33)
MCHC RBC AUTO-ENTMCNC: 31.7 G/DL (ref 31.5–35.7)
MCV RBC AUTO: 91.7 FL (ref 79–97)
MONOCYTES # BLD AUTO: 0.77 10*3/MM3 (ref 0.1–0.9)
MONOCYTES NFR BLD AUTO: 5.1 % (ref 5–12)
NEUTROPHILS NFR BLD AUTO: 11.77 10*3/MM3 (ref 1.7–7)
NEUTROPHILS NFR BLD AUTO: 78.7 % (ref 42.7–76)
NRBC BLD AUTO-RTO: 0 /100 WBC (ref 0–0.2)
PLATELET # BLD AUTO: 422 10*3/MM3 (ref 140–450)
PMV BLD AUTO: 10.7 FL (ref 6–12)
POTASSIUM SERPL-SCNC: 2.9 MMOL/L (ref 3.5–5.2)
POTASSIUM SERPL-SCNC: 3.2 MMOL/L (ref 3.5–5.2)
PROT SERPL-MCNC: 5.1 G/DL (ref 6–8.5)
QT INTERVAL: 482 MS
QTC INTERVAL: 482 MS
RBC # BLD AUTO: 2.65 10*6/MM3 (ref 3.77–5.28)
SODIUM SERPL-SCNC: 128 MMOL/L (ref 136–145)
WBC NRBC COR # BLD AUTO: 14.97 10*3/MM3 (ref 3.4–10.8)

## 2025-04-25 PROCEDURE — 80053 COMPREHEN METABOLIC PANEL: CPT | Performed by: INTERNAL MEDICINE

## 2025-04-25 PROCEDURE — 85025 COMPLETE CBC W/AUTO DIFF WBC: CPT | Performed by: INTERNAL MEDICINE

## 2025-04-25 PROCEDURE — OUTSIDEPOS PR OUTSIDE POS PLACEHOLDER: Performed by: INTERNAL MEDICINE

## 2025-04-25 PROCEDURE — 36415 COLL VENOUS BLD VENIPUNCTURE: CPT | Performed by: INTERNAL MEDICINE

## 2025-04-25 PROCEDURE — 93005 ELECTROCARDIOGRAM TRACING: CPT | Performed by: INTERNAL MEDICINE

## 2025-04-25 PROCEDURE — 84132 ASSAY OF SERUM POTASSIUM: CPT | Performed by: PHYSICIAN ASSISTANT

## 2025-04-25 PROCEDURE — 36415 COLL VENOUS BLD VENIPUNCTURE: CPT | Performed by: PHYSICIAN ASSISTANT

## 2025-04-25 PROCEDURE — 86140 C-REACTIVE PROTEIN: CPT | Performed by: INTERNAL MEDICINE

## 2025-04-26 ENCOUNTER — OUTSIDE FACILITY SERVICE (OUTPATIENT)
Dept: PULMONOLOGY | Facility: CLINIC | Age: 70
End: 2025-04-26
Payer: MEDICARE

## 2025-04-26 LAB
ALBUMIN SERPL-MCNC: 2.3 G/DL (ref 3.5–5.2)
ALBUMIN/GLOB SERPL: 0.7 G/DL
ALP SERPL-CCNC: 101 U/L (ref 39–117)
ALT SERPL W P-5'-P-CCNC: 14 U/L (ref 1–33)
ANION GAP SERPL CALCULATED.3IONS-SCNC: 14.2 MMOL/L (ref 5–15)
AST SERPL-CCNC: 15 U/L (ref 1–32)
BACTERIA SPEC RESP CULT: ABNORMAL
BACTERIA SPEC RESP CULT: ABNORMAL
BASOPHILS # BLD AUTO: 0.05 10*3/MM3 (ref 0–0.2)
BASOPHILS NFR BLD AUTO: 0.3 % (ref 0–1.5)
BILIRUB SERPL-MCNC: 0.2 MG/DL (ref 0–1.2)
BUN SERPL-MCNC: 29 MG/DL (ref 8–23)
BUN/CREAT SERPL: 7.3 (ref 7–25)
CALCIUM SPEC-SCNC: 9.2 MG/DL (ref 8.6–10.5)
CHLORIDE SERPL-SCNC: 89 MMOL/L (ref 98–107)
CO2 SERPL-SCNC: 26.8 MMOL/L (ref 22–29)
CREAT SERPL-MCNC: 3.97 MG/DL (ref 0.57–1)
CRP SERPL-MCNC: 3.91 MG/DL (ref 0–0.5)
DEPRECATED RDW RBC AUTO: 51.3 FL (ref 37–54)
EGFRCR SERPLBLD CKD-EPI 2021: 11.6 ML/MIN/1.73
EOSINOPHIL # BLD AUTO: 0.79 10*3/MM3 (ref 0–0.4)
EOSINOPHIL NFR BLD AUTO: 5.5 % (ref 0.3–6.2)
ERYTHROCYTE [DISTWIDTH] IN BLOOD BY AUTOMATED COUNT: 15.1 % (ref 12.3–15.4)
GLOBULIN UR ELPH-MCNC: 3.1 GM/DL
GLUCOSE BLDC GLUCOMTR-MCNC: 100 MG/DL (ref 70–130)
GLUCOSE SERPL-MCNC: 83 MG/DL (ref 65–99)
GRAM STN SPEC: ABNORMAL
GRAM STN SPEC: ABNORMAL
HCT VFR BLD AUTO: 24.7 % (ref 34–46.6)
HGB BLD-MCNC: 7.5 G/DL (ref 12–15.9)
IMM GRANULOCYTES # BLD AUTO: 0.12 10*3/MM3 (ref 0–0.05)
IMM GRANULOCYTES NFR BLD AUTO: 0.8 % (ref 0–0.5)
LYMPHOCYTES # BLD AUTO: 1.42 10*3/MM3 (ref 0.7–3.1)
LYMPHOCYTES NFR BLD AUTO: 9.9 % (ref 19.6–45.3)
MCH RBC QN AUTO: 28.1 PG (ref 26.6–33)
MCHC RBC AUTO-ENTMCNC: 30.4 G/DL (ref 31.5–35.7)
MCV RBC AUTO: 92.5 FL (ref 79–97)
MONOCYTES # BLD AUTO: 0.94 10*3/MM3 (ref 0.1–0.9)
MONOCYTES NFR BLD AUTO: 6.5 % (ref 5–12)
NEUTROPHILS NFR BLD AUTO: 11.05 10*3/MM3 (ref 1.7–7)
NEUTROPHILS NFR BLD AUTO: 77 % (ref 42.7–76)
NRBC BLD AUTO-RTO: 0 /100 WBC (ref 0–0.2)
PLATELET # BLD AUTO: 430 10*3/MM3 (ref 140–450)
PMV BLD AUTO: 10.6 FL (ref 6–12)
POTASSIUM SERPL-SCNC: 3.1 MMOL/L (ref 3.5–5.2)
PROT SERPL-MCNC: 5.4 G/DL (ref 6–8.5)
RBC # BLD AUTO: 2.67 10*6/MM3 (ref 3.77–5.28)
SODIUM SERPL-SCNC: 130 MMOL/L (ref 136–145)
VANCOMYCIN TROUGH SERPL-MCNC: 18.7 MCG/ML (ref 5–20)
WBC NRBC COR # BLD AUTO: 14.37 10*3/MM3 (ref 3.4–10.8)

## 2025-04-26 PROCEDURE — 80202 ASSAY OF VANCOMYCIN: CPT | Performed by: INTERNAL MEDICINE

## 2025-04-26 PROCEDURE — 82948 REAGENT STRIP/BLOOD GLUCOSE: CPT

## 2025-04-26 PROCEDURE — 86140 C-REACTIVE PROTEIN: CPT | Performed by: INTERNAL MEDICINE

## 2025-04-26 PROCEDURE — 36415 COLL VENOUS BLD VENIPUNCTURE: CPT | Performed by: INTERNAL MEDICINE

## 2025-04-26 PROCEDURE — 85025 COMPLETE CBC W/AUTO DIFF WBC: CPT | Performed by: INTERNAL MEDICINE

## 2025-04-26 PROCEDURE — 80053 COMPREHEN METABOLIC PANEL: CPT | Performed by: INTERNAL MEDICINE

## 2025-04-27 ENCOUNTER — OUTSIDE FACILITY SERVICE (OUTPATIENT)
Dept: PULMONOLOGY | Facility: CLINIC | Age: 70
End: 2025-04-27
Payer: MEDICARE

## 2025-04-27 LAB
ALBUMIN SERPL-MCNC: 2.4 G/DL (ref 3.5–5.2)
ALBUMIN/GLOB SERPL: 0.8 G/DL
ALP SERPL-CCNC: 92 U/L (ref 39–117)
ALT SERPL W P-5'-P-CCNC: 12 U/L (ref 1–33)
ANION GAP SERPL CALCULATED.3IONS-SCNC: 9.3 MMOL/L (ref 5–15)
AST SERPL-CCNC: 13 U/L (ref 1–32)
BASOPHILS # BLD AUTO: 0.07 10*3/MM3 (ref 0–0.2)
BASOPHILS NFR BLD AUTO: 0.6 % (ref 0–1.5)
BILIRUB SERPL-MCNC: 0.2 MG/DL (ref 0–1.2)
BUN SERPL-MCNC: 17 MG/DL (ref 8–23)
BUN/CREAT SERPL: 5.9 (ref 7–25)
CALCIUM SPEC-SCNC: 8.9 MG/DL (ref 8.6–10.5)
CHLORIDE SERPL-SCNC: 95 MMOL/L (ref 98–107)
CO2 SERPL-SCNC: 30.7 MMOL/L (ref 22–29)
CREAT SERPL-MCNC: 2.87 MG/DL (ref 0.57–1)
CRP SERPL-MCNC: 5.13 MG/DL (ref 0–0.5)
DEPRECATED RDW RBC AUTO: 51.9 FL (ref 37–54)
EGFRCR SERPLBLD CKD-EPI 2021: 17.1 ML/MIN/1.73
EOSINOPHIL # BLD AUTO: 0.48 10*3/MM3 (ref 0–0.4)
EOSINOPHIL NFR BLD AUTO: 3.9 % (ref 0.3–6.2)
ERYTHROCYTE [DISTWIDTH] IN BLOOD BY AUTOMATED COUNT: 15.2 % (ref 12.3–15.4)
GLOBULIN UR ELPH-MCNC: 3 GM/DL
GLUCOSE SERPL-MCNC: 91 MG/DL (ref 65–99)
HCT VFR BLD AUTO: 24.7 % (ref 34–46.6)
HGB BLD-MCNC: 7.6 G/DL (ref 12–15.9)
IMM GRANULOCYTES # BLD AUTO: 0.08 10*3/MM3 (ref 0–0.05)
IMM GRANULOCYTES NFR BLD AUTO: 0.7 % (ref 0–0.5)
LYMPHOCYTES # BLD AUTO: 1.83 10*3/MM3 (ref 0.7–3.1)
LYMPHOCYTES NFR BLD AUTO: 15 % (ref 19.6–45.3)
MCH RBC QN AUTO: 28.6 PG (ref 26.6–33)
MCHC RBC AUTO-ENTMCNC: 30.8 G/DL (ref 31.5–35.7)
MCV RBC AUTO: 92.9 FL (ref 79–97)
MONOCYTES # BLD AUTO: 1.06 10*3/MM3 (ref 0.1–0.9)
MONOCYTES NFR BLD AUTO: 8.7 % (ref 5–12)
NEUTROPHILS NFR BLD AUTO: 71.1 % (ref 42.7–76)
NEUTROPHILS NFR BLD AUTO: 8.65 10*3/MM3 (ref 1.7–7)
NRBC BLD AUTO-RTO: 0 /100 WBC (ref 0–0.2)
PLATELET # BLD AUTO: 383 10*3/MM3 (ref 140–450)
PMV BLD AUTO: 9.5 FL (ref 6–12)
POTASSIUM SERPL-SCNC: 3.1 MMOL/L (ref 3.5–5.2)
PROT SERPL-MCNC: 5.4 G/DL (ref 6–8.5)
RBC # BLD AUTO: 2.66 10*6/MM3 (ref 3.77–5.28)
SODIUM SERPL-SCNC: 135 MMOL/L (ref 136–145)
WBC NRBC COR # BLD AUTO: 12.17 10*3/MM3 (ref 3.4–10.8)

## 2025-04-27 PROCEDURE — 36415 COLL VENOUS BLD VENIPUNCTURE: CPT | Performed by: INTERNAL MEDICINE

## 2025-04-27 PROCEDURE — 85025 COMPLETE CBC W/AUTO DIFF WBC: CPT | Performed by: INTERNAL MEDICINE

## 2025-04-27 PROCEDURE — 80053 COMPREHEN METABOLIC PANEL: CPT | Performed by: INTERNAL MEDICINE

## 2025-04-27 PROCEDURE — 86140 C-REACTIVE PROTEIN: CPT | Performed by: INTERNAL MEDICINE

## 2025-04-28 ENCOUNTER — OUTSIDE FACILITY SERVICE (OUTPATIENT)
Dept: INFECTIOUS DISEASES | Facility: CLINIC | Age: 70
End: 2025-04-28

## 2025-04-28 ENCOUNTER — OUTSIDE FACILITY SERVICE (OUTPATIENT)
Dept: PULMONOLOGY | Facility: CLINIC | Age: 70
End: 2025-04-28
Payer: MEDICARE

## 2025-04-28 LAB
027 TOXIN: NORMAL
ALBUMIN SERPL-MCNC: 2.2 G/DL (ref 3.5–5.2)
ALBUMIN/GLOB SERPL: 0.8 G/DL
ALP SERPL-CCNC: 93 U/L (ref 39–117)
ALT SERPL W P-5'-P-CCNC: 12 U/L (ref 1–33)
ANION GAP SERPL CALCULATED.3IONS-SCNC: 10.1 MMOL/L (ref 5–15)
AST SERPL-CCNC: 12 U/L (ref 1–32)
BASOPHILS # BLD AUTO: 0.06 10*3/MM3 (ref 0–0.2)
BASOPHILS NFR BLD AUTO: 0.5 % (ref 0–1.5)
BILIRUB SERPL-MCNC: 0.2 MG/DL (ref 0–1.2)
BUN SERPL-MCNC: 27 MG/DL (ref 8–23)
BUN/CREAT SERPL: 7.3 (ref 7–25)
C DIFF TOX GENS STL QL NAA+PROBE: NEGATIVE
CALCIUM SPEC-SCNC: 8.9 MG/DL (ref 8.6–10.5)
CHLORIDE SERPL-SCNC: 95 MMOL/L (ref 98–107)
CO2 SERPL-SCNC: 28.9 MMOL/L (ref 22–29)
CREAT SERPL-MCNC: 3.7 MG/DL (ref 0.57–1)
CRP SERPL-MCNC: 5.13 MG/DL (ref 0–0.5)
DEPRECATED RDW RBC AUTO: 52.8 FL (ref 37–54)
EGFRCR SERPLBLD CKD-EPI 2021: 12.6 ML/MIN/1.73
EOSINOPHIL # BLD AUTO: 0.88 10*3/MM3 (ref 0–0.4)
EOSINOPHIL NFR BLD AUTO: 7.6 % (ref 0.3–6.2)
ERYTHROCYTE [DISTWIDTH] IN BLOOD BY AUTOMATED COUNT: 15.3 % (ref 12.3–15.4)
GLOBULIN UR ELPH-MCNC: 2.9 GM/DL
GLUCOSE SERPL-MCNC: 105 MG/DL (ref 65–99)
HCT VFR BLD AUTO: 27.5 % (ref 34–46.6)
HGB BLD-MCNC: 8.2 G/DL (ref 12–15.9)
IMM GRANULOCYTES # BLD AUTO: 0.08 10*3/MM3 (ref 0–0.05)
IMM GRANULOCYTES NFR BLD AUTO: 0.7 % (ref 0–0.5)
LYMPHOCYTES # BLD AUTO: 1.24 10*3/MM3 (ref 0.7–3.1)
LYMPHOCYTES NFR BLD AUTO: 10.8 % (ref 19.6–45.3)
MAGNESIUM SERPL-MCNC: 2 MG/DL (ref 1.6–2.4)
MCH RBC QN AUTO: 28.1 PG (ref 26.6–33)
MCHC RBC AUTO-ENTMCNC: 29.8 G/DL (ref 31.5–35.7)
MCV RBC AUTO: 94.2 FL (ref 79–97)
MONOCYTES # BLD AUTO: 0.78 10*3/MM3 (ref 0.1–0.9)
MONOCYTES NFR BLD AUTO: 6.8 % (ref 5–12)
NEUTROPHILS NFR BLD AUTO: 73.6 % (ref 42.7–76)
NEUTROPHILS NFR BLD AUTO: 8.48 10*3/MM3 (ref 1.7–7)
NRBC BLD AUTO-RTO: 0 /100 WBC (ref 0–0.2)
PLATELET # BLD AUTO: 362 10*3/MM3 (ref 140–450)
PMV BLD AUTO: 10.2 FL (ref 6–12)
POTASSIUM SERPL-SCNC: 3.4 MMOL/L (ref 3.5–5.2)
PROT SERPL-MCNC: 5.1 G/DL (ref 6–8.5)
RBC # BLD AUTO: 2.92 10*6/MM3 (ref 3.77–5.28)
SODIUM SERPL-SCNC: 134 MMOL/L (ref 136–145)
WBC NRBC COR # BLD AUTO: 11.52 10*3/MM3 (ref 3.4–10.8)

## 2025-04-28 PROCEDURE — 83735 ASSAY OF MAGNESIUM: CPT | Performed by: INTERNAL MEDICINE

## 2025-04-28 PROCEDURE — OUTSIDEPOS PR OUTSIDE POS PLACEHOLDER: Performed by: INTERNAL MEDICINE

## 2025-04-28 PROCEDURE — 80053 COMPREHEN METABOLIC PANEL: CPT | Performed by: INTERNAL MEDICINE

## 2025-04-28 PROCEDURE — 86140 C-REACTIVE PROTEIN: CPT | Performed by: INTERNAL MEDICINE

## 2025-04-28 PROCEDURE — 85025 COMPLETE CBC W/AUTO DIFF WBC: CPT | Performed by: INTERNAL MEDICINE

## 2025-04-28 PROCEDURE — 87493 C DIFF AMPLIFIED PROBE: CPT

## 2025-04-28 PROCEDURE — 36415 COLL VENOUS BLD VENIPUNCTURE: CPT | Performed by: INTERNAL MEDICINE

## 2025-04-29 ENCOUNTER — ANESTHESIA EVENT (OUTPATIENT)
Dept: PERIOP | Facility: HOSPITAL | Age: 70
End: 2025-04-29
Payer: COMMERCIAL

## 2025-04-29 ENCOUNTER — OUTSIDE FACILITY SERVICE (OUTPATIENT)
Dept: PULMONOLOGY | Facility: CLINIC | Age: 70
End: 2025-04-29
Payer: MEDICARE

## 2025-04-29 ENCOUNTER — OUTSIDE FACILITY SERVICE (OUTPATIENT)
Dept: INFECTIOUS DISEASES | Facility: CLINIC | Age: 70
End: 2025-04-29
Payer: MEDICARE

## 2025-04-29 ENCOUNTER — ANESTHESIA (OUTPATIENT)
Dept: PERIOP | Facility: HOSPITAL | Age: 70
End: 2025-04-29
Payer: COMMERCIAL

## 2025-04-29 LAB
ALBUMIN SERPL-MCNC: 2.3 G/DL (ref 3.5–5.2)
ALBUMIN/GLOB SERPL: 0.8 G/DL
ALP SERPL-CCNC: 92 U/L (ref 39–117)
ALT SERPL W P-5'-P-CCNC: 12 U/L (ref 1–33)
ANION GAP SERPL CALCULATED.3IONS-SCNC: 13.3 MMOL/L (ref 5–15)
AST SERPL-CCNC: 14 U/L (ref 1–32)
BACTERIA SPEC AEROBE CULT: NORMAL
BACTERIA SPEC AEROBE CULT: NORMAL
BASOPHILS # BLD AUTO: 0.07 10*3/MM3 (ref 0–0.2)
BASOPHILS NFR BLD AUTO: 0.6 % (ref 0–1.5)
BILIRUB SERPL-MCNC: 0.2 MG/DL (ref 0–1.2)
BUN SERPL-MCNC: 31 MG/DL (ref 8–23)
BUN/CREAT SERPL: 7 (ref 7–25)
CALCIUM SPEC-SCNC: 9.1 MG/DL (ref 8.6–10.5)
CHLORIDE SERPL-SCNC: 95 MMOL/L (ref 98–107)
CO2 SERPL-SCNC: 26.7 MMOL/L (ref 22–29)
CREAT SERPL-MCNC: 4.45 MG/DL (ref 0.57–1)
CRP SERPL-MCNC: 5.09 MG/DL (ref 0–0.5)
DEPRECATED RDW RBC AUTO: 52.6 FL (ref 37–54)
EGFRCR SERPLBLD CKD-EPI 2021: 10.1 ML/MIN/1.73
EOSINOPHIL # BLD AUTO: 0.91 10*3/MM3 (ref 0–0.4)
EOSINOPHIL NFR BLD AUTO: 7.9 % (ref 0.3–6.2)
ERYTHROCYTE [DISTWIDTH] IN BLOOD BY AUTOMATED COUNT: 15.6 % (ref 12.3–15.4)
FERRITIN SERPL-MCNC: 2238 NG/ML (ref 13–150)
GLOBULIN UR ELPH-MCNC: 2.9 GM/DL
GLUCOSE SERPL-MCNC: 86 MG/DL (ref 65–99)
HCT VFR BLD AUTO: 24.5 % (ref 34–46.6)
HGB BLD-MCNC: 7.7 G/DL (ref 12–15.9)
IMM GRANULOCYTES # BLD AUTO: 0.1 10*3/MM3 (ref 0–0.05)
IMM GRANULOCYTES NFR BLD AUTO: 0.9 % (ref 0–0.5)
IRON 24H UR-MRATE: 39 MCG/DL (ref 37–145)
IRON SATN MFR SERPL: 25 % (ref 20–50)
LYMPHOCYTES # BLD AUTO: 1.16 10*3/MM3 (ref 0.7–3.1)
LYMPHOCYTES NFR BLD AUTO: 10.1 % (ref 19.6–45.3)
MCH RBC QN AUTO: 29.2 PG (ref 26.6–33)
MCHC RBC AUTO-ENTMCNC: 31.4 G/DL (ref 31.5–35.7)
MCV RBC AUTO: 92.8 FL (ref 79–97)
MONOCYTES # BLD AUTO: 0.85 10*3/MM3 (ref 0.1–0.9)
MONOCYTES NFR BLD AUTO: 7.4 % (ref 5–12)
NEUTROPHILS NFR BLD AUTO: 73.1 % (ref 42.7–76)
NEUTROPHILS NFR BLD AUTO: 8.45 10*3/MM3 (ref 1.7–7)
NRBC BLD AUTO-RTO: 0 /100 WBC (ref 0–0.2)
PLATELET # BLD AUTO: 353 10*3/MM3 (ref 140–450)
PMV BLD AUTO: 10.2 FL (ref 6–12)
POTASSIUM SERPL-SCNC: 3.3 MMOL/L (ref 3.5–5.2)
PROT SERPL-MCNC: 5.2 G/DL (ref 6–8.5)
RBC # BLD AUTO: 2.64 10*6/MM3 (ref 3.77–5.28)
SODIUM SERPL-SCNC: 135 MMOL/L (ref 136–145)
TIBC SERPL-MCNC: 155 MCG/DL (ref 298–536)
TRANSFERRIN SERPL-MCNC: 104 MG/DL (ref 200–360)
VANCOMYCIN TROUGH SERPL-MCNC: 15.6 MCG/ML (ref 5–20)
WBC NRBC COR # BLD AUTO: 11.54 10*3/MM3 (ref 3.4–10.8)

## 2025-04-29 PROCEDURE — 25010000002 FAMOTIDINE 10 MG/ML SOLUTION: Performed by: NURSE ANESTHETIST, CERTIFIED REGISTERED

## 2025-04-29 PROCEDURE — C1769 GUIDE WIRE: HCPCS | Performed by: SURGERY

## 2025-04-29 PROCEDURE — 36415 COLL VENOUS BLD VENIPUNCTURE: CPT | Performed by: INTERNAL MEDICINE

## 2025-04-29 PROCEDURE — 80053 COMPREHEN METABOLIC PANEL: CPT | Performed by: INTERNAL MEDICINE

## 2025-04-29 PROCEDURE — 80202 ASSAY OF VANCOMYCIN: CPT | Performed by: INTERNAL MEDICINE

## 2025-04-29 PROCEDURE — 25010000002 FENTANYL CITRATE (PF) 50 MCG/ML SOLUTION: Performed by: NURSE ANESTHETIST, CERTIFIED REGISTERED

## 2025-04-29 PROCEDURE — 85025 COMPLETE CBC W/AUTO DIFF WBC: CPT | Performed by: INTERNAL MEDICINE

## 2025-04-29 PROCEDURE — 25010000002 ONDANSETRON PER 1 MG: Performed by: NURSE ANESTHETIST, CERTIFIED REGISTERED

## 2025-04-29 PROCEDURE — 83540 ASSAY OF IRON: CPT | Performed by: INTERNAL MEDICINE

## 2025-04-29 PROCEDURE — 25010000002 PROPOFOL 10 MG/ML EMULSION: Performed by: NURSE ANESTHETIST, CERTIFIED REGISTERED

## 2025-04-29 PROCEDURE — C1729 CATH, DRAINAGE: HCPCS | Performed by: SURGERY

## 2025-04-29 PROCEDURE — 25010000002 PHENYLEPHRINE 10 MG/ML SOLUTION: Performed by: NURSE ANESTHETIST, CERTIFIED REGISTERED

## 2025-04-29 PROCEDURE — 84466 ASSAY OF TRANSFERRIN: CPT | Performed by: INTERNAL MEDICINE

## 2025-04-29 PROCEDURE — 25010000002 LIDOCAINE 1 % SOLUTION: Performed by: SURGERY

## 2025-04-29 PROCEDURE — 82728 ASSAY OF FERRITIN: CPT | Performed by: INTERNAL MEDICINE

## 2025-04-29 PROCEDURE — 43246 EGD PLACE GASTROSTOMY TUBE: CPT | Performed by: SURGERY

## 2025-04-29 PROCEDURE — 31600 PLANNED TRACHEOSTOMY: CPT | Performed by: SURGERY

## 2025-04-29 PROCEDURE — 86140 C-REACTIVE PROTEIN: CPT | Performed by: INTERNAL MEDICINE

## 2025-04-29 RX ORDER — FENTANYL CITRATE 50 UG/ML
INJECTION, SOLUTION INTRAMUSCULAR; INTRAVENOUS AS NEEDED
Status: DISCONTINUED | OUTPATIENT
Start: 2025-04-29 | End: 2025-04-29 | Stop reason: SURG

## 2025-04-29 RX ORDER — ROCURONIUM BROMIDE 10 MG/ML
INJECTION, SOLUTION INTRAVENOUS AS NEEDED
Status: DISCONTINUED | OUTPATIENT
Start: 2025-04-29 | End: 2025-04-29 | Stop reason: SURG

## 2025-04-29 RX ORDER — PHENYLEPHRINE HYDROCHLORIDE 10 MG/ML
INJECTION INTRAVENOUS AS NEEDED
Status: DISCONTINUED | OUTPATIENT
Start: 2025-04-29 | End: 2025-04-29 | Stop reason: SURG

## 2025-04-29 RX ORDER — PROPOFOL 10 MG/ML
VIAL (ML) INTRAVENOUS AS NEEDED
Status: DISCONTINUED | OUTPATIENT
Start: 2025-04-29 | End: 2025-04-29 | Stop reason: SURG

## 2025-04-29 RX ORDER — ONDANSETRON 2 MG/ML
INJECTION INTRAMUSCULAR; INTRAVENOUS AS NEEDED
Status: DISCONTINUED | OUTPATIENT
Start: 2025-04-29 | End: 2025-04-29 | Stop reason: SURG

## 2025-04-29 RX ORDER — LIDOCAINE HYDROCHLORIDE 10 MG/ML
INJECTION, SOLUTION INFILTRATION; PERINEURAL AS NEEDED
Status: DISCONTINUED | OUTPATIENT
Start: 2025-04-29 | End: 2025-04-29 | Stop reason: HOSPADM

## 2025-04-29 RX ORDER — SODIUM CHLORIDE 9 MG/ML
INJECTION, SOLUTION INTRAVENOUS CONTINUOUS PRN
Status: DISCONTINUED | OUTPATIENT
Start: 2025-04-29 | End: 2025-04-29 | Stop reason: SURG

## 2025-04-29 RX ORDER — FAMOTIDINE 10 MG/ML
INJECTION, SOLUTION INTRAVENOUS AS NEEDED
Status: DISCONTINUED | OUTPATIENT
Start: 2025-04-29 | End: 2025-04-29 | Stop reason: SURG

## 2025-04-29 RX ADMIN — FENTANYL CITRATE 50 MCG: 50 INJECTION INTRAMUSCULAR; INTRAVENOUS at 11:45

## 2025-04-29 RX ADMIN — ROCURONIUM BROMIDE 15 MG: 10 SOLUTION INTRAVENOUS at 11:45

## 2025-04-29 RX ADMIN — ONDANSETRON 4 MG: 2 INJECTION INTRAMUSCULAR; INTRAVENOUS at 11:55

## 2025-04-29 RX ADMIN — SODIUM CHLORIDE: 9 INJECTION, SOLUTION INTRAVENOUS at 11:45

## 2025-04-29 RX ADMIN — FAMOTIDINE 20 MG: 10 INJECTION INTRAVENOUS at 11:55

## 2025-04-29 RX ADMIN — PHENYLEPHRINE HYDROCHLORIDE 150 MCG: 10 INJECTION INTRAVENOUS at 12:10

## 2025-04-29 RX ADMIN — PROPOFOL 30 MG: 10 INJECTION, EMULSION INTRAVENOUS at 12:34

## 2025-04-29 RX ADMIN — FENTANYL CITRATE 50 MCG: 50 INJECTION INTRAMUSCULAR; INTRAVENOUS at 12:22

## 2025-04-29 NOTE — ANESTHESIA POSTPROCEDURE EVALUATION
Patient: Estefania Connor    Procedure Summary       Date: 04/29/25 Room / Location: [facility] OR 04 /  [facility] OR    Anesthesia Start: 1133 Anesthesia Stop: 1239    Procedure: TRACHEOSTOMY AND PERCUTANEOUS ENDOSCOPIC GASTROSTOMY TUBE INSERTION (Neck) Diagnosis:     Surgeons: Kera Head MD Provider: Rosalio Foreman MD    Anesthesia Type: general ASA Status: 4            Anesthesia Type: general    Vitals  No vitals data found for the desired time range.          Post Anesthesia Care and Evaluation    Patient location during evaluation: bedside  Patient participation: complete - patient cannot participate  Post-procedure mental status: Sedated.  Pain score: 0  Pain management: adequate    Airway patency: patent  Anesthetic complications: No anesthetic complications  PONV Status: controlled  Cardiovascular status: acceptable and blood pressure returned to baseline  Respiratory status: trach and ventilator  Hydration status: acceptable  No anesthesia care post op

## 2025-04-29 NOTE — ANESTHESIA PREPROCEDURE EVALUATION
Anesthesia Evaluation     Nursing notes reviewed   no history of anesthetic complications:   NPO Solid Status: > 8 hours  NPO Liquid Status: > 8 hours           Airway   Dental      Pulmonary    (+) COPD,rhonchi  Cardiovascular   Exercise tolerance: poor (<4 METS)    PT is on anticoagulation therapy  Patient on routine beta blocker  Rhythm: regular  Rate: normal    (+) hypertension, CAD, hyperlipidemia      Neuro/Psych  (+) poor historian.  GI/Hepatic/Renal/Endo    (+) renal disease- ESRD    Musculoskeletal (-) negative ROS    Abdominal     Abdomen: soft.   Substance History - negative use     OB/GYN negative ob/gyn ROS         Other - negative ROS       ROS/Med Hx Other: RESPIRATORY FAILURE      Phys Exam Other: ON VENT WITH ETT.              Anesthesia Plan    ASA 4     general     intravenous induction     Anesthetic plan, risks, benefits, and alternatives have been provided, discussed and informed consent has been obtained with: patient.  No pre-procedure education provided  Use of blood products discussed with  Consented to blood products.    Plan discussed with CRNA.    CODE STATUS:    Code Status (Patient has no pulse and is not breathing): CPR (Attempt to Resuscitate)  Medical Interventions (Patient has pulse or is breathing): Full Support

## 2025-04-30 ENCOUNTER — OUTSIDE FACILITY SERVICE (OUTPATIENT)
Dept: PULMONOLOGY | Facility: CLINIC | Age: 70
End: 2025-04-30
Payer: MEDICARE

## 2025-04-30 ENCOUNTER — OUTSIDE FACILITY SERVICE (OUTPATIENT)
Dept: INFECTIOUS DISEASES | Facility: CLINIC | Age: 70
End: 2025-04-30

## 2025-04-30 LAB
ALBUMIN SERPL-MCNC: 2.4 G/DL (ref 3.5–5.2)
ALBUMIN/GLOB SERPL: 0.8 G/DL
ALP SERPL-CCNC: 92 U/L (ref 39–117)
ALT SERPL W P-5'-P-CCNC: 10 U/L (ref 1–33)
ANION GAP SERPL CALCULATED.3IONS-SCNC: 11.2 MMOL/L (ref 5–15)
AST SERPL-CCNC: 21 U/L (ref 1–32)
BASOPHILS # BLD AUTO: 0.05 10*3/MM3 (ref 0–0.2)
BASOPHILS NFR BLD AUTO: 0.5 % (ref 0–1.5)
BILIRUB SERPL-MCNC: 0.2 MG/DL (ref 0–1.2)
BUN SERPL-MCNC: 14 MG/DL (ref 8–23)
BUN/CREAT SERPL: 4.7 (ref 7–25)
CALCIUM SPEC-SCNC: 8.5 MG/DL (ref 8.6–10.5)
CHLORIDE SERPL-SCNC: 93 MMOL/L (ref 98–107)
CO2 SERPL-SCNC: 29.8 MMOL/L (ref 22–29)
CREAT SERPL-MCNC: 2.97 MG/DL (ref 0.57–1)
CRP SERPL-MCNC: 5.49 MG/DL (ref 0–0.5)
DEPRECATED RDW RBC AUTO: 53.2 FL (ref 37–54)
EGFRCR SERPLBLD CKD-EPI 2021: 16.4 ML/MIN/1.73
EOSINOPHIL # BLD AUTO: 0.6 10*3/MM3 (ref 0–0.4)
EOSINOPHIL NFR BLD AUTO: 6.2 % (ref 0.3–6.2)
ERYTHROCYTE [DISTWIDTH] IN BLOOD BY AUTOMATED COUNT: 15.5 % (ref 12.3–15.4)
GLOBULIN UR ELPH-MCNC: 3 GM/DL
GLUCOSE SERPL-MCNC: 84 MG/DL (ref 65–99)
HCT VFR BLD AUTO: 23.2 % (ref 34–46.6)
HGB BLD-MCNC: 7.2 G/DL (ref 12–15.9)
IMM GRANULOCYTES # BLD AUTO: 0.07 10*3/MM3 (ref 0–0.05)
IMM GRANULOCYTES NFR BLD AUTO: 0.7 % (ref 0–0.5)
LYMPHOCYTES # BLD AUTO: 1.43 10*3/MM3 (ref 0.7–3.1)
LYMPHOCYTES NFR BLD AUTO: 14.9 % (ref 19.6–45.3)
MCH RBC QN AUTO: 29.1 PG (ref 26.6–33)
MCHC RBC AUTO-ENTMCNC: 31 G/DL (ref 31.5–35.7)
MCV RBC AUTO: 93.9 FL (ref 79–97)
MONOCYTES # BLD AUTO: 0.74 10*3/MM3 (ref 0.1–0.9)
MONOCYTES NFR BLD AUTO: 7.7 % (ref 5–12)
NEUTROPHILS NFR BLD AUTO: 6.72 10*3/MM3 (ref 1.7–7)
NEUTROPHILS NFR BLD AUTO: 70 % (ref 42.7–76)
NRBC BLD AUTO-RTO: 0 /100 WBC (ref 0–0.2)
PLATELET # BLD AUTO: 351 10*3/MM3 (ref 140–450)
PMV BLD AUTO: 10.4 FL (ref 6–12)
POTASSIUM SERPL-SCNC: 3.3 MMOL/L (ref 3.5–5.2)
PROT SERPL-MCNC: 5.4 G/DL (ref 6–8.5)
RBC # BLD AUTO: 2.47 10*6/MM3 (ref 3.77–5.28)
SODIUM SERPL-SCNC: 134 MMOL/L (ref 136–145)
WBC NRBC COR # BLD AUTO: 9.61 10*3/MM3 (ref 3.4–10.8)

## 2025-04-30 PROCEDURE — 36415 COLL VENOUS BLD VENIPUNCTURE: CPT | Performed by: INTERNAL MEDICINE

## 2025-04-30 PROCEDURE — 85025 COMPLETE CBC W/AUTO DIFF WBC: CPT | Performed by: INTERNAL MEDICINE

## 2025-04-30 PROCEDURE — 80053 COMPREHEN METABOLIC PANEL: CPT | Performed by: INTERNAL MEDICINE

## 2025-04-30 PROCEDURE — 86140 C-REACTIVE PROTEIN: CPT | Performed by: INTERNAL MEDICINE

## 2025-04-30 PROCEDURE — OUTSIDEPOS PR OUTSIDE POS PLACEHOLDER: Performed by: INTERNAL MEDICINE

## 2025-04-30 NOTE — OP NOTE
OPERATIVE NOTE    Patient Name:  Estefania Connor     YOB: 1955    Date of Surgery:  4/29/25    PREOPERATIVE DIAGNOSIS: Chronic ventilator dependence    POSTOPERATIVE DIAGNOSIS: Same      PROCEDURE PERFORMED:   Tracheostomy  Percutaneous endoscopic gastrostomy tube    SURGEON: Kera Head MD     SPECIMENS: None    EBL: Minimal      ANESTHESIA: General.      FINDINGS:   1. Successful placement of tracheostomy and percutaneous endoscopic gastrostomy tube      INDICATIONS: The patient is a 68 yo F with chronic ventilator dependence. Consent obtained from family for tracheostomy and PEG placement.     DESCRIPTION OF PROCEDURE:   After obtaining informed consent, the patient was taken to the operating room and placed in supine position. General anesthesia was induced. Time out was performed.     I proceeded with tracheostomy placement. A shoulder roll was placed and the neck was prepped and draped. A horizontal incision was made in the neck and I dissected down with a hemostat and bovie electrocautery.  I dissected down to the trachea and cleared off the pre-tracheal tissue. I then passed the needle and catheter into the neck after backing up the ET tube. The trach was dilated upward using the blue rhino trach kit. A 7.5 F cuffed Shiley was placed into the airway, the inner cannula was placed, and ventilation occurred. End tidal CO2 was achieved and the patient was ventilating and oxygenating well. This concluded the trach and it was secured in place with Prolene sutures and a trach tie.     I began with placement of the PEG tube. The upper endoscope was passed into the stomach. The stomach was maximally insufflated. I then ensured a light and impulse reflex. I then placed the needle and catheter into the stomach under direct visualization and passed the wire. This was snared and the scope was pulled out of the mouth. The PEG tube was then attached and lubricated and passed into the abdomen. This was  secured to the abdominal wall.     COMPLICATIONS: None

## 2025-05-01 ENCOUNTER — OUTSIDE FACILITY SERVICE (OUTPATIENT)
Dept: PULMONOLOGY | Facility: CLINIC | Age: 70
End: 2025-05-01
Payer: MEDICARE

## 2025-05-01 ENCOUNTER — OUTSIDE FACILITY SERVICE (OUTPATIENT)
Dept: INFECTIOUS DISEASES | Facility: CLINIC | Age: 70
End: 2025-05-01
Payer: MEDICARE

## 2025-05-01 ENCOUNTER — APPOINTMENT (OUTPATIENT)
Dept: CARDIOLOGY | Facility: HOSPITAL | Age: 70
End: 2025-05-01
Payer: MEDICARE

## 2025-05-01 LAB
A-A DO2: 408.3 MMHG (ref 0–300)
ABO GROUP BLD: NORMAL
ALBUMIN SERPL-MCNC: 2.4 G/DL (ref 3.5–5.2)
ALBUMIN SERPL-MCNC: 2.4 G/DL (ref 3.5–5.2)
ALBUMIN SERPL-MCNC: 2.5 G/DL (ref 3.5–5.2)
ALBUMIN/GLOB SERPL: 0.8 G/DL
ALBUMIN/GLOB SERPL: 0.9 G/DL
ALBUMIN/GLOB SERPL: 0.9 G/DL
ALP SERPL-CCNC: 100 U/L (ref 39–117)
ALP SERPL-CCNC: 107 U/L (ref 39–117)
ALP SERPL-CCNC: 115 U/L (ref 39–117)
ALT SERPL W P-5'-P-CCNC: 10 U/L (ref 1–33)
ALT SERPL W P-5'-P-CCNC: 17 U/L (ref 1–33)
ALT SERPL W P-5'-P-CCNC: 19 U/L (ref 1–33)
ANION GAP SERPL CALCULATED.3IONS-SCNC: 12.6 MMOL/L (ref 5–15)
ANION GAP SERPL CALCULATED.3IONS-SCNC: 13.8 MMOL/L (ref 5–15)
ANION GAP SERPL CALCULATED.3IONS-SCNC: 18.3 MMOL/L (ref 5–15)
AORTIC DIMENSIONLESS INDEX: 0.73 (DI)
ARTERIAL PATENCY WRIST A: ABNORMAL
AST SERPL-CCNC: 15 U/L (ref 1–32)
AST SERPL-CCNC: 30 U/L (ref 1–32)
AST SERPL-CCNC: 30 U/L (ref 1–32)
ATMOSPHERIC PRESS: 726 MMHG
ATMOSPHERIC PRESS: 726 MMHG
AV MEAN PRESS GRAD SYS DOP V1V2: 5.5 MMHG
AV VMAX SYS DOP: 158 CM/SEC
BASE EXCESS BLDA CALC-SCNC: 0.2 MMOL/L (ref 0–2)
BASE EXCESS BLDV CALC-SCNC: 4.5 MMOL/L (ref 0–2)
BASOPHILS # BLD AUTO: 0.03 10*3/MM3 (ref 0–0.2)
BASOPHILS # BLD AUTO: 0.05 10*3/MM3 (ref 0–0.2)
BASOPHILS NFR BLD AUTO: 0.2 % (ref 0–1.5)
BASOPHILS NFR BLD AUTO: 0.4 % (ref 0–1.5)
BDY SITE: ABNORMAL
BDY SITE: ABNORMAL
BH CV ECHO MEAS - ACS: 1 CM
BH CV ECHO MEAS - AO MAX PG: 10 MMHG
BH CV ECHO MEAS - AO ROOT DIAM: 2.8 CM
BH CV ECHO MEAS - AO V2 VTI: 31.6 CM
BH CV ECHO MEAS - AVA(I,D): 2.29 CM2
BH CV ECHO MEAS - EDV(CUBED): 59.3 ML
BH CV ECHO MEAS - EDV(MOD-SP4): 67.3 ML
BH CV ECHO MEAS - EF(MOD-SP4): 39.4 %
BH CV ECHO MEAS - ESV(CUBED): 24.4 ML
BH CV ECHO MEAS - ESV(MOD-SP4): 40.8 ML
BH CV ECHO MEAS - FS: 25.6 %
BH CV ECHO MEAS - IVS/LVPW: 0.92 CM
BH CV ECHO MEAS - IVSD: 1.2 CM
BH CV ECHO MEAS - LA DIMENSION: 4.1 CM
BH CV ECHO MEAS - LAT PEAK E' VEL: 5.7 CM/SEC
BH CV ECHO MEAS - LV MASS(C)D: 169.4 GRAMS
BH CV ECHO MEAS - LV MAX PG: 4.7 MMHG
BH CV ECHO MEAS - LV MEAN PG: 2 MMHG
BH CV ECHO MEAS - LV V1 MAX: 108 CM/SEC
BH CV ECHO MEAS - LV V1 VTI: 23 CM
BH CV ECHO MEAS - LVIDD: 3.9 CM
BH CV ECHO MEAS - LVIDS: 2.9 CM
BH CV ECHO MEAS - LVOT AREA: 3.1 CM2
BH CV ECHO MEAS - LVOT DIAM: 2 CM
BH CV ECHO MEAS - LVPWD: 1.3 CM
BH CV ECHO MEAS - MED PEAK E' VEL: 3.4 CM/SEC
BH CV ECHO MEAS - MV A MAX VEL: 79.6 CM/SEC
BH CV ECHO MEAS - MV E MAX VEL: 109 CM/SEC
BH CV ECHO MEAS - MV E/A: 1.37
BH CV ECHO MEAS - PA ACC TIME: 0.09 SEC
BH CV ECHO MEAS - PA V2 MAX: 74.6 CM/SEC
BH CV ECHO MEAS - RAP SYSTOLE: 10 MMHG
BH CV ECHO MEAS - RVDD: 3.7 CM
BH CV ECHO MEAS - RVSP: 30.3 MMHG
BH CV ECHO MEAS - SV(LVOT): 72.3 ML
BH CV ECHO MEAS - SV(MOD-SP4): 26.5 ML
BH CV ECHO MEAS - TAPSE (>1.6): 1.48 CM
BH CV ECHO MEAS - TR MAX PG: 20.3 MMHG
BH CV ECHO MEAS - TR MAX VEL: 225 CM/SEC
BH CV ECHO MEASUREMENTS AVERAGE E/E' RATIO: 23.96
BH CV XLRA - RV BASE: 2.9 CM
BH CV XLRA - RV LENGTH: 5.6 CM
BH CV XLRA - RV MID: 2.7 CM
BILIRUB SERPL-MCNC: 0.2 MG/DL (ref 0–1.2)
BILIRUB SERPL-MCNC: 0.3 MG/DL (ref 0–1.2)
BILIRUB SERPL-MCNC: 0.3 MG/DL (ref 0–1.2)
BLD GP AB SCN SERPL QL: NEGATIVE
BUN SERPL-MCNC: 19 MG/DL (ref 8–23)
BUN SERPL-MCNC: 21 MG/DL (ref 8–23)
BUN SERPL-MCNC: 21 MG/DL (ref 8–23)
BUN/CREAT SERPL: 4.9 (ref 7–25)
BUN/CREAT SERPL: 5 (ref 7–25)
BUN/CREAT SERPL: 5.1 (ref 7–25)
CALCIUM SPEC-SCNC: 8.8 MG/DL (ref 8.6–10.5)
CALCIUM SPEC-SCNC: 9 MG/DL (ref 8.6–10.5)
CALCIUM SPEC-SCNC: 9.5 MG/DL (ref 8.6–10.5)
CHLORIDE SERPL-SCNC: 94 MMOL/L (ref 98–107)
CHLORIDE SERPL-SCNC: 95 MMOL/L (ref 98–107)
CHLORIDE SERPL-SCNC: 95 MMOL/L (ref 98–107)
CO2 BLDA-SCNC: 27.9 MMOL/L (ref 22–33)
CO2 BLDA-SCNC: 30.1 MMOL/L (ref 22–33)
CO2 SERPL-SCNC: 21.7 MMOL/L (ref 22–29)
CO2 SERPL-SCNC: 25.2 MMOL/L (ref 22–29)
CO2 SERPL-SCNC: 27.4 MMOL/L (ref 22–29)
COHGB MFR BLD: 1 % (ref 0–5)
COHGB MFR BLD: 1.4 % (ref 0–5)
CREAT SERPL-MCNC: 3.88 MG/DL (ref 0.57–1)
CREAT SERPL-MCNC: 4.14 MG/DL (ref 0.57–1)
CREAT SERPL-MCNC: 4.2 MG/DL (ref 0.57–1)
CRP SERPL-MCNC: 8.96 MG/DL (ref 0–0.5)
DEPRECATED RDW RBC AUTO: 54.1 FL (ref 37–54)
DEPRECATED RDW RBC AUTO: 54.7 FL (ref 37–54)
EGFRCR SERPLBLD CKD-EPI 2021: 10.9 ML/MIN/1.73
EGFRCR SERPLBLD CKD-EPI 2021: 11 ML/MIN/1.73
EGFRCR SERPLBLD CKD-EPI 2021: 11.9 ML/MIN/1.73
EOSINOPHIL # BLD AUTO: 0.04 10*3/MM3 (ref 0–0.4)
EOSINOPHIL # BLD AUTO: 0.69 10*3/MM3 (ref 0–0.4)
EOSINOPHIL NFR BLD AUTO: 0.3 % (ref 0.3–6.2)
EOSINOPHIL NFR BLD AUTO: 5.9 % (ref 0.3–6.2)
ERYTHROCYTE [DISTWIDTH] IN BLOOD BY AUTOMATED COUNT: 15.6 % (ref 12.3–15.4)
ERYTHROCYTE [DISTWIDTH] IN BLOOD BY AUTOMATED COUNT: 15.7 % (ref 12.3–15.4)
GEN 5 1HR TROPONIN T REFLEX: 114 NG/L
GLOBULIN UR ELPH-MCNC: 2.7 GM/DL
GLOBULIN UR ELPH-MCNC: 2.7 GM/DL
GLOBULIN UR ELPH-MCNC: 3 GM/DL
GLUCOSE SERPL-MCNC: 103 MG/DL (ref 65–99)
GLUCOSE SERPL-MCNC: 175 MG/DL (ref 65–99)
GLUCOSE SERPL-MCNC: 96 MG/DL (ref 65–99)
HCO3 BLDA-SCNC: 26.4 MMOL/L (ref 20–26)
HCO3 BLDV-SCNC: 28.9 MMOL/L (ref 22–28)
HCT VFR BLD AUTO: 20.2 % (ref 34–46.6)
HCT VFR BLD AUTO: 22.5 % (ref 34–46.6)
HCT VFR BLD AUTO: 25.7 % (ref 34–46.6)
HCT VFR BLD CALC: 23.2 % (ref 38–51)
HGB BLD-MCNC: 6.1 G/DL (ref 12–15.9)
HGB BLD-MCNC: 7 G/DL (ref 12–15.9)
HGB BLD-MCNC: 7.7 G/DL (ref 12–15.9)
HGB BLDA-MCNC: 6.5 G/DL (ref 13.5–17.5)
HGB BLDA-MCNC: 7.6 G/DL (ref 13.5–17.5)
IMM GRANULOCYTES # BLD AUTO: 0.11 10*3/MM3 (ref 0–0.05)
IMM GRANULOCYTES # BLD AUTO: 0.28 10*3/MM3 (ref 0–0.05)
IMM GRANULOCYTES NFR BLD AUTO: 0.9 % (ref 0–0.5)
IMM GRANULOCYTES NFR BLD AUTO: 1.8 % (ref 0–0.5)
INHALED O2 CONCENTRATION: 100 %
INHALED O2 CONCENTRATION: 70 %
LYMPHOCYTES # BLD AUTO: 0.55 10*3/MM3 (ref 0.7–3.1)
LYMPHOCYTES # BLD AUTO: 1.33 10*3/MM3 (ref 0.7–3.1)
LYMPHOCYTES NFR BLD AUTO: 11.3 % (ref 19.6–45.3)
LYMPHOCYTES NFR BLD AUTO: 3.6 % (ref 19.6–45.3)
Lab: ABNORMAL
MAGNESIUM SERPL-MCNC: 2.9 MG/DL (ref 1.6–2.4)
MCH RBC QN AUTO: 28.6 PG (ref 26.6–33)
MCH RBC QN AUTO: 29.3 PG (ref 26.6–33)
MCHC RBC AUTO-ENTMCNC: 30.2 G/DL (ref 31.5–35.7)
MCHC RBC AUTO-ENTMCNC: 31.1 G/DL (ref 31.5–35.7)
MCV RBC AUTO: 94.1 FL (ref 79–97)
MCV RBC AUTO: 94.8 FL (ref 79–97)
METHGB BLD QL: 0.5 % (ref 0–3)
METHGB BLD QL: 0.6 % (ref 0–3)
MODALITY: ABNORMAL
MODALITY: ABNORMAL
MONOCYTES # BLD AUTO: 0.94 10*3/MM3 (ref 0.1–0.9)
MONOCYTES # BLD AUTO: 1.01 10*3/MM3 (ref 0.1–0.9)
MONOCYTES NFR BLD AUTO: 6.1 % (ref 5–12)
MONOCYTES NFR BLD AUTO: 8.6 % (ref 5–12)
NEUTROPHILS NFR BLD AUTO: 13.61 10*3/MM3 (ref 1.7–7)
NEUTROPHILS NFR BLD AUTO: 72.9 % (ref 42.7–76)
NEUTROPHILS NFR BLD AUTO: 8.58 10*3/MM3 (ref 1.7–7)
NEUTROPHILS NFR BLD AUTO: 88 % (ref 42.7–76)
NOTIFIED BY: ABNORMAL
NOTIFIED WHO: ABNORMAL
NRBC BLD AUTO-RTO: 0 /100 WBC (ref 0–0.2)
NRBC BLD AUTO-RTO: 0 /100 WBC (ref 0–0.2)
OXYHGB MFR BLDV: 71 % (ref 45–75)
OXYHGB MFR BLDV: 98.1 % (ref 94–99)
PAW @ PEAK INSP FLOW SETTING VENT: 45 CMH2O
PAW @ PEAK INSP FLOW SETTING VENT: 45 CMH2O
PCO2 BLDA: 50 MM HG (ref 35–45)
PCO2 BLDV: 41.2 MM HG (ref 41–51)
PCO2 TEMP ADJ BLD: ABNORMAL MM[HG]
PEEP RESPIRATORY: 5 CM[H2O]
PEEP RESPIRATORY: 5 CM[H2O]
PH BLDA: 7.33 PH UNITS (ref 7.35–7.45)
PH BLDV: 7.45 PH UNITS (ref 7.32–7.42)
PH, TEMP CORRECTED: ABNORMAL
PLATELET # BLD AUTO: 317 10*3/MM3 (ref 140–450)
PLATELET # BLD AUTO: 356 10*3/MM3 (ref 140–450)
PMV BLD AUTO: 10.6 FL (ref 6–12)
PMV BLD AUTO: 10.8 FL (ref 6–12)
PO2 BLDA: 221 MM HG (ref 83–108)
PO2 BLDV: 37 MM HG (ref 27–53)
PO2 TEMP ADJ BLD: ABNORMAL MM[HG]
POTASSIUM SERPL-SCNC: 3.4 MMOL/L (ref 3.5–5.2)
POTASSIUM SERPL-SCNC: 3.4 MMOL/L (ref 3.5–5.2)
POTASSIUM SERPL-SCNC: 3.9 MMOL/L (ref 3.5–5.2)
PROT SERPL-MCNC: 5.1 G/DL (ref 6–8.5)
PROT SERPL-MCNC: 5.1 G/DL (ref 6–8.5)
PROT SERPL-MCNC: 5.5 G/DL (ref 6–8.5)
PSV: 0 CMH2O
QT INTERVAL: 308 MS
QTC INTERVAL: 493 MS
RBC # BLD AUTO: 2.13 10*6/MM3 (ref 3.77–5.28)
RBC # BLD AUTO: 2.39 10*6/MM3 (ref 3.77–5.28)
RH BLD: NEGATIVE
SAO2 % BLDCOA: >99.2 % (ref 94–99)
SAO2 % BLDCOV: 72.4 % (ref 45–75)
SET MECH RESP RATE: 25
SET MECH RESP RATE: 25
SODIUM SERPL-SCNC: 134 MMOL/L (ref 136–145)
SODIUM SERPL-SCNC: 134 MMOL/L (ref 136–145)
SODIUM SERPL-SCNC: 135 MMOL/L (ref 136–145)
T&S EXPIRATION DATE: NORMAL
T4 FREE SERPL-MCNC: 1.57 NG/DL (ref 0.92–1.68)
TOTAL RATE: 25 BREATHS/MINUTE
TOTAL RATE: 25 BREATHS/MINUTE
TROPONIN T % DELTA: 39
TROPONIN T NUMERIC DELTA: 32 NG/L
TROPONIN T SERPL HS-MCNC: 82 NG/L
TSH SERPL DL<=0.05 MIU/L-ACNC: 1.73 UIU/ML (ref 0.27–4.2)
VENTILATOR MODE: ABNORMAL
VENTILATOR MODE: ABNORMAL
VT ON VENT VENT: 400 ML
VT ON VENT VENT: 430 ML
WBC NRBC COR # BLD AUTO: 11.77 10*3/MM3 (ref 3.4–10.8)
WBC NRBC COR # BLD AUTO: 15.45 10*3/MM3 (ref 3.4–10.8)

## 2025-05-01 PROCEDURE — 86901 BLOOD TYPING SEROLOGIC RH(D): CPT | Performed by: PHYSICIAN ASSISTANT

## 2025-05-01 PROCEDURE — 85025 COMPLETE CBC W/AUTO DIFF WBC: CPT | Performed by: INTERNAL MEDICINE

## 2025-05-01 PROCEDURE — 86900 BLOOD TYPING SEROLOGIC ABO: CPT | Performed by: PHYSICIAN ASSISTANT

## 2025-05-01 PROCEDURE — 84439 ASSAY OF FREE THYROXINE: CPT | Performed by: PHYSICIAN ASSISTANT

## 2025-05-01 PROCEDURE — 85014 HEMATOCRIT: CPT | Performed by: PHYSICIAN ASSISTANT

## 2025-05-01 PROCEDURE — 84443 ASSAY THYROID STIM HORMONE: CPT | Performed by: PHYSICIAN ASSISTANT

## 2025-05-01 PROCEDURE — 86923 COMPATIBILITY TEST ELECTRIC: CPT

## 2025-05-01 PROCEDURE — 86140 C-REACTIVE PROTEIN: CPT | Performed by: INTERNAL MEDICINE

## 2025-05-01 PROCEDURE — 93306 TTE W/DOPPLER COMPLETE: CPT

## 2025-05-01 PROCEDURE — 85018 HEMOGLOBIN: CPT | Performed by: PHYSICIAN ASSISTANT

## 2025-05-01 PROCEDURE — 86850 RBC ANTIBODY SCREEN: CPT | Performed by: PHYSICIAN ASSISTANT

## 2025-05-01 PROCEDURE — P9016 RBC LEUKOCYTES REDUCED: HCPCS

## 2025-05-01 PROCEDURE — 93005 ELECTROCARDIOGRAM TRACING: CPT | Performed by: PHYSICIAN ASSISTANT

## 2025-05-01 PROCEDURE — 93010 ELECTROCARDIOGRAM REPORT: CPT | Performed by: INTERNAL MEDICINE

## 2025-05-01 PROCEDURE — 82805 BLOOD GASES W/O2 SATURATION: CPT

## 2025-05-01 PROCEDURE — 82375 ASSAY CARBOXYHB QUANT: CPT

## 2025-05-01 PROCEDURE — 36415 COLL VENOUS BLD VENIPUNCTURE: CPT | Performed by: INTERNAL MEDICINE

## 2025-05-01 PROCEDURE — 82820 HEMOGLOBIN-OXYGEN AFFINITY: CPT

## 2025-05-01 PROCEDURE — 86900 BLOOD TYPING SEROLOGIC ABO: CPT

## 2025-05-01 PROCEDURE — 93306 TTE W/DOPPLER COMPLETE: CPT | Performed by: INTERNAL MEDICINE

## 2025-05-01 PROCEDURE — 83050 HGB METHEMOGLOBIN QUAN: CPT

## 2025-05-01 PROCEDURE — 83735 ASSAY OF MAGNESIUM: CPT | Performed by: INTERNAL MEDICINE

## 2025-05-01 PROCEDURE — 80053 COMPREHEN METABOLIC PANEL: CPT | Performed by: INTERNAL MEDICINE

## 2025-05-01 PROCEDURE — 84484 ASSAY OF TROPONIN QUANT: CPT | Performed by: PHYSICIAN ASSISTANT

## 2025-05-02 ENCOUNTER — OUTSIDE FACILITY SERVICE (OUTPATIENT)
Dept: PULMONOLOGY | Facility: CLINIC | Age: 70
End: 2025-05-02
Payer: MEDICARE

## 2025-05-02 ENCOUNTER — OUTSIDE FACILITY SERVICE (OUTPATIENT)
Dept: INFECTIOUS DISEASES | Facility: CLINIC | Age: 70
End: 2025-05-02

## 2025-05-02 ENCOUNTER — APPOINTMENT (OUTPATIENT)
Dept: GENERAL RADIOLOGY | Facility: HOSPITAL | Age: 70
End: 2025-05-02
Payer: COMMERCIAL

## 2025-05-02 VITALS
HEART RATE: 101 BPM | DIASTOLIC BLOOD PRESSURE: 73 MMHG | WEIGHT: 153 LBS | SYSTOLIC BLOOD PRESSURE: 155 MMHG | BODY MASS INDEX: 27.11 KG/M2 | HEIGHT: 63 IN | RESPIRATION RATE: 24 BRPM | OXYGEN SATURATION: 98 %

## 2025-05-02 LAB
ALBUMIN SERPL-MCNC: 2.3 G/DL (ref 3.5–5.2)
ALBUMIN/GLOB SERPL: 0.9 G/DL
ALP SERPL-CCNC: 99 U/L (ref 39–117)
ALT SERPL W P-5'-P-CCNC: 16 U/L (ref 1–33)
ANION GAP SERPL CALCULATED.3IONS-SCNC: 14.9 MMOL/L (ref 5–15)
AST SERPL-CCNC: 22 U/L (ref 1–32)
ATMOSPHERIC PRESS: 726 MMHG
BASE EXCESS BLDV CALC-SCNC: 2.4 MMOL/L (ref 0–2)
BASOPHILS # BLD AUTO: 0.04 10*3/MM3 (ref 0–0.2)
BASOPHILS # BLD AUTO: 0.08 10*3/MM3 (ref 0–0.2)
BASOPHILS NFR BLD AUTO: 0.3 % (ref 0–1.5)
BASOPHILS NFR BLD AUTO: 0.7 % (ref 0–1.5)
BDY SITE: ABNORMAL
BH BB BLOOD EXPIRATION DATE: NORMAL
BH BB BLOOD EXPIRATION DATE: NORMAL
BH BB BLOOD TYPE BARCODE: 5100
BH BB BLOOD TYPE BARCODE: 5100
BH BB DISPENSE STATUS: NORMAL
BH BB DISPENSE STATUS: NORMAL
BH BB PRODUCT CODE: NORMAL
BH BB PRODUCT CODE: NORMAL
BH BB UNIT NUMBER: NORMAL
BH BB UNIT NUMBER: NORMAL
BILIRUB SERPL-MCNC: 0.2 MG/DL (ref 0–1.2)
BUN SERPL-MCNC: 23 MG/DL (ref 8–23)
BUN/CREAT SERPL: 5.2 (ref 7–25)
CALCIUM SPEC-SCNC: 8.9 MG/DL (ref 8.6–10.5)
CHLORIDE SERPL-SCNC: 95 MMOL/L (ref 98–107)
CO2 BLDA-SCNC: 28.6 MMOL/L (ref 22–33)
CO2 SERPL-SCNC: 24.1 MMOL/L (ref 22–29)
COHGB MFR BLD: 1.3 % (ref 0–5)
CREAT SERPL-MCNC: 4.43 MG/DL (ref 0.57–1)
CROSSMATCH INTERPRETATION: NORMAL
CROSSMATCH INTERPRETATION: NORMAL
CRP SERPL-MCNC: 11.03 MG/DL (ref 0–0.5)
DEPRECATED RDW RBC AUTO: 55.4 FL (ref 37–54)
DEPRECATED RDW RBC AUTO: 56.5 FL (ref 37–54)
EGFRCR SERPLBLD CKD-EPI 2021: 10.2 ML/MIN/1.73
EOSINOPHIL # BLD AUTO: 0.69 10*3/MM3 (ref 0–0.4)
EOSINOPHIL # BLD AUTO: 0.88 10*3/MM3 (ref 0–0.4)
EOSINOPHIL NFR BLD AUTO: 5.9 % (ref 0.3–6.2)
EOSINOPHIL NFR BLD AUTO: 7.3 % (ref 0.3–6.2)
ERYTHROCYTE [DISTWIDTH] IN BLOOD BY AUTOMATED COUNT: 17.1 % (ref 12.3–15.4)
ERYTHROCYTE [DISTWIDTH] IN BLOOD BY AUTOMATED COUNT: 17.2 % (ref 12.3–15.4)
GLOBULIN UR ELPH-MCNC: 2.7 GM/DL
GLUCOSE SERPL-MCNC: 86 MG/DL (ref 65–99)
HCO3 BLDV-SCNC: 27.3 MMOL/L (ref 22–28)
HCT VFR BLD AUTO: 25.7 % (ref 34–46.6)
HCT VFR BLD AUTO: 30.5 % (ref 34–46.6)
HGB BLD-MCNC: 7.9 G/DL (ref 12–15.9)
HGB BLD-MCNC: 9.5 G/DL (ref 12–15.9)
HGB BLDA-MCNC: 8.4 G/DL (ref 13.5–17.5)
IMM GRANULOCYTES # BLD AUTO: 0.11 10*3/MM3 (ref 0–0.05)
IMM GRANULOCYTES # BLD AUTO: 0.13 10*3/MM3 (ref 0–0.05)
IMM GRANULOCYTES NFR BLD AUTO: 0.9 % (ref 0–0.5)
IMM GRANULOCYTES NFR BLD AUTO: 1.1 % (ref 0–0.5)
INHALED O2 CONCENTRATION: 55 %
LYMPHOCYTES # BLD AUTO: 0.96 10*3/MM3 (ref 0.7–3.1)
LYMPHOCYTES # BLD AUTO: 1.16 10*3/MM3 (ref 0.7–3.1)
LYMPHOCYTES NFR BLD AUTO: 8.3 % (ref 19.6–45.3)
LYMPHOCYTES NFR BLD AUTO: 9.7 % (ref 19.6–45.3)
Lab: ABNORMAL
MCH RBC QN AUTO: 27.7 PG (ref 26.6–33)
MCH RBC QN AUTO: 27.7 PG (ref 26.6–33)
MCHC RBC AUTO-ENTMCNC: 30.7 G/DL (ref 31.5–35.7)
MCHC RBC AUTO-ENTMCNC: 31.1 G/DL (ref 31.5–35.7)
MCV RBC AUTO: 88.9 FL (ref 79–97)
MCV RBC AUTO: 90.2 FL (ref 79–97)
METHGB BLD QL: 0.7 % (ref 0–3)
MODALITY: ABNORMAL
MONOCYTES # BLD AUTO: 0.83 10*3/MM3 (ref 0.1–0.9)
MONOCYTES # BLD AUTO: 1.03 10*3/MM3 (ref 0.1–0.9)
MONOCYTES NFR BLD AUTO: 7.2 % (ref 5–12)
MONOCYTES NFR BLD AUTO: 8.6 % (ref 5–12)
NEUTROPHILS NFR BLD AUTO: 72.6 % (ref 42.7–76)
NEUTROPHILS NFR BLD AUTO: 77.4 % (ref 42.7–76)
NEUTROPHILS NFR BLD AUTO: 8.71 10*3/MM3 (ref 1.7–7)
NEUTROPHILS NFR BLD AUTO: 8.97 10*3/MM3 (ref 1.7–7)
NOTIFIED BY: ABNORMAL
NOTIFIED WHO: ABNORMAL
NRBC BLD AUTO-RTO: 0 /100 WBC (ref 0–0.2)
NRBC BLD AUTO-RTO: 0 /100 WBC (ref 0–0.2)
OXYHGB MFR BLDV: 66 % (ref 45–75)
PCO2 BLDV: 42.9 MM HG (ref 41–51)
PEEP RESPIRATORY: 5 CM[H2O]
PH BLDV: 7.41 PH UNITS (ref 7.32–7.42)
PLATELET # BLD AUTO: 296 10*3/MM3 (ref 140–450)
PLATELET # BLD AUTO: 322 10*3/MM3 (ref 140–450)
PMV BLD AUTO: 10.3 FL (ref 6–12)
PMV BLD AUTO: 11 FL (ref 6–12)
PO2 BLDV: 35.4 MM HG (ref 27–53)
POTASSIUM SERPL-SCNC: 3.6 MMOL/L (ref 3.5–5.2)
PROT SERPL-MCNC: 5 G/DL (ref 6–8.5)
RBC # BLD AUTO: 2.85 10*6/MM3 (ref 3.77–5.28)
RBC # BLD AUTO: 3.43 10*6/MM3 (ref 3.77–5.28)
SAO2 % BLDCOV: 67.3 % (ref 45–75)
SET MECH RESP RATE: 25
SODIUM SERPL-SCNC: 134 MMOL/L (ref 136–145)
UNIT  ABO: NORMAL
UNIT  ABO: NORMAL
UNIT  RH: NORMAL
UNIT  RH: NORMAL
VENTILATOR MODE: ABNORMAL
VT ON VENT VENT: 400 ML
WBC NRBC COR # BLD AUTO: 11.6 10*3/MM3 (ref 3.4–10.8)
WBC NRBC COR # BLD AUTO: 11.99 10*3/MM3 (ref 3.4–10.8)

## 2025-05-02 PROCEDURE — 92978 ENDOLUMINL IVUS OCT C 1ST: CPT | Performed by: INTERNAL MEDICINE

## 2025-05-02 PROCEDURE — 93458 L HRT ARTERY/VENTRICLE ANGIO: CPT | Performed by: INTERNAL MEDICINE

## 2025-05-02 PROCEDURE — 82820 HEMOGLOBIN-OXYGEN AFFINITY: CPT

## 2025-05-02 PROCEDURE — C1725 CATH, TRANSLUMIN NON-LASER: HCPCS | Performed by: INTERNAL MEDICINE

## 2025-05-02 PROCEDURE — C1887 CATHETER, GUIDING: HCPCS | Performed by: INTERNAL MEDICINE

## 2025-05-02 PROCEDURE — C1894 INTRO/SHEATH, NON-LASER: HCPCS | Performed by: INTERNAL MEDICINE

## 2025-05-02 PROCEDURE — 25010000002 HYDRALAZINE PER 20 MG: Performed by: INTERNAL MEDICINE

## 2025-05-02 PROCEDURE — C1769 GUIDE WIRE: HCPCS | Performed by: INTERNAL MEDICINE

## 2025-05-02 PROCEDURE — OUTSIDEPOS PR OUTSIDE POS PLACEHOLDER: Performed by: INTERNAL MEDICINE

## 2025-05-02 PROCEDURE — 25010000002 FENTANYL CITRATE (PF) 50 MCG/ML SOLUTION: Performed by: INTERNAL MEDICINE

## 2025-05-02 PROCEDURE — 71045 X-RAY EXAM CHEST 1 VIEW: CPT | Performed by: RADIOLOGY

## 2025-05-02 PROCEDURE — 92979 ENDOLUMINL IVUS OCT C EA: CPT | Performed by: INTERNAL MEDICINE

## 2025-05-02 PROCEDURE — 85025 COMPLETE CBC W/AUTO DIFF WBC: CPT | Performed by: INTERNAL MEDICINE

## 2025-05-02 PROCEDURE — 92920 PRQ TRLUML C ANGIOP 1ART&/BR: CPT | Performed by: INTERNAL MEDICINE

## 2025-05-02 PROCEDURE — C1753 CATH, INTRAVAS ULTRASOUND: HCPCS | Performed by: INTERNAL MEDICINE

## 2025-05-02 PROCEDURE — 80053 COMPREHEN METABOLIC PANEL: CPT | Performed by: INTERNAL MEDICINE

## 2025-05-02 PROCEDURE — 25010000002 HEPARIN (PORCINE) PER 1000 UNITS: Performed by: INTERNAL MEDICINE

## 2025-05-02 PROCEDURE — 25010000002 LIDOCAINE 2% SOLUTION: Performed by: INTERNAL MEDICINE

## 2025-05-02 PROCEDURE — 99152 MOD SED SAME PHYS/QHP 5/>YRS: CPT | Performed by: INTERNAL MEDICINE

## 2025-05-02 PROCEDURE — 71045 X-RAY EXAM CHEST 1 VIEW: CPT

## 2025-05-02 PROCEDURE — 25810000003 SODIUM CHLORIDE 0.9 % SOLUTION: Performed by: INTERNAL MEDICINE

## 2025-05-02 PROCEDURE — 99153 MOD SED SAME PHYS/QHP EA: CPT | Performed by: INTERNAL MEDICINE

## 2025-05-02 PROCEDURE — 36415 COLL VENOUS BLD VENIPUNCTURE: CPT | Performed by: INTERNAL MEDICINE

## 2025-05-02 PROCEDURE — P9016 RBC LEUKOCYTES REDUCED: HCPCS

## 2025-05-02 PROCEDURE — 86900 BLOOD TYPING SEROLOGIC ABO: CPT

## 2025-05-02 PROCEDURE — 25010000002 MIDAZOLAM PER 1 MG: Performed by: INTERNAL MEDICINE

## 2025-05-02 PROCEDURE — 25510000001 IOPAMIDOL PER 1 ML: Performed by: INTERNAL MEDICINE

## 2025-05-02 PROCEDURE — 86140 C-REACTIVE PROTEIN: CPT | Performed by: INTERNAL MEDICINE

## 2025-05-02 PROCEDURE — 82805 BLOOD GASES W/O2 SATURATION: CPT

## 2025-05-02 RX ORDER — MIDAZOLAM HYDROCHLORIDE 1 MG/ML
INJECTION, SOLUTION INTRAMUSCULAR; INTRAVENOUS
Status: DISCONTINUED | OUTPATIENT
Start: 2025-05-02 | End: 2025-05-02 | Stop reason: HOSPADM

## 2025-05-02 RX ORDER — RISPERIDONE 1 MG/1
1 TABLET ORAL 3 TIMES DAILY
Status: ON HOLD | COMMUNITY
End: 2025-05-07

## 2025-05-02 RX ORDER — HYDRALAZINE HYDROCHLORIDE 20 MG/ML
INJECTION INTRAMUSCULAR; INTRAVENOUS
Status: DISCONTINUED | OUTPATIENT
Start: 2025-05-02 | End: 2025-05-02 | Stop reason: HOSPADM

## 2025-05-02 RX ORDER — FENTANYL CITRATE 50 UG/ML
INJECTION, SOLUTION INTRAMUSCULAR; INTRAVENOUS
Status: DISCONTINUED | OUTPATIENT
Start: 2025-05-02 | End: 2025-05-02 | Stop reason: HOSPADM

## 2025-05-02 RX ORDER — LIDOCAINE HYDROCHLORIDE 20 MG/ML
INJECTION, SOLUTION INFILTRATION; PERINEURAL
Status: DISCONTINUED | OUTPATIENT
Start: 2025-05-02 | End: 2025-05-02 | Stop reason: HOSPADM

## 2025-05-02 RX ORDER — ASPIRIN 81 MG/1
81 TABLET ORAL DAILY
Status: ON HOLD | COMMUNITY

## 2025-05-02 RX ORDER — CLOPIDOGREL BISULFATE 75 MG/1
75 TABLET ORAL DAILY
Status: ON HOLD | COMMUNITY

## 2025-05-02 RX ORDER — SODIUM CHLORIDE 9 MG/ML
INJECTION, SOLUTION INTRAVENOUS
Status: COMPLETED | OUTPATIENT
Start: 2025-05-02 | End: 2025-05-02

## 2025-05-02 RX ORDER — MIDODRINE HYDROCHLORIDE 5 MG/1
10 TABLET ORAL 3 TIMES DAILY PRN
Status: ON HOLD | COMMUNITY
End: 2025-05-07

## 2025-05-02 RX ORDER — DIAZEPAM 10 MG/1
10 TABLET ORAL 3 TIMES DAILY
Status: ON HOLD | COMMUNITY
End: 2025-05-07

## 2025-05-02 RX ORDER — IOPAMIDOL 755 MG/ML
INJECTION, SOLUTION INTRAVASCULAR
Status: DISCONTINUED | OUTPATIENT
Start: 2025-05-02 | End: 2025-05-02 | Stop reason: HOSPADM

## 2025-05-02 RX ORDER — HEPARIN SODIUM 1000 [USP'U]/ML
INJECTION, SOLUTION INTRAVENOUS; SUBCUTANEOUS
Status: DISCONTINUED | OUTPATIENT
Start: 2025-05-02 | End: 2025-05-02 | Stop reason: HOSPADM

## 2025-05-02 RX ORDER — METOPROLOL TARTRATE 1 MG/ML
INJECTION, SOLUTION INTRAVENOUS
Status: DISCONTINUED | OUTPATIENT
Start: 2025-05-02 | End: 2025-05-02 | Stop reason: HOSPADM

## 2025-05-02 NOTE — NURSING NOTE
ACT drawn at 1605. Result was 170. 1608 Kira BALLESTEROS(r) removed sheath and is holding manual pressure. Manual pressure held for 15 minutes.

## 2025-05-03 ENCOUNTER — OUTSIDE FACILITY SERVICE (OUTPATIENT)
Dept: PULMONOLOGY | Facility: CLINIC | Age: 70
End: 2025-05-03
Payer: MEDICARE

## 2025-05-03 LAB
ALBUMIN SERPL-MCNC: 2.1 G/DL (ref 3.5–5.2)
ALBUMIN/GLOB SERPL: 0.7 G/DL
ALP SERPL-CCNC: 108 U/L (ref 39–117)
ALT SERPL W P-5'-P-CCNC: 15 U/L (ref 1–33)
ANION GAP SERPL CALCULATED.3IONS-SCNC: 11.9 MMOL/L (ref 5–15)
AST SERPL-CCNC: 13 U/L (ref 1–32)
BASOPHILS # BLD AUTO: 0.04 10*3/MM3 (ref 0–0.2)
BASOPHILS NFR BLD AUTO: 0.4 % (ref 0–1.5)
BH BB BLOOD EXPIRATION DATE: NORMAL
BH BB BLOOD TYPE BARCODE: 5100
BH BB DISPENSE STATUS: NORMAL
BH BB PRODUCT CODE: NORMAL
BH BB UNIT NUMBER: NORMAL
BILIRUB SERPL-MCNC: 0.2 MG/DL (ref 0–1.2)
BUN SERPL-MCNC: 13 MG/DL (ref 8–23)
BUN/CREAT SERPL: 4.3 (ref 7–25)
CALCIUM SPEC-SCNC: 8.1 MG/DL (ref 8.6–10.5)
CHLORIDE SERPL-SCNC: 94 MMOL/L (ref 98–107)
CO2 SERPL-SCNC: 29.1 MMOL/L (ref 22–29)
CREAT SERPL-MCNC: 3.03 MG/DL (ref 0.57–1)
CROSSMATCH INTERPRETATION: NORMAL
CRP SERPL-MCNC: 10.55 MG/DL (ref 0–0.5)
DEPRECATED RDW RBC AUTO: 55.3 FL (ref 37–54)
EGFRCR SERPLBLD CKD-EPI 2021: 16.1 ML/MIN/1.73
EOSINOPHIL # BLD AUTO: 0.4 10*3/MM3 (ref 0–0.4)
EOSINOPHIL NFR BLD AUTO: 4.5 % (ref 0.3–6.2)
ERYTHROCYTE [DISTWIDTH] IN BLOOD BY AUTOMATED COUNT: 17.3 % (ref 12.3–15.4)
GLOBULIN UR ELPH-MCNC: 2.9 GM/DL
GLUCOSE SERPL-MCNC: 79 MG/DL (ref 65–99)
HCT VFR BLD AUTO: 28.8 % (ref 34–46.6)
HGB BLD-MCNC: 9.3 G/DL (ref 12–15.9)
IMM GRANULOCYTES # BLD AUTO: 0.09 10*3/MM3 (ref 0–0.05)
IMM GRANULOCYTES NFR BLD AUTO: 1 % (ref 0–0.5)
LYMPHOCYTES # BLD AUTO: 1.06 10*3/MM3 (ref 0.7–3.1)
LYMPHOCYTES NFR BLD AUTO: 11.9 % (ref 19.6–45.3)
MCH RBC QN AUTO: 28.4 PG (ref 26.6–33)
MCHC RBC AUTO-ENTMCNC: 32.3 G/DL (ref 31.5–35.7)
MCV RBC AUTO: 87.8 FL (ref 79–97)
MONOCYTES # BLD AUTO: 0.91 10*3/MM3 (ref 0.1–0.9)
MONOCYTES NFR BLD AUTO: 10.2 % (ref 5–12)
NEUTROPHILS NFR BLD AUTO: 6.39 10*3/MM3 (ref 1.7–7)
NEUTROPHILS NFR BLD AUTO: 72 % (ref 42.7–76)
NRBC BLD AUTO-RTO: 0 /100 WBC (ref 0–0.2)
PLATELET # BLD AUTO: 289 10*3/MM3 (ref 140–450)
PMV BLD AUTO: 10.8 FL (ref 6–12)
POTASSIUM SERPL-SCNC: 3.4 MMOL/L (ref 3.5–5.2)
PROT SERPL-MCNC: 5 G/DL (ref 6–8.5)
RBC # BLD AUTO: 3.28 10*6/MM3 (ref 3.77–5.28)
SODIUM SERPL-SCNC: 135 MMOL/L (ref 136–145)
UNIT  ABO: NORMAL
UNIT  RH: NORMAL
WBC NRBC COR # BLD AUTO: 8.89 10*3/MM3 (ref 3.4–10.8)

## 2025-05-03 PROCEDURE — 80053 COMPREHEN METABOLIC PANEL: CPT | Performed by: INTERNAL MEDICINE

## 2025-05-03 PROCEDURE — 85025 COMPLETE CBC W/AUTO DIFF WBC: CPT | Performed by: INTERNAL MEDICINE

## 2025-05-03 PROCEDURE — 86140 C-REACTIVE PROTEIN: CPT | Performed by: INTERNAL MEDICINE

## 2025-05-03 PROCEDURE — 36415 COLL VENOUS BLD VENIPUNCTURE: CPT | Performed by: INTERNAL MEDICINE

## 2025-05-04 ENCOUNTER — OUTSIDE FACILITY SERVICE (OUTPATIENT)
Dept: PULMONOLOGY | Facility: CLINIC | Age: 70
End: 2025-05-04
Payer: MEDICARE

## 2025-05-04 LAB
ALBUMIN SERPL-MCNC: 2.2 G/DL (ref 3.5–5.2)
ALBUMIN/GLOB SERPL: 0.7 G/DL
ALP SERPL-CCNC: 119 U/L (ref 39–117)
ALT SERPL W P-5'-P-CCNC: 12 U/L (ref 1–33)
ANION GAP SERPL CALCULATED.3IONS-SCNC: 12.2 MMOL/L (ref 5–15)
APTT PPP: 36.8 SECONDS (ref 24.5–35.9)
APTT PPP: 39.6 SECONDS (ref 24.5–35.9)
AST SERPL-CCNC: 12 U/L (ref 1–32)
ATMOSPHERIC PRESS: 721 MMHG
BASE EXCESS BLDV CALC-SCNC: 8 MMOL/L (ref 0–2)
BASOPHILS # BLD AUTO: 0.05 10*3/MM3 (ref 0–0.2)
BASOPHILS NFR BLD AUTO: 0.5 % (ref 0–1.5)
BDY SITE: ABNORMAL
BILIRUB SERPL-MCNC: 0.2 MG/DL (ref 0–1.2)
BUN SERPL-MCNC: 20 MG/DL (ref 8–23)
BUN/CREAT SERPL: 5.1 (ref 7–25)
CALCIUM SPEC-SCNC: 9.2 MG/DL (ref 8.6–10.5)
CHLORIDE SERPL-SCNC: 95 MMOL/L (ref 98–107)
CO2 BLDA-SCNC: 34.1 MMOL/L (ref 22–33)
CO2 SERPL-SCNC: 27.8 MMOL/L (ref 22–29)
COHGB MFR BLD: 1.4 % (ref 0–5)
CREAT SERPL-MCNC: 3.93 MG/DL (ref 0.57–1)
CRP SERPL-MCNC: 8.98 MG/DL (ref 0–0.5)
DEPRECATED RDW RBC AUTO: 55.9 FL (ref 37–54)
EGFRCR SERPLBLD CKD-EPI 2021: 11.8 ML/MIN/1.73
EOSINOPHIL # BLD AUTO: 0.34 10*3/MM3 (ref 0–0.4)
EOSINOPHIL NFR BLD AUTO: 3.6 % (ref 0.3–6.2)
ERYTHROCYTE [DISTWIDTH] IN BLOOD BY AUTOMATED COUNT: 17.1 % (ref 12.3–15.4)
GLOBULIN UR ELPH-MCNC: 3 GM/DL
GLUCOSE SERPL-MCNC: 112 MG/DL (ref 65–99)
HCO3 BLDV-SCNC: 32.7 MMOL/L (ref 22–28)
HCT VFR BLD AUTO: 29.8 % (ref 34–46.6)
HGB BLD-MCNC: 9.5 G/DL (ref 12–15.9)
HGB BLDA-MCNC: 9.8 G/DL (ref 13.5–17.5)
IMM GRANULOCYTES # BLD AUTO: 0.13 10*3/MM3 (ref 0–0.05)
IMM GRANULOCYTES NFR BLD AUTO: 1.4 % (ref 0–0.5)
INHALED O2 CONCENTRATION: 55 %
INR PPP: 1.06 (ref 0.9–1.1)
LYMPHOCYTES # BLD AUTO: 1.46 10*3/MM3 (ref 0.7–3.1)
LYMPHOCYTES NFR BLD AUTO: 15.4 % (ref 19.6–45.3)
Lab: ABNORMAL
MCH RBC QN AUTO: 28.4 PG (ref 26.6–33)
MCHC RBC AUTO-ENTMCNC: 31.9 G/DL (ref 31.5–35.7)
MCV RBC AUTO: 89 FL (ref 79–97)
METHGB BLD QL: 0.6 % (ref 0–3)
MODALITY: ABNORMAL
MONOCYTES # BLD AUTO: 1.1 10*3/MM3 (ref 0.1–0.9)
MONOCYTES NFR BLD AUTO: 11.6 % (ref 5–12)
NEUTROPHILS NFR BLD AUTO: 6.43 10*3/MM3 (ref 1.7–7)
NEUTROPHILS NFR BLD AUTO: 67.5 % (ref 42.7–76)
NOTIFIED BY: ABNORMAL
NOTIFIED WHO: ABNORMAL
NRBC BLD AUTO-RTO: 0 /100 WBC (ref 0–0.2)
OXYHGB MFR BLDV: 66.3 % (ref 45–75)
PCO2 BLDV: 45.7 MM HG (ref 41–51)
PEEP RESPIRATORY: 5 CM[H2O]
PH BLDV: 7.46 PH UNITS (ref 7.32–7.42)
PLATELET # BLD AUTO: 288 10*3/MM3 (ref 140–450)
PMV BLD AUTO: 10.4 FL (ref 6–12)
PO2 BLDV: 34.5 MM HG (ref 27–53)
POTASSIUM SERPL-SCNC: 3.3 MMOL/L (ref 3.5–5.2)
PROT SERPL-MCNC: 5.2 G/DL (ref 6–8.5)
PROTHROMBIN TIME: 14 SECONDS (ref 11.6–15.1)
RBC # BLD AUTO: 3.35 10*6/MM3 (ref 3.77–5.28)
SAO2 % BLDCOV: 67.7 % (ref 45–75)
SET MECH RESP RATE: 22
SODIUM SERPL-SCNC: 135 MMOL/L (ref 136–145)
VENTILATOR MODE: ABNORMAL
VT ON VENT VENT: 400 ML
WBC NRBC COR # BLD AUTO: 9.51 10*3/MM3 (ref 3.4–10.8)

## 2025-05-04 PROCEDURE — 85730 THROMBOPLASTIN TIME PARTIAL: CPT | Performed by: INTERNAL MEDICINE

## 2025-05-04 PROCEDURE — 82820 HEMOGLOBIN-OXYGEN AFFINITY: CPT

## 2025-05-04 PROCEDURE — 80053 COMPREHEN METABOLIC PANEL: CPT | Performed by: INTERNAL MEDICINE

## 2025-05-04 PROCEDURE — 36415 COLL VENOUS BLD VENIPUNCTURE: CPT | Performed by: INTERNAL MEDICINE

## 2025-05-04 PROCEDURE — 86140 C-REACTIVE PROTEIN: CPT | Performed by: INTERNAL MEDICINE

## 2025-05-04 PROCEDURE — 82805 BLOOD GASES W/O2 SATURATION: CPT

## 2025-05-04 PROCEDURE — 85025 COMPLETE CBC W/AUTO DIFF WBC: CPT | Performed by: INTERNAL MEDICINE

## 2025-05-04 PROCEDURE — 85610 PROTHROMBIN TIME: CPT | Performed by: INTERNAL MEDICINE

## 2025-05-05 ENCOUNTER — OUTSIDE FACILITY SERVICE (OUTPATIENT)
Dept: PULMONOLOGY | Facility: CLINIC | Age: 70
End: 2025-05-05
Payer: MEDICARE

## 2025-05-05 ENCOUNTER — APPOINTMENT (OUTPATIENT)
Facility: HOSPITAL | Age: 70
End: 2025-05-05
Payer: MEDICARE

## 2025-05-05 ENCOUNTER — HOSPITAL ENCOUNTER (INPATIENT)
Facility: HOSPITAL | Age: 70
LOS: 17 days | Discharge: NURSING, REHAB OR LONG-TERM CARE | End: 2025-05-22
Attending: INTERNAL MEDICINE | Admitting: INTERNAL MEDICINE
Payer: MEDICARE

## 2025-05-05 ENCOUNTER — APPOINTMENT (OUTPATIENT)
Dept: GENERAL RADIOLOGY | Facility: HOSPITAL | Age: 70
End: 2025-05-05
Payer: MEDICARE

## 2025-05-05 ENCOUNTER — OUTSIDE FACILITY SERVICE (OUTPATIENT)
Dept: INFECTIOUS DISEASES | Facility: CLINIC | Age: 70
End: 2025-05-05

## 2025-05-05 DIAGNOSIS — S30.1XXA HEMATOMA OF GROIN, INITIAL ENCOUNTER: Primary | ICD-10-CM

## 2025-05-05 PROBLEM — E78.5 DYSLIPIDEMIA: Status: ACTIVE | Noted: 2025-05-05

## 2025-05-05 PROBLEM — Z93.0 TRACHEOSTOMY PRESENT: Status: ACTIVE | Noted: 2025-05-05

## 2025-05-05 PROBLEM — I25.10 CAD (CORONARY ARTERY DISEASE): Status: ACTIVE | Noted: 2025-05-05

## 2025-05-05 PROBLEM — Q61.3 POLYCYSTIC KIDNEY DISEASE: Chronic | Status: ACTIVE | Noted: 2025-05-05

## 2025-05-05 PROBLEM — I71.40 AAA (ABDOMINAL AORTIC ANEURYSM): Chronic | Status: ACTIVE | Noted: 2025-05-05

## 2025-05-05 PROBLEM — Z93.1 S/P PERCUTANEOUS ENDOSCOPIC GASTROSTOMY (PEG) TUBE PLACEMENT: Status: ACTIVE | Noted: 2025-05-05

## 2025-05-05 PROBLEM — N18.6 ESRD (END STAGE RENAL DISEASE): Chronic | Status: ACTIVE | Noted: 2025-05-05

## 2025-05-05 PROBLEM — I24.0 RIGHT CORONARY ARTERY OCCLUSION: Status: ACTIVE | Noted: 2025-05-05

## 2025-05-05 PROBLEM — I25.10 CAD (CORONARY ARTERY DISEASE): Chronic | Status: ACTIVE | Noted: 2025-05-05

## 2025-05-05 PROBLEM — I10 HTN (HYPERTENSION): Status: ACTIVE | Noted: 2025-05-05

## 2025-05-05 PROBLEM — E78.5 DYSLIPIDEMIA: Chronic | Status: ACTIVE | Noted: 2025-05-05

## 2025-05-05 PROBLEM — F32.A DEPRESSION: Chronic | Status: ACTIVE | Noted: 2025-05-05

## 2025-05-05 PROBLEM — I71.40 AAA (ABDOMINAL AORTIC ANEURYSM): Status: ACTIVE | Noted: 2025-05-05

## 2025-05-05 PROBLEM — I10 HTN (HYPERTENSION): Chronic | Status: ACTIVE | Noted: 2025-05-05

## 2025-05-05 LAB
ALBUMIN SERPL-MCNC: 2.1 G/DL (ref 3.5–5.2)
ALBUMIN/GLOB SERPL: 0.7 G/DL
ALP SERPL-CCNC: 112 U/L (ref 39–117)
ALT SERPL W P-5'-P-CCNC: 9 U/L (ref 1–33)
ANION GAP SERPL CALCULATED.3IONS-SCNC: 12.4 MMOL/L (ref 5–15)
ANION GAP SERPL CALCULATED.3IONS-SCNC: 15 MMOL/L (ref 5–15)
APTT PPP: 48.5 SECONDS (ref 24.5–35.9)
ARTERIAL PATENCY WRIST A: POSITIVE
AST SERPL-CCNC: 11 U/L (ref 1–32)
ATMOSPHERIC PRESS: ABNORMAL MM[HG]
BASE EXCESS BLDA CALC-SCNC: 3.8 MMOL/L (ref 0–2)
BASOPHILS # BLD AUTO: 0.07 10*3/MM3 (ref 0–0.2)
BASOPHILS # BLD AUTO: 0.08 10*3/MM3 (ref 0–0.2)
BASOPHILS NFR BLD AUTO: 0.5 % (ref 0–1.5)
BASOPHILS NFR BLD AUTO: 0.8 % (ref 0–1.5)
BDY SITE: ABNORMAL
BILIRUB SERPL-MCNC: 0.2 MG/DL (ref 0–1.2)
BODY TEMPERATURE: 37
BUN SERPL-MCNC: 25 MG/DL (ref 8–23)
BUN SERPL-MCNC: 27 MG/DL (ref 8–23)
BUN/CREAT SERPL: 5.4 (ref 7–25)
BUN/CREAT SERPL: 6 (ref 7–25)
CALCIUM SPEC-SCNC: 9.1 MG/DL (ref 8.6–10.5)
CALCIUM SPEC-SCNC: 9.5 MG/DL (ref 8.6–10.5)
CHLORIDE SERPL-SCNC: 93 MMOL/L (ref 98–107)
CHLORIDE SERPL-SCNC: 93 MMOL/L (ref 98–107)
CO2 BLDA-SCNC: 30.5 MMOL/L (ref 22–33)
CO2 SERPL-SCNC: 24 MMOL/L (ref 22–29)
CO2 SERPL-SCNC: 24.6 MMOL/L (ref 22–29)
COHGB MFR BLD: 1.1 % (ref 0–2)
CREAT SERPL-MCNC: 4.51 MG/DL (ref 0.57–1)
CREAT SERPL-MCNC: 4.63 MG/DL (ref 0.57–1)
CRP SERPL-MCNC: 6.44 MG/DL (ref 0–0.5)
DEPRECATED RDW RBC AUTO: 54.5 FL (ref 37–54)
DEPRECATED RDW RBC AUTO: 60.6 FL (ref 37–54)
EGFRCR SERPLBLD CKD-EPI 2021: 10 ML/MIN/1.73
EGFRCR SERPLBLD CKD-EPI 2021: 9.7 ML/MIN/1.73
EOSINOPHIL # BLD AUTO: 0.32 10*3/MM3 (ref 0–0.4)
EOSINOPHIL # BLD AUTO: 0.5 10*3/MM3 (ref 0–0.4)
EOSINOPHIL NFR BLD AUTO: 2.4 % (ref 0.3–6.2)
EOSINOPHIL NFR BLD AUTO: 5 % (ref 0.3–6.2)
ERYTHROCYTE [DISTWIDTH] IN BLOOD BY AUTOMATED COUNT: 16.4 % (ref 12.3–15.4)
ERYTHROCYTE [DISTWIDTH] IN BLOOD BY AUTOMATED COUNT: 17.1 % (ref 12.3–15.4)
GLOBULIN UR ELPH-MCNC: 2.9 GM/DL
GLUCOSE BLDC GLUCOMTR-MCNC: 119 MG/DL (ref 70–130)
GLUCOSE SERPL-MCNC: 107 MG/DL (ref 65–99)
GLUCOSE SERPL-MCNC: 108 MG/DL (ref 65–99)
HCO3 BLDA-SCNC: 29.1 MMOL/L (ref 20–26)
HCT VFR BLD AUTO: 31.2 % (ref 34–46.6)
HCT VFR BLD AUTO: 33.4 % (ref 34–46.6)
HCT VFR BLD CALC: 32.9 % (ref 38–51)
HGB BLD-MCNC: 9.5 G/DL (ref 12–15.9)
HGB BLD-MCNC: 9.9 G/DL (ref 12–15.9)
HGB BLDA-MCNC: 10.7 G/DL (ref 14–18)
IMM GRANULOCYTES # BLD AUTO: 0.13 10*3/MM3 (ref 0–0.05)
IMM GRANULOCYTES # BLD AUTO: 0.14 10*3/MM3 (ref 0–0.05)
IMM GRANULOCYTES NFR BLD AUTO: 1 % (ref 0–0.5)
IMM GRANULOCYTES NFR BLD AUTO: 1.3 % (ref 0–0.5)
INHALED O2 CONCENTRATION: 50 %
INR PPP: 1.02 (ref 0.89–1.12)
LYMPHOCYTES # BLD AUTO: 0.91 10*3/MM3 (ref 0.7–3.1)
LYMPHOCYTES # BLD AUTO: 1.65 10*3/MM3 (ref 0.7–3.1)
LYMPHOCYTES NFR BLD AUTO: 16.5 % (ref 19.6–45.3)
LYMPHOCYTES NFR BLD AUTO: 6.8 % (ref 19.6–45.3)
MAGNESIUM SERPL-MCNC: 2 MG/DL (ref 1.6–2.4)
MCH RBC QN AUTO: 27.6 PG (ref 26.6–33)
MCH RBC QN AUTO: 28.4 PG (ref 26.6–33)
MCHC RBC AUTO-ENTMCNC: 29.6 G/DL (ref 31.5–35.7)
MCHC RBC AUTO-ENTMCNC: 30.4 G/DL (ref 31.5–35.7)
MCV RBC AUTO: 90.7 FL (ref 79–97)
MCV RBC AUTO: 95.7 FL (ref 79–97)
METHGB BLD QL: 0.5 % (ref 0–1.5)
MODALITY: ABNORMAL
MONOCYTES # BLD AUTO: 0.96 10*3/MM3 (ref 0.1–0.9)
MONOCYTES # BLD AUTO: 1.05 10*3/MM3 (ref 0.1–0.9)
MONOCYTES NFR BLD AUTO: 7.8 % (ref 5–12)
MONOCYTES NFR BLD AUTO: 9.6 % (ref 5–12)
NEUTROPHILS NFR BLD AUTO: 10.98 10*3/MM3 (ref 1.7–7)
NEUTROPHILS NFR BLD AUTO: 6.66 10*3/MM3 (ref 1.7–7)
NEUTROPHILS NFR BLD AUTO: 66.8 % (ref 42.7–76)
NEUTROPHILS NFR BLD AUTO: 81.5 % (ref 42.7–76)
NRBC BLD AUTO-RTO: 0 /100 WBC (ref 0–0.2)
NRBC BLD AUTO-RTO: 0 /100 WBC (ref 0–0.2)
OXYHGB MFR BLDV: 97.8 % (ref 94–99)
PCO2 BLDA: 46.5 MM HG (ref 35–45)
PCO2 TEMP ADJ BLD: 46.5 MM HG (ref 35–45)
PEEP RESPIRATORY: 5 CM[H2O]
PH BLDA: 7.41 PH UNITS (ref 7.35–7.45)
PH, TEMP CORRECTED: 7.41 PH UNITS
PHOSPHATE SERPL-MCNC: 4.1 MG/DL (ref 2.5–4.5)
PLATELET # BLD AUTO: 240 10*3/MM3 (ref 140–450)
PLATELET # BLD AUTO: 274 10*3/MM3 (ref 140–450)
PMV BLD AUTO: 10.6 FL (ref 6–12)
PMV BLD AUTO: 10.9 FL (ref 6–12)
PO2 BLDA: 132 MM HG (ref 83–108)
PO2 TEMP ADJ BLD: 132 MM HG (ref 83–108)
POTASSIUM SERPL-SCNC: 3.2 MMOL/L (ref 3.5–5.2)
POTASSIUM SERPL-SCNC: 3.4 MMOL/L (ref 3.5–5.2)
PROT SERPL-MCNC: 5 G/DL (ref 6–8.5)
PROTHROMBIN TIME: 14 SECONDS (ref 12.2–15.3)
RBC # BLD AUTO: 3.44 10*6/MM3 (ref 3.77–5.28)
RBC # BLD AUTO: 3.49 10*6/MM3 (ref 3.77–5.28)
SODIUM SERPL-SCNC: 130 MMOL/L (ref 136–145)
SODIUM SERPL-SCNC: 132 MMOL/L (ref 136–145)
UFH PPP CHRO-ACNC: 0.1 IU/ML (ref 0.3–0.7)
UFH PPP CHRO-ACNC: 0.17 IU/ML (ref 0.3–0.7)
VENTILATOR MODE: ABNORMAL
VT ON VENT VENT: 0.36 ML
WBC NRBC COR # BLD AUTO: 13.47 10*3/MM3 (ref 3.4–10.8)
WBC NRBC COR # BLD AUTO: 9.98 10*3/MM3 (ref 3.4–10.8)

## 2025-05-05 PROCEDURE — 85610 PROTHROMBIN TIME: CPT

## 2025-05-05 PROCEDURE — 25010000002 HEPARIN (PORCINE) 25000-0.45 UT/250ML-% SOLUTION

## 2025-05-05 PROCEDURE — 83050 HGB METHEMOGLOBIN QUAN: CPT

## 2025-05-05 PROCEDURE — 83735 ASSAY OF MAGNESIUM: CPT

## 2025-05-05 PROCEDURE — 85730 THROMBOPLASTIN TIME PARTIAL: CPT | Performed by: INTERNAL MEDICINE

## 2025-05-05 PROCEDURE — 36600 WITHDRAWAL OF ARTERIAL BLOOD: CPT

## 2025-05-05 PROCEDURE — 87077 CULTURE AEROBIC IDENTIFY: CPT

## 2025-05-05 PROCEDURE — 85520 HEPARIN ASSAY: CPT

## 2025-05-05 PROCEDURE — 82375 ASSAY CARBOXYHB QUANT: CPT

## 2025-05-05 PROCEDURE — 93010 ELECTROCARDIOGRAM REPORT: CPT | Performed by: INTERNAL MEDICINE

## 2025-05-05 PROCEDURE — 99221 1ST HOSP IP/OBS SF/LOW 40: CPT | Performed by: INTERNAL MEDICINE

## 2025-05-05 PROCEDURE — 25010000002 HYDRALAZINE PER 20 MG

## 2025-05-05 PROCEDURE — 86140 C-REACTIVE PROTEIN: CPT | Performed by: INTERNAL MEDICINE

## 2025-05-05 PROCEDURE — 85025 COMPLETE CBC W/AUTO DIFF WBC: CPT | Performed by: INTERNAL MEDICINE

## 2025-05-05 PROCEDURE — 82948 REAGENT STRIP/BLOOD GLUCOSE: CPT

## 2025-05-05 PROCEDURE — 25810000003 SODIUM CHLORIDE 0.9 % SOLUTION 250 ML FLEX CONT

## 2025-05-05 PROCEDURE — 87070 CULTURE OTHR SPECIMN AEROBIC: CPT

## 2025-05-05 PROCEDURE — 5A1955Z RESPIRATORY VENTILATION, GREATER THAN 96 CONSECUTIVE HOURS: ICD-10-PCS | Performed by: INTERNAL MEDICINE

## 2025-05-05 PROCEDURE — 85025 COMPLETE CBC W/AUTO DIFF WBC: CPT

## 2025-05-05 PROCEDURE — 87186 SC STD MICRODIL/AGAR DIL: CPT

## 2025-05-05 PROCEDURE — 25010000002 HEPARIN (PORCINE) PER 1000 UNITS

## 2025-05-05 PROCEDURE — 84100 ASSAY OF PHOSPHORUS: CPT

## 2025-05-05 PROCEDURE — 36415 COLL VENOUS BLD VENIPUNCTURE: CPT | Performed by: INTERNAL MEDICINE

## 2025-05-05 PROCEDURE — 93005 ELECTROCARDIOGRAM TRACING: CPT

## 2025-05-05 PROCEDURE — 71045 X-RAY EXAM CHEST 1 VIEW: CPT

## 2025-05-05 PROCEDURE — 94799 UNLISTED PULMONARY SVC/PX: CPT

## 2025-05-05 PROCEDURE — 94002 VENT MGMT INPAT INIT DAY: CPT

## 2025-05-05 PROCEDURE — 99291 CRITICAL CARE FIRST HOUR: CPT | Performed by: INTERNAL MEDICINE

## 2025-05-05 PROCEDURE — 82805 BLOOD GASES W/O2 SATURATION: CPT

## 2025-05-05 PROCEDURE — 87205 SMEAR GRAM STAIN: CPT

## 2025-05-05 PROCEDURE — 80053 COMPREHEN METABOLIC PANEL: CPT | Performed by: INTERNAL MEDICINE

## 2025-05-05 PROCEDURE — 25010000002 NICARDIPINE 2.5 MG/ML SOLUTION 10 ML VIAL

## 2025-05-05 RX ORDER — DEXMEDETOMIDINE HYDROCHLORIDE 4 UG/ML
.2-1.5 INJECTION, SOLUTION INTRAVENOUS
Status: DISCONTINUED | OUTPATIENT
Start: 2025-05-05 | End: 2025-05-22

## 2025-05-05 RX ORDER — CLONIDINE HYDROCHLORIDE 0.1 MG/1
0.1 TABLET ORAL EVERY 8 HOURS SCHEDULED
Status: DISCONTINUED | OUTPATIENT
Start: 2025-05-05 | End: 2025-05-05

## 2025-05-05 RX ORDER — METOPROLOL TARTRATE 25 MG/1
25 TABLET, FILM COATED ORAL EVERY 12 HOURS SCHEDULED
Status: DISCONTINUED | OUTPATIENT
Start: 2025-05-05 | End: 2025-05-15

## 2025-05-05 RX ORDER — NYSTATIN 100000 [USP'U]/ML
5 SUSPENSION ORAL 4 TIMES DAILY
Status: DISCONTINUED | OUTPATIENT
Start: 2025-05-05 | End: 2025-05-10

## 2025-05-05 RX ORDER — HEPARIN SODIUM 1000 [USP'U]/ML
1700 INJECTION, SOLUTION INTRAVENOUS; SUBCUTANEOUS ONCE
Status: COMPLETED | OUTPATIENT
Start: 2025-05-06 | End: 2025-05-05

## 2025-05-05 RX ORDER — HYDRALAZINE HYDROCHLORIDE 20 MG/ML
20 INJECTION INTRAMUSCULAR; INTRAVENOUS EVERY 6 HOURS PRN
Status: DISCONTINUED | OUTPATIENT
Start: 2025-05-05 | End: 2025-05-09

## 2025-05-05 RX ORDER — NITROGLYCERIN 0.4 MG/1
0.4 TABLET SUBLINGUAL
Status: DISCONTINUED | OUTPATIENT
Start: 2025-05-05 | End: 2025-05-19

## 2025-05-05 RX ORDER — ASPIRIN 81 MG/1
81 TABLET, CHEWABLE ORAL DAILY
Status: DISCONTINUED | OUTPATIENT
Start: 2025-05-06 | End: 2025-05-22 | Stop reason: HOSPADM

## 2025-05-05 RX ORDER — LABETALOL HYDROCHLORIDE 5 MG/ML
20 INJECTION, SOLUTION INTRAVENOUS EVERY 4 HOURS PRN
Status: DISCONTINUED | OUTPATIENT
Start: 2025-05-05 | End: 2025-05-22

## 2025-05-05 RX ORDER — HYDRALAZINE HYDROCHLORIDE 25 MG/1
25 TABLET, FILM COATED ORAL EVERY 8 HOURS SCHEDULED
Status: DISCONTINUED | OUTPATIENT
Start: 2025-05-05 | End: 2025-05-05

## 2025-05-05 RX ORDER — CLONIDINE HYDROCHLORIDE 0.1 MG/1
0.1 TABLET ORAL EVERY 8 HOURS SCHEDULED
Status: DISCONTINUED | OUTPATIENT
Start: 2025-05-05 | End: 2025-05-10

## 2025-05-05 RX ORDER — ATORVASTATIN CALCIUM 40 MG/1
40 TABLET, FILM COATED ORAL NIGHTLY
Status: DISCONTINUED | OUTPATIENT
Start: 2025-05-05 | End: 2025-05-22 | Stop reason: HOSPADM

## 2025-05-05 RX ORDER — AMIODARONE HYDROCHLORIDE 200 MG/1
200 TABLET ORAL EVERY 12 HOURS SCHEDULED
Status: DISCONTINUED | OUTPATIENT
Start: 2025-05-05 | End: 2025-05-21

## 2025-05-05 RX ORDER — HEPARIN SODIUM 1000 [USP'U]/ML
2000 INJECTION, SOLUTION INTRAVENOUS; SUBCUTANEOUS ONCE
Status: COMPLETED | OUTPATIENT
Start: 2025-05-05 | End: 2025-05-05

## 2025-05-05 RX ORDER — CLOPIDOGREL BISULFATE 75 MG/1
75 TABLET ORAL DAILY
Status: DISCONTINUED | OUTPATIENT
Start: 2025-05-06 | End: 2025-05-22 | Stop reason: HOSPADM

## 2025-05-05 RX ORDER — SODIUM CHLORIDE 0.9 % (FLUSH) 0.9 %
10 SYRINGE (ML) INJECTION EVERY 12 HOURS SCHEDULED
Status: DISCONTINUED | OUTPATIENT
Start: 2025-05-05 | End: 2025-05-08

## 2025-05-05 RX ORDER — PANTOPRAZOLE SODIUM 40 MG/10ML
40 INJECTION, POWDER, LYOPHILIZED, FOR SOLUTION INTRAVENOUS
Status: DISCONTINUED | OUTPATIENT
Start: 2025-05-05 | End: 2025-05-10

## 2025-05-05 RX ORDER — ALBUTEROL SULFATE 0.83 MG/ML
2.5 SOLUTION RESPIRATORY (INHALATION) EVERY 4 HOURS PRN
Status: DISCONTINUED | OUTPATIENT
Start: 2025-05-05 | End: 2025-05-22 | Stop reason: HOSPADM

## 2025-05-05 RX ORDER — NITROGLYCERIN 0.4 MG/1
0.4 TABLET SUBLINGUAL
Status: DISCONTINUED | OUTPATIENT
Start: 2025-05-05 | End: 2025-05-05 | Stop reason: HOSPADM

## 2025-05-05 RX ORDER — HEPARIN SODIUM 10000 [USP'U]/100ML
22 INJECTION, SOLUTION INTRAVENOUS
Status: DISCONTINUED | OUTPATIENT
Start: 2025-05-05 | End: 2025-05-07

## 2025-05-05 RX ORDER — HYDRALAZINE HYDROCHLORIDE 25 MG/1
25 TABLET, FILM COATED ORAL EVERY 8 HOURS SCHEDULED
Status: DISCONTINUED | OUTPATIENT
Start: 2025-05-05 | End: 2025-05-10

## 2025-05-05 RX ORDER — ACETAMINOPHEN 325 MG/1
650 TABLET ORAL EVERY 4 HOURS PRN
Status: DISCONTINUED | OUTPATIENT
Start: 2025-05-05 | End: 2025-05-05 | Stop reason: HOSPADM

## 2025-05-05 RX ORDER — SODIUM CHLORIDE 9 MG/ML
1 INJECTION, SOLUTION INTRAVENOUS CONTINUOUS
Status: DISCONTINUED | OUTPATIENT
Start: 2025-05-05 | End: 2025-05-05 | Stop reason: HOSPADM

## 2025-05-05 RX ADMIN — AMIODARONE HYDROCHLORIDE 200 MG: 200 TABLET ORAL at 20:08

## 2025-05-05 RX ADMIN — ATORVASTATIN CALCIUM 40 MG: 40 TABLET, FILM COATED ORAL at 20:08

## 2025-05-05 RX ADMIN — HEPARIN SODIUM 2000 UNITS: 1000 INJECTION INTRAVENOUS; SUBCUTANEOUS at 16:20

## 2025-05-05 RX ADMIN — DEXMEDETOMIDINE HYDROCHLORIDE 1.5 MCG/KG/HR: 4 INJECTION, SOLUTION INTRAVENOUS at 18:19

## 2025-05-05 RX ADMIN — Medication 20 MG: at 21:45

## 2025-05-05 RX ADMIN — HEPARIN SODIUM 1700 UNITS: 1000 INJECTION INTRAVENOUS; SUBCUTANEOUS at 23:20

## 2025-05-05 RX ADMIN — MICONAZOLE NITRATE 1 APPLICATION: 20 POWDER TOPICAL at 23:43

## 2025-05-05 RX ADMIN — NYSTATIN 500000 UNITS: 100000 SUSPENSION ORAL at 20:08

## 2025-05-05 RX ADMIN — PANTOPRAZOLE SODIUM 40 MG: 40 INJECTION, POWDER, FOR SOLUTION INTRAVENOUS at 14:22

## 2025-05-05 RX ADMIN — DEXMEDETOMIDINE HYDROCHLORIDE 1.4 MCG/KG/HR: 4 INJECTION, SOLUTION INTRAVENOUS at 14:23

## 2025-05-05 RX ADMIN — MUPIROCIN 1 APPLICATION: 20 OINTMENT TOPICAL at 14:22

## 2025-05-05 RX ADMIN — SODIUM CHLORIDE 5 MG/HR: 9 INJECTION, SOLUTION INTRAVENOUS at 23:43

## 2025-05-05 RX ADMIN — DEXMEDETOMIDINE HYDROCHLORIDE 1.5 MCG/KG/HR: 4 INJECTION, SOLUTION INTRAVENOUS at 21:50

## 2025-05-05 RX ADMIN — Medication 10 ML: at 14:23

## 2025-05-05 RX ADMIN — CLONIDINE HYDROCHLORIDE 0.1 MG: 0.1 TABLET ORAL at 18:09

## 2025-05-05 RX ADMIN — HYDRALAZINE HYDROCHLORIDE 20 MG: 20 INJECTION INTRAMUSCULAR; INTRAVENOUS at 22:23

## 2025-05-05 RX ADMIN — HYDRALAZINE HYDROCHLORIDE 25 MG: 25 TABLET ORAL at 16:16

## 2025-05-05 RX ADMIN — NYSTATIN 500000 UNITS: 100000 SUSPENSION ORAL at 18:09

## 2025-05-05 RX ADMIN — Medication 20 MG: at 16:37

## 2025-05-05 RX ADMIN — Medication 10 ML: at 20:09

## 2025-05-05 RX ADMIN — HEPARIN SODIUM 13.5 UNITS/KG/HR: 10000 INJECTION, SOLUTION INTRAVENOUS at 14:48

## 2025-05-05 NOTE — Clinical Note
First balloon inflation max pressure = 12 ayah. First balloon inflation duration = 18 seconds. Second inflation of balloon - Max pressure = 12 ayah. 2nd Inflation of balloon - Duration = 5 seconds. Third inflation of balloon - Max pressure = 12 ayah. 3rd Inflation of balloon - Duration = 15 seconds.

## 2025-05-05 NOTE — Clinical Note
First balloon inflation max pressure = 15 ayah. First balloon inflation duration = 15 seconds. Second inflation of balloon - Max pressure = 15 ayah. 2nd Inflation of balloon - Duration = 10 seconds.

## 2025-05-05 NOTE — PROGRESS NOTES
Pharmacy to Dose Heparin Infusion Note    Estefania Connor is a 69 y.o. female receiving heparin infusion.     Therapy for (VTE/Cardiac): Cardiac  Patient Weight: 72kg  Initial Bolus (Y/N): No  Any Bolus (Y/N): Yes     Signs or Symptoms of Bleeding: No    Cardiac or Other (Not VTE)   Initial Bolus: 60 units/kg (Max 4,000 units)  Initial rate: 12 units/kg/hr (Max 1,000 units/hr)   Anti-Xa Bolus   Dose Infusion Hold   Time Infusion Rate Change (units/kg/hr) Repeat  Anti-Xa    < 0.11 50 units/kg  (4000 units Max) None Increase by  3 units/kg/hr 6 hours   0.11- 0.19 25 units/kg  (2000 units Max) None Increase by  2 units/kg/hr 6 hours   0.2 - 0.29 0 None Increase by  1 units/kg/hr 6 hours   0.3 - 0.5 0 None No Change 6 hours (after 2 consecutive levels in range check qAM)   0.51 - 0.6 0 None Decrease by  1 units/kg/hr 6 hours   0.61 - 0.8 0 30 minutes Decrease by  2 units/kg/hr 6 hours   0.81 - 1 0 60 minutes Decrease by  3 units/kg/hr 6 hours   >1 0 Hold  After Anti-Xa less than 0.5 decrease previous rate by  4 units/kg/hr  Every 2 hours until Anti-Xa  less than 0.5 then when infusion restarts in 6 hours     Results from last 7 days   Lab Units 05/05/25  1440 05/05/25  0143 05/04/25  1217 05/04/25  0235   INR  1.02  --  1.06  --    HEMOGLOBIN g/dL 9.5* 9.9*  --  9.5*   HEMATOCRIT % 31.2* 33.4*  --  29.8*   PLATELETS 10*3/mm3 274 240  --  288       Date   Time   Anti-Xa Current Rate (units/kg/hr) Bolus   (units) Rate Change   (units/kg/hr) New Rate (units/kg/hr) Repeat  Anti-Xa Comments /  Pump Check    5/5 1405 0.10 13.5 2000 +1.5 15 2200 Originally continued at 13.5u/kg/hr at 72kg from OSH. Eliquis PTA but baseline Anti-Xa 0.1 so okay for bolus. ANNA Trevino, [facility]  5/5/2025  15:35 EDT

## 2025-05-05 NOTE — CONSULTS
Date of Hospital Visit: 25  Encounter Provider: Arabella Angulo MD  Place of Service: [facility]  Patient Name: Estefania Connor  :1955  Referral Provider: System, Provider Not In  Primary Care Provider: Provider, No Known    Chief complaint/Reason for Consultation: V-fib arrest.      History of Present Illness:  Patient is a 69 years old lady who had a multiple medical problems who has been in the hospital for last couple of months either due to kidney disease, respiratory failure, vent dependent and anemia.  Patient was finally able to get out of the hospital after trach and PEG and was sent to rehab facility where she had V-fib arrest.    Patient was found to have blockage of the posterolateral big branch and may be left system which could not be adequately assessed.  Patient denies any chest pain.  Patient denies any further arrhythmias after that.  There is not a whole lot history to be taken from the patient as she has trach and PEG she is otherwise awake and alert.    Patient family cannot make it up here and all the information is over the phone.      Problem List:  CARDIAC  Coronary Artery Disease:   LHC, 2025: Calcified RCA and circumflex with significant disease.     Myocardium:   Echo, 2025: LVEF 41 to 45%, G2 DD     Valvular:   Aortic valve sclerosis     Electrical:  Vfib   NSR      Pericardium:   Small pericardial effusion     VASCULAR  Arterial  Cerebrovascular disease:        Peripheral vascular disease:        AAA:        Venous:       AUTONOMIC DYSFUNCTION   Autonomic failure:    Neurally mediated Syncope/vagal:    POTS:      CARDIAC RISK FACTORS  Hypertension  Dyslipidemia  Physical Inactivity  Menopausal state - Post-menopausal    NON-CARDIAC  Chronic kidney disease on dialysis  Tracheostomy  PEG tube placement    SURGERIES  Tracheostomy/PEG      Past Surgical History:   Procedure Laterality Date    CARDIAC CATHETERIZATION N/A 2025    Procedure: Coronary  angiography;  Surgeon: Ambrose Mcnally MD;  Location:  [facility] CATH [facility]ASIVE [facility];  Service: Cardiovascular;  Laterality: N/A;    TRACHEOSTOMY AND PEG TUBE INSERTION N/A 4/29/2025    Procedure: TRACHEOSTOMY AND PERCUTANEOUS ENDOSCOPIC GASTROSTOMY TUBE INSERTION;  Surgeon: Kera Head MD;  Location:  [facility] OR;  Service: General;  Laterality: N/A;       Medications Prior to Admission   Medication Sig Dispense Refill Last Dose/Taking    Acetaminophen 650 MG/20.3ML suspension Administer 650 mL per G tube Every 4 (Four) Hours As Needed (Mild Pain (Scale 1-3) or Temperature (>/= 101F)).       amiodarone 360 mg in dextrose 5 % 192.8 mL Infuse 1 mg/min into a venous catheter Continuous. Infuse 360 mg over 12 hours IV  After 6 hours decrease drip to maintenance rate of 16.6 ml/hr (0.5 mg/min)       aspirin 81 MG EC tablet Take 1 tablet by mouth Daily.       atorvastatin (LIPITOR) 40 MG tablet Administer 1 tablet per G tube Every Night.       benzonatate (TESSALON) 100 MG capsule Administer 1 capsule per G tube 3 (Three) Times a Day As Needed for Cough.       calcitriol (ROCALTROL) 0.25 MCG capsule Take 1 capsule by mouth 3 (Three) Times a Week. With hemodialysis on Tuesdays, Thursday, and Saturday.       cloNIDine (CATAPRES) 0.1 MG tablet Take 1 tablet by mouth 3 times a day. Hold if SBP <110       clopidogrel (PLAVIX) 75 MG tablet Take 1 tablet by mouth Daily.       dexmedetomidine (PRECEDEX) Infuse 0.2-1.4 mcg/kg/hr into a venous catheter Dose Adjusted By Provider As Needed. RASS -2       dextrose (D50W) 50 % solution Infuse 50 mL into a venous catheter Daily As Needed for Low Blood Sugar.       dextrose (GLUTOSE) 40 % gel Take 15 g by mouth Every 1 (One) Hour As Needed for Low Blood Sugar.       diazePAM (VALIUM) 10 MG tablet Take 1 tablet by mouth 3 times a day. Fall Risk       docusate sodium (COLACE) 100 MG capsule Take 1 capsule by mouth 2 (Two) Times a Day.       doxazosin (CARDURA) 2 MG tablet  Administer 1 tablet per G tube 2 (Two) Times a Day.       epoetin vera-epbx (RETACRIT) 3000 UNIT/ML injection Inject 2 mL under the skin into the appropriate area as directed 3 (Three) Times a Week. Every Tuesday, Thursday, and Saturday       ferric gluconate (FERRLECIT) 250 MG in sodium chloride 0.9% 100 mL IVPB Infuse 100 mL into a venous catheter 3 (Three) Times a Week. Infuse over 60 minutes  Every Tuesday, Thursday, and Saturday       glucagon (GLUCAGEN) 1 MG injection Inject 1 mg under the skin into the appropriate area as directed 1 (One) Time As Needed for Low Blood Sugar.       guaiFENesin 200 MG tablet Take 2 tablets by mouth 4 (Four) Times a Day.       hydrALAZINE (APRESOLINE) 20 MG/ML injection Infuse 0.5 mL into a venous catheter Every 4 (Four) Hours As Needed for High Blood Pressure. SBP > 160       hydrALAZINE (APRESOLINE) 25 MG tablet Take 1 tablet by mouth 3 (Three) Times a Day. Hold for SBP<120       HYDROcodone-acetaminophen (NORCO) 7.5-325 MG per tablet Take 1 tablet by mouth Every 6 (Six) Hours As Needed for Moderate Pain.       lisinopril (PRINIVIL,ZESTRIL) 20 MG tablet Take 2 tablets by mouth Daily.       LORazepam (ATIVAN) 2 MG/ML injection Infuse 0.5 mL into a venous catheter Every 2 (Two) Hours As Needed (Agitation). Must Dilute Prior to Slow IV Push with 0.9% NaCl       melatonin 5 MG tablet tablet Take 2 tablets by mouth Every Night.       metoprolol tartrate (LOPRESSOR) 25 MG tablet Take 1 tablet by mouth 2 (Two) Times a Day. Hold for SBP <100       Midazolam HCl-Sodium Chloride (midazolam, VERSED, 50mg/100mL normal saline) 50 mg/100 mL solution Infuse 0.04 mg/kg/hr into a venous catheter Every 30 (Thirty) Minutes As Needed (Sedation Protocol. RASS Score = (-2)).       midodrine (PROAMATINE) 5 MG tablet Take 2 tablets by mouth 3 (Three) Times a Day As Needed (SBP =/< 90).       NIFEdipine XL (PROCARDIA XL) 30 MG 24 hr tablet Take 3 tablets by mouth Daily. Hold for SBP<100        "nitroglycerin (NITROSTAT) 0.4 MG SL tablet Place 1 tablet under the tongue Every 5 (Five) Minutes As Needed for Chest Pain. Take no more than 3 doses in 15 minutes.       Ondansetron HCl (ZOFRAN IV) Infuse 4 mg into a venous catheter Every 6 (Six) Hours As Needed (N/V).       ondansetron ODT (ZOFRAN-ODT) 4 MG disintegrating tablet Administer 1 tablet per G tube Every 6 (Six) Hours As Needed for Nausea or Vomiting.       pantoprazole (PROTONIX) 40 MG injection Infuse 10 mL into a venous catheter 2 (Two) Times a Day.       Propofol (DIPRIVAN) 200 MG/20ML injection Infuse 5-50 mcg/kg/min into a venous catheter Every 4 (Four) Hours As Needed (sedation to desired RASS Score (-2)).       risperiDONE (risperDAL) 1 MG tablet Take 1 tablet by mouth 3 (Three) Times a Day.       sennosides-docusate (senna-docusate sodium) 8.6-50 MG per tablet Take 2 tablets by mouth 2 (Two) Times a Day. Hold for Diarrhea or Loose Stools       sodium chloride 0.9 % flush Infuse 10 mL into a venous catheter Every 12 (Twelve) Hours.       sodium chloride 0.9 % flush Infuse 10 mL into a venous catheter As Needed for Line Care (After each use).          Social History     Socioeconomic History    Marital status:        No family history on file.    REVIEW OF SYSTEMS:   Review of Systems   Unable to perform ROS: Patient nonverbal             Objective:     Vitals:    05/05/25 1400 05/05/25 1430 05/05/25 1500 05/05/25 1515   BP: 158/78 159/72 168/100    BP Location: Left arm      Patient Position: Lying      Pulse: 108 99 108 106   Resp:       Temp:       TempSrc:       SpO2: 94% 95% 95% 95%   Height:    160 cm (62.99\")       Body mass index is 27.11 kg/m².  Flowsheet Rows      Flowsheet Row First Filed Value   Admission Height 160 cm (62.99\") Documented at 05/05/2025 1515   Admission Weight --            Physical Exam     Constitutional:       General: Trach/ PEG     Appearance:  in distress.     Neck:     JVP:Not elevated     Carotid artery: " Normal    Pulmonary:      Effort: Pulmonary effort is normal.      Breath sounds: Normal breath sounds. No wheezing. No rhonchi. No rales.     Cardiovascular:      Normal rate. Regular rhythm. Normal S1. Normal S2.      Murmurs: There is 2/6 systolic murmur.      No gallop. No click. No rub.     Abdominal:      General: Bowel sounds are normal.      Palpations: Abdomen is soft.      Tenderness: There is no abdominal tenderness.    Extremities:     Pulses:Normal radial and pedal pulses     Edema:no edema        Lab Review:                Results from last 7 days   Lab Units 05/05/25  1440   SODIUM mmol/L 132*   POTASSIUM mmol/L 3.4*   CHLORIDE mmol/L 93*   CO2 mmol/L 24.0   BUN mg/dL 27*   CREATININE mg/dL 4.51*   GLUCOSE mg/dL 107*   CALCIUM mg/dL 9.5     Results from last 7 days   Lab Units 05/01/25  0904 05/01/25  0720   HSTROP T ng/L 114* 82*     Results from last 7 days   Lab Units 05/05/25  1440   WBC 10*3/mm3 13.47*   HEMOGLOBIN g/dL 9.5*   HEMATOCRIT % 31.2*   PLATELETS 10*3/mm3 274     Results from last 7 days   Lab Units 05/05/25  1440 05/05/25  0143 05/04/25  1846 05/04/25  1217   INR  1.02  --   --  1.06   APTT seconds  --  48.5* 36.8* 39.6*     Results from last 7 days   Lab Units 05/05/25  1440   MAGNESIUM mg/dL 2.0       EKG: afib/Aflutter    CXR: Interstitial Lung disease      Assessment:   CAD  ESRD  HTN  Hyperlipidemia  Respiratory failure  HFmrEF      Plan:   Patient had a left heart catheterization which shows a heavily calcified arteries and was unable to stent because of the tortuosity's.  Patient is on dialysis and has been doing good with that and hopefully that will help us resolve her pericardial effusion.  Patient has been having arrhythmias and amiodarone has taken care of it.  We will hopefully put her on schedule for tomorrow for stenting of the RCA and relook of the left system.

## 2025-05-05 NOTE — CONSULTS
NAL Consult Note    Estefania Connor  1955  [id number]    Date of Admit:  5/5/2025  Date of Consult: 5/5/2025    Reason for Consultation: ESRD on HD.     History of present illness:    Patient is a 69 y.o.  Yr old female with past medical history of end-stage renal disease on hemodialysis TTS schedule, hypertension, dyslipidemia, CAD status post stenting, depression who recently admitted at [facility] on 3/20/2025 after missing 2 HD sessions, patient was admitted for pneumonia/fluid overload, required thoracentesis.  She was intubated on 4/10 and was transferred to [facility] in [place] on 4/11/25.  Underwent trach and PEG on 4/29 at [facility] in [place].  On 5/2 patient underwent V-fib arrest; was taken emergently to Cath Lab, found to have 100% occlusion of RCA, attempted intervention was unsuccessful [facility].  Nephrology is consulted for dialysis management while inpatient.  Currently patient is on vent, on Precedex and heparin drip.     Called Dr. Mejía at [facility]; patient underwent around 30-45 minutes of dialysis today; however his transport arrived so dialysis was stopped and he was transferred to [facility]. [Patient is on dialysis since April 2023]     Past Medical History:   Diagnosis Date    AAA (abdominal aortic aneurysm)     Coronary artery disease     Depression     ESRD (end stage renal disease)     Hyperlipidemia     Hypertension     Polycystic kidney disease        Past Surgical History:   Procedure Laterality Date    CARDIAC CATHETERIZATION N/A 5/2/2025    Procedure: Coronary angiography;  Surgeon: Ambrose Mcnally MD;  Location: [facility] INVASIVE LOCATION;  Service: Cardiovascular;  Laterality: N/A;    TRACHEOSTOMY AND PEG TUBE INSERTION N/A 4/29/2025    Procedure: TRACHEOSTOMY AND PERCUTANEOUS ENDOSCOPIC GASTROSTOMY TUBE INSERTION;  Surgeon: Kera Head MD;  Location: [facility] OR;  Service: General;   Laterality: N/A;       Social History     Socioeconomic History    Marital status:        family history is not on file.    No Known Allergies    Medication:    Current Facility-Administered Medications:     albuterol (PROVENTIL) nebulizer solution 0.083% 2.5 mg/3mL, 2.5 mg, Nebulization, Q4H PRN, Inder Brody, APRN    amiodarone (PACERONE) tablet 200 mg, 200 mg, Per PEG Tube, Q12H, Inder Brody, APRN    [START ON 5/6/2025] aspirin chewable tablet 81 mg, 81 mg, Per PEG Tube, Daily, Inder Brody, APRN    atorvastatin (LIPITOR) tablet 40 mg, 40 mg, Per PEG Tube, Nightly, Inder Brody, APRN    cloNIDine (CATAPRES) tablet 0.1 mg, 0.1 mg, Per PEG Tube, Q8H, Inder Brody C, APRN    [START ON 5/6/2025] clopidogrel (PLAVIX) tablet 75 mg, 75 mg, Per PEG Tube, Daily, Inder Brody, APRN    dexmedetomidine (PRECEDEX) 400 mcg in 100 mL NS infusion, 0.2-1.5 mcg/kg/hr, Intravenous, Titrated, Inder Brody APRN, Last Rate: 26 mL/hr at 05/05/25 1638, 1.5 mcg/kg/hr at 05/05/25 1638    heparin 42436 units/250 mL (100 units/mL) in 0.45 % NaCl infusion, 15 Units/kg/hr (Order-Specific), Intravenous, Titrated, Tarik Trevino RPH, Last Rate: 10.8 mL/hr at 05/05/25 1621, 15 Units/kg/hr at 05/05/25 1621    hydrALAZINE (APRESOLINE) tablet 25 mg, 25 mg, Per PEG Tube, Q8H, Inder Brody, APRN, 25 mg at 05/05/25 1616    labetalol (NORMODYNE,TRANDATE) injection 20 mg, 20 mg, Intravenous, Q4H PRN, Inder Brody, APRN, 20 mg at 05/05/25 1637    [Held by provider] metoprolol tartrate (LOPRESSOR) tablet 25 mg, 25 mg, Per PEG Tube, Q12H, Inder Brody APRN    mupirocin (BACTROBAN) 2 % nasal ointment 1 Application, 1 Application, Each Nare, BID, Inder Brody APRN, 1 Application at 05/05/25 1422    nitroglycerin (NITROSTAT) SL tablet 0.4 mg, 0.4 mg, Sublingual, Q5 Min PRN, Inder Brody APRN    nystatin (MYCOSTATIN) 100,000 unit/mL suspension 500,000 Units, 5 mL,  Mouth/Throat, 4x Daily, Inder Brody C, APRN    pantoprazole (PROTONIX) injection 40 mg, 40 mg, Intravenous, Q24H, Inder Brody, APRN, 40 mg at 05/05/25 1422    Pharmacy Consult - [place], , Not Applicable, Daily, Tarik Trevino, [facility]    Pharmacy to Dose Heparin, , Not Applicable, Continuous PRN, Inder Brody, APRN    [Held by provider] sacubitril-valsartan (ENTRESTO) 24-26 MG tablet 1 tablet, 1 tablet, Per PEG Tube, Q12H, Inder Brody C, APRN    sodium chloride 0.9 % flush 10 mL, 10 mL, Intravenous, Q12H, Inder Brody, APRN, 10 mL at 05/05/25 1423    Medications Prior to Admission   Medication Sig Dispense Refill Last Dose/Taking    Acetaminophen 650 MG/20.3ML suspension Administer 650 mL per G tube Every 4 (Four) Hours As Needed (Mild Pain (Scale 1-3) or Temperature (>/= 101F)).       amiodarone 360 mg in dextrose 5 % 192.8 mL Infuse 1 mg/min into a venous catheter Continuous. Infuse 360 mg over 12 hours IV  After 6 hours decrease drip to maintenance rate of 16.6 ml/hr (0.5 mg/min)       aspirin 81 MG EC tablet Take 1 tablet by mouth Daily.       atorvastatin (LIPITOR) 40 MG tablet Administer 1 tablet per G tube Every Night.       benzonatate (TESSALON) 100 MG capsule Administer 1 capsule per G tube 3 (Three) Times a Day As Needed for Cough.       calcitriol (ROCALTROL) 0.25 MCG capsule Take 1 capsule by mouth 3 (Three) Times a Week. With hemodialysis on Tuesdays, Thursday, and Saturday.       cloNIDine (CATAPRES) 0.1 MG tablet Take 1 tablet by mouth 3 times a day. Hold if SBP <110       clopidogrel (PLAVIX) 75 MG tablet Take 1 tablet by mouth Daily.       dexmedetomidine (PRECEDEX) Infuse 0.2-1.4 mcg/kg/hr into a venous catheter Dose Adjusted By Provider As Needed. RASS -2       dextrose (D50W) 50 % solution Infuse 50 mL into a venous catheter Daily As Needed for Low Blood Sugar.       dextrose (GLUTOSE) 40 % gel Take 15 g by mouth Every 1 (One) Hour As Needed for Low Blood Sugar.        diazePAM (VALIUM) 10 MG tablet Take 1 tablet by mouth 3 times a day. Fall Risk       docusate sodium (COLACE) 100 MG capsule Take 1 capsule by mouth 2 (Two) Times a Day.       doxazosin (CARDURA) 2 MG tablet Administer 1 tablet per G tube 2 (Two) Times a Day.       epoetin vera-epbx (RETACRIT) 3000 UNIT/ML injection Inject 2 mL under the skin into the appropriate area as directed 3 (Three) Times a Week. Every Tuesday, Thursday, and Saturday       ferric gluconate (FERRLECIT) 250 MG in sodium chloride 0.9% 100 mL IVPB Infuse 100 mL into a venous catheter 3 (Three) Times a Week. Infuse over 60 minutes  Every Tuesday, Thursday, and Saturday       glucagon (GLUCAGEN) 1 MG injection Inject 1 mg under the skin into the appropriate area as directed 1 (One) Time As Needed for Low Blood Sugar.       guaiFENesin 200 MG tablet Take 2 tablets by mouth 4 (Four) Times a Day.       hydrALAZINE (APRESOLINE) 20 MG/ML injection Infuse 0.5 mL into a venous catheter Every 4 (Four) Hours As Needed for High Blood Pressure. SBP > 160       hydrALAZINE (APRESOLINE) 25 MG tablet Take 1 tablet by mouth 3 (Three) Times a Day. Hold for SBP<120       HYDROcodone-acetaminophen (NORCO) 7.5-325 MG per tablet Take 1 tablet by mouth Every 6 (Six) Hours As Needed for Moderate Pain.       lisinopril (PRINIVIL,ZESTRIL) 20 MG tablet Take 2 tablets by mouth Daily.       LORazepam (ATIVAN) 2 MG/ML injection Infuse 0.5 mL into a venous catheter Every 2 (Two) Hours As Needed (Agitation). Must Dilute Prior to Slow IV Push with 0.9% NaCl       melatonin 5 MG tablet tablet Take 2 tablets by mouth Every Night.       metoprolol tartrate (LOPRESSOR) 25 MG tablet Take 1 tablet by mouth 2 (Two) Times a Day. Hold for SBP <100       Midazolam HCl-Sodium Chloride (midazolam, VERSED, 50mg/100mL normal saline) 50 mg/100 mL solution Infuse 0.04 mg/kg/hr into a venous catheter Every 30 (Thirty) Minutes As Needed (Sedation Protocol. RASS Score = (-2)).       midodrine  "(PROAMATINE) 5 MG tablet Take 2 tablets by mouth 3 (Three) Times a Day As Needed (SBP =/< 90).       NIFEdipine XL (PROCARDIA XL) 30 MG 24 hr tablet Take 3 tablets by mouth Daily. Hold for SBP<100       nitroglycerin (NITROSTAT) 0.4 MG SL tablet Place 1 tablet under the tongue Every 5 (Five) Minutes As Needed for Chest Pain. Take no more than 3 doses in 15 minutes.       Ondansetron HCl (ZOFRAN IV) Infuse 4 mg into a venous catheter Every 6 (Six) Hours As Needed (N/V).       ondansetron ODT (ZOFRAN-ODT) 4 MG disintegrating tablet Administer 1 tablet per G tube Every 6 (Six) Hours As Needed for Nausea or Vomiting.       pantoprazole (PROTONIX) 40 MG injection Infuse 10 mL into a venous catheter 2 (Two) Times a Day.       Propofol (DIPRIVAN) 200 MG/20ML injection Infuse 5-50 mcg/kg/min into a venous catheter Every 4 (Four) Hours As Needed (sedation to desired RASS Score (-2)).       risperiDONE (risperDAL) 1 MG tablet Take 1 tablet by mouth 3 (Three) Times a Day.       sennosides-docusate (senna-docusate sodium) 8.6-50 MG per tablet Take 2 tablets by mouth 2 (Two) Times a Day. Hold for Diarrhea or Loose Stools       sodium chloride 0.9 % flush Infuse 10 mL into a venous catheter Every 12 (Twelve) Hours.       sodium chloride 0.9 % flush Infuse 10 mL into a venous catheter As Needed for Line Care (After each use).          Review of Systems:  Full review of systems reviewed and negative otherwise for acute complaints    Physical Exam:   Vital Signs   Blood pressure 168/100, pulse 106, temperature 98.3 °F (36.8 °C), temperature source Oral, resp. rate 22, height 160 cm (62.99\"), SpO2 95%.     GENERAL: Not in distress, s/p trach on vent  HEENT: Normocephalic, atraumatic.     NECK: Supple   HEART: RRR; No murmur, rubs, gallops. +  edema  LUNGS: Clear to auscultation on vent.   ABDOMEN: Soft, nontender, nondistended.   EXT:  ++ edema.  :  No Pereira   SKIN: Warm and dry without rash  NEURO: Alert and awake.   PSYCHIATRIC: " Cooperative with PE    Laboratory Data  Results from last 7 days   Lab Units 05/05/25  1440 05/05/25  0143 05/04/25  0235   HEMOGLOBIN g/dL 9.5* 9.9* 9.5*   HEMATOCRIT % 31.2* 33.4* 29.8*     Results from last 7 days   Lab Units 05/05/25  1440 05/05/25  0143 05/04/25  0235 05/03/25  0307 05/02/25  0337 05/01/25  0904 05/01/25  0720   SODIUM mmol/L 132* 130* 135* 135* 134*   < >  --    POTASSIUM mmol/L 3.4* 3.2* 3.3* 3.4* 3.6   < >  --    CHLORIDE mmol/L 93* 93* 95* 94* 95*   < >  --    CO2 mmol/L 24.0 24.6 27.8 29.1* 24.1   < >  --    BUN mg/dL 27* 25* 20 13 23   < >  --    CREATININE mg/dL 4.51* 4.63* 3.93* 3.03* 4.43*   < >  --    CALCIUM mg/dL 9.5 9.1 9.2 8.1* 8.9   < >  --    PHOSPHORUS mg/dL 4.1  --   --   --   --   --   --    MAGNESIUM mg/dL 2.0  --   --   --   --   --  2.9*   ALBUMIN g/dL  --  2.1* 2.2* 2.1* 2.3*   < >  --     < > = values in this interval not displayed.     Results from last 7 days   Lab Units 05/05/25  1440   GLUCOSE mg/dL 107*         Results from last 7 days   Lab Units 05/05/25  0143   ALK PHOS U/L 112   BILIRUBIN mg/dL 0.2   ALT (SGPT) U/L 9   AST (SGOT) U/L 11     Estimated Creatinine Clearance: 10.8 mL/min (A) (by C-G formula based on SCr of 4.51 mg/dL (H)).    Radiology:  Chest X ray: 5/5/25    Impression:  Interval decreased pulmonary edema.  Similar small right and trace left pleural effusions, with likely adjacent atelectasis. Superimposed infection to be excluded clinically.    Impression:     End-stage renal disease  V-fib arrest - 100% RCA obstruction.   Respiratory failure s/p trach  Hyponatremia  Hypokalemia  Anemia  Fluid overload    PLAN: Thank you for asking us to see Estefania Connor, I recommend the following:     ESRD on HD.    - On HD since 4/2023 .  On TTS schedule.   - No need for dialysis today  - Will do hemodialysis tomorrow as per TTS schedule.   - Serum electrolytes should get better with dialysis.   - Fluid overload/ Hyponatremia: Fluid restriction 1.5 L/day. UF  as tolerated.   - Anemia: Transfuse to keep hemoglobin above 8 . VANCE on dialysis    Rest of the care per ICU team and cardiology.     Thank you for the consult, will follow with you closely    Zafar Espana MD  5/5/2025  16:49 EDT

## 2025-05-05 NOTE — H&P
Pulmonary/Critical Care ICU Note     LOS: 0 days   Patient Care Team:  Provider, No Known as PCP - General    Chief Complaint: Cardiac arrest / CAD      Subjective     Estefania Connor is a 69 y.o. female who is admitted to [facility] as a transfer from [facility] 05/04/25 for higher level of care in the setting of recent V-fib arrest.      Patient has documented medical diagnoses including ESRD, PKD, HTN, dyslipidemia, AAA, CAD s/p stenting, and depression.      She was additionally recently admitted to [facility] on 3/20/25 with what sounds like volume overload and JOSÉ ANTONIO superimposed on CKD in the setting of missing 2 HD appointments. Additional concerns for pneumonia as well as found to be anemic requiring PRBC transfusion was reportedly evaluated by GI who found no evidence of bleeding. Worsened from a respiratory standpoint with CT showing enlarging pleural effusions despite undergoing thoracentesis (1550 cc fluid removed) ultimately required intubation on 4/10 with transfer to [facility] on 4/11 for for further management.      Patient was unable to wean from mechanical ventilatory support at OSH and ultimately underwent tracheostomy and PEG tube placement on 4/29. Deteriorated on Friday (5/2) with V-fib arrest was ultimately resuscitated and taken to Cath Lab where she was found to have 100% occlusion of the RCA. Attempted intervention was unsuccessful and Cardiology (Dr. Angulo) was contacted who recommended transfer to [facility] for repeat PCI attempt.     Interval History:     Patient arrived to ICU from outside hospital on mechanical ventilator.  She is awake and alert and follows commands on Precedex drip.  She is a bit hypertensive.  She reportedly did not tolerate HD this AM prior to transfer due to hypotension and only received 20 minutes of HD.  She did apparently receive delayed release diltiazem which was crushed and given through her PEG.      History taken from: chart, staff    PMH/FH/Social History were reviewed and  updated appropriately in the electronic medical record.     Past Medical History:   Diagnosis Date    AAA (abdominal aortic aneurysm)     Coronary artery disease     Depression     ESRD (end stage renal disease)     Hyperlipidemia     Hypertension     Polycystic kidney disease        Review of Systems:    Review of 14 systems was completed with positives and pertinent negatives noted in the subjective section.  All other systems reviewed and are negative.         Objective     Vital Signs  Temp:  [98.3 °F (36.8 °C)] 98.3 °F (36.8 °C)  Heart Rate:  [] 106  Resp:  [22] 22  BP: (158-184)/() 168/100  FiO2 (%):  [50 %] 50 %  No intake/output data recorded.  Body mass index is 27.11 kg/m².  Mode: VC+/AC  FiO2 (%):  [50 %] 50 %  S RR:  [20] 20  S VT:  [365 mL] 365 mL  PEEP/CPAP (cm H2O):  [5 cm H20] 5 cm H20  MAP (cm H2O):  [11] 11  IV drips:  dexmedetomidine, Last Rate: 1 mcg/kg/hr (05/05/25 1445)  heparin, Last Rate: 13.5 Units/kg/hr (05/05/25 1448)  Pharmacy to Dose Heparin       Physical Exam:     Constitutional:   Alert, in no acute distress   Head:   Normocephalic, atraumatic   Eyes:           Lids and lashes normal, conjunctivae and sclerae normal.  PER   ENMT:  Ears appear intact with no abnormalities noted     Lips normal.     Neck:  Tracheostomy in place, no JVD   Lungs/Resp:    Normal effort, symmetric chest rise, no crepitus, mild rhonchi bilaterally.               Heart/CV:   Regular rhythm and normal rate, no murmur   Abdomen/GI:    Soft, nontender, nondistended   :    Deferred   Extremities/MSK:  No clubbing or cyanosis.  No edema.     Pulses:  Pulses palpable and equal bilaterally   Skin:  No bleeding, bruising or rash   Heme/Lymph:  No cervical or supraclavicular adenopathy.   Neurologic:    Psychiatric:    Moves all extremities with no obvious focal motor deficit.  Cranial nerves 2 - 12 grossly intact  Non-agitated, normal affect.    The above physical exam findings were reviewed and reflect  my exam findings as of today's exam.   Electronically signed by:  Terry Antonio MD  05/05/25  15:38 EDT      Results Review:     I reviewed the patient's new clinical results.   Results from last 7 days   Lab Units 05/05/25  1440 05/05/25  0143 05/04/25  0235 05/03/25  0307   SODIUM mmol/L 132* 130* 135* 135*   POTASSIUM mmol/L 3.4* 3.2* 3.3* 3.4*   CHLORIDE mmol/L 93* 93* 95* 94*   CO2 mmol/L 24.0 24.6 27.8 29.1*   BUN mg/dL 27* 25* 20 13   CREATININE mg/dL 4.51* 4.63* 3.93* 3.03*   CALCIUM mg/dL 9.5 9.1 9.2 8.1*   BILIRUBIN mg/dL  --  0.2 0.2 0.2   ALK PHOS U/L  --  112 119* 108   ALT (SGPT) U/L  --  9 12 15   AST (SGOT) U/L  --  11 12 13   GLUCOSE mg/dL 107* 108* 112* 79     Results from last 7 days   Lab Units 05/05/25  1440 05/05/25  0143 05/04/25  0235   WBC 10*3/mm3 13.47* 9.98 9.51   HEMOGLOBIN g/dL 9.5* 9.9* 9.5*   HEMATOCRIT % 31.2* 33.4* 29.8*   PLATELETS 10*3/mm3 274 240 288     Results from last 7 days   Lab Units 05/05/25  1321   PH, ARTERIAL pH units 7.405   PO2 ART mm Hg 132.0*   PCO2, ARTERIAL mm Hg 46.5*   HCO3 ART mmol/L 29.1*     Results from last 7 days   Lab Units 05/05/25  1440 05/01/25  0720   MAGNESIUM mg/dL 2.0 2.9*   PHOSPHORUS mg/dL 4.1  --        I reviewed the patient's new imaging including images and reports.    X-ray on 5/5/2025 was reviewed and shows cardiomegaly with bilateral pleural effusions and trach tube in place with right internal jugular tunneled dialysis catheter.  No pneumothorax.  Left internal jugular catheter in place as well.    Medication Review:   amiodarone, 200 mg, Per PEG Tube, Q12H  [START ON 5/6/2025] aspirin, 81 mg, Per PEG Tube, Daily  atorvastatin, 40 mg, Per PEG Tube, Nightly  [Held by provider] cloNIDine, 0.1 mg, Per PEG Tube, Q8H  [START ON 5/6/2025] clopidogrel, 75 mg, Per PEG Tube, Daily  [Held by provider] hydrALAZINE, 25 mg, Per PEG Tube, Q8H  [Held by provider] metoprolol tartrate, 25 mg, Per PEG Tube, Q12H  mupirocin, 1 Application, Each Nare,  BID  nystatin, 5 mL, Mouth/Throat, 4x Daily  pantoprazole, 40 mg, Intravenous, Q24H  pharmacy consult - MTM, , Not Applicable, Daily  [Held by provider] sacubitril-valsartan, 1 tablet, Per PEG Tube, Q12H  sodium chloride, 10 mL, Intravenous, Q12H      dexmedetomidine, 0.2-1.5 mcg/kg/hr, Last Rate: 1 mcg/kg/hr (05/05/25 1445)  heparin, 13.5 Units/kg/hr (Order-Specific), Last Rate: 13.5 Units/kg/hr (05/05/25 1448)  Pharmacy to Dose Heparin,         Assessment & Plan         ESRD on HD    Polycystic kidney disease    HTN (hypertension)    Dyslipidemia    AAA (abdominal aortic aneurysm)    Right coronary artery occlusion    Depression    Tracheostomy present    S/P percutaneous endoscopic gastrostomy (PEG) tube placement    69 y.o. female with history of HTN, HLD, CKD on HD, polycystic kidney disease, abdominal aortic aneurysm, CAD with prior stent, depression, transferred from [facility] in [place] on 5/5/2025 after she suffered a V-fib arrest with resuscitation and underwent left heart catheterization showing 100% occlusion of the RCA with failed intervention.    Patient was initially admitted to [facility] on 3/20/2025 with volume overload secondary to missing 2 hemodialysis sessions.  She also has followed up possible pneumonia and had anemia and was evaluated by GI with no evidence of GI bleed.  She had bilateral pleural effusions and underwent 1550 mL thoracentesis, but despite thoracentesis required intubation on 4/10/2025 was [facility] on 4[facility]11[facility].  Patient ultimately underwent tracheostomy and PEG placement on 4/29/2025.  She deteriorated on 5/2/2025 with me today Vfib arrest ultimately resuscitated with subsequent LHC as above.    Plan:  1.  For V-fib arrest/CAD with occluded RCA: Failed intervention at outside hospital.  Cardiology consultation.  Aspirin, statin, beta-blocker, heparin drip.  2.  For CKD on HD: Last received HD on 5/3/2025 however received 20 minutes of  HD on 5/5/2025 but did not tolerated reportedly secondary to hypotension and her session was discontinued prior to transfer here.  Consult nephrology.  IHD per nephrology.  3.  For respiratory: Initial respiratory failure secondary to volume overload from missing hemodialysis.  Also treated for pneumonia.  Status post tracheostomy and PEG 4/29/2025.  Continue mechanical ventilation and adjust settings to optimize ventilation and oxygenation and minimize barotrauma.  4.  For nutrition: N.p.o. for now.  May resume tube feeds once plan is determined.  5.  DVT prophylaxis: Patient on heparin currently.  Also mechanical.    Patient is critically ill secondary to CAD with tenuous hemodynamic status in the setting of recent V. tach and failed intervention and has high risk of life-threatening decompensation in condition.   As such, the patient requires continuous monitoring and frequent reassessment for consideration of adjustment in management to minimize this risk.  I personally reassessed the patient multiple times today.    Critical care time : 40 minutes spent by me personally and independently.(This excludes time spent performing separately reportable procedures and services).  Critical care time includes high complexity decision making to assess, manipulate, and support vital organ system failure in this individual who has impairment of one or more vital organ systems such that there is a high probability of imminent or life threatening deterioration in the patient’s condition.    Electronically signed by:    Terry Antonio MD  05/05/25  15:38 EDT      *. Please note that portions of this note were completed with [employer] - a voice recognition program.

## 2025-05-05 NOTE — PLAN OF CARE
Goal Outcome Evaluation:           Pt admitted to 2H from [facility].   Neuro: Follows commands, ANDUJAR=, on precedex.   Resp: #7.5 shiley, ACVC+, FiO2 50%, PEEP 5, . Copious secretions, sputum sent.   Cardiac: NSR to , cardiology consulted, heparin gtt continues per pharmacy dosing. Hypertensive throughout shift, intensivist updated, home meds restarted, PRNs given, patient remains hypertensive.   GI: NPO, x2 loose BM.   : x1 large incontinence, nephrology consulted, plan for HD tomorrow.     Afebrile, no complaints of pain, spouse updated throughout shift.

## 2025-05-05 NOTE — Clinical Note
First balloon inflation max pressure = 15 ayah. First balloon inflation duration = 12 seconds. Second inflation of balloon - Max pressure = 15 ayah. 2nd Inflation of balloon - Duration = 12 seconds.

## 2025-05-06 ENCOUNTER — OUTSIDE FACILITY SERVICE (OUTPATIENT)
Dept: PULMONOLOGY | Facility: CLINIC | Age: 70
End: 2025-05-06
Payer: MEDICARE

## 2025-05-06 ENCOUNTER — APPOINTMENT (OUTPATIENT)
Dept: NEPHROLOGY | Facility: HOSPITAL | Age: 70
End: 2025-05-06
Payer: MEDICARE

## 2025-05-06 ENCOUNTER — APPOINTMENT (OUTPATIENT)
Dept: GENERAL RADIOLOGY | Facility: HOSPITAL | Age: 70
End: 2025-05-06
Payer: MEDICARE

## 2025-05-06 LAB
ANION GAP SERPL CALCULATED.3IONS-SCNC: 14 MMOL/L (ref 5–15)
ARTERIAL PATENCY WRIST A: ABNORMAL
ATMOSPHERIC PRESS: ABNORMAL MM[HG]
BASE EXCESS BLDA CALC-SCNC: 3.9 MMOL/L (ref 0–2)
BASOPHILS # BLD AUTO: 0.06 10*3/MM3 (ref 0–0.2)
BASOPHILS NFR BLD AUTO: 0.5 % (ref 0–1.5)
BDY SITE: ABNORMAL
BODY TEMPERATURE: 37
BUN SERPL-MCNC: 29 MG/DL (ref 8–23)
BUN/CREAT SERPL: 6 (ref 7–25)
CALCIUM SPEC-SCNC: 8.9 MG/DL (ref 8.6–10.5)
CHLORIDE SERPL-SCNC: 95 MMOL/L (ref 98–107)
CO2 BLDA-SCNC: 29.3 MMOL/L (ref 22–33)
CO2 SERPL-SCNC: 24 MMOL/L (ref 22–29)
COHGB MFR BLD: 1.1 % (ref 0–2)
CREAT SERPL-MCNC: 4.86 MG/DL (ref 0.57–1)
CRP SERPL-MCNC: 8.27 MG/DL (ref 0–0.5)
DEPRECATED RDW RBC AUTO: 53.3 FL (ref 37–54)
EGFRCR SERPLBLD CKD-EPI 2021: 9.1 ML/MIN/1.73
EOSINOPHIL # BLD AUTO: 0.48 10*3/MM3 (ref 0–0.4)
EOSINOPHIL NFR BLD AUTO: 4 % (ref 0.3–6.2)
EPAP: 0
ERYTHROCYTE [DISTWIDTH] IN BLOOD BY AUTOMATED COUNT: 16.2 % (ref 12.3–15.4)
GLUCOSE BLDC GLUCOMTR-MCNC: 107 MG/DL (ref 70–130)
GLUCOSE SERPL-MCNC: 103 MG/DL (ref 65–99)
HCO3 BLDA-SCNC: 28 MMOL/L (ref 20–26)
HCT VFR BLD AUTO: 28.8 % (ref 34–46.6)
HCT VFR BLD CALC: 29.6 % (ref 38–51)
HGB BLD-MCNC: 9.1 G/DL (ref 12–15.9)
HGB BLDA-MCNC: 9.6 G/DL (ref 14–18)
IMM GRANULOCYTES # BLD AUTO: 0.17 10*3/MM3 (ref 0–0.05)
IMM GRANULOCYTES NFR BLD AUTO: 1.4 % (ref 0–0.5)
INHALED O2 CONCENTRATION: 50 %
IPAP: 0
LYMPHOCYTES # BLD AUTO: 1.07 10*3/MM3 (ref 0.7–3.1)
LYMPHOCYTES NFR BLD AUTO: 9 % (ref 19.6–45.3)
MAGNESIUM SERPL-MCNC: 2 MG/DL (ref 1.6–2.4)
MCH RBC QN AUTO: 28.3 PG (ref 26.6–33)
MCHC RBC AUTO-ENTMCNC: 31.6 G/DL (ref 31.5–35.7)
MCV RBC AUTO: 89.7 FL (ref 79–97)
METHGB BLD QL: 0.1 % (ref 0–1.5)
MODALITY: ABNORMAL
MONOCYTES # BLD AUTO: 0.85 10*3/MM3 (ref 0.1–0.9)
MONOCYTES NFR BLD AUTO: 7.2 % (ref 5–12)
NEUTROPHILS NFR BLD AUTO: 77.9 % (ref 42.7–76)
NEUTROPHILS NFR BLD AUTO: 9.24 10*3/MM3 (ref 1.7–7)
NRBC BLD AUTO-RTO: 0 /100 WBC (ref 0–0.2)
OXYHGB MFR BLDV: 98.6 % (ref 94–99)
PAW @ PEAK INSP FLOW SETTING VENT: 0 CMH2O
PCO2 BLDA: 39.8 MM HG (ref 35–45)
PCO2 TEMP ADJ BLD: 39.8 MM HG (ref 35–45)
PEEP RESPIRATORY: 5 CM[H2O]
PH BLDA: 7.46 PH UNITS (ref 7.35–7.45)
PH, TEMP CORRECTED: 7.46 PH UNITS
PHOSPHATE SERPL-MCNC: 4.1 MG/DL (ref 2.5–4.5)
PLATELET # BLD AUTO: 269 10*3/MM3 (ref 140–450)
PMV BLD AUTO: 11 FL (ref 6–12)
PO2 BLDA: 150 MM HG (ref 83–108)
PO2 TEMP ADJ BLD: 150 MM HG (ref 83–108)
POTASSIUM SERPL-SCNC: 3.4 MMOL/L (ref 3.5–5.2)
PREALB SERPL-MCNC: 15.5 MG/DL (ref 20–40)
RBC # BLD AUTO: 3.21 10*6/MM3 (ref 3.77–5.28)
SODIUM SERPL-SCNC: 133 MMOL/L (ref 136–145)
TOTAL RATE: 20 BREATHS/MINUTE
UFH PPP CHRO-ACNC: 0.16 IU/ML (ref 0.3–0.7)
UFH PPP CHRO-ACNC: 0.34 IU/ML (ref 0.3–0.7)
VT ON VENT VENT: 0.36 ML
WBC NRBC COR # BLD AUTO: 11.87 10*3/MM3 (ref 3.4–10.8)

## 2025-05-06 PROCEDURE — 85520 HEPARIN ASSAY: CPT

## 2025-05-06 PROCEDURE — 82948 REAGENT STRIP/BLOOD GLUCOSE: CPT

## 2025-05-06 PROCEDURE — 80048 BASIC METABOLIC PNL TOTAL CA: CPT

## 2025-05-06 PROCEDURE — 25010000002 CEFTRIAXONE PER 250 MG: Performed by: NURSE PRACTITIONER

## 2025-05-06 PROCEDURE — 83735 ASSAY OF MAGNESIUM: CPT

## 2025-05-06 PROCEDURE — 25010000002 NICARDIPINE 2.5 MG/ML SOLUTION 10 ML VIAL

## 2025-05-06 PROCEDURE — 94003 VENT MGMT INPAT SUBQ DAY: CPT

## 2025-05-06 PROCEDURE — 25010000002 HEPARIN (PORCINE) 25000-0.45 UT/250ML-% SOLUTION

## 2025-05-06 PROCEDURE — 84100 ASSAY OF PHOSPHORUS: CPT

## 2025-05-06 PROCEDURE — 3E0G76Z INTRODUCTION OF NUTRITIONAL SUBSTANCE INTO UPPER GI, VIA NATURAL OR ARTIFICIAL OPENING: ICD-10-PCS | Performed by: INTERNAL MEDICINE

## 2025-05-06 PROCEDURE — 85025 COMPLETE CBC W/AUTO DIFF WBC: CPT

## 2025-05-06 PROCEDURE — 25010000002 HYDRALAZINE PER 20 MG

## 2025-05-06 PROCEDURE — 71045 X-RAY EXAM CHEST 1 VIEW: CPT

## 2025-05-06 PROCEDURE — 83050 HGB METHEMOGLOBIN QUAN: CPT

## 2025-05-06 PROCEDURE — 99291 CRITICAL CARE FIRST HOUR: CPT | Performed by: INTERNAL MEDICINE

## 2025-05-06 PROCEDURE — 97163 PT EVAL HIGH COMPLEX 45 MIN: CPT

## 2025-05-06 PROCEDURE — 82805 BLOOD GASES W/O2 SATURATION: CPT

## 2025-05-06 PROCEDURE — 25010000002 HEPARIN (PORCINE) PER 1000 UNITS: Performed by: STUDENT IN AN ORGANIZED HEALTH CARE EDUCATION/TRAINING PROGRAM

## 2025-05-06 PROCEDURE — 36600 WITHDRAWAL OF ARTERIAL BLOOD: CPT

## 2025-05-06 PROCEDURE — 84134 ASSAY OF PREALBUMIN: CPT

## 2025-05-06 PROCEDURE — 25010000002 HEPARIN (PORCINE) PER 1000 UNITS

## 2025-05-06 PROCEDURE — 86140 C-REACTIVE PROTEIN: CPT

## 2025-05-06 PROCEDURE — 82375 ASSAY CARBOXYHB QUANT: CPT

## 2025-05-06 PROCEDURE — 94799 UNLISTED PULMONARY SVC/PX: CPT

## 2025-05-06 PROCEDURE — 25810000003 SODIUM CHLORIDE 0.9 % SOLUTION 250 ML FLEX CONT

## 2025-05-06 PROCEDURE — 5A1D70Z PERFORMANCE OF URINARY FILTRATION, INTERMITTENT, LESS THAN 6 HOURS PER DAY: ICD-10-PCS | Performed by: STUDENT IN AN ORGANIZED HEALTH CARE EDUCATION/TRAINING PROGRAM

## 2025-05-06 RX ORDER — HEPARIN SODIUM 1000 [USP'U]/ML
1700 INJECTION, SOLUTION INTRAVENOUS; SUBCUTANEOUS ONCE
Status: COMPLETED | OUTPATIENT
Start: 2025-05-06 | End: 2025-05-06

## 2025-05-06 RX ORDER — HEPARIN SODIUM 1000 [USP'U]/ML
2000 INJECTION, SOLUTION INTRAVENOUS; SUBCUTANEOUS AS NEEDED
Status: DISCONTINUED | OUTPATIENT
Start: 2025-05-06 | End: 2025-05-22

## 2025-05-06 RX ORDER — AMINO AC/PROTEIN HYDR/WHEY PRO 10G-100/30
45 LIQUID (ML) ORAL 2 TIMES DAILY
Status: DISCONTINUED | OUTPATIENT
Start: 2025-05-06 | End: 2025-05-12

## 2025-05-06 RX ADMIN — DEXMEDETOMIDINE HYDROCHLORIDE 1 MCG/KG/HR: 4 INJECTION, SOLUTION INTRAVENOUS at 14:23

## 2025-05-06 RX ADMIN — HYDRALAZINE HYDROCHLORIDE 25 MG: 25 TABLET ORAL at 20:25

## 2025-05-06 RX ADMIN — CLONIDINE HYDROCHLORIDE 0.1 MG: 0.1 TABLET ORAL at 14:19

## 2025-05-06 RX ADMIN — ASPIRIN 81 MG 81 MG: 81 TABLET ORAL at 09:26

## 2025-05-06 RX ADMIN — HEPARIN SODIUM 1700 UNITS: 1000 INJECTION INTRAVENOUS; SUBCUTANEOUS at 09:28

## 2025-05-06 RX ADMIN — HEPARIN SODIUM 2000 UNITS: 1000 INJECTION INTRAVENOUS; SUBCUTANEOUS at 11:43

## 2025-05-06 RX ADMIN — DEXMEDETOMIDINE HYDROCHLORIDE 1.5 MCG/KG/HR: 4 INJECTION, SOLUTION INTRAVENOUS at 01:30

## 2025-05-06 RX ADMIN — HYDRALAZINE HYDROCHLORIDE 25 MG: 25 TABLET ORAL at 14:21

## 2025-05-06 RX ADMIN — SODIUM CHLORIDE 5 MG/HR: 9 INJECTION, SOLUTION INTRAVENOUS at 20:24

## 2025-05-06 RX ADMIN — SODIUM CHLORIDE 5 MG/HR: 9 INJECTION, SOLUTION INTRAVENOUS at 04:26

## 2025-05-06 RX ADMIN — CLONIDINE HYDROCHLORIDE 0.1 MG: 0.1 TABLET ORAL at 20:24

## 2025-05-06 RX ADMIN — DEXMEDETOMIDINE HYDROCHLORIDE 1 MCG/KG/HR: 4 INJECTION, SOLUTION INTRAVENOUS at 20:35

## 2025-05-06 RX ADMIN — MICONAZOLE NITRATE 1 APPLICATION: 20 POWDER TOPICAL at 09:30

## 2025-05-06 RX ADMIN — AMIODARONE HYDROCHLORIDE 200 MG: 200 TABLET ORAL at 20:25

## 2025-05-06 RX ADMIN — HEPARIN SODIUM 19 UNITS/KG/HR: 10000 INJECTION, SOLUTION INTRAVENOUS at 09:31

## 2025-05-06 RX ADMIN — Medication 10 ML: at 20:25

## 2025-05-06 RX ADMIN — CLOPIDOGREL BISULFATE 75 MG: 75 TABLET, FILM COATED ORAL at 09:26

## 2025-05-06 RX ADMIN — Medication 10 ML: at 09:30

## 2025-05-06 RX ADMIN — Medication 45 ML: at 14:20

## 2025-05-06 RX ADMIN — Medication 45 ML: at 20:25

## 2025-05-06 RX ADMIN — PANTOPRAZOLE SODIUM 40 MG: 40 INJECTION, POWDER, FOR SOLUTION INTRAVENOUS at 09:26

## 2025-05-06 RX ADMIN — SODIUM CHLORIDE 5 MG/HR: 9 INJECTION, SOLUTION INTRAVENOUS at 13:56

## 2025-05-06 RX ADMIN — NYSTATIN 500000 UNITS: 100000 SUSPENSION ORAL at 20:25

## 2025-05-06 RX ADMIN — NYSTATIN 500000 UNITS: 100000 SUSPENSION ORAL at 11:49

## 2025-05-06 RX ADMIN — AMIODARONE HYDROCHLORIDE 200 MG: 200 TABLET ORAL at 09:26

## 2025-05-06 RX ADMIN — DEXMEDETOMIDINE HYDROCHLORIDE 1.5 MCG/KG/HR: 4 INJECTION, SOLUTION INTRAVENOUS at 08:42

## 2025-05-06 RX ADMIN — HYDRALAZINE HYDROCHLORIDE 25 MG: 25 TABLET ORAL at 05:31

## 2025-05-06 RX ADMIN — CEFTRIAXONE 2000 MG: 2 INJECTION, POWDER, FOR SOLUTION INTRAMUSCULAR; INTRAVENOUS at 20:24

## 2025-05-06 RX ADMIN — ATORVASTATIN CALCIUM 40 MG: 40 TABLET, FILM COATED ORAL at 20:25

## 2025-05-06 RX ADMIN — MUPIROCIN 1 APPLICATION: 20 OINTMENT TOPICAL at 20:25

## 2025-05-06 RX ADMIN — HYDRALAZINE HYDROCHLORIDE 20 MG: 20 INJECTION INTRAMUSCULAR; INTRAVENOUS at 14:37

## 2025-05-06 RX ADMIN — MICONAZOLE NITRATE 1 APPLICATION: 20 POWDER TOPICAL at 20:25

## 2025-05-06 RX ADMIN — MUPIROCIN 1 APPLICATION: 20 OINTMENT TOPICAL at 09:26

## 2025-05-06 RX ADMIN — NYSTATIN 500000 UNITS: 100000 SUSPENSION ORAL at 09:26

## 2025-05-06 RX ADMIN — DEXMEDETOMIDINE HYDROCHLORIDE 1.5 MCG/KG/HR: 4 INJECTION, SOLUTION INTRAVENOUS at 04:53

## 2025-05-06 RX ADMIN — CLONIDINE HYDROCHLORIDE 0.1 MG: 0.1 TABLET ORAL at 05:31

## 2025-05-06 NOTE — PROGRESS NOTES
Pulmonary/Critical Care ICU Note     LOS: 1 day   Patient Care Team:  Provider, No Known as PCP - General    Chief Complaint: Cardiac arrest / CAD      Subjective     History reviewed and updated in EMR as indicated.    Interval History:     Patient remains on mechanical ventilator.  She is on 50% FiO2 with oxygen saturations of 100%.  She remains on Precedex.  She is awake and alert currently.  No current complaints.  Cardiology plans to take her to the Cath Lab tomorrow.  I initiated tube feeding.    History taken from: chart, staff    PMH/FH/Social History were reviewed and updated appropriately in the electronic medical record.     Past Medical History:   Diagnosis Date    AAA (abdominal aortic aneurysm)     Coronary artery disease     Depression     ESRD (end stage renal disease)     Hyperlipidemia     Hypertension     Polycystic kidney disease        Review of Systems:    Review of 14 systems was completed with positives and pertinent negatives noted in the subjective section.  All other systems reviewed and are negative.         Objective     Vital Signs  Temp:  [97.6 °F (36.4 °C)-98.6 °F (37 °C)] 97.7 °F (36.5 °C)  Heart Rate:  [] (P) 108  Resp:  [18-24] (P) 18  BP: (114-214)/() (P) 150/76  FiO2 (%):  [50 %] 50 %  05/05 0701 - 05/06 0700  In: 1023.8 [I.V.:833.8]  Out: 250 [Urine:250]  Body mass index is 27.11 kg/m².  Mode: VC+/AC  FiO2 (%):  [50 %] 50 %  S RR:  [20] 20  S VT:  [365 mL] 365 mL  PEEP/CPAP (cm H2O):  [5 cm H20] 5 cm H20  MAP (cm H2O):  [9.5-12] 9.8  IV drips:  dexmedetomidine, Last Rate: 1 mcg/kg/hr (05/06/25 1423)  heparin, Last Rate: 19 Units/kg/hr (05/06/25 0931)  niCARdipine, Last Rate: 5 mg/hr (05/06/25 1356)  Pharmacy to Dose Heparin       Physical Exam:     Constitutional:   Alert, in no acute distress   Head:   Normocephalic, atraumatic   Eyes:           Lids and lashes normal, conjunctivae and sclerae normal.  PER   ENMT:  Ears appear intact with no abnormalities noted      Lips normal.     Neck:  T tracheostomy in place, no JVD   Lungs/Resp:    Normal effort, symmetric chest rise, no crepitus, clear to      auscultation bilaterally.               Heart/CV:   Regular rhythm and normal rate, no murmur   Abdomen/GI:    Soft, nontender, nondistended   :    Deferred   Extremities/MSK:  No clubbing or cyanosis.  Trace bilateral lower extremity edema.     Pulses:  Pulses palpable and equal bilaterally   Skin:  No bleeding, bruising or rash   Heme/Lymph:  No cervical or supraclavicular adenopathy.   Neurologic:    Psychiatric:    Moves all extremities with no obvious focal motor deficit.  Cranial nerves 2 - 12 grossly intact  Non-agitated, normal affect.    The above physical exam findings were reviewed and reflect my exam findings as of today's exam.   Electronically signed by:  Terry Antonio MD  05/06/25  16:17 EDT      Results Review:     I reviewed the patient's new clinical results.   Results from last 7 days   Lab Units 05/06/25  0644 05/05/25  1440 05/05/25  0143 05/04/25  0235 05/03/25  0307   SODIUM mmol/L 133* 132* 130* 135* 135*   POTASSIUM mmol/L 3.4* 3.4* 3.2* 3.3* 3.4*   CHLORIDE mmol/L 95* 93* 93* 95* 94*   CO2 mmol/L 24.0 24.0 24.6 27.8 29.1*   BUN mg/dL 29* 27* 25* 20 13   CREATININE mg/dL 4.86* 4.51* 4.63* 3.93* 3.03*   CALCIUM mg/dL 8.9 9.5 9.1 9.2 8.1*   BILIRUBIN mg/dL  --   --  0.2 0.2 0.2   ALK PHOS U/L  --   --  112 119* 108   ALT (SGPT) U/L  --   --  9 12 15   AST (SGOT) U/L  --   --  11 12 13   GLUCOSE mg/dL 103* 107* 108* 112* 79     Results from last 7 days   Lab Units 05/06/25  0644 05/05/25  1440 05/05/25  0143   WBC 10*3/mm3 11.87* 13.47* 9.98   HEMOGLOBIN g/dL 9.1* 9.5* 9.9*   HEMATOCRIT % 28.8* 31.2* 33.4*   PLATELETS 10*3/mm3 269 274 240     Results from last 7 days   Lab Units 05/06/25  0431   PH, ARTERIAL pH units 7.456*   PO2 ART mm Hg 150.0*   PCO2, ARTERIAL mm Hg 39.8   HCO3 ART mmol/L 28.0*     Results from last 7 days   Lab Units 05/06/25  0644  05/05/25  1440 05/01/25  0720   MAGNESIUM mg/dL 2.0 2.0 2.9*   PHOSPHORUS mg/dL 4.1 4.1  --        I reviewed the patient's new imaging including images and reports.    Chest x-ray on 5/6/2025 was reviewed and shows cardiomegaly with bilateral pleural effusions and trach tube in place with right internal jugular tunneled dialysis catheter.  No pneumothorax.  Left internal jugular catheter in place as well.  Stable pulmonary congestion bilaterally.    Medication Review:   amiodarone, 200 mg, Per PEG Tube, Q12H  aspirin, 81 mg, Per PEG Tube, Daily  atorvastatin, 40 mg, Per PEG Tube, Nightly  cefTRIAXone, 2,000 mg, Intravenous, Q24H  cloNIDine, 0.1 mg, Per PEG Tube, Q8H  clopidogrel, 75 mg, Per PEG Tube, Daily  hydrALAZINE, 25 mg, Per PEG Tube, Q8H  [Held by provider] metoprolol tartrate, 25 mg, Per PEG Tube, Q12H  miconazole, 1 Application, Topical, Q12H  mupirocin, 1 Application, Each Nare, BID  nystatin, 5 mL, Mouth/Throat, 4x Daily  pantoprazole, 40 mg, Intravenous, Q24H  pharmacy consult - MTM, , Not Applicable, Daily  ProSource TF, 45 mL, Per G Tube, BID  [Held by provider] sacubitril-valsartan, 1 tablet, Per PEG Tube, Q12H  sodium chloride, 10 mL, Intravenous, Q12H      dexmedetomidine, 0.2-1.5 mcg/kg/hr, Last Rate: 1 mcg/kg/hr (05/06/25 1423)  heparin, 19 Units/kg/hr (Order-Specific), Last Rate: 19 Units/kg/hr (05/06/25 0931)  niCARdipine, 5-15 mg/hr, Last Rate: 5 mg/hr (05/06/25 1356)  Pharmacy to Dose Heparin,         Assessment & Plan         CAD (coronary artery disease)    ESRD on HD    Polycystic kidney disease    HTN (hypertension)    Dyslipidemia    AAA (abdominal aortic aneurysm)    Right coronary artery occlusion    Depression    Tracheostomy present    S/P percutaneous endoscopic gastrostomy (PEG) tube placement    69 y.o. female with history of HTN, HLD, CKD on HD, polycystic kidney disease, abdominal aortic aneurysm, CAD with prior stent, depression, transferred from [facility] in  [place] on 5/5/2025 after she suffered a V-fib arrest with resuscitation and underwent left heart catheterization showing 100% occlusion of the RCA with failed intervention.    Patient was initially admitted to [facility] on 3/20/2025 with volume overload secondary to missing 2 hemodialysis sessions.  She also has followed up possible pneumonia and had anemia and was evaluated by GI with no evidence of GI bleed.  She had bilateral pleural effusions and underwent 1550 mL thoracentesis, but despite thoracentesis required intubation on 4/10/2025 was transferred to [facility] on 4/11/2025.  Patient ultimately underwent tracheostomy and PEG placement on 4/29/2025.  She deteriorated on 5/2/2025 with me today Vfib arrest ultimately resuscitated with subsequent LHC as above.    Cardiology plans left heart catheterization tomorrow.    Patient remains on mechanical ventilation with excellent oxygen saturations on 50% FiO2 this morning.  She is awake and alert and I think her chances of successful weaning from mechanical ventilation after PCI will be excellent.    Plan:  1.  For V-fib arrest/CAD with occluded RCA: Failed intervention at outside hospital.  Cardiology consultation.  Aspirin, statin, beta-blocker, heparin drip.  LHC tomorrow.  2.  For CKD on HD: Last received HD at OSH on 5/3/2025 however received 20 minutes of HD on 5/5/2025 but did not tolerated reportedly secondary to hypotension and her session was discontinued prior to transfer here.  Nephrology following.  IHD done on 5/6/2025.    3.  For respiratory: Initial respiratory failure secondary to volume overload from missing hemodialysis.  Also treated for pneumonia.  Status post tracheostomy and PEG 4/29/2025.  Continue mechanical ventilation and adjust settings to optimize ventilation and oxygenation and minimize barotrauma.  I think patient will be weanable from mechanical ventilation after PCI.  I will defer formal ventilator weaning until after  revascularization.  4.  For nutrition: N.p.o. for now.  Restarted tube feeds.  N.p.o. after midnight.  5.  DVT prophylaxis: Patient on heparin currently.  Also mechanical.  6.  For gram-negative rods in sputum: Patient has some pulmonary congestion on chest x-ray but no convincing symptoms of pneumonia.  White blood cell count is downtrending today.  Rocephin was started empirically and we will follow-up cultures.    Patient is critically ill secondary to CAD with tenuous hemodynamic status in the setting of recent V. tach and failed intervention and has high risk of life-threatening decompensation in condition.   As such, the patient requires continuous monitoring and frequent reassessment for consideration of adjustment in management to minimize this risk.  I personally reassessed the patient multiple times today.    Critical care time : 35 minutes spent by me personally and independently.(This excludes time spent performing separately reportable procedures and services).  Critical care time includes high complexity decision making to assess, manipulate, and support vital organ system failure in this individual who has impairment of one or more vital organ systems such that there is a high probability of imminent or life threatening deterioration in the patient’s condition.    Electronically signed by:    Terry Antonio MD  05/06/25  16:17 EDT      *. Please note that portions of this note were completed with [employer] - a voice recognition program.

## 2025-05-06 NOTE — THERAPY EVALUATION
Patient Name: Estefania Connor  : 1955    MRN: [id number]                              Today's Date: 2025       Admit Date: 2025    Visit Dx: No diagnosis found.  Patient Active Problem List   Diagnosis    ESRD on HD    Polycystic kidney disease    HTN (hypertension)    Dyslipidemia    AAA (abdominal aortic aneurysm)    Right coronary artery occlusion    Depression    Tracheostomy present    S/P percutaneous endoscopic gastrostomy (PEG) tube placement    CAD (coronary artery disease)     Past Medical History:   Diagnosis Date    AAA (abdominal aortic aneurysm)     Coronary artery disease     Depression     ESRD (end stage renal disease)     Hyperlipidemia     Hypertension     Polycystic kidney disease      Past Surgical History:   Procedure Laterality Date    CARDIAC CATHETERIZATION N/A 2025    Procedure: Coronary angiography;  Surgeon: Ambrose Mcnally MD;  Location: [facility] CATH INVASIVE LOCATION;  Service: Cardiovascular;  Laterality: N/A;    TRACHEOSTOMY AND PEG TUBE INSERTION N/A 2025    Procedure: TRACHEOSTOMY AND PERCUTANEOUS ENDOSCOPIC GASTROSTOMY TUBE INSERTION;  Surgeon: Kera Head MD;  Location: [facility] OR;  Service: General;  Laterality: N/A;      General Information       Row Name 25 1512          Physical Therapy Time and Intention    Document Type evaluation  -MARIFER     Mode of Treatment physical therapy  -MARIFER       Row Name 25 1512          General Information    Patient Profile Reviewed yes  -MARIFER     Prior Level of Function independent:;gait;transfer;bed mobility;ADL's  -MARIFER     Existing Precautions/Restrictions cardiac;oxygen therapy device and L/min;fall  -MARIFER     Barriers to Rehab medically complex;previous functional deficit  -MARIFER       Row Name 25 151          Living Environment    Current Living Arrangements home  -MARIFER     People in Home spouse  -MARIFER       Row Name 25 1512          Cognition    Orientation Status (Cognition) oriented to;person   -MARIFER       Row Name 05/06/25 1512          Safety Issues/Impairments Affecting Functional Mobility    Safety Issues Affecting Function (Mobility) safety precautions follow-through/compliance;insight into deficits/self-awareness  -MARIFER     Impairments Affecting Function (Mobility) balance;endurance/activity tolerance;postural/trunk control;shortness of breath;strength  -MARIFER               User Key  (r) = Recorded By, (t) = Taken By, (c) = Cosigned By      Initials Name Provider Type    Sandra Mcneill PT Physical Therapist                   Mobility       Row Name 05/06/25 1514          Bed Mobility    Bed Mobility rolling left;rolling right;scooting/bridging;supine-sit;sit-supine  -MARIFER     Rolling Left [place] (Bed Mobility) maximum assist (25% patient effort);2 person assist  -MARIFER     Rolling Right [place] (Bed Mobility) maximum assist (25% patient effort);2 person assist  -MARIFER     Scooting/Bridging [place] (Bed Mobility) maximum assist (25% patient effort);2 person assist  -MARIFER     Supine-Sit [place] (Bed Mobility) maximum assist (25% patient effort);2 person assist  -MARIFER     Sit-Supine [place] (Bed Mobility) maximum assist (25% patient effort);2 person assist  -MARIFER     Assistive Device (Bed Mobility) head of bed elevated  -MARIFER     Comment, (Bed Mobility) patient has trach and PEG she remains on the vent she is following simple commands with delayed response at times.  -MARIFER       Row Name 05/06/25 1514          Bed-Chair Transfer    Bed-Chair [place] (Transfers) unable to assess  -MARIFER     Comment, (Bed-Chair Transfer) patient was unable to maintain sitting balance in midline and fatigues easily patient not ready for chair transfers today  -MARIFER       Row Name 05/06/25 1514          Sit-Stand Transfer    Sit-Stand [place] (Transfers) unable to assess  -MARIFER       Row Name 05/06/25 1514          Gait/Stairs (Locomotion)    [place] Level (Gait) not tested  -MARIFER     Comment, (Gait/Stairs)  patient just finished dialysis and was fatigued unable to transfer or try pre gait activity  -MARIFER               User Key  (r) = Recorded By, (t) = Taken By, (c) = Cosigned By      Initials Name Provider Type    Sandra Mcneill PT Physical Therapist                   Obj/Interventions       Row Name 05/06/25 1517          Range of Motion Comprehensive    General Range of Motion bilateral upper extremity ROM WNL  -MARIFER     Comment, General Range of Motion UE's limited by pain bilat LE's WFL  -MARIFER       Row Name 05/06/25 1517          Strength Comprehensive (MMT)    Comment, General Manual Muscle Testing (MMT) Assessment bilat LE weakness 3-/5  -MARIFER       Row Name 05/06/25 1517          Balance    Balance Assessment sitting static balance;sitting dynamic balance  -MARIFER     Static Sitting Balance moderate assist  -MARIFER     Dynamic Sitting Balance maximum assist  -MARIFER     Balance Interventions sitting;static;trunk training exercise;weight shifting activity  -MARIFER     Comment, Balance patient unable to maintain midline in sitting at the edge of the bed. patient leaning to the left and posteriorly patient fatigues easily sitting for 8 min  -MARIFER               User Key  (r) = Recorded By, (t) = Taken By, (c) = Cosigned By      Initials Name Provider Type    Sandra Mcneill PT Physical Therapist                   Goals/Plan       Row Name 05/06/25 1527          Bed Mobility Goal 1 (PT)    Activity/Assistive Device (Bed Mobility Goal 1, PT) bed mobility activities, all  -MARIFER     [place] Level/Cues Needed (Bed Mobility Goal 1, PT) minimum assist (75% or more patient effort)  -MARIFER     Time Frame (Bed Mobility Goal 1, PT) short term goal (STG);5 days  -MARIFER     Progress/Outcomes (Bed Mobility Goal 1, PT) goal ongoing  -MARIFER       Row Name 05/06/25 1527          Transfer Goal 1 (PT)    Activity/Assistive Device (Transfer Goal 1, PT) sit-to-stand/stand-to-sit  -MARIFER     [place] Level/Cues Needed (Transfer Goal 1, PT) maximum  assist (25-49% patient effort)  -MARIFER     Time Frame (Transfer Goal 1, PT) long term goal (LTG);10 days  -MARIFER     Progress/Outcome (Transfer Goal 1, PT) goal ongoing  -       Row Name 05/06/25 1527          Balance Goal 1 (PT)    Activity/Assistive Device (Balance Goal) sitting static balance  -MARIFER     [place] Level/Cues Needed (Balance Goal 1, PT) independent  -MARIFER     Time Frame (Balance Goal 1, PT) long-term goal (LTG);2 weeks  -MARIFER     Progress/Outcomes (Balance Goal 1, PT) goal ongoing  -       Row Name 05/06/25 1524          Therapy Assessment/Plan (PT)    Planned Therapy Interventions (PT) balance training;bed mobility training;home exercise program;strengthening;transfer training  -               User Key  (r) = Recorded By, (t) = Taken By, (c) = Cosigned By      Initials Name Provider Type    Sandra Mcneill, PT Physical Therapist                   Clinical Impression       [facility] Name 05/06/25 152          Pain    Additional Documentation Pain Scale: FACES Pre/Post-Treatment (Group)  -[facility] Name 05/06/25 1525          Pain Scale: FACES Pre/Post-Treatment    Pain: FACES Scale, Pretreatment 0-->no hurt  -MARIFER     Posttreatment Pain Rating 2-->hurts little bit  -MARIFER     Pre/Posttreatment Pain Comment patient crying during tx unable to state if she was hurting or was sad  -[facility] Name 05/06/25 1527          Plan of Care Review    Plan of Care Reviewed With patient  -MARIFER     Progress improving  -MARIFER     Outcome Evaluation PT eval is completed. patient presents after long hosp stay. patient has been in the hosp since 3/20 patient has trach and PEG followed by V-fib arrest at an outside hosp. patient demonstrates impaired bed mobility and inability to transfer or ambulate at this time which is below baseline patient sat at the edge of the bed for 8 min with mod assist she is unable to maintain midline sitting. at this time. anticipate patient to need ECF at D/C  -[facility] Name 05/06/25 7499           Therapy Assessment/Plan (PT)    Patient/Family Therapy Goals Statement (PT) patient is unable to state  -MARIFER     Rehab Potential (PT) fair  -MARIFER     Criteria for Skilled Interventions Met (PT) yes;skilled treatment is necessary  -MARIFER     Therapy Frequency (PT) daily  -MARIFER     Predicted Duration of Therapy Intervention (PT) 10 days  -MARIFER       Row Name 05/06/25 1520          Vital Signs    Pre Systolic BP Rehab 150  -MARIFER     Pre Treatment Diastolic BP 75  -MARIFER     Post Systolic BP Rehab 158  -MARIFER     Post Treatment Diastolic BP 74  -MARIFER     Pretreatment Heart Rate (beats/min) 105  -MARIFER     Posttreatment Heart Rate (beats/min) 115  -MARIFER     Pre SpO2 (%) 98  -MARIFER     O2 Delivery Pre Treatment ventilator  -MARIFER     Post SpO2 (%) 97  -MARIFER     O2 Delivery Post Treatment ventilator  -MARIFER     Pre Patient Position Supine  -MARIFER     Intra Patient Position Sitting  -MARIFER     Post Patient Position Supine  -MARIFER       Row Name 05/06/25 1520          Positioning and Restraints    Pre-Treatment Position in bed  -MARIFER     Post Treatment Position bed  -MARIFER     In Bed notified nsg;side lying right;call light within reach;encouraged to call for assist;with nsg  -MARIFER               User Key  (r) = Recorded By, (t) = Taken By, (c) = Cosigned By      Initials Name Provider Type    Sandra Mcneill, PT Physical Therapist                   Outcome Measures       Row Name 05/06/25 1528 05/06/25 0800       How much help from another person do you currently need...    Turning from your back to your side while in flat bed without using bedrails? 2  -MARIFER 1  -ER    Moving from lying on back to sitting on the side of a flat bed without bedrails? 2  -MARIFER 1  -ER    Moving to and from a bed to a chair (including a wheelchair)? 1  -MARIFER 1  -ER    Standing up from a chair using your arms (e.g., wheelchair, bedside chair)? 1  -MARIFER 1  -ER    Climbing 3-5 steps with a railing? 1  -MARIFER 1  -ER    To walk in hospital room? 1  -MARIFER 1  -ER    AM-PAC 6 Clicks Score (PT) 8  -MARIFER 6   -ER              User Key  (r) = Recorded By, (t) = Taken By, (c) = Cosigned By      Initials Name Provider Type    Sandra Mcneill, PT Physical Therapist    Ce Santo RN Registered Nurse                                 Physical Therapy Education       Title: PT OT SLP Therapies (In Progress)       Topic: Physical Therapy (In Progress)       Point: Mobility training (In Progress)       Learning Progress Summary            Patient Acceptance, E, NR by MARIFER at 5/6/2025 1430                      Point: Home exercise program (In Progress)       Learning Progress Summary            Patient Acceptance, E, NR by MARIFER at 5/6/2025 1430                      Point: Body mechanics (In Progress)       Learning Progress Summary            Patient Acceptance, E, NR by MARIFER at 5/6/2025 1430                      Point: Precautions (In Progress)       Learning Progress Summary            Patient Acceptance, E, NR by MARIFER at 5/6/2025 1430                                      User Key       Initials Effective Dates Name Provider Type Discipline    MARIFER 02/03/23 -  Sandra Pruitt PT Physical Therapist PT                  PT Recommendation and Plan  Planned Therapy Interventions (PT): balance training, bed mobility training, home exercise program, strengthening, transfer training  Progress: improving  Outcome Evaluation: PT eval is completed. patient presents after long hosp stay. patient has been in the hosp since 3/20 patient has trach and PEG followed by V-fib arrest at an outside hosp. patient demonstrates impaired bed mobility and inability to transfer or ambulate at this time which is below baseline patient sat at the edge of the bed for 8 min with mod assist she is unable to maintain midline sitting. at this time. anticipate patient to need ECF at D/C     Time Calculation:   PT Evaluation Complexity  History, PT Evaluation Complexity: 3 or more personal factors and/or comorbidities  Examination of Body Systems (PT Eval  Complexity): total of 4 or more elements  Clinical Presentation (PT Evaluation Complexity): unstable  Clinical Decision Making (PT Evaluation Complexity): high complexity  Overall Complexity (PT Evaluation Complexity): high complexity     PT Charges       Row Name 05/06/25 1529             Time Calculation    Start Time 1430  -MARIFER      PT Received On 05/06/25  -MARIFER      PT Goal Re-Cert Due Date 05/16/25  -MARIFER         Untimed Charges    PT Eval/Re-eval Minutes 48  -MARIFER         Total Minutes    Untimed Charges Total Minutes 48  -MARIFER       Total Minutes 48  -MARIFER                User Key  (r) = Recorded By, (t) = Taken By, (c) = Cosigned By      Initials Name Provider Type    Sandra Mcneill, PT Physical Therapist                  Therapy Charges for Today       Code Description Service Date Service Provider Modifiers Qty    77964603175 HC PT EVAL HIGH COMPLEXITY 4 5/6/2025 Sandra Pruitt PT GP 1            PT G-Codes  AM-PAC 6 Clicks Score (PT): 8  PT Discharge Summary  Anticipated Discharge Disposition (PT): extended care facility    Sandra Pruitt PT  5/6/2025

## 2025-05-06 NOTE — PROGRESS NOTES
" LOS: 1 day    Patient Care Team:  Provider, No Known as PCP - General      Subjective     Seen and examined at bedside, nurse present.   Patient is getting dialysis.  Tolerating well.   Per nurse overnight started on Cardene drip.  Now Cardene drip is off.   No major overnight issues.  Plan for RCA stenting tomorrow .     Objective     amiodarone, 200 mg, Per PEG Tube, Q12H  aspirin, 81 mg, Per PEG Tube, Daily  atorvastatin, 40 mg, Per PEG Tube, Nightly  cloNIDine, 0.1 mg, Per PEG Tube, Q8H  clopidogrel, 75 mg, Per PEG Tube, Daily  hydrALAZINE, 25 mg, Per PEG Tube, Q8H  [Held by provider] metoprolol tartrate, 25 mg, Per PEG Tube, Q12H  miconazole, 1 Application, Topical, Q12H  mupirocin, 1 Application, Each Nare, BID  nystatin, 5 mL, Mouth/Throat, 4x Daily  pantoprazole, 40 mg, Intravenous, Q24H  pharmacy consult - MTM, , Not Applicable, Daily  ProSource TF, 45 mL, Per G Tube, BID  [Held by provider] sacubitril-valsartan, 1 tablet, Per PEG Tube, Q12H  sodium chloride, 10 mL, Intravenous, Q12H      dexmedetomidine, 0.2-1.5 mcg/kg/hr, Last Rate: 1 mcg/kg/hr (05/06/25 0910)  heparin, 19 Units/kg/hr (Order-Specific), Last Rate: 19 Units/kg/hr (05/06/25 0931)  niCARdipine, 5-15 mg/hr, Last Rate: 5 mg/hr (05/06/25 1202)  Pharmacy to Dose Heparin,       Vital Signs:  Blood pressure 162/82, pulse 110, temperature 97.7 °F (36.5 °C), temperature source Axillary, resp. rate 23, height 160 cm (62.99\"), SpO2 98%.    Flowsheet Rows      Flowsheet Row First Filed Value   Admission Height 160 cm (62.99\") Documented at 05/05/2025 1515   Admission Weight --            05/05 0701 - 05/06 0700  In: 1023.8 [I.V.:833.8]  Out: 250 [Urine:250]    Physical Exam:  GENERAL: Not in distress, s/p trach on vent  HEENT: Normocephalic, atraumatic.     NECK: Supple   HEART: RRR; No murmur, rubs, gallops. + edema  LUNGS: Clear to auscultation on vent.   ABDOMEN: Soft, nontender, nondistended.   EXT:  ++ edema.  :  No Pereira   SKIN: Warm and dry " without rash  NEURO: Alert and awake.   PSYCHIATRIC: Cooperative with PE    Labs:  Results from last 7 days   Lab Units 05/06/25  0644 05/05/25  1440 05/05/25  0143   WBC 10*3/mm3 11.87* 13.47* 9.98   HEMOGLOBIN g/dL 9.1* 9.5* 9.9*   HEMATOCRIT % 28.8* 31.2* 33.4*   PLATELETS 10*3/mm3 269 274 240     Results from last 7 days   Lab Units 05/06/25  0644 05/05/25  1440 05/05/25  0143 05/04/25  0235 05/03/25  0307 05/02/25  0337 05/01/25  0904 05/01/25  0720   SODIUM mmol/L 133* 132* 130* 135* 135* 134*   < >  --    POTASSIUM mmol/L 3.4* 3.4* 3.2* 3.3* 3.4* 3.6   < >  --    CHLORIDE mmol/L 95* 93* 93* 95* 94* 95*   < >  --    CO2 mmol/L 24.0 24.0 24.6 27.8 29.1* 24.1   < >  --    BUN mg/dL 29* 27* 25* 20 13 23   < >  --    CREATININE mg/dL 4.86* 4.51* 4.63* 3.93* 3.03* 4.43*   < >  --    CALCIUM mg/dL 8.9 9.5 9.1 9.2 8.1* 8.9   < >  --    PHOSPHORUS mg/dL 4.1 4.1  --   --   --   --   --   --    MAGNESIUM mg/dL 2.0 2.0  --   --   --   --   --  2.9*   ALBUMIN g/dL  --   --  2.1* 2.2* 2.1* 2.3*   < >  --     < > = values in this interval not displayed.     Results from last 7 days   Lab Units 05/06/25  0644   GLUCOSE mg/dL 103*       Results from last 7 days   Lab Units 05/05/25  0143   ALK PHOS U/L 112   BILIRUBIN mg/dL 0.2   ALT (SGPT) U/L 9   AST (SGOT) U/L 11     Results from last 7 days   Lab Units 05/06/25  0431   PH, ARTERIAL pH units 7.456*   PO2 ART mm Hg 150.0*   PCO2, ARTERIAL mm Hg 39.8   HCO3 ART mmol/L 28.0*     Chest X ray: 5/5/25  Impression:  Interval decreased pulmonary edema.  Similar small right and trace left pleural effusions, with likely adjacent atelectasis. Superimposed infection to be excluded clinically.    Assessment       CAD (coronary artery disease)    ESRD on HD    Polycystic kidney disease    HTN (hypertension)    Dyslipidemia    AAA (abdominal aortic aneurysm)    Right coronary artery occlusion    Depression    Tracheostomy present    S/P percutaneous endoscopic gastrostomy (PEG) tube  placement    End-stage renal disease  V-fib arrest - 100% RCA obstruction.   Respiratory failure s/p trach  Hyponatremia  Hypokalemia  Anemia  Fluid overload     PLAN:     ESRD on HD.    - On HD since 4/2023 .   - Hemodialysis today as per TTS schedule.   - Serum electrolytes should get better with dialysis.   - Fluid overload/ Hyponatremia: Fluid restriction 1.5 L/day. UF as tolerated.   - Anemia: Transfuse to keep hemoglobin above 8 . VANCE on dialysis     Rest of the care per ICU team and cardiology.       Zafar Espana MD  05/06/25  13:57 EDT

## 2025-05-06 NOTE — PLAN OF CARE
Problem: Hemodialysis  Goal: Safe, Effective Therapy Delivery  Outcome: Progressing  Goal: Effective Tissue Perfusion  Outcome: Progressing  Goal: Absence of Infection Signs and Symptoms  Outcome: Progressing     HD completed. Tolerated scheduled 3.5 hr tx well with no complications. Access functioned well. UF goal was increased from 2.5 liters to 3 liters per Dr. Espana request, goal of 3 liters reached, total net fluid removal was 3070ml, total liters processed was 80.6 liters. Blood returned well with no complications. Report given to ILIANA Encinas, RN. Goal Outcome Evaluation:

## 2025-05-06 NOTE — CONSULTS
Clinical Nutrition     Patient Name: Estefania Connor  YOB: 1955  MRN: [id number]  Date of Encounter: 05/06/25 09:15 EDT  Admission date: 5/5/2025  Reason for Visit: MDR, Physician consult, EN    Assessment   Nutrition Assessment   Admission Diagnosis:  CAD (coronary artery disease) [I25.10]    Problem List:    CAD (coronary artery disease)    ESRD on HD    Polycystic kidney disease    HTN (hypertension)    Dyslipidemia    AAA (abdominal aortic aneurysm)    Right coronary artery occlusion    Depression    Tracheostomy present    S/P percutaneous endoscopic gastrostomy (PEG) tube placement      PMH:   She  has a past medical history of AAA (abdominal aortic aneurysm), Coronary artery disease, Depression, ESRD (end stage renal disease), Hyperlipidemia, Hypertension, and Polycystic kidney disease.    PSH:  She  has a past surgical history that includes tracheostomy and peg tube insertion (N/A, 4/29/2025) and Cardiac catheterization (N/A, 5/2/2025).    Applicable Nutrition History:   Trach/PEG    Labs    Labs Reviewed: Yes    Results from last 7 days   Lab Units 05/06/25  0644 05/05/25  1440 05/05/25  0143 05/04/25  0235 05/03/25  0307 05/01/25  0904 05/01/25  0720   GLUCOSE mg/dL 103* 107* 108* 112* 79   < >  --    BUN mg/dL 29* 27* 25* 20 13   < >  --    CREATININE mg/dL 4.86* 4.51* 4.63* 3.93* 3.03*   < >  --    SODIUM mmol/L 133* 132* 130* 135* 135*   < >  --    CHLORIDE mmol/L 95* 93* 93* 95* 94*   < >  --    POTASSIUM mmol/L 3.4* 3.4* 3.2* 3.3* 3.4*   < >  --    PHOSPHORUS mg/dL 4.1 4.1  --   --   --   --   --    MAGNESIUM mg/dL 2.0 2.0  --   --   --   --  2.9*   ALT (SGPT) U/L  --   --  9 12 15   < >  --     < > = values in this interval not displayed.       Results from last 7 days   Lab Units 05/05/25  0143 05/04/25  0235 05/03/25  0307   ALBUMIN g/dL 2.1* 2.2* 2.1*   CRP mg/dL 6.44* 8.98* 10.55*       Results from last 7 days   Lab Units 05/06/25  0515 05/05/25  1254   GLUCOSE  "mg/dL 107 119     No results found for: \"HGBA1C\"            Medications    Medications Reviewed: yes    Scheduled Meds:amiodarone, 200 mg, Per PEG Tube, Q12H  aspirin, 81 mg, Per PEG Tube, Daily  atorvastatin, 40 mg, Per PEG Tube, Nightly  cloNIDine, 0.1 mg, Per PEG Tube, Q8H  clopidogrel, 75 mg, Per PEG Tube, Daily  heparin (porcine), 1,700 Units, Intravenous, Once  hydrALAZINE, 25 mg, Per PEG Tube, Q8H  [Held by provider] metoprolol tartrate, 25 mg, Per PEG Tube, Q12H  miconazole, 1 Application, Topical, Q12H  mupirocin, 1 Application, Each Nare, BID  nystatin, 5 mL, Mouth/Throat, 4x Daily  pantoprazole, 40 mg, Intravenous, Q24H  pharmacy consult - MTM, , Not Applicable, Daily  [Held by provider] sacubitril-valsartan, 1 tablet, Per PEG Tube, Q12H  sodium chloride, 10 mL, Intravenous, Q12H      Continuous Infusions:dexmedetomidine, 0.2-1.5 mcg/kg/hr, Last Rate: 1.5 mcg/kg/hr (05/06/25 0842)  heparin, 19 Units/kg/hr (Order-Specific), Last Rate: 17 Units/kg/hr (05/05/25 2308)  niCARdipine, 5-15 mg/hr, Last Rate: 5 mg/hr (05/06/25 0426)  Pharmacy to Dose Heparin,       PRN Meds:.  albuterol    hydrALAZINE    labetalol    nitroglycerin    Pharmacy to Dose Heparin    Intake/Ouptut 24 hrs (0701 - 0700)   I&O's Reviewed: yes      Anthropometrics     Height: Height: 160 cm (62.99\")  Last Filed Weight:  69.4kg (153lbs)  Method:  ?  BMI:  27.11kg/m^2    UBW: Limited wt data available  Weight change:  unable to determine    Nutrition Focused Physical Exam     Date: 5/6    Unable to perform due to pt unable to consent. Visual assessment of pt completed, noted to have edema to BUE.*     Subjective   Reported/Observed/Food/Nutrition Related History:     Pt transferred to [facility] for higher level of care from [facility] after extensive hospital stay at OSH. Has been on Nepro via PEG throughout admission to that facility. +HD. Discussed in MDR. OK to restart nutrition. NKFA    Needs Assessment   Date: 5/6    Height used:Height: 160 cm " "(62.99\")  Weights used: 69.4kg CBW; 52.38kg IBW    Estimated Calorie needs: ~ 1475 Kcal/day  Method: 21-24 Kcals/KG = 9701-8398  Method:  MSJ (6395) x 1.1 = 1623  Method:  PSU = 1475  Ve: 8.79; Tmax: 37    Estimated Protein needs: ~ 83 g PRO/day  Method: 1.0-1.3g/Kg CBW = 69-90    Estimated Fluid needs: ~ ml/day   Per clinical status    Current Nutrition Prescription     PO: NPO Diet NPO Type: Strict NPO  Oral Nutrition Supplement: N/A  Intake: N/A    Assessment & Plan   Nutrition Diagnosis   Date: 5/6 Updated:   Problem Inadequate oral intake    Etiology Dysphagia with trach/peg   Signs/Symptoms Chronic EN   Status: New    Goal:   Nutrition to support treatment and Initiate EN      Nutrition Intervention      Follow treatment progress, Care plan reviewed, Nutrition support order placed    Initiate EN via PEG  Novasource Renal @ 35mL/hr + flush of 20mL/hr. Provide Prosource TF 2x daily  Infused at goal over 20 hrs this regimen provides: 700mL TGV, 1480kcal (100% est needs), 86g protein (104% est needs), 0g fiber, 504mL TF FW (+ 400mL flush = 904mL total).     Adjust flush per clinical status  Ordered nutrition labs   PAB & CRP    Monitoring/Evaluation:   Per protocol, I&O, Pertinent labs, EN delivery/tolerance, Weight, GI status, Symptoms, POC/GOC, Swallow function, Hemodynamic stability    Mary Alvarado, MS,RD,LD  Time Spent: 40min  "

## 2025-05-06 NOTE — PROGRESS NOTES
Pharmacy to Dose Heparin Infusion Note    Estefania Connor is a 69 y.o. female receiving heparin infusion.     Therapy for (VTE/Cardiac): Cardiac  Patient Weight: 72 kg  Initial Bolus (Y/N): No  Any Bolus (Y/N): Yes     Signs or Symptoms of Bleeding: No    Cardiac or Other (Not VTE)   Initial Bolus: 60 units/kg (Max 4,000 units)  Initial rate: 12 units/kg/hr (Max 1,000 units/hr)   Anti-Xa Bolus   Dose Infusion Hold   Time Infusion Rate Change (units/kg/hr) Repeat  Anti-Xa    < 0.11 50 units/kg  (4000 units Max) None Increase by  3 units/kg/hr 6 hours   0.11- 0.19 25 units/kg  (2000 units Max) None Increase by  2 units/kg/hr 6 hours   0.2 - 0.29 0 None Increase by  1 units/kg/hr 6 hours   0.3 - 0.5 0 None No Change 6 hours (after 2 consecutive levels in range check qAM)   0.51 - 0.6 0 None Decrease by  1 units/kg/hr 6 hours   0.61 - 0.8 0 30 minutes Decrease by  2 units/kg/hr 6 hours   0.81 - 1 0 60 minutes Decrease by  3 units/kg/hr 6 hours   >1 0 Hold  After Anti-Xa less than 0.5 decrease previous rate by  4 units/kg/hr  Every 2 hours until Anti-Xa  less than 0.5 then when infusion restarts in 6 hours     Results from last 7 days   Lab Units 05/06/25  0644 05/05/25  1440 05/05/25  0143 05/04/25  1217   INR   --  1.02  --  1.06   HEMOGLOBIN g/dL 9.1* 9.5* 9.9*  --    HEMATOCRIT % 28.8* 31.2* 33.4*  --    PLATELETS 10*3/mm3 269 274 240  --        Date   Time   Anti-Xa Current Rate (units/kg/hr) Bolus   (units) Rate Change   (units/kg/hr) New Rate (units/kg/hr) Repeat  Anti-Xa Comments /  Pump Check    5/5 1405 0.10 13.5 2000 +1.5 15 2200 Originally continued at 13.5u/kg/hr at 72kg from OSH. Eliquis PTA but baseline Anti-Xa 0.1 so okay for bolus. DW RN   5/5 2225 0.17 15 1700 +2 17 0500 Discussed w/ nurse   5/6 0644 0.16 17 1700 +2 19 1400 DW RN                                                                                                                                                                                                            Tarik Trevino [facility]  5/6/2025  08:11 EDT

## 2025-05-06 NOTE — CASE MANAGEMENT/SOCIAL WORK
Continued Stay Note  [facility]     Patient Name: Estefania Connor  MRN: [id number]  Today's Date: 5/6/2025    Admit Date: 5/5/2025    Plan: [facility]   Discharge Plan       Row Name 05/06/25 1511       Plan    Plan [facility]    Plan Comments  spoke with patient's spouse over the phone. They live in a mobile home, 4 steps to enter located in [facility]. She requires assistance from spouse for ADL, bathing, cooking, cleaning, laundry.   She uses a wheelchair, rolling walker, continuous home oxygen provided by [facility].   She goes to [facility] [place], T, TH, Sat am provided by wheelchair transport.   Her trach and peg are new since hospitalization 7 week ago. Has been at [facility] in [place].  reached out to Reginald [phone] with [facility] to see if she can return on Friday.   Patient has Medicare, [place] Medicaid and has insurance coverage.   Her PCP is Sergio Randall. She has no advanced directives. Await word from [facility] in [place].     Final Discharge Disposition Code 63 - [facility]                   Discharge Codes    No documentation.                       Chrissie Terrell RN

## 2025-05-06 NOTE — PLAN OF CARE
Goal Outcome Evaluation:  Plan of Care Reviewed With: patient        Progress: no change        * pt restless/agitated most of the night, refused to be suctioned at times.  Remained hypertensive after PRN meds (labetalol,hydralazine), reported to APRN, nicardipine initiated. Pt responsive to intervention  Pt remains on hep gtt. Currently infusing at 17 units/kg/he. Xa recheck at 0500  Pt remains maxed on precedex.  HD planned for today  Spouse updated via phone.  Vent settings remain the same: Fi02 @ 50%, PEEP of 5, TV @ 365

## 2025-05-06 NOTE — PLAN OF CARE
Goal Outcome Evaluation:  Plan of Care Reviewed With: patient        Progress: improving  Outcome Evaluation: PT eval is completed. patient presents after long hosp stay. patient has been in the hosp since 3/20 patient has trach and PEG followed by V-fib arrest at an outside hosp. patient demonstrates impaired bed mobility and inability to transfer or ambulate at this time which is below baseline patient sat at the edge of the bed for 8 min with mod assist she is unable to maintain midline sitting. at this time. anticipate patient to need ECF at D/C    Anticipated Discharge Disposition (PT): extended care facility

## 2025-05-07 ENCOUNTER — APPOINTMENT (OUTPATIENT)
Dept: GENERAL RADIOLOGY | Facility: HOSPITAL | Age: 70
End: 2025-05-07
Payer: MEDICARE

## 2025-05-07 ENCOUNTER — OUTSIDE FACILITY SERVICE (OUTPATIENT)
Dept: PULMONOLOGY | Facility: CLINIC | Age: 70
End: 2025-05-07
Payer: MEDICARE

## 2025-05-07 ENCOUNTER — APPOINTMENT (OUTPATIENT)
Dept: CARDIOLOGY | Facility: HOSPITAL | Age: 70
End: 2025-05-07
Payer: MEDICARE

## 2025-05-07 LAB
ACT BLD: 216 SECONDS (ref 82–152)
ANION GAP SERPL CALCULATED.3IONS-SCNC: 12 MMOL/L (ref 5–15)
ATMOSPHERIC PRESS: ABNORMAL MM[HG]
BACTERIA SPEC RESP CULT: ABNORMAL
BACTERIA SPEC RESP CULT: ABNORMAL
BASE EXCESS BLDV CALC-SCNC: 3 MMOL/L (ref -2–2)
BDY SITE: ABNORMAL
BH CV UPPER VENOUS LEFT SUBCLAVIAN AUGMENT: NORMAL
BH CV UPPER VENOUS LEFT SUBCLAVIAN COMPRESS: NORMAL
BH CV UPPER VENOUS LEFT SUBCLAVIAN PHASIC: NORMAL
BH CV UPPER VENOUS LEFT SUBCLAVIAN SPONT: NORMAL
BH CV UPPER VENOUS RIGHT AXILLARY AUGMENT: NORMAL
BH CV UPPER VENOUS RIGHT AXILLARY COMPRESS: NORMAL
BH CV UPPER VENOUS RIGHT AXILLARY PHASIC: NORMAL
BH CV UPPER VENOUS RIGHT AXILLARY SPONT: NORMAL
BH CV UPPER VENOUS RIGHT BASILIC FOREARM COMPRESS: NORMAL
BH CV UPPER VENOUS RIGHT BASILIC UPPER COMPRESS: NORMAL
BH CV UPPER VENOUS RIGHT BRACHIAL AUGMENT: NORMAL
BH CV UPPER VENOUS RIGHT BRACHIAL COMPRESS: NORMAL
BH CV UPPER VENOUS RIGHT BRACHIAL PHASIC: NORMAL
BH CV UPPER VENOUS RIGHT BRACHIAL SPONT: NORMAL
BH CV UPPER VENOUS RIGHT CEPHALIC FOREARM COLOR: 1
BH CV UPPER VENOUS RIGHT CEPHALIC FOREARM COMPRESS: NORMAL
BH CV UPPER VENOUS RIGHT CEPHALIC UPPER COMPRESS: NORMAL
BH CV UPPER VENOUS RIGHT RADIAL COMPRESS: NORMAL
BH CV UPPER VENOUS RIGHT SUBCLAVIAN AUGMENT: NORMAL
BH CV UPPER VENOUS RIGHT SUBCLAVIAN PHASIC: NORMAL
BH CV UPPER VENOUS RIGHT SUBCLAVIAN SPONT: NORMAL
BH CV UPPER VENOUS RIGHT ULNAR COMPRESS: NORMAL
BODY TEMPERATURE: 37
BUN SERPL-MCNC: 17 MG/DL (ref 8–23)
BUN/CREAT SERPL: 5.8 (ref 7–25)
CALCIUM SPEC-SCNC: 8.8 MG/DL (ref 8.6–10.5)
CHLORIDE SERPL-SCNC: 99 MMOL/L (ref 98–107)
CO2 BLDA-SCNC: 28.8 MMOL/L (ref 22–33)
CO2 SERPL-SCNC: 24 MMOL/L (ref 22–29)
COHGB MFR BLD: 1.2 %
CREAT SERPL-MCNC: 2.94 MG/DL (ref 0.57–1)
DEPRECATED RDW RBC AUTO: 53.9 FL (ref 37–54)
EGFRCR SERPLBLD CKD-EPI 2021: 16.6 ML/MIN/1.73
EPAP: 0
ERYTHROCYTE [DISTWIDTH] IN BLOOD BY AUTOMATED COUNT: 16.5 % (ref 12.3–15.4)
GLUCOSE BLDC GLUCOMTR-MCNC: 95 MG/DL (ref 70–130)
GLUCOSE BLDC GLUCOMTR-MCNC: 97 MG/DL (ref 70–130)
GLUCOSE SERPL-MCNC: 106 MG/DL (ref 65–99)
GRAM STN SPEC: ABNORMAL
HCO3 BLDV-SCNC: 27.5 MMOL/L (ref 22–28)
HCT VFR BLD AUTO: 30.9 % (ref 34–46.6)
HGB BLD-MCNC: 9.8 G/DL (ref 12–15.9)
HGB BLDA-MCNC: 10.3 G/DL (ref 14–18)
INHALED O2 CONCENTRATION: 50 %
IPAP: 0
MCH RBC QN AUTO: 28.2 PG (ref 26.6–33)
MCHC RBC AUTO-ENTMCNC: 31.7 G/DL (ref 31.5–35.7)
MCV RBC AUTO: 88.8 FL (ref 79–97)
METHGB BLD QL: 0.3 %
MODALITY: ABNORMAL
OXYHGB MFR BLDV: 73.9 %
PAW @ PEAK INSP FLOW SETTING VENT: 0 CMH2O
PCO2 BLDV: 40.9 MM HG (ref 41–51)
PEEP RESPIRATORY: 5 CM[H2O]
PH BLDV: 7.44 PH UNITS (ref 7.31–7.41)
PLATELET # BLD AUTO: 306 10*3/MM3 (ref 140–450)
PMV BLD AUTO: 10.6 FL (ref 6–12)
PO2 BLDV: 39.3 MM HG (ref 27–53)
POTASSIUM SERPL-SCNC: 3.1 MMOL/L (ref 3.5–5.2)
RBC # BLD AUTO: 3.48 10*6/MM3 (ref 3.77–5.28)
SODIUM SERPL-SCNC: 135 MMOL/L (ref 136–145)
TOTAL RATE: 20 BREATHS/MINUTE
UFH PPP CHRO-ACNC: 0.21 IU/ML (ref 0.3–0.7)
UFH PPP CHRO-ACNC: 0.23 IU/ML (ref 0.3–0.7)
VENTILATOR MODE: ABNORMAL
VT ON VENT VENT: 0.36 ML
WBC NRBC COR # BLD AUTO: 13.11 10*3/MM3 (ref 3.4–10.8)

## 2025-05-07 PROCEDURE — 93971 EXTREMITY STUDY: CPT

## 2025-05-07 PROCEDURE — 85347 COAGULATION TIME ACTIVATED: CPT

## 2025-05-07 PROCEDURE — 97110 THERAPEUTIC EXERCISES: CPT

## 2025-05-07 PROCEDURE — 93971 EXTREMITY STUDY: CPT | Performed by: INTERNAL MEDICINE

## 2025-05-07 PROCEDURE — 92928 PRQ TCAT PLMT NTRAC ST 1 LES: CPT | Performed by: INTERNAL MEDICINE

## 2025-05-07 PROCEDURE — 85520 HEPARIN ASSAY: CPT

## 2025-05-07 PROCEDURE — 25010000002 NICARDIPINE 2.5 MG/ML SOLUTION 10 ML VIAL

## 2025-05-07 PROCEDURE — C1887 CATHETER, GUIDING: HCPCS | Performed by: INTERNAL MEDICINE

## 2025-05-07 PROCEDURE — 99232 SBSQ HOSP IP/OBS MODERATE 35: CPT | Performed by: INTERNAL MEDICINE

## 2025-05-07 PROCEDURE — 25010000002 CEFTRIAXONE PER 250 MG: Performed by: INTERNAL MEDICINE

## 2025-05-07 PROCEDURE — 71045 X-RAY EXAM CHEST 1 VIEW: CPT

## 2025-05-07 PROCEDURE — 82805 BLOOD GASES W/O2 SATURATION: CPT

## 2025-05-07 PROCEDURE — 94799 UNLISTED PULMONARY SVC/PX: CPT

## 2025-05-07 PROCEDURE — 25010000002 NICARDIPINE 2.5 MG/ML SOLUTION 10 ML VIAL: Performed by: INTERNAL MEDICINE

## 2025-05-07 PROCEDURE — C1874 STENT, COATED/COV W/DEL SYS: HCPCS | Performed by: INTERNAL MEDICINE

## 2025-05-07 PROCEDURE — C1725 CATH, TRANSLUMIN NON-LASER: HCPCS | Performed by: INTERNAL MEDICINE

## 2025-05-07 PROCEDURE — 25010000002 BIVALIRUDIN TRIFLUOROACETATE 250 MG RECONSTITUTED SOLUTION 1 EACH VIAL: Performed by: INTERNAL MEDICINE

## 2025-05-07 PROCEDURE — C1894 INTRO/SHEATH, NON-LASER: HCPCS | Performed by: INTERNAL MEDICINE

## 2025-05-07 PROCEDURE — 94761 N-INVAS EAR/PLS OXIMETRY MLT: CPT

## 2025-05-07 PROCEDURE — 80048 BASIC METABOLIC PNL TOTAL CA: CPT | Performed by: INTERNAL MEDICINE

## 2025-05-07 PROCEDURE — 027036Z DILATION OF CORONARY ARTERY, ONE ARTERY WITH THREE DRUG-ELUTING INTRALUMINAL DEVICES, PERCUTANEOUS APPROACH: ICD-10-PCS | Performed by: INTERNAL MEDICINE

## 2025-05-07 PROCEDURE — C1769 GUIDE WIRE: HCPCS | Performed by: INTERNAL MEDICINE

## 2025-05-07 PROCEDURE — 94003 VENT MGMT INPAT SUBQ DAY: CPT

## 2025-05-07 PROCEDURE — C9600 PERC DRUG-EL COR STENT SING: HCPCS | Performed by: INTERNAL MEDICINE

## 2025-05-07 PROCEDURE — 82948 REAGENT STRIP/BLOOD GLUCOSE: CPT

## 2025-05-07 PROCEDURE — 25010000002 LIDOCAINE PF 1% 1 % SOLUTION: Performed by: INTERNAL MEDICINE

## 2025-05-07 PROCEDURE — 25510000001 IOPAMIDOL PER 1 ML: Performed by: INTERNAL MEDICINE

## 2025-05-07 PROCEDURE — 85027 COMPLETE CBC AUTOMATED: CPT

## 2025-05-07 PROCEDURE — 99291 CRITICAL CARE FIRST HOUR: CPT | Performed by: INTERNAL MEDICINE

## 2025-05-07 PROCEDURE — 97166 OT EVAL MOD COMPLEX 45 MIN: CPT

## 2025-05-07 PROCEDURE — 25810000003 SODIUM CHLORIDE 0.9 % SOLUTION 250 ML FLEX CONT

## 2025-05-07 PROCEDURE — 25010000002 MIDAZOLAM PER 1 MG: Performed by: INTERNAL MEDICINE

## 2025-05-07 PROCEDURE — 25010000002 FENTANYL CITRATE (PF) 50 MCG/ML SOLUTION: Performed by: INTERNAL MEDICINE

## 2025-05-07 PROCEDURE — 4A023N7 MEASUREMENT OF CARDIAC SAMPLING AND PRESSURE, LEFT HEART, PERCUTANEOUS APPROACH: ICD-10-PCS | Performed by: INTERNAL MEDICINE

## 2025-05-07 PROCEDURE — 25810000003 SODIUM CHLORIDE 0.9 % SOLUTION 250 ML FLEX CONT: Performed by: INTERNAL MEDICINE

## 2025-05-07 PROCEDURE — 25010000002 HEPARIN (PORCINE) 25000-0.45 UT/250ML-% SOLUTION

## 2025-05-07 DEVICE — XIENCE SKYPOINT™ EVEROLIMUS ELUTING CORONARY STENT SYSTEM 3.50 MM X 48 MM / RAPID-EXCHANGE
Type: IMPLANTABLE DEVICE | Site: CORONARY | Status: FUNCTIONAL
Brand: XIENCE SKYPOINT™

## 2025-05-07 DEVICE — XIENCE SKYPOINT™ EVEROLIMUS ELUTING CORONARY STENT SYSTEM 2.50 MM X 18 MM / RAPID-EXCHANGE
Type: IMPLANTABLE DEVICE | Site: CORONARY | Status: FUNCTIONAL
Brand: XIENCE SKYPOINT™

## 2025-05-07 DEVICE — XIENCE SKYPOINT™ EVEROLIMUS ELUTING CORONARY STENT SYSTEM 3.00 MM X 33 MM / RAPID-EXCHANGE
Type: IMPLANTABLE DEVICE | Site: CORONARY | Status: FUNCTIONAL
Brand: XIENCE SKYPOINT™

## 2025-05-07 RX ORDER — AMLODIPINE BESYLATE 10 MG/1
10 TABLET ORAL DAILY
COMMUNITY
Start: 2025-03-03 | End: 2025-05-22 | Stop reason: HOSPADM

## 2025-05-07 RX ORDER — FENOFIBRATE 54 MG/1
54 TABLET ORAL DAILY
COMMUNITY
Start: 2025-03-03 | End: 2025-05-22 | Stop reason: HOSPADM

## 2025-05-07 RX ORDER — POTASSIUM CHLORIDE 1.5 G/1.58G
40 POWDER, FOR SOLUTION ORAL ONCE
Status: COMPLETED | OUTPATIENT
Start: 2025-05-07 | End: 2025-05-07

## 2025-05-07 RX ORDER — SERTRALINE HYDROCHLORIDE 25 MG/1
25 TABLET, FILM COATED ORAL DAILY
COMMUNITY
Start: 2025-03-03 | End: 2025-05-22 | Stop reason: HOSPADM

## 2025-05-07 RX ORDER — EZETIMIBE 10 MG/1
10 TABLET ORAL DAILY
COMMUNITY
Start: 2025-03-03 | End: 2025-05-22 | Stop reason: HOSPADM

## 2025-05-07 RX ORDER — POTASSIUM CHLORIDE 1.5 G/1.58G
40 POWDER, FOR SOLUTION ORAL ONCE
Status: DISCONTINUED | OUTPATIENT
Start: 2025-05-07 | End: 2025-05-07

## 2025-05-07 RX ORDER — BUSPIRONE HYDROCHLORIDE 10 MG/1
10 TABLET ORAL 3 TIMES DAILY
COMMUNITY
Start: 2025-03-03 | End: 2025-05-22 | Stop reason: HOSPADM

## 2025-05-07 RX ORDER — MIDAZOLAM HYDROCHLORIDE 1 MG/ML
INJECTION, SOLUTION INTRAMUSCULAR; INTRAVENOUS
Status: DISCONTINUED | OUTPATIENT
Start: 2025-05-07 | End: 2025-05-07 | Stop reason: HOSPADM

## 2025-05-07 RX ORDER — IOPAMIDOL 755 MG/ML
INJECTION, SOLUTION INTRAVASCULAR
Status: DISCONTINUED | OUTPATIENT
Start: 2025-05-07 | End: 2025-05-07 | Stop reason: HOSPADM

## 2025-05-07 RX ORDER — HYDRALAZINE HYDROCHLORIDE 50 MG/1
50 TABLET, FILM COATED ORAL 3 TIMES DAILY
COMMUNITY
End: 2025-05-22 | Stop reason: HOSPADM

## 2025-05-07 RX ORDER — ISOSORBIDE MONONITRATE 60 MG/1
60 TABLET, EXTENDED RELEASE ORAL DAILY
COMMUNITY
Start: 2025-03-03 | End: 2025-05-22 | Stop reason: HOSPADM

## 2025-05-07 RX ORDER — FENTANYL CITRATE 50 UG/ML
INJECTION, SOLUTION INTRAMUSCULAR; INTRAVENOUS
Status: DISCONTINUED | OUTPATIENT
Start: 2025-05-07 | End: 2025-05-07 | Stop reason: HOSPADM

## 2025-05-07 RX ORDER — APIXABAN 2.5 MG/1
2.5 TABLET, FILM COATED ORAL EVERY 12 HOURS SCHEDULED
COMMUNITY
Start: 2025-03-03 | End: 2025-05-22 | Stop reason: HOSPADM

## 2025-05-07 RX ORDER — CARVEDILOL 25 MG/1
25 TABLET ORAL 2 TIMES DAILY WITH MEALS
COMMUNITY
Start: 2025-03-03 | End: 2025-05-22 | Stop reason: HOSPADM

## 2025-05-07 RX ORDER — LIDOCAINE HYDROCHLORIDE 10 MG/ML
INJECTION, SOLUTION EPIDURAL; INFILTRATION; INTRACAUDAL; PERINEURAL
Status: DISCONTINUED | OUTPATIENT
Start: 2025-05-07 | End: 2025-05-07 | Stop reason: HOSPADM

## 2025-05-07 RX ORDER — HYDROXYZINE HYDROCHLORIDE 25 MG/1
25 TABLET, FILM COATED ORAL EVERY 8 HOURS PRN
COMMUNITY
Start: 2025-03-03 | End: 2025-05-22 | Stop reason: HOSPADM

## 2025-05-07 RX ORDER — BUMETANIDE 2 MG/1
2 TABLET ORAL 2 TIMES DAILY
COMMUNITY
Start: 2025-03-12 | End: 2025-05-22 | Stop reason: HOSPADM

## 2025-05-07 RX ADMIN — HYDRALAZINE HYDROCHLORIDE 25 MG: 25 TABLET ORAL at 20:59

## 2025-05-07 RX ADMIN — DEXMEDETOMIDINE HYDROCHLORIDE 1.5 MCG/KG/HR: 4 INJECTION, SOLUTION INTRAVENOUS at 19:32

## 2025-05-07 RX ADMIN — Medication 10 ML: at 08:30

## 2025-05-07 RX ADMIN — AMIODARONE HYDROCHLORIDE 200 MG: 200 TABLET ORAL at 20:59

## 2025-05-07 RX ADMIN — MUPIROCIN 1 APPLICATION: 20 OINTMENT TOPICAL at 20:59

## 2025-05-07 RX ADMIN — CLONIDINE HYDROCHLORIDE 0.1 MG: 0.1 TABLET ORAL at 05:26

## 2025-05-07 RX ADMIN — DEXMEDETOMIDINE HYDROCHLORIDE 1.5 MCG/KG/HR: 4 INJECTION, SOLUTION INTRAVENOUS at 11:11

## 2025-05-07 RX ADMIN — MICONAZOLE NITRATE 1 APPLICATION: 20 POWDER TOPICAL at 08:30

## 2025-05-07 RX ADMIN — POTASSIUM CHLORIDE 40 MEQ: 1.5 FOR SOLUTION ORAL at 17:13

## 2025-05-07 RX ADMIN — SODIUM CHLORIDE 5 MG/HR: 9 INJECTION, SOLUTION INTRAVENOUS at 00:34

## 2025-05-07 RX ADMIN — HYDRALAZINE HYDROCHLORIDE 25 MG: 25 TABLET ORAL at 17:14

## 2025-05-07 RX ADMIN — MUPIROCIN 1 APPLICATION: 20 OINTMENT TOPICAL at 08:29

## 2025-05-07 RX ADMIN — ASPIRIN 81 MG 81 MG: 81 TABLET ORAL at 08:30

## 2025-05-07 RX ADMIN — HEPARIN SODIUM 21 UNITS/KG/HR: 10000 INJECTION, SOLUTION INTRAVENOUS at 05:28

## 2025-05-07 RX ADMIN — AMIODARONE HYDROCHLORIDE 200 MG: 200 TABLET ORAL at 08:30

## 2025-05-07 RX ADMIN — DEXMEDETOMIDINE HYDROCHLORIDE 1.5 MCG/KG/HR: 4 INJECTION, SOLUTION INTRAVENOUS at 05:28

## 2025-05-07 RX ADMIN — SODIUM CHLORIDE 5 MG/HR: 9 INJECTION, SOLUTION INTRAVENOUS at 22:45

## 2025-05-07 RX ADMIN — CEFTRIAXONE 2000 MG: 2 INJECTION, POWDER, FOR SOLUTION INTRAMUSCULAR; INTRAVENOUS at 20:59

## 2025-05-07 RX ADMIN — Medication 45 ML: at 20:59

## 2025-05-07 RX ADMIN — PANTOPRAZOLE SODIUM 40 MG: 40 INJECTION, POWDER, FOR SOLUTION INTRAVENOUS at 08:30

## 2025-05-07 RX ADMIN — DEXMEDETOMIDINE HYDROCHLORIDE 1.5 MCG/KG/HR: 4 INJECTION, SOLUTION INTRAVENOUS at 03:37

## 2025-05-07 RX ADMIN — ATORVASTATIN CALCIUM 40 MG: 40 TABLET, FILM COATED ORAL at 20:59

## 2025-05-07 RX ADMIN — HYDRALAZINE HYDROCHLORIDE 25 MG: 25 TABLET ORAL at 05:30

## 2025-05-07 RX ADMIN — NYSTATIN 500000 UNITS: 100000 SUSPENSION ORAL at 08:29

## 2025-05-07 RX ADMIN — Medication 10 ML: at 21:00

## 2025-05-07 RX ADMIN — DEXMEDETOMIDINE HYDROCHLORIDE 1.5 MCG/KG/HR: 4 INJECTION, SOLUTION INTRAVENOUS at 00:10

## 2025-05-07 RX ADMIN — CLOPIDOGREL BISULFATE 75 MG: 75 TABLET, FILM COATED ORAL at 08:30

## 2025-05-07 RX ADMIN — CLONIDINE HYDROCHLORIDE 0.1 MG: 0.1 TABLET ORAL at 20:59

## 2025-05-07 RX ADMIN — CLONIDINE HYDROCHLORIDE 0.1 MG: 0.1 TABLET ORAL at 17:13

## 2025-05-07 RX ADMIN — DEXMEDETOMIDINE HYDROCHLORIDE 1.5 MCG/KG/HR: 4 INJECTION, SOLUTION INTRAVENOUS at 22:57

## 2025-05-07 NOTE — PLAN OF CARE
Goal Outcome Evaluation:  Plan of Care Reviewed With: patient        Progress: declining  Outcome Evaluation: All EOB/OOB mobility deferred. Pt tolerated bed level ther ex, but did not follow any commands to actively participate. Continue to recommend PT skilled care and D/C to ECF.

## 2025-05-07 NOTE — THERAPY TREATMENT NOTE
Patient Name: Estefania Connor  : 1955    MRN: [id number]                              Today's Date: 2025       Admit Date: 2025    Visit Dx: No diagnosis found.  Patient Active Problem List   Diagnosis    ESRD on HD    Polycystic kidney disease    HTN (hypertension)    Dyslipidemia    AAA (abdominal aortic aneurysm)    Right coronary artery occlusion    Depression    Tracheostomy present    S/P percutaneous endoscopic gastrostomy (PEG) tube placement    CAD (coronary artery disease)     Past Medical History:   Diagnosis Date    AAA (abdominal aortic aneurysm)     Coronary artery disease     Depression     ESRD (end stage renal disease)     Hyperlipidemia     Hypertension     Polycystic kidney disease      Past Surgical History:   Procedure Laterality Date    CARDIAC CATHETERIZATION N/A 2025    Procedure: Coronary angiography;  Surgeon: Ambrose Mcnally MD;  Location: [facility] INVASIVE LOCATION;  Service: Cardiovascular;  Laterality: N/A;    TRACHEOSTOMY AND PEG TUBE INSERTION N/A 2025    Procedure: TRACHEOSTOMY AND PERCUTANEOUS ENDOSCOPIC GASTROSTOMY TUBE INSERTION;  Surgeon: Kera Head MD;  [facility];  Service: General;  Laterality: N/A;      General Information       Row Name 25 1426          Physical Therapy Time and Intention    Document Type therapy note (daily note)  -KG     Mode of Treatment physical therapy  -KG       Row Name 25 1426          General Information    Existing Precautions/Restrictions cardiac;fall;oxygen therapy device and L/min;other (see comments)  trach vent; no command following  -KG     Barriers to Rehab medically complex;previous functional deficit;cognitive status  -KG       Row Name 25 1426          Cognition    Orientation Status (Cognition) unable/difficult to assess  -KG       Row Name 25 1426          Safety Issues/Impairments Affecting Functional Mobility    Safety Issues Affecting Function (Mobility) ability  to follow commands;at risk behavior observed;awareness of need for assistance;insight into deficits/self-awareness;judgment;safety precaution awareness;safety precautions follow-through/compliance;sequencing abilities  -KG     Impairments Affecting Function (Mobility) balance;cognition;coordination;endurance/activity tolerance;postural/trunk control;range of motion (ROM);shortness of breath;strength  -KG     Cognitive Impairments, Mobility Safety/Performance attention;awareness, need for assistance;insight into deficits/self-awareness;judgment;safety precaution awareness;safety precaution follow-through;sequencing abilities  -KG               User Key  (r) = Recorded By, (t) = Taken By, (c) = Cosigned By      Initials Name Provider Type    KG Dorothy Frazier PT Physical Therapist                   Mobility       Row Name 05/07/25 1428          Bed Mobility    Comment, (Bed Mobility) All EOB mobility deferred; pt not alert; no command following.  -KG       Row Name 05/07/25 1428          Transfers    Comment, (Transfers) All OOB mobility deferred.  -KG       Row Name 05/07/25 1428          Bed-Chair Transfer    Bed-Chair [place] (Transfers) unable to assess  -KG       Row Name 05/07/25 1428          Sit-Stand Transfer    Sit-Stand [place] (Transfers) unable to assess  -KG       Row Name 05/07/25 1428          Gait/Stairs (Locomotion)    [place] Level (Gait) unable to assess  -KG     Comment, (Gait/Stairs) Ambulation deferred. Not appropriate to assess.  -KG               User Key  (r) = Recorded By, (t) = Taken By, (c) = Cosigned By      Initials Name Provider Type    Dorothy Carlos PT Physical Therapist                   Obj/Interventions       Row Name 05/07/25 1431          Motor Skills    Therapeutic Exercise hip;knee;ankle  -KG       Row Name 05/07/25 1431          Hip (Therapeutic Exercise)    Hip (Therapeutic Exercise) strengthening exercise  -KG     Hip Strengthening  (Therapeutic Exercise) bilateral;flexion;extension;aBduction;aDduction;external rotation;internal rotation;heel slides;supine;10 repetitions  -KG       Row Name 05/07/25 1431          Knee (Therapeutic Exercise)    Knee (Therapeutic Exercise) strengthening exercise  -KG     Knee Strengthening (Therapeutic Exercise) bilateral;SLR (straight leg raise);SAQ (short arc quad);supine;10 repetitions  -KG       Row Name 05/07/25 1431          Ankle (Therapeutic Exercise)    Ankle (Therapeutic Exercise) strengthening exercise  -KG     Ankle Strengthening (Therapeutic Exercise) bilateral;dorsiflexion;plantarflexion;supine;10 repetitions  -KG               User Key  (r) = Recorded By, (t) = Taken By, (c) = Cosigned By      Initials Name Provider Type    KG Dorothy Frazier, PT Physical Therapist                   Goals/Plan    No documentation.                  Clinical Impression       Row Name 05/07/25 1429          Pain    Additional Documentation Pain Scale: FACES Pre/Post-Treatment (Group)  -KG       Row Name 05/07/25 1421          Pain Scale: FACES Pre/Post-Treatment    Pain: FACES Scale, Pretreatment 0-->no hurt  -KG     Posttreatment Pain Rating 0-->no hurt  -KG       Row Name 05/07/25 142          Plan of Care Review    Plan of Care Reviewed With patient  -KG     Progress declining  -KG     Outcome Evaluation All EOB/OOB mobility deferred. Pt tolerated bed level ther ex, but did not follow any commands to actively participate. Continue to recommend PT skilled care and D/C to ECF.  -KG       [facility] Name 05/07/25 1429          Vital Signs    Pre Systolic BP Rehab 152  -KG     Pre Treatment Diastolic BP 75  -KG     Post Systolic BP Rehab 170  -KG     Post Treatment Diastolic BP 78  -KG     Pretreatment Heart Rate (beats/min) 76  -KG     Posttreatment Heart Rate (beats/min) 74  -KG     Pre SpO2 (%) 91  -KG     O2 Delivery Pre Treatment ventilator  -KG     Post SpO2 (%) 92  -KG     O2 Delivery Post Treatment ventilator   -KG     Pre Patient Position Supine  -KG     Intra Patient Position Supine  -KG     Post Patient Position Supine  -KG       Row Name 05/07/25 1429          Positioning and Restraints    Pre-Treatment Position in bed  -KG     Post Treatment Position bed  -KG     In Bed notified nsg;supine;call light within reach;encouraged to call for assist;side rails up x3;RUE elevated;LUE elevated  -KG     Restraints released:;reapplied:;notified nsg:;soft limb  -KG               User Key  (r) = Recorded By, (t) = Taken By, (c) = Cosigned By      Initials Name Provider Type    Dorothy Carlos PT Physical Therapist                   Outcome Measures       Row Name 05/07/25 1432 05/07/25 1000       How much help from another person do you currently need...    Turning from your back to your side while in flat bed without using bedrails? 1  -KG 1  -HM    Moving from lying on back to sitting on the side of a flat bed without bedrails? 1  -KG 1  -HM    Moving to and from a bed to a chair (including a wheelchair)? 1  -KG 1  -HM    Standing up from a chair using your arms (e.g., wheelchair, bedside chair)? 1  -KG 1  -HM    Climbing 3-5 steps with a railing? 1  -KG 1  -HM    To walk in hospital room? 1  -KG 1  -HM    AM-PAC 6 Clicks Score (PT) 6  -KG 6  -HM    Highest Level of Mobility Goal 2 --> Bed activities/dependent transfer  -KG 2 --> Bed activities/dependent transfer  -HM      Row Name 05/07/25 [facility] 05/07/25 1210       Functional Assessment    Outcome Measure Options AM-PAC 6 Clicks Basic Mobility (PT)  -KG AM-PAC 6 Clicks Daily Activity (OT)  -AN              User Key  (r) = Recorded By, (t) = Taken By, (c) = Cosigned By      Initials Name Provider Type    Dorothy Carlos PT Physical Therapist    Hilary Johnson OT Occupational Therapist    Alma Mack, RN Registered Nurse                                 Physical Therapy Education       Title: PT OT SLP Therapies (In Progress)       Topic: Physical  Therapy (In Progress)       Point: Mobility training (In Progress)       Learning Progress Summary            Patient Acceptance, E, NR by KG at 5/7/2025 1115    Acceptance, E, NR by MARIFER at 5/6/2025 1430                      Point: Home exercise program (In Progress)       Learning Progress Summary            Patient Acceptance, E, NR by KG at 5/7/2025 1115    Acceptance, E, NR by MARIFER at 5/6/2025 1430                      Point: Body mechanics (In Progress)       Learning Progress Summary            Patient Acceptance, E, NR by KG at 5/7/2025 1115    Acceptance, E, NR by MARIFER at 5/6/2025 1430                      Point: Precautions (In Progress)       Learning Progress Summary            Patient Acceptance, E, NR by KG at 5/7/2025 1115    Acceptance, E, NR by MARIFER at 5/6/2025 1430                                      User Key       Initials Effective Dates Name Provider Type Discipline    MARIFER 02/03/23 -  Sandra Pruitt, PT Physical Therapist PT    KG 05/22/20 -  Dorothy Frazier, PT Physical Therapist PT                  PT Recommendation and Plan     Progress: declining  Outcome Evaluation: All EOB/OOB mobility deferred. Pt tolerated bed level ther ex, but did not follow any commands to actively participate. Continue to recommend PT skilled care and D/C to ECF.     Time Calculation:         PT Charges       Row Name 05/07/25 1115             Time Calculation    Start Time 1115  -KG      PT Received On 05/07/25  -KG      PT Goal Re-Cert Due Date 05/16/25  -KG         Time Calculation- PT    Total Timed Code Minutes- PT 9 minute(s)  -KG         Timed Charges    56728 - PT Therapeutic Exercise Minutes 9  -KG         Total Minutes    Timed Charges Total Minutes 9  -KG       Total Minutes 9  -KG                User Key  (r) = Recorded By, (t) = Taken By, (c) = Cosigned By      Initials Name Provider Type    KG Dorothy Frazier, PT Physical Therapist                  Therapy Charges for Today       Code  Description Service Date Service Provider Modifiers Qty    38041588487 HC PT THER PROC EA 15 MIN 5/7/2025 Dorothy Frazier, PT GP 1            PT G-Codes  Outcome Measure Options: AM-PAC 6 Clicks Basic Mobility (PT)  AM-PAC 6 Clicks Score (PT): 6  AM-PAC 6 Clicks Score (OT): 6       Brigette Frazier, PT  5/7/2025

## 2025-05-07 NOTE — THERAPY EVALUATION
Patient Name: Estefania Connor  : 1955    MRN: [id number]                              Today's Date: 2025       Admit Date: 2025    Visit Dx: No diagnosis found.  Patient Active Problem List   Diagnosis    ESRD on HD    Polycystic kidney disease    HTN (hypertension)    Dyslipidemia    AAA (abdominal aortic aneurysm)    Right coronary artery occlusion    Depression    Tracheostomy present    S/P percutaneous endoscopic gastrostomy (PEG) tube placement    CAD (coronary artery disease)     Past Medical History:   Diagnosis Date    AAA (abdominal aortic aneurysm)     Coronary artery disease     Depression     ESRD (end stage renal disease)     Hyperlipidemia     Hypertension     Polycystic kidney disease      Past Surgical History:   Procedure Laterality Date    CARDIAC CATHETERIZATION N/A 2025    Procedure: Coronary angiography;  Surgeon: Ambrose Mcnally MD;  Location: [facility] INVASIVE LOCATION;  Service: Cardiovascular;  Laterality: N/A;    TRACHEOSTOMY AND PEG TUBE INSERTION N/A 2025    Procedure: TRACHEOSTOMY AND PERCUTANEOUS ENDOSCOPIC GASTROSTOMY TUBE INSERTION;  Surgeon: Kera Head MD;  Location: [facility] OR;  Service: General;  Laterality: N/A;      General Information       Row Name 25 1201          OT Time and Intention    Document Type evaluation  -AN     Mode of Treatment occupational therapy  -AN       Row Name 25 120          General Information    Patient Profile Reviewed yes  -AN     Prior Level of Function --  unable to gather PLOF at this time due to AMS; per chart review pt independent prior to recent hospitalization requiring some assistance from   -AN     Existing Precautions/Restrictions cardiac;oxygen therapy device and L/min;fall  -AN     Barriers to Rehab medically complex;previous functional deficit  -AN       Row Name 25 1201          Living Environment    Current Living Arrangements home  -AN     People in Home spouse  -AN        Row Name 05/07/25 1201          Home Main Entrance    Number of Stairs, Main Entrance four  -AN       Row Name 05/07/25 1201          Cognition    Orientation Status (Cognition) unable/difficult to assess  -AN       Row Name 05/07/25 1201          Safety Issues/Impairments Affecting Functional Mobility    Safety Issues Affecting Function (Mobility) ability to follow commands;at risk behavior observed;awareness of need for assistance;friction/shear risk;insight into deficits/self-awareness;judgment;sequencing abilities;safety precautions follow-through/compliance;safety precaution awareness;problem-solving  -AN     Impairments Affecting Function (Mobility) balance;endurance/activity tolerance;postural/trunk control;shortness of breath;strength;cognition;coordination;range of motion (ROM);grasp  -AN     Cognitive Impairments, Mobility Safety/Performance attention;safety precaution follow-through;awareness, need for assistance;sequencing abilities;insight into deficits/self-awareness;judgment;problem-solving/reasoning;safety precaution awareness  -AN               User Key  (r) = Recorded By, (t) = Taken By, (c) = Cosigned By      Initials Name Provider Type    AN Hilary Crespo OT Occupational Therapist                     Mobility/ADL's    No documentation.                  Obj/Interventions       Row Name 05/07/25 1204          Sensory Assessment (Somatosensory)    Sensory Assessment (Somatosensory) unable/difficult to assess  -AN       [facility] Name 05/07/25 1204          Vision Assessment/Intervention    Visual Impairment/Limitations unable/difficult to assess  -AN       [facility] Name 05/07/25 1204          Range of Motion Comprehensive    General Range of Motion upper extremity range of motion deficits identified  -AN     Comment, General Range of Motion b/l shoulder flexion limited to partial ROM  -AN       Row Name 05/07/25 1204          Strength Comprehensive (MMT)    General Manual Muscle Testing (MMT) Assessment  upper extremity strength deficits identified  -AN     Comment, General Manual Muscle Testing (MMT) Assessment unable to formally assess at this time due to AMS and poor command following  -AN       Row Name 05/07/25 1204          Shoulder (Therapeutic Exercise)    Shoulder (Therapeutic Exercise) PROM (passive range of motion)  -AN     Shoulder PROM (Therapeutic Exercise) bilateral;flexion;extension;aBduction;aDduction;supine;5 repetitions  -AN       Row Name 05/07/25 1204          Elbow/Forearm (Therapeutic Exercise)    Elbow/Forearm (Therapeutic Exercise) PROM (passive range of motion)  -AN     Elbow/Forearm PROM (Therapeutic Exercise) bilateral;flexion;extension;supination;pronation;supine;5 repetitions  -AN       Row Name 05/07/25 1204          Wrist (Therapeutic Exercise)    Wrist (Therapeutic Exercise) PROM (passive range of motion)  -AN     Wrist PROM (Therapeutic Exercise) bilateral;flexion;extension;5 repetitions  -AN       Row Name 05/07/25 1204          Hand (Therapeutic Exercise)    Hand (Therapeutic Exercise) PROM (passive range of motion)  -AN     Hand PROM (Therapeutic Exercise) bilateral;finger flexion;finger extension;5 repetitions  -AN       Row Name 05/07/25 1204          Motor Skills    Motor Skills coordination  -AN     Therapeutic Exercise shoulder;elbow/forearm;wrist;hand  -AN               User Key  (r) = Recorded By, (t) = Taken By, (c) = Cosigned By      Initials Name Provider Type    Hilary Johnson OT Occupational Therapist                   Goals/Plan       Row Name 05/07/25 1209          Bed Mobility Goal 1 (OT)    Activity/Assistive Device (Bed Mobility Goal 1, OT) rolling to left;rolling to right  -AN     [place] Level/Cues Needed (Bed Mobility Goal 1, OT) moderate assist (50-74% patient effort)  -AN     Time Frame (Bed Mobility Goal 1, OT) short term goal (STG);3 days  -AN       Row Name 05/07/25 1209          Transfer Goal 1 (OT)    Activity/Assistive Device (Transfer Goal  1, OT) bed-to-chair/chair-to-bed  -AN     [place] Level/Cues Needed (Transfer Goal 1, OT) moderate assist (50-74% patient effort)  -AN     Time Frame (Transfer Goal 1, OT) long term goal (LTG);10 days  -AN       Row Name 05/07/25 1209          Dressing Goal 1 (OT)    Activity/Device (Dressing Goal 1, OT) upper body dressing  -AN     [place]/Cues Needed (Dressing Goal 1, OT) minimum assist (75% or more patient effort)  -AN     Time Frame (Dressing Goal 1, OT) short term goal (STG);3 days  -AN       [facility] Name 05/07/25 1209          Therapy Assessment/Plan (OT)    Planned Therapy Interventions (OT) activity tolerance training;adaptive equipment training;BADL retraining;cognitive/visual perception retraining;edema control/reduction;functional balance retraining;transfer/mobility retraining;strengthening exercise;occupation/activity based interventions;patient/caregiver education/training  -AN               User Key  (r) = Recorded By, (t) = Taken By, (c) = Cosigned By      Initials Name Provider Type    Hilary Johnson, OT Occupational Therapist                   Clinical Impression       [facility] Name 05/07/25 1206          Pain Assessment    Additional Documentation Pain Scale: FACES Pre/Post-Treatment (Group)  -AN       Row Name 05/07/25 1206          Pain Scale: FACES Pre/Post-Treatment    Pain: FACES Scale, Pretreatment 0-->no hurt  -AN     Posttreatment Pain Rating 0-->no hurt  -AN       Row Name 05/07/25 1206          Plan of Care Review    Plan of Care Reviewed With patient  -AN     Progress no change  -AN     Outcome Evaluation Pt presents below her functional baseline with deficits including generalized weakness, impaired cognition, decreased activity tolerance limiting independence with ADLs and mobility warranting OT services. Bed level evaluation completed due to current deficits, vent, and poor command following. Rec ECF at [place].  -AN       Row Name 05/07/25 1206          Therapy Assessment/Plan  (OT)    Patient/Family Therapy Goal Statement (OT) Return to PLOF  -AN     Rehab Potential (OT) good  -AN     Criteria for Skilled Therapeutic Interventions Met (OT) yes;skilled treatment is necessary  -AN     Therapy Frequency (OT) daily  -AN     Predicted Duration of Therapy Intervention (OT) 7 days  -AN       Row Name 05/07/25 1206          Therapy Plan Review/Discharge Plan (OT)    Anticipated Discharge Disposition (OT) extended care facility  -AN       Row Name 05/07/25 1206          Positioning and Restraints    Pre-Treatment Position in bed  -AN     Post Treatment Position bed  -AN     In Bed notified nsg;supine;call light within reach;encouraged to call for assist;exit alarm on;with nsg  -AN               User Key  (r) = Recorded By, (t) = Taken By, (c) = Cosigned By      Initials Name Provider Type    Hilary Johnson OT Occupational Therapist                   Outcome Measures       Row Name 05/07/25 1210          How much help from another is currently needed...    Putting on and taking off regular lower body clothing? 1  -AN     Bathing (including washing, rinsing, and drying) 1  -AN     Toileting (which includes using toilet bed pan or urinal) 1  -AN     Putting on and taking off regular upper body clothing 1  -AN     Taking care of personal grooming (such as brushing teeth) 1  -AN     Eating meals 1  -AN     AM-PAC 6 Clicks Score (OT) 6  -AN       Row Name 05/07/25 1210          Functional Assessment    Outcome Measure Options AM-PAC 6 Clicks Daily Activity (OT)  -AN               User Key  (r) = Recorded By, (t) = Taken By, (c) = Cosigned By      Initials Name Provider Type    Hilary Johnson OT Occupational Therapist                    Occupational Therapy Education       Title: PT OT SLP Therapies (In Progress)       Topic: Occupational Therapy (In Progress)       Point: ADL training (In Progress)       Learning Progress Summary            Patient Acceptance, E, NL by ANKUR at 5/7/2025 1211                       Point: Home exercise program (In Progress)       Learning Progress Summary            Patient Acceptance, E, NL by AN at 5/7/2025 1211                      Point: Precautions (In Progress)       Learning Progress Summary            Patient Acceptance, E, NL by AN at 5/7/2025 1211                      Point: Body mechanics (In Progress)       Learning Progress Summary            Patient Acceptance, E, NL by AN at 5/7/2025 1211                                      User Key       Initials Effective Dates Name Provider Type Discipline    AN 09/21/21 -  Hilary Crespo OT Occupational Therapist OT                  OT Recommendation and Plan  Planned Therapy Interventions (OT): activity tolerance training, adaptive equipment training, BADL retraining, cognitive/visual perception retraining, edema control/reduction, functional balance retraining, transfer/mobility retraining, strengthening exercise, occupation/activity based interventions, patient/caregiver education/training  Therapy Frequency (OT): daily  Plan of Care Review  Plan of Care Reviewed With: patient  Progress: no change  Outcome Evaluation: Pt presents below her functional baseline with deficits including generalized weakness, impaired cognition, decreased activity tolerance limiting independence with ADLs and mobility warranting OT services. Bed level evaluation completed due to current deficits, vent, and poor command following. Rec ECF at dc.     Time Calculation:   Evaluation Complexity (OT)  Review Occupational Profile/Medical/Therapy History Complexity: expanded/moderate complexity  Assessment, Occupational Performance/Identification of Deficit Complexity: 5 or more performance deficits  Clinical Decision Making Complexity (OT): detailed assessment/moderate complexity  Overall Complexity of Evaluation (OT): moderate complexity     Time Calculation- OT       Row Name 05/07/25 1211             Time Calculation- OT    OT Start Time  1105  -AN      OT Received On 05/07/25  -AN      OT Goal Re-Cert Due Date 05/17/25  -AN         Untimed Charges    OT Eval/Re-eval Minutes 35  -AN         Total Minutes    Untimed Charges Total Minutes 35  -AN       Total Minutes 35  -AN                User Key  (r) = Recorded By, (t) = Taken By, (c) = Cosigned By      Initials Name Provider Type    Hilary Johnson OT Occupational Therapist                  Therapy Charges for Today       Code Description Service Date Service Provider Modifiers Qty    [id number]  OT EVAL MOD COMPLEXITY 3 5/7/2025 Hilary Crespo OT GO 1                 Hilary Crespo OT  5/7/2025

## 2025-05-07 NOTE — PLAN OF CARE
Goal Outcome Evaluation:         Pt agitated and restless with care. Intermittently attempting to kick staff. BUE restraints initiated. Precedex continued. Pt rested in between care. Heparin gtt continued. Cardene weaned and off. Vent settings as ordered. SR on monitor. TF held at MN for heart cath today. BM x2. UOP 350ml.  updated by phone.

## 2025-05-07 NOTE — PROGRESS NOTES
Pulmonary/Critical Care ICU Note     LOS: 2 days   Patient Care Team:  Sergio Randall MD as PCP - General (Cardiology)    Chief Complaint: Cardiac arrest / CAD      Subjective     History reviewed and updated in EMR as indicated.    Interval History:     Patient remains on mechanical ventilator.  She is on 50% FiO2 with oxygen saturations of 98 %.  She remains on Precedex.  No current complaints.  Cardiology plans to take her to the Cath Lab later today.  She is on a heparin drip.  N.p.o. currently for procedure.    History taken from: chart, staff    PMH/FH/Social History were reviewed and updated appropriately in the electronic medical record.     Past Medical History:   Diagnosis Date    AAA (abdominal aortic aneurysm)     Coronary artery disease     Depression     ESRD (end stage renal disease)     Hyperlipidemia     Hypertension     Polycystic kidney disease        Review of Systems:    Review of 14 systems was completed with positives and pertinent negatives noted in the subjective section.  All other systems reviewed and are negative.         Objective     Vital Signs  Temp:  [96.6 °F (35.9 °C)-98.3 °F (36.8 °C)] 96.6 °F (35.9 °C)  Heart Rate:  [] 90  Resp:  [18-24] 22  BP: (117-187)/() 160/72  FiO2 (%):  [50 %] 50 %  05/06 0701 - 05/07 0700  In: 2813 [I.V.:2179]  Out: 4020 [Urine:650]  Body mass index is 27.11 kg/m².  Mode: VC+/AC  FiO2 (%):  [50 %] 50 %  S RR:  [20] 20  S VT:  [365 mL] 365 mL  PEEP/CPAP (cm H2O):  [5 cm H20] 5 cm H20  MAP (cm H2O):  [9.5-10] 9.8  IV drips:  dexmedetomidine, Last Rate: 1.5 mcg/kg/hr (05/07/25 0528)  heparin, Last Rate: 21 Units/kg/hr (05/07/25 0528)  niCARdipine, Last Rate: Stopped (05/07/25 0532)  Pharmacy to Dose Heparin       Physical Exam:     Constitutional:   Drowsy on ventilator, in no acute distress   Head:   Normocephalic, atraumatic   Eyes:           Lids and lashes normal, conjunctivae and sclerae normal.  PER   ENMT:  Ears appear intact with no  abnormalities noted     Lips normal.     Neck:  Tracheostomy in place, no JVD   Lungs/Resp:    Normal effort, symmetric chest rise, no crepitus, clear to      auscultation bilaterally.               Heart/CV:   Regular rhythm and normal rate, no murmur   Abdomen/GI:    Soft, nontender, nondistended   :    Deferred   Extremities/MSK:  No clubbing or cyanosis.  Trace bilateral lower extremity edema.  Right upper extremity is swollen.   Pulses:  Pulses palpable and equal bilaterally   Skin:  No bleeding, bruising or rash   Heme/Lymph:  No cervical or supraclavicular adenopathy.   Neurologic:    Psychiatric:    Moves all extremities with no obvious focal motor deficit.  Cranial nerves 2 - 12 grossly intact  Non-agitated, normal affect.    The above physical exam findings were reviewed and reflect my exam findings as of today's exam.   Electronically signed by:  Terry Antonio MD  05/07/25  08:33 EDT      Results Review:     I reviewed the patient's new clinical results.   Results from last 7 days   Lab Units 05/07/25  0348 05/06/25  0644 05/05/25  1440 05/05/25  0143 05/04/25  0235 05/03/25  0307   SODIUM mmol/L 135* 133* 132* 130* 135* 135*   POTASSIUM mmol/L 3.1* 3.4* 3.4* 3.2* 3.3* 3.4*   CHLORIDE mmol/L 99 95* 93* 93* 95* 94*   CO2 mmol/L 24.0 24.0 24.0 24.6 27.8 29.1*   BUN mg/dL 17 29* 27* 25* 20 13   CREATININE mg/dL 2.94* 4.86* 4.51* 4.63* 3.93* 3.03*   CALCIUM mg/dL 8.8 8.9 9.5 9.1 9.2 8.1*   BILIRUBIN mg/dL  --   --   --  0.2 0.2 0.2   ALK PHOS U/L  --   --   --  112 119* 108   ALT (SGPT) U/L  --   --   --  9 12 15   AST (SGOT) U/L  --   --   --  11 12 13   GLUCOSE mg/dL 106* 103* 107* 108* 112* 79     Results from last 7 days   Lab Units 05/07/25  0348 05/06/25  0644 05/05/25  1440   WBC 10*3/mm3 13.11* 11.87* 13.47*   HEMOGLOBIN g/dL 9.8* 9.1* 9.5*   HEMATOCRIT % 30.9* 28.8* 31.2*   PLATELETS 10*3/mm3 306 269 274     Results from last 7 days   Lab Units 05/06/25  0431   PH, ARTERIAL pH units 7.456*    PO2 ART mm Hg 150.0*   PCO2, ARTERIAL mm Hg 39.8   HCO3 ART mmol/L 28.0*     Results from last 7 days   Lab Units 05/06/25  0644 05/05/25  1440 05/01/25  0720   MAGNESIUM mg/dL 2.0 2.0 2.9*   PHOSPHORUS mg/dL 4.1 4.1  --        I reviewed the patient's new imaging including images and reports.    Chest x-ray on 5/6/2025 was reviewed and shows cardiomegaly with bilateral pleural effusions and trach tube in place with right internal jugular tunneled dialysis catheter.  No pneumothorax.  Left internal jugular catheter in place as well.  Stable pulmonary congestion bilaterally.    Medication Review:   amiodarone, 200 mg, Per PEG Tube, Q12H  aspirin, 81 mg, Per PEG Tube, Daily  atorvastatin, 40 mg, Per PEG Tube, Nightly  cefTRIAXone, 2,000 mg, Intravenous, Q24H  cloNIDine, 0.1 mg, Per PEG Tube, Q8H  clopidogrel, 75 mg, Per PEG Tube, Daily  hydrALAZINE, 25 mg, Per PEG Tube, Q8H  [Held by provider] metoprolol tartrate, 25 mg, Per PEG Tube, Q12H  miconazole, 1 Application, Topical, Q12H  mupirocin, 1 Application, Each Nare, BID  nystatin, 5 mL, Mouth/Throat, 4x Daily  pantoprazole, 40 mg, Intravenous, Q24H  pharmacy consult - MTM, , Not Applicable, Daily  ProSource TF, 45 mL, Per G Tube, BID  [Held by provider] sacubitril-valsartan, 1 tablet, Per PEG Tube, Q12H  sodium chloride, 10 mL, Intravenous, Q12H      dexmedetomidine, 0.2-1.5 mcg/kg/hr, Last Rate: 1.5 mcg/kg/hr (05/07/25 0528)  heparin, 21 Units/kg/hr (Order-Specific), Last Rate: 21 Units/kg/hr (05/07/25 0528)  niCARdipine, 5-15 mg/hr, Last Rate: Stopped (05/07/25 0532)  Pharmacy to Dose Heparin,         Assessment & Plan         CAD (coronary artery disease)    ESRD on HD    Polycystic kidney disease    HTN (hypertension)    Dyslipidemia    AAA (abdominal aortic aneurysm)    Right coronary artery occlusion    Depression    Tracheostomy present    S/P percutaneous endoscopic gastrostomy (PEG) tube placement    69 y.o. female with history of HTN, HLD, CKD on HD,  polycystic kidney disease, abdominal aortic aneurysm, CAD with prior stent, depression, transferred from [facility] in [place] on 5/5/2025 after she suffered a V-fib arrest with resuscitation and underwent left heart catheterization showing 100% occlusion of the RCA with failed intervention.    Patient was initially admitted to [facility] on 3/20/2025 with volume overload secondary to missing 2 hemodialysis sessions.  She also has followed up possible pneumonia and had anemia and was evaluated by GI with no evidence of GI bleed.  She had bilateral pleural effusions and underwent 1550 mL thoracentesis, but despite thoracentesis required intubation on 4/10/2025 was transferred to [facility] on 4/11/2025.  Patient ultimately underwent tracheostomy and PEG placement on 4/29/2025.  She deteriorated on 5/2/2025 with me today Vfib arrest ultimately resuscitated with subsequent LHC as above.    Cardiology plans left heart catheterization today for PCI.    Patient remains on mechanical ventilation with excellent oxygen saturations on 50% FiO2 this morning.  She is arousable and alert and I think her chances of successful weaning from mechanical ventilation after PCI will be excellent.    Patient has a swollen right upper extremity which has been ongoing.  Previous right upper extremity duplex showed no flow and no compressibility in the right cephalic vein.  She has a right internal jugular dialysis catheter.  She is currently on a heparin drip.  I will repeat a duplex.    Plan:  1.  For V-fib arrest/CAD with occluded RCA: Failed intervention at outside hospital.  Cardiology consultation.  Aspirin, statin, beta-blocker, heparin drip.  LHC today.  2.  For CKD on HD: Last received HD at OSH on 5/3/2025 however received 20 minutes of HD on 5/5/2025 but did not tolerated reportedly secondary to hypotension and her session was discontinued prior to transfer here.  Nephrology following.  IHD done on  5/6/2025.    3.  For respiratory: Initial respiratory failure secondary to volume overload from missing hemodialysis.  Also treated for pneumonia.  Status post tracheostomy and PEG 4/29/2025.  Continue mechanical ventilation and adjust settings to optimize ventilation and oxygenation and minimize barotrauma.  I think patient will be weanable from mechanical ventilation after PCI.  I will defer formal ventilator weaning until after revascularization.  4.  For nutrition: N.p.o. for now.  Restarted tube feeds.  N.p.o. after midnight.  5.  DVT prophylaxis right upper extremity swell: Patient on heparin currently.  Also mechanical.  Recheck duplex right upper extremity (last checked in April).  6.  For gram-negative rods in sputum: Patient has some pulmonary congestion on chest x-ray but no convincing symptoms of pneumonia.  White blood cell count mildly elevated.  Rocephin was started empirically.  Cultures growing moderate Klebsiella susceptible to Rocephin and I will plan to complete 7 days for Klebsiella tracheobronchitis in the setting of tracheostomy/respiratory failure.      Patient is critically ill secondary to CAD with tenuous hemodynamic status in the setting of recent V. tach and failed intervention and has high risk of life-threatening decompensation in condition.   As such, the patient requires continuous monitoring and frequent reassessment for consideration of adjustment in management to minimize this risk.  I personally reassessed the patient multiple times today.    Critical care time : 33 minutes spent by me personally and independently.(This excludes time spent performing separately reportable procedures and services).  Critical care time includes high complexity decision making to assess, manipulate, and support vital organ system failure in this individual who has impairment of one or more vital organ systems such that there is a high probability of imminent or life threatening deterioration in the  patient’s condition.    Electronically signed by:    Trery Antonio MD  05/07/25  08:33 EDT      *. Please note that portions of this note were completed with [employer] - a voice recognition program.

## 2025-05-07 NOTE — CASE MANAGEMENT/SOCIAL WORK
Continued Stay Note  [facility]     Patient Name: Estefania Connor  MRN: [id number]  Today's Date: 5/7/2025    Admit Date: 5/5/2025    Plan: [facility] [place]   Discharge Plan       Row Name 05/07/25 1505       Plan    Plan [facility] [place]    Plan Comments  spoke with Reginald @ [facility]/[place].  They are anticipating patient to return once medically ready to come back. He may see patient in unit tomorrow.   will arrange ambulance plans for Friday, if ready for transition back to [facility] [place].    Final Discharge Disposition Code 63 - [facility]                   Discharge Codes    No documentation.                       Chrissie Terrell RN

## 2025-05-07 NOTE — PROGRESS NOTES
"[facility]  IP Progress Note    PROBLEM LIST:  V-fib arrest  Chronic kidney disease on hemodialysis  PEG and trach  Severe coronary artery disease      HOSPITAL COURSE:  Patient was transferred for high risk PCI of the RCA and also to see if we fix anything on the circumflex.      CHIEF COMPLAINTS:  V-fib arrest      Subjective   Patient has been doing fine.  Patient is verbal sometimes.  Patient does not have any family member here.  Her  does not have a ride to come visit her.      Objective     Blood pressure 160/72, pulse 90, temperature 96.6 °F (35.9 °C), temperature source Axillary, resp. rate 22, height 160 cm (62.99\"), SpO2 98%.     Intake/Output Summary (Last 24 hours) at 5/7/2025 0930  Last data filed at 5/7/2025 0600  Gross per 24 hour   Intake 2733 ml   Output 4020 ml   Net -1287 ml       PHYSICAL EXAM:  Constitutional:       General: Not in acute distress.     Appearance: Healthy appearance. Not in distress.     Neck:     JVP:Not elevated     Carotid artery: Normal    Pulmonary:      Effort: Pulmonary effort is normal.      Breath sounds: Normal breath sounds. No wheezing. No rhonchi. No rales.     Cardiovascular:      Normal rate. Regular rhythm. Normal S1. Normal S2.      Murmurs: There is 2/6 systolic murmur.      No gallop. No click. No rub.     Abdominal:      General: Bowel sounds are normal.      Palpations: Abdomen is soft.      Tenderness: There is no abdominal tenderness.    Extremities:     Pulses:Normal radial and pedal pulses     Edema:no edema    MEDICATIONS:    amiodarone, 200 mg, Per PEG Tube, Q12H  aspirin, 81 mg, Per PEG Tube, Daily  atorvastatin, 40 mg, Per PEG Tube, Nightly  cefTRIAXone, 2,000 mg, Intravenous, Q24H  cloNIDine, 0.1 mg, Per PEG Tube, Q8H  clopidogrel, 75 mg, Per PEG Tube, Daily  hydrALAZINE, 25 mg, Per PEG Tube, Q8H  [Held by provider] metoprolol tartrate, 25 mg, Per PEG Tube, Q12H  miconazole, 1 Application, Topical, " Q12H  mupirocin, 1 Application, Each Nare, BID  nystatin, 5 mL, Mouth/Throat, 4x Daily  pantoprazole, 40 mg, Intravenous, Q24H  pharmacy consult - MTM, , Not Applicable, Daily  ProSource TF, 45 mL, Per G Tube, BID  [Held by provider] sacubitril-valsartan, 1 tablet, Per PEG Tube, Q12H  sodium chloride, 10 mL, Intravenous, Q12H          Results from last 7 days   Lab Units 05/07/25  0348   WBC 10*3/mm3 13.11*   HEMOGLOBIN g/dL 9.8*   HEMATOCRIT % 30.9*   PLATELETS 10*3/mm3 306     Results from last 7 days   Lab Units 05/07/25  0348 05/05/25  1440 05/05/25  0143   SODIUM mmol/L 135*   < > 130*   POTASSIUM mmol/L 3.1*   < > 3.2*   CHLORIDE mmol/L 99   < > 93*   CO2 mmol/L 24.0   < > 24.6   BUN mg/dL 17   < > 25*   CREATININE mg/dL 2.94*   < > 4.63*   CALCIUM mg/dL 8.8   < > 9.1   BILIRUBIN mg/dL  --   --  0.2   ALK PHOS U/L  --   --  112   ALT (SGPT) U/L  --   --  9   AST (SGOT) U/L  --   --  11   GLUCOSE mg/dL 106*   < > 108*    < > = values in this interval not displayed.     Results from last 7 days   Lab Units 05/05/25  1440 05/04/25  1217   INR  1.02 1.06     Lab Results   Component Value Date    TROPONINT 114 (C) 05/01/2025     Results from last 7 days   Lab Units 05/01/25  0904   TSH uIU/mL 1.730   FREE T4 ng/dL 1.57             Iron   Date Value Ref Range Status   04/29/2025 39 37 - 145 mcg/dL Final     Ferritin   Date Value Ref Range Status   04/29/2025 2,238.00 (H) 13.00 - 150.00 ng/mL Final     Iron Saturation (TSAT)   Date Value Ref Range Status   04/29/2025 25 20 - 50 % Final     TIBC   Date Value Ref Range Status   04/29/2025 155 (L) 298 - 536 mcg/dL Final      Magnesium   Date Value Ref Range Status   05/06/2025 2.0 1.6 - 2.4 mg/dL Final        RESULT REVIEW:    I reviewed the patient's new clinical results.    Tele: Sinus Rythym      ASSESSMENT:     Coronary artery disease  V-fib  Chronic kidney disease  A-fib    PLAN:     Patient have been n.p.o. overnight and will have a heart catheterization performed  by Dr. Boland today.  He was kind enough to take over the responsibility for this.  Patient consent have been signed over the phone with her .  Will keep on amiodarone and she will need dual antiplatelet therapy initially.   Counseling Text: I reviewed the side effect in detail. Patient should get monthly blood tests, not donate blood, not drive at night if vision affected, and not share medication.

## 2025-05-07 NOTE — PROGRESS NOTES
Pharmacy to Dose Heparin Infusion Note    Estefania Connor is a 69 y.o. female receiving heparin infusion.     Therapy for (VTE/Cardiac): Cardiac  Patient Weight: 72 kg  Initial Bolus (Y/N): No  Any Bolus (Y/N): Yes     Signs or Symptoms of Bleeding: No    Cardiac or Other (Not VTE)   Initial Bolus: 60 units/kg (Max 4,000 units)  Initial rate: 12 units/kg/hr (Max 1,000 units/hr)   Anti-Xa Bolus   Dose Infusion Hold   Time Infusion Rate Change (units/kg/hr) Repeat  Anti-Xa    < 0.11 50 units/kg  (4000 units Max) None Increase by  3 units/kg/hr 6 hours   0.11- 0.19 25 units/kg  (2000 units Max) None Increase by  2 units/kg/hr 6 hours   0.2 - 0.29 0 None Increase by  1 units/kg/hr 6 hours   0.3 - 0.5 0 None No Change 6 hours (after 2 consecutive levels in range check qAM)   0.51 - 0.6 0 None Decrease by  1 units/kg/hr 6 hours   0.61 - 0.8 0 30 minutes Decrease by  2 units/kg/hr 6 hours   0.81 - 1 0 60 minutes Decrease by  3 units/kg/hr 6 hours   >1 0 Hold  After Anti-Xa less than 0.5 decrease previous rate by  4 units/kg/hr  Every 2 hours until Anti-Xa  less than 0.5 then when infusion restarts in 6 hours     Results from last 7 days   Lab Units 05/07/25  0348 05/06/25  0644 05/05/25  1440 05/05/25  0143 05/04/25  1217   INR   --   --  1.02  --  1.06   HEMOGLOBIN g/dL 9.8* 9.1* 9.5*   < >  --    HEMATOCRIT % 30.9* 28.8* 31.2*   < >  --    PLATELETS 10*3/mm3 306 269 274   < >  --     < > = values in this interval not displayed.       Date   Time   Anti-Xa Current Rate (units/kg/hr) Bolus   (units) Rate Change   (units/kg/hr) New Rate (units/kg/hr) Repeat  Anti-Xa Comments /  Pump Check    5/5 1405 0.10 13.5 2000 +1.5 15 2200 Originally continued at 13.5u/kg/hr at 72kg from OSH. Eliquis PTA but baseline Anti-Xa 0.1 so okay for bolus. DW RN   5/5 2225 0.17 15 1700 +2 17 0500 Discussed w/ nurse   5/6 0644 0.16 17 1700 +2 19 1400 DW RN   5/6 1511 0.34 19 -- -- 19 2200 Disscused with Ce FOSTER, no bleeding, verified Pump rate    5/6 0014 0.21 19 -- +2 21 0800 Nurse confirmed pump   5/7 0829 0.23 21 -- +1 22  Going to cath lab at 1300

## 2025-05-07 NOTE — PROGRESS NOTES
"   LOS: 2 days    Patient Care Team:  Provider, No Known as PCP - General    Subjective     Post cath    Objective     Vital Signs:  Blood pressure 160/72, pulse 90, temperature 96.6 °F (35.9 °C), temperature source Axillary, resp. rate 22, height 160 cm (62.99\"), SpO2 98%.      Intake/Output Summary (Last 24 hours) at 5/7/2025 0801  Last data filed at 5/7/2025 0600  Gross per 24 hour   Intake 2733 ml   Output 4020 ml   Net -1287 ml        05/06 0701 - 05/07 0700  In: 2813 [I.V.:2179]  Out: 4020 [Urine:650]    Physical Exam:        GENERAL: WDWF NAD  NEURO: Sedate on vent  PSYCHIATRIC: Unable to evaluate  EYE: PE, no icterus, no conjunctivitis  ENT: OMM dry, + trach  NECK: Supple , No JVD discernable,  Trachea midline  CV: Trace edema, RRR  LUNGS:  Quiet,  Nonlabored resp.  Symmetrical expansion  ABDOMEN: + PEG, nondistended  : No Pereira, no palp bladder  SKIN: Warm and dry without rash      Labs:  Results from last 7 days   Lab Units 05/07/25  0348 05/06/25  0644 05/05/25  1440   WBC 10*3/mm3 13.11* 11.87* 13.47*   HEMOGLOBIN g/dL 9.8* 9.1* 9.5*   PLATELETS 10*3/mm3 306 269 274     Results from last 7 days   Lab Units 05/07/25  0348 05/06/25  0644 05/05/25  1440 05/05/25  0143 05/04/25  0235 05/03/25  0307 05/02/25  0337 05/01/25  0904 05/01/25  0720   SODIUM mmol/L 135* 133* 132* 130* 135* 135* 134*   < >  --    POTASSIUM mmol/L 3.1* 3.4* 3.4* 3.2* 3.3* 3.4* 3.6   < >  --    CHLORIDE mmol/L 99 95* 93* 93* 95* 94* 95*   < >  --    CO2 mmol/L 24.0 24.0 24.0 24.6 27.8 29.1* 24.1   < >  --    BUN mg/dL 17 29* 27* 25* 20 13 23   < >  --    CREATININE mg/dL 2.94* 4.86* 4.51* 4.63* 3.93* 3.03* 4.43*   < >  --    CALCIUM mg/dL 8.8 8.9 9.5 9.1 9.2 8.1* 8.9   < >  --    PHOSPHORUS mg/dL  --  4.1 4.1  --   --   --   --   --   --    MAGNESIUM mg/dL  --  2.0 2.0  --   --   --   --   --  2.9*   ALBUMIN g/dL  --   --   --  2.1* 2.2* 2.1* 2.3*   < >  --     < > = values in this interval not displayed.     Results from last 7 " days   Lab Units 05/05/25  0143   ALK PHOS U/L 112   BILIRUBIN mg/dL 0.2   ALT (SGPT) U/L 9   AST (SGOT) U/L 11     Results from last 7 days   Lab Units 05/06/25  0431   PH, ARTERIAL pH units 7.456*   PO2 ART mm Hg 150.0*   PCO2, ARTERIAL mm Hg 39.8   HCO3 ART mmol/L 28.0*               Estimated Creatinine Clearance: 16.6 mL/min (A) (by C-G formula based on SCr of 2.94 mg/dL (H)).         A/P:    End-stage renal disease     - On HD since 4/2023 .  V-fib arrest - 100% RCA obstruction.   Respiratory failure s/p trach  Hyponatremia  Hypokalemia  Anemia  Fluid overload     PLAN:     - Hemodialysis per TTS schedule.   -Give potassium replacement today.  - Adjust electrolytes further with HD as scheduled.  - Fluid overload/ Hyponatremia: Fluid restriction 1.5 L/day. UF as tolerated.   - Anemia: Transfuse to keep hemoglobin above 8 . VANCE on dialysis    High risk and complexity patient.       Mulugeta Chávez MD  05/07/25  08:01 EDT

## 2025-05-07 NOTE — PLAN OF CARE
Goal Outcome Evaluation:  Plan of Care Reviewed With: patient        Progress: no change  Outcome Evaluation: Pt presents below her functional baseline with deficits including generalized weakness, impaired cognition, decreased activity tolerance limiting independence with ADLs and mobility warranting OT services. Bed level evaluation completed due to current deficits, vent, and poor command following. Rec ECF at dc.    Anticipated Discharge Disposition (OT): extended care facility

## 2025-05-08 ENCOUNTER — OUTSIDE FACILITY SERVICE (OUTPATIENT)
Dept: PULMONOLOGY | Facility: CLINIC | Age: 70
End: 2025-05-08
Payer: MEDICARE

## 2025-05-08 ENCOUNTER — APPOINTMENT (OUTPATIENT)
Dept: NEPHROLOGY | Facility: HOSPITAL | Age: 70
End: 2025-05-08
Payer: MEDICARE

## 2025-05-08 ENCOUNTER — APPOINTMENT (OUTPATIENT)
Dept: GENERAL RADIOLOGY | Facility: HOSPITAL | Age: 70
End: 2025-05-08
Payer: MEDICARE

## 2025-05-08 LAB
ACT BLD: 164 SECONDS (ref 82–152)
ACT BLD: 187 SECONDS (ref 82–152)
ANION GAP SERPL CALCULATED.3IONS-SCNC: 12 MMOL/L (ref 5–15)
ARTERIAL PATENCY WRIST A: ABNORMAL
ATMOSPHERIC PRESS: ABNORMAL MM[HG]
BASE EXCESS BLDA CALC-SCNC: -1.5 MMOL/L (ref 0–2)
BDY SITE: ABNORMAL
BODY TEMPERATURE: 37
BUN SERPL-MCNC: 23 MG/DL (ref 8–23)
BUN/CREAT SERPL: 6.6 (ref 7–25)
CALCIUM SPEC-SCNC: 8.8 MG/DL (ref 8.6–10.5)
CHLORIDE SERPL-SCNC: 101 MMOL/L (ref 98–107)
CO2 BLDA-SCNC: 24.1 MMOL/L (ref 22–33)
CO2 SERPL-SCNC: 22 MMOL/L (ref 22–29)
COHGB MFR BLD: 1.2 % (ref 0–2)
CREAT SERPL-MCNC: 3.47 MG/DL (ref 0.57–1)
DEPRECATED RDW RBC AUTO: 55.1 FL (ref 37–54)
EGFRCR SERPLBLD CKD-EPI 2021: 13.6 ML/MIN/1.73
EPAP: 0
ERYTHROCYTE [DISTWIDTH] IN BLOOD BY AUTOMATED COUNT: 16.9 % (ref 12.3–15.4)
GLUCOSE BLDC GLUCOMTR-MCNC: 116 MG/DL (ref 70–130)
GLUCOSE SERPL-MCNC: 88 MG/DL (ref 65–99)
HCO3 BLDA-SCNC: 23 MMOL/L (ref 20–26)
HCT VFR BLD AUTO: 26.8 % (ref 34–46.6)
HCT VFR BLD CALC: 28.4 % (ref 38–51)
HGB BLD-MCNC: 8.4 G/DL (ref 12–15.9)
HGB BLDA-MCNC: 9.3 G/DL (ref 14–18)
INHALED O2 CONCENTRATION: 50 %
IPAP: 0
MCH RBC QN AUTO: 28 PG (ref 26.6–33)
MCHC RBC AUTO-ENTMCNC: 31.3 G/DL (ref 31.5–35.7)
MCV RBC AUTO: 89.3 FL (ref 79–97)
METHGB BLD QL: 0.2 % (ref 0–1.5)
MODALITY: ABNORMAL
OXYHGB MFR BLDV: 97.3 % (ref 94–99)
PAW @ PEAK INSP FLOW SETTING VENT: 0 CMH2O
PCO2 BLDA: 36.6 MM HG (ref 35–45)
PCO2 TEMP ADJ BLD: 36.6 MM HG (ref 35–45)
PH BLDA: 7.41 PH UNITS (ref 7.35–7.45)
PH, TEMP CORRECTED: 7.41 PH UNITS
PLATELET # BLD AUTO: 255 10*3/MM3 (ref 140–450)
PMV BLD AUTO: 10.9 FL (ref 6–12)
PO2 BLDA: 103 MM HG (ref 83–108)
PO2 TEMP ADJ BLD: 103 MM HG (ref 83–108)
POTASSIUM SERPL-SCNC: 3.7 MMOL/L (ref 3.5–5.2)
RBC # BLD AUTO: 3 10*6/MM3 (ref 3.77–5.28)
SODIUM SERPL-SCNC: 135 MMOL/L (ref 136–145)
TOTAL RATE: 0 BREATHS/MINUTE
WBC NRBC COR # BLD AUTO: 9.01 10*3/MM3 (ref 3.4–10.8)

## 2025-05-08 PROCEDURE — 82805 BLOOD GASES W/O2 SATURATION: CPT

## 2025-05-08 PROCEDURE — 83050 HGB METHEMOGLOBIN QUAN: CPT

## 2025-05-08 PROCEDURE — 25010000002 CEFTRIAXONE PER 250 MG: Performed by: INTERNAL MEDICINE

## 2025-05-08 PROCEDURE — 94799 UNLISTED PULMONARY SVC/PX: CPT

## 2025-05-08 PROCEDURE — 99233 SBSQ HOSP IP/OBS HIGH 50: CPT | Performed by: INTERNAL MEDICINE

## 2025-05-08 PROCEDURE — 85027 COMPLETE CBC AUTOMATED: CPT | Performed by: INTERNAL MEDICINE

## 2025-05-08 PROCEDURE — 94003 VENT MGMT INPAT SUBQ DAY: CPT

## 2025-05-08 PROCEDURE — 99232 SBSQ HOSP IP/OBS MODERATE 35: CPT | Performed by: INTERNAL MEDICINE

## 2025-05-08 PROCEDURE — 94761 N-INVAS EAR/PLS OXIMETRY MLT: CPT

## 2025-05-08 PROCEDURE — 85347 COAGULATION TIME ACTIVATED: CPT

## 2025-05-08 PROCEDURE — 25010000002 HYDRALAZINE PER 20 MG: Performed by: INTERNAL MEDICINE

## 2025-05-08 PROCEDURE — 25010000002 NICARDIPINE 2.5 MG/ML SOLUTION 10 ML VIAL: Performed by: INTERNAL MEDICINE

## 2025-05-08 PROCEDURE — 80048 BASIC METABOLIC PNL TOTAL CA: CPT | Performed by: INTERNAL MEDICINE

## 2025-05-08 PROCEDURE — 71045 X-RAY EXAM CHEST 1 VIEW: CPT

## 2025-05-08 PROCEDURE — 25810000003 SODIUM CHLORIDE 0.9 % SOLUTION 250 ML FLEX CONT: Performed by: INTERNAL MEDICINE

## 2025-05-08 PROCEDURE — 82375 ASSAY CARBOXYHB QUANT: CPT

## 2025-05-08 PROCEDURE — 82948 REAGENT STRIP/BLOOD GLUCOSE: CPT

## 2025-05-08 RX ORDER — GUAR GUM
2 PACKET (EA) ORAL DAILY
Status: DISCONTINUED | OUTPATIENT
Start: 2025-05-08 | End: 2025-05-12

## 2025-05-08 RX ORDER — NITROGLYCERIN 0.1MG/HR
1 PATCH, TRANSDERMAL 24 HOURS TRANSDERMAL DAILY
Status: DISCONTINUED | OUTPATIENT
Start: 2025-05-08 | End: 2025-05-12

## 2025-05-08 RX ADMIN — Medication 10 ML: at 08:41

## 2025-05-08 RX ADMIN — DEXMEDETOMIDINE HYDROCHLORIDE 1.3 MCG/KG/HR: 4 INJECTION, SOLUTION INTRAVENOUS at 22:20

## 2025-05-08 RX ADMIN — NYSTATIN 500000 UNITS: 100000 SUSPENSION ORAL at 20:34

## 2025-05-08 RX ADMIN — NITROGLYCERIN 1 PATCH: 0.1 PATCH TRANSDERMAL at 11:42

## 2025-05-08 RX ADMIN — AMIODARONE HYDROCHLORIDE 200 MG: 200 TABLET ORAL at 20:34

## 2025-05-08 RX ADMIN — CEFTRIAXONE 2000 MG: 2 INJECTION, POWDER, FOR SOLUTION INTRAMUSCULAR; INTRAVENOUS at 20:34

## 2025-05-08 RX ADMIN — HYDRALAZINE HYDROCHLORIDE 25 MG: 25 TABLET ORAL at 05:41

## 2025-05-08 RX ADMIN — CLONIDINE HYDROCHLORIDE 0.1 MG: 0.1 TABLET ORAL at 05:41

## 2025-05-08 RX ADMIN — HYDRALAZINE HYDROCHLORIDE 20 MG: 20 INJECTION INTRAMUSCULAR; INTRAVENOUS at 16:42

## 2025-05-08 RX ADMIN — MICONAZOLE NITRATE 1 APPLICATION: 20 POWDER TOPICAL at 08:41

## 2025-05-08 RX ADMIN — MICONAZOLE NITRATE 1 APPLICATION: 20 POWDER TOPICAL at 20:34

## 2025-05-08 RX ADMIN — CLONIDINE HYDROCHLORIDE 0.1 MG: 0.1 TABLET ORAL at 14:33

## 2025-05-08 RX ADMIN — CLOPIDOGREL BISULFATE 75 MG: 75 TABLET, FILM COATED ORAL at 08:41

## 2025-05-08 RX ADMIN — PANTOPRAZOLE SODIUM 40 MG: 40 INJECTION, POWDER, FOR SOLUTION INTRAVENOUS at 08:41

## 2025-05-08 RX ADMIN — DEXMEDETOMIDINE HYDROCHLORIDE 1.5 MCG/KG/HR: 4 INJECTION, SOLUTION INTRAVENOUS at 02:19

## 2025-05-08 RX ADMIN — HYDRALAZINE HYDROCHLORIDE 25 MG: 25 TABLET ORAL at 14:33

## 2025-05-08 RX ADMIN — SODIUM CHLORIDE 5 MG/HR: 9 INJECTION, SOLUTION INTRAVENOUS at 04:20

## 2025-05-08 RX ADMIN — ASPIRIN 81 MG 81 MG: 81 TABLET ORAL at 08:40

## 2025-05-08 RX ADMIN — DEXMEDETOMIDINE HYDROCHLORIDE 1.5 MCG/KG/HR: 4 INJECTION, SOLUTION INTRAVENOUS at 05:41

## 2025-05-08 RX ADMIN — HYDRALAZINE HYDROCHLORIDE 25 MG: 25 TABLET ORAL at 20:34

## 2025-05-08 RX ADMIN — MUPIROCIN 1 APPLICATION: 20 OINTMENT TOPICAL at 08:40

## 2025-05-08 RX ADMIN — NYSTATIN 500000 UNITS: 100000 SUSPENSION ORAL at 12:21

## 2025-05-08 RX ADMIN — MUPIROCIN 1 APPLICATION: 20 OINTMENT TOPICAL at 20:34

## 2025-05-08 RX ADMIN — ATORVASTATIN CALCIUM 40 MG: 40 TABLET, FILM COATED ORAL at 20:34

## 2025-05-08 RX ADMIN — AMIODARONE HYDROCHLORIDE 200 MG: 200 TABLET ORAL at 08:41

## 2025-05-08 RX ADMIN — Medication 45 ML: at 08:41

## 2025-05-08 RX ADMIN — Medication 45 ML: at 20:35

## 2025-05-08 RX ADMIN — CLONIDINE HYDROCHLORIDE 0.1 MG: 0.1 TABLET ORAL at 20:34

## 2025-05-08 RX ADMIN — DEXMEDETOMIDINE HYDROCHLORIDE 1.3 MCG/KG/HR: 4 INJECTION, SOLUTION INTRAVENOUS at 17:31

## 2025-05-08 RX ADMIN — NYSTATIN 500000 UNITS: 100000 SUSPENSION ORAL at 17:31

## 2025-05-08 RX ADMIN — DEXMEDETOMIDINE HYDROCHLORIDE 1.5 MCG/KG/HR: 4 INJECTION, SOLUTION INTRAVENOUS at 09:49

## 2025-05-08 RX ADMIN — DEXMEDETOMIDINE HYDROCHLORIDE 1.5 MCG/KG/HR: 4 INJECTION, SOLUTION INTRAVENOUS at 14:33

## 2025-05-08 RX ADMIN — NYSTATIN 500000 UNITS: 100000 SUSPENSION ORAL at 08:40

## 2025-05-08 NOTE — PROGRESS NOTES
"[facility] at [facility]  IP Progress Note    HOSPITAL COURSE:  Patient has successful intervention performed to the right coronary artery.  Her ACT was high overnight and she is still placed in both the femoral arteries.  We should be able to pull them this morning.      CHIEF COMPLAINTS:  V-fib arrest      Subjective   Patient is lying comfortably at this time      Objective     Blood pressure 134/65, pulse 74, temperature 97.8 °F (36.6 °C), temperature source Axillary, resp. rate 20, height 160 cm (62.99\"), SpO2 100%.     Intake/Output Summary (Last 24 hours) at 5/8/2025 0824  Last data filed at 5/8/2025 0420  Gross per 24 hour   Intake 1113.5 ml   Output 0 ml   Net 1113.5 ml       PHYSICAL EXAM:  Constitutional:       General: Not in acute distress.     Appearance: Healthy appearance. Not in distress.     Neck:     JVP:Not elevated     Carotid artery: Normal    Pulmonary:      Effort: Pulmonary effort is normal.      Breath sounds: Normal breath sounds. No wheezing. No rhonchi. No rales.     Cardiovascular:      Normal rate. Regular rhythm. Normal S1. Normal S2.      Murmurs: There is no significant murmur.      No gallop. No click. No rub.     Abdominal:      General: Bowel sounds are normal.      Palpations: Abdomen is soft.      Tenderness: There is no abdominal tenderness.    Extremities:     Pulses:Normal radial and pedal pulses     Edema:no edema    MEDICATIONS:    amiodarone, 200 mg, Per PEG Tube, Q12H  aspirin, 81 mg, Per PEG Tube, Daily  atorvastatin, 40 mg, Per PEG Tube, Nightly  cefTRIAXone, 2,000 mg, Intravenous, Q24H  cloNIDine, 0.1 mg, Per PEG Tube, Q8H  clopidogrel, 75 mg, Per PEG Tube, Daily  hydrALAZINE, 25 mg, Per PEG Tube, Q8H  [Held by provider] metoprolol tartrate, 25 mg, Per PEG Tube, Q12H  miconazole, 1 Application, Topical, Q12H  mupirocin, 1 Application, Each Nare, BID  nystatin, 5 mL, Mouth/Throat, 4x Daily  pantoprazole, 40 mg, Intravenous, Q24H  pharmacy " consult - MTM, , Not Applicable, Daily  ProSource TF, 45 mL, Per G Tube, BID  [Held by provider] sacubitril-valsartan, 1 tablet, Per PEG Tube, Q12H  sodium chloride, 10 mL, Intravenous, Q12H          Results from last 7 days   Lab Units 05/08/25  0421   WBC 10*3/mm3 9.01   HEMOGLOBIN g/dL 8.4*   HEMATOCRIT % 26.8*   PLATELETS 10*3/mm3 255     Results from last 7 days   Lab Units 05/08/25  0421 05/05/25  1440 05/05/25  0143   SODIUM mmol/L 135*   < > 130*   POTASSIUM mmol/L 3.7   < > 3.2*   CHLORIDE mmol/L 101   < > 93*   CO2 mmol/L 22.0   < > 24.6   BUN mg/dL 23   < > 25*   CREATININE mg/dL 3.47*   < > 4.63*   CALCIUM mg/dL 8.8   < > 9.1   BILIRUBIN mg/dL  --   --  0.2   ALK PHOS U/L  --   --  112   ALT (SGPT) U/L  --   --  9   AST (SGOT) U/L  --   --  11   GLUCOSE mg/dL 88   < > 108*    < > = values in this interval not displayed.     Results from last 7 days   Lab Units 05/05/25  1440 05/04/25  1217   INR  1.02 1.06     Lab Results   Component Value Date    TROPONINT 114 (C) 05/01/2025     Results from last 7 days   Lab Units 05/01/25  0904   TSH uIU/mL 1.730   FREE T4 ng/dL 1.57             Iron   Date Value Ref Range Status   04/29/2025 39 37 - 145 mcg/dL Final     Ferritin   Date Value Ref Range Status   04/29/2025 2,238.00 (H) 13.00 - 150.00 ng/mL Final     Iron Saturation (TSAT)   Date Value Ref Range Status   04/29/2025 25 20 - 50 % Final     TIBC   Date Value Ref Range Status   04/29/2025 155 (L) 298 - 536 mcg/dL Final      Magnesium   Date Value Ref Range Status   05/06/2025 2.0 1.6 - 2.4 mg/dL Final        RESULT REVIEW:    I reviewed the patient's new clinical results.    Tele: Sinus Rythym      ASSESSMENT:     Coronary artery disease  Heart failure with mildly reduced ejection fraction  Hyperlipidemia  Hypertension  Chronic kidney disease on hemodialysis  PEG and trach    PLAN:     Patient has successful intervention performed to the RCA yesterday we will pulling the sheath today.  Patient has an  episode of atrial fibrillation in between but she is a very high risk for bleed with recent hemoglobin trending down at 6.  Will continue with aspirin and Plavix at this time.  We will wean off Cardene drip.  We will add Nitropatch 0.1 mg/h for blood pressure control.  Patient should be able to go back to [facility] today.

## 2025-05-08 NOTE — CASE MANAGEMENT/SOCIAL WORK
Continued Stay Note   [place]     Patient Name: Estefania Connor  MRN: [id number]  Today's Date: 5/8/2025    Admit Date: 5/5/2025    Plan: [facility] [place]   Discharge Plan       Row Name 05/08/25 1123       Plan    Plan [facility] [place]    Plan Comments  spoke with Reginald at [facility] in [place] about  a transfer today. Reginald spoke with their team and they prefer to receive patient tomorrow.   Their physician wanted to have patient receive her dialysis and keep her on the Tues, Thurs and Saturday schedule.  ambulance will be transporting.   IMM issued to patient's spouse today. I will update patient's spouse. Reginald with [facility] tells me fine with dialysis in am and then transport to follow as long as she is stable.     Final Discharge Disposition Code 63 - [facility]                   Discharge Codes    No documentation.                       Chrissie Terrell RN

## 2025-05-08 NOTE — PROGRESS NOTES
Clinical Nutrition     Patient Name: Estefania Connor  YOB: 1955  MRN: [id number]  Date of Encounter: 05/08/25 09:06 EDT  Admission date: 5/5/2025  Reason for Visit: MDR, Follow-up protocol, EN    Assessment   Nutrition Assessment   Admission Diagnosis:  CAD (coronary artery disease) [I25.10]    Problem List:    CAD (coronary artery disease)    ESRD on HD    Polycystic kidney disease    HTN (hypertension)    Dyslipidemia    AAA (abdominal aortic aneurysm)    Right coronary artery occlusion    Depression    Tracheostomy present    S/P percutaneous endoscopic gastrostomy (PEG) tube placement      PMH:   She  has a past medical history of AAA (abdominal aortic aneurysm), Coronary artery disease, Depression, ESRD (end stage renal disease), Hyperlipidemia, Hypertension, and Polycystic kidney disease.    PSH:  She  has a past surgical history that includes tracheostomy and peg tube insertion (N/A, 4/29/2025) and Cardiac catheterization (N/A, 5/2/2025).    Applicable Nutrition History:   Trach/PEG    Labs    Labs Reviewed: Yes    Results from last 7 days   Lab Units 05/08/25  0421 05/07/25  0348 05/06/25  0644 05/05/25  1440 05/05/25  0143 05/04/25  0235 05/03/25  0307   GLUCOSE mg/dL 88 106* 103* 107* 108* 112* 79   BUN mg/dL 23 17 29* 27* 25* 20 13   CREATININE mg/dL 3.47* 2.94* 4.86* 4.51* 4.63* 3.93* 3.03*   SODIUM mmol/L 135* 135* 133* 132* 130* 135* 135*   CHLORIDE mmol/L 101 99 95* 93* 93* 95* 94*   POTASSIUM mmol/L 3.7 3.1* 3.4* 3.4* 3.2* 3.3* 3.4*   PHOSPHORUS mg/dL  --   --  4.1 4.1  --   --   --    MAGNESIUM mg/dL  --   --  2.0 2.0  --   --   --    ALT (SGPT) U/L  --   --   --   --  9 12 15       Results from last 7 days   Lab Units 05/06/25  1511 05/06/25  0644 05/05/25  0143 05/04/25  0235 05/03/25  0307   ALBUMIN g/dL  --   --  2.1* 2.2* 2.1*   PREALBUMIN mg/dL 15.5*  --   --   --   --    CRP mg/dL  --  8.27* 6.44* 8.98* 10.55*       Results from last 7 days   Lab Units  "05/07/25  1747 05/07/25  1211 05/06/25  0515 05/05/25  1254   GLUCOSE mg/dL 95 97 107 119     No results found for: \"HGBA1C\"            Medications    Medications Reviewed: yes    Scheduled Meds:amiodarone, 200 mg, Per PEG Tube, Q12H  aspirin, 81 mg, Per PEG Tube, Daily  atorvastatin, 40 mg, Per PEG Tube, Nightly  cefTRIAXone, 2,000 mg, Intravenous, Q24H  cloNIDine, 0.1 mg, Per PEG Tube, Q8H  clopidogrel, 75 mg, Per PEG Tube, Daily  hydrALAZINE, 25 mg, Per PEG Tube, Q8H  [Held by provider] metoprolol tartrate, 25 mg, Per PEG Tube, Q12H  miconazole, 1 Application, Topical, Q12H  mupirocin, 1 Application, Each Nare, BID  nitroglycerin, 1 patch, Transdermal, Daily  nystatin, 5 mL, Mouth/Throat, 4x Daily  pantoprazole, 40 mg, Intravenous, Q24H  pharmacy consult - MTM, , Not Applicable, Daily  ProSource TF, 45 mL, Per G Tube, BID  [Held by provider] sacubitril-valsartan, 1 tablet, Per PEG Tube, Q12H  sodium chloride, 10 mL, Intravenous, Q12H      Continuous Infusions:dexmedetomidine, 0.2-1.5 mcg/kg/hr, Last Rate: 1.5 mcg/kg/hr (05/08/25 0541)  niCARdipine, 5-15 mg/hr, Last Rate: 5 mg/hr (05/08/25 0420)      PRN Meds:.  albuterol    atropine    heparin (porcine)    hydrALAZINE    labetalol    nitroglycerin    Intake/Ouptut 24 hrs (0701 - 0700)   I&O's Reviewed: yes  Intake & Output (last day)         05/07 0701 05/08 0700 05/08 0701 05/09 0700    I.V. 1043.5     Other      NG/ 100    Total Intake 1148.5 100    Urine 0     Stool      Dialysis      Total Output 0     Net +1148.5 +100          Urine Unmeasured Occurrence 1 x 1 x    Stool Unmeasured Occurrence 1 x           Anthropometrics     Height: Height: 160 cm (62.99\")  Last Filed Weight:  69.4kg (153lbs)  Method:  ?  BMI:  27.11kg/m^2    UBW: Limited wt data available  Weight change:  unable to determine    Nutrition Focused Physical Exam     Date: 5/6    Unable to perform due to pt unable to consent. Visual assessment of pt completed, noted to have edema to " "BUE.*     Subjective   Reported/Observed/Food/Nutrition Related History:   5/8  Pt s/p RCA with stent placement. TF restarted. Not PAB low with elevated CRP. Plan for HD today and possible d/c back to [facility] later today vs tomorrow. Documented loose stool.     5/6  Pt transferred to [facility] for higher level of care from [facility] after extensive hospital stay at OSH. Has been on Nepro via PEG throughout admission to that facility. +HD. Discussed in MDR. OK to restart nutrition. NKFA    Needs Assessment   Date: 5/6    Height used:Height: 160 cm (62.99\")  Weights used: 69.4kg CBW; 52.38kg IBW    Estimated Calorie needs: ~ 1475 Kcal/day  Method: 21-24 Kcals/KG = 9853-4207  Method:  MSJ (1475) x 1.1 = 1623  Method:  PSU = 1475  Ve: 8.79; Tmax: 37    Estimated Protein needs: ~ 83 g PRO/day  Method: 1.0-1.3g/Kg CBW = 69-90    Estimated Fluid needs: ~ ml/day   Per clinical status    Current Nutrition Prescription     PO: NPO Diet NPO Type: Strict NPO  Oral Nutrition Supplement: N/A  Intake: N/A    EN: NovaSource Renal  Goal Rate: 35mL/hr  Water Flushes: 20mL/hr  Modular: Prosource TF 2/day  Route: PEG  Tube: Other     At goal over: 20Hrs/day     Rx will supply:   Goal Volume 700  mL/day     Flush Volume 400 mL/day     Energy 1480 Kcal/day 100 % Est Need   Protein 86 g/day 104 % Est Need   Fiber 0 g/day     Water in   mL     Total Water 904 mL     Meet DRI Yes        --------------------------------------------------------------------------  Product/Rate verified at bedside: Yes  Infusing Rate at time of visit: 35mL/hr + flush 20mL/hr    Average Delivery from Charting: Insufficient data - held at midnight yesterday for RCA   Volume  mL/day   % Goal Vol.   Flush Volume  mL/day     Energy  Kcal/day  % Est Need   Protein  g/day  % Est Need   Fiber  g/day     Water in  EN  mL     Total Water  mL     Meet DRI N/A        Assessment & Plan   Nutrition Diagnosis   Date: 5/6 Updated: 5/8  Problem Inadequate oral intake    Etiology " Dysphagia with trach/peg   Signs/Symptoms Chronic EN   Status: Active    Goal:   Nutrition to support treatment and Maintain EN      Nutrition Intervention      Follow treatment progress, Care plan reviewed, Nutrition support order placed    Continue EN regimen as ordered  Novasource Renal @ 35mL/hr + flush of 20mL/hr. Provide Prosource TF 2x daily    Add Nutrisource Fiber 2x daily    Adjust flush per clinical status      Monitoring/Evaluation:   Per protocol, I&O, Pertinent labs, EN delivery/tolerance, Weight, GI status, Symptoms, POC/GOC, Swallow function, Hemodynamic stability    Mary Alvarado, MS,RD,LD  Time Spent: 30min

## 2025-05-08 NOTE — CONSULTS
Diabetes Education    Patient Name:  Estefania Connor  YOB: 1955  MRN: [id number]  Admit Date:  5/5/2025        Pt does not meet criteria for diabetes education. Current A1c is not available for review.  Current glucose trends are within normal range. Pt is on no home meds for diabetes and has no documented history of diabetes. Will sign off at this time, please re-consult if diabetes education needs arise.  Thank you.      Electronically signed by:  Cecy Rosario RN, [facility]  05/08/25 13:05 EDT

## 2025-05-08 NOTE — PLAN OF CARE
Problem: Adult Inpatient Plan of Care  Goal: Plan of Care Review  Outcome: Progressing  Flowsheets (Taken 5/8/2025 0644)  Progress: no change  Plan of Care Reviewed With: patient  Goal: Patient-Specific Goal (Individualized)  Outcome: Progressing  Goal: Absence of Hospital-Acquired Illness or Injury  Outcome: Progressing  Intervention: Identify and Manage Fall Risk  Description: Perform standard risk assessment on admission using a validated tool or comprehensive approach appropriate to the patient; reassess fall risk frequently, with change in status or transfer to another level of care.Communicate risk to interprofessional healthcare team; ensure fall risk visible cue.Determine need for increased observation, equipment and environmental modification, as well as use of supportive, nonskid footwear.Adjust safety measures to individual needs and identified risk factors.Reinforce the importance of active participation with fall risk prevention, safety, and physical activity with the patient and family.Perform regular intentional rounding to assess need for position change, pain assessment and personal needs, including assistance with toileting.  Recent Flowsheet Documentation  Taken 5/8/2025 0600 by Jac Johnson RN  Safety Promotion/Fall Prevention: safety round/check completed  Taken 5/8/2025 0400 by Jac Johnson RN  Safety Promotion/Fall Prevention: safety round/check completed  Taken 5/8/2025 0200 by Jac Johnson RN  Safety Promotion/Fall Prevention: safety round/check completed  Taken 5/8/2025 0000 by Jac Johnson RN  Safety Promotion/Fall Prevention: safety round/check completed  Taken 5/7/2025 2200 by Jac Johnson RN  Safety Promotion/Fall Prevention: safety round/check completed  Taken 5/7/2025 2030 by Jac Johnson RN  Safety Promotion/Fall Prevention: safety round/check completed  Intervention: Prevent Skin Injury  Description: Perform a screening for skin injury risk, such as  pressure or moisture-associated skin damage on admission and at regular intervals throughout hospital stay.Keep all areas of skin (especially folds) clean and dry.Maintain adequate skin hydration.Relieve and redistribute pressure and protect bony prominences and skin at risk for injury; implement measures based on patient-specific risk factors.Match turning and repositioning schedule to clinical condition.Encourage weight shift frequently; assist with reposition if unable to complete independently.Float heels off bed; avoid pressure on the Achilles tendon.Keep skin free from extended contact with medical devices.Optimize nutrition and hydration.Encourage functional activity and mobility, as early as tolerated.Use aids (e.g., slide boards, mechanical lift) during transfer.  Recent Flowsheet Documentation  Taken 5/8/2025 0600 by Jac Johnson RN  Body Position: neutral body alignment  Skin Protection: incontinence pads utilized  Taken 5/8/2025 0400 by Jac Johnson RN  Body Position: neutral body alignment  Skin Protection: incontinence pads utilized  Taken 5/8/2025 0200 by Jac Johnson RN  Body Position: neutral body alignment  Skin Protection: incontinence pads utilized  Taken 5/8/2025 0000 by Jac Johnson RN  Body Position: neutral body alignment  Skin Protection: incontinence pads utilized  Taken 5/7/2025 2200 by aJc Johnson RN  Body Position: neutral body alignment  Skin Protection: incontinence pads utilized  Taken 5/7/2025 2030 by Jac Johnson RN  Body Position:   neutral body alignment   position maintained  Skin Protection: incontinence pads utilized  Intervention: Prevent and Manage VTE (Venous Thromboembolism) Risk  Description: Assess for VTE (venous thromboembolism) risk.Promote early mobilization; encourage both active and passive leg exercises, if unable to ambulate.Initiate and maintain compression or other therapy, as indicated, based on identified risk in accordance with  organizational protocol and provider order.Recognize the patient's individual risk for bleeding before initiating pharmacologic thromboprophylaxis.  Recent Flowsheet Documentation  Taken 5/8/2025 0400 by Jac Johnson RN  VTE Prevention/Management:   bilateral   SCDs (sequential compression devices) off  Taken 5/8/2025 0000 by Jac Johnson RN  VTE Prevention/Management:   bilateral   SCDs (sequential compression devices) off  Goal: Optimal Comfort and Wellbeing  Outcome: Progressing  Intervention: Provide Person-Centered Care  Description: Use a family-focused approach to care; encourage support system presence and participation.Develop trust and rapport by proactively providing information, encouraging questions, addressing concerns and offering reassurance.Acknowledge emotional response to hospitalization.Recognize and utilize personal coping strategies and strengths; develop goals via shared decision-making.Honor spiritual and cultural preferences.  Recent Flowsheet Documentation  Taken 5/7/2025 2030 by Jac Johnson RN  Trust Relationship/Rapport:   thoughts/feelings acknowledged   reassurance provided   empathic listening provided  Goal: Readiness for Transition of Care  Outcome: Progressing     Problem: Skin Injury Risk Increased  Goal: Skin Health and Integrity  Outcome: Progressing  Intervention: Optimize Skin Protection  Description: Perform a full pressure injury risk assessment, as indicated by screening, upon admission to care unit.Reassess skin (full inspection and injury risk, including skin temperature, consistency and color) frequently (e.g., scheduled interval, with change in condition) to provide optimal early detection and prevention.Maintain adequate tissue perfusion (e.g., encourage fluid balance; avoid crossing legs, constrictive clothing or devices) to promote tissue oxygenation.Maintain head of bed at lowest degree of elevation tolerated, considering medical condition and other  restrictions. Use positioning supports to prevent sliding and friction. Consider low friction textiles.Avoid positioning onto an area that remains reddened or on bony prominences.Minimize incontinence and moisture (e.g., toileting schedule; moisture-wicking pad, diaper or incontinence collection device; skin moisture barrier).Cleanse skin promptly and gently, when soiled, utilizing a pH-balanced cleanser.Relieve and redistribute pressure (e.g., scheduled position changes, weight shifts, use of support surface, medical device repositioning, protective dressing application, use of positioning device, microclimate control, use of pressure-injury-monitorEncourage increased activity, such as sitting in a chair at the bedside or early mobilization, when able to tolerate. Avoid prolonged sitting.  Recent Flowsheet Documentation  Taken 5/8/2025 0600 by Jac Johnson, RN  Activity Management: bedrest  Pressure Reduction Techniques: weight shift assistance provided  Head of Bed (HOB) Positioning: HOB at 30 degrees  Pressure Reduction Devices: positioning supports utilized  Skin Protection: incontinence pads utilized  Taken 5/8/2025 0400 by Jac Johnson, RN  Activity Management: bedrest  Pressure Reduction Techniques: weight shift assistance provided  Head of Bed (HOB) Positioning: HOB not elevated due to postsurgical restriction  Pressure Reduction Devices: positioning supports utilized  Skin Protection: incontinence pads utilized  Taken 5/8/2025 0200 by Jac Johnson, RN  Activity Management: bedrest  Pressure Reduction Techniques: weight shift assistance provided  Head of Bed (HOB) Positioning: HOB not elevated due to postsurgical restriction  Pressure Reduction Devices: positioning supports utilized  Skin Protection: incontinence pads utilized  Taken 5/8/2025 0000 by Jac Johnson, RN  Activity Management: bedrest  Pressure Reduction Techniques: weight shift assistance provided  Head of Bed (HOB) Positioning:  HOB not elevated due to postsurgical restriction  Pressure Reduction Devices: positioning supports utilized  Skin Protection: incontinence pads utilized  Taken 5/7/2025 2200 by Jac Johnson, RN  Activity Management: bedrest  Pressure Reduction Techniques: weight shift assistance provided  Head of Bed (HOB) Positioning: HOB not elevated due to postsurgical restriction  Pressure Reduction Devices: positioning supports utilized  Skin Protection: incontinence pads utilized  Taken 5/7/2025 2030 by Jac Johnson, RN  Activity Management: bedrest  Pressure Reduction Techniques: weight shift assistance provided  Head of Bed (HOB) Positioning: HOB not elevated due to postsurgical restriction  Pressure Reduction Devices: positioning supports utilized  Skin Protection: incontinence pads utilized     Problem: Mechanical Ventilation Invasive  Goal: Effective Communication  Outcome: Progressing  Intervention: Ensure Effective Communication  Description: Keep call system within reach; adapt to meet needs; respond to call light in person.Acknowledge and validate intensity and complexity of voicelessness. Maintain eye contact when speaking and awaiting response.Promote calming presence. Involve patient in decision-making and care to promote inclusion, self-efficacy, confidence and sense of control.Establish a nonverbal communication method. Use augmentative techniques to preserve self-identity and self-esteem, such as writing tools, letter board, computer, flash cards or picture boards.If tracheostomy, consider alternative communication method to facilitate sound or speech, such as speaking valve, occlusive cap or electrolarynx; deflate tracheostomy cuff, if present, when using devices to allow exhalation; monitor closely.Assess and monitor for signs of biopsychosocial concerns that may affect ability to communicate, such as delirium, anxiety and depression.  Recent Flowsheet Documentation  Taken 5/8/2025 0600 by Alex  Jac REDDY RN  Diversional Activities: television  Taken 5/8/2025 0400 by Jac Johnson RN  Diversional Activities: television  Taken 5/8/2025 0200 by Jac Johnson RN  Diversional Activities: television  Taken 5/8/2025 0000 by Jac Johnson RN  Diversional Activities: television  Taken 5/7/2025 2200 by Jac Johnson RN  Diversional Activities: television  Taken 5/7/2025 2030 by Jac Johnson RN  Trust Relationship/Rapport:   thoughts/feelings acknowledged   reassurance provided   empathic listening provided  Diversional Activities: television  Taken 5/7/2025 2000 by Jac Johnson RN  Diversional Activities: television  Goal: Optimal Device Function  Outcome: Progressing  Intervention: Optimize Device Care and Function  Description: Maintain head of bed elevation with regular position changes to minimize ventilation-perfusion mismatch and breathlessness.Provide oral care regularly with subglottic suction to reduce the risk of infection; perform prior to cuff deflation or tube manipulation.Assess tube size, depth, location and securement frequently to minimize the risk of tube displacement; confirm placement with radiography or ultrasonography.Facilitate regular mechanical ventilator and humidification equipment checks to ensure proper function; monitor and manage ventilator and alarm settings.Provide humidification and evaluate need for suctioning to minimize risk of airway obstruction; regularly replace closed suction equipment.Perform ongoing device and stoma care to prevent infection; minimize excessive moisture around device; ensure tracheostomy inner cannula or single lumen device is cleaned or replaced regularly to prevent obstruction from secretions.Monitor and manage cuff pressure routinely, if present; deflate cuff when not clinically indicated.Maintain readily available emergency equipment that includes appropriate-sized manual resuscitation bag, mask, suction equipment, cleaning  supplies and replacement airway devices.If displacement occurs, provide oxygen to the nose, mouth and stoma. Notify provider.  Recent Flowsheet Documentation  Taken 5/7/2025 2030 by Jac Johnson, RN  Oral Care:   swabbed with antiseptic solution   lip/mouth moisturizer applied  Goal: Mechanical Ventilation Liberation  Outcome: Progressing  Intervention: Promote Extubation and Mechanical Ventilation Liberation  Description: Assess for pain, agitation and delirium regularly, utilizing a validated tool; minimize medication effects that may contribute to agitation, delirium or delay extubation.Encourage early rehabilitation using therapeutic intervention and functional mobility training to minimize deconditioning, weakness, functional dependence and delirium.Assess readiness to wake up, breathe, wean and extubate; consider protocol approach to reduce ventilator and intensive care days.Perform spontaneous awakening trial; adjust medication to minimize effects that may contribute to extubation failure.Perform SBT (spontaneous breathing trial); consider low inspiratory-pressure support.Facilitate clustered care and uninterrupted sleep/rest pattern that supports home sleep routine; promote calm environment.Acknowledge fear and anxiety related to the patient's and support system's experience of prolonged mechanical ventilation; encourage complementary therapies, such as music therapy.Perform a cuff leak test to predict postextubation risk for swelling or stridor; consider intravenous or inhaled steroids for high-risk patients.Consider prophylactic noninvasive ventilation after extubation for high-risk patients [e.g., COPD (chronic obstructive pulmonary disease), heart failure, older adults].Consider the need for a longer-term airway.  Recent Flowsheet Documentation  Taken 5/8/2025 0600 by Jac Johnson, RN  Medication Review/Management: medications reviewed  Taken 5/8/2025 0400 by Jac Johnson, RN  Medication  Review/Management: medications reviewed  Taken 5/8/2025 0200 by Jac Johnson RN  Medication Review/Management: medications reviewed  Goal: Optimal Nutrition Delivery  Outcome: Progressing  Goal: Absence of Device-Related Skin and Tissue Injury  Outcome: Progressing  Intervention: Maintain Skin and Tissue Health  Description: Reposition and resecure endotracheal tube regularly; ensure proper tube location.Monitor depth of suction catheter advancement to minimize the risk of internal tracheobronchial tissue injury.Assess skin and mucosal areas around the device frequently.Monitor tightness of securement device regularly; consider skin barrier protection.Minimize pressure points and prevent traction on device, using careful positioning, flexible extenders and props.Assess and monitor for the presence of bleeding that may indicate injury to tracheobronchial tissue. Notify provider for persistent bleeding.Anticipate adjunct therapy, such as cool mist, racemic epinephrine, corticosteroid or heliox, for symptoms related to airway swelling or stridor after removal of tube.  Recent Flowsheet Documentation  Taken 5/8/2025 0600 by Jac Johnson RN  Device Skin Pressure Protection: tubing/devices free from skin contact  Taken 5/8/2025 0400 by Jac Johnson RN  Device Skin Pressure Protection: tubing/devices free from skin contact  Taken 5/8/2025 0200 by Jac Johnson RN  Device Skin Pressure Protection: tubing/devices free from skin contact  Taken 5/8/2025 0000 by Jac Johnson RN  Device Skin Pressure Protection: tubing/devices free from skin contact  Taken 5/7/2025 2200 by Jac Johnson RN  Device Skin Pressure Protection: tubing/devices free from skin contact  Taken 5/7/2025 2030 by Jac Johnson RN  Device Skin Pressure Protection: tubing/devices free from skin contact  Goal: Absence of Ventilator-Induced Lung Injury  Outcome: Progressing  Intervention: Prevent Ventilator-Associated  Pneumonia  Description: Assess readiness to extubate; perform sedation interruption and spontaneous breathing trial.Maintain semirecumbent position to minimize aspiration risk.Provide ongoing oral care to reduce pathogens in oral cavity.Consider the use of antiseptic cloths for daily bathing.Minimize ventilator circuit breaks; consider use of closed suction device.Minimize microaspiration risk; consider the use of ultrathin polyurethane tapered endotracheal tubes with subglottic secretion drainage, as well as cuff pressure monitoring.Assess need for stress ulcer and venous thromboembolism prophylaxis due to increased risk during mechanical ventilation.  Recent Flowsheet Documentation  Taken 5/8/2025 0600 by Jac Johnson RN  Head of Bed (HOB) Positioning: HOB at 30 degrees  Taken 5/8/2025 0400 by Jac Johnson RN  Head of Bed (HOB) Positioning: HOB not elevated due to postsurgical restriction  Taken 5/8/2025 0200 by Jca Johnson RN  Head of Bed (HOB) Positioning: HOB not elevated due to postsurgical restriction  Taken 5/8/2025 0000 by Jac Johnson RN  Head of Bed (HOB) Positioning: HOB not elevated due to postsurgical restriction  Taken 5/7/2025 2200 by Jac Johnson RN  Head of Bed (HOB) Positioning: HOB not elevated due to postsurgical restriction  Taken 5/7/2025 2030 by Jac Johnson RN  Head of Bed (HOB) Positioning: HOB not elevated due to postsurgical restriction  Oral Care:   swabbed with antiseptic solution   lip/mouth moisturizer applied     Problem: Hemodialysis  Goal: Safe, Effective Therapy Delivery  Outcome: Progressing  Intervention: Optimize Device Care and Function  Description: Maintain flow rate, anticoagulation parameters, pressure ranges and prescribed fluid balance with gradual adjustments to maintain hemodynamic stability and achieve therapy goals.Monitor laboratory results (e.g., electrolytes, glucose, albumin, coagulation studies, hemoglobin, hematocrit) and clinical  status (e.g., cramping; nausea, vomiting, blood pressure fluctuations) for response to therapy; advocate for treatment or adjuAssess vascular access site for patency, securement and position to meet flow demands. Assess perfusion distal to access site to ensure adequate tissue oxygenation.For arteriovenous fistula, assess presence of thrill to ensure presence of blood flow. Avoid blood pressure readings, lab draws, tight clothing or jewelry on the AV (arteriovenous) fistula extremity to prevent trauma.Maintain circuit monitoring (e.g., arterial, venous and transmembrane pressure; ultrafiltrate removal; dialysate flow, conductivity and temperature); address alarms promptly to decrease risk to patient and preserve circuit function.Evaluate circuit for disconnections, cracks, clotting, malfunction, leaks or rupture of hemofilter; intervene promptly to prevent risk to patient.Consider distal vascular access for antibiotic or vasoactive medication administration to prevent filtration of medication effect prior to being delivered systemically to the patient.Maintain infusion of anticoagulation; adjust to keep laboratory values within ordered range.Provide emergency equipment, such as clamps, replacement devices and resuscitative supplies, if malfunction, tubing rupture, clot formation or migration occur.  Recent Flowsheet Documentation  Taken 5/8/2025 0600 by Jac Johnson, RN  Medication Review/Management: medications reviewed  Taken 5/8/2025 0400 by Jac Johnson, RN  Medication Review/Management: medications reviewed  Taken 5/8/2025 0200 by Jac Johnson, RN  Medication Review/Management: medications reviewed  Goal: Effective Tissue Perfusion  Outcome: Progressing  Goal: Absence of Infection Signs and Symptoms  Outcome: Progressing     Problem: Fall Injury Risk  Goal: Absence of Fall and Fall-Related Injury  Outcome: Progressing  Intervention: Identify and Manage Contributors  Description: Develop a fall  prevention plan, considering patient-centered interventions and family/caregiver involvement; identify and address patient's facilitators and barriers.Provide reorientation, appropriate sensory stimulation and routines with changes in mental status to decrease risk of fall.Promote use of personal vision and auditory aids.Assess assistance level required for safe and effective self-care; provide support as needed, such as toileting and mobilization. For age 65 and older, implement timed toileting with assistance.Encourage physical activity, such as performance of mobility and self-care at highest level of patient ability, multicomponent exercise program and provision of appropriate assistive devices.If fall occurs, assess the severity of injury; implement fall injury protocol. Determine the cause and revise fall injury prevention plan.Regularly review and advocate for medication adjustment to decrease fall risk; consider administration times, polypharmacy and age.Balance adequate pain management with potential for oversedation.  Recent Flowsheet Documentation  Taken 5/8/2025 0600 by Jac Johnson, RN  Medication Review/Management: medications reviewed  Taken 5/8/2025 0400 by Jac Johnson, RN  Medication Review/Management: medications reviewed  Taken 5/8/2025 0200 by Jac Johnson, RN  Medication Review/Management: medications reviewed  Intervention: Promote Injury-Free Environment  Description: Provide a safe, barrier-free environment that encourages independent activity.Keep care area uncluttered and well-lighted.Determine need for increased observation or monitoring.Avoid use of devices that minimize mobility, such as restraints or indwelling urinary catheter.  Recent Flowsheet Documentation  Taken 5/8/2025 0600 by Jac Johnson, RN  Safety Promotion/Fall Prevention: safety round/check completed  Taken 5/8/2025 0400 by Jac Johnson, RN  Safety Promotion/Fall Prevention: safety round/check  completed  Taken 5/8/2025 0200 by Jac Johnson RN  Safety Promotion/Fall Prevention: safety round/check completed  Taken 5/8/2025 0000 by Jac Johnson RN  Safety Promotion/Fall Prevention: safety round/check completed  Taken 5/7/2025 2200 by Jac Johnson RN  Safety Promotion/Fall Prevention: safety round/check completed  Taken 5/7/2025 2030 by Jac Johnson RN  Safety Promotion/Fall Prevention: safety round/check completed     Problem: Restraint, Nonviolent  Goal: Absence of Harm or Injury  Outcome: Progressing  Intervention: Implement Least Restrictive Safety Strategies  Description: Consider personal and environmental factors contributing to unsafe nonviolent, self-destructive behavior.Utilize diversional activity or alternative device protection.Document alternative strategies and less restrictive intervention attempts and their effects.Use the least restrictive method of restraint.Recognize restraint should only be used if needed to improve the patient's wellbeing and less restrictive interventions have been determined to be ineffective.Reassess continued need frequently.  Recent Flowsheet Documentation  Taken 5/8/2025 0600 by Jac Johnson RN  Medical Device Protection: tubing secured  Diversional Activities: television  Taken 5/8/2025 0400 by Jac Johnson RN  Medical Device Protection: tubing secured  Diversional Activities: television  Taken 5/8/2025 0200 by Jac Johnson RN  Medical Device Protection: tubing secured  Diversional Activities: television  Taken 5/8/2025 0000 by Jac Johnson RN  Medical Device Protection: tubing secured  Diversional Activities: television  Taken 5/7/2025 2200 by Jac Johnson RN  Medical Device Protection: tubing secured  Diversional Activities: television  Taken 5/7/2025 2030 by Jac Johnson RN  Medical Device Protection: tubing secured  Diversional Activities: television  Taken 5/7/2025 2000 by Jac Johnson RN  Medical Device  Protection: tubing secured  Diversional Activities: television  Intervention: Protect Dignity, Rights and Personal Wellbeing  Description: Explain restraint rationale and procedure to patient and family/support person prior to or at time of application.Regularly assess personal needs and for changes in behavior and clinical condition, as well as physical and emotional wellbeing.Provide reassurance and emotional support.  Recent Flowsheet Documentation  Taken 5/7/2025 2030 by Jac Johnson RN  Trust Relationship/Rapport:   thoughts/feelings acknowledged   reassurance provided   empathic listening provided  Intervention: Protect Skin and Joint Integrity  Description: Frequently assess restraint application site for skin integrity, edema and perfusion distal to the site; document findings.Consider protective skin barrier with risk of skin injury.Release and replace restraint at regular intervals.Assist with frequent joint range of motion activity.  Recent Flowsheet Documentation  Taken 5/8/2025 0600 by Jac Johnson RN  Body Position: neutral body alignment  Skin Protection: incontinence pads utilized  Taken 5/8/2025 0400 by Jac Johnson RN  Body Position: neutral body alignment  Skin Protection: incontinence pads utilized  Taken 5/8/2025 0200 by Jac Johnson RN  Body Position: neutral body alignment  Skin Protection: incontinence pads utilized  Taken 5/8/2025 0000 by Jac Johnson RN  Body Position: neutral body alignment  Skin Protection: incontinence pads utilized  Taken 5/7/2025 2200 by Jac Johnson RN  Body Position: neutral body alignment  Skin Protection: incontinence pads utilized  Taken 5/7/2025 2030 by Jac Johnson RN  Body Position:   neutral body alignment   position maintained  Skin Protection: incontinence pads utilized   Goal Outcome Evaluation:  Plan of Care Reviewed With: patient        Progress: no change

## 2025-05-08 NOTE — PROGRESS NOTES
"   LOS: 3 days    Patient Care Team:  Sergio Randall MD as PCP - General (Cardiology)    Subjective     Stable overnight    Objective     Vital Signs:  Blood pressure 134/65, pulse 74, temperature 97.8 °F (36.6 °C), temperature source Axillary, resp. rate 20, height 160 cm (62.99\"), SpO2 100%.      Intake/Output Summary (Last 24 hours) at 5/8/2025 0918  Last data filed at 5/8/2025 0420  Gross per 24 hour   Intake 1113.5 ml   Output 0 ml   Net 1113.5 ml        05/07 0701 - 05/08 0700  In: 1148.5 [I.V.:1043.5]  Out: 0     Physical Exam:        GENERAL: WDWF NAD  NEURO: Sedate on vent  PSYCHIATRIC: Unable to evaluate  EYE: PE, no icterus, no conjunctivitis  ENT: OMM dry, + trach  NECK: Supple , No JVD discernable,  Trachea midline  CV: Trace edema, RRR  LUNGS:  Quiet,  Nonlabored resp.  Symmetrical expansion  ABDOMEN: + PEG, nondistended  : No Pereira, no palp bladder  SKIN: Warm and dry without rash      Labs:  Results from last 7 days   Lab Units 05/08/25  0421 05/07/25  0348 05/06/25  0644   WBC 10*3/mm3 9.01 13.11* 11.87*   HEMOGLOBIN g/dL 8.4* 9.8* 9.1*   PLATELETS 10*3/mm3 255 306 269     Results from last 7 days   Lab Units 05/08/25  0421 05/07/25  0348 05/06/25  0644 05/05/25  1440 05/05/25  0143 05/04/25  0235 05/03/25  0307 05/02/25  0337   SODIUM mmol/L 135* 135* 133* 132* 130* 135* 135* 134*   POTASSIUM mmol/L 3.7 3.1* 3.4* 3.4* 3.2* 3.3* 3.4* 3.6   CHLORIDE mmol/L 101 99 95* 93* 93* 95* 94* 95*   CO2 mmol/L 22.0 24.0 24.0 24.0 24.6 27.8 29.1* 24.1   BUN mg/dL 23 17 29* 27* 25* 20 13 23   CREATININE mg/dL 3.47* 2.94* 4.86* 4.51* 4.63* 3.93* 3.03* 4.43*   CALCIUM mg/dL 8.8 8.8 8.9 9.5 9.1 9.2 8.1* 8.9   PHOSPHORUS mg/dL  --   --  4.1 4.1  --   --   --   --    MAGNESIUM mg/dL  --   --  2.0 2.0  --   --   --   --    ALBUMIN g/dL  --   --   --   --  2.1* 2.2* 2.1* 2.3*     Results from last 7 days   Lab Units 05/05/25  0143   ALK PHOS U/L 112   BILIRUBIN mg/dL 0.2   ALT (SGPT) U/L 9   AST (SGOT) U/L 11 "     Results from last 7 days   Lab Units 05/08/25  0326   PH, ARTERIAL pH units 7.406   PO2 ART mm Hg 103.0   PCO2, ARTERIAL mm Hg 36.6   HCO3 ART mmol/L 23.0               Estimated Creatinine Clearance: 14.1 mL/min (A) (by C-G formula based on SCr of 3.47 mg/dL (H)).         A/P:    End-stage renal disease     - On HD since 4/2023 .  V-fib arrest - 100% RCA obstruction.   Respiratory failure s/p trach  Hyponatremia  Hypokalemia  Anemia  Fluid overload     PLAN:     Electrolytes and volume stable today.  Per nurse patient with several large incontinent voids per day patient.  Mild pulmonary edema seen on chest x-ray, however patient with 100% saturation on 40% FiO2 vent.  Unable to get dialysis until later this afternoon, however patient has transportation arranged to return to LTAC in [place].  Patient stable to hold HD today to avoid delay in transfer.  [facility] run [facility] in [facility].  Will put orders in for HD tomorrow in case patient does not transfer for some other reason    High risk and complexity patient.    Addendum: Transferred held today.  Will HD in a.m. prior to transfer.       Mulugeta Chávez MD  05/08/25  09:18 EDT

## 2025-05-08 NOTE — PROGRESS NOTES
Pulmonary/Critical Care ICU Note     LOS: 3 days   Patient Care Team:  Sergio Randall MD as PCP - General (Cardiology)    Chief Complaint: Cardiac arrest / CAD      Subjective     History reviewed and updated in EMR as indicated.    Interval History:     Patient underwent PCI to RCA yesterday.  She remains on mechanical ventilator today.  She is down to 40% FiO2.  She remains on Precedex.  Groin sheath discontinued this morning.    Plan to transfer back to rehab tomorrow.    History taken from: chart, staff    PMH/FH/Social History were reviewed and updated appropriately in the electronic medical record.     Past Medical History:   Diagnosis Date    AAA (abdominal aortic aneurysm)     Coronary artery disease     Depression     ESRD (end stage renal disease)     Hyperlipidemia     Hypertension     Polycystic kidney disease        Review of Systems:    Review of 14 systems was completed with positives and pertinent negatives noted in the subjective section.  All other systems reviewed and are negative.         Objective     Vital Signs  Temp:  [97.3 °F (36.3 °C)-98 °F (36.7 °C)] 98 °F (36.7 °C)  Heart Rate:  [57-99] 87  Resp:  [20-24] 23  BP: (114-186)/() 158/69  FiO2 (%):  [40 %-50 %] 40 %  05/07 0701 - 05/08 0700  In: 1148.5 [I.V.:1043.5]  Out: 0   Body mass index is 27.11 kg/m².  Mode: VC+/AC  FiO2 (%):  [40 %-50 %] 40 %  S RR:  [20] 20  S VT:  [365 mL] 365 mL  PEEP/CPAP (cm H2O):  [5 cm H20] 5 cm H20  MAP (cm H2O):  [8.5-13] 10  IV drips:  dexmedetomidine, Last Rate: 1.3 mcg/kg/hr (05/08/25 1731)  niCARdipine, Last Rate: Stopped (05/08/25 0730)       Physical Exam:     Constitutional:   Drowsy but arousable on ventilator, in no acute distress   Head:   Normocephalic, atraumatic   Eyes:           Lids and lashes normal, conjunctivae and sclerae normal.  PER   ENMT:  Ears appear intact with no abnormalities noted     Lips normal.     Neck:  Tracheostomy in place, no JVD   Lungs/Resp:    Normal effort,  symmetric chest rise, no crepitus, clear to      auscultation bilaterally.               Heart/CV:   Regular rhythm and normal rate, no murmur   Abdomen/GI:    Soft, nontender, nondistended   :    Deferred   Extremities/MSK:  No clubbing or cyanosis.  Trace bilateral lower extremity edema.  Right upper extremity is swollen.   Pulses:  Pulses palpable and equal bilaterally   Skin:  No bleeding, bruising or rash   Heme/Lymph:  No cervical or supraclavicular adenopathy.   Neurologic:    Psychiatric:    Moves all extremities with no obvious focal motor deficit.  Cranial nerves 2 - 12 grossly intact  Non-agitated, normal affect.    The above physical exam findings were reviewed and reflect my exam findings as of today's exam.   Electronically signed by:  Terry Antonio MD  05/08/25  18:26 EDT      Results Review:     I reviewed the patient's new clinical results.   Results from last 7 days   Lab Units 05/08/25  0421 05/07/25  0348 05/06/25  0644 05/05/25  1440 05/05/25  0143 05/04/25  0235 05/03/25  0307   SODIUM mmol/L 135* 135* 133*   < > 130* 135* 135*   POTASSIUM mmol/L 3.7 3.1* 3.4*   < > 3.2* 3.3* 3.4*   CHLORIDE mmol/L 101 99 95*   < > 93* 95* 94*   CO2 mmol/L 22.0 24.0 24.0   < > 24.6 27.8 29.1*   BUN mg/dL 23 17 29*   < > 25* 20 13   CREATININE mg/dL 3.47* 2.94* 4.86*   < > 4.63* 3.93* 3.03*   CALCIUM mg/dL 8.8 8.8 8.9   < > 9.1 9.2 8.1*   BILIRUBIN mg/dL  --   --   --   --  0.2 0.2 0.2   ALK PHOS U/L  --   --   --   --  112 119* 108   ALT (SGPT) U/L  --   --   --   --  9 12 15   AST (SGOT) U/L  --   --   --   --  11 12 13   GLUCOSE mg/dL 88 106* 103*   < > 108* 112* 79    < > = values in this interval not displayed.     Results from last 7 days   Lab Units 05/08/25  0421 05/07/25  0348 05/06/25  0644   WBC 10*3/mm3 9.01 13.11* 11.87*   HEMOGLOBIN g/dL 8.4* 9.8* 9.1*   HEMATOCRIT % 26.8* 30.9* 28.8*   PLATELETS 10*3/mm3 255 306 269     Results from last 7 days   Lab Units 05/08/25  0326   PH, ARTERIAL pH  units 7.406   PO2 ART mm Hg 103.0   PCO2, ARTERIAL mm Hg 36.6   HCO3 ART mmol/L 23.0     Results from last 7 days   Lab Units 05/06/25  0644 05/05/25  1440   MAGNESIUM mg/dL 2.0 2.0   PHOSPHORUS mg/dL 4.1 4.1       I reviewed the patient's new imaging including images and reports.    Chest x-ray 5/8/2025 shows cardiomegaly and pulmonary congestion with tracheostomy in place in the right internal jugular dialysis catheter with left internal jugular central venous catheter in place.  Overall unchanged.    Medication Review:   amiodarone, 200 mg, Per PEG Tube, Q12H  aspirin, 81 mg, Per PEG Tube, Daily  atorvastatin, 40 mg, Per PEG Tube, Nightly  cefTRIAXone, 2,000 mg, Intravenous, Q24H  cloNIDine, 0.1 mg, Per PEG Tube, Q8H  clopidogrel, 75 mg, Per PEG Tube, Daily  hydrALAZINE, 25 mg, Per PEG Tube, Q8H  [Held by provider] metoprolol tartrate, 25 mg, Per PEG Tube, Q12H  miconazole, 1 Application, Topical, Q12H  mupirocin, 1 Application, Each Nare, BID  nitroglycerin, 1 patch, Transdermal, Daily  Nutrisource fiber, 2 packet, Per G Tube, Daily  nystatin, 5 mL, Mouth/Throat, 4x Daily  pantoprazole, 40 mg, Intravenous, Q24H  ProSource TF, 45 mL, Per G Tube, BID  [Held by provider] sacubitril-valsartan, 1 tablet, Per PEG Tube, Q12H      dexmedetomidine, 0.2-1.5 mcg/kg/hr, Last Rate: 1.3 mcg/kg/hr (05/08/25 9950)  niCARdipine, 5-15 mg/hr, Last Rate: Stopped (05/08/25 0730)        Assessment & Plan         CAD (coronary artery disease)    ESRD on HD    Polycystic kidney disease    HTN (hypertension)    Dyslipidemia    AAA (abdominal aortic aneurysm)    Right coronary artery occlusion    Depression    Tracheostomy present    S/P percutaneous endoscopic gastrostomy (PEG) tube placement    69 y.o. female with history of HTN, HLD, CKD on HD, polycystic kidney disease, abdominal aortic aneurysm, CAD with prior stent, depression, transferred from [facility] on 5/5/2025 after she suffered a V-fib arrest with  resuscitation and underwent left heart catheterization showing 100% occlusion of the RCA with failed intervention.    Patient was initially admitted to [facility] on 3/20/2025 with volume overload secondary to missing 2 hemodialysis sessions.  She also has followed up possible pneumonia and had anemia and was evaluated by GI with no evidence of GI bleed.  She had bilateral pleural effusions and underwent 1550 mL thoracentesis, but despite thoracentesis required intubation on 4/10/2025 was transferred to [facility] on 4/11/2025.  Patient ultimately underwent tracheostomy and PEG placement on 4/29/2025.  She deteriorated on 5/2/2025 with me today Vfib arrest ultimately resuscitated with subsequent LHC as above.    Cardiology plans left heart catheterization today for PCI.    Patient remains on mechanical ventilation with excellent oxygen saturations on 40 % FiO2 this morning.  She is arousable and alert and I think her chances of successful weaning from mechanical ventilation will be excellent.    Patient has a swollen right upper extremity which has been ongoing.  Repeat right upper extremity duplex this admission shows superficial thrombophlebitis in the cephalic vein.  She has a right internal jugular dialysis catheter.      Plan:  1.  For V-fib arrest/CAD with occluded RCA: Failed intervention at outside hospital.  Cardiology consultation.  Aspirin, statin, beta-blocker, heparin drip.  LHC today.  2.  For CKD on HD: Last received HD at OSH on 5/3/2025 however received 20 minutes of HD on 5/5/2025 but did not tolerated reportedly secondary to hypotension and her session was discontinued prior to transfer here.  Nephrology following.  IHD done on 5/6/2025.    3.  For respiratory: Initial respiratory failure secondary to volume overload from missing hemodialysis.  Also treated for pneumonia.  Status post tracheostomy and PEG 4/29/2025.  Continue mechanical ventilation and adjust settings to optimize  ventilation and oxygenation and minimize barotrauma.  I think patient will be weanable from mechanical ventilation after PCI.  I will defer formal ventilator weaning until after revascularization.  4.  For nutrition: N.p.o. for now.  Restarted tube feeds.  N.p.o. after midnight.  5.  DVT prophylaxis/right upper extremity swell: Recheck duplex right upper extremity this admission showed superficial thrombophlebitis in the cephalic vein.  No DVT.  6.  For gram-negative rods in sputum: Patient has some pulmonary congestion on chest x-ray but no convincing symptoms of pneumonia.  White blood cell count mildly elevated.  Rocephin was started empirically.  Cultures growing moderate Klebsiella susceptible to Rocephin and I will plan to complete 7 days for Klebsiella tracheobronchitis in the setting of tracheostomy/respiratory failure.      MDM:    Problem(s) High due to: Acute or Chronic illness or injury that may poses a threat to life or bodily function  Data: High due to: Review of prior external records from each unique source, Review of the result(s) of each unique test, Ordering of each unique test, and Independent interpretation of a test performed by another physician/NPP (not separately reported)  Risk: High due to: drug(s) requiring intensive monitoring for toxicity and parenteral controlled substances    High      Electronically signed by:    Terry Antonio MD  05/08/25  18:26 EDT      *. Please note that portions of this note were completed with K[employer]est - a voice recognition program.    70

## 2025-05-09 ENCOUNTER — APPOINTMENT (OUTPATIENT)
Dept: CT IMAGING | Facility: HOSPITAL | Age: 70
End: 2025-05-09
Payer: MEDICARE

## 2025-05-09 ENCOUNTER — APPOINTMENT (OUTPATIENT)
Dept: CARDIOLOGY | Facility: HOSPITAL | Age: 70
End: 2025-05-09
Payer: MEDICARE

## 2025-05-09 ENCOUNTER — OUTSIDE FACILITY SERVICE (OUTPATIENT)
Dept: PULMONOLOGY | Facility: CLINIC | Age: 70
End: 2025-05-09
Payer: MEDICARE

## 2025-05-09 ENCOUNTER — ANESTHESIA EVENT CONVERTED (OUTPATIENT)
Dept: ANESTHESIOLOGY | Facility: HOSPITAL | Age: 70
End: 2025-05-09
Payer: MEDICARE

## 2025-05-09 ENCOUNTER — ANESTHESIA EVENT (OUTPATIENT)
Dept: PERIOP | Facility: HOSPITAL | Age: 70
End: 2025-05-09
Payer: MEDICARE

## 2025-05-09 ENCOUNTER — ANESTHESIA (OUTPATIENT)
Dept: PERIOP | Facility: HOSPITAL | Age: 70
End: 2025-05-09
Payer: MEDICARE

## 2025-05-09 ENCOUNTER — APPOINTMENT (OUTPATIENT)
Dept: NEPHROLOGY | Facility: HOSPITAL | Age: 70
End: 2025-05-09
Payer: MEDICARE

## 2025-05-09 LAB
ABO GROUP BLD: NORMAL
ANION GAP SERPL CALCULATED.3IONS-SCNC: 12 MMOL/L (ref 5–15)
APTT PPP: 33.7 SECONDS (ref 22–39)
BLD GP AB SCN SERPL QL: NEGATIVE
BUN SERPL-MCNC: 32 MG/DL (ref 8–23)
BUN/CREAT SERPL: 8.5 (ref 7–25)
CALCIUM SPEC-SCNC: 8.7 MG/DL (ref 8.6–10.5)
CHLORIDE SERPL-SCNC: 102 MMOL/L (ref 98–107)
CO2 SERPL-SCNC: 22 MMOL/L (ref 22–29)
CREAT SERPL-MCNC: 3.75 MG/DL (ref 0.57–1)
DEPRECATED RDW RBC AUTO: 52.5 FL (ref 37–54)
DEPRECATED RDW RBC AUTO: 56 FL (ref 37–54)
DEPRECATED RDW RBC AUTO: 57.5 FL (ref 37–54)
EGFRCR SERPLBLD CKD-EPI 2021: 12.4 ML/MIN/1.73
ERYTHROCYTE [DISTWIDTH] IN BLOOD BY AUTOMATED COUNT: 16.1 % (ref 12.3–15.4)
ERYTHROCYTE [DISTWIDTH] IN BLOOD BY AUTOMATED COUNT: 17.1 % (ref 12.3–15.4)
ERYTHROCYTE [DISTWIDTH] IN BLOOD BY AUTOMATED COUNT: 17.4 % (ref 12.3–15.4)
FIBRINOGEN PPP-MCNC: 327 MG/DL (ref 203–567)
FSP PPP LA-ACNC: ABNORMAL
GLUCOSE BLDC GLUCOMTR-MCNC: 110 MG/DL (ref 70–130)
GLUCOSE BLDC GLUCOMTR-MCNC: 115 MG/DL (ref 70–130)
GLUCOSE BLDC GLUCOMTR-MCNC: 117 MG/DL (ref 70–130)
GLUCOSE BLDC GLUCOMTR-MCNC: 125 MG/DL (ref 70–130)
GLUCOSE SERPL-MCNC: 114 MG/DL (ref 65–99)
HCT VFR BLD AUTO: 16.1 % (ref 34–46.6)
HCT VFR BLD AUTO: 21.8 % (ref 34–46.6)
HCT VFR BLD AUTO: 27.1 % (ref 34–46.6)
HGB BLD-MCNC: 5 G/DL (ref 12–15.9)
HGB BLD-MCNC: 7.2 G/DL (ref 12–15.9)
HGB BLD-MCNC: 8.5 G/DL (ref 12–15.9)
INR PPP: 1.15 (ref 0.89–1.12)
MCH RBC QN AUTO: 28.2 PG (ref 26.6–33)
MCH RBC QN AUTO: 28.2 PG (ref 26.6–33)
MCH RBC QN AUTO: 29.5 PG (ref 26.6–33)
MCHC RBC AUTO-ENTMCNC: 31.1 G/DL (ref 31.5–35.7)
MCHC RBC AUTO-ENTMCNC: 31.4 G/DL (ref 31.5–35.7)
MCHC RBC AUTO-ENTMCNC: 33 G/DL (ref 31.5–35.7)
MCV RBC AUTO: 89.3 FL (ref 79–97)
MCV RBC AUTO: 90 FL (ref 79–97)
MCV RBC AUTO: 91 FL (ref 79–97)
PLATELET # BLD AUTO: 222 10*3/MM3 (ref 140–450)
PLATELET # BLD AUTO: 252 10*3/MM3 (ref 140–450)
PLATELET # BLD AUTO: 285 10*3/MM3 (ref 140–450)
PMV BLD AUTO: 10.6 FL (ref 6–12)
PMV BLD AUTO: 10.6 FL (ref 6–12)
PMV BLD AUTO: 11.1 FL (ref 6–12)
POTASSIUM SERPL-SCNC: 3.3 MMOL/L (ref 3.5–5.2)
POTASSIUM SERPL-SCNC: 3.7 MMOL/L (ref 3.5–5.2)
PROTHROMBIN TIME: 15.4 SECONDS (ref 12.2–15.3)
QT INTERVAL: 368 MS
QTC INTERVAL: 495 MS
RBC # BLD AUTO: 1.77 10*6/MM3 (ref 3.77–5.28)
RBC # BLD AUTO: 2.44 10*6/MM3 (ref 3.77–5.28)
RBC # BLD AUTO: 3.01 10*6/MM3 (ref 3.77–5.28)
RH BLD: NEGATIVE
SODIUM SERPL-SCNC: 136 MMOL/L (ref 136–145)
T&S EXPIRATION DATE: NORMAL
WBC NRBC COR # BLD AUTO: 11.6 10*3/MM3 (ref 3.4–10.8)
WBC NRBC COR # BLD AUTO: 13.5 10*3/MM3 (ref 3.4–10.8)
WBC NRBC COR # BLD AUTO: 14.19 10*3/MM3 (ref 3.4–10.8)

## 2025-05-09 PROCEDURE — 86900 BLOOD TYPING SEROLOGIC ABO: CPT

## 2025-05-09 PROCEDURE — 25810000003 SODIUM CHLORIDE PER 500 ML: Performed by: SURGERY

## 2025-05-09 PROCEDURE — 86920 COMPATIBILITY TEST SPIN: CPT

## 2025-05-09 PROCEDURE — P9016 RBC LEUKOCYTES REDUCED: HCPCS

## 2025-05-09 PROCEDURE — 25010000002 ALBUMIN HUMAN 25% PER 50 ML: Performed by: INTERNAL MEDICINE

## 2025-05-09 PROCEDURE — 85027 COMPLETE CBC AUTOMATED: CPT | Performed by: INTERNAL MEDICINE

## 2025-05-09 PROCEDURE — 25810000003 LACTATED RINGERS PER 1000 ML: Performed by: ANESTHESIOLOGY

## 2025-05-09 PROCEDURE — 86923 COMPATIBILITY TEST ELECTRIC: CPT

## 2025-05-09 PROCEDURE — 82948 REAGENT STRIP/BLOOD GLUCOSE: CPT

## 2025-05-09 PROCEDURE — 86900 BLOOD TYPING SEROLOGIC ABO: CPT | Performed by: INTERNAL MEDICINE

## 2025-05-09 PROCEDURE — 99232 SBSQ HOSP IP/OBS MODERATE 35: CPT | Performed by: INTERNAL MEDICINE

## 2025-05-09 PROCEDURE — 25810000003 SODIUM CHLORIDE 0.9 % SOLUTION 250 ML FLEX CONT: Performed by: INTERNAL MEDICINE

## 2025-05-09 PROCEDURE — 85730 THROMBOPLASTIN TIME PARTIAL: CPT | Performed by: INTERNAL MEDICINE

## 2025-05-09 PROCEDURE — 94003 VENT MGMT INPAT SUBQ DAY: CPT

## 2025-05-09 PROCEDURE — 85027 COMPLETE CBC AUTOMATED: CPT | Performed by: STUDENT IN AN ORGANIZED HEALTH CARE EDUCATION/TRAINING PROGRAM

## 2025-05-09 PROCEDURE — 99291 CRITICAL CARE FIRST HOUR: CPT | Performed by: INTERNAL MEDICINE

## 2025-05-09 PROCEDURE — P9047 ALBUMIN (HUMAN), 25%, 50ML: HCPCS | Performed by: INTERNAL MEDICINE

## 2025-05-09 PROCEDURE — 25010000002 FENTANYL CITRATE (PF) 50 MCG/ML SOLUTION: Performed by: NURSE PRACTITIONER

## 2025-05-09 PROCEDURE — 74174 CTA ABD&PLVS W/CONTRAST: CPT

## 2025-05-09 PROCEDURE — 84132 ASSAY OF SERUM POTASSIUM: CPT | Performed by: INTERNAL MEDICINE

## 2025-05-09 PROCEDURE — 25510000001 IOPAMIDOL PER 1 ML: Performed by: INTERNAL MEDICINE

## 2025-05-09 PROCEDURE — 86901 BLOOD TYPING SEROLOGIC RH(D): CPT | Performed by: INTERNAL MEDICINE

## 2025-05-09 PROCEDURE — 25010000002 NICARDIPINE 2.5 MG/ML SOLUTION 10 ML VIAL: Performed by: INTERNAL MEDICINE

## 2025-05-09 PROCEDURE — 86850 RBC ANTIBODY SCREEN: CPT | Performed by: INTERNAL MEDICINE

## 2025-05-09 PROCEDURE — 94799 UNLISTED PULMONARY SVC/PX: CPT

## 2025-05-09 PROCEDURE — 80048 BASIC METABOLIC PNL TOTAL CA: CPT | Performed by: INTERNAL MEDICINE

## 2025-05-09 PROCEDURE — 85610 PROTHROMBIN TIME: CPT | Performed by: NURSE PRACTITIONER

## 2025-05-09 PROCEDURE — 25010000003 DEXTROSE 5 % SOLUTION 250 ML FLEX CONT: Performed by: ANESTHESIOLOGY

## 2025-05-09 PROCEDURE — 25010000002 HEPARIN (PORCINE) PER 1000 UNITS: Performed by: SURGERY

## 2025-05-09 PROCEDURE — 25010000002 HEPARIN (PORCINE) PER 1000 UNITS: Performed by: INTERNAL MEDICINE

## 2025-05-09 PROCEDURE — 25010000002 HYDRALAZINE PER 20 MG: Performed by: INTERNAL MEDICINE

## 2025-05-09 PROCEDURE — 85362 FIBRIN DEGRADATION PRODUCTS: CPT | Performed by: INTERNAL MEDICINE

## 2025-05-09 PROCEDURE — 04QL0ZZ REPAIR LEFT FEMORAL ARTERY, OPEN APPROACH: ICD-10-PCS | Performed by: SURGERY

## 2025-05-09 PROCEDURE — 85384 FIBRINOGEN ACTIVITY: CPT | Performed by: INTERNAL MEDICINE

## 2025-05-09 PROCEDURE — 94761 N-INVAS EAR/PLS OXIMETRY MLT: CPT

## 2025-05-09 PROCEDURE — P9040 RBC LEUKOREDUCED IRRADIATED: HCPCS

## 2025-05-09 PROCEDURE — 25010000002 HYDRALAZINE PER 20 MG: Performed by: NURSE PRACTITIONER

## 2025-05-09 PROCEDURE — 0JC80ZZ EXTIRPATION OF MATTER FROM ABDOMEN SUBCUTANEOUS TISSUE AND FASCIA, OPEN APPROACH: ICD-10-PCS | Performed by: SURGERY

## 2025-05-09 PROCEDURE — 25010000002 ALTEPLASE 2 MG RECONSTITUTED SOLUTION 1 EACH VIAL: Performed by: INTERNAL MEDICINE

## 2025-05-09 DEVICE — SEAL HEMO SURG ARISTA/AH ABS/PWDR 5GM: Type: IMPLANTABLE DEVICE | Site: GROIN | Status: FUNCTIONAL

## 2025-05-09 DEVICE — LIGACLIP MCA MULTIPLE CLIP APPLIERS, 20 MEDIUM CLIPS
Type: IMPLANTABLE DEVICE | Site: GROIN | Status: FUNCTIONAL
Brand: LIGACLIP

## 2025-05-09 RX ORDER — AMINO AC/PROTEIN HYDR/WHEY PRO 10G-100/30
45 LIQUID (ML) ORAL 2 TIMES DAILY
Start: 2025-05-09 | End: 2025-05-22 | Stop reason: HOSPADM

## 2025-05-09 RX ORDER — NYSTATIN 100000 [USP'U]/ML
5 SUSPENSION ORAL 4 TIMES DAILY
Start: 2025-05-09 | End: 2025-05-22 | Stop reason: HOSPADM

## 2025-05-09 RX ORDER — GUAR GUM
2 PACKET (EA) ORAL DAILY
Start: 2025-05-09 | End: 2025-05-22 | Stop reason: HOSPADM

## 2025-05-09 RX ORDER — DEXMEDETOMIDINE HYDROCHLORIDE 4 UG/ML
.2-1.5 INJECTION, SOLUTION INTRAVENOUS
Start: 2025-05-09 | End: 2025-05-22 | Stop reason: HOSPADM

## 2025-05-09 RX ORDER — FENTANYL CITRATE 50 UG/ML
25 INJECTION, SOLUTION INTRAMUSCULAR; INTRAVENOUS ONCE
Refills: 0 | Status: COMPLETED | OUTPATIENT
Start: 2025-05-09 | End: 2025-05-09

## 2025-05-09 RX ORDER — IOPAMIDOL 755 MG/ML
100 INJECTION, SOLUTION INTRAVASCULAR
Status: COMPLETED | OUTPATIENT
Start: 2025-05-09 | End: 2025-05-09

## 2025-05-09 RX ORDER — PANTOPRAZOLE SODIUM 40 MG/10ML
40 INJECTION, POWDER, LYOPHILIZED, FOR SOLUTION INTRAVENOUS
Start: 2025-05-09 | End: 2025-05-22 | Stop reason: HOSPADM

## 2025-05-09 RX ORDER — NOREPINEPHRINE BITARTRATE 0.03 MG/ML
.02-.3 INJECTION, SOLUTION INTRAVENOUS
Status: DISCONTINUED | OUTPATIENT
Start: 2025-05-09 | End: 2025-05-11

## 2025-05-09 RX ORDER — FENTANYL CITRATE 50 UG/ML
25 INJECTION, SOLUTION INTRAMUSCULAR; INTRAVENOUS ONCE
Refills: 0 | Status: DISCONTINUED | OUTPATIENT
Start: 2025-05-09 | End: 2025-05-09 | Stop reason: SDUPTHER

## 2025-05-09 RX ORDER — WATER 10 ML/10ML
INJECTION INTRAMUSCULAR; INTRAVENOUS; SUBCUTANEOUS
Status: COMPLETED
Start: 2025-05-09 | End: 2025-05-09

## 2025-05-09 RX ORDER — HYDRALAZINE HYDROCHLORIDE 25 MG/1
25 TABLET, FILM COATED ORAL EVERY 8 HOURS SCHEDULED
Start: 2025-05-09 | End: 2025-05-22 | Stop reason: HOSPADM

## 2025-05-09 RX ORDER — POTASSIUM CHLORIDE 1.5 G/1.58G
20 POWDER, FOR SOLUTION ORAL ONCE
Status: COMPLETED | OUTPATIENT
Start: 2025-05-09 | End: 2025-05-09

## 2025-05-09 RX ORDER — CLONIDINE HYDROCHLORIDE 0.1 MG/1
0.1 TABLET ORAL EVERY 8 HOURS SCHEDULED
Start: 2025-05-09 | End: 2025-05-22 | Stop reason: HOSPADM

## 2025-05-09 RX ORDER — SODIUM CHLORIDE, SODIUM LACTATE, POTASSIUM CHLORIDE, CALCIUM CHLORIDE 600; 310; 30; 20 MG/100ML; MG/100ML; MG/100ML; MG/100ML
INJECTION, SOLUTION INTRAVENOUS CONTINUOUS PRN
Status: DISCONTINUED | OUTPATIENT
Start: 2025-05-09 | End: 2025-05-09 | Stop reason: SURG

## 2025-05-09 RX ORDER — NITROGLYCERIN 0.1MG/HR
1 PATCH, TRANSDERMAL 24 HOURS TRANSDERMAL DAILY
Start: 2025-05-09 | End: 2025-05-22 | Stop reason: HOSPADM

## 2025-05-09 RX ORDER — SODIUM CHLORIDE 9 MG/ML
INJECTION, SOLUTION INTRAVENOUS AS NEEDED
Status: DISCONTINUED | OUTPATIENT
Start: 2025-05-09 | End: 2025-05-09 | Stop reason: HOSPADM

## 2025-05-09 RX ORDER — ALBUTEROL SULFATE 0.83 MG/ML
2.5 SOLUTION RESPIRATORY (INHALATION) EVERY 4 HOURS PRN
Status: ON HOLD
Start: 2025-05-09

## 2025-05-09 RX ORDER — FENTANYL CITRATE 50 UG/ML
25 INJECTION, SOLUTION INTRAMUSCULAR; INTRAVENOUS
Refills: 0 | Status: DISCONTINUED | OUTPATIENT
Start: 2025-05-09 | End: 2025-05-13

## 2025-05-09 RX ORDER — ALBUMIN (HUMAN) 12.5 G/50ML
25 SOLUTION INTRAVENOUS ONCE
Status: COMPLETED | OUTPATIENT
Start: 2025-05-09 | End: 2025-05-09

## 2025-05-09 RX ORDER — AMIODARONE HYDROCHLORIDE 200 MG/1
200 TABLET ORAL EVERY 12 HOURS SCHEDULED
Start: 2025-05-09 | End: 2025-05-22 | Stop reason: HOSPADM

## 2025-05-09 RX ORDER — ROCURONIUM BROMIDE 10 MG/ML
INJECTION, SOLUTION INTRAVENOUS AS NEEDED
Status: DISCONTINUED | OUTPATIENT
Start: 2025-05-09 | End: 2025-05-09 | Stop reason: SURG

## 2025-05-09 RX ORDER — HYDRALAZINE HYDROCHLORIDE 20 MG/ML
20 INJECTION INTRAMUSCULAR; INTRAVENOUS EVERY 4 HOURS PRN
Status: DISCONTINUED | OUTPATIENT
Start: 2025-05-09 | End: 2025-05-19

## 2025-05-09 RX ORDER — HEPARIN SODIUM 5000 [USP'U]/ML
5000 INJECTION, SOLUTION INTRAVENOUS; SUBCUTANEOUS EVERY 8 HOURS SCHEDULED
Status: DISCONTINUED | OUTPATIENT
Start: 2025-05-09 | End: 2025-05-22 | Stop reason: HOSPADM

## 2025-05-09 RX ADMIN — DEXMEDETOMIDINE HYDROCHLORIDE 1.5 MCG/KG/HR: 4 INJECTION, SOLUTION INTRAVENOUS at 01:42

## 2025-05-09 RX ADMIN — Medication 45 ML: at 08:45

## 2025-05-09 RX ADMIN — ALBUMIN (HUMAN) 25 G: 0.25 INJECTION, SOLUTION INTRAVENOUS at 09:05

## 2025-05-09 RX ADMIN — HEPARIN SODIUM 2000 UNITS: 1000 INJECTION INTRAVENOUS; SUBCUTANEOUS at 08:36

## 2025-05-09 RX ADMIN — FENTANYL CITRATE 25 MCG: 50 INJECTION, SOLUTION INTRAMUSCULAR; INTRAVENOUS at 21:00

## 2025-05-09 RX ADMIN — WATER 10 ML: 1 INJECTION INTRAMUSCULAR; INTRAVENOUS; SUBCUTANEOUS at 13:07

## 2025-05-09 RX ADMIN — HYDRALAZINE HYDROCHLORIDE 25 MG: 25 TABLET ORAL at 06:50

## 2025-05-09 RX ADMIN — CLOPIDOGREL BISULFATE 75 MG: 75 TABLET, FILM COATED ORAL at 08:45

## 2025-05-09 RX ADMIN — DEXMEDETOMIDINE HYDROCHLORIDE 1 MCG/KG/HR: 4 INJECTION, SOLUTION INTRAVENOUS at 17:56

## 2025-05-09 RX ADMIN — PANTOPRAZOLE SODIUM 40 MG: 40 INJECTION, POWDER, FOR SOLUTION INTRAVENOUS at 08:45

## 2025-05-09 RX ADMIN — ROCURONIUM BROMIDE 50 MG: 10 INJECTION INTRAVENOUS at 21:44

## 2025-05-09 RX ADMIN — MUPIROCIN 1 APPLICATION: 20 OINTMENT TOPICAL at 08:45

## 2025-05-09 RX ADMIN — HYDRALAZINE HYDROCHLORIDE 20 MG: 20 INJECTION INTRAMUSCULAR; INTRAVENOUS at 01:42

## 2025-05-09 RX ADMIN — DEXMEDETOMIDINE HYDROCHLORIDE 1 MCG/KG/HR: 4 INJECTION, SOLUTION INTRAVENOUS at 05:15

## 2025-05-09 RX ADMIN — SODIUM CHLORIDE, POTASSIUM CHLORIDE, SODIUM LACTATE AND CALCIUM CHLORIDE: 600; 310; 30; 20 INJECTION, SOLUTION INTRAVENOUS at 21:40

## 2025-05-09 RX ADMIN — NYSTATIN 500000 UNITS: 100000 SUSPENSION ORAL at 17:27

## 2025-05-09 RX ADMIN — ALTEPLASE: 2.2 INJECTION, POWDER, LYOPHILIZED, FOR SOLUTION INTRAVENOUS at 13:08

## 2025-05-09 RX ADMIN — NOREPINEPHRINE BITARTRATE 0.02 MCG/KG/MIN: 0.03 INJECTION, SOLUTION INTRAVENOUS at 09:00

## 2025-05-09 RX ADMIN — CLONIDINE HYDROCHLORIDE 0.1 MG: 0.1 TABLET ORAL at 14:57

## 2025-05-09 RX ADMIN — Medication 2 PACKET: at 08:44

## 2025-05-09 RX ADMIN — ALTEPLASE: 2.2 INJECTION, POWDER, LYOPHILIZED, FOR SOLUTION INTRAVENOUS at 13:09

## 2025-05-09 RX ADMIN — HYDRALAZINE HYDROCHLORIDE 20 MG: 20 INJECTION INTRAMUSCULAR; INTRAVENOUS at 23:01

## 2025-05-09 RX ADMIN — CLONIDINE HYDROCHLORIDE 0.1 MG: 0.1 TABLET ORAL at 06:23

## 2025-05-09 RX ADMIN — NYSTATIN 500000 UNITS: 100000 SUSPENSION ORAL at 12:51

## 2025-05-09 RX ADMIN — DEXMEDETOMIDINE HYDROCHLORIDE 1 MCG/KG/HR: 4 INJECTION, SOLUTION INTRAVENOUS at 12:51

## 2025-05-09 RX ADMIN — IOPAMIDOL 82 ML: 755 INJECTION, SOLUTION INTRAVENOUS at 20:13

## 2025-05-09 RX ADMIN — POTASSIUM CHLORIDE 20 MEQ: 1.5 POWDER, FOR SOLUTION ORAL at 06:50

## 2025-05-09 RX ADMIN — DEXMEDETOMIDINE HYDROCHLORIDE 1 MCG/KG/HR: 4 INJECTION, SOLUTION INTRAVENOUS at 10:27

## 2025-05-09 RX ADMIN — NOREPINEPHRINE BITARTRATE 0.05 MCG/KG/MIN: 1 INJECTION, SOLUTION, CONCENTRATE INTRAVENOUS at 22:04

## 2025-05-09 RX ADMIN — DEXMEDETOMIDINE HYDROCHLORIDE 1 MCG/KG/HR: 4 INJECTION, SOLUTION INTRAVENOUS at 23:03

## 2025-05-09 RX ADMIN — HYDRALAZINE HYDROCHLORIDE 25 MG: 25 TABLET ORAL at 14:57

## 2025-05-09 RX ADMIN — NITROGLYCERIN 1 PATCH: 0.1 PATCH TRANSDERMAL at 08:44

## 2025-05-09 RX ADMIN — SODIUM CHLORIDE 10 MG/HR: 9 INJECTION, SOLUTION INTRAVENOUS at 22:55

## 2025-05-09 RX ADMIN — AMIODARONE HYDROCHLORIDE 200 MG: 200 TABLET ORAL at 08:44

## 2025-05-09 RX ADMIN — NYSTATIN 500000 UNITS: 100000 SUSPENSION ORAL at 08:43

## 2025-05-09 RX ADMIN — ASPIRIN 81 MG 81 MG: 81 TABLET ORAL at 08:46

## 2025-05-09 RX ADMIN — CEFTRIAXONE 2000 MG: 2 INJECTION, POWDER, FOR SOLUTION INTRAMUSCULAR; INTRAVENOUS at 21:50

## 2025-05-09 RX ADMIN — MICONAZOLE NITRATE 1 APPLICATION: 20 POWDER TOPICAL at 08:45

## 2025-05-09 NOTE — PLAN OF CARE
Goal Outcome Evaluation:      Has been vented throughout. Hematoma in left groin with bruising. Firm but small. Have been watching this . At 1700 groin became very firm bruised and edematous. Have been in  touch with cardiology and their PA. Dr. Blanco in to see and ordered ultrasound of left groin. Distal pulses intact. H/H dropped and planning  to get blood. Intensive MD and nurse prac at bedside.

## 2025-05-09 NOTE — PROGRESS NOTES
"   LOS: 4 days    Patient Care Team:  Sergio Randall MD as PCP - General (Cardiology)    Subjective     Stable overnight    Objective     Vital Signs:  Blood pressure 131/64, pulse 117, temperature 97.8 °F (36.6 °C), temperature source Axillary, resp. rate 18, height 160 cm (62.99\"), SpO2 98%.      Intake/Output Summary (Last 24 hours) at 5/9/2025 1223  Last data filed at 5/9/2025 1146  Gross per 24 hour   Intake 1600.97 ml   Output 3410 ml   Net -1809.03 ml        05/08 0701 - 05/09 0700  In: 1351 [I.V.:420]  Out: 50 [Urine:50]    Physical Exam:        GENERAL: WDWF NAD  NEURO: Sedate on vent  PSYCHIATRIC: Unable to evaluate  EYE: PE, no icterus, no conjunctivitis  ENT: OMM dry, + trach  NECK: Supple , No JVD discernable,  Trachea midline  CV: Trace edema, RRR  LUNGS:  Quiet,  Nonlabored resp.  Symmetrical expansion  ABDOMEN: + PEG, nondistended  : No Pereira, no palp bladder  SKIN: Warm and dry without rash      Labs:  Results from last 7 days   Lab Units 05/09/25  0329 05/08/25  0421 05/07/25  0348   WBC 10*3/mm3 11.60* 9.01 13.11*   HEMOGLOBIN g/dL 8.5* 8.4* 9.8*   PLATELETS 10*3/mm3 285 255 306     Results from last 7 days   Lab Units 05/09/25  0329 05/08/25  0421 05/07/25  0348 05/06/25  0644 05/05/25  1440 05/05/25  0143 05/04/25  0235 05/03/25  0307   SODIUM mmol/L 136 135* 135* 133* 132* 130* 135* 135*   POTASSIUM mmol/L 3.3* 3.7 3.1* 3.4* 3.4* 3.2* 3.3* 3.4*   CHLORIDE mmol/L 102 101 99 95* 93* 93* 95* 94*   CO2 mmol/L 22.0 22.0 24.0 24.0 24.0 24.6 27.8 29.1*   BUN mg/dL 32* 23 17 29* 27* 25* 20 13   CREATININE mg/dL 3.75* 3.47* 2.94* 4.86* 4.51* 4.63* 3.93* 3.03*   CALCIUM mg/dL 8.7 8.8 8.8 8.9 9.5 9.1 9.2 8.1*   PHOSPHORUS mg/dL  --   --   --  4.1 4.1  --   --   --    MAGNESIUM mg/dL  --   --   --  2.0 2.0  --   --   --    ALBUMIN g/dL  --   --   --   --   --  2.1* 2.2* 2.1*     Results from last 7 days   Lab Units 05/05/25  0143   ALK PHOS U/L 112   BILIRUBIN mg/dL 0.2   ALT (SGPT) U/L 9   AST " (SGOT) U/L 11     Results from last 7 days   Lab Units 05/08/25  0326   PH, ARTERIAL pH units 7.406   PO2 ART mm Hg 103.0   PCO2, ARTERIAL mm Hg 36.6   HCO3 ART mmol/L 23.0               Estimated Creatinine Clearance: 13 mL/min (A) (by C-G formula based on SCr of 3.75 mg/dL (H)).         A/P:    End-stage renal disease     - On HD since 4/2023 .  V-fib arrest - 100% RCA obstruction.   Respiratory failure s/p trach  Hyponatremia  Hypokalemia  Anemia  Fluid overload     PLAN:     Patient getting HD this a.m.  Stable for discharge postdialysis.    High risk and complexity patient.         Mulugeta Chávez MD  05/09/25  12:23 EDT

## 2025-05-09 NOTE — DISCHARGE SUMMARY
DISCHARGE SUMMARY       Patient name: Estefania Connor  CSN: [id number]  MRN: [id number]  : 1955    Date of Admission: 2025  Date of Discharge:  2025    Admitting Physician:    Terry Antonio MD     Primary Care Provider: Sergio Randall MD  Consultations:   Arabella Angulo MD Cardiology  Zafar Espana MD Nephrology    Admission Diagnosis:   CAD    Discharge Diagnoses:     CAD (coronary artery disease)    ESRD on HD    Polycystic kidney disease    HTN (hypertension)    Dyslipidemia    AAA (abdominal aortic aneurysm)    Right coronary artery occlusion    Depression    Tracheostomy present    S/P percutaneous endoscopic gastrostomy (PEG) tube placement      Procedures:  Procedure(s):  25 [facility]     Imaging:  XR Chest 1 View  Result Date: 2025  Impression: 1. Stable lines and support tubes. 2. No pneumothorax. 3. Unchanged mixed interstitial and airspace opacities bilaterally which may relate to pulmonary edema. 4. Small bilateral pleural effusions. Electronically Signed: Thaddeus Toledo MD  2025 7:52 AM EDT  Workstation ID: [id number]    Cardiac Catheterization/Vascular Study  Result Date: 2025  Successful PTCA/stenting of the RCA with placement of 3 overlapping drug-eluting stents reducing 95% stenosis to 0%. RECOMMENDATIONS: Dual antiplatelet therapy for at least 1 year. Optimize medical therapy and risk factor management per guidelines. Indications: Status post VF arrest/non-STEMI. Access: Left femoral Procedures: PTCA/stenting of the posterolateral branch of the right coronary artery. Stenting of the mid to distal RCA with overlapping drug-eluting stents due to nonflow limiting dissection. Procedure narrative: The patient is a 69-year-old female with severe stenosis of the posterolateral branch of the RCA and a extremely heavily calcified vessel.  She was recently admitted to [facility] with ACS/VF arrest.  Angiogram was performed and PCI of the posterolateral branch of the  RCA was attempted and was unsuccessful due to failure to deliver the devices.  Subsequently patient's condition decompensated and she was transferred to [facility].  She was brought to the Cath Lab today for planned PCI of the posterolateral branch of the RCA. The patient was brought to the catheterization lab in a fasting condition.  Right femoral access site was prepped and draped in standard sterile fashion.  Lidocaine was injected and arterial access was obtained by percutaneous anterior wall puncture technique.  A 6 Irish arterial sheath was placed.  Heavy calcification and moderate to severe stenosis of the right iliac artery was noted and guidewire could not be advanced.  At this time we proceeded via the left femoral approach.  Angiomax was started.  The right coronary artery was engaged with F R4 guide catheter.  Angiogram revealed diffusely calcified right coronary artery with a 95% stenosis in the large posterolateral branch.  The lesion was crossed with a versa turn wire and a 2.0 mm balloon was advanced and balloon PTCA was performed.  A guide liner was advanced to facilitate balloon delivery.  Subsequently delivery of a 2.5 x 18 mm ELIECER was quite difficult and required deep intubation of the right coronary artery with a guide liner.  Stent was successfully delivered and deployed as well as postdilated with satisfactory results however post stent deployment we noted a nonflow limiting dissection extending from the distal RCA into the more distal segment beyond the crux up to the right PDA.  At this time a 3.0 x 20 mm NC balloon was advanced and expanded in the dissected segment.  The guide liner was further advanced to facilitate delivery of stents.  A 3.5 x 48 mm ELIECER was delivered from mid to distal RCA and a 3.0 x 33 mm ELIECER was delivered from distal RCA to the EBENEZER covering the entire segment with overlapping stents.  All stents were deployed and postdilated with satisfactory results and  no significant residual uncovered dissection and no significant residual stenosis.  Final angiograms were taken and the guide catheter was removed.  BRAXTON flow was grade 3 before and after PCI.  At the conclusion the arterial sheath were secured and the patient was transferred to her room in a stable condition.      XR Chest 1 View  Result Date: 5/7/2025  Impression: Persistent pulmonary vascular congestion, left basilar atelectasis and effusion. Significantly increased right basilar atelectasis +/- effusion. Electronically Signed: Arley Duong MD  5/7/2025 7:12 AM EDT  Workstation ID: [id number]    XR Chest 1 View  Result Date: 5/6/2025  Impression: 1.The tracheostomy tube is noted in the midline. 2.Stable appearance of diffuse interstitial changes throughout the lungs with airspace infiltrates in the mid to lower lungs bilaterally. Small bilateral pleural effusions are noted. 3.The left IJ central line and right dialysis catheter are stable in position. Electronically Signed: Enzo Anna MD  5/6/2025 7:38 AM EDT  Workstation ID: [id number]    XR Chest 1 View  Result Date: 5/5/2025  Impression: Interval decreased pulmonary edema. Similar small right and trace left pleural effusions, with likely adjacent atelectasis. Superimposed infection to be excluded clinically. Support devices as above. Electronically Signed: Sal Seth MD  5/5/2025 3:07 PM EDT  Workstation ID: [id number]    XR Chest 1 View  Result Date: 5/2/2025  1.  Tiny bilateral pleural effusions. 2.  Mild cardiomegaly.  This report was finalized on 5/2/2025 10:28 AM by Dr. Leander Faria MD.      XR Chest 1 View  Result Date: 4/24/2025  1.  Decreased right pleural effusion. 2.  Otherwise stable changes of CHF/edema. 3.  Support devices as above.  This report was finalized on 4/24/2025 3:19 PM by Dr. Edin Peace MD.      US Venous Doppler Upper Extremity Right (duplex)  Result Date: 4/17/2025  1.  No evidence of DVT. 2.  No flow and no compressibility in  the right cephalic vein. This may represent thrombosis or may be due to prior venous cutdown.  This report was finalized on 4/17/2025 7:42 AM by Dr. Delfino Lepe MD.      XR Chest 1 View  Result Date: 4/16/2025  Central line tip in the superior vena cava. No pneumothorax    This report was finalized on 4/16/2025 4:35 PM by Dr. Delfino Lepe MD.      XR Chest 1 View  Result Date: 4/16/2025   1. Bilateral effusions and bilateral airspace disease as well as mild degree of pulmonary congestion    This report was finalized on 4/16/2025 9:57 AM by Dr. Delfino Lepe MD.      XR Chest 1 View  Result Date: 4/12/2025   Enlarged heart size. Coarsened bronchovascular pattern to the lungs. Small right and small left pleural effusion. Mild compressive atelectasis at the lung bases. Satisfactory position of the tubes and lines.   This report was finalized on 4/12/2025 2:58 AM by Modesto Minor MD.        History of Present Illness:  Estefania Connor is a 69 y.o. female who is admitted to [facility] as a transfer from [facility] 05/04/25 for higher level of care in the setting of recent V-fib arrest.      Patient has documented medical diagnoses including ESRD, PKD, HTN, dyslipidemia, AAA, CAD s/p stenting, and depression.      She was additionally recently admitted to [facility] on 3/20/25 with what sounds like volume overload and JOSÉ ANTONIO superimposed on CKD in the setting of missing 2 HD appointments. Additional concerns for pneumonia as well as found to be anemic requiring PRBC transfusion was reportedly evaluated by GI who found no evidence of bleeding. Worsened from a respiratory standpoint with CT showing enlarging pleural effusions despite undergoing thoracentesis (1550 cc fluid removed) ultimately required intubation on 4/10 with transfer to [facility] on 4/11 for for further management.      Patient was unable to wean from mechanical ventilatory support at OSH and ultimately underwent tracheostomy and PEG tube placement on 4/29.  "Deteriorated on Friday (5/2) with V-fib arrest was ultimately resuscitated and taken to Cath Lab where she was found to have 100% occlusion of the RCA. Attempted intervention was unsuccessful and Cardiology (Dr. Angulo) was contacted who recommended transfer to [facility] for repeat PCI attempt.   (End of copied text).     Hospital Course:  Patient admitted to the ICU 2* the above listed problems in the HPI. Underwent LHC on 5/7/25 with 3 overlapping drug-eluting stents. Stenosis reduced from 95% to 0%. Recommendations are 1 year of DAPT and avoidance of anticoagulation due to severe bleeding. LVEF noted to be 41-45% / GDMT added as tolerated however CKD limits this.     Sheaths pulled the following day without incidence. RUE noted to be swollen and repeat RUE duplex showed superficial thrombophlebitis in the cephalic vein.     Nephrology followed. Patient received IHD. Last session was today, 5/9/25 with ~ 3 L removed.     Found to have tracheobronchitis. Cultures grew Klebsiella susceptible to Rocephin. Rocephin initiated on 5/6/25 with plans for 7 day course.    It has been determined by all primary and consulting providers that the patient has reached the maximum benefit of their inpatient stay and is ready for d/c back to [facility]. She is hemodynamically stable.     I would recommend proceeding with aggressive ventilator weaning when she is back at her facility.  She appears to be ready for weaning after PCI.    Vitals:  /64   Pulse 117   Temp 97.8 °F (36.6 °C) (Axillary)   Resp 18   Ht 160 cm (62.99\")   SpO2 98%   BMI 27.11 kg/m²     Physical Exam:                Constitutional:    Drowsy but arousable and a bit restless on ventilator, in no acute distress   Head:    Normocephalic, atraumatic   Eyes:            Lids and lashes normal, conjunctivae and sclerae normal.  PER   ENMT:   Ears appear intact with no abnormalities noted       Lips normal.     Neck:   Tracheostomy in place on mechanical ventilator, no " "JVD   Lungs/Resp:     Normal effort, symmetric chest rise, no crepitus, clear to      auscultation bilaterally.               Heart/CV:    Regular rhythm and normal rate, no murmur   Abdomen/GI:     Soft, nontender, nondistended   :     Deferred   Extremities/MSK:   No clubbing or cyanosis.  Trace bilateral lower extremity edema.  Right upper extremity is swollen.   Pulses:   Pulses palpable and equal bilaterally   Skin:   No bleeding, bruising or rash   Heme/Lymph:   No cervical or supraclavicular adenopathy.   Neurologic:     Psychiatric:      Moves all extremities with no obvious focal motor deficit.  Cranial nerves 2 - 12 grossly intact  Non-agitated, normal affect.    The above physical exam findings were reviewed and reflect my exam findings as of today's exam.   Electronically signed by:  Terry Antonio MD  05/09/25  11:54 EDT     Labs:  Results from last 7 days   Lab Units 05/09/25  0329   WBC 10*3/mm3 11.60*   HEMOGLOBIN g/dL 8.5*   HEMATOCRIT % 27.1*   PLATELETS 10*3/mm3 285     Results from last 7 days   Lab Units 05/09/25  0329 05/05/25  1440 05/05/25  0143   SODIUM mmol/L 136   < > 130*   POTASSIUM mmol/L 3.3*   < > 3.2*   CHLORIDE mmol/L 102   < > 93*   CO2 mmol/L 22.0   < > 24.6   BUN mg/dL 32*   < > 25*   CREATININE mg/dL 3.75*   < > 4.63*   CALCIUM mg/dL 8.7   < > 9.1   BILIRUBIN mg/dL  --   --  0.2   ALK PHOS U/L  --   --  112   ALT (SGPT) U/L  --   --  9   AST (SGOT) U/L  --   --  11   GLUCOSE mg/dL 114*   < > 108*    < > = values in this interval not displayed.         No results found for: \"MG\", \"PHOS\"          PT Outcome Summary:  Progress: declining (05/07/25 1429)  Outcome Evaluation: All EOB/OOB mobility deferred. Pt tolerated bed level ther ex, but did not follow any commands to actively participate. Continue to recommend PT skilled care and D/C to ECF. (05/07/25 1429)    OT Outcome Summary:  Plan of Care Reviewed With: patient (05/07/25 6150)  Progress: no change (05/07/25 " 1201)  Outcome Evaluation: Pt presents below her functional baseline with deficits including generalized weakness, impaired cognition, decreased activity tolerance limiting independence with ADLs and mobility warranting OT services. Bed level evaluation completed due to current deficits, vent, and poor command following. Rec ECF at [facility]. (05/07/25 1206)      Discharge Medications:     Discharge Medications        New Medications        Instructions Start Date   albuterol (2.5 MG/3ML) 0.083% nebulizer solution  Commonly known as: PROVENTIL   2.5 mg, Nebulization, Every 4 Hours PRN      amiodarone 200 MG tablet  Commonly known as: PACERONE   200 mg, Per G Tube, Every 12 Hours Scheduled      cefTRIAXone 2,000 mg in sodium chloride 0.9 % 100 mL IVPB   2,000 mg, Intravenous, Every 24 Hours      cloNIDine 0.1 MG tablet  Commonly known as: CATAPRES   0.1 mg, Per G Tube, Every 8 Hours Scheduled      dexmedetomidine 400 MCG/100ML solution infusion  Commonly known as: PRECEDEX   0.2-1.5 mcg/kg/hr (13..1 mcg/hr), Intravenous, Titrated      miconazole 2 % powder  Commonly known as: MICOTIN   1 Application, Topical, Every 12 Hours Scheduled      nitroglycerin 0.1 MG/HR patch  Commonly known as: NITRODUR   1 patch, Transdermal, Daily      Nutrisource fiber pack pack   2 packets, Per G Tube, Daily      nystatin 100,000 unit/mL suspension  Commonly known as: MYCOSTATIN   500,000 Units, Swish & Spit, 4 Times Daily      pantoprazole 40 MG injection  Commonly known as: PROTONIX   40 mg, Intravenous, Every 24 Hours Scheduled      ProSource TF liquid oral liquid   45 mL, Per G Tube, 2 Times Daily             Changes to Medications        Instructions Start Date   hydrALAZINE 25 MG tablet  Commonly known as: APRESOLINE  What changed:   medication strength  how much to take  how to take this  when to take this   25 mg, Per G Tube, Every 8 Hours Scheduled             Continue These Medications        Instructions Start Date   aspirin  81 MG EC tablet   81 mg, Oral, Daily      atorvastatin 40 MG tablet  Commonly known as: LIPITOR   40 mg, Per G Tube, Nightly      clopidogrel 75 MG tablet  Commonly known as: PLAVIX   75 mg, Oral, Daily      ezetimibe 10 MG tablet  Commonly known as: ZETIA   10 mg, Daily      fenofibrate 54 MG tablet  Commonly known as: TRICOR   54 mg, Daily             Stop These Medications      amLODIPine 10 MG tablet  Commonly known as: NORVASC     bumetanide 2 MG tablet  Commonly known as: BUMEX     busPIRone 10 MG tablet  Commonly known as: BUSPAR     calcitriol 0.25 MCG capsule  Commonly known as: ROCALTROL     carvedilol 25 MG tablet  Commonly known as: COREG     doxazosin 2 MG tablet  Commonly known as: CARDURA     Eliquis 2.5 MG tablet tablet  Generic drug: apixaban     epoetin vera-epbx 3000 UNIT/ML injection  Commonly known as: RETACRIT     hydrOXYzine 25 MG tablet  Commonly known as: ATARAX     isosorbide mononitrate 60 MG 24 hr tablet  Commonly known as: IMDUR     sertraline 25 MG tablet  Commonly known as: ZOLOFT              Discharge Diet:   Diet Instructions       Diet: Nothing By Mouth      Discharge Diet: Nothing By Mouth          Tube Feeding: Formula: Novasource Renal (Electrolyte Restricted); Feeding Type: Continuous; Start at: 35 mL/hr; Then Advance By: Do Not Advance; Water Flush: 20 mL; Every: 1 hour; Water Bolus: None     Activity at Discharge:    Activity Instructions       Activity as Tolerated              Follow-up Appointments  No future appointments.  Additional Instructions for the Follow-ups that You Need to Schedule       Discharge Follow-up with PCP   As directed       Currently Documented PCP:    Sergio Randall MD    PCP Phone Number:    [phone]     Follow Up Details: 1 week post-discharge        Discharge Follow-up with Specialty: Cardiology; 1 Month   As directed      Specialty: Cardiology   Follow Up: 1 Month                Discharge Instructions:  D/c to [facility]  Medications as  above  Follow-ups as above  Further d/c instructions as above  Please call 911 or proceed to nearest ED if symptoms worsen or reoccur     Current Code Status       Date Active Code Status Order ID Comments User Context       5/6/2025 1629 CPR (Attempt to Resuscitate) [id number]  Terry Antonio MD Inpatient        Question Answer    Code Status (Patient has no pulse and is not breathing) CPR (Attempt to Resuscitate)    Medical Interventions (Patient has pulse or is breathing) Full Support                  Inder Brody. MSN, APRN, [facility]  Pulmonary and Critical Care Medicine  05/09/25     I performed an independent history and physical examination. Portions of the history were obtained by APRN and were modified by me according to my findings. The above note reflects my findings, assessment, and plan.    Terry Antonio MD    Please see my discharge day progress note for additional details.  I spent 35 minutes on discharge.    Electronically signed by:  Terry Antonio MD  05/09/25  12:02 EDT      CC: Sergio Randall MD    Please note that portions of this note were completed with a voice recognition program.

## 2025-05-09 NOTE — DISCHARGE PLACEMENT REQUEST
"Miranda Connor (69 y.o. Female)       Date of Birth   1955    Social Security Number       Address   [address] [facility] [address]    Home Phone   [phone]    MRN   [id number]       Adventism   Unknown    Marital Status                               Admission Date   5/5/2025    Admission Type   Urgent    Admitting Provider   Case, Latesha FAY DO    Attending Provider   Terry Antonio MD    Department, Room/Bed   [address], S257/1       Discharge Date       Discharge Disposition   Long Term Care (DC - External)    Discharge Destination                                 Attending Provider: Terry Antonio MD    Allergies: No Known Allergies    Isolation: None   Infection: None   Code Status: CPR    Ht: 160 cm (62.99\")   Wt: 69.4 kg (153 lb)    Admission Cmt: None   Principal Problem: CAD (coronary artery disease) [I25.10]                   Active Insurance as of 5/5/2025       Primary Coverage       Payor Plan Insurance Group Employer/Plan Group    MEDICARE MEDICARE A & B        Payor Plan Address Payor Plan Phone Number Payor Plan Fax Number Effective Dates    [address] [phone]  6/1/2019 - None Entered    [facility] [address]         Subscriber Name Subscriber Birth Date Member ID       MIRANDA CONNOR 1955 [id number]               Secondary Coverage       Payor Plan Insurance Group Employer/Plan Group    CONTINUECARE - ([place] ONLY) CONTINUECARE        Payor Plan Address Payor Plan Phone Number Payor Plan Fax Number Effective Dates    [address] [address]   4/11/2025 - None Entered    [facility] [address]         Subscriber Name Subscriber Birth Date Member ID       MIRANDA CONNOR 1955 [id number]               Tertiary Coverage       Payor Plan Insurance Group Employer/Plan Group    [place]        Payor Plan Address Payor Plan Phone Number Payor Plan Fax Number Effective Dates    [address] [phone]  4/11/2025 - None " Entered    [facility][1] [address]         Subscriber Name Subscriber Birth Date Member ID       MIRANDA CONNOR 1955 [id number]                     Emergency Contacts        (Rel.) Home Phone Work Phone Mobile Phone    reid brannonn (Spouse) -- -- [phone]              Insurance Information                  MEDICARE/MEDICARE A & B Phone: [phone]    Subscriber: Miranda Connor Subscriber#: [id number]    Group#: -- Precert#: --    Authorization#: -- Effective Date: --        CONTINUECARE - ([place] ONLY)/CONTINUECARE Phone: --    Subscriber: Miranda Connor Subscriber#: [id number]    Group#: -- Precert#: --    Authorization#: -- Effective Date: --        [place] Phone: [phone]    Subscriber: Miranda Connor Subscriber#: [id number]    Group#: -- Precert#: --    Authorization#: -- Effective Date: --               Discharge Summary        Terry Antonio MD at 25 0711            DISCHARGE SUMMARY       Patient name: Miranda Connor  CSN: [id number]  MRN: [id number]  : 1955    Date of Admission: 2025  Date of Discharge:  2025    Admitting Physician:    Terry Antonio MD     Primary Care Provider: Sergio Randall MD  Consultations:   Arabella Angulo MD Cardiology  Zafar Espana MD Nephrology    Admission Diagnosis:   CAD    Discharge Diagnoses:     CAD (coronary artery disease)    ESRD on HD    Polycystic kidney disease    HTN (hypertension)    Dyslipidemia    AAA (abdominal aortic aneurysm)    Right coronary artery occlusion    Depression    Tracheostomy present    S/P percutaneous endoscopic gastrostomy (PEG) tube placement      Procedures:  Procedure(s):  25 [facility]     Imaging:  XR Chest 1 View  Result Date: 2025  Impression: 1. Stable lines and support tubes. 2. No pneumothorax. 3. Unchanged mixed interstitial and airspace opacities bilaterally which may relate to pulmonary edema. 4. Small bilateral pleural effusions.  Electronically Signed: Thaddeus Toledo MD  5/8/2025 7:52 AM EDT  Workstation ID: [id number]    Cardiac Catheterization/Vascular Study  Result Date: 5/7/2025  Successful PTCA/stenting of the RCA with placement of 3 overlapping drug-eluting stents reducing 95% stenosis to 0%. RECOMMENDATIONS: Dual antiplatelet therapy for at least 1 year. Optimize medical therapy and risk factor management per guidelines. Indications: Status post VF arrest/non-STEMI. Access: Left femoral Procedures: PTCA/stenting of the posterolateral branch of the right coronary artery. Stenting of the mid to distal RCA with overlapping drug-eluting stents due to nonflow limiting dissection. Procedure narrative: The patient is a 69-year-old female with severe stenosis of the posterolateral branch of the RCA and a extremely heavily calcified vessel.  She was recently admitted to [facility] with ACS/VF arrest.  Angiogram was performed and PCI of the posterolateral branch of the RCA was attempted and was unsuccessful due to failure to deliver the devices.  Subsequently patient's condition decompensated and she was transferred to [facility].  She was brought to the Cath Lab today for planned PCI of the posterolateral branch of the RCA. The patient was brought to the catheterization lab in a fasting condition.  Right femoral access site was prepped and draped in standard sterile fashion.  Lidocaine was injected and arterial access was obtained by percutaneous anterior wall puncture technique.  A 6 Hebrew arterial sheath was placed.  Heavy calcification and moderate to severe stenosis of the right iliac artery was noted and guidewire could not be advanced.  At this time we proceeded via the left femoral approach.  Angiomax was started.  The right coronary artery was engaged with F R4 guide catheter.  Angiogram revealed diffusely calcified right coronary artery with a 95% stenosis in the large posterolateral branch.  The lesion was crossed  with a versa turn wire and a 2.0 mm balloon was advanced and balloon PTCA was performed.  A guide liner was advanced to facilitate balloon delivery.  Subsequently delivery of a 2.5 x 18 mm ELIECER was quite difficult and required deep intubation of the right coronary artery with a guide liner.  Stent was successfully delivered and deployed as well as postdilated with satisfactory results however post stent deployment we noted a nonflow limiting dissection extending from the distal RCA into the more distal segment beyond the crux up to the right PDA.  At this time a 3.0 x 20 mm NC balloon was advanced and expanded in the dissected segment.  The guide liner was further advanced to facilitate delivery of stents.  A 3.5 x 48 mm ELIECER was delivered from mid to distal RCA and a 3.0 x 33 mm ELIECER was delivered from distal RCA to the EBENEZER covering the entire segment with overlapping stents.  All stents were deployed and postdilated with satisfactory results and no significant residual uncovered dissection and no significant residual stenosis.  Final angiograms were taken and the guide catheter was removed.  BRAXTON flow was grade 3 before and after PCI.  At the conclusion the arterial sheath were secured and the patient was transferred to her room in a stable condition.      XR Chest 1 View  Result Date: 5/7/2025  Impression: Persistent pulmonary vascular congestion, left basilar atelectasis and effusion. Significantly increased right basilar atelectasis +/- effusion. Electronically Signed: Arley Duong MD  5/7/2025 7:12 AM EDT  Workstation ID: [id number]    XR Chest 1 View  Result Date: 5/6/2025  Impression: 1.The tracheostomy tube is noted in the midline. 2.Stable appearance of diffuse interstitial changes throughout the lungs with airspace infiltrates in the mid to lower lungs bilaterally. Small bilateral pleural effusions are noted. 3.The left IJ central line and right dialysis catheter are stable in position. Electronically Signed:  Enzo Anna MD  5/6/2025 7:38 AM EDT  Workstation ID: [id number]    XR Chest 1 View  Result Date: 5/5/2025  Impression: Interval decreased pulmonary edema. Similar small right and trace left pleural effusions, with likely adjacent atelectasis. Superimposed infection to be excluded clinically. Support devices as above. Electronically Signed: Sal Seth MD  5/5/2025 3:07 PM EDT  Workstation ID: [id number]    XR Chest 1 View  Result Date: 5/2/2025  1.  Tiny bilateral pleural effusions. 2.  Mild cardiomegaly.  This report was finalized on 5/2/2025 10:28 AM by Dr. Leander Faria MD.      XR Chest 1 View  Result Date: 4/24/2025  1.  Decreased right pleural effusion. 2.  Otherwise stable changes of CHF/edema. 3.  Support devices as above.  This report was finalized on 4/24/2025 3:19 PM by Dr. Edin Peace MD.      US Venous Doppler Upper Extremity Right (duplex)  Result Date: 4/17/2025  1.  No evidence of DVT. 2.  No flow and no compressibility in the right cephalic vein. This may represent thrombosis or may be due to prior venous cutdown.  This report was finalized on 4/17/2025 7:42 AM by Dr. Delfino Lepe MD.      XR Chest 1 View  Result Date: 4/16/2025  Central line tip in the superior vena cava. No pneumothorax    This report was finalized on 4/16/2025 4:35 PM by Dr. Delfino Lepe MD.      XR Chest 1 View  Result Date: 4/16/2025   1. Bilateral effusions and bilateral airspace disease as well as mild degree of pulmonary congestion    This report was finalized on 4/16/2025 9:57 AM by Dr. Delfino Lepe MD.      XR Chest 1 View  Result Date: 4/12/2025   Enlarged heart size. Coarsened bronchovascular pattern to the lungs. Small right and small left pleural effusion. Mild compressive atelectasis at the lung bases. Satisfactory position of the tubes and lines.   This report was finalized on 4/12/2025 2:58 AM by Modesto Minor MD.        History of Present Illness:  Estefania Connor is a 69 y.o. female who is admitted to  [facility] as a transfer from [facility] 05/04/25 for higher level of care in the setting of recent V-fib arrest.      Patient has documented medical diagnoses including ESRD, PKD, HTN, dyslipidemia, AAA, CAD s/p stenting, and depression.      She was additionally recently admitted to [facility] on 3/20/25 with what sounds like volume overload and JOSÉ ANTONIO superimposed on CKD in the setting of missing 2 HD appointments. Additional concerns for pneumonia as well as found to be anemic requiring PRBC transfusion was reportedly evaluated by GI who found no evidence of bleeding. Worsened from a respiratory standpoint with CT showing enlarging pleural effusions despite undergoing thoracentesis (1550 cc fluid removed) ultimately required intubation on 4/10 with transfer to [facility] on 4/11 for for further management.      Patient was unable to wean from mechanical ventilatory support at OSH and ultimately underwent tracheostomy and PEG tube placement on 4/29. Deteriorated on Friday (5/2) with V-fib arrest was ultimately resuscitated and taken to Cath Lab where she was found to have 100% occlusion of the RCA. Attempted intervention was unsuccessful and Cardiology (Dr. Angulo) was contacted who recommended transfer to [facility] for repeat PCI attempt.   (End of copied text).     Hospital Course:  Patient admitted to the ICU 2* the above listed problems in the HPI. Underwent LHC on 5/7/25 with 3 overlapping drug-eluting stents. Stenosis reduced from 95% to 0%. Recommendations are 1 year of DAPT and avoidance of anticoagulation due to severe bleeding. LVEF noted to be 41-45% / GDMT added as tolerated however CKD limits this.     Sheaths pulled the following day without incidence. RUE noted to be swollen and repeat RUE duplex showed superficial thrombophlebitis in the cephalic vein.     Nephrology followed. Patient received IHD. Last session was today, 5/9/25 with ~ 3 L removed.     Found to have tracheobronchitis. Cultures grew Klebsiella  "susceptible to Rocephin. Rocephin initiated on 5/6/25 with plans for 7 day course.    It has been determined by all primary and consulting providers that the patient has reached the maximum benefit of their inpatient stay and is ready for d/c back to [facility]. She is hemodynamically stable.     I would recommend proceeding with aggressive ventilator weaning when she is back at her facility.  She appears to be ready for weaning after PCI.    Vitals:  /64   Pulse 117   Temp 97.8 °F (36.6 °C) (Axillary)   Resp 18   Ht 160 cm (62.99\")   SpO2 98%   BMI 27.11 kg/m²     Physical Exam:                Constitutional:    Drowsy but arousable and a bit restless on ventilator, in no acute distress   Head:    Normocephalic, atraumatic   Eyes:            Lids and lashes normal, conjunctivae and sclerae normal.  PER   ENMT:   Ears appear intact with no abnormalities noted       Lips normal.     Neck:   Tracheostomy in place on mechanical ventilator, no JVD   Lungs/Resp:     Normal effort, symmetric chest rise, no crepitus, clear to      auscultation bilaterally.               Heart/CV:    Regular rhythm and normal rate, no murmur   Abdomen/GI:     Soft, nontender, nondistended   :     Deferred   Extremities/MSK:   No clubbing or cyanosis.  Trace bilateral lower extremity edema.  Right upper extremity is swollen.   Pulses:   Pulses palpable and equal bilaterally   Skin:   No bleeding, bruising or rash   Heme/Lymph:   No cervical or supraclavicular adenopathy.   Neurologic:     Psychiatric:      Moves all extremities with no obvious focal motor deficit.  Cranial nerves 2 - 12 grossly intact  Non-agitated, normal affect.    The above physical exam findings were reviewed and reflect my exam findings as of today's exam.   Electronically signed by:  Terry Antonio MD  05/09/25  11:54 EDT     Labs:  Results from last 7 days   Lab Units 05/09/25  0329   WBC 10*3/mm3 11.60*   HEMOGLOBIN g/dL 8.5*   HEMATOCRIT % 27.1* " "  PLATELETS 10*3/mm3 285     Results from last 7 days   Lab Units 05/09/25  0329 05/05/25  1440 05/05/25  0143   SODIUM mmol/L 136   < > 130*   POTASSIUM mmol/L 3.3*   < > 3.2*   CHLORIDE mmol/L 102   < > 93*   CO2 mmol/L 22.0   < > 24.6   BUN mg/dL 32*   < > 25*   CREATININE mg/dL 3.75*   < > 4.63*   CALCIUM mg/dL 8.7   < > 9.1   BILIRUBIN mg/dL  --   --  0.2   ALK PHOS U/L  --   --  112   ALT (SGPT) U/L  --   --  9   AST (SGOT) U/L  --   --  11   GLUCOSE mg/dL 114*   < > 108*    < > = values in this interval not displayed.         No results found for: \"MG\", \"PHOS\"          PT Outcome Summary:  Progress: declining (05/07/25 1429)  Outcome Evaluation: All EOB/OOB mobility deferred. Pt tolerated bed level ther ex, but did not follow any commands to actively participate. Continue to recommend PT skilled care and D/C to ECF. (05/07/25 1429)    OT Outcome Summary:  Plan of Care Reviewed With: patient (05/07/25 1206)  Progress: no change (05/07/25 1206)  Outcome Evaluation: Pt presents below her functional baseline with deficits including generalized weakness, impaired cognition, decreased activity tolerance limiting independence with ADLs and mobility warranting OT services. Bed level evaluation completed due to current deficits, vent, and poor command following. Rec ECF at [facility]. (05/07/25 1206)      Discharge Medications:     Discharge Medications        New Medications        Instructions Start Date   albuterol (2.5 MG/3ML) 0.083% nebulizer solution  Commonly known as: PROVENTIL   2.5 mg, Nebulization, Every 4 Hours PRN      amiodarone 200 MG tablet  Commonly known as: PACERONE   200 mg, Per G Tube, Every 12 Hours Scheduled      cefTRIAXone 2,000 mg in sodium chloride 0.9 % 100 mL IVPB   2,000 mg, Intravenous, Every 24 Hours      cloNIDine 0.1 MG tablet  Commonly known as: CATAPRES   0.1 mg, Per G Tube, Every 8 Hours Scheduled      dexmedetomidine 400 MCG/100ML solution infusion  Commonly known as: PRECEDEX   0.2-1.5 " mcg/kg/hr (13..1 mcg/hr), Intravenous, Titrated      miconazole 2 % powder  Commonly known as: MICOTIN   1 Application, Topical, Every 12 Hours Scheduled      nitroglycerin 0.1 MG/HR patch  Commonly known as: NITRODUR   1 patch, Transdermal, Daily      Nutrisource fiber pack pack   2 packets, Per G Tube, Daily      nystatin 100,000 unit/mL suspension  Commonly known as: MYCOSTATIN   500,000 Units, Swish & Spit, 4 Times Daily      pantoprazole 40 MG injection  Commonly known as: PROTONIX   40 mg, Intravenous, Every 24 Hours Scheduled      ProSource TF liquid oral liquid   45 mL, Per G Tube, 2 Times Daily             Changes to Medications        Instructions Start Date   hydrALAZINE 25 MG tablet  Commonly known as: APRESOLINE  What changed:   medication strength  how much to take  how to take this  when to take this   25 mg, Per G Tube, Every 8 Hours Scheduled             Continue These Medications        Instructions Start Date   aspirin 81 MG EC tablet   81 mg, Oral, Daily      atorvastatin 40 MG tablet  Commonly known as: LIPITOR   40 mg, Per G Tube, Nightly      clopidogrel 75 MG tablet  Commonly known as: PLAVIX   75 mg, Oral, Daily      ezetimibe 10 MG tablet  Commonly known as: ZETIA   10 mg, Daily      fenofibrate 54 MG tablet  Commonly known as: TRICOR   54 mg, Daily             Stop These Medications      amLODIPine 10 MG tablet  Commonly known as: NORVASC     bumetanide 2 MG tablet  Commonly known as: BUMEX     busPIRone 10 MG tablet  Commonly known as: BUSPAR     calcitriol 0.25 MCG capsule  Commonly known as: ROCALTROL     carvedilol 25 MG tablet  Commonly known as: COREG     doxazosin 2 MG tablet  Commonly known as: CARDURA     Eliquis 2.5 MG tablet tablet  Generic drug: apixaban     epoetin vera-epbx 3000 UNIT/ML injection  Commonly known as: RETACRIT     hydrOXYzine 25 MG tablet  Commonly known as: ATARAX     isosorbide mononitrate 60 MG 24 hr tablet  Commonly known as: IMDUR     sertraline 25  MG tablet  Commonly known as: ZOLOFT              Discharge Diet:   Diet Instructions       Diet: Nothing By Mouth      Discharge Diet: Nothing By Mouth          Tube Feeding: Formula: Novasource Renal (Electrolyte Restricted); Feeding Type: Continuous; Start at: 35 mL/hr; Then Advance By: Do Not Advance; Water Flush: 20 mL; Every: 1 hour; Water Bolus: None     Activity at Discharge:    Activity Instructions       Activity as Tolerated              Follow-up Appointments  No future appointments.  Additional Instructions for the Follow-ups that You Need to Schedule       Discharge Follow-up with PCP   As directed       Currently Documented PCP:    Sergio Randall MD    PCP Phone Number:    [phone]     Follow Up Details: 1 week post-discharge        Discharge Follow-up with Specialty: Cardiology; 1 Month   As directed      Specialty: Cardiology   Follow Up: 1 Month                Discharge Instructions:  D/c to [facility]  Medications as above  Follow-ups as above  Further d/c instructions as above  Please call 911 or proceed to nearest ED if symptoms worsen or reoccur     Current Code Status       Date Active Code Status Order ID Comments User Context       5/6/2025 1629 CPR (Attempt to Resuscitate) [id number]  Terry Antonio MD Inpatient        Question Answer    Code Status (Patient has no pulse and is not breathing) CPR (Attempt to Resuscitate)    Medical Interventions (Patient has pulse or is breathing) Full Support                  Inder Brody. MSN, APRN, [facility]  Pulmonary and Critical Care Medicine  05/09/25     I performed an independent history and physical examination. Portions of the history were obtained by APRN and were modified by me according to my findings. The above note reflects my findings, assessment, and plan.    Terry Antonio MD    Please see my discharge day progress note for additional details.  I spent 35 minutes on discharge.    Electronically signed by:  Terry Antonio  MD  05/09/25  12:02 EDT      CC: Sergio Randall MD    Please note that portions of this note were completed with a voice recognition program.       Electronically signed by Terry Antonio MD at 05/09/25 1203

## 2025-05-09 NOTE — CASE MANAGEMENT/SOCIAL WORK
Case Management Discharge Note      Final Note: For Friday, [facility] after her dialysis run this am.   Nursing to call report to [phone], fax summary to [phone]. [facility] ambulance EMS/ACLS to transport after nurse has called report, transfer summary in.    IMM given to spouse over phone yesterday.   He is agreeable with the plan.     EMS ACLS request sent through at 12:30.     Selected Continued Care - Admitted Since 5/5/2025       Destination Coordination complete.      Service Provider Services Address Phone Fax Patient Preferred    [facility] [address] [id number] [phone] --              Durable Medical Equipment    No services have been selected for the patient.                Dialysis/Infusion    No services have been selected for the patient.                Home Medical Care    No services have been selected for the patient.                Therapy    No services have been selected for the patient.                Community Resources    No services have been selected for the patient.                Community & DME    No services have been selected for the patient.                    Transportation Services  Ambulance: [facility]    Final Discharge Disposition Code: 63 - [facility]

## 2025-05-09 NOTE — DISCHARGE PLACEMENT REQUEST
"Miranda Connor (69 y.o. Female)       Date of Birth   1955    Social Security Number       Address   [address] [place] [facility] [address]    Home Phone   [phone]    MRN   [id number]       Druze   Unknown    Marital Status                               Admission Date   5/5/2025    Admission Type   Urgent    Admitting Provider   Case, Latesha FAY DO    Attending Provider   Terry Antonio MD    Department, Room/Bed   [facility]       Discharge Date       Discharge Disposition       Discharge Destination                                 Attending Provider: Terry Antonio MD    Allergies: No Known Allergies    Isolation: None   Infection: None   Code Status: CPR    Ht: 160 cm (62.99\")   Wt: 69.4 kg (153 lb)    Admission Cmt: None   Principal Problem: CAD (coronary artery disease) [I25.10]                   Active Insurance as of 5/5/2025       Primary Coverage       Payor Plan Insurance Group Employer/Plan Group    MEDICARE MEDICARE A & B        Payor Plan Address Payor Plan Phone Number Payor Plan Fax Number Effective Dates    [address] [phone]  6/1/2019 - None Entered    [facility] [id number]         Subscriber Name Subscriber Birth Date Member ID       MIRANDA CONNOR 1955 [id number]               Secondary Coverage       Payor Plan Insurance Group Employer/Plan Group    CONTINUECARE - ([place] ONLY) CONTINUECARE        Payor Plan Address Payor Plan Phone Number Payor Plan Fax Number Effective Dates    [address] [address]   4/11/2025 - None Entered    [facility] [address]         Subscriber Name Subscriber Birth Date Member ID       MIRANDA CONNOR 1955 [id number]               Tertiary Coverage       Payor Plan Insurance Group Employer/Plan Group    [place]        Payor Plan Address Payor Plan Phone Number Payor Plan Fax Number Effective Dates    [address] [phone]  4/11/2025 - None Entered    [facility] [id number]    "      Subscriber Name Subscriber Birth Date Member ID       MIRANDA CONNOR 1955 [id number]                     Emergency Contacts        (Rel.) Home Phone Work Phone Mobile Phone    reid brannonn (Spouse) -- -- [phone]              Insurance Information                  MEDICARE/MEDICARE A & B Phone: [phone]    Subscriber: Miranda Connor Subscriber#: [id number]    Group#: -- Precert#: --    Authorization#: -- Effective Date: --        CONTINUECARE - ([place] ONLY)/CONTINUECARE Phone: --    Subscriber: Miranda Connor Subscriber#: [id number]    Group#: -- Precert#: --    Authorization#: -- Effective Date: --        [place] Phone: [phone]    Subscriber: Miranda Connor Subscriber#: [id number]    Group#: -- Precert#: --    Authorization#: -- Effective Date: --                   All medication doses during the admission are shown, including meds that are no longer on order.  Scheduled Meds Sorted by Name  for Miranda Connor as of 04/30/25 through 5/9/25    1 Day 3 Days 7 Days 10 Days < Today >   Legend:       Medications 04/30 05/01 05/02 05/03 05/04 05/05 05/06 05/07 05/08 05/09   albumin human 25 % IV SOLN 25 g  Dose: 25 g  Freq: Once Route: IV  Indications of Use: HEMODIALYSIS PROCEDURE  Start: 05/09/25 0945 End: 05/09/25 0935             0905 [C]        amiodarone (PACERONE) tablet 200 mg  Dose: 200 mg  Freq: Every 12 Hours Scheduled Route: PEG TUBE  Start: 05/05/25 2100   Admin Instructions:            2008 0926 2025 0830 2059      0841     2034      0844     2100        aspirin chewable tablet 81 mg  Dose: 81 mg  Freq: Daily Route: PEG TUBE  Start: 05/06/25 0900   Admin Instructions:             0926 0830 1429     1632      0840      0846        atorvastatin (LIPITOR) tablet 40 mg  Dose: 40 mg  Freq: Nightly Route: PEG TUBE  Start: 05/05/25 2100   Admin Instructions:            2008 2025 1429 1632     2059       2034 2100        cefTRIAXone (ROCEPHIN) 2,000 mg in sodium chloride 0.9 % 100 mL MBP  Dose: 2,000 mg  Freq: Every 24 Hours Route: IV  Indications of Use: EMPIRIC,PNEUMONIA  Last Dose: 2,000 mg (05/08/25 2034)  Start: 05/06/25 2100 End: 05/11/25 2059   Admin Instructions:             2024 2059 2034 2100        cloNIDine (CATAPRES) tablet 0.1 mg  Dose: 0.1 mg  Freq: Every 8 Hours Scheduled Route: PEG TUBE  Start: 05/05/25 1845   Admin Instructions:            1809      0531     1419     2024           0526     1430)     1713     2059           0541     1433     2034           0623     1400     2200        cloNIDine (CATAPRES) tablet 0.1 mg  Dose: 0.1 mg  Freq: Every 8 Hours Scheduled Route: PEG TUBE  Start: 05/05/25 1515 End: 05/05/25 1754   Admin Instructions:            1425     1515     1632     1754-D/C'd          cloNIDine (CATAPRES) tablet 0.1 mg  Dose: 0.1 mg  Freq: Every 8 Hours Scheduled Route: PO  Start: 05/05/25 1515 End: 05/05/25 1429   Admin Instructions:            1425     1429     1429-D/C'd          clopidogrel (PLAVIX) tablet 75 mg  Dose: 75 mg  Freq: Daily Route: PEG TUBE  Start: 05/06/25 0900          0926      0830     1429     1632      0841      0845        heparin (porcine) injection 1,700 Units  Dose: 1,700 Units  Freq: Once Route: IV  Indications of Use: ACUTE CORONARY SYNDROME,Other - full anticoagulation  Start: 05/06/25 0900 End: 05/06/25 0928          0928           heparin (porcine) injection 1,700 Units  Dose: 1,700 Units  Freq: Once Route: IV  Indications Comment: Heparin Bolus  Start: 05/06/25 0000 End: 05/05/25 2320         ([id number])     2322            heparin (porcine) injection 2,000 Units  Dose: 2,000 Units  Freq: Once Route: IV  Indications of Use: ACUTE CORONARY SYNDROME  Start: 05/05/25 1630 End: 05/05/25 1620         1620            hydrALAZINE (APRESOLINE) tablet 25 mg  Dose: 25 mg  Freq: Every 8 Hours Scheduled Route: PEG TUBE  Start: 05/05/25 1700   Admin  Instructions:            1616      0531     1421 2025           0530     (9075) 0512     2054           0562     1433     2034           0650     1400     2200        hydrALAZINE (APRESOLINE) tablet 25 mg  Dose: 25 mg  Freq: Every 8 Hours Scheduled Route: PEG TUBE  Start: 05/05/25 1515 End: 05/05/25 1610   Admin Instructions:            1425     1515     1610     1610-D/C'd          hydrALAZINE (APRESOLINE) tablet 25 mg  Dose: 25 mg  Freq: Every 8 Hours Scheduled Route: PO  Start: 05/05/25 1515 End: 05/05/25 1429   Admin Instructions:            1425     1429     1429-D/C'd          metoprolol tartrate (LOPRESSOR) tablet 25 mg  Dose: 25 mg  Freq: Every 12 Hours Scheduled Route: PEG TUBE  Start: 05/05/25 2100   Admin Instructions:            1425     2100      0900     2100      0900     2100      0900     2100      0900     2100        miconazole (MICOTIN) 2 % powder 1 Application  Dose: 1 Application  Freq: Every 12 Hours Scheduled Route: TOP  Start: 05/05/25 2315   Admin Instructions:            2343      0930     2025      0830     1429     1632     (4086)      0872     2033      0845     2100        mupirocin (BACTROBAN) 2 % nasal ointment 1 Application  Dose: 1 Application  Freq: 2 Times Daily Route: EACH NARE  Start: 05/05/25 1415 End: 05/10/25 0859   Admin Instructions:            1422     2009) [C]      0926 2025      0829     1429     1632     2059      0840     2034      0845     2100        nitroglycerin (NITRODUR) 0.1 MG/HR patch 1 patch  Dose: 1 patch  Freq: Daily Route: TD  Start: 05/08/25 0900   Admin Instructions:               1142      0000     0844     2044        Nutrisource fiber pack 2 packet  Dose: 2 packet  Freq: Daily Route: PER G TUBE  Start: 05/08/25 0046 ([phone]) [id number]        nystatin (MYCOSTATIN) 100,000 unit/mL suspension 500,000 Units  Dose: 5 mL  Freq: 4 Times Daily Route: MT  Start: 05/05/25 1800 End: 05/10/25 1758   Admin Instructions:            1808      2008 0926     1145 (6636)     2024      0871 [phone]) 5382     1636 (2402) (8503)      0873     1224     1737     2037      0843     1200     1800     2100        pantoprazole (PROTONIX) injection 40 mg  Dose: 40 mg  Freq: Every 24 Hours Scheduled Route: IV  Indications of Use: STRESS ULCER PROPHYLAXIS  Start: 05/05/25 1500   Admin Instructions:            1422      0926      0830     1429     1632      0841      0845        Pharmacy Consult - MT  Freq: Daily Route: XX  Start: 05/05/25 1515 End: 05/08/25 0908   Admin Instructions:                             1429     1632           0908-D/C'd       potassium chloride (KLOR-CON) packet 20 mEq  Dose: 20 mEq  Freq: Once Route: PO  Start: 05/09/25 0730 End: 05/09/25 0650   Admin Instructions:                0650        potassium chloride (KLOR-CON) packet 40 mEq  Dose: 40 mEq  Freq: Once Route: PO  Start: 05/07/25 1745 End: 05/07/25 1713   Admin Instructions:              1713          potassium chloride (KLOR-CON) packet 40 mEq  Dose: 40 mEq  Freq: Once Route: PO  Start: 05/07/25 1230 End: 05/07/25 1645   Admin Instructions:                   1645-D/C'd        ProSource TF oral liquid 45 mL  Dose: 45 mL  Freq: 2 Times Daily Route: PER G TUBE  Start: 05/06/25 1245          1420     2025      0830) [C]     2056      0841     2035      0845     2100        sacubitril-valsartan (ENTRESTO) 24-26 MG tablet 1 tablet  Dose: 1 tablet  Freq: Every 12 Hours Scheduled Route: PEG TUBE  Start: 05/05/25 2100   Order specific questions:            1425 2100 0900 2100 0900 2100 0900 2100 0900 2100        sodium chloride 0.9 % flush 10 mL  Dose: 10 mL  Freq: Every 12 Hours Scheduled Route: IV  Start: 05/05/25 1415 End: 05/08/25 1449         1423     2009 0930 2025 0830 1429 1632 2100 0841     1449-D/C'd                   Continuous Meds Sorted by Name  for Estefania Connor as of 04/30/25  through 5/9/25  Legend:       Medications 04/30 05/01 05/02 05/03 05/04 05/05 05/06 05/07 05/08 05/09   Bivalirudin Trifluoroacetate (ANGIOMAX) 250 mg in sodium chloride 0.9 % 50 mL (5 mg/mL) infusion  Freq: Code / Trauma / Sedation Continuous Med  Last Dose: Stopped (05/07/25 1513)  Start: 05/07/25 1513 End: 05/07/25 1602           1513     1602-D/C'd        dexmedetomidine (PRECEDEX) 400 mcg in 100 mL NS infusion  Rate: 3.5-26 mL/hr Dose: 0.2-1.5 mcg/kg/hr  Weight Dosing Info: 69.4 kg  Freq: Titrated Route: IV  Last Dose: 1 mcg/kg/hr (05/09/25 1027)  Start: 05/05/25 1430   Admin Instructions:      Order specific questions:            1423     1445     1638     1819     2150      0130     0453     0842     0910     1423     2035      0010     0337     0528     1111     1932     2257      0219     0541     0949     1433     1436     1643     1731     2220      0142     0515     1027        heparin 17936 units/250 mL (100 units/mL) in 0.45 % NaCl infusion  Rate: 15.84 mL/hr Dose: 22 Units/kg/hr  Weight Dosing Info: 72 kg (Order-Specific)  Freq: Titrated Route: IV  Indications Comment: Cardiac or other NOT VTE  Last Dose: Stopped (05/07/25 1455)  Start: 05/05/25 1500 End: 05/07/25 1646   Admin Instructions:            1448          1621     2308      0927     0931      0122     0528     0950     1455     1646-D/C'd        niCARdipine (CARDENE) 25mg in 250mL NS infusion  Rate:  mL/hr Dose: 5-15 mg/hr  Freq: Titrated Route: IV  Last Dose: Stopped (05/08/25 0730)  Start: 05/06/25 0030   Admin Instructions:            2343      (0004) [C]     0426     0829     1202     1356     2024      0034     0200     0532     1420     1527     2245      0420     0730         norepinephrine (LEVOPHED) 8 mg in 250 mL NS infusion (premix)  Rate: 2.6-39.04 mL/hr Dose: 0.02-0.3 mcg/kg/min  Weight Dosing Info: 69.4 kg  Freq: Titrated Route: IV  Last Dose: Stopped (05/09/25 0923)  Start: 05/09/25 0945   Admin Instructions:                 6857 (4729) 2533        Pharmacy to Dose Heparin  Freq: Continuous PRN Route: XX  PRN Reason: Consult  Indications Comment: Cardiac or Other (NOT VTE)  Start: 05/05/25 1358 End: 05/07/25 1646   Admin Instructions:              1646-D/C'd                    PRN Meds Sorted by Name  for Estefania Connor as of 04/30/25 through 5/9/25  Legend:       Medications 04/30 05/01 05/02 05/03 05/04 05/05 05/06 05/07 05/08 05/09   albuterol (PROVENTIL) nebulizer solution 0.083% 2.5 mg/3mL  Dose: 2.5 mg  Freq: Every 4 Hours PRN Route: NEBULIZATION  PRN Reason: Wheezing  Start: 05/05/25 1407   Admin Instructions:              1429     1632          alteplase (CATHFLO/ACTIVASE) injection 2 mg  Dose: 2 mg  Freq: As Needed Route: IK  PRN Comment: Thrombotic Occlusion of Catheter  Start: 05/09/25 0905   Admin Instructions:                   atropine sulfate injection 0.5 mg  Dose: 0.5 mg  Freq: Every 5 Minutes PRN Route: IV  PRN Reason: Bradycardia  PRN Comment: HR less than 60 bpm  Start: 05/07/25 1632   Admin Instructions:                   bivalirudin (ANGIOMAX) bolus from bag solution  Freq: Code / Trauma / Sedation Medication  Start: 05/07/25 1510 End: 05/07/25 1602           1510     1602-D/C'd        Bivalirudin Trifluoroacetate (ANGIOMAX) 250 mg in sodium chloride 0.9 % 50 mL (5 mg/mL) infusion  Freq: Code / Trauma / Sedation Continuous Med  Start: 05/07/25 1513 End: 05/07/25 1602           1513     1602-D/C'd        fentaNYL citrate (PF) (SUBLIMAZE) injection  Freq: Code / Trauma / Sedation Medication  Start: 05/07/25 1507 End: 05/07/25 1602           1507     1602-D/C'd        heparin (porcine) injection 2,000 Units  Dose: 2,000 Units  Freq: As Needed Route: IK  PRN Comment: To be instill via venous and arterial ports of dialysis catheter upon completion of tx.  Indications of Use: Other - full anticoagulation  Start: 05/06/25 1130          1143 [C]      1420 [id number] 4914 [C]        hydrALAZINE (APRESOLINE)  injection 20 mg  Dose: 20 mg  Freq: Every 6 Hours PRN Route: IV  PRN Reason: High Blood Pressure  PRN Comment: For SBP greater than 175  Start: 05/05/25 2216   Admin Instructions:            2223      1437       1642      0142        iopamidol (ISOVUE-370) 76 % injection  Freq: Code / Trauma / Sedation Medication  Start: 05/07/25 1537 End: 05/07/25 1602           1537     1602-D/C'd        labetalol (NORMODYNE,TRANDATE) injection 20 mg  Dose: 20 mg  Freq: Every 4 Hours PRN Route: IV  PRN Reason: High Blood Pressure  Start: 05/05/25 1610   Admin Instructions:            1637     2145            lidocaine PF 1% (XYLOCAINE) injection  Freq: Code / Trauma / Sedation Medication  Start: 05/07/25 1448 End: 05/07/25 1602           1448     1504     1602-D/C'd        midazolam (VERSED) injection  Freq: Code / Trauma / Sedation Medication  Start: 05/07/25 1449 End: 05/07/25 1602           1449     1602-D/C'd        nitroglycerin (NITROSTAT) SL tablet 0.4 mg  Dose: 0.4 mg  Freq: Every 5 Minutes PRN Route: SL  PRN Reason: Chest Pain  Start: 05/05/25 1327   Admin Instructions:              1429     1632          Pharmacy to Dose Heparin  Freq: Continuous PRN Route: XX  PRN Reason: Consult  Indications Comment: Cardiac or Other (NOT VTE)  Start: 05/05/25 1358 End: 05/07/25 1646   Admin Instructions:              1646-D/C'd        Legend:       [id number]/0105/0205/0305/0405/0505/0605/0705/0805/09

## 2025-05-09 NOTE — PLAN OF CARE
Problem: Hemodialysis  Goal: Safe, Effective Therapy Delivery  Outcome: Progressing  Goal: Effective Tissue Perfusion  Outcome: Progressing  Goal: Absence of Infection Signs and Symptoms  Outcome: Progressing     HD completed. Tolerated scheduled 3.5 hrs well, until hypotensive blood pressure episodes noted early into tx, 25 grams of Albumin was administered. Access functioned well. UF goal of 3 liters reached, total net fluid removal was 3060 ml, total liters processed was 81.9 liters.  Blood returned well with no complications. Report given to Yao rabago RN. Goal Outcome Evaluation:

## 2025-05-09 NOTE — PROGRESS NOTES
"[facility]  IP Progress Note    PROBLEM LIST:  CARDIAC   Coronary Artery Disease:   [facility], 5/2/2025: Calcified RCA and circumflex with significant disease.   [facility], 5/7/2025: RCA 3 stents     Myocardium:   [facility], 5/1/2025: LVEF 41 to 45%, G2 DD     Valvular:   Aortic valve sclerosis     Electrical:   Vfib   NSR     Pericardium:   Small pericardial effusion     VASCULAR   Peripheral vascular disease:   Severe right iliac stenosis     CARDIAC RISK FACTORS   Hypertension   Dyslipidemia   Physical Inactivity   Menopausal state - Post-menopausal     NON-CARDIAC   Chronic kidney disease on dialysis   Tracheostomy   PEG tube placement       HOSPITAL COURSE:  Patient was transferred for stenting of the RCA after she had a V-fib arrest.  Patient has been doing fine.  She should be transferred out today      CHIEF COMPLAINTS:  V-fib arrest      Subjective   Lying comfortably respond to commands.  Has a trach and PEG      Objective     Blood pressure 164/81, pulse 96, temperature 97.5 °F (36.4 °C), temperature source Axillary, resp. rate 21, height 160 cm (62.99\"), SpO2 95%.     Intake/Output Summary (Last 24 hours) at 5/9/2025 0803  Last data filed at 5/9/2025 0600  Gross per 24 hour   Intake 1250.97 ml   Output 50 ml   Net 1200.97 ml       PHYSICAL EXAM:  Constitutional:       General: Not in acute distress.     Appearance: Healthy appearance. Not in distress.     Neck:     JVP:Not elevated     Carotid artery: Normal    Pulmonary:      Effort: Pulmonary effort is normal.      Breath sounds: Normal breath sounds. No wheezing. No rhonchi. No rales.     Cardiovascular:      Normal rate. Regular rhythm. Normal S1. Normal S2.      Murmurs: There is 2/6 systolic murmur.      No gallop. No click. No rub.     Abdominal:      General: Bowel sounds are normal.      Palpations: Abdomen is soft.      Tenderness: There is no abdominal tenderness.    Extremities:     Pulses:Normal radial and pedal pulses   "   Edema:no edema    MEDICATIONS:    amiodarone, 200 mg, Per PEG Tube, Q12H  aspirin, 81 mg, Per PEG Tube, Daily  atorvastatin, 40 mg, Per PEG Tube, Nightly  cefTRIAXone, 2,000 mg, Intravenous, Q24H  cloNIDine, 0.1 mg, Per PEG Tube, Q8H  clopidogrel, 75 mg, Per PEG Tube, Daily  hydrALAZINE, 25 mg, Per PEG Tube, Q8H  [Held by provider] metoprolol tartrate, 25 mg, Per PEG Tube, Q12H  miconazole, 1 Application, Topical, Q12H  mupirocin, 1 Application, Each Nare, BID  nitroglycerin, 1 patch, Transdermal, Daily  Nutrisource fiber, 2 packet, Per G Tube, Daily  nystatin, 5 mL, Mouth/Throat, 4x Daily  pantoprazole, 40 mg, Intravenous, Q24H  ProSource TF, 45 mL, Per G Tube, BID  [Held by provider] sacubitril-valsartan, 1 tablet, Per PEG Tube, Q12H          Results from last 7 days   Lab Units 05/09/25  0329   WBC 10*3/mm3 11.60*   HEMOGLOBIN g/dL 8.5*   HEMATOCRIT % 27.1*   PLATELETS 10*3/mm3 285     Results from last 7 days   Lab Units 05/09/25  0329 05/05/25  1440 05/05/25  0143   SODIUM mmol/L 136   < > 130*   POTASSIUM mmol/L 3.3*   < > 3.2*   CHLORIDE mmol/L 102   < > 93*   CO2 mmol/L 22.0   < > 24.6   BUN mg/dL 32*   < > 25*   CREATININE mg/dL 3.75*   < > 4.63*   CALCIUM mg/dL 8.7   < > 9.1   BILIRUBIN mg/dL  --   --  0.2   ALK PHOS U/L  --   --  112   ALT (SGPT) U/L  --   --  9   AST (SGOT) U/L  --   --  11   GLUCOSE mg/dL 114*   < > 108*    < > = values in this interval not displayed.     Results from last 7 days   Lab Units 05/05/25  1440 05/04/25  1217   INR  1.02 1.06     Lab Results   Component Value Date    TROPONINT 114 (C) 05/01/2025                 Iron   Date Value Ref Range Status   04/29/2025 39 37 - 145 mcg/dL Final     Ferritin   Date Value Ref Range Status   04/29/2025 2,238.00 (H) 13.00 - 150.00 ng/mL Final     Iron Saturation (TSAT)   Date Value Ref Range Status   04/29/2025 25 20 - 50 % Final     TIBC   Date Value Ref Range Status   04/29/2025 155 (L) 298 - 536 mcg/dL Final      Magnesium   Date Value  Ref Range Status   05/06/2025 2.0 1.6 - 2.4 mg/dL Final        RESULT REVIEW:    I reviewed the patient's new clinical results.    Tele: Sinus Rythym      ASSESSMENT:     Coronary artery disease  Chronic kidney disease on dialysis  Heart failure with mildly reduced ejection fraction  Trach and PEG    PLAN:     Patient has been on aspirin and Plavix.  Patient has a history of atrial fibrillation patient has a severe bleeding and hemoglobin has been running 6 in the past.  We will avoid anticoagulation.  Patient has a EF between 41-45.  Patient cannot be on guideline directed therapy especially ACE ARB and spironolactone along with SGLT2's because of chronic kidney disease.  Hopefully patient should be able to go back to LT[facility] today

## 2025-05-09 NOTE — PROGRESS NOTES
Pulmonary/Critical Care ICU Note     LOS: 4 days   Patient Care Team:  Sergio Randall MD as PCP - General (Cardiology)    Chief Complaint: Cardiac arrest / CAD      Subjective     History reviewed and updated in EMR as indicated.    Interval History:     Patient is sedated but restless.  She is getting hemodialysis.  She was hypertensive prior to dialysis however became hypotensive with dialysis and received albumin and was started on some low-dose norepinephrine.  She is now tolerating dialysis with ultrafiltration.  No fevers or chills.      History taken from: chart, staff    PMH/FH/Social History were reviewed and updated appropriately in the electronic medical record.     Past Medical History:   Diagnosis Date    AAA (abdominal aortic aneurysm)     Coronary artery disease     Depression     ESRD (end stage renal disease)     Hyperlipidemia     Hypertension     Polycystic kidney disease        Review of Systems:    Review of 14 systems was completed with positives and pertinent negatives noted in the subjective section.  All other systems reviewed and are negative.         Objective     Vital Signs  Temp:  [97.5 °F (36.4 °C)-98.8 °F (37.1 °C)] 97.8 °F (36.6 °C)  Heart Rate:  [] 117  Resp:  [18-24] 18  BP: ()/() 131/64  FiO2 (%):  [40 %] 40 %  05/08 0701 - 05/09 0700  In: 1351 [I.V.:420]  Out: 50 [Urine:50]  Body mass index is 27.11 kg/m².  Mode: VC+/AC  FiO2 (%):  [40 %] 40 %  S RR:  [20] 20  S VT:  [365 mL] 365 mL  PEEP/CPAP (cm H2O):  [5 cm H20] 5 cm H20  MAP (cm H2O):  [8.6-13] 11  IV drips:  dexmedetomidine, Last Rate: 1 mcg/kg/hr (05/09/25 1027)  niCARdipine, Last Rate: Stopped (05/08/25 0730)  norepinephrine, Last Rate: Stopped (05/09/25 0923)       Physical Exam:     Constitutional:   Drowsy but arousable and a bit restless on ventilator, in no acute distress   Head:   Normocephalic, atraumatic   Eyes:           Lids and lashes normal, conjunctivae and sclerae normal.  PER   ENMT:   Ears appear intact with no abnormalities noted     Lips normal.     Neck:  Tracheostomy in place on mechanical ventilator, no JVD   Lungs/Resp:    Normal effort, symmetric chest rise, no crepitus, clear to      auscultation bilaterally.               Heart/CV:   Regular rhythm and normal rate, no murmur   Abdomen/GI:    Soft, nontender, nondistended   :    Deferred   Extremities/MSK:  No clubbing or cyanosis.  Trace bilateral lower extremity edema.  Right upper extremity is swollen.   Pulses:  Pulses palpable and equal bilaterally   Skin:  No bleeding, bruising or rash   Heme/Lymph:  No cervical or supraclavicular adenopathy.   Neurologic:    Psychiatric:    Moves all extremities with no obvious focal motor deficit.  Cranial nerves 2 - 12 grossly intact  Non-agitated, normal affect.    The above physical exam findings were reviewed and reflect my exam findings as of today's exam.   Electronically signed by:  Terry Antonio MD  05/09/25  11:54 EDT      Results Review:     I reviewed the patient's new clinical results.   Results from last 7 days   Lab Units 05/09/25  0329 05/08/25  0421 05/07/25  0348 05/05/25  1440 05/05/25  0143 05/04/25  0235 05/03/25  0307   SODIUM mmol/L 136 135* 135*   < > 130* 135* 135*   POTASSIUM mmol/L 3.3* 3.7 3.1*   < > 3.2* 3.3* 3.4*   CHLORIDE mmol/L 102 101 99   < > 93* 95* 94*   CO2 mmol/L 22.0 22.0 24.0   < > 24.6 27.8 29.1*   BUN mg/dL 32* 23 17   < > 25* 20 13   CREATININE mg/dL 3.75* 3.47* 2.94*   < > 4.63* 3.93* 3.03*   CALCIUM mg/dL 8.7 8.8 8.8   < > 9.1 9.2 8.1*   BILIRUBIN mg/dL  --   --   --   --  0.2 0.2 0.2   ALK PHOS U/L  --   --   --   --  112 119* 108   ALT (SGPT) U/L  --   --   --   --  9 12 15   AST (SGOT) U/L  --   --   --   --  11 12 13   GLUCOSE mg/dL 114* 88 106*   < > 108* 112* 79    < > = values in this interval not displayed.     Results from last 7 days   Lab Units 05/09/25  0329 05/08/25  0421 05/07/25  0348   WBC 10*3/mm3 11.60* 9.01 13.11*   HEMOGLOBIN  g/dL 8.5* 8.4* 9.8*   HEMATOCRIT % 27.1* 26.8* 30.9*   PLATELETS 10*3/mm3 285 255 306     Results from last 7 days   Lab Units 05/08/25  0326   PH, ARTERIAL pH units 7.406   PO2 ART mm Hg 103.0   PCO2, ARTERIAL mm Hg 36.6   HCO3 ART mmol/L 23.0     Results from last 7 days   Lab Units 05/06/25  0644 05/05/25  1440   MAGNESIUM mg/dL 2.0 2.0   PHOSPHORUS mg/dL 4.1 4.1       I reviewed the patient's new imaging including images and reports.    Chest x-ray 5/8/2025 shows cardiomegaly and pulmonary congestion with tracheostomy in place in the right internal jugular dialysis catheter with left internal jugular central venous catheter in place.  Overall unchanged.    Medication Review:   amiodarone, 200 mg, Per PEG Tube, Q12H  aspirin, 81 mg, Per PEG Tube, Daily  atorvastatin, 40 mg, Per PEG Tube, Nightly  cefTRIAXone, 2,000 mg, Intravenous, Q24H  cloNIDine, 0.1 mg, Per PEG Tube, Q8H  clopidogrel, 75 mg, Per PEG Tube, Daily  hydrALAZINE, 25 mg, Per PEG Tube, Q8H  [Held by provider] metoprolol tartrate, 25 mg, Per PEG Tube, Q12H  miconazole, 1 Application, Topical, Q12H  mupirocin, 1 Application, Each Nare, BID  nitroglycerin, 1 patch, Transdermal, Daily  Nutrisource fiber, 2 packet, Per G Tube, Daily  nystatin, 5 mL, Mouth/Throat, 4x Daily  pantoprazole, 40 mg, Intravenous, Q24H  ProSource TF, 45 mL, Per G Tube, BID  [Held by provider] sacubitril-valsartan, 1 tablet, Per PEG Tube, Q12H      dexmedetomidine, 0.2-1.5 mcg/kg/hr, Last Rate: 1 mcg/kg/hr (05/09/25 1027)  niCARdipine, 5-15 mg/hr, Last Rate: Stopped (05/08/25 0730)  norepinephrine, 0.02-0.3 mcg/kg/min, Last Rate: Stopped (05/09/25 0923)        Assessment & Plan         CAD (coronary artery disease)    ESRD on HD    Polycystic kidney disease    HTN (hypertension)    Dyslipidemia    AAA (abdominal aortic aneurysm)    Right coronary artery occlusion    Depression    Tracheostomy present    S/P percutaneous endoscopic gastrostomy (PEG) tube placement    69 y.o. female  with history of HTN, HLD, CKD on HD, polycystic kidney disease, abdominal aortic aneurysm, CAD with prior stent, depression, transferred from [facility] in [place] on 5/5/2025 after she suffered a V-fib arrest with resuscitation and underwent left heart catheterization showing 100% occlusion of the RCA with failed intervention.    Patient was initially admitted to [facility] on 3/20/2025 with volume overload secondary to missing 2 hemodialysis sessions.  She also has followed up possible pneumonia and had anemia and was evaluated by GI with no evidence of GI bleed.  She had bilateral pleural effusions and underwent 1550 mL thoracentesis, but despite thoracentesis required intubation on 4/10/2025 was transferred to [facility] on 4/11/2025.  Patient ultimately underwent tracheostomy and PEG placement on 4/29/2025.  She deteriorated on 5/2/2025 with me today Vfib arrest ultimately resuscitated with subsequent LHC as above.    Cardiology plans left heart catheterization today for PCI.    Patient remains on mechanical ventilation with excellent oxygen saturations on 40 % FiO2 this morning.  She is arousable and alert and I think her chances of successful weaning from mechanical ventilation will be excellent.    Patient has a swollen right upper extremity which has been ongoing.  Repeat right upper extremity duplex this admission shows superficial thrombophlebitis in the cephalic vein.  She has a right internal jugular dialysis catheter.      Plan:  1.  For V-fib arrest/CAD with occluded RCA: Failed intervention at outside hospital.  Cardiology consultation.  Aspirin, statin, beta-blocker, heparin drip.  LHC today.  2.  For CKD on HD: Last received HD at OSH on 5/3/2025 however received 20 minutes of HD on 5/5/2025 but did not tolerated reportedly secondary to hypotension and her session was discontinued prior to transfer here.  Nephrology following.  IHD done on 5/9/2025.    3.  For respiratory:  Initial respiratory failure secondary to volume overload from missing hemodialysis.  Also treated for pneumonia.  Status post tracheostomy and PEG 4/29/2025.  Continue mechanical ventilation and adjust settings to optimize ventilation and oxygenation and minimize barotrauma.  I think patient will be weanable from mechanical ventilation going forward after PCI.    4.  For nutrition: N.p.o. for now.  Restarted tube feeds.  N.p.o. after midnight.  5.  DVT prophylaxis/right upper extremity swell: Recheck duplex right upper extremity this admission showed superficial thrombophlebitis in the cephalic vein.  No DVT.  6.  For gram-negative rods in sputum: Patient has some pulmonary congestion on chest x-ray but no convincing symptoms of pneumonia.  White blood cell count mildly elevated.  Rocephin was started empirically.  Cultures growing moderate Klebsiella susceptible to Rocephin and I will plan to complete 7 days for Klebsiella tracheobronchitis in the setting of tracheostomy/respiratory failure.      Patient is okay for discharge to facility today after dialysis.    Electronically signed by:    Terry Antonio MD  05/09/25  11:54 EDT      *. Please note that portions of this note were completed with [employer] - a voice recognition program.

## 2025-05-09 NOTE — PROGRESS NOTES
Informed by nursing staff a little after 6 PM the patient had developed a large, painful, left femoral hematoma at prior cath site.  Spoke to Dr. Hawkins by phone and subsequently with Dr. Shrestha with cardiology.  I ordered a stat duplex for pseudoaneurysm evaluation of the left femoral artery.  I spoke with house supervisor to ensure that this would be done promptly and confirmed that it would be read by cardiology.  I spoke with night intensivist team regarding what I had done and the plan.  Nursing staff holding pressure at site.  Monitor H&H with transfusion as needed    I spent 38 minutes on critical care after original evaluation from early today/prior note including coordination of care.    Terry Antonio MD

## 2025-05-10 LAB
ALBUMIN SERPL-MCNC: 2.5 G/DL (ref 3.5–5.2)
ALBUMIN/GLOB SERPL: 1.3 G/DL
ALP SERPL-CCNC: 84 U/L (ref 39–117)
ALT SERPL W P-5'-P-CCNC: 14 U/L (ref 1–33)
ANION GAP SERPL CALCULATED.3IONS-SCNC: 13 MMOL/L (ref 5–15)
ANION GAP SERPL CALCULATED.3IONS-SCNC: 9 MMOL/L (ref 5–15)
AST SERPL-CCNC: 23 U/L (ref 1–32)
BASOPHILS # BLD AUTO: 0.08 10*3/MM3 (ref 0–0.2)
BASOPHILS NFR BLD AUTO: 0.4 % (ref 0–1.5)
BH BB BLOOD EXPIRATION DATE: NORMAL
BH BB BLOOD TYPE BARCODE: 9500
BH BB DISPENSE STATUS: NORMAL
BH BB PRODUCT CODE: NORMAL
BH BB UNIT NUMBER: NORMAL
BILIRUB SERPL-MCNC: 0.4 MG/DL (ref 0–1.2)
BUN SERPL-MCNC: 20 MG/DL (ref 8–23)
BUN SERPL-MCNC: 25 MG/DL (ref 8–23)
BUN/CREAT SERPL: 8 (ref 7–25)
BUN/CREAT SERPL: 8.5 (ref 7–25)
CALCIUM SPEC-SCNC: 8.1 MG/DL (ref 8.6–10.5)
CALCIUM SPEC-SCNC: 8.3 MG/DL (ref 8.6–10.5)
CHLORIDE SERPL-SCNC: 103 MMOL/L (ref 98–107)
CHLORIDE SERPL-SCNC: 104 MMOL/L (ref 98–107)
CO2 SERPL-SCNC: 21 MMOL/L (ref 22–29)
CO2 SERPL-SCNC: 23 MMOL/L (ref 22–29)
CREAT SERPL-MCNC: 2.5 MG/DL (ref 0.57–1)
CREAT SERPL-MCNC: 2.93 MG/DL (ref 0.57–1)
CROSSMATCH INTERPRETATION: NORMAL
D-LACTATE SERPL-SCNC: 0.5 MMOL/L (ref 0.5–2)
DEPRECATED RDW RBC AUTO: 47.8 FL (ref 37–54)
DEPRECATED RDW RBC AUTO: 49.3 FL (ref 37–54)
EGFRCR SERPLBLD CKD-EPI 2021: 16.7 ML/MIN/1.73
EGFRCR SERPLBLD CKD-EPI 2021: 20.2 ML/MIN/1.73
EOSINOPHIL # BLD AUTO: 0.08 10*3/MM3 (ref 0–0.4)
EOSINOPHIL NFR BLD AUTO: 0.4 % (ref 0.3–6.2)
ERYTHROCYTE [DISTWIDTH] IN BLOOD BY AUTOMATED COUNT: 15.2 % (ref 12.3–15.4)
ERYTHROCYTE [DISTWIDTH] IN BLOOD BY AUTOMATED COUNT: 16.4 % (ref 12.3–15.4)
FIBRINOGEN PPP-MCNC: 336 MG/DL (ref 203–567)
GLOBULIN UR ELPH-MCNC: 2 GM/DL
GLUCOSE BLDC GLUCOMTR-MCNC: 104 MG/DL (ref 70–130)
GLUCOSE BLDC GLUCOMTR-MCNC: 113 MG/DL (ref 70–130)
GLUCOSE BLDC GLUCOMTR-MCNC: 118 MG/DL (ref 70–130)
GLUCOSE BLDC GLUCOMTR-MCNC: 121 MG/DL (ref 70–130)
GLUCOSE SERPL-MCNC: 105 MG/DL (ref 65–99)
GLUCOSE SERPL-MCNC: 116 MG/DL (ref 65–99)
HCT VFR BLD AUTO: 22.4 % (ref 34–46.6)
HCT VFR BLD AUTO: 22.8 % (ref 34–46.6)
HCT VFR BLD AUTO: 29.3 % (ref 34–46.6)
HGB BLD-MCNC: 7.7 G/DL (ref 12–15.9)
HGB BLD-MCNC: 7.7 G/DL (ref 12–15.9)
HGB BLD-MCNC: 9.8 G/DL (ref 12–15.9)
IMM GRANULOCYTES # BLD AUTO: 0.62 10*3/MM3 (ref 0–0.05)
IMM GRANULOCYTES NFR BLD AUTO: 3.1 % (ref 0–0.5)
INR PPP: 1.13 (ref 0.89–1.12)
LYMPHOCYTES # BLD AUTO: 1.55 10*3/MM3 (ref 0.7–3.1)
LYMPHOCYTES NFR BLD AUTO: 7.7 % (ref 19.6–45.3)
MAGNESIUM SERPL-MCNC: 1.8 MG/DL (ref 1.6–2.4)
MCH RBC QN AUTO: 29.2 PG (ref 26.6–33)
MCH RBC QN AUTO: 29.3 PG (ref 26.6–33)
MCHC RBC AUTO-ENTMCNC: 33.4 G/DL (ref 31.5–35.7)
MCHC RBC AUTO-ENTMCNC: 34.4 G/DL (ref 31.5–35.7)
MCV RBC AUTO: 85.2 FL (ref 79–97)
MCV RBC AUTO: 87.2 FL (ref 79–97)
MONOCYTES # BLD AUTO: 1.6 10*3/MM3 (ref 0.1–0.9)
MONOCYTES NFR BLD AUTO: 8 % (ref 5–12)
NEUTROPHILS NFR BLD AUTO: 16.14 10*3/MM3 (ref 1.7–7)
NEUTROPHILS NFR BLD AUTO: 80.4 % (ref 42.7–76)
NRBC BLD AUTO-RTO: 0 /100 WBC (ref 0–0.2)
PLATELET # BLD AUTO: 170 10*3/MM3 (ref 140–450)
PLATELET # BLD AUTO: 220 10*3/MM3 (ref 140–450)
PMV BLD AUTO: 11 FL (ref 6–12)
PMV BLD AUTO: 11.1 FL (ref 6–12)
POTASSIUM SERPL-SCNC: 3.8 MMOL/L (ref 3.5–5.2)
POTASSIUM SERPL-SCNC: 3.8 MMOL/L (ref 3.5–5.2)
PROCALCITONIN SERPL-MCNC: 0.27 NG/ML (ref 0–0.25)
PROT SERPL-MCNC: 4.5 G/DL (ref 6–8.5)
PROTHROMBIN TIME: 15.2 SECONDS (ref 12.2–15.3)
RBC # BLD AUTO: 2.63 10*6/MM3 (ref 3.77–5.28)
RBC # BLD AUTO: 3.36 10*6/MM3 (ref 3.77–5.28)
SODIUM SERPL-SCNC: 136 MMOL/L (ref 136–145)
SODIUM SERPL-SCNC: 137 MMOL/L (ref 136–145)
UNIT  ABO: NORMAL
UNIT  RH: NORMAL
WBC NRBC COR # BLD AUTO: 13.3 10*3/MM3 (ref 3.4–10.8)
WBC NRBC COR # BLD AUTO: 20.07 10*3/MM3 (ref 3.4–10.8)

## 2025-05-10 PROCEDURE — 85027 COMPLETE CBC AUTOMATED: CPT | Performed by: INTERNAL MEDICINE

## 2025-05-10 PROCEDURE — 99232 SBSQ HOSP IP/OBS MODERATE 35: CPT | Performed by: INTERNAL MEDICINE

## 2025-05-10 PROCEDURE — 25010000002 FENTANYL CITRATE (PF) 50 MCG/ML SOLUTION: Performed by: SURGERY

## 2025-05-10 PROCEDURE — 80053 COMPREHEN METABOLIC PANEL: CPT | Performed by: STUDENT IN AN ORGANIZED HEALTH CARE EDUCATION/TRAINING PROGRAM

## 2025-05-10 PROCEDURE — 82948 REAGENT STRIP/BLOOD GLUCOSE: CPT

## 2025-05-10 PROCEDURE — 25010000002 HEPARIN (PORCINE) PER 1000 UNITS: Performed by: SURGERY

## 2025-05-10 PROCEDURE — 25010000002 NICARDIPINE 2.5 MG/ML SOLUTION 10 ML VIAL: Performed by: INTERNAL MEDICINE

## 2025-05-10 PROCEDURE — 94003 VENT MGMT INPAT SUBQ DAY: CPT

## 2025-05-10 PROCEDURE — 85014 HEMATOCRIT: CPT | Performed by: INTERNAL MEDICINE

## 2025-05-10 PROCEDURE — 85018 HEMOGLOBIN: CPT | Performed by: INTERNAL MEDICINE

## 2025-05-10 PROCEDURE — 85384 FIBRINOGEN ACTIVITY: CPT | Performed by: STUDENT IN AN ORGANIZED HEALTH CARE EDUCATION/TRAINING PROGRAM

## 2025-05-10 PROCEDURE — 83605 ASSAY OF LACTIC ACID: CPT | Performed by: STUDENT IN AN ORGANIZED HEALTH CARE EDUCATION/TRAINING PROGRAM

## 2025-05-10 PROCEDURE — 25810000003 SODIUM CHLORIDE 0.9 % SOLUTION 250 ML FLEX CONT: Performed by: INTERNAL MEDICINE

## 2025-05-10 PROCEDURE — 94799 UNLISTED PULMONARY SVC/PX: CPT

## 2025-05-10 PROCEDURE — 85025 COMPLETE CBC W/AUTO DIFF WBC: CPT | Performed by: STUDENT IN AN ORGANIZED HEALTH CARE EDUCATION/TRAINING PROGRAM

## 2025-05-10 PROCEDURE — 84145 PROCALCITONIN (PCT): CPT

## 2025-05-10 PROCEDURE — 85610 PROTHROMBIN TIME: CPT | Performed by: STUDENT IN AN ORGANIZED HEALTH CARE EDUCATION/TRAINING PROGRAM

## 2025-05-10 PROCEDURE — 83735 ASSAY OF MAGNESIUM: CPT | Performed by: STUDENT IN AN ORGANIZED HEALTH CARE EDUCATION/TRAINING PROGRAM

## 2025-05-10 PROCEDURE — 25010000002 CEFTRIAXONE PER 250 MG: Performed by: INTERNAL MEDICINE

## 2025-05-10 RX ORDER — AMLODIPINE BESYLATE 5 MG/1
5 TABLET ORAL
Status: DISCONTINUED | OUTPATIENT
Start: 2025-05-10 | End: 2025-05-13

## 2025-05-10 RX ORDER — HYDRALAZINE HYDROCHLORIDE 50 MG/1
50 TABLET, FILM COATED ORAL EVERY 8 HOURS SCHEDULED
Status: DISCONTINUED | OUTPATIENT
Start: 2025-05-10 | End: 2025-05-19

## 2025-05-10 RX ORDER — CLONIDINE HYDROCHLORIDE 0.1 MG/1
0.2 TABLET ORAL EVERY 8 HOURS SCHEDULED
Status: DISCONTINUED | OUTPATIENT
Start: 2025-05-10 | End: 2025-05-11

## 2025-05-10 RX ORDER — OXYCODONE HYDROCHLORIDE 5 MG/1
5 TABLET ORAL EVERY 4 HOURS PRN
Refills: 0 | Status: DISCONTINUED | OUTPATIENT
Start: 2025-05-10 | End: 2025-05-11

## 2025-05-10 RX ORDER — NYSTATIN 100000 [USP'U]/ML
5 SUSPENSION ORAL 4 TIMES DAILY
Status: DISCONTINUED | OUTPATIENT
Start: 2025-05-10 | End: 2025-05-10

## 2025-05-10 RX ADMIN — AMIODARONE HYDROCHLORIDE 200 MG: 200 TABLET ORAL at 21:31

## 2025-05-10 RX ADMIN — CLONIDINE HYDROCHLORIDE 0.1 MG: 0.1 TABLET ORAL at 05:14

## 2025-05-10 RX ADMIN — METOPROLOL TARTRATE 25 MG: 25 TABLET, FILM COATED ORAL at 08:39

## 2025-05-10 RX ADMIN — CEFTRIAXONE 2000 MG: 2 INJECTION, POWDER, FOR SOLUTION INTRAMUSCULAR; INTRAVENOUS at 21:31

## 2025-05-10 RX ADMIN — AMLODIPINE BESYLATE 5 MG: 5 TABLET ORAL at 12:47

## 2025-05-10 RX ADMIN — HEPARIN SODIUM 5000 UNITS: 5000 INJECTION INTRAVENOUS; SUBCUTANEOUS at 05:15

## 2025-05-10 RX ADMIN — Medication 45 ML: at 01:49

## 2025-05-10 RX ADMIN — AMIODARONE HYDROCHLORIDE 200 MG: 200 TABLET ORAL at 00:37

## 2025-05-10 RX ADMIN — MUPIROCIN 1 APPLICATION: 20 OINTMENT TOPICAL at 00:38

## 2025-05-10 RX ADMIN — HYDRALAZINE HYDROCHLORIDE 50 MG: 50 TABLET ORAL at 14:08

## 2025-05-10 RX ADMIN — ASPIRIN 81 MG 81 MG: 81 TABLET ORAL at 08:39

## 2025-05-10 RX ADMIN — DEXMEDETOMIDINE HYDROCHLORIDE 1.2 MCG/KG/HR: 4 INJECTION, SOLUTION INTRAVENOUS at 06:26

## 2025-05-10 RX ADMIN — HEPARIN SODIUM 5000 UNITS: 5000 INJECTION INTRAVENOUS; SUBCUTANEOUS at 21:31

## 2025-05-10 RX ADMIN — HYDRALAZINE HYDROCHLORIDE 25 MG: 25 TABLET ORAL at 05:15

## 2025-05-10 RX ADMIN — SODIUM CHLORIDE 5 MG/HR: 9 INJECTION, SOLUTION INTRAVENOUS at 01:54

## 2025-05-10 RX ADMIN — CLONIDINE HYDROCHLORIDE 0.1 MG: 0.1 TABLET ORAL at 00:38

## 2025-05-10 RX ADMIN — PANTOPRAZOLE SODIUM 40 MG: 40 INJECTION, POWDER, FOR SOLUTION INTRAVENOUS at 08:40

## 2025-05-10 RX ADMIN — HEPARIN SODIUM 5000 UNITS: 5000 INJECTION INTRAVENOUS; SUBCUTANEOUS at 14:09

## 2025-05-10 RX ADMIN — DEXMEDETOMIDINE HYDROCHLORIDE 1.3 MCG/KG/HR: 4 INJECTION, SOLUTION INTRAVENOUS at 18:11

## 2025-05-10 RX ADMIN — Medication 2 PACKET: at 08:38

## 2025-05-10 RX ADMIN — HYDRALAZINE HYDROCHLORIDE 25 MG: 25 TABLET ORAL at 00:36

## 2025-05-10 RX ADMIN — OXYCODONE HYDROCHLORIDE 5 MG: 5 TABLET ORAL at 12:50

## 2025-05-10 RX ADMIN — AMIODARONE HYDROCHLORIDE 200 MG: 200 TABLET ORAL at 08:39

## 2025-05-10 RX ADMIN — MICONAZOLE NITRATE 1 APPLICATION: 20 POWDER TOPICAL at 08:40

## 2025-05-10 RX ADMIN — CLOPIDOGREL BISULFATE 75 MG: 75 TABLET, FILM COATED ORAL at 08:39

## 2025-05-10 RX ADMIN — ATORVASTATIN CALCIUM 40 MG: 40 TABLET, FILM COATED ORAL at 00:37

## 2025-05-10 RX ADMIN — METOPROLOL TARTRATE 25 MG: 25 TABLET, FILM COATED ORAL at 21:32

## 2025-05-10 RX ADMIN — ATORVASTATIN CALCIUM 40 MG: 40 TABLET, FILM COATED ORAL at 21:32

## 2025-05-10 RX ADMIN — NITROGLYCERIN 1 PATCH: 0.1 PATCH TRANSDERMAL at 08:39

## 2025-05-10 RX ADMIN — Medication 45 ML: at 08:38

## 2025-05-10 RX ADMIN — SACUBITRIL AND VALSARTAN 1 TABLET: 24; 26 TABLET, FILM COATED ORAL at 08:39

## 2025-05-10 RX ADMIN — DEXMEDETOMIDINE HYDROCHLORIDE 1.3 MCG/KG/HR: 4 INJECTION, SOLUTION INTRAVENOUS at 22:28

## 2025-05-10 RX ADMIN — DEXMEDETOMIDINE HYDROCHLORIDE 1.1 MCG/KG/HR: 4 INJECTION, SOLUTION INTRAVENOUS at 02:34

## 2025-05-10 RX ADMIN — Medication 45 ML: at 21:33

## 2025-05-10 RX ADMIN — CLONIDINE HYDROCHLORIDE 0.2 MG: 0.1 TABLET ORAL at 21:30

## 2025-05-10 RX ADMIN — OXYCODONE HYDROCHLORIDE 5 MG: 5 TABLET ORAL at 22:08

## 2025-05-10 RX ADMIN — CLONIDINE HYDROCHLORIDE 0.2 MG: 0.1 TABLET ORAL at 14:08

## 2025-05-10 RX ADMIN — FENTANYL CITRATE 25 MCG: 50 INJECTION, SOLUTION INTRAMUSCULAR; INTRAVENOUS at 17:28

## 2025-05-10 RX ADMIN — MICONAZOLE NITRATE 1 APPLICATION: 20 POWDER TOPICAL at 21:30

## 2025-05-10 RX ADMIN — MICONAZOLE NITRATE 1 APPLICATION: 20 POWDER TOPICAL at 00:38

## 2025-05-10 RX ADMIN — HYDRALAZINE HYDROCHLORIDE 50 MG: 50 TABLET ORAL at 21:31

## 2025-05-10 RX ADMIN — SACUBITRIL AND VALSARTAN 1 TABLET: 24; 26 TABLET, FILM COATED ORAL at 21:32

## 2025-05-10 RX ADMIN — DEXMEDETOMIDINE HYDROCHLORIDE 1 MCG/KG/HR: 4 INJECTION, SOLUTION INTRAVENOUS at 12:33

## 2025-05-10 NOTE — PROGRESS NOTES
[facility]  Vascular Surgery Progress Note    Patient Name: Estefania Connor  : 1955  MRN: [id number]  Date of admission: 2025  Surgical Procedures Since Admission:  Procedure(s):  Left Heart Cath  Stent ELIECER coronary  Surgeon:  Edin Boland MD  Status:  3 Days Post-Op  -------------------    Procedure(s):  REPAIR LEFT FEMORAL ARTERY, EVACUATION LEFT GROIN HEMATOMA, PLACEMENT OF NEGATIVE WOUND VACCUUM THERAPY  Surgeon:  Remi Guerrero MD  Status:  1 Day Post-Op  -------------------    Subjective   Subjective     Subjective: No events overnight.  Hemodynamically stable        Objective   Objective     Vitals:   Temp:  [97.9 °F (36.6 °C)-100.2 °F (37.9 °C)] 98.2 °F (36.8 °C)  Heart Rate:  [] 90  Resp:  [20-27] 21  BP: ()/() 158/56  Arterial Line BP: (115-175)/(46-69) 132/48  FiO2 (%):  [40 %] 40 %  Output by Drain (mL) 25 0701 - 25 1900 25 1901 - 05/10/25 0700 05/10/25 0701 - 05/10/25 1248 Range Total   Closed/Suction Drain 1 Left;Proximal Thigh Bulb 19 Fr.  425  425       Physical Exam    Left groin hematoma with wound VAC in place.  Palpable left dorsalis pedis pulse  Result Review    Result Review:  I have personally reviewed the results from the time of this admission to 5/10/2025 12:48 EDT and agree with these findings:  []  Laboratory  []  Microbiology  []  Radiology  []  EKG/Telemetry   []  Cardiology/Vascular   []  Pathology  []  Old records  []  Other:      Assessment & Plan   Assessment / Plan     Brief Patient Summary:  Estefania Connor is a 69 y.o. female who underwent successful repair of left femoral artery with placement of negative pressure wound therapy.      Plan:   Will need return trip to the operating room early next week for washout with possible closure.    Remi Guerrero MD

## 2025-05-10 NOTE — ANESTHESIA POSTPROCEDURE EVALUATION
Patient: Estefania Connor    Procedure Summary       Date: 05/09/25 Room / Location:  [facility] /  [facility]    Anesthesia Start: 2140 Anesthesia Stop: 2255    Procedure: REPAIR LEFT FEMORAL ARTERY, EVACUATION LEFT GROIN HEMATOMA, PLACEMENT OF NEGATIVE WOUND VACCUUM THERAPY (Left) Diagnosis:     Surgeons: Remi Guerrero MD Provider: Paulino Donnelly MD    Anesthesia Type: Not recorded ASA Status: 4 - Emergent            Anesthesia Type: No value filed.    Vitals  Vitals Value Taken Time   BP     Temp     Pulse 102 05/09/25 22:54   Resp     SpO2 99 % 05/09/25 22:54   Vitals shown include unfiled device data.        Post Anesthesia Care and Evaluation    Patient location during evaluation: ICU  Level of consciousness: sleepy but conscious    Airway patency: patent  Anesthetic complications: No anesthetic complications    Cardiovascular status: acceptable  Respiratory status: acceptable, ventilator and trach  Hydration status: acceptable

## 2025-05-10 NOTE — PLAN OF CARE
Goal Outcome Evaluation:  Plan of Care Reviewed With: spouse        Progress: improving     Remains on vent via trach. Precedex in use. More calm today but agitated at times. Remains in wrist restraints for pt safety. Wound vac remains in place. Groin remains edematous bruised. H/H low this AM and recheck remains the same 7.7/22.8. B/P up and down throughout the day. See flow sheet for in depth assessment.  called and was given very frequent updates. Continue with plan of care.                            Pt called to recheck vital signs no answer , will check back.      Nik Jordan, LPN  15/14/30 [id number]

## 2025-05-10 NOTE — PROGRESS NOTES
Pulmonary/Critical Care ICU Note     LOS: 5 days   Patient Care Team:  Sergio Randall MD as PCP - General (Cardiology)    Chief Complaint: Cardiac arrest / CAD      Subjective     History reviewed and updated in EMR as indicated.    Interval History:     Overnight events reviewed.  Patient went back to the OR overnight for left femoral artery repair and evacuation of hematoma.  Patient is sedated on Precedex this morning and on mechanical ventilation.  She is unable to relay any history.  She received 4 units of PRBCs.    History taken from: chart, staff    PMH/FH/Social History were reviewed and updated appropriately in the electronic medical record.     Review of Systems:    Review of 14 systems was completed with positives and pertinent negatives noted in the subjective section.  All other systems reviewed and are negative.         Objective     Vital Signs  Temp:  [97.9 °F (36.6 °C)-100.2 °F (37.9 °C)] 98.2 °F (36.8 °C)  Heart Rate:  [] 90  Resp:  [20-27] 21  BP: ()/() 158/56  Arterial Line BP: (115-175)/(46-69) 132/48  FiO2 (%):  [40 %] 40 %  05/09 0701 - 05/10 0700  In: 3232.7 [I.V.:1802.7]  Out: 3835 [Urine:50; Drains:425]  Body mass index is 27.11 kg/m².  Mode: VC+/AC  FiO2 (%):  [40 %] 40 %  S RR:  [10-20] 20  S VT:  [365 mL] 365 mL  PEEP/CPAP (cm H2O):  [5 cm H20] 5 cm H20  MAP (cm H2O):  [7.6-17] 17  IV drips:  dexmedetomidine, Last Rate: 1 mcg/kg/hr (05/10/25 1233)  niCARdipine, Last Rate: Stopped (05/10/25 1040)  norepinephrine, Last Rate: Stopped (05/09/25 0923)       Physical Exam:     Constitutional:   Drowsy  on ventilator, in no acute distress   Head:   Normocephalic, atraumatic   Eyes:           Lids and lashes normal, conjunctivae and sclerae normal.  PER   ENMT:  Ears appear intact with no abnormalities noted     Lips normal.     Neck:  Tracheostomy in place on mechanical ventilator, no JVD   Lungs/Resp:    Normal effort, symmetric chest rise, no crepitus, clear to       auscultation bilaterally.               Heart/CV:   Regular rhythm and normal rate, no murmur   Abdomen/GI:    Soft, nontender, nondistended   :    Deferred   Extremities/MSK:  No clubbing or cyanosis.  Trace bilateral lower extremity edema.  Right upper extremity is swollen.  Left groin wound VAC in place.   Pulses:  Pulses palpable and equal bilaterally   Skin:  No bleeding, bruising or rash   Heme/Lymph:  No cervical or supraclavicular adenopathy.   Neurologic:    Psychiatric:    Moves all extremities with no obvious focal motor deficit.  Cranial nerves 2 - 12 grossly intact  Non-agitated, normal affect.    The above physical exam findings were reviewed and reflect my exam findings as of today's exam.   Electronically signed by:  Terry Antonio MD  05/10/25  13:25 EDT      Results Review:     I reviewed the patient's new clinical results.   Results from last 7 days   Lab Units 05/10/25  0828 05/10/25  0005 05/09/25  1238 05/09/25  0329 05/05/25  1440 05/05/25  0143 05/04/25  0235   SODIUM mmol/L 136 137  --  136   < > 130* 135*   POTASSIUM mmol/L 3.8 3.8 3.7 3.3*   < > 3.2* 3.3*   CHLORIDE mmol/L 104 103  --  102   < > 93* 95*   CO2 mmol/L 23.0 21.0*  --  22.0   < > 24.6 27.8   BUN mg/dL 25* 20  --  32*   < > 25* 20   CREATININE mg/dL 2.93* 2.50*  --  3.75*   < > 4.63* 3.93*   CALCIUM mg/dL 8.1* 8.3*  --  8.7   < > 9.1 9.2   BILIRUBIN mg/dL  --  0.4  --   --   --  0.2 0.2   ALK PHOS U/L  --  84  --   --   --  112 119*   ALT (SGPT) U/L  --  14  --   --   --  9 12   AST (SGOT) U/L  --  23  --   --   --  11 12   GLUCOSE mg/dL 105* 116*  --  114*   < > 108* 112*    < > = values in this interval not displayed.     Results from last 7 days   Lab Units 05/10/25  0828 05/10/25  0005 05/09/25 2058   WBC 10*3/mm3 13.30* 20.07* 13.50*   HEMOGLOBIN g/dL 7.7* 9.8* 7.2*   HEMATOCRIT % 22.4* 29.3* 21.8*   PLATELETS 10*3/mm3 170 220 222     Results from last 7 days   Lab Units 05/08/25  0326   PH, ARTERIAL pH units 7.406    PO2 ART mm Hg 103.0   PCO2, ARTERIAL mm Hg 36.6   HCO3 ART mmol/L 23.0     Results from last 7 days   Lab Units 05/10/25  0005 05/06/25  0644 05/05/25  1440   MAGNESIUM mg/dL 1.8 2.0 2.0   PHOSPHORUS mg/dL  --  4.1 4.1       I reviewed the patient's new imaging including images and reports.    CT angiogram abdomen pelvis done on 5/9/2025.  There is a large left groin hematoma with evidence of active extravasation and no evidence of retroperitoneal hemorrhage.  Radiologist impression follows:    Impression:  1.Infrarenal abdominal aortic aneurysm measuring 4.2 cm in transverse dimension. No evidence of aortic dissection or periaortic hemorrhage.  2.Large left groin hematoma measuring 4.3 x 12 cm with evidence of contrast extravasation within the hematoma consistent with active bleeding. A pseudoaneurysm is not visualized. Findings discussed with vascular surgery at the time of dictation.  3.Advanced aortoiliac atheromatous changes as described above.  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 Multifocal moderate stenosis throughout the SMA. BELINDA is not visualized.  5.Moderate to large bilateral pleural effusions are visualized with adjacent compressive atelectasis.  6.Gastrostomy tube is in appropriate position. There is nonspecific gastric distention. An obstructing lesion is not visualized however, evaluation is limited due to motion artifact. Gastric distention may be secondary to gastroparesis. If there is   concern for an obstructing lesion, correlate with the endoscopy.  7.Additional findings per body of the report.    Medication Review:   amiodarone, 200 mg, Per PEG Tube, Q12H  amLODIPine, 5 mg, Per PEG Tube, Q24H  aspirin, 81 mg, Per PEG Tube, Daily  atorvastatin, 40 mg, Per PEG Tube, Nightly  cefTRIAXone, 2,000 mg, Intravenous, Q24H  cloNIDine, 0.2 mg, Per PEG Tube, Q8H  clopidogrel, 75 mg, Per PEG Tube, Daily  heparin (porcine), 5,000 Units, Subcutaneous,  Q8H  hydrALAZINE, 50 mg, Per PEG Tube, Q8H  [START ON 5/11/2025] lansoprazole, 15 mg, Per PEG Tube, QAM AC  metoprolol tartrate, 25 mg, Per PEG Tube, Q12H  miconazole, 1 Application, Topical, Q12H  nitroglycerin, 1 patch, Transdermal, Daily  Nutrisource fiber, 2 packet, Per G Tube, Daily  ProSource TF, 45 mL, Per G Tube, BID  sacubitril-valsartan, 1 tablet, Per PEG Tube, Q12H      dexmedetomidine, 0.2-1.5 mcg/kg/hr, Last Rate: 1 mcg/kg/hr (05/10/25 1233)  niCARdipine, 5-15 mg/hr, Last Rate: Stopped (05/10/25 1040)  norepinephrine, 0.02-0.3 mcg/kg/min, Last Rate: Stopped (05/09/25 0923)        Assessment & Plan         CAD (coronary artery disease)    ESRD on HD    Polycystic kidney disease    HTN (hypertension)    Dyslipidemia    AAA (abdominal aortic aneurysm)    Right coronary artery occlusion    Depression    Tracheostomy present    S/P percutaneous endoscopic gastrostomy (PEG) tube placement    69 y.o. female with history of HTN, HLD, CKD on HD, polycystic kidney disease, abdominal aortic aneurysm, CAD with prior stent, depression, transferred from [facility] in [place] on 5/5/2025 after she suffered a V-fib arrest with resuscitation and underwent left heart catheterization showing 100% occlusion of the RCA with failed intervention.    Patient was initially admitted to [facility] on 3/20/2025 with volume overload secondary to missing 2 hemodialysis sessions.  She also has followed up possible pneumonia and had anemia and was evaluated by GI with no evidence of GI bleed.  She had bilateral pleural effusions and underwent 1550 mL thoracentesis, but despite thoracentesis required intubation on 4/10/2025 was transferred to [facility] on 4/11/2025.  Patient ultimately underwent tracheostomy and PEG placement on 4/29/2025.  She deteriorated on 5/2/2025 with me today Vfib arrest ultimately resuscitated with subsequent LHC as above.    Cardiology plans left heart catheterization today for  PCI.    Patient remains on mechanical ventilation with excellent oxygen saturations on 40 % FiO2 this morning.  She is arousable and alert and I think her chances of successful weaning from mechanical ventilation will be excellent.    Patient has a swollen right upper extremity which has been ongoing.  Repeat right upper extremity duplex this admission shows superficial thrombophlebitis in the cephalic vein.  She has a right internal jugular dialysis catheter.      Patient had bleeding from left femoral artery site on the evening of 5/9/2025 necessitating return to the OR for femoral artery repair and evacuation of hematoma.  She will need to go back to the operating room per vascular surgery.  Currently wound VAC in place.    Plan:  1.  For V-fib arrest/CAD with occluded RCA: Failed intervention at outside hospital.  Cardiology consultation.  Aspirin, statin, beta-blocker, heparin drip.  [facility] today.  2.  For CKD on HD: Last received HD at OSH on 5/3/2025 however received 20 minutes of HD on 5/5/2025 but did not tolerate reportedly secondary to hypotension and her session was discontinued prior to transfer here.  Nephrology following.  IHD done on 5/9/2025.    3.  For respiratory: Initial respiratory failure secondary to volume overload from missing hemodialysis.  Also treated for pneumonia.  Status post tracheostomy and PEG 4/29/2025.  Continue mechanical ventilation and adjust settings to optimize ventilation and oxygenation and minimize barotrauma.  I think patient will be weanable from mechanical ventilation going forward after PCI.    4.  For nutrition: N.p.o. for now.  Restarted tube feeds.  N.p.o. after midnight.  5.  DVT prophylaxis/right upper extremity swell: Recheck duplex right upper extremity this admission showed superficial thrombophlebitis in the cephalic vein.  No DVT.  6.  For gram-negative rods in sputum: Patient has some pulmonary congestion on chest x-ray but no convincing symptoms of pneumonia.   White blood cell count mildly elevated.  Rocephin was started empirically.  Cultures growing moderate Klebsiella susceptible to Rocephin and I will plan to complete 7 days for Klebsiella tracheobronchitis in the setting of tracheostomy/respiratory failure.    7.  For left femoral artery bleeding/hematoma: Status postrepair by vascular surgery 5/9/2025.  Wound VAC in place.  Patient will need to go back to the operating room.    Holding on discharge secondary to vascular issues.    Electronically signed by:    Terry Antonio MD  05/10/25  13:25 EDT      *. Please note that portions of this note were completed with [employer] - a voice recognition program.

## 2025-05-10 NOTE — PROGRESS NOTES
"[facility] at [facility]  IP Progress Note      Chief Complaint: Follow-up of CAD/CM.    Subjective   Patient was stable from cardiac standpoint and was to be transferred to LTAC however developed large, expanding left groin hematoma with drop in hemoglobin remained hemodynamically stable.  Needed 4 units of blood transfusion and was emergently taken for vascular surgery.  She underwent hematoma evacuation and access site repair by Remi Menard.  Currently stable, remains on ventilator through tracheostomy, response, denying any pain.  Blood pressure was markedly elevated postoperatively and it has now improved.    Objective     Blood pressure 111/60, pulse 84, temperature 99 °F (37.2 °C), temperature source Axillary, resp. rate 21, height 160 cm (62.99\"), SpO2 98%.     Intake/Output Summary (Last 24 hours) at 5/10/2025 0803  Last data filed at 5/10/2025 0600  Gross per 24 hour   Intake 3232.72 ml   Output 3835 ml   Net -602.28 ml       Physical Exam:  General: No acute distress.   Neck: no JVD.  Chest:No respiratory distress, breath sounds are normal. No wheezes,  rhonchi or rales.  Cardiovascular: Normal S1 and S2, no murmur, gallop or rub.    Extremities: No edema.  Right groin stable, left groin dressing and drain in place.    Results Review:     I reviewed the patient's new clinical results.    Results from last 7 days   Lab Units 05/10/25  0005   WBC 10*3/mm3 20.07*   HEMOGLOBIN g/dL 9.8*   HEMATOCRIT % 29.3*   PLATELETS 10*3/mm3 220     Results from last 7 days   Lab Units 05/10/25  0005   SODIUM mmol/L 137   POTASSIUM mmol/L 3.8   CHLORIDE mmol/L 103   CO2 mmol/L 21.0*   BUN mg/dL 20   CREATININE mg/dL 2.50*   CALCIUM mg/dL 8.3*   BILIRUBIN mg/dL 0.4   ALK PHOS U/L 84   ALT (SGPT) U/L 14   AST (SGOT) U/L 23   GLUCOSE mg/dL 116*     Results from last 7 days   Lab Units 05/10/25  0005   SODIUM mmol/L 137   POTASSIUM mmol/L 3.8   CHLORIDE mmol/L 103   CO2 mmol/L 21.0*   BUN mg/dL 20 "   CREATININE mg/dL 2.50*   GLUCOSE mg/dL 116*   CALCIUM mg/dL 8.3*     Results from last 7 days   Lab Units 05/10/25  0005 05/09/25  1913 05/05/25  1440   INR  1.13* 1.15* 1.02     Lab Results   Component Value Date    TROPONINT 114 (C) 05/01/2025       amiodarone, 200 mg, Per PEG Tube, Q12H  aspirin, 81 mg, Per PEG Tube, Daily  atorvastatin, 40 mg, Per PEG Tube, Nightly  cefTRIAXone, 2,000 mg, Intravenous, Q24H  cloNIDine, 0.1 mg, Per PEG Tube, Q8H  clopidogrel, 75 mg, Per PEG Tube, Daily  heparin (porcine), 5,000 Units, Subcutaneous, Q8H  hydrALAZINE, 25 mg, Per PEG Tube, Q8H  [START ON 5/11/2025] lansoprazole, 15 mg, Per PEG Tube, QAM AC  metoprolol tartrate, 25 mg, Per PEG Tube, Q12H  miconazole, 1 Application, Topical, Q12H  nitroglycerin, 1 patch, Transdermal, Daily  Nutrisource fiber, 2 packet, Per G Tube, Daily  ProSource TF, 45 mL, Per G Tube, BID  sacubitril-valsartan, 1 tablet, Per PEG Tube, Q12H       Tele: Sinus Rythym      Assessment:  Coronary artery disease with heavily calcified vessels.  Moderate nonocclusive disease of the left coronary system, severe disease of the RCA, status post ELIECER x 3.  Cardiomyopathy, EF 41-45%  Left groin hematoma, status post surgical repair.  Chronic kidney disease on dialysis  Heart failure with mildly reduced ejection fraction  Hypertension.  Dyslipidemia.  Trach and PEG    Plan:  Continue current medical management.  Medications as listed above reviewed and agree with above medical regimen.    Increase hydralazine to 50 mg 3 times daily for better blood pressure control.  Postoperative care per vascular surgery.  ICU team following.  Cardiology will revisit on Monday, please call if needed sooner.    Edin Boland MD, FACC, [facility]

## 2025-05-10 NOTE — OP NOTE
PSEUDO ANEURYSM REPAIR, EXTREMITY  Procedure Report    Patient Name:  Estefania Connor  YOB: 1955    Date of Surgery:  5/9/2025     Indications: 69-year-old woman with active hemorrhage in left groin after cardiac catheterization.  Patient was unstable and given large hematoma and compromised skin patient was indicated to be taken to the operating room for exploration and repair of femoral artery.    Pre-op Diagnosis:   Left groin hematoma       Post-Op Diagnosis Codes:  Left groin hematoma, common femoral artery anterior wall injury    Procedure/CPT® Codes:      Procedure(s):  Evacuation of left groin hematoma (17x6x4)  Primary repair of left femoral artery   Application of negative pressure wound therapy (17X6X4)    Staff:  Surgeon(s):  Remi Guerrero MD    Circulator: Natali Combs, CRISTIAN; María Guerra, CRISTIAN; Angie Patel, CRISTIAN; Derek Ornelas, CRISTIAN; Michelle Cabrera RN  Perfusionist: Ryan Ho  Scrub Person: Jasmyne Bustillos  Assistant: Wero Leal PA-C     Assistant: Wero Leal PA-C  was responsible for performing the following activities: Retraction, Suction, Irrigation, and Placing Dressing and their skilled assistance was necessary for the success of this case.    Anesthesia: General    Estimated Blood Loss: <500ml    Implants:    Implant Name Type Inv. Item Serial No.  Lot No. LRB No. Used Action   CLIPAPPLR M/ ENDO LIGACLIP 20CLP 11IN MD - [id number] Implant CLIPAPPLR M/ ENDO LIGACLIP 20CLP 11IN MD  [employer]  [employer] J [id number] Left 1 Implanted   SEAL HEMO SURG JAREN/AH ABS/PWDR 5GM - [id number] Implant SEAL HEMO SURG JAREN/AH ABS/PWDR 5GM  [employer] [id number] Left 1 Implanted       Specimen:          None        Findings: Large left groin hematoma with suspected bleeding for over 24 hours.  Anterior common femoral artery wall arteriotomy primary closed with Prolene suture.  Concern for nonviable skin and subcutaneous  tissue.  Negative pressure wound therapy applied with plans for additional debridement and irrigation in the near future.    Complications: None apparent    Description of Procedure: Patient was brought to the operating, and laid on the table in a supine position.  Patient already had a tracheostomy.  She was connected to the ventilator.  Patient's abdomen and groins were prepped and draped in sterile surgical fashion.  Patient received antibiotics.  Timeout was performed.  A longitudinal incision was made in the left groin.  A large hematoma which appears to have been present for quite some time was evacuated and self-retaining tractors were placed.  There was diffuse oozing.  We identified the inguinal ligament and dissected to the common femoral artery and identified arteriotomy with a fresh thrombus over the vessel.  When this thrombus was unroofed there was active hemorrhage noted.  This was then repaired primarily with 5-0 Prolene sutures with good hemostasis.  Patient had palpable dorsalis pedis pulse and good Doppler signal in the vessels.  We then applied negative pressure wound therapy given that I had concerns about viability of some of the tissue and did not want to close this primarily as there was significant destruction of subcutaneous tissue by this dissecting hematoma.  Patient was then left intubated and was taken to ICU in stable but guarded condition.  All counts were correct.    Remi Guerrero MD     Date: 5/9/2025  Time: 22:35 EDT

## 2025-05-10 NOTE — PROGRESS NOTES
"   LOS: 5 days    Patient Care Team:  Sergio Randall MD as PCP - General (Cardiology)    Subjective     Patient went to the OR yesterday for groin hematoma requiring femoral artery repair.    Objective     Vital Signs:  Blood pressure 135/65, pulse 90, temperature 98.2 °F (36.8 °C), temperature source Axillary, resp. rate 21, height 160 cm (62.99\"), SpO2 98%.      Intake/Output Summary (Last 24 hours) at 5/10/2025 1011  Last data filed at 5/10/2025 0600  Gross per 24 hour   Intake 3182.72 ml   Output 3835 ml   Net -652.28 ml        05/09 0701 - 05/10 0700  In: 3232.7 [I.V.:1802.7]  Out: 3835 [Urine:50; Drains:425]    Physical Exam:        GENERAL: WDWF NAD  NEURO: Sedate on vent  PSYCHIATRIC: Unable to evaluate  EYE: PE, no icterus, no conjunctivitis  ENT: OMM dry, + trach  NECK: Supple , No JVD discernable,  Trachea midline  CV: Trace edema, RRR  LUNGS:  Quiet,  Nonlabored resp.  Symmetrical expansion  ABDOMEN: + PEG, nondistended  : + Pereira, no palp bladder  SKIN: Warm and dry without rash      Labs:  Results from last 7 days   Lab Units 05/10/25  0828 05/10/25  0005 05/09/25  2058   WBC 10*3/mm3 13.30* 20.07* 13.50*   HEMOGLOBIN g/dL 7.7* 9.8* 7.2*   PLATELETS 10*3/mm3 170 220 222     Results from last 7 days   Lab Units 05/10/25  0828 05/10/25  0005 05/09/25  1238 05/09/25  0329 05/08/25  0421 05/07/25  0348 05/06/25  0644 05/05/25  1440 05/05/25  0143 05/04/25  0235   SODIUM mmol/L 136 137  --  136 135*   < > 133* 132* 130* 135*   POTASSIUM mmol/L 3.8 3.8 3.7 3.3* 3.7   < > 3.4* 3.4* 3.2* 3.3*   CHLORIDE mmol/L 104 103  --  102 101   < > 95* 93* 93* 95*   CO2 mmol/L 23.0 21.0*  --  22.0 22.0   < > 24.0 24.0 24.6 27.8   BUN mg/dL 25* 20  --  32* 23   < > 29* 27* 25* 20   CREATININE mg/dL 2.93* 2.50*  --  3.75* 3.47*   < > 4.86* 4.51* 4.63* 3.93*   CALCIUM mg/dL 8.1* 8.3*  --  8.7 8.8   < > 8.9 9.5 9.1 9.2   PHOSPHORUS mg/dL  --   --   --   --   --   --  4.1 4.1  --   --    MAGNESIUM mg/dL  --  1.8  --   -- "   --   --  2.0 2.0  --   --    ALBUMIN g/dL  --  2.5*  --   --   --   --   --   --  2.1* 2.2*    < > = values in this interval not displayed.     Results from last 7 days   Lab Units 05/10/25  0005   ALK PHOS U/L 84   BILIRUBIN mg/dL 0.4   ALT (SGPT) U/L 14   AST (SGOT) U/L 23     Results from last 7 days   Lab Units 05/08/25  0326   PH, ARTERIAL pH units 7.406   PO2 ART mm Hg 103.0   PCO2, ARTERIAL mm Hg 36.6   HCO3 ART mmol/L 23.0               Estimated Creatinine Clearance: 16.7 mL/min (A) (by C-G formula based on SCr of 2.93 mg/dL (H)).         A/P:      ESRD:    HTN: Patient previously hypotensive.  Over the past few days blood pressure with wide variance with systolic pressure  on Cardene drip.  Patient required Levophed yesterday morning.  Blood pressure stable today. Try to avoid clonidine as may cause rebound hypertension.  Hydralazine increased this morning.    - Wean Cardene as able.    - Add amlodipine.  - Give albumin for hypotension.  - Watch with UF on HD.    Electrolytes: Stable  - Monitor and adjust as needed with HD.    Metabolic acidosis: pH has been stable.  Bicarb stable.    -Adjust with HD.    Anemia: Hemoglobin dropped to 5 yesterday with femoral artery bleed.  Transfused 4 units packed red blood cells.  Back down to 7.7 today from 9.8 at midnight.    -Continue to transfuse as indicated.    Volume: Stable.    -UF with HD as tolerated.    Respiratory failure: Trach on vent    Left femoral pseudoaneurysm: Developed at cardiac cath site.  Required repair 5/9/2025.  Wound VAC placed    High risk and complexity patient.         Mulugeta Chávez MD  05/10/25  10:11 EDT

## 2025-05-10 NOTE — CONSULTS
Vascular Surgery Consult Note    Chief complaint left groin hematoma    Reason for consultation: left groin hematoma   Consult requested by: Dr Higuera    HPI: This is a 69-year-old woman who underwent bilateral femoral arterial access for coronary stenting.  Sheaths were removed yesterday.  Patient has been hypertensive.  This afternoon patient was noted to have expanding hematoma.  There was a tremendous drop in hemoglobin from 10 to 5.  Patient was hypotensive and tachycardic.  I was asked by Dr. Higuera to evaluate the patient.  Patient is currently sedated with tracheostomy in place.  She is uncomfortable.  Her hypotension has resolved and she is actually hypertensive now.    Review of Systems: Unable to obtain due to mental status    History  Past Medical History:   Diagnosis Date    AAA (abdominal aortic aneurysm)     Coronary artery disease     Depression     ESRD (end stage renal disease)     Hyperlipidemia     Hypertension     Polycystic kidney disease      Past Surgical History:   Procedure Laterality Date    CARDIAC CATHETERIZATION N/A 5/2/2025    Procedure: Coronary angiography;  Surgeon: Ambrose Mcnally MD;  Location:  [facility];  Service: Cardiovascular;  Laterality: N/A;    CARDIAC CATHETERIZATION N/A 5/7/2025    Procedure: Left Heart Cath;  Surgeon: Edin Boland MD;  Location:  [facility];  Service: Cardiovascular;  Laterality: N/A;    CARDIAC CATHETERIZATION N/A 5/7/2025    Procedure: Stent ELIECER coronary;  Surgeon: Edin Boland MD;  Location:  [facility]AS[facility];  Service: Cardiovascular;  Laterality: N/A;    TRACHEOSTOMY AND PEG TUBE INSERTION N/A 4/29/2025    Procedure: TRACHEOSTOMY AND PERCUTANEOUS ENDOSCOPIC GASTROSTOMY TUBE INSERTION;  Surgeon: Kera Head MD;  Location: [facility] OR;  Service: General;  Laterality: N/A;     History reviewed. No pertinent family history.     Medications Prior to Admission   Medication Sig Dispense Refill Last  Dose/Taking    amLODIPine (NORVASC) 10 MG tablet Take 1 tablet by mouth Daily.   Taking    bumetanide (BUMEX) 2 MG tablet Take 1 tablet by mouth 2 (Two) Times a Day.   Taking    busPIRone (BUSPAR) 10 MG tablet Take 1 tablet by mouth 3 (Three) Times a Day.   Taking    carvedilol (COREG) 25 MG tablet Take 1 tablet by mouth 2 (Two) Times a Day With Meals.   Taking    Eliquis 2.5 MG tablet tablet Take 1 tablet by mouth Every 12 (Twelve) Hours.   Taking    ezetimibe (ZETIA) 10 MG tablet Take 1 tablet by mouth Daily.   Taking    fenofibrate (TRICOR) 54 MG tablet Take 1 tablet by mouth Daily.   Taking    hydrOXYzine (ATARAX) 25 MG tablet Take 1 tablet by mouth Every 8 (Eight) Hours As Needed for Anxiety.   Taking As Needed    isosorbide mononitrate (IMDUR) 60 MG 24 hr tablet Take 1 tablet by mouth Daily.   Taking    sertraline (ZOLOFT) 25 MG tablet Take 1 tablet by mouth Daily.   Taking    aspirin 81 MG EC tablet Take 1 tablet by mouth Daily.       atorvastatin (LIPITOR) 40 MG tablet Administer 1 tablet per G tube Every Night.       calcitriol (ROCALTROL) 0.25 MCG capsule Take 1 capsule by mouth 3 (Three) Times a Week. With hemodialysis on Tuesdays, Thursday, and Saturday.       clopidogrel (PLAVIX) 75 MG tablet Take 1 tablet by mouth Daily.       doxazosin (CARDURA) 2 MG tablet Administer 1 tablet per G tube Every Night.       epoetin vera-epbx (RETACRIT) 3000 UNIT/ML injection Inject 2 mL under the skin into the appropriate area as directed 3 (Three) Times a Week. Every Tuesday, Thursday, and Saturday       hydrALAZINE (APRESOLINE) 50 MG tablet Take 1 tablet by mouth 3 (Three) Times a Day.        Allergies:  Patient has no known allergies.    Vital Signs  Temp:  [97.5 °F (36.4 °C)-98.8 °F (37.1 °C)] 97.9 °F (36.6 °C)  Heart Rate:  [] 105  Resp:  [18-27] 27  BP: ()/() 177/79  FiO2 (%):  [40 %] 40 %    Physical Exam:      General Appearance:    Awakens to painful stimuli and moves all 4 extremities    Head:    Normocephalic, without obvious abnormality, atraumatic   Eyes:               Lids and lashes normal, conjunctivae and sclerae normal, no icterus, no pallor, corneas clear, PERRL   Ears:    Ears appear intact with no abnormalities noted   Throat:   Dry mucous membranes   Neck:   Tracheostomy in place       Lungs:     Diminished breath sounds    Heart:    Regular rhythm and normal rate, normal S1 and S2, no         murmur   Abdomen:     Normal bowel sounds, no masses, no organomegaly, soft     non-tender, non-distended, no guarding, no rebound                tenderness   Extremities:   Large, tender left groin hematoma with skin compromise and ecchymosis   Pulses:   Dorsalis pedis pulses palpable and equal bilaterally   Skin:   Left groin ecchymosis   Lymph nodes:   No palpable adenopathy   Neurologic:   Sedated, unable to assess cranial nerves     Results Review:    I reviewed the patient's new clinical results.    Assessment and Plan: This is a 69-year-old female with a large left groin hematoma with active extravasation.  I am unable to see the exact site of extravasation but this is still ongoing based on CTA.  She is currently hemodynamically stable.  She does have skin compromise from this hematoma.  I suspect that without evacuation and hemorrhage control this will continue to progress.  I recommend for this patient to undergo evacuation of left femoral hematoma and repair of femoral artery.  I called her  discussed the situation with him.  I discussed the risks of the procedure which include bleeding, infection, thrombosis, arterial injury, incisional complications, perioperative myocardial infarction, stroke, death.  Patient will be taken to the operating room as soon as the team is available.    I discussed the patients findings and my recommendations with family and primary care team.       Remi Guerrero MD  05/09/25  21:06 EDT

## 2025-05-10 NOTE — ANESTHESIA PREPROCEDURE EVALUATION
Anesthesia Evaluation     Patient summary reviewed and Nursing notes reviewed   NPO Solid Status: Waived due to emergency  NPO Liquid Status: Waived due to emergency           Airway   Dental      Pulmonary     breath sounds clear to auscultation  Cardiovascular     ECG reviewed  PT is on anticoagulation therapy  Rhythm: regular  Rate: abnormal    (+) hypertension, CAD, cardiac stents within the past 12 months , hyperlipidemia      Neuro/Psych  (+) psychiatric history  GI/Hepatic/Renal/Endo    (+) renal disease- ESRD    Musculoskeletal     Abdominal    Substance History      OB/GYN          Other        ROS/Med Hx Other: The study is technically difficult for diagnosis. The quality of the study is limited with poor acoustic windows.  ·  Sinus bradycardia in high 50s was the predominant rhythm observed during the procedure.  ·  Normal left ventricular cavity size noted. Left ventricular wall thickness is consistent with mild concentric hypertrophy.  ·  Left ventricular systolic function is mildly decreased. Left ventricular ejection fraction appears to be 41 - 45%.  ·  Left ventricular diastolic function is consistent with (grade II w/high LAP) pseudonormalization.  ·  There is mild calcification of the aortic valve. No significant aortic valve regurgitation is present. No hemodynamically significant aortic valve stenosis is present.  ·  There is mild calcification of the mitral valve posterior leaflet(s). Mild mitral valve regurgitation is present. No significant mitral valve stenosis is present.  ·  There is a small (<1cm) pericardial effusion. There is no evidence of cardiac tamponade.        Phys Exam Other: Arrived to OR intubated and sedated              Anesthesia Plan    ASA 4 - emergent     intravenous induction     Anesthetic plan, risks, benefits, and alternatives have been provided, discussed and informed consent has been obtained with: patient.    CODE STATUS:    Code Status (Patient has no pulse and is  not breathing): CPR (Attempt to Resuscitate)  Medical Interventions (Patient has pulse or is breathing): Full Support

## 2025-05-11 LAB
ANION GAP SERPL CALCULATED.3IONS-SCNC: 10 MMOL/L (ref 5–15)
BUN SERPL-MCNC: 37 MG/DL (ref 8–23)
BUN/CREAT SERPL: 11.1 (ref 7–25)
CALCIUM SPEC-SCNC: 8.2 MG/DL (ref 8.6–10.5)
CHLORIDE SERPL-SCNC: 102 MMOL/L (ref 98–107)
CO2 SERPL-SCNC: 22 MMOL/L (ref 22–29)
CREAT SERPL-MCNC: 3.32 MG/DL (ref 0.57–1)
DEPRECATED RDW RBC AUTO: 55.6 FL (ref 37–54)
EGFRCR SERPLBLD CKD-EPI 2021: 14.4 ML/MIN/1.73
ERYTHROCYTE [DISTWIDTH] IN BLOOD BY AUTOMATED COUNT: 17.4 % (ref 12.3–15.4)
GLUCOSE BLDC GLUCOMTR-MCNC: 107 MG/DL (ref 70–130)
GLUCOSE BLDC GLUCOMTR-MCNC: 113 MG/DL (ref 70–130)
GLUCOSE BLDC GLUCOMTR-MCNC: 114 MG/DL (ref 70–130)
GLUCOSE BLDC GLUCOMTR-MCNC: 116 MG/DL (ref 70–130)
GLUCOSE SERPL-MCNC: 98 MG/DL (ref 65–99)
HCT VFR BLD AUTO: 19.1 % (ref 34–46.6)
HCT VFR BLD AUTO: 19.9 % (ref 34–46.6)
HCT VFR BLD AUTO: 28 % (ref 34–46.6)
HGB BLD-MCNC: 6.3 G/DL (ref 12–15.9)
HGB BLD-MCNC: 6.7 G/DL (ref 12–15.9)
HGB BLD-MCNC: 9.5 G/DL (ref 12–15.9)
MCH RBC QN AUTO: 29.2 PG (ref 26.6–33)
MCHC RBC AUTO-ENTMCNC: 33 G/DL (ref 31.5–35.7)
MCV RBC AUTO: 88.4 FL (ref 79–97)
PLATELET # BLD AUTO: 162 10*3/MM3 (ref 140–450)
PMV BLD AUTO: 11.1 FL (ref 6–12)
POTASSIUM SERPL-SCNC: 3.8 MMOL/L (ref 3.5–5.2)
RBC # BLD AUTO: 2.16 10*6/MM3 (ref 3.77–5.28)
SODIUM SERPL-SCNC: 134 MMOL/L (ref 136–145)
WBC NRBC COR # BLD AUTO: 10.65 10*3/MM3 (ref 3.4–10.8)

## 2025-05-11 PROCEDURE — 85018 HEMOGLOBIN: CPT | Performed by: STUDENT IN AN ORGANIZED HEALTH CARE EDUCATION/TRAINING PROGRAM

## 2025-05-11 PROCEDURE — 80048 BASIC METABOLIC PNL TOTAL CA: CPT | Performed by: INTERNAL MEDICINE

## 2025-05-11 PROCEDURE — 85027 COMPLETE CBC AUTOMATED: CPT | Performed by: NURSE PRACTITIONER

## 2025-05-11 PROCEDURE — 86901 BLOOD TYPING SEROLOGIC RH(D): CPT

## 2025-05-11 PROCEDURE — 86900 BLOOD TYPING SEROLOGIC ABO: CPT

## 2025-05-11 PROCEDURE — P9016 RBC LEUKOCYTES REDUCED: HCPCS

## 2025-05-11 PROCEDURE — 82948 REAGENT STRIP/BLOOD GLUCOSE: CPT

## 2025-05-11 PROCEDURE — 85014 HEMATOCRIT: CPT | Performed by: STUDENT IN AN ORGANIZED HEALTH CARE EDUCATION/TRAINING PROGRAM

## 2025-05-11 PROCEDURE — 99232 SBSQ HOSP IP/OBS MODERATE 35: CPT | Performed by: INTERNAL MEDICINE

## 2025-05-11 PROCEDURE — 85018 HEMOGLOBIN: CPT | Performed by: INTERNAL MEDICINE

## 2025-05-11 PROCEDURE — 94003 VENT MGMT INPAT SUBQ DAY: CPT

## 2025-05-11 PROCEDURE — 94799 UNLISTED PULMONARY SVC/PX: CPT

## 2025-05-11 PROCEDURE — 25010000002 FENTANYL CITRATE (PF) 50 MCG/ML SOLUTION: Performed by: SURGERY

## 2025-05-11 PROCEDURE — 25010000002 CEFTRIAXONE PER 250 MG: Performed by: INTERNAL MEDICINE

## 2025-05-11 PROCEDURE — P9040 RBC LEUKOREDUCED IRRADIATED: HCPCS

## 2025-05-11 PROCEDURE — 85014 HEMATOCRIT: CPT | Performed by: INTERNAL MEDICINE

## 2025-05-11 PROCEDURE — 36430 TRANSFUSION BLD/BLD COMPNT: CPT

## 2025-05-11 PROCEDURE — 94761 N-INVAS EAR/PLS OXIMETRY MLT: CPT

## 2025-05-11 PROCEDURE — 25010000002 HEPARIN (PORCINE) PER 1000 UNITS: Performed by: SURGERY

## 2025-05-11 RX ORDER — OXYCODONE HYDROCHLORIDE 5 MG/1
5 TABLET ORAL EVERY 4 HOURS PRN
Refills: 0 | Status: DISCONTINUED | OUTPATIENT
Start: 2025-05-11 | End: 2025-05-13

## 2025-05-11 RX ORDER — CLONIDINE HYDROCHLORIDE 0.1 MG/1
0.1 TABLET ORAL EVERY 8 HOURS SCHEDULED
Status: DISCONTINUED | OUTPATIENT
Start: 2025-05-11 | End: 2025-05-22 | Stop reason: HOSPADM

## 2025-05-11 RX ADMIN — FENTANYL CITRATE 25 MCG: 50 INJECTION, SOLUTION INTRAMUSCULAR; INTRAVENOUS at 00:04

## 2025-05-11 RX ADMIN — HYDRALAZINE HYDROCHLORIDE 50 MG: 50 TABLET ORAL at 13:15

## 2025-05-11 RX ADMIN — CLONIDINE HYDROCHLORIDE 0.2 MG: 0.1 TABLET ORAL at 05:09

## 2025-05-11 RX ADMIN — DEXMEDETOMIDINE HYDROCHLORIDE 1.5 MCG/KG/HR: 4 INJECTION, SOLUTION INTRAVENOUS at 20:24

## 2025-05-11 RX ADMIN — FENTANYL CITRATE 25 MCG: 50 INJECTION, SOLUTION INTRAMUSCULAR; INTRAVENOUS at 20:24

## 2025-05-11 RX ADMIN — CEFTRIAXONE 2000 MG: 2 INJECTION, POWDER, FOR SOLUTION INTRAMUSCULAR; INTRAVENOUS at 21:49

## 2025-05-11 RX ADMIN — HYDRALAZINE HYDROCHLORIDE 50 MG: 50 TABLET ORAL at 21:49

## 2025-05-11 RX ADMIN — LANSOPRAZOLE 15 MG: 15 TABLET, ORALLY DISINTEGRATING ORAL at 08:25

## 2025-05-11 RX ADMIN — FENTANYL CITRATE 25 MCG: 50 INJECTION, SOLUTION INTRAMUSCULAR; INTRAVENOUS at 17:24

## 2025-05-11 RX ADMIN — HEPARIN SODIUM 5000 UNITS: 5000 INJECTION INTRAVENOUS; SUBCUTANEOUS at 13:15

## 2025-05-11 RX ADMIN — Medication 45 ML: at 21:53

## 2025-05-11 RX ADMIN — SACUBITRIL AND VALSARTAN 1 TABLET: 24; 26 TABLET, FILM COATED ORAL at 08:26

## 2025-05-11 RX ADMIN — ASPIRIN 81 MG 81 MG: 81 TABLET ORAL at 08:26

## 2025-05-11 RX ADMIN — DEXMEDETOMIDINE HYDROCHLORIDE 1.3 MCG/KG/HR: 4 INJECTION, SOLUTION INTRAVENOUS at 06:56

## 2025-05-11 RX ADMIN — FENTANYL CITRATE 25 MCG: 50 INJECTION, SOLUTION INTRAMUSCULAR; INTRAVENOUS at 09:16

## 2025-05-11 RX ADMIN — DEXMEDETOMIDINE HYDROCHLORIDE 1.5 MCG/KG/HR: 4 INJECTION, SOLUTION INTRAVENOUS at 10:43

## 2025-05-11 RX ADMIN — Medication 45 ML: at 08:28

## 2025-05-11 RX ADMIN — CLONIDINE HYDROCHLORIDE 0.1 MG: 0.1 TABLET ORAL at 21:49

## 2025-05-11 RX ADMIN — OXYCODONE HYDROCHLORIDE 5 MG: 5 TABLET ORAL at 08:26

## 2025-05-11 RX ADMIN — DEXMEDETOMIDINE HYDROCHLORIDE 1.3 MCG/KG/HR: 4 INJECTION, SOLUTION INTRAVENOUS at 14:42

## 2025-05-11 RX ADMIN — HEPARIN SODIUM 5000 UNITS: 5000 INJECTION INTRAVENOUS; SUBCUTANEOUS at 21:49

## 2025-05-11 RX ADMIN — AMIODARONE HYDROCHLORIDE 200 MG: 200 TABLET ORAL at 08:26

## 2025-05-11 RX ADMIN — AMIODARONE HYDROCHLORIDE 200 MG: 200 TABLET ORAL at 21:49

## 2025-05-11 RX ADMIN — DEXMEDETOMIDINE HYDROCHLORIDE 1.5 MCG/KG/HR: 4 INJECTION, SOLUTION INTRAVENOUS at 23:00

## 2025-05-11 RX ADMIN — FENTANYL CITRATE 25 MCG: 50 INJECTION, SOLUTION INTRAMUSCULAR; INTRAVENOUS at 03:15

## 2025-05-11 RX ADMIN — DEXMEDETOMIDINE HYDROCHLORIDE 1.5 MCG/KG/HR: 4 INJECTION, SOLUTION INTRAVENOUS at 02:30

## 2025-05-11 RX ADMIN — CLONIDINE HYDROCHLORIDE 0.1 MG: 0.1 TABLET ORAL at 13:15

## 2025-05-11 RX ADMIN — MICONAZOLE NITRATE 1 APPLICATION: 20 POWDER TOPICAL at 08:27

## 2025-05-11 RX ADMIN — FENTANYL CITRATE 25 MCG: 50 INJECTION, SOLUTION INTRAMUSCULAR; INTRAVENOUS at 23:59

## 2025-05-11 RX ADMIN — Medication 2 PACKET: at 08:24

## 2025-05-11 RX ADMIN — MICONAZOLE NITRATE 1 APPLICATION: 20 POWDER TOPICAL at 21:51

## 2025-05-11 RX ADMIN — FENTANYL CITRATE 25 MCG: 50 INJECTION, SOLUTION INTRAMUSCULAR; INTRAVENOUS at 14:35

## 2025-05-11 RX ADMIN — ATORVASTATIN CALCIUM 40 MG: 40 TABLET, FILM COATED ORAL at 21:49

## 2025-05-11 RX ADMIN — METOPROLOL TARTRATE 25 MG: 25 TABLET, FILM COATED ORAL at 08:26

## 2025-05-11 RX ADMIN — AMLODIPINE BESYLATE 5 MG: 5 TABLET ORAL at 08:25

## 2025-05-11 RX ADMIN — METOPROLOL TARTRATE 25 MG: 25 TABLET, FILM COATED ORAL at 21:49

## 2025-05-11 RX ADMIN — NITROGLYCERIN 1 PATCH: 0.1 PATCH TRANSDERMAL at 08:24

## 2025-05-11 RX ADMIN — HEPARIN SODIUM 5000 UNITS: 5000 INJECTION INTRAVENOUS; SUBCUTANEOUS at 05:09

## 2025-05-11 RX ADMIN — HYDRALAZINE HYDROCHLORIDE 50 MG: 50 TABLET ORAL at 05:09

## 2025-05-11 RX ADMIN — CLOPIDOGREL BISULFATE 75 MG: 75 TABLET, FILM COATED ORAL at 08:26

## 2025-05-11 RX ADMIN — SACUBITRIL AND VALSARTAN 1 TABLET: 24; 26 TABLET, FILM COATED ORAL at 21:49

## 2025-05-11 NOTE — PROGRESS NOTES
"   LOS: 6 days    Patient Care Team:  Sergio Randall MD as PCP - General (Cardiology)    Subjective     Patient went to the OR yesterday for groin hematoma requiring femoral artery repair.    Objective     Vital Signs:  Blood pressure 151/65, pulse 79, temperature 98.5 °F (36.9 °C), temperature source Axillary, resp. rate 25, height 160 cm (62.99\"), SpO2 99%.      Intake/Output Summary (Last 24 hours) at 5/11/2025 0815  Last data filed at 5/11/2025 0600  Gross per 24 hour   Intake 2201.46 ml   Output 420 ml   Net 1781.46 ml        05/10 0701 - 05/11 0700  In: 2201.5 [I.V.:601.5]  Out: 440 [Urine:40; Drains:400]    Physical Exam:        GENERAL: WDWF NAD  NEURO: Sedate on vent  PSYCHIATRIC: Unable to evaluate  EYE: PE, no icterus, no conjunctivitis  ENT: OMM dry, + trach  NECK: Supple , No JVD discernable,  Trachea midline  CV: Trace edema, RRR  LUNGS:  Quiet,  Nonlabored resp.  Symmetrical expansion  ABDOMEN: + PEG, nondistended  : + Pereira, no palp bladder  SKIN: Warm and dry without rash      Labs:  Results from last 7 days   Lab Units 05/11/25  0544 05/11/25  0453 05/10/25  1655 05/10/25  0828 05/10/25  0005   WBC 10*3/mm3  --  10.65  --  13.30* 20.07*   HEMOGLOBIN g/dL 6.7* 6.3* 7.7* 7.7* 9.8*   PLATELETS 10*3/mm3  --  162  --  170 220     Results from last 7 days   Lab Units 05/11/25  0353 05/10/25  0828 05/10/25  0005 05/09/25  1238 05/09/25  0329 05/07/25  0348 05/06/25  0644 05/05/25  1440 05/05/25  0143   SODIUM mmol/L 134* 136 137  --  136   < > 133* 132* 130*   POTASSIUM mmol/L 3.8 3.8 3.8 3.7 3.3*   < > 3.4* 3.4* 3.2*   CHLORIDE mmol/L 102 104 103  --  102   < > 95* 93* 93*   CO2 mmol/L 22.0 23.0 21.0*  --  22.0   < > 24.0 24.0 24.6   BUN mg/dL 37* 25* 20  --  32*   < > 29* 27* 25*   CREATININE mg/dL 3.32* 2.93* 2.50*  --  3.75*   < > 4.86* 4.51* 4.63*   CALCIUM mg/dL 8.2* 8.1* 8.3*  --  8.7   < > 8.9 9.5 9.1   PHOSPHORUS mg/dL  --   --   --   --   --   --  4.1 4.1  --    MAGNESIUM mg/dL  --   --  " 1.8  --   --   --  2.0 2.0  --    ALBUMIN g/dL  --   --  2.5*  --   --   --   --   --  2.1*    < > = values in this interval not displayed.     Results from last 7 days   Lab Units 05/10/25  0005   ALK PHOS U/L 84   BILIRUBIN mg/dL 0.4   ALT (SGPT) U/L 14   AST (SGOT) U/L 23     Results from last 7 days   Lab Units 05/08/25  0326   PH, ARTERIAL pH units 7.406   PO2 ART mm Hg 103.0   PCO2, ARTERIAL mm Hg 36.6   HCO3 ART mmol/L 23.0               Estimated Creatinine Clearance: 14.7 mL/min (A) (by C-G formula based on SCr of 3.32 mg/dL (H)).         A/P:      ESRD: On HD with Dr. Kaur at [facility].   - Continue HD on MWF schedule.    HTN: Blood pressure stabilizing with further adjustment of blood pressure medications.  Now off Cardene.  Previously with wide blood pressure variance from hypotensive to hypertensive.  Wean clonidine as able.  - Give albumin for hypotension.  -Cover with as needed medications for hypertension.  -Wean off clonidine as able.  - Watch with UF on HD.    Electrolytes: Stable  - Monitor and adjust as needed with HD.    Metabolic acidosis: pH has been stable.  Bicarb stable.    -Adjust with HD.    Anemia: Hemoglobin dropped to 5 yesterday with femoral artery bleed.  Transfused 4 units packed red blood cells.  Back down to 7.7 today from 9.8 at midnight.    -Continue to transfuse as indicated.    Volume: Stable.    -UF with HD as tolerated.    Respiratory failure: Trach on vent    Left femoral pseudoaneurysm: Developed at cardiac cath site.  Required repair 5/9/2025.  Wound VAC placed    High risk and complexity patient.         Mulugeta Chávez MD  05/11/25  08:15 EDT

## 2025-05-11 NOTE — PLAN OF CARE
Goal Outcome Evaluation:  Plan of Care Reviewed With: spouse        Progress: improving     Remains on vent, has been agitated and restless for the most part of the day. Remains on precedex and has few does of fentanyl. Wound vac remains to left groin. 2 units PRBC today. Recheck H/H 9.5 / 28.0. restraints remain in use for her safety.cont. with plan of care

## 2025-05-11 NOTE — PROGRESS NOTES
Pulmonary/Critical Care ICU Note     LOS: 6 days   Patient Care Team:  Sergio Randall MD as PCP - General (Cardiology)    Chief Complaint: Cardiac arrest / CAD      Subjective     History reviewed and updated in EMR as indicated.    Interval History:     Patient unable to relay any history.  Hemoglobin dropped again this morning and 2 units PRBCs ordered.  Vascular surgery plans to take her back to the operating room on Tuesday.  She has some weeping from her wound.  Remains on Precedex and getting intermittent fentanyl for pain.    History taken from: chart, staff    PMH/FH/Social History were reviewed and updated appropriately in the electronic medical record.     Review of Systems:    Review of 14 systems was completed with positives and pertinent negatives noted in the subjective section.  All other systems reviewed and are negative.         Objective     Vital Signs  Temp:  [97.8 °F (36.6 °C)-100.1 °F (37.8 °C)] 97.8 °F (36.6 °C)  Heart Rate:  [55-99] 65  Resp:  [20-25] 20  BP: ()/() 151/57  Arterial Line BP: ()/(39-71) 152/56  FiO2 (%):  [35 %-40 %] 35 %  05/10 0701 - 05/11 0700  In: 2201.5 [I.V.:601.5]  Out: 440 [Urine:40; Drains:400]  Body mass index is 27.11 kg/m².  Mode: VC+/AC  FiO2 (%):  [35 %-40 %] 35 %  S RR:  [20] 20  S VT:  [365 mL] 365 mL  PEEP/CPAP (cm H2O):  [5 cm H20] 5 cm H20  MAP (cm H2O):  [8.5-13] 9.5  IV drips:  dexmedetomidine, Last Rate: 1.3 mcg/kg/hr (05/11/25 1227)  niCARdipine, Last Rate: Stopped (05/10/25 1040)       Physical Exam:     Constitutional:   Drowsy  on ventilator, in no acute distress   Head:   Normocephalic, atraumatic   Eyes:           Lids and lashes normal, conjunctivae and sclerae normal.  PER   ENMT:  Ears appear intact with no abnormalities noted     Lips normal.     Neck:  Tracheostomy in place on mechanical ventilator, no JVD   Lungs/Resp:    Normal effort, symmetric chest rise, no crepitus, clear to      auscultation bilaterally.                Heart/CV:   Regular rhythm and normal rate, no murmur   Abdomen/GI:    Soft, nontender, nondistended   :    Deferred   Extremities/MSK:  No clubbing or cyanosis.  Trace bilateral lower extremity edema.  Right upper extremity is swollen.  Left groin wound VAC in place.   Pulses:  Pulses palpable and equal bilaterally   Skin:  No bleeding, bruising or rash   Heme/Lymph:  No cervical or supraclavicular adenopathy.   Neurologic:    Psychiatric:    Moves all extremities with no obvious focal motor deficit.  Cranial nerves 2 - 12 grossly intact  Non-agitated, normal affect.    The above physical exam findings were reviewed and reflect my exam findings as of today's exam.   Electronically signed by:  Terry Antonio MD  05/11/25  13:55 EDT      Results Review:     I reviewed the patient's new clinical results.   Results from last 7 days   Lab Units 05/11/25  0353 05/10/25  0828 05/10/25  0005 05/05/25  1440 05/05/25  0143   SODIUM mmol/L 134* 136 137   < > 130*   POTASSIUM mmol/L 3.8 3.8 3.8   < > 3.2*   CHLORIDE mmol/L 102 104 103   < > 93*   CO2 mmol/L 22.0 23.0 21.0*   < > 24.6   BUN mg/dL 37* 25* 20   < > 25*   CREATININE mg/dL 3.32* 2.93* 2.50*   < > 4.63*   CALCIUM mg/dL 8.2* 8.1* 8.3*   < > 9.1   BILIRUBIN mg/dL  --   --  0.4  --  0.2   ALK PHOS U/L  --   --  84  --  112   ALT (SGPT) U/L  --   --  14  --  9   AST (SGOT) U/L  --   --  23  --  11   GLUCOSE mg/dL 98 105* 116*   < > 108*    < > = values in this interval not displayed.     Results from last 7 days   Lab Units 05/11/25  0544 05/11/25  0453 05/10/25  1655 05/10/25  0828 05/10/25  0005   WBC 10*3/mm3  --  10.65  --  13.30* 20.07*   HEMOGLOBIN g/dL 6.7* 6.3* 7.7* 7.7* 9.8*   HEMATOCRIT % 19.9* 19.1* 22.8* 22.4* 29.3*   PLATELETS 10*3/mm3  --  162  --  170 220     Results from last 7 days   Lab Units 05/08/25  0326   PH, ARTERIAL pH units 7.406   PO2 ART mm Hg 103.0   PCO2, ARTERIAL mm Hg 36.6   HCO3 ART mmol/L 23.0     Results from last 7 days   Lab  Units 05/10/25  0005 05/06/25  0644 05/05/25  1440   MAGNESIUM mg/dL 1.8 2.0 2.0   PHOSPHORUS mg/dL  --  4.1 4.1       I reviewed the patient's new imaging including images and reports.    CT angiogram abdomen pelvis done on 5/9/2025.  There is a large left groin hematoma with evidence of active extravasation and no evidence of retroperitoneal hemorrhage.  Radiologist impression follows:    Impression:  1.Infrarenal abdominal aortic aneurysm measuring 4.2 cm in transverse dimension. No evidence of aortic dissection or periaortic hemorrhage.  2.Large left groin hematoma measuring 4.3 x 12 cm with evidence of contrast extravasation within the hematoma consistent with active bleeding. A pseudoaneurysm is not visualized. Findings discussed with vascular surgery at the time of dictation.  3.Advanced aortoiliac atheromatous changes as described above.  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 Multifocal moderate stenosis throughout the SMA. BELINDA is not visualized.  5.Moderate to large bilateral pleural effusions are visualized with adjacent compressive atelectasis.  6.Gastrostomy tube is in appropriate position. There is nonspecific gastric distention. An obstructing lesion is not visualized however, evaluation is limited due to motion artifact. Gastric distention may be secondary to gastroparesis. If there is   concern for an obstructing lesion, correlate with the endoscopy.  7.Additional findings per body of the report.    Medication Review:   amiodarone, 200 mg, Per PEG Tube, Q12H  amLODIPine, 5 mg, Per PEG Tube, Q24H  aspirin, 81 mg, Per PEG Tube, Daily  atorvastatin, 40 mg, Per PEG Tube, Nightly  cefTRIAXone, 2,000 mg, Intravenous, Q24H  cloNIDine, 0.1 mg, Per PEG Tube, Q8H  clopidogrel, 75 mg, Per PEG Tube, Daily  heparin (porcine), 5,000 Units, Subcutaneous, Q8H  hydrALAZINE, 50 mg, Per PEG Tube, Q8H  lansoprazole, 15 mg, Per PEG Tube, QAM AC  metoprolol tartrate, 25 mg,  Per PEG Tube, Q12H  miconazole, 1 Application, Topical, Q12H  nitroglycerin, 1 patch, Transdermal, Daily  Nutrisource fiber, 2 packet, Per G Tube, Daily  ProSource TF, 45 mL, Per G Tube, BID  sacubitril-valsartan, 1 tablet, Per PEG Tube, Q12H      dexmedetomidine, 0.2-1.5 mcg/kg/hr, Last Rate: 1.3 mcg/kg/hr (05/11/25 1227)  niCARdipine, 5-15 mg/hr, Last Rate: Stopped (05/10/25 1040)        Assessment & Plan         CAD (coronary artery disease)    ESRD on HD    Polycystic kidney disease    HTN (hypertension)    Dyslipidemia    AAA (abdominal aortic aneurysm)    Right coronary artery occlusion    Depression    Tracheostomy present    S/P percutaneous endoscopic gastrostomy (PEG) tube placement    69 y.o. female with history of HTN, HLD, CKD on HD, polycystic kidney disease, abdominal aortic aneurysm, CAD with prior stent, depression, transferred from [facility] in [place] on 5/5/2025 after she suffered a V-fib arrest with resuscitation and underwent left heart catheterization showing 100% occlusion of the RCA with failed intervention.    Patient was initially admitted to [facility] on 3/20/2025 with volume overload secondary to missing 2 hemodialysis sessions.  She also has followed up possible pneumonia and had anemia and was evaluated by GI with no evidence of GI bleed.  She had bilateral pleural effusions and underwent 1550 mL thoracentesis, but despite thoracentesis required intubation on 4/10/2025 was transferred to [facility] on 4/11/2025.  Patient ultimately underwent tracheostomy and PEG placement on 4/29/2025.  She deteriorated on 5/2/2025 with me today Vfib arrest ultimately resuscitated with subsequent LHC as above.    Cardiology plans left heart catheterization today for PCI.    Patient remains on mechanical ventilation with excellent oxygen saturations on 40 % FiO2 this morning.  She is arousable and alert and I think her chances of successful weaning from mechanical ventilation  will be excellent.    Patient has a swollen right upper extremity which has been ongoing.  Repeat right upper extremity duplex this admission shows superficial thrombophlebitis in the cephalic vein.  She has a right internal jugular dialysis catheter.      Patient had bleeding from left femoral artery site on the evening of 5/9/2025 necessitating return to the OR for femoral artery repair and evacuation of hematoma.  She will need to go back to the operating room per vascular surgery.  This is tentatively planned for Tuesday.  2 additional units of PRBCs today for anemia currently wound VAC in place.    Plan:  1.  For V-fib arrest/CAD with occluded RCA: Failed intervention at outside hospital.  Cardiology consultation.  Aspirin, statin, beta-blocker, heparin drip.  [facility] today.  2.  For CKD on HD: Last received HD at OSH on 5/3/2025 however received 20 minutes of HD on 5/5/2025 but did not tolerate reportedly secondary to hypotension and her session was discontinued prior to transfer here.  Nephrology following.  IHD done on 5/9/2025.    3.  For respiratory: Initial respiratory failure secondary to volume overload from missing hemodialysis.  Also treated for pneumonia.  Status post tracheostomy and PEG 4/29/2025.  Continue mechanical ventilation and adjust settings to optimize ventilation and oxygenation and minimize barotrauma.  I think patient will be weanable from mechanical ventilation going forward after PCI.    4.  For nutrition: N.p.o. for now.  Restarted tube feeds.  N.p.o. after midnight.  5.  DVT prophylaxis/right upper extremity swell: Recheck duplex right upper extremity this admission showed superficial thrombophlebitis in the cephalic vein.  No DVT.  6.  For gram-negative rods in sputum: Patient has some pulmonary congestion on chest x-ray but no convincing symptoms of pneumonia.  White blood cell count mildly elevated.  Rocephin was started empirically.  Cultures growing moderate Klebsiella susceptible  to Rocephin and I will plan to complete 7 days for Klebsiella tracheobronchitis in the setting of tracheostomy/respiratory failure.    7.  For left femoral artery bleeding/hematoma: Status postrepair by vascular surgery 5/9/2025.  Wound VAC in place.  Patient will need to go back to the operating room.    Holding on discharge secondary to vascular issues.    Electronically signed by:    Terry Antonio MD  05/11/25  13:55 EDT      *. Please note that portions of this note were completed with [employer] - a voice recognition program.

## 2025-05-11 NOTE — PROGRESS NOTES
[facility]  Vascular Surgery Progress Note    Patient Name: Estefania Connor  : 1955  MRN: [id number]  Date of admission: 2025  Surgical Procedures Since Admission:  Procedure(s):  Left Heart Cath  Stent ELIECER coronary  Surgeon:  Edin Boland MD  Status:  4 Days Post-Op  -------------------    Procedure(s):  REPAIR LEFT FEMORAL ARTERY, EVACUATION LEFT GROIN HEMATOMA, PLACEMENT OF NEGATIVE WOUND VACCUUM THERAPY  Surgeon:  Remi Guerrero MD  Status:  2 Days Post-Op  -------------------    Subjective   Subjective     Subjective: No events overnight.  Hemoglobin drop noted.        Objective   Objective     Vitals:   Temp:  [98.1 °F (36.7 °C)-100.1 °F (37.8 °C)] 98.2 °F (36.8 °C)  Heart Rate:  [] 60  Resp:  [20-25] 21  BP: ()/() 113/53  Arterial Line BP: ()/(37-73) 158/61  FiO2 (%):  [40 %] 40 %  Output by Drain (mL) 05/10/25 0701 - 05/10/25 1900 05/10/25 1901 - 25 0700 25 0701 - 25 1043 Range Total   Closed/Suction Drain 1 Left;Proximal Thigh Bulb 19 Fr. 150 250  400       Physical Exam    Left groin is with extensive ecchymosis as expected.  The area is soft.  Wound VAC with mostly serous fluid noted.  No active signs of bleeding present.  Result Review    Result Review:  I have personally reviewed the results from the time of this admission to 2025 10:43 EDT and agree with these findings:  []  Laboratory  []  Microbiology  []  Radiology  []  EKG/Telemetry   []  Cardiology/Vascular   []  Pathology  []  Old records  []  Other:        Assessment / Plan     Brief Patient Summary:  Estefania Connor is a 69 y.o. female who underwent left groin hematoma evacuation and femoral artery repair.  Agree with transfusing 2 units for anemia.  This will likely stabilize here in the next several days.  I do not see any active signs of bleeding.        Plan:   Will plan for left groin reexploration with wound VAC change on Tuesday.    Remi  MD Cesar

## 2025-05-12 ENCOUNTER — ANESTHESIA EVENT (OUTPATIENT)
Dept: PERIOP | Facility: HOSPITAL | Age: 70
End: 2025-05-12
Payer: MEDICARE

## 2025-05-12 ENCOUNTER — APPOINTMENT (OUTPATIENT)
Dept: NEPHROLOGY | Facility: HOSPITAL | Age: 70
End: 2025-05-12
Payer: MEDICARE

## 2025-05-12 PROBLEM — S30.1XXA HEMATOMA OF GROIN: Status: ACTIVE | Noted: 2025-05-05

## 2025-05-12 LAB
ANION GAP SERPL CALCULATED.3IONS-SCNC: 13 MMOL/L (ref 5–15)
BH BB BLOOD EXPIRATION DATE: NORMAL
BH BB BLOOD EXPIRATION DATE: NORMAL
BH BB BLOOD TYPE BARCODE: 9500
BH BB BLOOD TYPE BARCODE: 9500
BH BB DISPENSE STATUS: NORMAL
BH BB DISPENSE STATUS: NORMAL
BH BB PRODUCT CODE: NORMAL
BH BB PRODUCT CODE: NORMAL
BH BB UNIT NUMBER: NORMAL
BH BB UNIT NUMBER: NORMAL
BUN SERPL-MCNC: 49 MG/DL (ref 8–23)
BUN/CREAT SERPL: 13 (ref 7–25)
CALCIUM SPEC-SCNC: 8.5 MG/DL (ref 8.6–10.5)
CHLORIDE SERPL-SCNC: 102 MMOL/L (ref 98–107)
CO2 SERPL-SCNC: 20 MMOL/L (ref 22–29)
CREAT SERPL-MCNC: 3.78 MG/DL (ref 0.57–1)
CROSSMATCH INTERPRETATION: NORMAL
CROSSMATCH INTERPRETATION: NORMAL
DEPRECATED RDW RBC AUTO: 52.5 FL (ref 37–54)
EGFRCR SERPLBLD CKD-EPI 2021: 12.3 ML/MIN/1.73
ERYTHROCYTE [DISTWIDTH] IN BLOOD BY AUTOMATED COUNT: 16.4 % (ref 12.3–15.4)
GLUCOSE BLDC GLUCOMTR-MCNC: 102 MG/DL (ref 70–130)
GLUCOSE BLDC GLUCOMTR-MCNC: 116 MG/DL (ref 70–130)
GLUCOSE BLDC GLUCOMTR-MCNC: 126 MG/DL (ref 70–130)
GLUCOSE BLDC GLUCOMTR-MCNC: 128 MG/DL (ref 70–130)
GLUCOSE SERPL-MCNC: 124 MG/DL (ref 65–99)
HCT VFR BLD AUTO: 27.4 % (ref 34–46.6)
HGB BLD-MCNC: 9.1 G/DL (ref 12–15.9)
MCH RBC QN AUTO: 29.4 PG (ref 26.6–33)
MCHC RBC AUTO-ENTMCNC: 33.2 G/DL (ref 31.5–35.7)
MCV RBC AUTO: 88.7 FL (ref 79–97)
PLATELET # BLD AUTO: 200 10*3/MM3 (ref 140–450)
PMV BLD AUTO: 10.7 FL (ref 6–12)
POTASSIUM SERPL-SCNC: 3.6 MMOL/L (ref 3.5–5.2)
RBC # BLD AUTO: 3.09 10*6/MM3 (ref 3.77–5.28)
SODIUM SERPL-SCNC: 135 MMOL/L (ref 136–145)
UNIT  ABO: NORMAL
UNIT  ABO: NORMAL
UNIT  RH: NORMAL
UNIT  RH: NORMAL
WBC NRBC COR # BLD AUTO: 17.27 10*3/MM3 (ref 3.4–10.8)

## 2025-05-12 PROCEDURE — 94003 VENT MGMT INPAT SUBQ DAY: CPT

## 2025-05-12 PROCEDURE — 80048 BASIC METABOLIC PNL TOTAL CA: CPT | Performed by: INTERNAL MEDICINE

## 2025-05-12 PROCEDURE — 25010000002 HEPARIN (PORCINE) PER 1000 UNITS: Performed by: SURGERY

## 2025-05-12 PROCEDURE — 25010000002 CEFTRIAXONE PER 250 MG: Performed by: INTERNAL MEDICINE

## 2025-05-12 PROCEDURE — 99232 SBSQ HOSP IP/OBS MODERATE 35: CPT | Performed by: INTERNAL MEDICINE

## 2025-05-12 PROCEDURE — 97530 THERAPEUTIC ACTIVITIES: CPT

## 2025-05-12 PROCEDURE — 94799 UNLISTED PULMONARY SVC/PX: CPT

## 2025-05-12 PROCEDURE — 25010000002 HYDRALAZINE PER 20 MG: Performed by: NURSE PRACTITIONER

## 2025-05-12 PROCEDURE — 85027 COMPLETE CBC AUTOMATED: CPT | Performed by: INTERNAL MEDICINE

## 2025-05-12 PROCEDURE — 97164 PT RE-EVAL EST PLAN CARE: CPT

## 2025-05-12 PROCEDURE — 82948 REAGENT STRIP/BLOOD GLUCOSE: CPT

## 2025-05-12 PROCEDURE — 99291 CRITICAL CARE FIRST HOUR: CPT | Performed by: INTERNAL MEDICINE

## 2025-05-12 PROCEDURE — 97535 SELF CARE MNGMENT TRAINING: CPT

## 2025-05-12 PROCEDURE — 25010000002 NICARDIPINE 2.5 MG/ML SOLUTION 10 ML VIAL: Performed by: INTERNAL MEDICINE

## 2025-05-12 PROCEDURE — 25810000003 SODIUM CHLORIDE 0.9 % SOLUTION 250 ML FLEX CONT: Performed by: INTERNAL MEDICINE

## 2025-05-12 PROCEDURE — 25010000002 FENTANYL CITRATE (PF) 50 MCG/ML SOLUTION: Performed by: SURGERY

## 2025-05-12 PROCEDURE — 97168 OT RE-EVAL EST PLAN CARE: CPT

## 2025-05-12 RX ORDER — LIDOCAINE HYDROCHLORIDE 10 MG/ML
0.5 INJECTION, SOLUTION EPIDURAL; INFILTRATION; INTRACAUDAL; PERINEURAL ONCE AS NEEDED
Status: CANCELLED | OUTPATIENT
Start: 2025-05-12

## 2025-05-12 RX ORDER — DIAZEPAM 5 MG/1
5 TABLET ORAL EVERY 6 HOURS PRN
Status: DISPENSED | OUTPATIENT
Start: 2025-05-12 | End: 2025-05-21

## 2025-05-12 RX ORDER — NITROGLYCERIN 80 MG/1
1 PATCH TRANSDERMAL DAILY
Status: DISCONTINUED | OUTPATIENT
Start: 2025-05-12 | End: 2025-05-22 | Stop reason: HOSPADM

## 2025-05-12 RX ORDER — FAMOTIDINE 10 MG/ML
20 INJECTION, SOLUTION INTRAVENOUS ONCE
Status: CANCELLED | OUTPATIENT
Start: 2025-05-12 | End: 2025-05-12

## 2025-05-12 RX ORDER — SODIUM CHLORIDE, SODIUM LACTATE, POTASSIUM CHLORIDE, CALCIUM CHLORIDE 600; 310; 30; 20 MG/100ML; MG/100ML; MG/100ML; MG/100ML
9 INJECTION, SOLUTION INTRAVENOUS CONTINUOUS
Status: CANCELLED | OUTPATIENT
Start: 2025-05-13 | End: 2025-05-13

## 2025-05-12 RX ORDER — FAMOTIDINE 20 MG/1
20 TABLET, FILM COATED ORAL ONCE
Status: CANCELLED | OUTPATIENT
Start: 2025-05-12 | End: 2025-05-12

## 2025-05-12 RX ORDER — SODIUM CHLORIDE 0.9 % (FLUSH) 0.9 %
10 SYRINGE (ML) INJECTION AS NEEDED
Status: CANCELLED | OUTPATIENT
Start: 2025-05-12

## 2025-05-12 RX ORDER — GUAR GUM
2 PACKET (EA) ORAL 3 TIMES DAILY
Status: DISCONTINUED | OUTPATIENT
Start: 2025-05-12 | End: 2025-05-15

## 2025-05-12 RX ORDER — SODIUM CHLORIDE 0.9 % (FLUSH) 0.9 %
10 SYRINGE (ML) INJECTION EVERY 12 HOURS SCHEDULED
Status: CANCELLED | OUTPATIENT
Start: 2025-05-12

## 2025-05-12 RX ORDER — LOPERAMIDE HCL 1 MG/7.5ML
2 SOLUTION ORAL 4 TIMES DAILY PRN
Status: DISCONTINUED | OUTPATIENT
Start: 2025-05-12 | End: 2025-05-19

## 2025-05-12 RX ADMIN — Medication 2 PACKET: at 16:16

## 2025-05-12 RX ADMIN — HYDRALAZINE HYDROCHLORIDE 50 MG: 50 TABLET ORAL at 17:32

## 2025-05-12 RX ADMIN — FENTANYL CITRATE 25 MCG: 50 INJECTION, SOLUTION INTRAMUSCULAR; INTRAVENOUS at 04:16

## 2025-05-12 RX ADMIN — SODIUM CHLORIDE 2.5 MG/HR: 9 INJECTION, SOLUTION INTRAVENOUS at 06:08

## 2025-05-12 RX ADMIN — SACUBITRIL AND VALSARTAN 1 TABLET: 24; 26 TABLET, FILM COATED ORAL at 08:56

## 2025-05-12 RX ADMIN — SACUBITRIL AND VALSARTAN 1 TABLET: 24; 26 TABLET, FILM COATED ORAL at 10:57

## 2025-05-12 RX ADMIN — HEPARIN SODIUM 5000 UNITS: 5000 INJECTION INTRAVENOUS; SUBCUTANEOUS at 05:50

## 2025-05-12 RX ADMIN — CLONIDINE HYDROCHLORIDE 0.1 MG: 0.1 TABLET ORAL at 17:32

## 2025-05-12 RX ADMIN — DEXMEDETOMIDINE HYDROCHLORIDE 1.3 MCG/KG/HR: 4 INJECTION, SOLUTION INTRAVENOUS at 04:16

## 2025-05-12 RX ADMIN — OXYCODONE 5 MG: 5 TABLET ORAL at 00:32

## 2025-05-12 RX ADMIN — MICONAZOLE NITRATE 1 APPLICATION: 20 POWDER TOPICAL at 08:26

## 2025-05-12 RX ADMIN — DEXMEDETOMIDINE HYDROCHLORIDE 1.3 MCG/KG/HR: 4 INJECTION, SOLUTION INTRAVENOUS at 21:23

## 2025-05-12 RX ADMIN — CLONIDINE HYDROCHLORIDE 0.1 MG: 0.1 TABLET ORAL at 05:50

## 2025-05-12 RX ADMIN — AMLODIPINE BESYLATE 5 MG: 5 TABLET ORAL at 08:56

## 2025-05-12 RX ADMIN — SODIUM CHLORIDE 2.5 MG/HR: 9 INJECTION, SOLUTION INTRAVENOUS at 16:32

## 2025-05-12 RX ADMIN — DEXMEDETOMIDINE HYDROCHLORIDE 1.3 MCG/KG/HR: 4 INJECTION, SOLUTION INTRAVENOUS at 08:25

## 2025-05-12 RX ADMIN — FENTANYL CITRATE 25 MCG: 50 INJECTION, SOLUTION INTRAMUSCULAR; INTRAVENOUS at 01:07

## 2025-05-12 RX ADMIN — CLONIDINE HYDROCHLORIDE 0.1 MG: 0.1 TABLET ORAL at 21:11

## 2025-05-12 RX ADMIN — OXYCODONE 5 MG: 5 TABLET ORAL at 05:50

## 2025-05-12 RX ADMIN — METOPROLOL TARTRATE 25 MG: 25 TABLET, FILM COATED ORAL at 21:11

## 2025-05-12 RX ADMIN — LANSOPRAZOLE 15 MG: 15 TABLET, ORALLY DISINTEGRATING ORAL at 08:56

## 2025-05-12 RX ADMIN — HEPARIN SODIUM 5000 UNITS: 5000 INJECTION INTRAVENOUS; SUBCUTANEOUS at 14:05

## 2025-05-12 RX ADMIN — NITROGLYCERIN 1 PATCH: 0.4 PATCH TRANSDERMAL at 21:10

## 2025-05-12 RX ADMIN — HYDRALAZINE HYDROCHLORIDE 20 MG: 20 INJECTION INTRAMUSCULAR; INTRAVENOUS at 00:15

## 2025-05-12 RX ADMIN — NITROGLYCERIN 1 PATCH: 0.1 PATCH TRANSDERMAL at 08:57

## 2025-05-12 RX ADMIN — CEFTRIAXONE 2000 MG: 2 INJECTION, POWDER, FOR SOLUTION INTRAMUSCULAR; INTRAVENOUS at 21:23

## 2025-05-12 RX ADMIN — HYDRALAZINE HYDROCHLORIDE 50 MG: 50 TABLET ORAL at 05:50

## 2025-05-12 RX ADMIN — DEXMEDETOMIDINE HYDROCHLORIDE 1.3 MCG/KG/HR: 4 INJECTION, SOLUTION INTRAVENOUS at 12:40

## 2025-05-12 RX ADMIN — AMIODARONE HYDROCHLORIDE 200 MG: 200 TABLET ORAL at 21:11

## 2025-05-12 RX ADMIN — SODIUM CHLORIDE 5 MG/HR: 9 INJECTION, SOLUTION INTRAVENOUS at 01:01

## 2025-05-12 RX ADMIN — HYDRALAZINE HYDROCHLORIDE 50 MG: 50 TABLET ORAL at 21:11

## 2025-05-12 RX ADMIN — OXYCODONE 5 MG: 5 TABLET ORAL at 18:20

## 2025-05-12 RX ADMIN — Medication 2 PACKET: at 21:15

## 2025-05-12 RX ADMIN — MICONAZOLE NITRATE 1 APPLICATION: 20 POWDER TOPICAL at 21:30

## 2025-05-12 RX ADMIN — Medication 45 ML: at 08:31

## 2025-05-12 RX ADMIN — ASPIRIN 81 MG 81 MG: 81 TABLET ORAL at 08:56

## 2025-05-12 RX ADMIN — CLOPIDOGREL BISULFATE 75 MG: 75 TABLET, FILM COATED ORAL at 08:56

## 2025-05-12 RX ADMIN — SACUBITRIL AND VALSARTAN 1 TABLET: 49; 51 TABLET, FILM COATED ORAL at 21:11

## 2025-05-12 RX ADMIN — DEXMEDETOMIDINE HYDROCHLORIDE 1.3 MCG/KG/HR: 4 INJECTION, SOLUTION INTRAVENOUS at 16:32

## 2025-05-12 RX ADMIN — HEPARIN SODIUM 5000 UNITS: 5000 INJECTION INTRAVENOUS; SUBCUTANEOUS at 21:23

## 2025-05-12 RX ADMIN — ATORVASTATIN CALCIUM 40 MG: 40 TABLET, FILM COATED ORAL at 21:11

## 2025-05-12 RX ADMIN — METOPROLOL TARTRATE 25 MG: 25 TABLET, FILM COATED ORAL at 08:56

## 2025-05-12 RX ADMIN — FENTANYL CITRATE 25 MCG: 50 INJECTION, SOLUTION INTRAMUSCULAR; INTRAVENOUS at 21:27

## 2025-05-12 RX ADMIN — AMIODARONE HYDROCHLORIDE 200 MG: 200 TABLET ORAL at 08:55

## 2025-05-12 RX ADMIN — Medication 2 PACKET: at 08:31

## 2025-05-12 NOTE — CASE MANAGEMENT/SOCIAL WORK
Continued Stay Note   [place]     Patient Name: Estefania Connor  MRN: [id number]  Today's Date: 5/12/2025    Admit Date: 5/5/2025    Plan: [facility]   Discharge Plan       Row Name 05/12/25 6310       Plan    Plan [facility]    Plan Comments Patient discussed in ICU MDR earlier today. Anticipate vascular surgery before returning back to [facility]..    Final Discharge Disposition Code 63 - [facility]                   Discharge Codes    No documentation.                 Expected Discharge Date and Time       Expected Discharge Date Expected Discharge Time    May 9, 2025               Chrissie Terrell RN

## 2025-05-12 NOTE — THERAPY RE-EVALUATION
Patient Name: Estefania Connor  : 1955    MRN: [id number]                              Today's Date: 2025       Admit Date: 2025    Visit Dx:     ICD-10-CM ICD-9-CM   1. Hematoma of groin, initial encounter  S30.1XXA 922.2     Patient Active Problem List   Diagnosis    ESRD on HD    Polycystic kidney disease    HTN (hypertension)    Dyslipidemia    AAA (abdominal aortic aneurysm)    Right coronary artery occlusion    Depression    Tracheostomy present    S/P percutaneous endoscopic gastrostomy (PEG) tube placement    CAD (coronary artery disease)    Hematoma of groin     Past Medical History:   Diagnosis Date    AAA (abdominal aortic aneurysm)     Coronary artery disease     Depression     ESRD (end stage renal disease)     Hyperlipidemia     Hypertension     Polycystic kidney disease      Past Surgical History:   Procedure Laterality Date    CARDIAC CATHETERIZATION N/A 2025    Procedure: Coronary angiography;  Surgeon: Ambrose Mcnally MD;  Location:  [facility];  Service: Cardiovascular;  Laterality: N/A;    CARDIAC CATHETERIZATION N/A 2025    Procedure: Left Heart Cath;  Surgeon: Edin Boland MD;  Location:  [facility];  Service: Cardiovascular;  Laterality: N/A;    CARDIAC CATHETERIZATION N/A 2025    Procedure: Stent ELIECER coronary;  Surgeon: Edin Boland MD;  Location:  [facility];  Service: Cardiovascular;  Laterality: N/A;    PSEUDO ANEURYSM REPAIR, EXTREMITY Left 2025    Procedure: REPAIR LEFT FEMORAL ARTERY, EVACUATION LEFT GROIN HEMATOMA, PLACEMENT OF NEGATIVE WOUND VACCUUM THERAPY;  Surgeon: Remi Guerrero MD;  Location:  [facility];  Service: Vascular;  Laterality: Left;    TRACHEOSTOMY AND PEG TUBE INSERTION N/A 2025    Procedure: TRACHEOSTOMY AND PERCUTANEOUS ENDOSCOPIC GASTROSTOMY TUBE INSERTION;  Surgeon: Kera Head MD;  Location:  [facility];  Service: General;  Laterality: N/A;      General  Information       Row Name 05/12/25 1426          Physical Therapy Time and Intention    Document Type re-evaluation  -KG     Mode of Treatment physical therapy;co-treatment  -KG       Row Name 05/12/25 1426          General Information    Patient Profile Reviewed yes  -KG     Prior Level of Function --  please see IE  -KG     Existing Precautions/Restrictions cardiac;fall;oxygen therapy device and L/min;other (see comments)  trach vent; PEG  -KG     Barriers to Rehab medically complex;previous functional deficit;cognitive status  -KG       Row Name 05/12/25 1426          Living Environment    Current Living Arrangements --  please see IE  -KG       Row Name 05/12/25 1426          Cognition    Orientation Status (Cognition) unable/difficult to assess  -KG       Row Name 05/12/25 1426          Safety Issues/Impairments Affecting Functional Mobility    Safety Issues Affecting Function (Mobility) ability to follow commands;at risk behavior observed;awareness of need for assistance;insight into deficits/self-awareness;judgment;safety precaution awareness;safety precautions follow-through/compliance;sequencing abilities  -KG     Impairments Affecting Function (Mobility) balance;cognition;coordination;endurance/activity tolerance;motor control;motor planning;postural/trunk control;range of motion (ROM);shortness of breath;strength  -KG     Cognitive Impairments, Mobility Safety/Performance attention;awareness, need for assistance;insight into deficits/self-awareness;judgment;safety precaution awareness;safety precaution follow-through;sequencing abilities  -KG               User Key  (r) = Recorded By, (t) = Taken By, (c) = Cosigned By      Initials Name Provider Type    KG Dorothy Frazier, PT Physical Therapist                   Mobility       Row Name 05/12/25 1427          Bed Mobility    Bed Mobility rolling left;rolling right;scooting/bridging;supine-sit;sit-supine  -KG     Rolling Left [place] (Bed  Mobility) maximum assist (25% patient effort);2 person assist;verbal cues  -KG     Rolling Right [place] (Bed Mobility) maximum assist (25% patient effort);2 person assist;verbal cues  -KG     Scooting/Bridging [place] (Bed Mobility) dependent (less than 25% patient effort);2 person assist;verbal cues  -KG     Supine-Sit [place] (Bed Mobility) maximum assist (25% patient effort);2 person assist;verbal cues  -KG     Sit-Supine [place] (Bed Mobility) maximum assist (25% patient effort);2 person assist;verbal cues  -KG     Assistive Device (Bed Mobility) bed rails;head of bed elevated;repositioning sheet  -KG     Comment, (Bed Mobility) VC's for sequencing and technique. Pt required increased assistance at trunk and BLEs. Upon sitting EOB pt demonstrated significant posterior lean; pushing back at times resisting movement. Pt able to tolerate EOB sitting for ~5 minutes. Required maxA x2 -depA for static sitting balance. Very poor command following.  -KG       Row Name 05/12/25 1427          Transfers    Comment, (Transfers) All OOB mobility deferred.  -KG       Row Name 05/12/25 1427          Bed-Chair Transfer    Bed-Chair [place] (Transfers) unable to assess  -KG       Row Name 05/12/25 1427          Sit-Stand Transfer    Sit-Stand [place] (Transfers) unable to assess  -KG       Row Name 05/12/25 1427          Gait/Stairs (Locomotion)    [place] Level (Gait) unable to assess  -KG     Comment, (Gait/Stairs) Ambulation deferred. Not appropriate to assess.  -KG               User Key  (r) = Recorded By, (t) = Taken By, (c) = Cosigned By      Initials Name Provider Type    Dorothy Carlos, PT Physical Therapist                   Obj/Interventions       Row Name 05/12/25 1429          Balance    Balance Assessment sitting static balance;sitting dynamic balance  -KG     Static Sitting Balance maximum assist;2-person assist;verbal cues  -KG     Dynamic Sitting Balance  dependent;2-person assist;verbal cues  -KG     Position, Sitting Balance supported;sitting edge of bed  -KG               User Key  (r) = Recorded By, (t) = Taken By, (c) = Cosigned By      Initials Name Provider Type    Dorothy Carlos PT Physical Therapist                   Goals/Plan       Row Name 05/12/25 1432          Bed Mobility Goal 1 (PT)    Activity/Assistive Device (Bed Mobility Goal 1, PT) sit to supine;supine to sit  -KG     [place] Level/Cues Needed (Bed Mobility Goal 1, PT) maximum assist (25-49% patient effort)  -KG     Time Frame (Bed Mobility Goal 1, PT) short term goal (STG);5 days  -KG     Progress/Outcomes (Bed Mobility Goal 1, PT) goal ongoing;goal revised this date  -KG       Row Name 05/12/25 1432          Transfer Goal 1 (PT)    Activity/Assistive Device (Transfer Goal 1, PT) sit-to-stand/stand-to-sit;bed-to-chair/chair-to-bed  -KG     [place] Level/Cues Needed (Transfer Goal 1, PT) maximum assist (25-49% patient effort)  -KG     Time Frame (Transfer Goal 1, PT) long term goal (LTG);10 days  -KG     Progress/Outcome (Transfer Goal 1, PT) goal ongoing;goal revised this date  -KG       Row Name 05/12/25 1432          Balance Goal 1 (PT)    Activity/Assistive Device (Balance Goal) sitting static balance  -KG     [place] Level/Cues Needed (Balance Goal 1, PT) minimum assist (75% or more patient effort)  -KG     Time Frame (Balance Goal 1, PT) long-term goal (LTG);2 weeks  -KG     Progress/Outcomes (Balance Goal 1, PT) goal ongoing;goal revised this date  -KG               User Key  (r) = Recorded By, (t) = Taken By, (c) = Cosigned By      Initials Name Provider Type    Dorothy Carlos PT Physical Therapist                   Clinical Impression       Row Name 05/12/25 1429          Pain    Additional Documentation Pain Scale: FACES Pre/Post-Treatment (Group)  -KG       Row Name 05/12/25 1422          Pain Scale: FACES Pre/Post-Treatment    Pain: FACES Scale,  Pretreatment 0-->no hurt  -KG     Posttreatment Pain Rating 0-->no hurt  -KG       Row Name 05/12/25 1429          Plan of Care Review    Plan of Care Reviewed With patient  -KG     Progress no change  -KG     Outcome Evaluation PT re-evaluation completed. Pt continues to demonstrate significant generalized weakness, impaired balance and coordination, impaired cognition, and decreased functional mobility. Pt required maxA x2 for bed mobility. Required maxA x2- depA for static sitting balance at EOB. Pt's goals have been revised and pt would continue to benefit from PT skilled care. Recommend pt return to [facility]CF at D/C.  -KG       Row Name 05/12/25 1429          Vital Signs    Pre Systolic BP Rehab 134  -KG     Pre Treatment Diastolic BP 66  -KG     Post Systolic BP Rehab 167  -KG     Post Treatment Diastolic BP 85  -KG     Pretreatment Heart Rate (beats/min) 72  -KG     Posttreatment Heart Rate (beats/min) 76  -KG     Pre SpO2 (%) 95  -KG     O2 Delivery Pre Treatment ventilator  -KG     Post SpO2 (%) 94  -KG     O2 Delivery Post Treatment ventilator  -KG     Pre Patient Position Supine  -KG     Intra Patient Position Sitting  -KG     Post Patient Position Supine  -KG       Row Name 05/12/25 1429          Positioning and Restraints    Pre-Treatment Position in bed  -KG     Post Treatment Position bed  -KG     In Bed notified nsg;supine;call light within reach;encouraged to call for assist;exit alarm on;side rails up x3;RUE elevated;LUE elevated;heels elevated  -KG     Restraints released:;reapplied:;notified nsg:;soft limb  -KG               User Key  (r) = Recorded By, (t) = Taken By, (c) = Cosigned By      Initials Name Provider Type    KG Dorothy Frazier, PT Physical Therapist                   Outcome Measures       Row Name 05/12/25 1432          How much help from another person do you currently need...    Turning from your back to your side while in flat bed without using bedrails? 2  -KG     Moving from  lying on back to sitting on the side of a flat bed without bedrails? 2  -KG     Moving to and from a bed to a chair (including a wheelchair)? 1  -KG     Standing up from a chair using your arms (e.g., wheelchair, bedside chair)? 1  -KG     Climbing 3-5 steps with a railing? 1  -KG     To walk in hospital room? 1  -KG     AM-PAC 6 Clicks Score (PT) 8  -KG     Highest Level of Mobility Goal 3 --> Sit at edge of bed  -KG       Row Name 05/12/25 1432 05/12/25 1428       Functional Assessment    Outcome Measure Options AM-PAC 6 Clicks Basic Mobility (PT)  -KG AM-PAC 6 Clicks Daily Activity (OT)  -AN              User Key  (r) = Recorded By, (t) = Taken By, (c) = Cosigned By      Initials Name Provider Type    KG Dorothy Frazier, PT Physical Therapist    Hilary Johnson, OT Occupational Therapist                                 Physical Therapy Education       Title: PT OT SLP Therapies (In Progress)       Topic: Physical Therapy (In Progress)       Point: Mobility training (In Progress)       Learning Progress Summary            Patient Acceptance, E, NR by KG at 5/12/2025 1046    Acceptance, E, NR by KG at 5/7/2025 1115    Acceptance, E, NR by MARIFER at 5/6/2025 1430                      Point: Home exercise program (In Progress)       Learning Progress Summary            Patient Acceptance, E, NR by KG at 5/12/2025 1046    Acceptance, E, NR by KG at 5/7/2025 1115    Acceptance, E, NR by MARIFER at 5/6/2025 1430                      Point: Body mechanics (In Progress)       Learning Progress Summary            Patient Acceptance, E, NR by KG at 5/12/2025 1046    Acceptance, E, NR by KG at 5/7/2025 1115    Acceptance, E, NR by MARIFER at 5/6/2025 1430                      Point: Precautions (In Progress)       Learning Progress Summary            Patient Acceptance, E, NR by KG at 5/12/2025 1046    Acceptance, E, NR by KG at 5/7/2025 1115    Acceptance, E, NR by MARIFER at 5/6/2025 1430                                      User  Key       Initials Effective Dates Name Provider Type Discipline    MARIFER 02/03/23 -  Sandra Pruitt, PT Physical Therapist PT    KG 05/22/20 -  Dorothy Frazier, PT Physical Therapist PT                  PT Recommendation and Plan     Progress: no change  Outcome Evaluation: PT re-evaluation completed. Pt continues to demonstrate significant generalized weakness, impaired balance and coordination, impaired cognition, and decreased functional mobility. Pt required maxA x2 for bed mobility. Required maxA x2- depA for static sitting balance at EOB. Pt's goals have been revised and pt would continue to benefit from PT skilled care. Recommend pt return to ECF at D/C.     Time Calculation:         PT Charges       Row Name 05/12/25 1046             Time Calculation    Start Time 1046  -KG      PT Received On 05/12/25  -KG      PT Goal Re-Cert Due Date 05/22/25  -KG         Time Calculation- PT    Total Timed Code Minutes- PT 12 minute(s)  -KG         Timed Charges    59224 - PT Therapeutic Activity Minutes 12  -KG         Untimed Charges    PT Eval/Re-eval Minutes 24  -KG         Total Minutes    Timed Charges Total Minutes 12  -KG      Untimed Charges Total Minutes 24  -KG       Total Minutes 36  -KG                User Key  (r) = Recorded By, (t) = Taken By, (c) = Cosigned By      Initials Name Provider Type    KG Dorothy Frazier, PT Physical Therapist                  Therapy Charges for Today       Code Description Service Date Service Provider Modifiers Qty    00027378889 HC PT THERAPEUTIC ACT EA 15 MIN 5/12/2025 Dorothy Frazier, PT GP 1    [id number] HC PT RE-EVAL ESTABLISHED PLAN 2 5/12/2025 Dorothy Frazier, PT GP 1            PT G-Codes  Outcome Measure Options: AM-PAC 6 Clicks Basic Mobility (PT)  AM-PAC 6 Clicks Score (PT): 8  AM-PAC 6 Clicks Score (OT): 6       Brigette Frazier PT  5/12/2025

## 2025-05-12 NOTE — PLAN OF CARE
Goal Outcome Evaluation:  Plan of Care Reviewed With: patient        Progress: no change  Outcome Evaluation: PT re-evaluation completed. Pt continues to demonstrate significant generalized weakness, impaired balance and coordination, impaired cognition, and decreased functional mobility. Pt required maxA x2 for bed mobility. Required maxA x2- depA for static sitting balance at EOB. Pt's goals have been revised and pt would continue to benefit from PT skilled care. Recommend pt return to ECF at D/C.

## 2025-05-12 NOTE — PLAN OF CARE
Goal Outcome Evaluation:  Plan of Care Reviewed With: patient        Progress: no change  Outcome Evaluation: OT re-eval completed. Pt continues to have impaired in strength, mobility, and cognition limiting independence with ADLs. Pt tolerated EOB sitting with Max A x 2-dep A for ~5 mins limited by L lateral lean and poor command following. Cont POC.    Anticipated Discharge Disposition (OT): extended care facility

## 2025-05-12 NOTE — THERAPY RE-EVALUATION
Patient Name: Estefania Connor  : 1955    MRN: [id number]                              Today's Date: 2025       Admit Date: 2025    Visit Dx:     ICD-10-CM ICD-9-CM   1. Hematoma of groin, initial encounter  S30.1XXA 922.2     Patient Active Problem List   Diagnosis    ESRD on HD    Polycystic kidney disease    HTN (hypertension)    Dyslipidemia    AAA (abdominal aortic aneurysm)    Right coronary artery occlusion    Depression    Tracheostomy present    S/P percutaneous endoscopic gastrostomy (PEG) tube placement    CAD (coronary artery disease)    Hematoma of groin     Past Medical History:   Diagnosis Date    AAA (abdominal aortic aneurysm)     Coronary artery disease     Depression     ESRD (end stage renal disease)     Hyperlipidemia     Hypertension     Polycystic kidney disease      Past Surgical History:   Procedure Laterality Date    CARDIAC CATHETERIZATION N/A 2025    Procedure: Coronary angiography;  Surgeon: Ambrose Mcnally MD;  Location:  [facility];  Service: Cardiovascular;  Laterality: N/A;    CARDIAC CATHETERIZATION N/A 2025    Procedure: Left Heart Cath;  Surgeon: Edin Boland MD;  Location:  [facility];  Service: Cardiovascular;  Laterality: N/A;    CARDIAC CATHETERIZATION N/A 2025    Procedure: Stent ELIECER coronary;  Surgeon: Edin Boland MD;  Location:  [facility];  Service: Cardiovascular;  Laterality: N/A;    PSEUDO ANEURYSM REPAIR, EXTREMITY Left 2025    Procedure: REPAIR LEFT FEMORAL ARTERY, EVACUATION LEFT GROIN HEMATOMA, PLACEMENT OF NEGATIVE WOUND VACCUUM THERAPY;  Surgeon: Remi Guerrero MD;  Location:  [facility];  Service: Vascular;  Laterality: Left;    TRACHEOSTOMY AND PEG TUBE INSERTION N/A 2025    Procedure: TRACHEOSTOMY AND PERCUTANEOUS ENDOSCOPIC GASTROSTOMY TUBE INSERTION;  Surgeon: Kera Head MD;  Location:  [facility];  Service: General;  Laterality: N/A;      General  Information       Row Name 05/12/25 1419          OT Time and Intention    Document Type re-evaluation  -AN     Mode of Treatment occupational therapy;co-treatment  -AN     Patient Effort good  -AN       Row Name 05/12/25 1419          General Information    Patient Profile Reviewed yes  -AN     Existing Precautions/Restrictions cardiac;fall;oxygen therapy device and L/min;other (see comments)  trach, vent  -AN     Barriers to Rehab medically complex;previous functional deficit;cognitive status  -AN       Row Name 05/12/25 1419          Cognition    Orientation Status (Cognition) unable/difficult to assess;other (see comments)  pt intermittently responding to name  -AN       Row Name 05/12/25 1419          Safety Issues/Impairments Affecting Functional Mobility    Safety Issues Affecting Function (Mobility) safety precaution awareness;safety precautions follow-through/compliance;insight into deficits/self-awareness;sequencing abilities;judgment;awareness of need for assistance;problem-solving;ability to follow commands  -AN     Impairments Affecting Function (Mobility) balance;cognition;coordination;endurance/activity tolerance;postural/trunk control;range of motion (ROM);shortness of breath;strength  -AN     Cognitive Impairments, Mobility Safety/Performance attention;safety precaution follow-through;awareness, need for assistance;sequencing abilities;insight into deficits/self-awareness;judgment;problem-solving/reasoning;safety precaution awareness  -AN               User Key  (r) = Recorded By, (t) = Taken By, (c) = Cosigned By      Initials Name Provider Type    Hilary Johnson OT Occupational Therapist                     Mobility/ADL's       Row Name 05/12/25 1423          Bed Mobility    Bed Mobility rolling left;rolling right;supine-sit;sit-supine  -AN     Rolling Left [place] (Bed Mobility) maximum assist (25% patient effort);2 person assist;verbal cues  -AN     Rolling Right [place] (Bed  Mobility) maximum assist (25% patient effort);2 person assist;verbal cues  -AN     Supine-Sit [place] (Bed Mobility) maximum assist (25% patient effort);2 person assist;verbal cues  -AN     Sit-Supine [place] (Bed Mobility) verbal cues;maximum assist (25% patient effort);2 person assist  -AN     Bed Mobility, Safety Issues decreased use of arms for pushing/pulling;decreased use of legs for bridging/pushing  -AN     Assistive Device (Bed Mobility) bed rails  -AN     Comment, (Bed Mobility) Pt tolerated EOB sitting for ~5 mins with varying assist levels of Max A x 2 - dep with Max cues. L lateral lean noted.  -AN       Row Name 05/12/25 [facility]          Activities of Daily Living    BADL Assessment/Intervention lower body dressing;grooming  -AN       Row Name 05/12/25 [facility]          Lower Body Dressing Assessment/Training    [place] Level (Lower Body Dressing) don;socks;dependent (less than 25% patient effort);doff  -AN     Position (Lower Body Dressing) sitting up in bed  -AN       Row Name 05/12/25 142          Grooming Assessment/Training    [place] Level (Grooming) hair care, combing/brushing;dependent (less than 25% patient effort)  -AN     Position (Grooming) edge of bed sitting  -AN               User Key  (r) = Recorded By, (t) = Taken By, (c) = Cosigned By      Initials Name Provider Type    Hilary Johnson OT Occupational Therapist                   Obj/Interventions       Row Name 05/12/25 1423          Sensory Assessment (Somatosensory)    Sensory Assessment (Somatosensory) unable/difficult to assess  -AN       Row Name 05/12/25 1423          Vision Assessment/Intervention    Visual Impairment/Limitations unable/difficult to assess  -AN       Row Name 05/12/25 1423          Balance    Balance Assessment sitting static balance;sitting dynamic balance  -AN     Static Sitting Balance maximum assist;2-person assist;verbal cues;non-verbal cues (demo/gesture)  -AN     Dynamic Sitting  Balance verbal cues;dependent  -AN     Position, Sitting Balance supported;sitting edge of bed  -AN     Balance Interventions sitting;static;dynamic;highly challenging;occupation based/functional task  -AN               User Key  (r) = Recorded By, (t) = Taken By, (c) = Cosigned By      Initials Name Provider Type    Hilary Johnson OT Occupational Therapist                   Goals/Plan       Row Name 05/12/25 1427          Bed Mobility Goal 1 (OT)    Activity/Assistive Device (Bed Mobility Goal 1, OT) rolling to left;rolling to right  -AN     [place] Level/Cues Needed (Bed Mobility Goal 1, OT) moderate assist (50-74% patient effort)  -AN     Time Frame (Bed Mobility Goal 1, OT) short term goal (STG);3 days  -AN     Progress/Outcomes (Bed Mobility Goal 1, OT) goal ongoing  -AN       Row Name 05/12/25 1427          Transfer Goal 1 (OT)    Activity/Assistive Device (Transfer Goal 1, OT) bed-to-chair/chair-to-bed  -AN     [place] Level/Cues Needed (Transfer Goal 1, OT) moderate assist (50-74% patient effort)  -AN     Time Frame (Transfer Goal 1, OT) long term goal (LTG);10 days  -AN     Progress/Outcome (Transfer Goal 1, OT) goal ongoing  -AN       Row Name 05/12/25 1427          Dressing Goal 1 (OT)    Activity/Device (Dressing Goal 1, OT) upper body dressing  -AN     [place]/Cues Needed (Dressing Goal 1, OT) minimum assist (75% or more patient effort)  -AN     Time Frame (Dressing Goal 1, OT) short term goal (STG);3 days  -AN     Progress/Outcome (Dressing Goal 1, OT) goal ongoing  -AN       Row Name 05/12/25 1427          Therapy Assessment/Plan (OT)    Planned Therapy Interventions (OT) activity tolerance training;adaptive equipment training;BADL retraining;cognitive/visual perception retraining;functional balance retraining;edema control/reduction;transfer/mobility retraining;strengthening exercise;occupation/activity based interventions;patient/caregiver education/training  -AN                User Key  (r) = Recorded By, (t) = Taken By, (c) = Cosigned By      Initials Name Provider Type    Hilary Johnson OT Occupational Therapist                   Clinical Impression       Row Name 05/12/25 1425          Pain Assessment    Additional Documentation Pain Scale: FACES Pre/Post-Treatment (Group)  -AN       Row Name 05/12/25 1425          Pain Scale: FACES Pre/Post-Treatment    Pain: FACES Scale, Pretreatment 0-->no hurt  -AN     Posttreatment Pain Rating 0-->no hurt  -AN       Row Name 05/12/25 1425          Plan of Care Review    Plan of Care Reviewed With patient  -AN     Progress no change  -AN     Outcome Evaluation OT re-eval completed. Pt continues to have impaired in strength, mobility, and cognition limiting independence with ADLs. Pt tolerated EOB sitting with Max A x 2-dep A for ~5 mins limited by L lateral lean and poor command following. Cont POC.  -AN       Row Name 05/12/25 1425          Therapy Assessment/Plan (OT)    Criteria for Skilled Therapeutic Interventions Met (OT) yes;skilled treatment is necessary  -AN     Therapy Frequency (OT) daily  -AN       Row Name 05/12/25 1425          Therapy Plan Review/Discharge Plan (OT)    Anticipated Discharge Disposition (OT) extended care facility  -AN       Row Name 05/12/25 1425          Positioning and Restraints    Pre-Treatment Position in bed  -AN     Post Treatment Position bed  -AN     In Bed notified nsg;supine;call light within reach;encouraged to call for assist;with nsg;LUE elevated;RUE elevated;side rails up x3;exit alarm on;legs elevated  -AN               User Key  (r) = Recorded By, (t) = Taken By, (c) = Cosigned By      Initials Name Provider Type    Hilary Johnson OT Occupational Therapist                   Outcome Measures       Row Name 05/12/25 1428          How much help from another is currently needed...    Putting on and taking off regular lower body clothing? 1  -AN     Bathing (including washing, rinsing, and  drying) 1  -AN     Toileting (which includes using toilet bed pan or urinal) 1  -AN     Putting on and taking off regular upper body clothing 1  -AN     Taking care of personal grooming (such as brushing teeth) 1  -AN     Eating meals 1  -AN     AM-PAC 6 Clicks Score (OT) 6  -AN       Row Name 05/12/25 1432          How much help from another person do you currently need...    Turning from your back to your side while in flat bed without using bedrails? 2  -KG     Moving from lying on back to sitting on the side of a flat bed without bedrails? 2  -KG     Moving to and from a bed to a chair (including a wheelchair)? 1  -KG     Standing up from a chair using your arms (e.g., wheelchair, bedside chair)? 1  -KG     Climbing 3-5 steps with a railing? 1  -KG     To walk in hospital room? 1  -KG     AM-PAC 6 Clicks Score (PT) 8  -KG     Highest Level of Mobility Goal 3 --> Sit at edge of bed  -KG       Row Name 05/12/25 1432 05/12/25 1428       Functional Assessment    Outcome Measure Options AM-PAC 6 Clicks Basic Mobility (PT)  -KG AM-PAC 6 Clicks Daily Activity (OT)  -AN              User Key  (r) = Recorded By, (t) = Taken By, (c) = Cosigned By      Initials Name Provider Type    KG Dorothy Frazier, PT Physical Therapist    Hilary Johnson, OT Occupational Therapist                    Occupational Therapy Education       Title: PT OT SLP Therapies (In Progress)       Topic: Occupational Therapy (In Progress)       Point: ADL training (Done)       Learning Progress Summary            Patient Acceptance, E, VU by AN at 5/12/2025 1429                      Point: Precautions (Done)       Learning Progress Summary            Patient Acceptance, E, VU by AN at 5/12/2025 1429                      Point: Body mechanics (Done)       Learning Progress Summary            Patient Acceptance, E, VU by AN at 5/12/2025 1429                                      User Key       Initials Effective Dates Name Provider Type  Discipline    AN 09/21/21 -  Hilary Crespo OT Occupational Therapist OT                  OT Recommendation and Plan  Planned Therapy Interventions (OT): activity tolerance training, adaptive equipment training, BADL retraining, cognitive/visual perception retraining, functional balance retraining, edema control/reduction, transfer/mobility retraining, strengthening exercise, occupation/activity based interventions, patient/caregiver education/training  Therapy Frequency (OT): daily  Plan of Care Review  Plan of Care Reviewed With: patient  Progress: no change  Outcome Evaluation: OT re-eval completed. Pt continues to have impaired in strength, mobility, and cognition limiting independence with ADLs. Pt tolerated EOB sitting with Max A x 2-dep A for ~5 mins limited by L lateral lean and poor command following. Cont POC.     Time Calculation:   Evaluation Complexity (OT)  Review Occupational Profile/Medical/Therapy History Complexity: expanded/moderate complexity  Assessment, Occupational Performance/Identification of Deficit Complexity: 5 or more performance deficits  Clinical Decision Making Complexity (OT): detailed assessment/moderate complexity  Overall Complexity of Evaluation (OT): moderate complexity     Time Calculation- OT       Row Name 05/12/25 1430             Time Calculation- OT    OT Start Time 1030  -AN      OT Received On 05/12/25  -AN      OT Goal Re-Cert Due Date 05/22/25  -AN         Timed Charges    13311 - OT Self Care/Mgmt Minutes 15  -AN         Untimed Charges    OT Eval/Re-eval Minutes 25  -AN         Total Minutes    Timed Charges Total Minutes 15  -AN      Untimed Charges Total Minutes 25  -AN       Total Minutes 40  -AN                User Key  (r) = Recorded By, (t) = Taken By, (c) = Cosigned By      Initials Name Provider Type    Hilary Johnson OT Occupational Therapist                  Therapy Charges for Today       Code Description Service Date Service Provider Modifiers  Qty    94867829873 HC OT SELF CARE/MGMT/TRAIN EA 15 MIN 5/12/2025 Hilary Crespo OT GO 1    [id number] HC OT RE-EVAL 2 5/12/2025 Hilary Crespo OT GO 1                 Hilary Crespo OT  5/12/2025

## 2025-05-12 NOTE — PLAN OF CARE
Problem: Hemodialysis  Goal: Safe, Effective Therapy Delivery  Outcome: Progressing  Goal: Effective Tissue Perfusion  Outcome: Progressing  Goal: Absence of Infection Signs and Symptoms  Outcome: Progressing   Goal Outcome Evaluation:               HD complete, tx tolerated well, report given to primary RN Zora KNIGHT     Fluid removal: 3L

## 2025-05-12 NOTE — PROGRESS NOTES
Clinical Nutrition     Patient Name: Estefania Connor  YOB: 1955  MRN: [id number]  Date of Encounter: 05/12/25 09:27 EDT  Admission date: 5/5/2025  Reason for Visit: MDR, Follow-up protocol, EN    Assessment   Nutrition Assessment   Admission Diagnosis:  CAD (coronary artery disease) [I25.10]    Problem List:    CAD (coronary artery disease)    ESRD on HD    Polycystic kidney disease    HTN (hypertension)    Dyslipidemia    AAA (abdominal aortic aneurysm)    Right coronary artery occlusion    Depression    Tracheostomy present    S/P percutaneous endoscopic gastrostomy (PEG) tube placement      PMH:   She  has a past medical history of AAA (abdominal aortic aneurysm), Coronary artery disease, Depression, ESRD (end stage renal disease), Hyperlipidemia, Hypertension, and Polycystic kidney disease.    PSH:  She  has a past surgical history that includes tracheostomy and peg tube insertion (N/A, 4/29/2025); Cardiac catheterization (N/A, 5/2/2025); Cardiac catheterization (N/A, 5/7/2025); Cardiac catheterization (N/A, 5/7/2025); and Pseudo Aneurysm Repair, Extremity (Left, 5/9/2025).    Applicable Nutrition History:   Trach/PEG    (5/9) s/p repair L femoral artery, evacuation L groin hematoma + NPWT    Labs    Labs Reviewed: Yes    Results from last 7 days   Lab Units 05/12/25  0315 05/11/25  0353 05/10/25  0828 05/10/25  0005 05/07/25  0348 05/06/25  0644 05/05/25  1440   GLUCOSE mg/dL 124* 98 105* 116*   < > 103* 107*   BUN mg/dL 49* 37* 25* 20   < > 29* 27*   CREATININE mg/dL 3.78* 3.32* 2.93* 2.50*   < > 4.86* 4.51*   SODIUM mmol/L 135* 134* 136 137   < > 133* 132*   CHLORIDE mmol/L 102 102 104 103   < > 95* 93*   POTASSIUM mmol/L 3.6 3.8 3.8 3.8   < > 3.4* 3.4*   PHOSPHORUS mg/dL  --   --   --   --   --  4.1 4.1   MAGNESIUM mg/dL  --   --   --  1.8  --  2.0 2.0   ALT (SGPT) U/L  --   --   --  14  --   --   --    LACTATE mmol/L  --   --   --  0.5  --   --   --     < > = values in this  "interval not displayed.       Results from last 7 days   Lab Units 05/10/25  0005 05/06/25  1511 05/06/25  0644   ALBUMIN g/dL 2.5*  --   --    PREALBUMIN mg/dL  --  15.5*  --    CRP mg/dL  --   --  8.27*       Results from last 7 days   Lab Units 05/12/25  0523 05/11/25  2300 05/11/25  1727 05/11/25  1119 05/11/25  0546 05/10/25  2308   GLUCOSE mg/dL 116 114 107 116 113 118     No results found for: \"HGBA1C\"            Medications    Medications Reviewed: yes    Scheduled Meds:amiodarone, 200 mg, Per PEG Tube, Q12H  amLODIPine, 5 mg, Per PEG Tube, Q24H  aspirin, 81 mg, Per PEG Tube, Daily  atorvastatin, 40 mg, Per PEG Tube, Nightly  cefTRIAXone, 2,000 mg, Intravenous, Q24H  cloNIDine, 0.1 mg, Per PEG Tube, Q8H  clopidogrel, 75 mg, Per PEG Tube, Daily  heparin (porcine), 5,000 Units, Subcutaneous, Q8H  hydrALAZINE, 50 mg, Per PEG Tube, Q8H  lansoprazole, 15 mg, Per PEG Tube, QAM AC  metoprolol tartrate, 25 mg, Per PEG Tube, Q12H  miconazole, 1 Application, Topical, Q12H  nitroglycerin, 1 patch, Transdermal, Daily  Nutrisource fiber, 2 packet, Per G Tube, Daily  ProSource TF, 45 mL, Per G Tube, BID  sacubitril-valsartan, 1 tablet, Per PEG Tube, Once  sacubitril-valsartan, 1 tablet, Per PEG Tube, Q12H      Continuous Infusions:dexmedetomidine, 0.2-1.5 mcg/kg/hr, Last Rate: 1.3 mcg/kg/hr (05/12/25 0825)  niCARdipine, 5-15 mg/hr, Last Rate: 2.5 mg/hr (05/12/25 0608)      PRN Meds:.  albuterol    atropine    diazePAM    fentaNYL citrate (PF)    heparin (porcine)    hydrALAZINE    labetalol    loperamide    nitroglycerin    oxyCODONE    Intake/Ouptut 24 hrs (0701 - 0700)   I&O's Reviewed: yes  Intake & Output (last day)         05/11 0701 05/12 0700 05/12 0701 05/13 0700    P.O.      I.V. 929.3     Blood 688.8     Other 240     NG/     IV Piggyback 100     Total Intake 2478     Urine 0     Drains 250     Stool 0     Total Output 250     Net +2228           Urine Unmeasured Occurrence 5 x     Stool Unmeasured " "Occurrence 6 x           Anthropometrics     Height: Height: 160 cm (62.99\")  Last Filed Weight:  69.4kg (153lbs)  Method:  ?  BMI:  27.11kg/m^2    UBW: Limited wt data available  Weight change:  unable to determine    Nutrition Focused Physical Exam     Date: 5/6    Unable to perform due to pt unable to consent. Visual assessment of pt completed, noted to have edema to BUE.*     Subjective   Reported/Observed/Food/Nutrition Related History:   5/12  Remains intubated/sedated. Plan for HD today. EN remains at goal. Ongoing loose BM despite initiation of fiber - per MD, start Imodium.      5/8  Pt s/p RCA with stent placement. TF restarted. Not PAB low with elevated CRP. Plan for HD today and possible d/c back to LTACH later today vs tomorrow. Documented loose stool.     5/6  Pt transferred to [facility] for higher level of care from [facility] after extensive hospital stay at OSH. Has been on Nepro via PEG throughout admission to that facility. +HD. Discussed in MDR. OK to restart nutrition. NKFA    Needs Assessment   Date: 5/6; Reassessed: 5/12    Height used:Height: 160 cm (62.99\")  Weights used: 69.4kg CBW; 52.38kg IBW    Estimated Calorie needs: ~ 1400 Kcal/day  Method: 21-24 Kcals/KG = 3036-1527  Method:  MSJ (1475) x 1.1 = 1623  Method:  PSU = 1405  Ve: 8.25; Tmax: 37.3    Estimated Protein needs: ~ 83 g PRO/day  Method: 1.0-1.3g/Kg CBW = 69-90    Estimated Fluid needs: ~ ml/day   Per clinical status    Current Nutrition Prescription     PO: NPO Diet NPO Type: Strict NPO  Oral Nutrition Supplement: N/A  Intake: N/A    EN: NovaSource Renal  Goal Rate: 35mL/hr  Water Flushes: 20mL/hr  Modular: Prosource TF 2/day and Nutrisource Fiber 2/day  Route: PEG  Tube: Other     At goal over: 20Hrs/day     Rx will supply:   Goal Volume 700  mL/day     Flush Volume 400 mL/day     Energy 1480 Kcal/day 100 % Est Need   Protein 86 g/day 104 % Est Need   Fiber 0 g/day     Water in   mL     Total Water 904 mL     Meet DRI Yes    "     --------------------------------------------------------------------------  Product/Rate verified at bedside: Yes  Infusing Rate at time of visit: 35mL/hr + flush 20mL/hr    Average Delivery from Charting: 3 Days:   Volume 564 mL/day 81  % Goal Vol.   Flush Volume 393 mL/day     Energy  Kcal/day  % Est Need   Protein  g/day  % Est Need   Fiber  g/day     Water in  EN  mL     Total Water  mL     Meet DRI N/A        Assessment & Plan   Nutrition Diagnosis   Date: 5/6 Updated: 5/8  Problem Inadequate oral intake    Etiology Dysphagia with trach/peg   Signs/Symptoms Chronic EN   Status: Active    Goal:   Nutrition to support treatment and Maintain EN      Nutrition Intervention      Follow treatment progress, Care plan reviewed, Nutrition support order placed    Continue EN regimen as ordered  Novasource Renal @ 35mL/hr + flush of 20mL/hr. Provide Prosource TF 2x daily    Increase Nutrisource Fiber to 2 pkts 3x daily    Adjust flush per clinical status      Monitoring/Evaluation:   Per protocol, I&O, Pertinent labs, EN delivery/tolerance, Weight, GI status, Symptoms, POC/GOC, Swallow function, Hemodynamic stability    Mary Alvarado, MS,RD,LD  Time Spent: 30min

## 2025-05-12 NOTE — PROGRESS NOTES
"   LOS: 7 days    Patient Care Team:  Sergio Randall MD as PCP - General (Cardiology)    Subjective     Nonverbal    Objective     Vital Signs:  Blood pressure 109/53, pulse 58, temperature 98.5 °F (36.9 °C), temperature source Axillary, resp. rate 22, height 160 cm (62.99\"), SpO2 95%.      Intake/Output Summary (Last 24 hours) at 5/12/2025 1130  Last data filed at 5/12/2025 1000  Gross per 24 hour   Intake 3587 ml   Output 250 ml   Net 3337 ml        05/11 0701 - 05/12 0700  In: 2478 [I.V.:929.3]  Out: 250 [Drains:250]    Physical Exam:        GENERAL: WDWF NAD  NEURO: Sedate on vent  PSYCHIATRIC: Unable to evaluate  EYE: PE, no icterus, no conjunctivitis  ENT: OMM dry, + trach  NECK: Supple , No JVD discernable,  Trachea midline  CV: Trace edema, RRR  LUNGS:  Quiet,  Nonlabored resp.  Symmetrical expansion  ABDOMEN: + PEG, nondistended  : No Pereira, no palp bladder  SKIN: Warm and dry without rash.  Left groin wound with wound VAC      Labs:  Results from last 7 days   Lab Units 05/12/25  0315 05/11/25  1727 05/11/25  0544 05/11/25  0453 05/10/25  1655 05/10/25  0828   WBC 10*3/mm3 17.27*  --   --  10.65  --  13.30*   HEMOGLOBIN g/dL 9.1* 9.5* 6.7* 6.3*   < > 7.7*   PLATELETS 10*3/mm3 200  --   --  162  --  170    < > = values in this interval not displayed.     Results from last 7 days   Lab Units 05/12/25  0315 05/11/25  0353 05/10/25  0828 05/10/25  0005 05/07/25  0348 05/06/25  0644 05/05/25  1440   SODIUM mmol/L 135* 134* 136 137   < > 133* 132*   POTASSIUM mmol/L 3.6 3.8 3.8 3.8   < > 3.4* 3.4*   CHLORIDE mmol/L 102 102 104 103   < > 95* 93*   CO2 mmol/L 20.0* 22.0 23.0 21.0*   < > 24.0 24.0   BUN mg/dL 49* 37* 25* 20   < > 29* 27*   CREATININE mg/dL 3.78* 3.32* 2.93* 2.50*   < > 4.86* 4.51*   CALCIUM mg/dL 8.5* 8.2* 8.1* 8.3*   < > 8.9 9.5   PHOSPHORUS mg/dL  --   --   --   --   --  4.1 4.1   MAGNESIUM mg/dL  --   --   --  1.8  --  2.0 2.0   ALBUMIN g/dL  --   --   --  2.5*  --   --   --     < > = " values in this interval not displayed.     Results from last 7 days   Lab Units 05/10/25  0005   ALK PHOS U/L 84   BILIRUBIN mg/dL 0.4   ALT (SGPT) U/L 14   AST (SGOT) U/L 23     Results from last 7 days   Lab Units 05/08/25  0326   PH, ARTERIAL pH units 7.406   PO2 ART mm Hg 103.0   PCO2, ARTERIAL mm Hg 36.6   HCO3 ART mmol/L 23.0               Estimated Creatinine Clearance: 12.9 mL/min (A) (by C-G formula based on SCr of 3.78 mg/dL (H)).         A/P:      ESRD: On HD with Dr. Kaur at [facility].   - Continue HD on MW schedule.    HTN: Blood pressure stabilizing with further adjustment of blood pressure medications.  Now off Cardene.  Previously with wide blood pressure variance from hypotensive to hypertensive.  Wean clonidine as able.  - Give albumin for hypotension.  -Cover with as needed medications for hypertension.  -Wean off clonidine as able.  - Watch with UF on HD.    Electrolytes: Potassium has been low requiring 4K bath.  Overall stable with HD correction  - Monitor and adjust as needed with HD.    Metabolic acidosis: pH has been stable.  Bicarb stable.    -Adjust with HD.    Anemia: Hemoglobin dropped to 5 with femoral artery bleed.  Patient was transfused 4 units packed red blood cells.  Trended back down to 6.7 yesterday.  -Continue Epogen.  -Continue to transfuse as indicated.    Volume: Stable.    -UF with HD as tolerated.    Respiratory failure: Trach on vent    Left femoral pseudoaneurysm: Developed at cardiac cath site.  Required repair 5/9/2025.  Wound VAC in place.  May go back to the OR tomorrow for further evaluation.    High risk and complexity patient.         Mulugeta Chávez MD  05/12/25  11:30 EDT

## 2025-05-12 NOTE — PROGRESS NOTES
"[facility]  IP Progress Note    HOSPITAL COURSE:  Patient underwent successful revascularization and patient had pseudoaneurysm of the left femoral artery for which she underwent vascular repair      CHIEF COMPLAINTS:  V-fib arrest      Subjective   Patient has been doing okay      Objective     Blood pressure 172/95, pulse 95, temperature 98.7 °F (37.1 °C), temperature source Axillary, resp. rate 20, height 160 cm (62.99\"), SpO2 94%.     Intake/Output Summary (Last 24 hours) at 5/12/2025 0711  Last data filed at 5/12/2025 0600  Gross per 24 hour   Intake 2478 ml   Output 250 ml   Net 2228 ml       PHYSICAL EXAM:  Constitutional:       General: Not in acute distress.     Appearance: Healthy appearance. Not in distress.     Neck:     JVP:Not elevated     Carotid artery: Normal    Pulmonary:      Effort: Pulmonary effort is normal.      Breath sounds: Normal breath sounds. No wheezing. No rhonchi. No rales.     Cardiovascular:      Normal rate. Regular rhythm. Normal S1. Normal S2.      Murmurs: There is 2/6 systolic murmur.      No gallop. No click. No rub.     Abdominal:      General: Bowel sounds are normal.      Palpations: Abdomen is soft.      Tenderness: There is no abdominal tenderness.    Extremities:     Pulses:Normal radial and pedal pulses     Edema:no edema    MEDICATIONS:    amiodarone, 200 mg, Per PEG Tube, Q12H  amLODIPine, 5 mg, Per PEG Tube, Q24H  aspirin, 81 mg, Per PEG Tube, Daily  atorvastatin, 40 mg, Per PEG Tube, Nightly  cefTRIAXone, 2,000 mg, Intravenous, Q24H  cloNIDine, 0.1 mg, Per PEG Tube, Q8H  clopidogrel, 75 mg, Per PEG Tube, Daily  heparin (porcine), 5,000 Units, Subcutaneous, Q8H  hydrALAZINE, 50 mg, Per PEG Tube, Q8H  lansoprazole, 15 mg, Per PEG Tube, QAM AC  metoprolol tartrate, 25 mg, Per PEG Tube, Q12H  miconazole, 1 Application, Topical, Q12H  nitroglycerin, 1 patch, Transdermal, Daily  Nutrisource fiber, 2 packet, Per G Tube, " Daily  ProSource TF, 45 mL, Per G Tube, BID  sacubitril-valsartan, 1 tablet, Per PEG Tube, Q12H          Results from last 7 days   Lab Units 05/12/25  0315   WBC 10*3/mm3 17.27*   HEMOGLOBIN g/dL 9.1*   HEMATOCRIT % 27.4*   PLATELETS 10*3/mm3 200     Results from last 7 days   Lab Units 05/12/25  0315 05/10/25  0828 05/10/25  0005   SODIUM mmol/L 135*   < > 137   POTASSIUM mmol/L 3.6   < > 3.8   CHLORIDE mmol/L 102   < > 103   CO2 mmol/L 20.0*   < > 21.0*   BUN mg/dL 49*   < > 20   CREATININE mg/dL 3.78*   < > 2.50*   CALCIUM mg/dL 8.5*   < > 8.3*   BILIRUBIN mg/dL  --   --  0.4   ALK PHOS U/L  --   --  84   ALT (SGPT) U/L  --   --  14   AST (SGOT) U/L  --   --  23   GLUCOSE mg/dL 124*   < > 116*    < > = values in this interval not displayed.     Results from last 7 days   Lab Units 05/10/25  0005 05/09/25  1913 05/05/25  1440   INR  1.13* 1.15* 1.02     Lab Results   Component Value Date    TROPONINT 114 (C) 05/01/2025                 Iron   Date Value Ref Range Status   04/29/2025 39 37 - 145 mcg/dL Final     Ferritin   Date Value Ref Range Status   04/29/2025 2,238.00 (H) 13.00 - 150.00 ng/mL Final     Iron Saturation (TSAT)   Date Value Ref Range Status   04/29/2025 25 20 - 50 % Final     TIBC   Date Value Ref Range Status   04/29/2025 155 (L) 298 - 536 mcg/dL Final      Magnesium   Date Value Ref Range Status   05/10/2025 1.8 1.6 - 2.4 mg/dL Final        RESULT REVIEW:    I reviewed the patient's new clinical results.    Tele: Sinus Rythym      ASSESSMENT:     Coronary artery disease  Peripheral vascular disease  Hypertension  Chronic kidney disease on dialysis    PLAN:     Patient has been stable after her vessel repair and blood transfusions.  Patient blood pressure is still running high we will increase nitro to 0.4 mg/h.  We will try to wean her off of Cardene drip.  Once she is more stable she should be able to go back to LTAC facility.

## 2025-05-12 NOTE — PROGRESS NOTES
Critical Care Note     LOS: 7 days   Patient Care Team:  Sergio Randall MD as PCP - General (Cardiology)    Chief Complaint/Reason for visit:     CAD (coronary artery disease)    ESRD on HD    Polycystic kidney disease    HTN (hypertension)    Dyslipidemia    AAA (abdominal aortic aneurysm)    Right coronary artery occlusion    Depression    Tracheostomy present    S/P percutaneous endoscopic gastrostomy (PEG) tube placement      Subjective     Interval History:     Patient is on Precedex 1.3.  She is still restless and needing restraints.  She had a tracheostomy performed in [place] and remains on full ventilatory support.  She is requiring Cardene 2.5 mg/h for blood pressure management in addition to her oral medications.  She is on tube feeding and having frequent diarrhea.  She does arrive afebrile.  She has had no further ventricular arrhythmias.  She remains afebrile.  Wound VAC in place, left groin    Review of Systems:    All systems were reviewed and negative except as noted in subjective.    Medical history, surgical history, social history, family history reviewed    Objective     Intake/Output:    Intake/Output Summary (Last 24 hours) at 5/12/2025 1545  Last data filed at 5/12/2025 1000  Gross per 24 hour   Intake 2898.25 ml   Output 250 ml   Net 2648.25 ml       Nutrition:  NPO Diet NPO Type: Strict NPO  NPO Diet NPO Type: Sips with Meds    Infusions:  dexmedetomidine, 0.2-1.5 mcg/kg/hr, Last Rate: 1.3 mcg/kg/hr (05/12/25 1240)  niCARdipine, 5-15 mg/hr, Last Rate: 2.5 mg/hr (05/12/25 0608)        Mechanical Ventilator Settings:            Resp Rate (Set): 20     FiO2 (%): 35 %  PEEP/CPAP (cm H2O): 5 cm H20    Minute Ventilation (L/min) (Obs): 8.39 L/min  Resp Rate (Observed) Vent: 22  I:E Ratio (Set): 1:2.33  I:E Ratio (Obs): 1:2.3    PIP Observed (cm H2O): 22 cm H2O  Plateau Pressure (cm H2O): 20 cm H2O    Telemetry: Sinus rhythm             Vital Signs  Blood pressure 150/62, pulse 78, temperature  "97.7 °F (36.5 °C), temperature source Axillary, resp. rate 22, height 160 cm (62.99\"), SpO2 94%.    Physical Exam:  General Appearance:  Obese woman supine in bed   Head:  No visible trauma   Eyes:          Pupils are equal, conjunctiva pale   Ears:     Throat: Oral mucosa moist   Neck: Tracheostomy in place, left IJ central line   Back:      Lungs:   Symmetric chest expansion.  Prolonged expiration.  Scattered expiratory rhonchi    Heart:  Regular rhythm, S1, S2 auscultated   Abdomen:   Bowel sounds present, soft, PEG tube in place   Rectal:   Deferred   Extremities: Left groin wound VAC   Pulses: Palpable pedal pulses   Skin: No rash   Lymph nodes:    Neurologic: Awake and moving all extremities, will intermittently follow commands      Results Review:     I reviewed the patient's new clinical results.   Results from last 7 days   Lab Units 05/12/25  0315 05/11/25  0353 05/10/25  0828 05/10/25  0005   SODIUM mmol/L 135* 134* 136 137   POTASSIUM mmol/L 3.6 3.8 3.8 3.8   CHLORIDE mmol/L 102 102 104 103   CO2 mmol/L 20.0* 22.0 23.0 21.0*   BUN mg/dL 49* 37* 25* 20   CREATININE mg/dL 3.78* 3.32* 2.93* 2.50*   CALCIUM mg/dL 8.5* 8.2* 8.1* 8.3*   BILIRUBIN mg/dL  --   --   --  0.4   ALK PHOS U/L  --   --   --  84   ALT (SGPT) U/L  --   --   --  14   AST (SGOT) U/L  --   --   --  23   GLUCOSE mg/dL 124* 98 105* 116*     Results from last 7 days   Lab Units 05/12/25  0315 05/11/25  1727 05/11/25  0544 05/11/25  0453 05/10/25  1655 05/10/25  0828   WBC 10*3/mm3 17.27*  --   --  10.65  --  13.30*   HEMOGLOBIN g/dL 9.1* 9.5* 6.7* 6.3*   < > 7.7*   HEMATOCRIT % 27.4* 28.0* 19.9* 19.1*   < > 22.4*   PLATELETS 10*3/mm3 200  --   --  162  --  170    < > = values in this interval not displayed.     Results from last 7 days   Lab Units 05/08/25  0326   PH, ARTERIAL pH units 7.406   PO2 ART mm Hg 103.0   PCO2, ARTERIAL mm Hg 36.6   HCO3 ART mmol/L 23.0     Lab Results   Component Value Date    BLOODCX No growth at 5 days 04/24/2025 " "    No results found for: \"URINECX\"    I reviewed the patient's new imaging including images and reports.    Interpretation Summary right upper extremity duplex         Acute right upper extremity superficial thrombophlebitis noted in the cephalic (forearm).    All other right sided vessels appear normal.    FINAL IMPRESSION: Left heart catheterization May 7, 2025  Successful PTCA/stenting of the RCA with placement of 3 overlapping drug-eluting stents reducing 95% stenosis to 0%.     RECOMMENDATIONS:  Dual antiplatelet therapy for at least 1 year.  Optimize medical therapy and risk factor management per guidelines.    Interpretation Summary, echocardiogram May 1, 2025, [place]         The study is technically difficult for diagnosis. The quality of the study is limited with poor acoustic windows.    Sinus bradycardia in high 50s was the predominant rhythm observed during the procedure.    Normal left ventricular cavity size noted. Left ventricular wall thickness is consistent with mild concentric hypertrophy.    Left ventricular systolic function is mildly decreased. Left ventricular ejection fraction appears to be 41 - 45%.    Left ventricular diastolic function is consistent with (grade II w/high LAP) pseudonormalization.    There is mild calcification of the aortic valve. No significant aortic valve regurgitation is present. No hemodynamically significant aortic valve stenosis is present.    There is mild calcification of the mitral valve posterior leaflet(s). Mild mitral valve regurgitation is present. No significant mitral valve stenosis is present.    There is a small (<1cm) pericardial effusion. There is no evidence of cardiac tamponade.      All medications reviewed.   amiodarone, 200 mg, Per PEG Tube, Q12H  amLODIPine, 5 mg, Per PEG Tube, Q24H  aspirin, 81 mg, Per PEG Tube, Daily  atorvastatin, 40 mg, Per PEG Tube, Nightly  cefTRIAXone, 2,000 mg, Intravenous, Q24H  cloNIDine, 0.1 mg, Per PEG Tube, " Q8H  clopidogrel, 75 mg, Per PEG Tube, Daily  heparin (porcine), 5,000 Units, Subcutaneous, Q8H  hydrALAZINE, 50 mg, Per PEG Tube, Q8H  lansoprazole, 15 mg, Per PEG Tube, QAM AC  metoprolol tartrate, 25 mg, Per PEG Tube, Q12H  miconazole, 1 Application, Topical, Q12H  nitroglycerin, 1 patch, Transdermal, Daily  Nutrisource fiber, 2 packet, Per G Tube, TID  ProSource TF, 45 mL, Per G Tube, BID  sacubitril-valsartan, 1 tablet, Per PEG Tube, Q12H          Assessment & Plan       CAD (coronary artery disease)    ESRD on HD    Polycystic kidney disease    HTN (hypertension)    Dyslipidemia    AAA (abdominal aortic aneurysm)    Right coronary artery occlusion    Depression    Tracheostomy present    S/P percutaneous endoscopic gastrostomy (PEG) tube placement    Hematoma of groin    69 y.o. female with history of HTN, HLD, CKD on HD, polycystic kidney disease, abdominal aortic aneurysm, CAD with prior stent, depression, transferred from [facility] in [place] on 5/5/2025 after she suffered a V-fib arrest with resuscitation and underwent left heart catheterization showing 100% occlusion of the RCA with failed intervention.     Patient was initially admitted to [facility] on 3/20/2025 with volume overload secondary to missing 2 hemodialysis sessions.  She also has followed up possible pneumonia and had anemia and was evaluated by GI with no evidence of GI bleed.  She had bilateral pleural effusions and underwent 1550 mL thoracentesis, but despite thoracentesis required intubation on 4/10/2025 was transferred to [facility] on 4/11/2025.  Patient ultimately underwent tracheostomy and PEG placement on 4/29/2025.  She deteriorated on 5/2/2025 with me today Vfib arrest ultimately resuscitated with subsequent LHC as above.    May 7 she underwent left heart catheterization with stent of her occluded RCA.  She was noted to have a swollen right upper extremity.  Duplex revealed superficial thrombophlebitis.       Patient developed a hematoma at her left femoral artery site on the evening of May 9 requiring urgent surgery with femoral artery repair, evacuation of a hematoma, 2 units of packed red cells.  Wound VAC was placed.    She continues to have large pleural effusions and upper extremity pitting edema, volume overload.  She is only requiring 35% FiO2.  Blood pressure has been labile but is elevated and Cardene drip was restarted.  She remains afebrile on Rocephin for a Klebsiella tracheobronchitis.  Clinically I suspect if her volume status was better, she could potentially start weaning trials    #1 V-fib arrest/coronary artery disease-occluded RCA, maintaining sinus rhythm.  No overt failure clinically.  LVEF 41 to 45%  - Successful stent to RCA May 9  - Aspirin, statin, Plavix, beta-blocker  - Entresto  - Amiodarone    #2 left femoral artery pseudoaneurysm with hematoma  - OR for repair of the left femoral artery and evacuation of a hematoma, wound VAC placement, May 9   - Hemoglobin 9.1    #3 respiratory failure  - Tracheostomy, PEG tube placement in [place], April 29, 2025  - Initially hospitalized March 20, 2025 for volume overload after missing dialysis, intubated April 10 in [place] and then transferred to LTAC in [place]  - Pleural effusions postthoracentesis April 4, moderate to large effusions present on CT of the abdomen May 9  - Previous tobacco per records, details not available  - Klebsiella pneumonia positive respiratory culture, suspect tracheobronchitis  - Ceftriaxone started May 6  - Assist-control 20, tidal volume 365, PEEP 5, 35% FiO2    #4 chronic kidney disease, history of polycystic kidneys  - End-stage renal disease on hemodialysis  - Nephrology following with intermittent dialysis  - Appears volume overloaded with effusions and upper extremity pitting edema    #5 hypertension, difficult to control  - Amlodipine 5 mg daily  - Hydralazine 50 mg every 8 hours  - Metoprolol 25 mg twice daily  -  Entresto 29-31 mg twice daily, will increase to 49-51 mg  - Wean Cardene drip    #6 nutrition  - Novasource renal 35 mL/h  - Frequent diarrhea  - Start Imodium  - Stop Prosource      VTE Prophylaxis: subcutaneous heparin    Stress Ulcer Prophylaxis: Prevacid suspension    Nuris Aquino MD  05/12/25  15:45 EDT      Time: Critical care 40 min  I personally provided care to this critically ill patient as documented above.  Critical care time does not include time spent on separately billed procedures.  None of my critical care time was concurrent with other critical care providers.

## 2025-05-13 ENCOUNTER — APPOINTMENT (OUTPATIENT)
Dept: GENERAL RADIOLOGY | Facility: HOSPITAL | Age: 70
End: 2025-05-13
Payer: MEDICARE

## 2025-05-13 ENCOUNTER — ANESTHESIA (OUTPATIENT)
Dept: PERIOP | Facility: HOSPITAL | Age: 70
End: 2025-05-13
Payer: MEDICARE

## 2025-05-13 LAB
ANION GAP SERPL CALCULATED.3IONS-SCNC: 10 MMOL/L (ref 5–15)
ARTERIAL PATENCY WRIST A: ABNORMAL
ATMOSPHERIC PRESS: ABNORMAL MM[HG]
BASE EXCESS BLDA CALC-SCNC: 0.6 MMOL/L (ref 0–2)
BDY SITE: ABNORMAL
BODY TEMPERATURE: 37
BUN SERPL-MCNC: 29 MG/DL (ref 8–23)
BUN/CREAT SERPL: 11.5 (ref 7–25)
CALCIUM SPEC-SCNC: 8.7 MG/DL (ref 8.6–10.5)
CHLORIDE SERPL-SCNC: 101 MMOL/L (ref 98–107)
CO2 BLDA-SCNC: 26.1 MMOL/L (ref 22–33)
CO2 SERPL-SCNC: 22 MMOL/L (ref 22–29)
COHGB MFR BLD: 1.6 % (ref 0–2)
CREAT SERPL-MCNC: 2.53 MG/DL (ref 0.57–1)
CRP SERPL-MCNC: 6.07 MG/DL (ref 0–0.5)
DEPRECATED RDW RBC AUTO: 54 FL (ref 37–54)
EGFRCR SERPLBLD CKD-EPI 2021: 19.9 ML/MIN/1.73
EPAP: 0
ERYTHROCYTE [DISTWIDTH] IN BLOOD BY AUTOMATED COUNT: 16.5 % (ref 12.3–15.4)
GLUCOSE BLDC GLUCOMTR-MCNC: 105 MG/DL (ref 70–130)
GLUCOSE BLDC GLUCOMTR-MCNC: 107 MG/DL (ref 70–130)
GLUCOSE BLDC GLUCOMTR-MCNC: 113 MG/DL (ref 70–130)
GLUCOSE SERPL-MCNC: 115 MG/DL (ref 65–99)
HCO3 BLDA-SCNC: 24.9 MMOL/L (ref 20–26)
HCT VFR BLD AUTO: 26.5 % (ref 34–46.6)
HCT VFR BLD CALC: 27.2 % (ref 38–51)
HGB BLD-MCNC: 8.7 G/DL (ref 12–15.9)
HGB BLDA-MCNC: 8.9 G/DL (ref 14–18)
INHALED O2 CONCENTRATION: 35 %
IPAP: 0
MCH RBC QN AUTO: 29.9 PG (ref 26.6–33)
MCHC RBC AUTO-ENTMCNC: 32.8 G/DL (ref 31.5–35.7)
MCV RBC AUTO: 91.1 FL (ref 79–97)
METHGB BLD QL: 0.1 % (ref 0–1.5)
MODALITY: ABNORMAL
OXYHGB MFR BLDV: 94.7 % (ref 94–99)
PAW @ PEAK INSP FLOW SETTING VENT: 0 CMH2O
PCO2 BLDA: 37.6 MM HG (ref 35–45)
PCO2 TEMP ADJ BLD: 37.6 MM HG (ref 35–45)
PEEP RESPIRATORY: 5 CM[H2O]
PH BLDA: 7.43 PH UNITS (ref 7.35–7.45)
PH, TEMP CORRECTED: 7.43 PH UNITS
PLATELET # BLD AUTO: 203 10*3/MM3 (ref 140–450)
PMV BLD AUTO: 10.3 FL (ref 6–12)
PO2 BLDA: 66.5 MM HG (ref 83–108)
PO2 TEMP ADJ BLD: 66.5 MM HG (ref 83–108)
POTASSIUM SERPL-SCNC: 3.5 MMOL/L (ref 3.5–5.2)
PREALB SERPL-MCNC: 14.9 MG/DL (ref 20–40)
RBC # BLD AUTO: 2.91 10*6/MM3 (ref 3.77–5.28)
SODIUM SERPL-SCNC: 133 MMOL/L (ref 136–145)
TOTAL RATE: 0 BREATHS/MINUTE
VENTILATOR MODE: ABNORMAL
VT ON VENT VENT: 0.35 ML
WBC NRBC COR # BLD AUTO: 13.7 10*3/MM3 (ref 3.4–10.8)

## 2025-05-13 PROCEDURE — 25010000002 NICARDIPINE 2.5 MG/ML SOLUTION 10 ML VIAL: Performed by: INTERNAL MEDICINE

## 2025-05-13 PROCEDURE — 25010000002 FENTANYL CITRATE (PF) 50 MCG/ML SOLUTION: Performed by: INTERNAL MEDICINE

## 2025-05-13 PROCEDURE — 25010000002 FENTANYL CITRATE (PF) 50 MCG/ML SOLUTION: Performed by: SURGERY

## 2025-05-13 PROCEDURE — 82948 REAGENT STRIP/BLOOD GLUCOSE: CPT

## 2025-05-13 PROCEDURE — 82805 BLOOD GASES W/O2 SATURATION: CPT

## 2025-05-13 PROCEDURE — 85027 COMPLETE CBC AUTOMATED: CPT | Performed by: INTERNAL MEDICINE

## 2025-05-13 PROCEDURE — 0JB80ZZ EXCISION OF ABDOMEN SUBCUTANEOUS TISSUE AND FASCIA, OPEN APPROACH: ICD-10-PCS | Performed by: SURGERY

## 2025-05-13 PROCEDURE — 94799 UNLISTED PULMONARY SVC/PX: CPT

## 2025-05-13 PROCEDURE — 82375 ASSAY CARBOXYHB QUANT: CPT

## 2025-05-13 PROCEDURE — 86140 C-REACTIVE PROTEIN: CPT | Performed by: INTERNAL MEDICINE

## 2025-05-13 PROCEDURE — 99291 CRITICAL CARE FIRST HOUR: CPT | Performed by: INTERNAL MEDICINE

## 2025-05-13 PROCEDURE — 71045 X-RAY EXAM CHEST 1 VIEW: CPT

## 2025-05-13 PROCEDURE — 25810000003 SODIUM CHLORIDE 0.9 % SOLUTION 250 ML FLEX CONT: Performed by: INTERNAL MEDICINE

## 2025-05-13 PROCEDURE — 94003 VENT MGMT INPAT SUBQ DAY: CPT

## 2025-05-13 PROCEDURE — 80048 BASIC METABOLIC PNL TOTAL CA: CPT | Performed by: INTERNAL MEDICINE

## 2025-05-13 PROCEDURE — 83050 HGB METHEMOGLOBIN QUAN: CPT

## 2025-05-13 PROCEDURE — 25810000003 SODIUM CHLORIDE 0.9 % SOLUTION

## 2025-05-13 PROCEDURE — 99232 SBSQ HOSP IP/OBS MODERATE 35: CPT | Performed by: INTERNAL MEDICINE

## 2025-05-13 PROCEDURE — 36600 WITHDRAWAL OF ARTERIAL BLOOD: CPT

## 2025-05-13 PROCEDURE — 84134 ASSAY OF PREALBUMIN: CPT | Performed by: INTERNAL MEDICINE

## 2025-05-13 PROCEDURE — 25010000002 FENTANYL CITRATE (PF) 100 MCG/2ML SOLUTION

## 2025-05-13 PROCEDURE — 25010000002 HEPARIN (PORCINE) PER 1000 UNITS: Performed by: SURGERY

## 2025-05-13 PROCEDURE — 25010000002 PHENYLEPHRINE 10 MG/ML SOLUTION

## 2025-05-13 PROCEDURE — 25010000002 PROPOFOL 10 MG/ML EMULSION

## 2025-05-13 RX ORDER — SODIUM CHLORIDE 9 MG/ML
INJECTION, SOLUTION INTRAVENOUS CONTINUOUS PRN
Status: DISCONTINUED | OUTPATIENT
Start: 2025-05-13 | End: 2025-05-13 | Stop reason: SURG

## 2025-05-13 RX ORDER — EPHEDRINE SULFATE 50 MG/ML
INJECTION INTRAVENOUS AS NEEDED
Status: DISCONTINUED | OUTPATIENT
Start: 2025-05-13 | End: 2025-05-13 | Stop reason: SURG

## 2025-05-13 RX ORDER — OXYCODONE HYDROCHLORIDE 10 MG/1
10 TABLET ORAL EVERY 4 HOURS PRN
Refills: 0 | Status: DISCONTINUED | OUTPATIENT
Start: 2025-05-13 | End: 2025-05-20

## 2025-05-13 RX ORDER — PROPOFOL 10 MG/ML
VIAL (ML) INTRAVENOUS AS NEEDED
Status: DISCONTINUED | OUTPATIENT
Start: 2025-05-13 | End: 2025-05-13 | Stop reason: SURG

## 2025-05-13 RX ORDER — SODIUM CHLOR/HYPOCHLOROUS ACID 0.033 %
SOLUTION, IRRIGATION IRRIGATION AS NEEDED
Status: DISCONTINUED | OUTPATIENT
Start: 2025-05-13 | End: 2025-05-13 | Stop reason: HOSPADM

## 2025-05-13 RX ORDER — ONDANSETRON 2 MG/ML
4 INJECTION INTRAMUSCULAR; INTRAVENOUS ONCE AS NEEDED
Status: DISCONTINUED | OUTPATIENT
Start: 2025-05-13 | End: 2025-05-13 | Stop reason: HOSPADM

## 2025-05-13 RX ORDER — FENTANYL CITRATE 50 UG/ML
50 INJECTION, SOLUTION INTRAMUSCULAR; INTRAVENOUS
Status: DISCONTINUED | OUTPATIENT
Start: 2025-05-13 | End: 2025-05-13 | Stop reason: HOSPADM

## 2025-05-13 RX ORDER — FENTANYL CITRATE 50 UG/ML
INJECTION, SOLUTION INTRAMUSCULAR; INTRAVENOUS AS NEEDED
Status: DISCONTINUED | OUTPATIENT
Start: 2025-05-13 | End: 2025-05-13 | Stop reason: SURG

## 2025-05-13 RX ORDER — HYDROMORPHONE HYDROCHLORIDE 1 MG/ML
0.5 INJECTION, SOLUTION INTRAMUSCULAR; INTRAVENOUS; SUBCUTANEOUS
Status: DISCONTINUED | OUTPATIENT
Start: 2025-05-13 | End: 2025-05-13 | Stop reason: HOSPADM

## 2025-05-13 RX ORDER — PHENYLEPHRINE HYDROCHLORIDE 10 MG/ML
INJECTION INTRAVENOUS AS NEEDED
Status: DISCONTINUED | OUTPATIENT
Start: 2025-05-13 | End: 2025-05-13 | Stop reason: SURG

## 2025-05-13 RX ORDER — AMLODIPINE BESYLATE 10 MG/1
10 TABLET ORAL
Status: DISCONTINUED | OUTPATIENT
Start: 2025-05-13 | End: 2025-05-22 | Stop reason: HOSPADM

## 2025-05-13 RX ADMIN — LANSOPRAZOLE 15 MG: 15 TABLET, ORALLY DISINTEGRATING ORAL at 08:36

## 2025-05-13 RX ADMIN — METOPROLOL TARTRATE 25 MG: 25 TABLET, FILM COATED ORAL at 08:36

## 2025-05-13 RX ADMIN — FENTANYL CITRATE 25 MCG: 50 INJECTION, SOLUTION INTRAMUSCULAR; INTRAVENOUS at 14:25

## 2025-05-13 RX ADMIN — AMIODARONE HYDROCHLORIDE 200 MG: 200 TABLET ORAL at 20:40

## 2025-05-13 RX ADMIN — SODIUM CHLORIDE 2.5 MG/HR: 9 INJECTION, SOLUTION INTRAVENOUS at 06:18

## 2025-05-13 RX ADMIN — OXYCODONE 5 MG: 5 TABLET ORAL at 20:39

## 2025-05-13 RX ADMIN — DEXMEDETOMIDINE HYDROCHLORIDE 1.4 MCG/KG/HR: 4 INJECTION, SOLUTION INTRAVENOUS at 11:58

## 2025-05-13 RX ADMIN — AMLODIPINE BESYLATE 10 MG: 10 TABLET ORAL at 08:36

## 2025-05-13 RX ADMIN — PHENYLEPHRINE HYDROCHLORIDE 100 MCG: 10 INJECTION INTRAVENOUS at 15:10

## 2025-05-13 RX ADMIN — METOPROLOL TARTRATE 25 MG: 25 TABLET, FILM COATED ORAL at 20:40

## 2025-05-13 RX ADMIN — FENTANYL CITRATE 25 MCG: 50 INJECTION, SOLUTION INTRAMUSCULAR; INTRAVENOUS at 04:22

## 2025-05-13 RX ADMIN — DEXMEDETOMIDINE HYDROCHLORIDE 1.5 MCG/KG/HR: 4 INJECTION, SOLUTION INTRAVENOUS at 16:20

## 2025-05-13 RX ADMIN — DEXMEDETOMIDINE HYDROCHLORIDE 1.3 MCG/KG/HR: 4 INJECTION, SOLUTION INTRAVENOUS at 02:54

## 2025-05-13 RX ADMIN — SACUBITRIL AND VALSARTAN 1 TABLET: 97; 103 TABLET, FILM COATED ORAL at 08:59

## 2025-05-13 RX ADMIN — AMIODARONE HYDROCHLORIDE 200 MG: 200 TABLET ORAL at 08:36

## 2025-05-13 RX ADMIN — EPHEDRINE SULFATE 5 MG: 50 INJECTION INTRAVENOUS at 15:10

## 2025-05-13 RX ADMIN — HYDRALAZINE HYDROCHLORIDE 50 MG: 50 TABLET ORAL at 06:18

## 2025-05-13 RX ADMIN — EPHEDRINE SULFATE 5 MG: 50 INJECTION INTRAVENOUS at 14:55

## 2025-05-13 RX ADMIN — SODIUM CHLORIDE: 9 INJECTION, SOLUTION INTRAVENOUS at 14:34

## 2025-05-13 RX ADMIN — EPHEDRINE SULFATE 5 MG: 50 INJECTION INTRAVENOUS at 14:43

## 2025-05-13 RX ADMIN — HEPARIN SODIUM 5000 UNITS: 5000 INJECTION INTRAVENOUS; SUBCUTANEOUS at 06:18

## 2025-05-13 RX ADMIN — OXYCODONE 5 MG: 5 TABLET ORAL at 15:46

## 2025-05-13 RX ADMIN — FENTANYL CITRATE 25 MCG: 50 INJECTION, SOLUTION INTRAMUSCULAR; INTRAVENOUS at 17:20

## 2025-05-13 RX ADMIN — HEPARIN SODIUM 5000 UNITS: 5000 INJECTION INTRAVENOUS; SUBCUTANEOUS at 21:01

## 2025-05-13 RX ADMIN — CLOPIDOGREL BISULFATE 75 MG: 75 TABLET, FILM COATED ORAL at 08:36

## 2025-05-13 RX ADMIN — NITROGLYCERIN 1 PATCH: 0.4 PATCH TRANSDERMAL at 08:38

## 2025-05-13 RX ADMIN — FENTANYL CITRATE 100 MCG: 50 INJECTION, SOLUTION INTRAMUSCULAR; INTRAVENOUS at 14:39

## 2025-05-13 RX ADMIN — DEXMEDETOMIDINE HYDROCHLORIDE 1.3 MCG/KG/HR: 4 INJECTION, SOLUTION INTRAVENOUS at 06:49

## 2025-05-13 RX ADMIN — OXYCODONE 5 MG: 5 TABLET ORAL at 00:08

## 2025-05-13 RX ADMIN — CLONIDINE HYDROCHLORIDE 0.1 MG: 0.1 TABLET ORAL at 06:18

## 2025-05-13 RX ADMIN — OXYCODONE 5 MG: 5 TABLET ORAL at 06:18

## 2025-05-13 RX ADMIN — PROPOFOL 30 MG: 10 INJECTION, EMULSION INTRAVENOUS at 14:39

## 2025-05-13 RX ADMIN — ATORVASTATIN CALCIUM 40 MG: 40 TABLET, FILM COATED ORAL at 20:40

## 2025-05-13 RX ADMIN — SACUBITRIL AND VALSARTAN 1 TABLET: 97; 103 TABLET, FILM COATED ORAL at 20:40

## 2025-05-13 RX ADMIN — PHENYLEPHRINE HYDROCHLORIDE 100 MCG: 10 INJECTION INTRAVENOUS at 15:04

## 2025-05-13 RX ADMIN — Medication 2 PACKET: at 15:46

## 2025-05-13 RX ADMIN — MICONAZOLE NITRATE 1 APPLICATION: 20 POWDER TOPICAL at 20:41

## 2025-05-13 RX ADMIN — DEXMEDETOMIDINE HYDROCHLORIDE 1.5 MCG/KG/HR: 4 INJECTION, SOLUTION INTRAVENOUS at 20:40

## 2025-05-13 RX ADMIN — ASPIRIN 81 MG 81 MG: 81 TABLET ORAL at 08:36

## 2025-05-13 RX ADMIN — CLONIDINE HYDROCHLORIDE 0.1 MG: 0.1 TABLET ORAL at 21:02

## 2025-05-13 RX ADMIN — FENTANYL CITRATE 25 MCG: 50 INJECTION, SOLUTION INTRAMUSCULAR; INTRAVENOUS at 20:58

## 2025-05-13 RX ADMIN — HYDRALAZINE HYDROCHLORIDE 50 MG: 50 TABLET ORAL at 21:02

## 2025-05-13 RX ADMIN — EPHEDRINE SULFATE 5 MG: 50 INJECTION INTRAVENOUS at 15:04

## 2025-05-13 NOTE — ANESTHESIA POSTPROCEDURE EVALUATION
Patient: Estefania Connor    Procedure Summary       Date: 05/13/25 Room / Location:  [facility] /  [facility]    Anesthesia Start: 1434 Anesthesia Stop: 1527    Procedure: Left groin wound vacuum change (Left) Diagnosis:       Hematoma of groin, initial encounter      (Hematoma of groin, initial encounter [S30.1XXA])    Surgeons: Remi Guerrero MD Provider: Michelle De Leon DO    Anesthesia Type: Not recorded ASA Status: Not recorded            Anesthesia Type: No value filed.    Vitals  Vitals Value Taken Time   /61 05/13/25 15:31   Temp 97 °F (36.1 °C) 05/13/25 15:31   Pulse 70 05/13/25 15:31   Resp 14 05/13/25 15:31   SpO2 97 % 05/13/25 15:31           Post Anesthesia Care and Evaluation    Patient location during evaluation: ICU  Patient participation: complete - patient cannot participate  Level of consciousness: lethargic  Pain management: adequate    Airway patency: patent  Anesthetic complications: No anesthetic complications  PONV Status: none  Cardiovascular status: hemodynamically stable and acceptable  Respiratory status: acceptable and trach  Hydration status: acceptable    Comments: Report given, pt stable

## 2025-05-13 NOTE — PROGRESS NOTES
Critical Care Note     LOS: 8 days   Patient Care Team:  Sergio Randall MD as PCP - General (Cardiology)    Chief Complaint/Reason for visit:     CAD (coronary artery disease)    ESRD on HD    Polycystic kidney disease    HTN (hypertension)    Dyslipidemia    AAA (abdominal aortic aneurysm)    Right coronary artery occlusion    Depression    Tracheostomy present    S/P percutaneous endoscopic gastrostomy (PEG) tube placement      Subjective     Interval History:     She is n.p.o. with plans to return to surgery today to reevaluate the left groin wound VAC.  She is on Precedex 1.3 mics per kilogram per hour and calm.  She was on Cardene 2.5 mg/h for hypertension.  Oral medications adjusted and Cardene has been stopped.  Blood pressure has been labile, systolic of 104-180.  Maintaining sinus rhythm.  Current ventilator settings rate 20 tidal volume to 365, PEEP of 5 and 35% FiO2.  Oxygen saturation 93%.  Hemodialysis yesterday with 3.3 L removed.  2 unmeasured episodes of voiding.  On tube feeding with frequent diarrhea.    Review of Systems:    All systems were reviewed and negative except as noted in subjective.    Medical history, surgical history, social history, family history reviewed    Objective     Intake/Output:    Intake/Output Summary (Last 24 hours) at 5/13/2025 1103  Last data filed at 5/13/2025 0800  Gross per 24 hour   Intake 841 ml   Output 3610 ml   Net -2769 ml       Nutrition:  NPO Diet NPO Type: Sips with Meds    Infusions:  dexmedetomidine, 0.2-1.5 mcg/kg/hr, Last Rate: 1.3 mcg/kg/hr (05/13/25 0649)  niCARdipine, 5-15 mg/hr, Last Rate: 2.5 mg/hr (05/13/25 0618)        Mechanical Ventilator Settings:            Resp Rate (Set): 16     FiO2 (%): 35 %  PEEP/CPAP (cm H2O): 5 cm H20    Minute Ventilation (L/min) (Obs): 6.69 L/min  Resp Rate (Observed) Vent: 16  I:E Ratio (Set): 1:3.17  I:E Ratio (Obs): 1:2.9    PIP Observed (cm H2O): 22 cm H2O  Plateau Pressure (cm H2O): (S) 19 cm  "H2O    Telemetry: Sinus rhythm             Vital Signs  Blood pressure 166/73, pulse 75, temperature 97.7 °F (36.5 °C), temperature source Axillary, resp. rate 20, height 160 cm (62.99\"), SpO2 93%.    Physical Exam:  General Appearance:  Obese woman supine in bed   Head:  No visible trauma   Eyes:          Pupils are equal, conjunctiva pale   Ears:     Throat: Oral mucosa moist   Neck: Tracheostomy in place, left IJ central line, right tunneled access   Back:      Lungs:   Symmetric chest expansion.  Prolonged expiration.  Scattered expiratory rhonchi, diminished at the bases bilaterally    Heart:  Regular rhythm, S1, S2 auscultated   Abdomen:   Bowel sounds present, soft, PEG tube in place   Rectal:   Deferred   Extremities: Left groin wound VAC   Pulses: Palpable pedal pulses   Skin: No rash   Lymph nodes:    Neurologic: Awake and moving all extremities, will intermittently follow commands      Results Review:     I reviewed the patient's new clinical results.   Results from last 7 days   Lab Units 05/13/25 0403 05/12/25 0315 05/11/25  0353 05/10/25  0828 05/10/25  0005   SODIUM mmol/L 133* 135* 134*   < > 137   POTASSIUM mmol/L 3.5 3.6 3.8   < > 3.8   CHLORIDE mmol/L 101 102 102   < > 103   CO2 mmol/L 22.0 20.0* 22.0   < > 21.0*   BUN mg/dL 29* 49* 37*   < > 20   CREATININE mg/dL 2.53* 3.78* 3.32*   < > 2.50*   CALCIUM mg/dL 8.7 8.5* 8.2*   < > 8.3*   BILIRUBIN mg/dL  --   --   --   --  0.4   ALK PHOS U/L  --   --   --   --  84   ALT (SGPT) U/L  --   --   --   --  14   AST (SGOT) U/L  --   --   --   --  23   GLUCOSE mg/dL 115* 124* 98   < > 116*    < > = values in this interval not displayed.     Results from last 7 days   Lab Units 05/13/25  0403 05/12/25  0315 05/11/25  1727 05/11/25  0544 05/11/25  0453   WBC 10*3/mm3 13.70* 17.27*  --   --  10.65   HEMOGLOBIN g/dL 8.7* 9.1* 9.5*   < > 6.3*   HEMATOCRIT % 26.5* 27.4* 28.0*   < > 19.1*   PLATELETS 10*3/mm3 203 200  --   --  162    < > = values in this " "interval not displayed.     Results from last 7 days   Lab Units 05/13/25  0358   PH, ARTERIAL pH units 7.430   PO2 ART mm Hg 66.5*   PCO2, ARTERIAL mm Hg 37.6   HCO3 ART mmol/L 24.9     Lab Results   Component Value Date    BLOODCX No growth at 5 days 04/24/2025     Respiratory Culture - Sputum, ET Suction [[id number]]    (Abnormal)   Sputum from ET Suction    Final result Component Value   Respiratory Culture Moderate growth (3+) Klebsiella pneumoniae ssp pneumoniae Abnormal     No Normal Respiratory Geraldine Abnormal    Gram Stain Rare (1+) Epithelial cells per low power field    Moderate (3+) WBCs per low power field    No organisms seen         Susceptibility     Klebsiella pneumoniae ssp pneumoniae     GRACE     Amoxicillin + Clavulanate <=2 Susceptible     Ampicillin >=32 Resistant     Ampicillin + Sulbactam 8 Susceptible     Cefazolin (Non Urine) 2 Susceptible     Cefepime <=0.12 Susceptible     Ceftazidime <=0.5 Susceptible     Ceftriaxone <=0.25 Susceptible     Gentamicin <=1 Susceptible     Levofloxacin <=0.12 Susceptible     Piperacillin + Tazobactam <=4 Susceptible     Tetracycline <=1 Susceptible     Trimethoprim + Sulfamethoxazole <=20 Susceptible                    No results found for: \"URINECX\"    I reviewed the patient's new imaging including images and reports.    Interpretation Summary right upper extremity duplex         Acute right upper extremity superficial thrombophlebitis noted in the cephalic (forearm).    All other right sided vessels appear normal.    FINAL IMPRESSION: Left heart catheterization May 7, 2025  Successful PTCA/stenting of the RCA with placement of 3 overlapping drug-eluting stents reducing 95% stenosis to 0%.     RECOMMENDATIONS:  Dual antiplatelet therapy for at least 1 year.  Optimize medical therapy and risk factor management per guidelines.    Interpretation Summary, echocardiogram May 1, 2025, [place]         The study is technically difficult for diagnosis. The quality of " the study is limited with poor acoustic windows.    Sinus bradycardia in high 50s was the predominant rhythm observed during the procedure.    Normal left ventricular cavity size noted. Left ventricular wall thickness is consistent with mild concentric hypertrophy.    Left ventricular systolic function is mildly decreased. Left ventricular ejection fraction appears to be 41 - 45%.    Left ventricular diastolic function is consistent with (grade II w/high LAP) pseudonormalization.    There is mild calcification of the aortic valve. No significant aortic valve regurgitation is present. No hemodynamically significant aortic valve stenosis is present.    There is mild calcification of the mitral valve posterior leaflet(s). Mild mitral valve regurgitation is present. No significant mitral valve stenosis is present.    There is a small (<1cm) pericardial effusion. There is no evidence of cardiac tamponade.      All medications reviewed.   amiodarone, 200 mg, Per PEG Tube, Q12H  amLODIPine, 10 mg, Per PEG Tube, Q24H  aspirin, 81 mg, Per PEG Tube, Daily  atorvastatin, 40 mg, Per PEG Tube, Nightly  cloNIDine, 0.1 mg, Per PEG Tube, Q8H  clopidogrel, 75 mg, Per PEG Tube, Daily  heparin (porcine), 5,000 Units, Subcutaneous, Q8H  hydrALAZINE, 50 mg, Per PEG Tube, Q8H  lansoprazole, 15 mg, Per PEG Tube, QAM AC  metoprolol tartrate, 25 mg, Per PEG Tube, Q12H  miconazole, 1 Application, Topical, Q12H  nitroglycerin, 1 patch, Transdermal, Daily  Nutrisource fiber, 2 packet, Per G Tube, TID  sacubitril-valsartan, 1 tablet, Per PEG Tube, Q12H          Assessment & Plan       CAD (coronary artery disease)    ESRD on HD    Polycystic kidney disease    HTN (hypertension)    Dyslipidemia    AAA (abdominal aortic aneurysm)    Right coronary artery occlusion    Depression    Tracheostomy present    S/P percutaneous endoscopic gastrostomy (PEG) tube placement    Hematoma of groin    69 y.o. female with history of HTN, HLD, CKD on HD,  polycystic kidney disease, abdominal aortic aneurysm, CAD with prior stent, depression, transferred from [facility] in [place] on 5/5/2025 after she suffered a V-fib arrest with resuscitation and underwent left heart catheterization showing 100% occlusion of the RCA with failed intervention.     Patient was initially admitted to [facility] on 3/20/2025 with volume overload secondary to missing 2 hemodialysis sessions.  She also has followed up possible pneumonia and had anemia and was evaluated by GI with no evidence of GI bleed.  She had bilateral pleural effusions and underwent 1550 mL thoracentesis, but despite thoracentesis required intubation on 4/10/2025 was transferred to [facility] on 4/11/2025.  Patient ultimately underwent tracheostomy and PEG placement on 4/29/2025.  She deteriorated on 5/2/2025 suffering a Vfib arrest.    May 7 she underwent left heart catheterization with stent of her occluded RCA.  She was noted to have a swollen right upper extremity.  Duplex revealed superficial thrombophlebitis.      Patient developed a hematoma at her left femoral artery site on the evening of May 9 requiring urgent surgery with femoral artery repair, evacuation of a hematoma, 2 units of packed red cells.  Wound VAC was placed.    She continues to have large pleural effusions and upper extremity pitting edema, volume overload.  She is only requiring 35% FiO2.  Blood pressure has been labile, requiring Cardene intermittently in addition to her oral blood pressure medications.    Respiratory cultures were positive for Klebsiella without evidence of pneumonia consistent with a tracheobronchitis.  She is receiving Rocephin.      #1 V-fib arrest/coronary artery disease-occluded RCA,    .  LVEF 41 to 45%  - Successful stent to RCA May 9  - Aspirin, statin, Plavix, beta-blocker  - Entresto  mg twice daily  - Amiodarone 200 mg every 12 hours  - Moderate bilateral pleural effusions    #2  left femoral artery pseudoaneurysm with hematoma  - OR for repair of the left femoral artery and evacuation of a hematoma, wound VAC placement, May 9   -2 units packed red blood cells  - Hemoglobin 8.7  - Returning to surgery today for reexploration, washout    #3 respiratory failure  - Tracheostomy, PEG tube placement in [place], April 29, 2025  - Initially hospitalized March 20, 2025 for volume overload after missing dialysis, intubated April 10 in [place] and then transferred to LTAC in [place]  - Pleural effusions postthoracentesis April 4, moderate to large effusions present on CT of the abdomen May 9  - Previous tobacco per records, details not available  - Klebsiella pneumoniae positive respiratory culture, suspect tracheobronchitis  - Ceftriaxone started May 6, last dose today  -WBC 13.7  - Assist-control 20, tidal volume 365, PEEP 5, 35% FiO2  - Decrease AC to 16  - After surgery would consider decreasing AC further potential spontaneous breathing trial or pressure support trials  - Consider right chest tube to drain effusion    #4 chronic kidney disease, history of polycystic kidneys  - End-stage renal disease on hemodialysis  - Nephrology following with intermittent dialysis  - Appears volume overloaded with effusions and upper extremity pitting edema  - BUN 29, creatinine 2.53, potassium 3.5    #5 hypertension, difficult to control  - Amlodipine 10 mg daily  - Hydralazine 50 mg every 8 hours  - Metoprolol 25 mg twice daily  - Entresto  mg twice daily  -NTG patch  - Off and on Cardene drip, oral antihypertensives increased and currently off    #6 nutrition  - Novasource renal 35 mL/h  - Frequent diarrhea  - Start Imodium  - Stop Prosource      VTE Prophylaxis: subcutaneous heparin    Stress Ulcer Prophylaxis: Prevacid suspension    Nuris Aquino MD  05/13/25  11:03 EDT      Time: Critical care 35 min  I personally provided care to this critically ill patient as documented above.  Critical care  time does not include time spent on separately billed procedures.  None of my critical care time was concurrent with other critical care providers.

## 2025-05-13 NOTE — NURSING NOTE
At 1700 large very firm area around the hematoma in the left groin from previous cath site. MD's made aware as well as PA on call for Adele and Kya. Dr. Antonio also looked at it and ordered ultrasound stat.   
MD's consulted for enlarged hematoma in left groin.Manual pressure held to CT by RN. Charge nurse and RT went as well.  Vascular surgery was then consulted after ultrasound and CT scan. Dr. Garcia made the decision to take patient to OR to repair the femoral artery and evacuate the hematoma.     BP remains labile. On and off nicardipine gtt as well as scheduled meds.     Surgery site has wound vac with 425 mL output.     See flowsheet for exact details.        
Pt. Referred for Phase II Cardiac Rehab. Staff will follow up when patient is more stable, alert and oriented.  
WOC consulted for gluteal pressure injury, POA.    There is a superficial appearing wound to the left upper gluteal.  The wound appears to be a friction/shear related abrasion.  Incontinence care provided.    Recommend applying topical skin barrier wipes to sacrum and coccyx and wound location.  Cover with Allevyn dressing.    Turn every 2 hours.    Elevate heels off bed.    Utilize Allevyn dressings.    High risk for pressure injury development.  Implement all pressure injury prevention protocols throughout hospitalization.    WOC will sign off.    Clay Núñez RN, BSN, CWOCN  Wound, Ostomy and Continence (WOC) Department  [facility]        
Woc consulted for skin breakdown around trach.    Patient has MASD to the peritracheal site related to secretions.  Her trach plate is rather tight against her skin and difficult to view entirety of underlying skin.    Recommend BID trach care per [facility] policy. Change trach foam as needed to help wick moisture from effected area.    Pressure injury prevention protocol.    Sign off.    Clay Núñez RN, BSN, CWOCN  Wound, Ostomy and Continence (WOC) Department  [facility]    
What Type Of Note Output Would You Prefer (Optional)?: Bullet Format
How Severe Is Your Skin Lesion?: mild
Has Your Skin Lesion Been Treated?: not been treated
Is This A New Presentation, Or A Follow-Up?: Skin Lesion

## 2025-05-13 NOTE — PROGRESS NOTES
"   LOS: 8 days    Patient Care Team:  Sergio Randall MD as PCP - General (Cardiology)    Subjective     Seen and examined at bedside  No major overnight issues per nurse  Had dialysis yesterday    Objective     Vital Signs:  Blood pressure 118/64, pulse 57, temperature 97.7 °F (36.5 °C), temperature source Axillary, resp. rate 16, height 160 cm (62.99\"), SpO2 92%.      Intake/Output Summary (Last 24 hours) at 5/13/2025 1516  Last data filed at 5/13/2025 0830  Gross per 24 hour   Intake 1212 ml   Output 3610 ml   Net -2398 ml        05/12 0701 - 05/13 0700  In: 1960 [I.V.:629]  Out: 3610 [Drains:300]    Physical Exam:    GENERAL: WDWF NAD  NEURO: Sedate on vent  PSYCHIATRIC: Unable to evaluate  EYE: PE, no icterus, no conjunctivitis  ENT: OMM dry, + trach  NECK: Supple , No JVD discernable,  Trachea midline  CV: Trace edema, RRR  LUNGS:  Quiet,  Nonlabored resp.  Symmetrical expansion  ABDOMEN: + PEG, nondistended  : No Pereira, no palp bladder  SKIN: Warm and dry without rash.  Left groin wound with wound VAC      Labs:  Results from last 7 days   Lab Units 05/13/25  0403 05/12/25  0315 05/11/25  1727 05/11/25  0544 05/11/25  0453   WBC 10*3/mm3 13.70* 17.27*  --   --  10.65   HEMOGLOBIN g/dL 8.7* 9.1* 9.5*   < > 6.3*   PLATELETS 10*3/mm3 203 200  --   --  162    < > = values in this interval not displayed.     Results from last 7 days   Lab Units 05/13/25  0403 05/12/25  0315 05/11/25  0353 05/10/25  0828 05/10/25  0005   SODIUM mmol/L 133* 135* 134* 136 137   POTASSIUM mmol/L 3.5 3.6 3.8 3.8 3.8   CHLORIDE mmol/L 101 102 102 104 103   CO2 mmol/L 22.0 20.0* 22.0 23.0 21.0*   BUN mg/dL 29* 49* 37* 25* 20   CREATININE mg/dL 2.53* 3.78* 3.32* 2.93* 2.50*   CALCIUM mg/dL 8.7 8.5* 8.2* 8.1* 8.3*   MAGNESIUM mg/dL  --   --   --   --  1.8   ALBUMIN g/dL  --   --   --   --  2.5*     Results from last 7 days   Lab Units 05/10/25  0005   ALK PHOS U/L 84   BILIRUBIN mg/dL 0.4   ALT (SGPT) U/L 14   AST (SGOT) U/L 23 "     Results from last 7 days   Lab Units 05/13/25  0358   PH, ARTERIAL pH units 7.430   PO2 ART mm Hg 66.5*   PCO2, ARTERIAL mm Hg 37.6   HCO3 ART mmol/L 24.9       A/P:    ESRD: On HD with Dr. Kaur at [facility].   - Continue HD on MW schedule.    HTN: Blood pressure stabilizing with further adjustment of blood pressure medications.   - Give albumin for hypotension.  -Cover with as needed medications for hypertension.  -Wean off clonidine as able.  - Watch with UF on HD.    Electrolytes: Potassium has been low requiring 4K bath.  Overall stable with HD correction  - Monitor and adjust as needed with HD.    Metabolic acidosis: pH has been stable.  Bicarb stable.    -Adjust with HD.    Anemia: Hemoglobin dropped to 5 with femoral artery bleed.  Patient was transfused 4 units packed red blood cells.    -Continue Epogen.  -Continue to transfuse as indicated.    Volume: Stable.    -UF with HD as tolerated.    Respiratory failure: Trach on vent    Left femoral pseudoaneurysm: Developed at cardiac cath site.  Required repair 5/9/2025.  Wound VAC in place.      High risk and complexity patient.      Zafar Espana MD  05/13/25  15:16 EDT

## 2025-05-13 NOTE — OP NOTE
INCISION AND DRAINAGE LOWER EXTREMITY  Procedure Report    Patient Name:  Estefania Connor  YOB: 1955    Date of Surgery:  5/13/2025     Indications: 69-year-old woman with left femoral hematoma who presents for washout.  Risks and benefits were discussed.    Pre-op Diagnosis:   Hematoma of groin, initial encounter [S30.1XXA]       Post-Op Diagnosis Codes:     * Hematoma of groin, initial encounter [S30.1XXA]      Procedure(s):    Excisional debridement left groin ([id number] cm down to muscle)  Application of negative pressure wound therapy ([id number] cm)    Staff:  Surgeon(s):  Remi Guerrero MD    Circulator: Amarilis Tam RN  Scrub Person: Taty Blount; Lucio Mjeia       Anesthesia: Monitored Anesthesia Care    Estimated Blood Loss: minimal    Implants:    Nothing was implanted during the procedure    Specimen:          None        Findings: Extensive tissue destruction from the large dissecting hematoma.  Nonviable tissue resected down to bleeding tissue.  Everything is still stained with blood so assessment of tissue health is challenging.  This patient will need a long-term wound care with negative pressure wound therapy until this wound heals by secondary intention.  This wound cannot be closed from my perspective given the size of it.    Complications: None apparent    Description of Procedure: Patient was brought to the op room, and laid on the table in supine position.  Patient already has a tracheostomy.  Her left negative pressure wound therapy the device was removed.  The groin was prepped and draped in sterile surgical fashion.  Patient was receive antibiotics.  Timeout was performed.  Wound was carefully examined.  There is still significant amount of blood staining and tunneling.  I utilized a 15 blade and scissors to resect nonviable skin and subcutaneous tissue and areas of necrotic fascia down to muscle.  The wound measured 17 x 10 x 4 cm.  Wound was copiously irrigated  hemostasis was achieved.  Negative pressure wound therapy was applied.  Good seal was achieved.  Patient was taken to ICU in stable condition.  Counts were correct    Remi Guerrero MD     Date: 5/13/2025  Time: 15:15 EDT

## 2025-05-13 NOTE — PROGRESS NOTES
"[facility] at [facility]  IP Progress Note    HOSPITAL COURSE:  Patient was admitted with V-fib arrest and underwent successful revascularization.  Patient had a left femoral hematoma that was evacuated earlier and will need to do redo today.      CHIEF COMPLAINTS:  V-fib arrest      Subjective   Patient is more awake alert today      Objective     Blood pressure 141/60, pulse 72, temperature 98.1 °F (36.7 °C), temperature source Axillary, resp. rate 20, height 160 cm (62.99\"), SpO2 94%.     Intake/Output Summary (Last 24 hours) at 5/13/2025 0703  Last data filed at 5/13/2025 0600  Gross per 24 hour   Intake 1960 ml   Output 3610 ml   Net -1650 ml       PHYSICAL EXAM:  Constitutional:       General: Not in acute distress.     Appearance: Healthy appearance. Not in distress.     Neck:     JVP:Not elevated     Carotid artery: Normal    Pulmonary:      Effort: Pulmonary effort is normal.      Breath sounds: Normal breath sounds. No wheezing. No rhonchi. No rales.     Cardiovascular:      Normal rate. Regular rhythm. Normal S1. Normal S2.      Murmurs: There is no significant murmur.      No gallop. No click. No rub.     Abdominal:      General: Bowel sounds are normal.      Palpations: Abdomen is soft.      Tenderness: There is no abdominal tenderness.    Extremities:     Pulses:Normal radial and pedal pulses     Edema:no edema    MEDICATIONS:    amiodarone, 200 mg, Per PEG Tube, Q12H  amLODIPine, 5 mg, Per PEG Tube, Q24H  aspirin, 81 mg, Per PEG Tube, Daily  atorvastatin, 40 mg, Per PEG Tube, Nightly  cloNIDine, 0.1 mg, Per PEG Tube, Q8H  clopidogrel, 75 mg, Per PEG Tube, Daily  heparin (porcine), 5,000 Units, Subcutaneous, Q8H  hydrALAZINE, 50 mg, Per PEG Tube, Q8H  lansoprazole, 15 mg, Per PEG Tube, QAM AC  metoprolol tartrate, 25 mg, Per PEG Tube, Q12H  miconazole, 1 Application, Topical, Q12H  nitroglycerin, 1 patch, Transdermal, Daily  Nutrisource fiber, 2 packet, Per G Tube, " TID  sacubitril-valsartan, 1 tablet, Per PEG Tube, Q12H          Results from last 7 days   Lab Units 05/13/25  0403   WBC 10*3/mm3 13.70*   HEMOGLOBIN g/dL 8.7*   HEMATOCRIT % 26.5*   PLATELETS 10*3/mm3 203     Results from last 7 days   Lab Units 05/13/25  0403 05/10/25  0828 05/10/25  0005   SODIUM mmol/L 133*   < > 137   POTASSIUM mmol/L 3.5   < > 3.8   CHLORIDE mmol/L 101   < > 103   CO2 mmol/L 22.0   < > 21.0*   BUN mg/dL 29*   < > 20   CREATININE mg/dL 2.53*   < > 2.50*   CALCIUM mg/dL 8.7   < > 8.3*   BILIRUBIN mg/dL  --   --  0.4   ALK PHOS U/L  --   --  84   ALT (SGPT) U/L  --   --  14   AST (SGOT) U/L  --   --  23   GLUCOSE mg/dL 115*   < > 116*    < > = values in this interval not displayed.     Results from last 7 days   Lab Units 05/10/25  0005 05/09/25  1913   INR  1.13* 1.15*     Lab Results   Component Value Date    TROPONINT 114 (C) 05/01/2025                 Iron   Date Value Ref Range Status   04/29/2025 39 37 - 145 mcg/dL Final     Ferritin   Date Value Ref Range Status   04/29/2025 2,238.00 (H) 13.00 - 150.00 ng/mL Final     Iron Saturation (TSAT)   Date Value Ref Range Status   04/29/2025 25 20 - 50 % Final     TIBC   Date Value Ref Range Status   04/29/2025 155 (L) 298 - 536 mcg/dL Final      Magnesium   Date Value Ref Range Status   05/10/2025 1.8 1.6 - 2.4 mg/dL Final        RESULT REVIEW:    I reviewed the patient's new clinical results.    Tele: Sinus Rythym      ASSESSMENT:     V-fib arrest status post PTCA stent of RCA  Respiratory failure on trach  PEG  Left femoral artery hematoma  Hypertension    PLAN:     We will increase amlodipine to 10 mg daily.  We will go ahead and increase Entresto to maximum dose.  Please wean off Cardene drip.  She will be going to the OR again today for her left femoral artery hematoma.

## 2025-05-13 NOTE — ANESTHESIA PREPROCEDURE EVALUATION
Anesthesia Evaluation     Patient summary reviewed and Nursing notes reviewed   no history of anesthetic complications:   NPO Solid Status: > 8 hours  NPO Liquid Status: > 2 hours           Airway   Dental      Pulmonary    Cardiovascular     ECG reviewed  PT is on anticoagulation therapy    (+) hypertension, CAD, cardiac stents (5/7/25) Drug eluting stent within the past 12 months , dysrhythmias (amio), hyperlipidemia    ROS comment: 5/7/25-Successful PTCA/stenting of the RCA with placement of 3 overlapping   drug-eluting stents reducing 95% stenosis to 0%.     5/2/25- hx trach peg 5/1, hx of VT arrest, hx of a fib    5/1/25-poor acoustic windows.  SB high 50s; lvef 41-45,  gr2dd, no as/ai, mild mr  ·  There is a small (<1cm) pericardial effusion. There is no evidence of cardiac tamponade.      Neuro/Psych  GI/Hepatic/Renal/Endo    (+) renal disease- ESRD    Musculoskeletal     Abdominal    Substance History      OB/GYN          Other        ROS/Med Hx Other: Hgb 5--> 9.8--> 6.7 --> 9.5--> 8.7  Cr 4.5--> 2.5  Plavix, Eliquis   5/12-HD/3L removed   B/l Pl effsn s/p thoracent 1.5L; still required intubation 4/10/25, 4/29-trach/peg;  5/2/2025 Vfib arrest      Phys Exam Other: trach            Anesthesia Plan    ASA 4     general         CODE STATUS:    Code Status (Patient has no pulse and is not breathing): CPR (Attempt to Resuscitate)  Medical Interventions (Patient has pulse or is breathing): Full Support

## 2025-05-13 NOTE — PLAN OF CARE
No additional vascular procedures planned from my perspective.  Please involve our wound care colleagues for every other day wound VAC changes with this patient.  She will require long-term negative pressure wound therapy as this wound cannot be closed due to the size and tissue quality.

## 2025-05-13 NOTE — PROGRESS NOTES
Enter Query Response Below      Query Response: Type 1 non-STEMI due to plaque erosion.             If applicable, please update the problem list.

## 2025-05-14 ENCOUNTER — APPOINTMENT (OUTPATIENT)
Dept: NEPHROLOGY | Facility: HOSPITAL | Age: 70
End: 2025-05-14
Payer: MEDICARE

## 2025-05-14 ENCOUNTER — APPOINTMENT (OUTPATIENT)
Dept: GENERAL RADIOLOGY | Facility: HOSPITAL | Age: 70
End: 2025-05-14
Payer: MEDICARE

## 2025-05-14 PROBLEM — K56.7 ILEUS: Status: ACTIVE | Noted: 2025-05-14

## 2025-05-14 LAB
ABO GROUP BLD: NORMAL
ABO GROUP BLD: NORMAL
ANION GAP SERPL CALCULATED.3IONS-SCNC: 13 MMOL/L (ref 5–15)
BLD GP AB SCN SERPL QL: NEGATIVE
BUN SERPL-MCNC: 33 MG/DL (ref 8–23)
BUN/CREAT SERPL: 10.5 (ref 7–25)
CALCIUM SPEC-SCNC: 8.8 MG/DL (ref 8.6–10.5)
CHLORIDE SERPL-SCNC: 101 MMOL/L (ref 98–107)
CO2 SERPL-SCNC: 20 MMOL/L (ref 22–29)
CREAT SERPL-MCNC: 3.13 MG/DL (ref 0.57–1)
DEPRECATED RDW RBC AUTO: 55.4 FL (ref 37–54)
EGFRCR SERPLBLD CKD-EPI 2021: 15.4 ML/MIN/1.73
ERYTHROCYTE [DISTWIDTH] IN BLOOD BY AUTOMATED COUNT: 16.7 % (ref 12.3–15.4)
GLUCOSE BLDC GLUCOMTR-MCNC: 84 MG/DL (ref 70–130)
GLUCOSE BLDC GLUCOMTR-MCNC: 86 MG/DL (ref 70–130)
GLUCOSE BLDC GLUCOMTR-MCNC: 90 MG/DL (ref 70–130)
GLUCOSE SERPL-MCNC: 87 MG/DL (ref 65–99)
HCT VFR BLD AUTO: 23.3 % (ref 34–46.6)
HCT VFR BLD AUTO: 23.5 % (ref 34–46.6)
HCT VFR BLD AUTO: 24.1 % (ref 34–46.6)
HCT VFR BLD AUTO: 25 % (ref 34–46.6)
HGB BLD-MCNC: 7.5 G/DL (ref 12–15.9)
HGB BLD-MCNC: 7.6 G/DL (ref 12–15.9)
HGB BLD-MCNC: 7.7 G/DL (ref 12–15.9)
HGB BLD-MCNC: 8 G/DL (ref 12–15.9)
MCH RBC QN AUTO: 29.7 PG (ref 26.6–33)
MCHC RBC AUTO-ENTMCNC: 32 G/DL (ref 31.5–35.7)
MCV RBC AUTO: 92.9 FL (ref 79–97)
PLATELET # BLD AUTO: 268 10*3/MM3 (ref 140–450)
PMV BLD AUTO: 10.5 FL (ref 6–12)
POTASSIUM SERPL-SCNC: 3.8 MMOL/L (ref 3.5–5.2)
RBC # BLD AUTO: 2.69 10*6/MM3 (ref 3.77–5.28)
RH BLD: NEGATIVE
RH BLD: NEGATIVE
SODIUM SERPL-SCNC: 134 MMOL/L (ref 136–145)
T&S EXPIRATION DATE: NORMAL
WBC NRBC COR # BLD AUTO: 17.25 10*3/MM3 (ref 3.4–10.8)

## 2025-05-14 PROCEDURE — 85014 HEMATOCRIT: CPT

## 2025-05-14 PROCEDURE — 86923 COMPATIBILITY TEST ELECTRIC: CPT

## 2025-05-14 PROCEDURE — 25010000002 HEPARIN (PORCINE) PER 1000 UNITS: Performed by: SURGERY

## 2025-05-14 PROCEDURE — 86901 BLOOD TYPING SEROLOGIC RH(D): CPT

## 2025-05-14 PROCEDURE — 94799 UNLISTED PULMONARY SVC/PX: CPT

## 2025-05-14 PROCEDURE — 86900 BLOOD TYPING SEROLOGIC ABO: CPT

## 2025-05-14 PROCEDURE — 85018 HEMOGLOBIN: CPT | Performed by: INTERNAL MEDICINE

## 2025-05-14 PROCEDURE — 85018 HEMOGLOBIN: CPT

## 2025-05-14 PROCEDURE — 25010000002 MIDAZOLAM PER 1 MG: Performed by: NURSE PRACTITIONER

## 2025-05-14 PROCEDURE — P9016 RBC LEUKOCYTES REDUCED: HCPCS

## 2025-05-14 PROCEDURE — 80048 BASIC METABOLIC PNL TOTAL CA: CPT | Performed by: INTERNAL MEDICINE

## 2025-05-14 PROCEDURE — 82948 REAGENT STRIP/BLOOD GLUCOSE: CPT

## 2025-05-14 PROCEDURE — 85014 HEMATOCRIT: CPT | Performed by: INTERNAL MEDICINE

## 2025-05-14 PROCEDURE — 94003 VENT MGMT INPAT SUBQ DAY: CPT

## 2025-05-14 PROCEDURE — 86850 RBC ANTIBODY SCREEN: CPT

## 2025-05-14 PROCEDURE — 94761 N-INVAS EAR/PLS OXIMETRY MLT: CPT

## 2025-05-14 PROCEDURE — 25010000002 HYDROMORPHONE 1 MG/ML SOLUTION: Performed by: NURSE PRACTITIONER

## 2025-05-14 PROCEDURE — 74018 RADEX ABDOMEN 1 VIEW: CPT

## 2025-05-14 PROCEDURE — 97597 DBRDMT OPN WND 1ST 20 CM/<: CPT

## 2025-05-14 PROCEDURE — 99232 SBSQ HOSP IP/OBS MODERATE 35: CPT | Performed by: INTERNAL MEDICINE

## 2025-05-14 PROCEDURE — 85027 COMPLETE CBC AUTOMATED: CPT | Performed by: INTERNAL MEDICINE

## 2025-05-14 PROCEDURE — 25010000002 ONDANSETRON PER 1 MG: Performed by: NURSE PRACTITIONER

## 2025-05-14 PROCEDURE — 97164 PT RE-EVAL EST PLAN CARE: CPT

## 2025-05-14 PROCEDURE — 97605 NEG PRS WND THER DME<=50SQCM: CPT

## 2025-05-14 PROCEDURE — 99291 CRITICAL CARE FIRST HOUR: CPT | Performed by: INTERNAL MEDICINE

## 2025-05-14 RX ORDER — METOCLOPRAMIDE HYDROCHLORIDE 5 MG/ML
10 INJECTION INTRAMUSCULAR; INTRAVENOUS EVERY 6 HOURS
Status: DISCONTINUED | OUTPATIENT
Start: 2025-05-14 | End: 2025-05-14

## 2025-05-14 RX ORDER — ONDANSETRON 2 MG/ML
4 INJECTION INTRAMUSCULAR; INTRAVENOUS EVERY 6 HOURS PRN
Status: DISCONTINUED | OUTPATIENT
Start: 2025-05-14 | End: 2025-05-22

## 2025-05-14 RX ORDER — PYRIDOSTIGMINE BROMIDE 60 MG/1
60 TABLET ORAL EVERY 8 HOURS SCHEDULED
Status: DISCONTINUED | OUTPATIENT
Start: 2025-05-14 | End: 2025-05-15

## 2025-05-14 RX ORDER — MIDAZOLAM HYDROCHLORIDE 1 MG/ML
2 INJECTION, SOLUTION INTRAMUSCULAR; INTRAVENOUS ONCE
Status: COMPLETED | OUTPATIENT
Start: 2025-05-14 | End: 2025-05-14

## 2025-05-14 RX ADMIN — DEXMEDETOMIDINE HYDROCHLORIDE 0.2 MCG/KG/HR: 4 INJECTION, SOLUTION INTRAVENOUS at 08:32

## 2025-05-14 RX ADMIN — HYDROMORPHONE HYDROCHLORIDE 1 MG: 1 INJECTION, SOLUTION INTRAMUSCULAR; INTRAVENOUS; SUBCUTANEOUS at 16:01

## 2025-05-14 RX ADMIN — HYDROMORPHONE HYDROCHLORIDE 1 MG: 1 INJECTION, SOLUTION INTRAMUSCULAR; INTRAVENOUS; SUBCUTANEOUS at 23:00

## 2025-05-14 RX ADMIN — AMIODARONE HYDROCHLORIDE 200 MG: 200 TABLET ORAL at 12:25

## 2025-05-14 RX ADMIN — METOPROLOL TARTRATE 25 MG: 25 TABLET, FILM COATED ORAL at 20:58

## 2025-05-14 RX ADMIN — CLOPIDOGREL BISULFATE 75 MG: 75 TABLET, FILM COATED ORAL at 12:26

## 2025-05-14 RX ADMIN — AMIODARONE HYDROCHLORIDE 200 MG: 200 TABLET ORAL at 20:58

## 2025-05-14 RX ADMIN — ASPIRIN 81 MG 81 MG: 81 TABLET ORAL at 12:25

## 2025-05-14 RX ADMIN — HYDROMORPHONE HYDROCHLORIDE 1 MG: 1 INJECTION, SOLUTION INTRAMUSCULAR; INTRAVENOUS; SUBCUTANEOUS at 00:12

## 2025-05-14 RX ADMIN — DIAZEPAM 5 MG: 5 TABLET ORAL at 20:57

## 2025-05-14 RX ADMIN — ATORVASTATIN CALCIUM 40 MG: 40 TABLET, FILM COATED ORAL at 20:58

## 2025-05-14 RX ADMIN — SACUBITRIL AND VALSARTAN 1 TABLET: 97; 103 TABLET, FILM COATED ORAL at 08:21

## 2025-05-14 RX ADMIN — HYDRALAZINE HYDROCHLORIDE 50 MG: 50 TABLET ORAL at 05:17

## 2025-05-14 RX ADMIN — DIAZEPAM 5 MG: 5 TABLET ORAL at 08:22

## 2025-05-14 RX ADMIN — MICONAZOLE NITRATE 1 APPLICATION: 20 POWDER TOPICAL at 21:42

## 2025-05-14 RX ADMIN — MICONAZOLE NITRATE 1 APPLICATION: 20 POWDER TOPICAL at 08:22

## 2025-05-14 RX ADMIN — HYDROMORPHONE HYDROCHLORIDE 1 MG: 1 INJECTION, SOLUTION INTRAMUSCULAR; INTRAVENOUS; SUBCUTANEOUS at 09:09

## 2025-05-14 RX ADMIN — Medication 2 PACKET: at 08:24

## 2025-05-14 RX ADMIN — LANSOPRAZOLE 15 MG: 15 TABLET, ORALLY DISINTEGRATING ORAL at 08:21

## 2025-05-14 RX ADMIN — HEPARIN SODIUM 5000 UNITS: 5000 INJECTION INTRAVENOUS; SUBCUTANEOUS at 13:38

## 2025-05-14 RX ADMIN — SACUBITRIL AND VALSARTAN 1 TABLET: 97; 103 TABLET, FILM COATED ORAL at 20:58

## 2025-05-14 RX ADMIN — OXYCODONE HYDROCHLORIDE 10 MG: 10 TABLET ORAL at 01:08

## 2025-05-14 RX ADMIN — OXYCODONE HYDROCHLORIDE 10 MG: 10 TABLET ORAL at 20:57

## 2025-05-14 RX ADMIN — METOPROLOL TARTRATE 25 MG: 25 TABLET, FILM COATED ORAL at 08:21

## 2025-05-14 RX ADMIN — NITROGLYCERIN 1 PATCH: 0.4 PATCH TRANSDERMAL at 08:23

## 2025-05-14 RX ADMIN — HEPARIN SODIUM 5000 UNITS: 5000 INJECTION INTRAVENOUS; SUBCUTANEOUS at 05:17

## 2025-05-14 RX ADMIN — MIDAZOLAM HYDROCHLORIDE 2 MG: 1 INJECTION, SOLUTION INTRAMUSCULAR; INTRAVENOUS at 01:16

## 2025-05-14 RX ADMIN — CLONIDINE HYDROCHLORIDE 0.1 MG: 0.1 TABLET ORAL at 05:17

## 2025-05-14 RX ADMIN — HYDROMORPHONE HYDROCHLORIDE 1 MG: 1 INJECTION, SOLUTION INTRAMUSCULAR; INTRAVENOUS; SUBCUTANEOUS at 01:19

## 2025-05-14 RX ADMIN — HYDROMORPHONE HYDROCHLORIDE 1 MG: 1 INJECTION, SOLUTION INTRAMUSCULAR; INTRAVENOUS; SUBCUTANEOUS at 12:25

## 2025-05-14 RX ADMIN — DEXMEDETOMIDINE HYDROCHLORIDE 0.6 MCG/KG/HR: 4 INJECTION, SOLUTION INTRAVENOUS at 13:37

## 2025-05-14 RX ADMIN — HEPARIN SODIUM 2000 UNITS: 1000 INJECTION INTRAVENOUS; SUBCUTANEOUS at 11:50

## 2025-05-14 RX ADMIN — HYDRALAZINE HYDROCHLORIDE 50 MG: 50 TABLET ORAL at 21:00

## 2025-05-14 RX ADMIN — PYRIDOSTIGMINE BROMIDE 60 MG: 60 TABLET ORAL at 13:38

## 2025-05-14 RX ADMIN — CLONIDINE HYDROCHLORIDE 0.1 MG: 0.1 TABLET ORAL at 21:00

## 2025-05-14 RX ADMIN — PYRIDOSTIGMINE BROMIDE 60 MG: 60 TABLET ORAL at 21:00

## 2025-05-14 RX ADMIN — OXYCODONE HYDROCHLORIDE 10 MG: 10 TABLET ORAL at 05:17

## 2025-05-14 RX ADMIN — ONDANSETRON 4 MG: 2 INJECTION INTRAMUSCULAR; INTRAVENOUS at 05:56

## 2025-05-14 RX ADMIN — HEPARIN SODIUM 5000 UNITS: 5000 INJECTION INTRAVENOUS; SUBCUTANEOUS at 21:00

## 2025-05-14 NOTE — PROGRESS NOTES
"[facility] at [facility]  IP Progress Note      HOSPITAL COURSE:  Patient admitted with V-fib arrest and have successful revascularization complicated with left femoral hematoma      CHIEF COMPLAINTS:  V-fib arrest      Subjective   Patient agitated bleeding from the wound and on dialysis      Objective     Blood pressure 105/64, pulse 80, temperature 98.8 °F (37.1 °C), temperature source Axillary, resp. rate 16, height 160 cm (62.99\"), SpO2 94%.     Intake/Output Summary (Last 24 hours) at 5/14/2025 1650  Last data filed at 5/14/2025 1500  Gross per 24 hour   Intake 1309 ml   Output 4125 ml   Net -2816 ml       PHYSICAL EXAM:  Constitutional:       General: Not in acute distress.     Appearance: Healthy appearance. Not in distress.     Neck:     JVP:Not elevated     Carotid artery: Normal    Pulmonary:      Effort: Pulmonary effort is normal.      Breath sounds: Normal breath sounds. No wheezing. No rhonchi. No rales.     Cardiovascular:      Normal rate. Regular rhythm. Normal S1. Normal S2.      Murmurs: There is no significant murmur.      No gallop. No click. No rub.     Abdominal:      General: Bowel sounds are normal.      Palpations: Abdomen is soft.      Tenderness: There is no abdominal tenderness.    Extremities:     Pulses:Normal radial and pedal pulses     Edema:no edema    MEDICATIONS:    amiodarone, 200 mg, Per PEG Tube, Q12H  amLODIPine, 10 mg, Per PEG Tube, Q24H  aspirin, 81 mg, Per PEG Tube, Daily  atorvastatin, 40 mg, Per PEG Tube, Nightly  cloNIDine, 0.1 mg, Per PEG Tube, Q8H  clopidogrel, 75 mg, Per PEG Tube, Daily  heparin (porcine), 5,000 Units, Subcutaneous, Q8H  hydrALAZINE, 50 mg, Per PEG Tube, Q8H  lansoprazole, 15 mg, Per PEG Tube, QAM AC  metoprolol tartrate, 25 mg, Per PEG Tube, Q12H  miconazole, 1 Application, Topical, Q12H  nitroglycerin, 1 patch, Transdermal, Daily  Nutrisource fiber, 2 packet, Per G Tube, TID  pyridostigmine, 60 mg, Oral, " Q8H  sacubitril-valsartan, 1 tablet, Per PEG Tube, Q12H          Results from last 7 days   Lab Units 05/14/25  1142 05/14/25  0320   WBC 10*3/mm3  --  17.25*   HEMOGLOBIN g/dL 7.7* 8.0*   HEMATOCRIT % 24.1* 25.0*   PLATELETS 10*3/mm3  --  268     Results from last 7 days   Lab Units 05/14/25  0320 05/10/25  0828 05/10/25  0005   SODIUM mmol/L 134*   < > 137   POTASSIUM mmol/L 3.8   < > 3.8   CHLORIDE mmol/L 101   < > 103   CO2 mmol/L 20.0*   < > 21.0*   BUN mg/dL 33*   < > 20   CREATININE mg/dL 3.13*   < > 2.50*   CALCIUM mg/dL 8.8   < > 8.3*   BILIRUBIN mg/dL  --   --  0.4   ALK PHOS U/L  --   --  84   ALT (SGPT) U/L  --   --  14   AST (SGOT) U/L  --   --  23   GLUCOSE mg/dL 87   < > 116*    < > = values in this interval not displayed.     Results from last 7 days   Lab Units 05/10/25  0005 05/09/25  1913   INR  1.13* 1.15*     Lab Results   Component Value Date    TROPONINT 114 (C) 05/01/2025                 Iron   Date Value Ref Range Status   04/29/2025 39 37 - 145 mcg/dL Final     Ferritin   Date Value Ref Range Status   04/29/2025 2,238.00 (H) 13.00 - 150.00 ng/mL Final     Iron Saturation (TSAT)   Date Value Ref Range Status   04/29/2025 25 20 - 50 % Final     TIBC   Date Value Ref Range Status   04/29/2025 155 (L) 298 - 536 mcg/dL Final      Magnesium   Date Value Ref Range Status   05/10/2025 1.8 1.6 - 2.4 mg/dL Final        RESULT REVIEW:    I reviewed the patient's new clinical results.    Tele: Sinus Rythym      ASSESSMENT:     Coronary artery disease  V-fib  Atrial fibrillation  Left femoral artery hematoma    PLAN:     Patient has successful revascularization of the RCA.  She is on aspirin and Plavix.  She is still having problem from her left femoral artery.  Appreciate vascular surgery help  Her blood pressure has been under good control and we will continue with the current medications.

## 2025-05-14 NOTE — PLAN OF CARE
Problem: Hemodialysis  Goal: Safe, Effective Therapy Delivery  Outcome: Progressing  Goal: Effective Tissue Perfusion  Outcome: Progressing  Goal: Absence of Infection Signs and Symptoms  Outcome: Progressing   Goal Outcome Evaluation:               HD completed. UF 2350, goal not met, due to low BP at end of tx. Tolerated well otherwise. Access functioned well with lines reversed. Blood returned to pt. Report to CRISTIAN Caba

## 2025-05-14 NOTE — PROGRESS NOTES
Critical Care Note     LOS: 9 days   Patient Care Team:  Sergio Randall MD as PCP - General (Cardiology)    Chief Complaint/Reason for visit:     CAD (coronary artery disease)    ESRD on HD    Polycystic kidney disease    HTN (hypertension)    Dyslipidemia    AAA (abdominal aortic aneurysm)    Right coronary artery occlusion    Depression    Tracheostomy present    S/P percutaneous endoscopic gastrostomy (PEG) tube placement      Subjective     Interval History:     Precedex stopped overnight and she became restless.  She vomited this morning and tube feeding is on hold.  No fever.  Wound VAC output significant and they had to empty it twice on night shift.  Precedex restarted and she was given some Valium and fentanyl and finally calmed down.  She is afebrile.  Hemoglobin is back down to 7.7.  Scheduled for dialysis today.  Current ventilator settings, FiO2 40%, rate 16, tidal volume 365, PEEP 5.  Oxygen saturation 94 to 100%.  End-tidal CO2 37.  She is mostly breathing with the ventilator but occasionally 1 to 2 breaths above the ventilator.    Review of Systems:    All systems were reviewed and negative except as noted in subjective.    Medical history, surgical history, social history, family history reviewed    Objective     Intake/Output:    Intake/Output Summary (Last 24 hours) at 5/14/2025 1305  Last data filed at 5/14/2025 1200  Gross per 24 hour   Intake 1509 ml   Output 3500 ml   Net -1991 ml       Nutrition:  No diet orders on file    Infusions:  dexmedetomidine, 0.2-1.5 mcg/kg/hr, Last Rate: 1.2 mcg/kg/hr (05/14/25 0969)  niCARdipine, 5-15 mg/hr, Last Rate: 2.5 mg/hr (05/13/25 0618)        Mechanical ventilation            Resp Rate (Set): 16     FiO2 (%): (S) 40 %  PEEP/CPAP (cm H2O): 5 cm H20    Minute Ventilation (L/min) (Obs): 9.91 L/min  Resp Rate (Observed) Vent: 16  I:E Ratio (Set): 1:3.17  I:E Ratio (Obs): 1:3.2    PIP Observed (cm H2O): 35 cm H2O  Plateau Pressure (cm H2O): (S) 20 cm  "H2O    Telemetry: Sinus rhythm             Vital Signs  Blood pressure 97/47, pulse 74, temperature 98.5 °F (36.9 °C), temperature source Axillary, resp. rate 16, height 160 cm (62.99\"), SpO2 98%.    Physical Exam:  General Appearance:  Obese woman supine in bed, restless   Head:  No visible trauma   Eyes:          Pupils are equal, conjunctiva pale   Ears:     Throat: Oral mucosa moist   Neck: Tracheostomy in place, left IJ central line, right tunneled access   Back:      Lungs:   Symmetric chest expansion.  Coarse rhonchi bilaterally, unchanged    Heart:  Regular rhythm, S1, S2 auscultated   Abdomen:   Bowel sounds present, soft, PEG tube in place   Rectal:   Deferred   Extremities: Left groin wound VAC bloody drainage.   Pulses: Palpable pedal pulses, weaker today compared to yesterday   Skin: No rash   Lymph nodes:    Neurologic: Awake and moving all extremities, appears restless, not following commands for me this morning      Results Review:     I reviewed the patient's new clinical results.   Results from last 7 days   Lab Units 05/14/25  0320 05/13/25  0403 05/12/25  0315 05/10/25  0828 05/10/25  0005   SODIUM mmol/L 134* 133* 135*   < > 137   POTASSIUM mmol/L 3.8 3.5 3.6   < > 3.8   CHLORIDE mmol/L 101 101 102   < > 103   CO2 mmol/L 20.0* 22.0 20.0*   < > 21.0*   BUN mg/dL 33* 29* 49*   < > 20   CREATININE mg/dL 3.13* 2.53* 3.78*   < > 2.50*   CALCIUM mg/dL 8.8 8.7 8.5*   < > 8.3*   BILIRUBIN mg/dL  --   --   --   --  0.4   ALK PHOS U/L  --   --   --   --  84   ALT (SGPT) U/L  --   --   --   --  14   AST (SGOT) U/L  --   --   --   --  23   GLUCOSE mg/dL 87 115* 124*   < > 116*    < > = values in this interval not displayed.     Results from last 7 days   Lab Units 05/14/25  1142 05/14/25  0320 05/13/25  0403 05/12/25  0315   WBC 10*3/mm3  --  17.25* 13.70* 17.27*   HEMOGLOBIN g/dL 7.7* 8.0* 8.7* 9.1*   HEMATOCRIT % 24.1* 25.0* 26.5* 27.4*   PLATELETS 10*3/mm3  --  268 203 200     Results from last 7 days " "  Lab Units 05/13/25  0358   PH, ARTERIAL pH units 7.430   PO2 ART mm Hg 66.5*   PCO2, ARTERIAL mm Hg 37.6   HCO3 ART mmol/L 24.9     Lab Results   Component Value Date    BLOODCX No growth at 5 days 04/24/2025     Respiratory Culture - Sputum, ET Suction [[id number]]    (Abnormal)   Sputum from ET Suction    Final result Component Value   Respiratory Culture Moderate growth (3+) Klebsiella pneumoniae ssp pneumoniae Abnormal     No Normal Respiratory Geraldine Abnormal    Gram Stain Rare (1+) Epithelial cells per low power field    Moderate (3+) WBCs per low power field    No organisms seen         Susceptibility     Klebsiella pneumoniae ssp pneumoniae     GRACE     Amoxicillin + Clavulanate <=2 Susceptible     Ampicillin >=32 Resistant     Ampicillin + Sulbactam 8 Susceptible     Cefazolin (Non Urine) 2 Susceptible     Cefepime <=0.12 Susceptible     Ceftazidime <=0.5 Susceptible     Ceftriaxone <=0.25 Susceptible     Gentamicin <=1 Susceptible     Levofloxacin <=0.12 Susceptible     Piperacillin + Tazobactam <=4 Susceptible     Tetracycline <=1 Susceptible     Trimethoprim + Sulfamethoxazole <=20 Susceptible                    No results found for: \"URINECX\"    I reviewed the patient's new imaging including images and reports.    Interpretation Summary right upper extremity duplex         Acute right upper extremity superficial thrombophlebitis noted in the cephalic (forearm).    All other right sided vessels appear normal.    FINAL IMPRESSION: Left heart catheterization May 7, 2025  Successful PTCA/stenting of the RCA with placement of 3 overlapping drug-eluting stents reducing 95% stenosis to 0%.     RECOMMENDATIONS:  Dual antiplatelet therapy for at least 1 year.  Optimize medical therapy and risk factor management per guidelines.    Interpretation Summary, echocardiogram May 1, 2025, lAcon         The study is technically difficult for diagnosis. The quality of the study is limited with poor acoustic windows.    " Sinus bradycardia in high 50s was the predominant rhythm observed during the procedure.    Normal left ventricular cavity size noted. Left ventricular wall thickness is consistent with mild concentric hypertrophy.    Left ventricular systolic function is mildly decreased. Left ventricular ejection fraction appears to be 41 - 45%.    Left ventricular diastolic function is consistent with (grade II w/high LAP) pseudonormalization.    There is mild calcification of the aortic valve. No significant aortic valve regurgitation is present. No hemodynamically significant aortic valve stenosis is present.    There is mild calcification of the mitral valve posterior leaflet(s). Mild mitral valve regurgitation is present. No significant mitral valve stenosis is present.    There is a small (<1cm) pericardial effusion. There is no evidence of cardiac tamponade.      All medications reviewed.   amiodarone, 200 mg, Per PEG Tube, Q12H  amLODIPine, 10 mg, Per PEG Tube, Q24H  aspirin, 81 mg, Per PEG Tube, Daily  atorvastatin, 40 mg, Per PEG Tube, Nightly  cloNIDine, 0.1 mg, Per PEG Tube, Q8H  clopidogrel, 75 mg, Per PEG Tube, Daily  heparin (porcine), 5,000 Units, Subcutaneous, Q8H  hydrALAZINE, 50 mg, Per PEG Tube, Q8H  lansoprazole, 15 mg, Per PEG Tube, QAM AC  metoprolol tartrate, 25 mg, Per PEG Tube, Q12H  miconazole, 1 Application, Topical, Q12H  nitroglycerin, 1 patch, Transdermal, Daily  Nutrisource fiber, 2 packet, Per G Tube, TID  pyridostigmine, 60 mg, Oral, Q8H  sacubitril-valsartan, 1 tablet, Per PEG Tube, Q12H          Assessment & Plan       CAD (coronary artery disease)    ESRD on HD    Polycystic kidney disease    HTN (hypertension)    Dyslipidemia    AAA (abdominal aortic aneurysm)    Right coronary artery occlusion    Depression    Tracheostomy present    S/P percutaneous endoscopic gastrostomy (PEG) tube placement    Hematoma of groin    Ileus    69 y.o. female with history of HTN, HLD, CKD on HD, polycystic  kidney disease, abdominal aortic aneurysm, CAD with prior stent, depression, transferred from [facility] in [place] on 5/5/2025 after she suffered a V-fib arrest with resuscitation and underwent left heart catheterization showing 100% occlusion of the RCA with failed intervention.     Patient was initially admitted to [facility] on 3/20/2025 with volume overload secondary to missing 2 hemodialysis sessions.  She also has followed up possible pneumonia and had anemia and was evaluated by GI with no evidence of GI bleed.  She had bilateral pleural effusions and underwent 1550 mL thoracentesis, but despite thoracentesis required intubation on 4/10/2025 was transferred to [facility] on 4/11/2025.  Patient ultimately underwent tracheostomy and PEG placement on 4/29/2025.  She deteriorated on 5/2/2025 suffering a Vfib arrest.    May 7 she underwent left heart catheterization with stent of her occluded RCA.  She was noted to have a swollen right upper extremity.  Duplex revealed superficial thrombophlebitis.      Patient developed a hematoma at her left femoral artery site on the evening of May 9 requiring urgent surgery with femoral artery repair, evacuation of a hematoma, 2 units of packed red cells.  Wound VAC was placed.  May 13 she went back to surgery for a washout and replacement of the wound VAC.    She continues to have large pleural effusions and upper extremity pitting edema, volume overload.  She is only requiring 40 % FiO2.  Blood pressure has been labile, requiring Cardene intermittently in addition to her oral blood pressure medications.    Respiratory cultures were positive for Klebsiella without evidence of pneumonia consistent with a tracheobronchitis.  She is receiving Rocephin.      #1 V-fib arrest/coronary artery disease-occluded RCA,    - Maintaining sinus rhythm, still clinically volume overloaded  .  LVEF 41 to 45%  - Successful stent to RCA May 9  - Aspirin, statin,  Plavix, beta-blocker  - Entresto  mg twice daily  - Amiodarone 200 mg every 12 hours  - Moderate bilateral pleural effusions    #2 left femoral artery pseudoaneurysm with hematoma  - OR for repair of the left femoral artery and evacuation of a hematoma, wound VAC placement, May 9   -Return to the OR May 13 for a washout and replacement of the wound VAC  - Total of 4 units packed red blood cells  - Hemoglobin 7.7, has drifted back down from 8.7.  Will plan on additional packed cell today      #3 respiratory failure  - Tracheostomy, PEG tube placement in [place], April 29, 2025  - Initially hospitalized March 20, 2025 for volume overload after missing dialysis, intubated April 10 in [place] and then transferred to LTAC in [place]  - Pleural effusions postthoracentesis April 4, moderate to large effusions present on CT of the abdomen May 9  - Previous tobacco per records, details not available  - Klebsiella pneumoniae positive respiratory culture, suspect tracheobronchitis  - Ceftriaxone started May 6, completed May 13  -WBC 17.2, increased compared to yesterday  - Assist-control 16, tidal volume 365, PEEP 5, 35% FiO2  -Clinically, her volume status has never been adequately controlled as she continues to have pitting edema and moderate pleural effusions    #4 chronic kidney disease, history of polycystic kidneys  - End-stage renal disease on hemodialysis  - Nephrology following with intermittent dialysis  - Appears volume overloaded with effusions and upper extremity pitting edema  - BUN 33, creatinine 3.13, potassium 3.8    #5 hypertension, difficult to control  - Amlodipine 10 mg daily  - Hydralazine 50 mg every 8 hours  - Metoprolol 25 mg twice daily  - Entresto  mg twice daily  -NTG patch  - Off and on Cardene drip, oral antihypertensives increased and currently off    #6 nutrition  -Vomited this morning and tube feeding on hold  - Previously on Novasource renal 35 mL/h  - Frequent diarrhea  - Start  Imodium  - Stop Prosource  - Generalized ileus on KUB  - Cannot give Reglan because of amiodarone, will try Mestinon    VTE Prophylaxis: subcutaneous heparin    Stress Ulcer Prophylaxis: Prevacid suspension    Nuris Aquino MD  05/14/25  13:05 EDT      Time: Critical care 40 min  I personally provided care to this critically ill patient as documented above.  Critical care time does not include time spent on separately billed procedures.  None of my critical care time was concurrent with other critical care providers.

## 2025-05-14 NOTE — PLAN OF CARE
Goal Outcome Evaluation:  Plan of Care Reviewed With: patient        Progress: no change  Outcome Evaluation: MD placed vac in OR. PT checked vac, and RN reported an increase in drainage, night shift had to change canister twice. MD was called, checked on patient over the evening. RN called vascular surgeon this am to check on patient, vascular surgeon reported drainage amount is ok. PT dropped off additional canisters in room. Will continue to monitor. Patient would continue to benefit from skilled PT services and NPWT to improve granulation tissue compared to other basic dressings, decrease bioburden, manage exudate, and improve wound healing. will continue to follow up      **update: RN called PT this afternoon stating that vac alarming. RN attempted to patch vac dressing as previous draping leaking and full of blood. Vac alarming stating blockage. PT attempted to disconnect hosing, however hosing full of clotting (see photos). Under draping significant clotting. PT able to remove clotting. Due to increse in bleeding and vac unable to maintain seal PT paused vac and appiled a pressure dressing until hemostasis is reached. Will check back in am.

## 2025-05-14 NOTE — PROGRESS NOTES
[facility]  Vascular Surgery Progress Note    Patient Name: Estefania Connor  : 1955  MRN: [id number]  Date of admission: 2025  Surgical Procedures Since Admission:  Procedure(s):  Left Heart Cath  Stent ELIECER coronary  Surgeon:  Edin Boland MD  Status:  7 Days Post-Op  -------------------    Procedure(s):  REPAIR LEFT FEMORAL ARTERY, EVACUATION LEFT GROIN HEMATOMA, PLACEMENT OF NEGATIVE WOUND VACCUUM THERAPY  Surgeon:  Remi Guerrero MD  Status:  5 Days Post-Op  -------------------    Procedure(s):  GROIN WOUND VACUUM CHANGE LEFT  Surgeon:  Remi Guerrero MD  Status:  1 Day Post-Op  -------------------    Subjective   Subjective     Subjective: Patient remains on ventilation, not sedated, agitated and moving during hemodialysis treatment.        Objective   Objective     Vitals:   Temp:  [97 °F (36.1 °C)-98.4 °F (36.9 °C)] 98.3 °F (36.8 °C)  Heart Rate:  [] 93  Resp:  [14-22] 22  BP: ()/(53-89) 104/53  Flow (L/min) (Oxygen Therapy):  [45] 45  FiO2 (%):  [35 %-45 %] 45 %  Output by Drain (mL) 25 0701 - 25 1900 25 1901 - 25 0700 25 0701 - 25 0945 Range Total   Closed/Suction Drain 1 Left;Proximal Thigh Bulb 19 Fr. 500 400  900       Physical Exam    Alert, trach on vent, no family at bedside  Abdomen: soft, non-tender  LEFT Groin: wound vac intact with good seal, no surrounding drainage  Motor intact      Result Review    Result Review:  I have personally reviewed the results from the time of this admission to 2025 09:45 EDT and agree with these findings:  [x]  Laboratory  []  Microbiology  []  Radiology  []  EKG/Telemetry   []  Cardiology/Vascular   []  Pathology  []  Old records  []  Other:        Assessment / Plan     Brief Patient Summary:  Estefania Connor is a 69 y.o. female who underwent left groin hematoma evacuation and femoral artery repair on 2025.  Later she required further femoral hematoma  evacuation which was performed on 5/13/2025 with Dr. Guerrero.    Plan:   - Wound vac in place with good seal  - She will require long-term negative pressure wound therapy as the wound could not be closed due to size and tissue quality  - PT wound care consulted  - No additional vascular procedures to be performed, vascular surgery will sign off.   - Please contact on-call physician with any questions or concerns.      Britt Jim PA-C 5/14/2025   9:51AM

## 2025-05-14 NOTE — PROGRESS NOTES
"   LOS: 9 days    Patient Care Team:  Sergio Randall MD as PCP - General (Cardiology)    Subjective     Seen on dialysis, tolerating well.   Per nurse agitated earlier was started on Precedex drip.     Objective     Vital Signs:  Blood pressure 123/58, pulse 74, temperature 98 °F (36.7 °C), temperature source Axillary, resp. rate 16, height 160 cm (62.99\"), SpO2 97%.      Intake/Output Summary (Last 24 hours) at 5/14/2025 1450  Last data filed at 5/14/2025 1200  Gross per 24 hour   Intake 1509 ml   Output 3750 ml   Net -2241 ml        05/13 0701 - 05/14 0700  In: 1856 [I.V.:1458]  Out: 900 [Drains:900]    Physical Exam:  GENERAL: WDWF NAD  NEURO: Sedate on vent  PSYCHIATRIC: Unable to evaluate  EYE: PE, no icterus, no conjunctivitis  ENT: OMM dry, + trach  NECK: Supple , No JVD discernable  CV: Trace edema, RRR  LUNGS:  Quiet,  Nonlabored resp.  Symmetrical expansion  ABDOMEN: + PEG, nondistended  : No Pereira, no palp bladder  SKIN: Warm and dry without rash.  Left groin wound with wound VAC    Labs:  Results from last 7 days   Lab Units 05/14/25  1142 05/14/25  0320 05/13/25  0403 05/12/25  0315   WBC 10*3/mm3  --  17.25* 13.70* 17.27*   HEMOGLOBIN g/dL 7.7* 8.0* 8.7* 9.1*   PLATELETS 10*3/mm3  --  268 203 200     Results from last 7 days   Lab Units 05/14/25  0320 05/13/25  0403 05/12/25  0315 05/11/25  0353 05/10/25  0828 05/10/25  0005   SODIUM mmol/L 134* 133* 135* 134*   < > 137   POTASSIUM mmol/L 3.8 3.5 3.6 3.8   < > 3.8   CHLORIDE mmol/L 101 101 102 102   < > 103   CO2 mmol/L 20.0* 22.0 20.0* 22.0   < > 21.0*   BUN mg/dL 33* 29* 49* 37*   < > 20   CREATININE mg/dL 3.13* 2.53* 3.78* 3.32*   < > 2.50*   CALCIUM mg/dL 8.8 8.7 8.5* 8.2*   < > 8.3*   MAGNESIUM mg/dL  --   --   --   --   --  1.8   ALBUMIN g/dL  --   --   --   --   --  2.5*    < > = values in this interval not displayed.     Results from last 7 days   Lab Units 05/10/25  0005   ALK PHOS U/L 84   BILIRUBIN mg/dL 0.4   ALT (SGPT) U/L 14   AST " (SGOT) U/L 23     Results from last 7 days   Lab Units 05/13/25  0358   PH, ARTERIAL pH units 7.430   PO2 ART mm Hg 66.5*   PCO2, ARTERIAL mm Hg 37.6   HCO3 ART mmol/L 24.9       A/P:    ESRD: On HD with Dr. Kaur at [facility].   - Continue HD on MWF schedule.    HTN: Blood pressure stabilizing with further adjustment of blood pressure medications.   - Give albumin for hypotension.  - Cover with as needed medications for hypertension.  - Watch with UF on HD.    Electrolytes: Potassium has been low requiring 4K bath.  Overall stable with HD correction  - Monitor and adjust as needed with HD.    Metabolic acidosis: pH has been stable.  Bicarb stable.    -Adjust with HD.    Anemia: Hemoglobin dropped to 5 with femoral artery bleed.  Patient was transfused 4 units packed red blood cells.    -Continue Epogen.  -Continue to transfuse as indicated.    Volume: Stable.    -UF with HD as tolerated.    Respiratory failure: Trach on vent    Left femoral pseudoaneurysm: Developed at cardiac cath site.  Required repair 5/9/2025.  Wound VAC in place.      High risk and complexity patient.      Zafar Espana MD  05/14/25  14:50 EDT

## 2025-05-14 NOTE — PLAN OF CARE
Goal Outcome Evaluation:  Plan of Care Reviewed With: patient        Progress: no change  Outcome Evaluation: fac, guerra, restless/anxious/crying this am- prn's tolerated restarted precedex, tolerating vent, sr/st, bp , L leg pulses weaker, tf off r/t ileus, no bm, hd today, 1 U PRBC groin site still bleeding- 725 ML of blood from wound vac + what leaked out- unable to keep wound vac sealed removed and replaced with pressure dressing, no family at bedside

## 2025-05-14 NOTE — PROGRESS NOTES
Clinical Nutrition     Patient Name: Estefania Connor  YOB: 1955  MRN: [id number]  Date of Encounter: 05/14/25 08:34 EDT  Admission date: 5/5/2025  Reason for Visit: MDR, Follow-up protocol, EN    Assessment   Nutrition Assessment   Admission Diagnosis:  CAD (coronary artery disease) [I25.10]    Problem List:    CAD (coronary artery disease)    ESRD on HD    Polycystic kidney disease    HTN (hypertension)    Dyslipidemia    AAA (abdominal aortic aneurysm)    Right coronary artery occlusion    Depression    Tracheostomy present    S/P percutaneous endoscopic gastrostomy (PEG) tube placement    Hematoma of groin      PMH:   She  has a past medical history of AAA (abdominal aortic aneurysm), Coronary artery disease, Depression, ESRD (end stage renal disease), Hyperlipidemia, Hypertension, and Polycystic kidney disease.    PSH:  She  has a past surgical history that includes tracheostomy and peg tube insertion (N/A, 4/29/2025); Cardiac catheterization (N/A, 5/2/2025); Cardiac catheterization (N/A, 5/7/2025); Cardiac catheterization (N/A, 5/7/2025); and Pseudo Aneurysm Repair, Extremity (Left, 5/9/2025).    Applicable Nutrition History:   Trach/PEG    (5/9) s/p repair L femoral artery, evacuation L groin hematoma + NPWT  (5/13) redo L femoral hematoma evacuation  (5/14) KUB - Probable general ileus. Gaseous distention of the stomach     Labs    Labs Reviewed: Yes    Results from last 7 days   Lab Units 05/14/25  0320 05/13/25  0403 05/12/25  0315 05/10/25  0828 05/10/25  0005   GLUCOSE mg/dL 87 115* 124*   < > 116*   BUN mg/dL 33* 29* 49*   < > 20   CREATININE mg/dL 3.13* 2.53* 3.78*   < > 2.50*   SODIUM mmol/L 134* 133* 135*   < > 137   CHLORIDE mmol/L 101 101 102   < > 103   POTASSIUM mmol/L 3.8 3.5 3.6   < > 3.8   MAGNESIUM mg/dL  --   --   --   --  1.8   ALT (SGPT) U/L  --   --   --   --  14   LACTATE mmol/L  --   --   --   --  0.5    < > = values in this interval not displayed.  "      Results from last 7 days   Lab Units 05/13/25  0403 05/10/25  0005   ALBUMIN g/dL  --  2.5*   PREALBUMIN mg/dL 14.9*  --    CRP mg/dL 6.07*  --        Results from last 7 days   Lab Units 05/14/25  0500 05/13/25  2318 05/13/25  1717 05/13/25  1148 05/12/25  2312 05/12/25  1736   GLUCOSE mg/dL 90 113 107 105 102 126     No results found for: \"HGBA1C\"            Medications    Medications Reviewed: yes    Scheduled Meds:amiodarone, 200 mg, Per PEG Tube, Q12H  amLODIPine, 10 mg, Per PEG Tube, Q24H  aspirin, 81 mg, Per PEG Tube, Daily  atorvastatin, 40 mg, Per PEG Tube, Nightly  cloNIDine, 0.1 mg, Per PEG Tube, Q8H  clopidogrel, 75 mg, Per PEG Tube, Daily  heparin (porcine), 5,000 Units, Subcutaneous, Q8H  hydrALAZINE, 50 mg, Per PEG Tube, Q8H  lansoprazole, 15 mg, Per PEG Tube, QAM AC  metoprolol tartrate, 25 mg, Per PEG Tube, Q12H  miconazole, 1 Application, Topical, Q12H  nitroglycerin, 1 patch, Transdermal, Daily  Nutrisource fiber, 2 packet, Per G Tube, TID  sacubitril-valsartan, 1 tablet, Per PEG Tube, Q12H      Continuous Infusions:dexmedetomidine, 0.2-1.5 mcg/kg/hr, Last Rate: 0.2 mcg/kg/hr (05/14/25 0832)  niCARdipine, 5-15 mg/hr, Last Rate: 2.5 mg/hr (05/13/25 0618)      PRN Meds:.  albuterol    atropine    diazePAM    heparin (porcine)    hydrALAZINE    HYDROmorphone    labetalol    loperamide    nitroglycerin    ondansetron    oxyCODONE    Intake/Ouptut 24 hrs (0701 - 0700)   I&O's Reviewed: yes  Intake & Output (last day)         05/13 0701 05/14 0700 05/14 0701  05/15 0700    I.V. 1458     Other 101     NG/     Total Intake 1856     Urine 0     Drains 900     Stool 0     Dialysis      Total Output 900     Net +956           Urine Unmeasured Occurrence 3 x     Stool Unmeasured Occurrence 4 x           Anthropometrics     Height: Height: 160 cm (62.99\")  Last Filed Weight:  69.4kg (153lbs)  Method:  ?  BMI:  27.11kg/m^2    UBW: Limited wt data available  Weight change:  unable to " "determine    Nutrition Focused Physical Exam     Date: 5/6    Unable to perform due to pt unable to consent. Visual assessment of pt completed, noted to have edema to BUE.*     Subjective   Reported/Observed/Food/Nutrition Related History:   5/14  Pt remains on MV, sedation off - per RN, pt restless. KUB obtained yesterday indicating ileus - EN held. Noted Prosource TF d/c'd per MD. Continues with multiple loose BM - plan to start PO mestinon.     5/12  Remains intubated/sedated. Plan for HD today. EN remains at goal. Ongoing loose BM despite initiation of fiber - per MD, start Imodium.      5/8  Pt s/p RCA with stent placement. TF restarted. Not PAB low with elevated CRP. [facility] HD today [facility]. Documented loose stool.     5/6  Pt transferred to [facility] for higher level of care from [facility] after extensive hospital stay at OSH. Has been on Nepro via PEG throughout admission to that facility. +HD. Discussed in MDR. OK to restart nutrition. NKFA    Needs Assessment   Date: 5/6; Reassessed: 5/12    Height used:Height: 160 cm (62.99\")  Weights used: 69.4kg CBW; 52.38kg IBW    Estimated Calorie needs: ~ 1400 Kcal/day  Method: 21-24 Kcals/KG = 5966-5219  Method:  MSJ (1475) x 1.1 = 1623  Method:  PSU = 1405  Ve: 8.25; Tmax: 37.3    Estimated Protein needs: ~ 83 g PRO/day  Method: 1.0-1.3g/Kg CBW = 69-90    Estimated Fluid needs: ~ ml/day   Per clinical status    Current Nutrition Prescription     PO: No diet orders on file  Oral Nutrition Supplement: N/A  Intake: N/A    EN: NovaSource Renal  Goal Rate: 35mL/hr  Water Flushes: 20mL/hr  Modular: Nutrisource Fiber 6/day  Route: PEG  Tube: Other     At goal over: 20Hrs/day     Rx will supply:   Goal Volume 700  mL/day     Flush Volume 400 mL/day     Energy 1490 Kcal/day 106 % Est Need   Protein 64 g/day 77 % Est Need   Fiber 18 g/day     Water in   mL     Total Water 904 mL     Meet DRI Yes    "     --------------------------------------------------------------------------  Product/Rate verified at bedside: Yes  Infusing Rate at time of visit: held    Average Delivery from Charting: N/A   Volume  mL/day   % Goal Vol.   Flush Volume  mL/day     Energy  Kcal/day  % Est Need   Protein  g/day  % Est Need   Fiber  g/day     Water in  EN  mL     Total Water  mL     Meet DRI N/A        Assessment & Plan   Nutrition Diagnosis   Date: 5/6 Updated: 5/8  Problem Inadequate oral intake    Etiology Dysphagia with trach/peg   Signs/Symptoms Chronic EN   Status: Active    Date:  5/14 Updated:  Problem Inadequate enteral nutrition infusion   Etiology Altered GI fxn   Signs/Symptoms Generalized ileus   Status: New    Goal:   Nutrition to support treatment and Establish EN tolerance      Nutrition Intervention      Follow treatment progress, Care plan reviewed, Nutrition support order placed    Once appropriate per MD restart feeds  Novasource Renal @ 15mL/hr and adv by 10mL q8hrs as tolerated to goal of 35mL/hr. Flush of 20mL/hr.     Adjust flush per clinical status    If unable to restart feeds within monty 48hrs would consider starting TPN due to suspected prolonged malabsorption on EN with loose BM since admission      Monitoring/Evaluation:   Per protocol, I&O, Pertinent labs, EN delivery/tolerance, Weight, GI status, Symptoms, POC/GOC, Swallow function, Hemodynamic stability    Mary Alvarado, MS,RD,LD  Time Spent: 30min

## 2025-05-14 NOTE — THERAPY WOUND CARE TREATMENT
Acute Care - Wound/Debridement Initial Evaluation  [facility]     Patient Name: Estefania Connor  : 1955  MRN: [id number]  Today's Date: 2025          Draping on L groin     Clotting removed from vac hose     Clotting from wound bed         Admit Date: 2025    Visit Dx:    ICD-10-CM ICD-9-CM   1. Hematoma of groin, initial encounter  S30.1XXA 922.2       Patient Active Problem List   Diagnosis    ESRD on HD    Polycystic kidney disease    HTN (hypertension)    Dyslipidemia    AAA (abdominal aortic aneurysm)    Right coronary artery occlusion    Depression    Tracheostomy present    S/P percutaneous endoscopic gastrostomy (PEG) tube placement    CAD (coronary artery disease)    Hematoma of groin    Ileus        Past Medical History:   Diagnosis Date    AAA (abdominal aortic aneurysm)     Coronary artery disease     Depression     ESRD (end stage renal disease)     Hyperlipidemia     Hypertension     Polycystic kidney disease         Past Surgical History:   Procedure Laterality Date    CARDIAC CATHETERIZATION N/A 2025    Procedure: Coronary angiography;  Surgeon: Ambrose Mcnally MD;  Location:  [facility];  Service: Cardiovascular;  Laterality: N/A;    CARDIAC CATHETERIZATION N/A 2025    Procedure: Left Heart Cath;  Surgeon: Edin Boland MD;  Location:  [facility];  Service: Cardiovascular;  Laterality: N/A;    CARDIAC CATHETERIZATION N/A 2025    Procedure: Stent ELIECER coronary;  Surgeon: Edin Boland MD;  Location:  [facility];  Service: Cardiovascular;  Laterality: N/A;    INCISION AND DRAINAGE LEG Left 2025    Procedure: GROIN WOUND VACUUM CHANGE LEFT;  Surgeon: Remi Guerrero MD;  Location:  [facility];  Service: Vascular;  Laterality: Left;    PSEUDO ANEURYSM REPAIR, EXTREMITY Left 2025    Procedure: REPAIR LEFT FEMORAL ARTERY, EVACUATION LEFT GROIN HEMATOMA, PLACEMENT OF NEGATIVE WOUND VACCUUM THERAPY;  Surgeon:  Remi Guerrero MD;  Location:  [facility];  Service: Vascular;  Laterality: Left;    TRACHEOSTOMY AND PEG TUBE INSERTION N/A 4/29/2025    Procedure: TRACHEOSTOMY AND PERCUTANEOUS ENDOSCOPIC GASTROSTOMY TUBE INSERTION;  Surgeon: Kera Head MD;  Location:  [facility] OR;  Service: General;  Laterality: N/A;           Wound Right gluteal (Active)   Dressing Appearance intact;dry 05/14/25 0800   Closure Adhesive bandage 05/14/25 1400   Base nonblanchable;red 05/14/25 1400   Periwound excoriated;redness 05/14/25 1400   Periwound Temperature warm 05/14/25 1400   Periwound Skin Turgor soft 05/14/25 1400   Drainage Amount none 05/14/25 1400   Care, Wound cleansed with;barrier applied 05/13/25 2000   Dressing Care silicone border foam 05/14/25 0800       Wound Other (See comments) nose (Active)   Dressing Appearance open to air 05/14/25 0800   Closure Open to air;None 05/14/25 1400   Base clean;dry;pink 05/14/25 1400   Periwound dry;intact 05/14/25 1400   Periwound Temperature warm 05/14/25 1400   Periwound Skin Turgor soft 05/14/25 1400   Drainage Amount none 05/14/25 1400   Dressing Care open to air 05/14/25 0800       Wound 05/09/25 1700 Left anterior groin Other (Comments) (Active)   Dressing Appearance moist drainage 05/14/25 0800   Dressing Removed Type other (see comments) 05/14/25 0800   Closure Adhesive bandage 05/14/25 1400   Base unable to visualize 05/14/25 1400   Periwound ecchymotic;edematous;redness;swelling 05/14/25 1400   Periwound Temperature warm 05/14/25 1400   Periwound Skin Turgor firm 05/14/25 1400   Drainage Characteristics/Odor sanguineous 05/14/25 1400   Drainage Amount large 05/14/25 1400   Care, Wound negative pressure wound therapy 05/14/25 0800   Dressing Care dressing reinforced 05/14/25 0200   Wound Output (mL) 300 05/14/25 1301       NPWT (Negative Pressure Wound Therapy) 05/09/25 left groin (Active)   Therapy Setting continuous therapy 05/14/25 1400   Dressing foam, black;transparent  dressing 05/14/25 1400   Pressure Setting 100 mmHg 05/14/25 1400         WOUND DEBRIDEMENT  Total area of Debridement: 6cm2  Debridement Site 1  Location- Site 1: L groin  Selective Debridement- Site 1: Wound Surface <20cmsq  Instruments- Site 1: tweezers  Excised Tissue Description- Site 1: maximum, other (comment) (clotting)  Bleeding- Site 1: none               PT Assessment (Last 12 Hours)       PT Evaluation and Treatment       Row Name 05/14/25 1301          Physical Therapy Time and Intention    Subjective Information no complaints  -KW     Document Type wound care;evaluation  -KW     Mode of Treatment physical therapy  -KW       Row Name 05/14/25 1301          General Information    Patient Profile Reviewed yes  -KW       Row Name             Wound Right gluteal    Wound - Properties Group Present on Original Admission: Y  -ER Side: Right  -ER Location: gluteal  -ER    Retired Wound - Properties Group Present on Original Admission: Y  -ER Side: Right  -ER Location: gluteal  -ER    Retired Wound - Properties Group Present on Original Admission: Y  -ER Side: Right  -ER Location: gluteal  -ER    Retired Wound - Properties Group Present on Original Admission: Y  -ER Side: Right  -ER Location: gluteal  -ER      Row Name             Wound Other (See comments) nose    Wound - Properties Group Present on Original Admission: Y  -ER Side: Other (See comments)  -ER Location: nose  -ER    Retired Wound - Properties Group Present on Original Admission: Y  -ER Side: Other (See comments)  -ER Location: nose  -ER    Retired Wound - Properties Group Present on Original Admission: Y  -ER Side: Other (See comments)  -ER Location: nose  -ER    Retired Wound - Properties Group Present on Original Admission: Y  -ER Side: Other (See comments)  -ER Location: nose  -ER      Row Name 05/14/25 1301          Wound 05/09/25 1700 Left anterior groin Other (Comments)    Wound - Properties Group Placement Date: 05/09/25  - Placement  Time: 1700  -MH Side: Left  -MH Orientation: anterior  -MH Location: groin  -MH Primary Wound Type: Other  -MH, hematoma     Base unable to visualize  -KW     Drainage Characteristics/Odor sanguineous  -KW     Drainage Amount large  -KW     Wound Output (mL) 300  RN reported that canister has been changedf twice over the evening, total drainage 1300ml  -KW     Retired Wound - Properties Group Placement Date: 05/09/25  - Placement Time: 1700 -MH Side: Left  -MH Orientation: anterior  -MH Location: groin  -MH    Retired Wound - Properties Group Placement Date: 05/09/25  - Placement Time: 1700 -MH Side: Left  -MH Orientation: anterior  -MH Location: groin  -MH    Retired Wound - Properties Group Date first assessed: 05/09/25  - Time first assessed: 1700 - Side: Left  -MH Location: groin  -MH      Row Name 05/14/25 1301          NPWT (Negative Pressure Wound Therapy) 05/09/25 left groin    NPWT (Negative Pressure Wound Therapy) - Properties Group Placement Date: 05/09/25  -MEET Location: left groin  -MEET    Therapy Setting continuous therapy  -KW     Dressing unable to visualize  -KW     Pressure Setting 100 mmHg  -KW     Retired NPWT (Negative Pressure Wound Therapy) - Properties Group Placement Date: 05/09/25  -MEET Location: left groin  -MEET    Retired NPWT (Negative Pressure Wound Therapy) - Properties Group Placement Date: 05/09/25  -MEET Location: left groin  -MEET    Retired NPWT (Negative Pressure Wound Therapy) - Properties Group Placement Date: 05/09/25  -MEET Location: left groin  -MEET      Row Name 05/14/25 1301          Plan of Care Review    Plan of Care Reviewed With patient  -KW     Progress no change  -KW     Outcome Evaluation MD placed vac in OR. PT checked vac, and RN reported an increase in drainage, night shift had to change canister twice. MD was called, checked on patient over the evening. RN called vascular surgeon this am to check on patient, vascular surgeon reported drainage amount is ok. PT  dropped off additional canisters in room. Will continue to monitor. Patient would continue to benefit from skilled PT services and NPWT to improve granulation tissue compared to other basic dressings, decrease bioburden, manage exudate, and improve wound healing. will continue to follow up      **update: RN called PT this afternoon stating that vac alarming. RN attempted to patch vac dressing as previous draping leaking and full of blood. Vac alarming stating blockage. PT attempted to disconnect hosing, however hosing full of clotting (see photos). Under draping significant clotting. PT able to remove clotting. Due to increse in bleeding and vac unable to maintain seal PT paused vac and appiled a pressure dressing until hemostasis is reached. Will check back in am.  -KW       Row Name 05/14/25 1301          Positioning and Restraints    Pre-Treatment Position in bed  -KW     Post Treatment Position bed  -KW     In Bed supine;call light within reach;encouraged to call for assist  -KW       Row Name 05/14/25 1301          Physical Therapy Goals    Wound Management Goal Selection (PT) wound management, PT goal 1  -KW       Row Name 05/14/25 1301          Wound Management Goal 1 (PT)    Wound Management Goal (Wound Goal 1, PT) Wound will be measure < 0.5x0.5 x 0.1  -KW               User Key  (r) = Recorded By, (t) = Taken By, (c) = Cosigned By      Initials Name Provider Type    María Maza RN Registered Nurse    Yao Shine RN Registered Nurse    Ce Santo RN Registered Nurse    Yaima Anguiano, JARETT Physical Therapist                  Physical Therapy Education       Title: PT OT SLP Therapies (In Progress)       Topic: Physical Therapy (In Progress)       Point: Mobility training (In Progress)       Learning Progress Summary            Patient Acceptance, E, NR by KG at 5/12/2025 1046    Acceptance, E, NR by KG at 5/7/2025 1115    Acceptance, E, NR by MARIFER at 5/6/2025 1430                       Point: Home exercise program (In Progress)       Learning Progress Summary            Patient Acceptance, E, NR by KG at 5/12/2025 1046    Acceptance, E, NR by KG at 5/7/2025 1115    Acceptance, E, NR by MARIFER at 5/6/2025 1430                      Point: Body mechanics (In Progress)       Learning Progress Summary            Patient Acceptance, E, NR by KG at 5/12/2025 1046    Acceptance, E, NR by KG at 5/7/2025 1115    Acceptance, E, NR by MARIFER at 5/6/2025 1430                      Point: Precautions (In Progress)       Learning Progress Summary            Patient Acceptance, E, NR by KG at 5/12/2025 1046    Acceptance, E, NR by KG at 5/7/2025 1115    Acceptance, E, NR by MARIFER at 5/6/2025 1430                                      User Key       Initials Effective Dates Name Provider Type Discipline    MARIFER 02/03/23 -  Sandra Pruitt, PT Physical Therapist PT    KG 05/22/20 -  Dorothy Frazier PT Physical Therapist PT                    Recommendation and Plan  Anticipated Discharge Disposition (PT): extended care facility  Planned Therapy Interventions (PT): balance training, bed mobility training, home exercise program, strengthening, transfer training  Therapy Frequency (PT): daily  Plan of Care Reviewed With: patient   Progress: no change       Progress: no change  Outcome Evaluation: MD placed vac in OR. PT checked vac, and RN reported an increase in drainage, night shift had to change canister twice. MD was called, checked on patient over the evening. RN called vascular surgeon this am to check on patient, vascular surgeon reported drainage amount is ok. PT dropped off additional canisters in room. Will continue to monitor. Patient would continue to benefit from skilled PT services and NPWT to improve granulation tissue compared to other basic dressings, decrease bioburden, manage exudate, and improve wound healing. will continue to follow up      **update: RN called PT this afternoon stating that vac  alarming. RN attempted to patch vac dressing as previous draping leaking and full of blood. Vac alarming stating blockage. PT attempted to disconnect hosing, however hosing full of clotting (see photos). Under draping significant clotting. PT able to remove clotting. Due to increse in bleeding and vac unable to maintain seal PT paused vac and appiled a pressure dressing until hemostasis is reached. Will check back in am.  Plan of Care Reviewed With: patient            Time Calculation   PT Charges       Row Name 05/14/25 1301             Time Calculation    Start Time 1301  -KW         Untimed Charges    PT Eval/Re-eval Minutes 27  -KW      Wound Care 89035 Neg Press (DME) wound TO 50 sqcm  -KW      48850-[id number] Pressure wound to 50 sqcm 25  -KW         Total Minutes    Untimed Charges Total Minutes 52  -KW       Total Minutes 52  -KW                User Key  (r) = Recorded By, (t) = Taken By, (c) = Cosigned By      Initials Name Provider Type    KW Yaima Parisi, JARETT Physical Therapist                      Therapy Charges for Today       Code Description Service Date Service Provider Modifiers Qty    75009794536 HC PT RE-EVAL ESTABLISHED PLAN 2 5/14/2025 Yaima Parisi, PT GP 1    [id number] HC PT NEG PRESS WOUND TO 50SQCM DME2 5/14/2025 Yaima Parisi, PT GP 1              PT G-Codes  Outcome Measure Options: AM-PAC 6 Clicks Basic Mobility (PT)  AM-PAC 6 Clicks Score (PT): 9  AM-PAC 6 Clicks Score (OT): 6       Yaima Parisi PT  5/14/2025

## 2025-05-15 ENCOUNTER — APPOINTMENT (OUTPATIENT)
Dept: GENERAL RADIOLOGY | Facility: HOSPITAL | Age: 70
End: 2025-05-15
Payer: MEDICARE

## 2025-05-15 LAB
ANION GAP SERPL CALCULATED.3IONS-SCNC: 13 MMOL/L (ref 5–15)
BH BB BLOOD EXPIRATION DATE: NORMAL
BH BB BLOOD TYPE BARCODE: 9500
BH BB DISPENSE STATUS: NORMAL
BH BB PRODUCT CODE: NORMAL
BH BB UNIT NUMBER: NORMAL
BUN SERPL-MCNC: 19 MG/DL (ref 8–23)
BUN/CREAT SERPL: 7.2 (ref 7–25)
CALCIUM SPEC-SCNC: 8.6 MG/DL (ref 8.6–10.5)
CHLORIDE SERPL-SCNC: 101 MMOL/L (ref 98–107)
CO2 SERPL-SCNC: 22 MMOL/L (ref 22–29)
CREAT SERPL-MCNC: 2.64 MG/DL (ref 0.57–1)
CROSSMATCH INTERPRETATION: NORMAL
DEPRECATED RDW RBC AUTO: 54.4 FL (ref 37–54)
EGFRCR SERPLBLD CKD-EPI 2021: 18.9 ML/MIN/1.73
ERYTHROCYTE [DISTWIDTH] IN BLOOD BY AUTOMATED COUNT: 16.1 % (ref 12.3–15.4)
GLUCOSE BLDC GLUCOMTR-MCNC: 75 MG/DL (ref 70–130)
GLUCOSE BLDC GLUCOMTR-MCNC: 79 MG/DL (ref 70–130)
GLUCOSE BLDC GLUCOMTR-MCNC: 83 MG/DL (ref 70–130)
GLUCOSE SERPL-MCNC: 76 MG/DL (ref 65–99)
HCT VFR BLD AUTO: 23.4 % (ref 34–46.6)
HCT VFR BLD AUTO: 23.6 % (ref 34–46.6)
HCT VFR BLD AUTO: 26.2 % (ref 34–46.6)
HCT VFR BLD AUTO: 26.4 % (ref 34–46.6)
HGB BLD-MCNC: 7.5 G/DL (ref 12–15.9)
HGB BLD-MCNC: 7.6 G/DL (ref 12–15.9)
HGB BLD-MCNC: 8.3 G/DL (ref 12–15.9)
HGB BLD-MCNC: 8.3 G/DL (ref 12–15.9)
MAGNESIUM SERPL-MCNC: 1.8 MG/DL (ref 1.6–2.4)
MCH RBC QN AUTO: 29.4 PG (ref 26.6–33)
MCHC RBC AUTO-ENTMCNC: 31.4 G/DL (ref 31.5–35.7)
MCV RBC AUTO: 93.6 FL (ref 79–97)
PHOSPHATE SERPL-MCNC: 3.4 MG/DL (ref 2.5–4.5)
PLATELET # BLD AUTO: 296 10*3/MM3 (ref 140–450)
PMV BLD AUTO: 10.4 FL (ref 6–12)
POTASSIUM SERPL-SCNC: 3.9 MMOL/L (ref 3.5–5.2)
RBC # BLD AUTO: 2.82 10*6/MM3 (ref 3.77–5.28)
SODIUM SERPL-SCNC: 136 MMOL/L (ref 136–145)
UNIT  ABO: NORMAL
UNIT  RH: NORMAL
WBC NRBC COR # BLD AUTO: 16.01 10*3/MM3 (ref 3.4–10.8)

## 2025-05-15 PROCEDURE — 94003 VENT MGMT INPAT SUBQ DAY: CPT

## 2025-05-15 PROCEDURE — 85014 HEMATOCRIT: CPT

## 2025-05-15 PROCEDURE — 85027 COMPLETE CBC AUTOMATED: CPT | Performed by: INTERNAL MEDICINE

## 2025-05-15 PROCEDURE — 82948 REAGENT STRIP/BLOOD GLUCOSE: CPT

## 2025-05-15 PROCEDURE — 25010000002 HEPARIN (PORCINE) PER 1000 UNITS: Performed by: SURGERY

## 2025-05-15 PROCEDURE — 83735 ASSAY OF MAGNESIUM: CPT | Performed by: INTERNAL MEDICINE

## 2025-05-15 PROCEDURE — 99291 CRITICAL CARE FIRST HOUR: CPT | Performed by: INTERNAL MEDICINE

## 2025-05-15 PROCEDURE — 94640 AIRWAY INHALATION TREATMENT: CPT

## 2025-05-15 PROCEDURE — 71045 X-RAY EXAM CHEST 1 VIEW: CPT

## 2025-05-15 PROCEDURE — P9016 RBC LEUKOCYTES REDUCED: HCPCS

## 2025-05-15 PROCEDURE — 85018 HEMOGLOBIN: CPT

## 2025-05-15 PROCEDURE — 36430 TRANSFUSION BLD/BLD COMPNT: CPT

## 2025-05-15 PROCEDURE — 94799 UNLISTED PULMONARY SVC/PX: CPT

## 2025-05-15 PROCEDURE — 86900 BLOOD TYPING SEROLOGIC ABO: CPT

## 2025-05-15 PROCEDURE — 25010000002 HYDROMORPHONE 1 MG/ML SOLUTION: Performed by: NURSE PRACTITIONER

## 2025-05-15 PROCEDURE — 87205 SMEAR GRAM STAIN: CPT | Performed by: INTERNAL MEDICINE

## 2025-05-15 PROCEDURE — 87070 CULTURE OTHR SPECIMN AEROBIC: CPT | Performed by: INTERNAL MEDICINE

## 2025-05-15 PROCEDURE — 85018 HEMOGLOBIN: CPT | Performed by: INTERNAL MEDICINE

## 2025-05-15 PROCEDURE — 97605 NEG PRS WND THER DME<=50SQCM: CPT

## 2025-05-15 PROCEDURE — 85027 COMPLETE CBC AUTOMATED: CPT

## 2025-05-15 PROCEDURE — 80048 BASIC METABOLIC PNL TOTAL CA: CPT | Performed by: INTERNAL MEDICINE

## 2025-05-15 PROCEDURE — 80048 BASIC METABOLIC PNL TOTAL CA: CPT

## 2025-05-15 PROCEDURE — 82330 ASSAY OF CALCIUM: CPT

## 2025-05-15 PROCEDURE — 84100 ASSAY OF PHOSPHORUS: CPT | Performed by: INTERNAL MEDICINE

## 2025-05-15 PROCEDURE — 85014 HEMATOCRIT: CPT | Performed by: INTERNAL MEDICINE

## 2025-05-15 RX ORDER — PYRIDOSTIGMINE BROMIDE 60 MG/1
60 TABLET ORAL EVERY 8 HOURS SCHEDULED
Status: DISCONTINUED | OUTPATIENT
Start: 2025-05-15 | End: 2025-05-15

## 2025-05-15 RX ORDER — CARVEDILOL 12.5 MG/1
12.5 TABLET ORAL EVERY 12 HOURS SCHEDULED
Status: DISCONTINUED | OUTPATIENT
Start: 2025-05-15 | End: 2025-05-22 | Stop reason: HOSPADM

## 2025-05-15 RX ORDER — IBUPROFEN 600 MG/1
1 TABLET ORAL
Status: DISCONTINUED | OUTPATIENT
Start: 2025-05-15 | End: 2025-05-22

## 2025-05-15 RX ORDER — CARVEDILOL 12.5 MG/1
12.5 TABLET ORAL EVERY 12 HOURS SCHEDULED
Status: DISCONTINUED | OUTPATIENT
Start: 2025-05-15 | End: 2025-05-15

## 2025-05-15 RX ORDER — DEXTROSE MONOHYDRATE 25 G/50ML
25 INJECTION, SOLUTION INTRAVENOUS
Status: DISCONTINUED | OUTPATIENT
Start: 2025-05-15 | End: 2025-05-22

## 2025-05-15 RX ORDER — PYRIDOSTIGMINE BROMIDE 60 MG/1
60 TABLET ORAL EVERY 8 HOURS SCHEDULED
Status: DISCONTINUED | OUTPATIENT
Start: 2025-05-15 | End: 2025-05-16

## 2025-05-15 RX ORDER — NICOTINE POLACRILEX 4 MG
15 LOZENGE BUCCAL
Status: DISCONTINUED | OUTPATIENT
Start: 2025-05-15 | End: 2025-05-15

## 2025-05-15 RX ORDER — IPRATROPIUM BROMIDE AND ALBUTEROL SULFATE 2.5; .5 MG/3ML; MG/3ML
3 SOLUTION RESPIRATORY (INHALATION)
Status: DISCONTINUED | OUTPATIENT
Start: 2025-05-15 | End: 2025-05-22 | Stop reason: HOSPADM

## 2025-05-15 RX ADMIN — CLOPIDOGREL BISULFATE 75 MG: 75 TABLET, FILM COATED ORAL at 08:13

## 2025-05-15 RX ADMIN — HYDRALAZINE HYDROCHLORIDE 50 MG: 50 TABLET ORAL at 13:29

## 2025-05-15 RX ADMIN — LANSOPRAZOLE 15 MG: 15 TABLET, ORALLY DISINTEGRATING ORAL at 08:13

## 2025-05-15 RX ADMIN — OXYCODONE HYDROCHLORIDE 10 MG: 10 TABLET ORAL at 18:10

## 2025-05-15 RX ADMIN — SACUBITRIL AND VALSARTAN 1 TABLET: 97; 103 TABLET, FILM COATED ORAL at 21:23

## 2025-05-15 RX ADMIN — OXYCODONE HYDROCHLORIDE 10 MG: 10 TABLET ORAL at 10:54

## 2025-05-15 RX ADMIN — HEPARIN SODIUM 5000 UNITS: 5000 INJECTION INTRAVENOUS; SUBCUTANEOUS at 21:23

## 2025-05-15 RX ADMIN — AMLODIPINE BESYLATE 10 MG: 10 TABLET ORAL at 08:13

## 2025-05-15 RX ADMIN — AMIODARONE HYDROCHLORIDE 200 MG: 200 TABLET ORAL at 08:12

## 2025-05-15 RX ADMIN — MICONAZOLE NITRATE 1 APPLICATION: 20 POWDER TOPICAL at 21:23

## 2025-05-15 RX ADMIN — HYDROMORPHONE HYDROCHLORIDE 1 MG: 1 INJECTION, SOLUTION INTRAMUSCULAR; INTRAVENOUS; SUBCUTANEOUS at 10:28

## 2025-05-15 RX ADMIN — CLONIDINE HYDROCHLORIDE 0.1 MG: 0.1 TABLET ORAL at 21:23

## 2025-05-15 RX ADMIN — CLONIDINE HYDROCHLORIDE 0.1 MG: 0.1 TABLET ORAL at 13:29

## 2025-05-15 RX ADMIN — ATORVASTATIN CALCIUM 40 MG: 40 TABLET, FILM COATED ORAL at 21:23

## 2025-05-15 RX ADMIN — IPRATROPIUM BROMIDE AND ALBUTEROL SULFATE 3 ML: 2.5; .5 SOLUTION RESPIRATORY (INHALATION) at 15:34

## 2025-05-15 RX ADMIN — CARVEDILOL 12.5 MG: 12.5 TABLET, FILM COATED ORAL at 10:32

## 2025-05-15 RX ADMIN — OXYCODONE HYDROCHLORIDE 10 MG: 10 TABLET ORAL at 02:23

## 2025-05-15 RX ADMIN — HYDROMORPHONE HYDROCHLORIDE 1 MG: 1 INJECTION, SOLUTION INTRAMUSCULAR; INTRAVENOUS; SUBCUTANEOUS at 04:25

## 2025-05-15 RX ADMIN — CLONIDINE HYDROCHLORIDE 0.1 MG: 0.1 TABLET ORAL at 06:31

## 2025-05-15 RX ADMIN — PYRIDOSTIGMINE BROMIDE 60 MG: 60 TABLET ORAL at 06:31

## 2025-05-15 RX ADMIN — AMIODARONE HYDROCHLORIDE 200 MG: 200 TABLET ORAL at 21:23

## 2025-05-15 RX ADMIN — HYDROMORPHONE HYDROCHLORIDE 1 MG: 1 INJECTION, SOLUTION INTRAMUSCULAR; INTRAVENOUS; SUBCUTANEOUS at 21:24

## 2025-05-15 RX ADMIN — ASPIRIN 81 MG 81 MG: 81 TABLET ORAL at 08:13

## 2025-05-15 RX ADMIN — HYDROMORPHONE HYDROCHLORIDE 1 MG: 1 INJECTION, SOLUTION INTRAMUSCULAR; INTRAVENOUS; SUBCUTANEOUS at 16:17

## 2025-05-15 RX ADMIN — HYDRALAZINE HYDROCHLORIDE 50 MG: 50 TABLET ORAL at 21:23

## 2025-05-15 RX ADMIN — METOPROLOL TARTRATE 25 MG: 25 TABLET, FILM COATED ORAL at 08:13

## 2025-05-15 RX ADMIN — MICONAZOLE NITRATE 1 APPLICATION: 20 POWDER TOPICAL at 08:14

## 2025-05-15 RX ADMIN — HYDRALAZINE HYDROCHLORIDE 50 MG: 50 TABLET ORAL at 06:31

## 2025-05-15 RX ADMIN — NITROGLYCERIN 1 PATCH: 0.4 PATCH TRANSDERMAL at 08:13

## 2025-05-15 RX ADMIN — PYRIDOSTIGMINE BROMIDE 60 MG: 60 TABLET ORAL at 21:26

## 2025-05-15 RX ADMIN — HEPARIN SODIUM 5000 UNITS: 5000 INJECTION INTRAVENOUS; SUBCUTANEOUS at 06:30

## 2025-05-15 RX ADMIN — CARVEDILOL 12.5 MG: 12.5 TABLET, FILM COATED ORAL at 21:23

## 2025-05-15 RX ADMIN — PYRIDOSTIGMINE BROMIDE 60 MG: 60 TABLET ORAL at 13:29

## 2025-05-15 RX ADMIN — SACUBITRIL AND VALSARTAN 1 TABLET: 97; 103 TABLET, FILM COATED ORAL at 08:13

## 2025-05-15 RX ADMIN — HYDROMORPHONE HYDROCHLORIDE 1 MG: 1 INJECTION, SOLUTION INTRAMUSCULAR; INTRAVENOUS; SUBCUTANEOUS at 03:19

## 2025-05-15 RX ADMIN — HEPARIN SODIUM 5000 UNITS: 5000 INJECTION INTRAVENOUS; SUBCUTANEOUS at 13:29

## 2025-05-15 RX ADMIN — DIAZEPAM 5 MG: 5 TABLET ORAL at 04:02

## 2025-05-15 RX ADMIN — IPRATROPIUM BROMIDE AND ALBUTEROL SULFATE 3 ML: 2.5; .5 SOLUTION RESPIRATORY (INHALATION) at 12:08

## 2025-05-15 RX ADMIN — DIAZEPAM 5 MG: 5 TABLET ORAL at 10:28

## 2025-05-15 RX ADMIN — IPRATROPIUM BROMIDE AND ALBUTEROL SULFATE 3 ML: 2.5; .5 SOLUTION RESPIRATORY (INHALATION) at 18:41

## 2025-05-15 NOTE — PLAN OF CARE
Goal Outcome Evaluation:                         Unable to decrease vent support

## 2025-05-15 NOTE — THERAPY WOUND CARE TREATMENT
Acute Care - Wound/Debridement Treatment Note   [place]     Patient Name: Estefania Connor  : 1955  MRN: [id number]  Today's Date: 5/15/2025                Admit Date: 2025    Visit Dx:    ICD-10-CM ICD-9-CM   1. Hematoma of groin, initial encounter  S30.1XXA 922.2       Patient Active Problem List   Diagnosis    ESRD on HD    Polycystic kidney disease    HTN (hypertension)    Dyslipidemia    AAA (abdominal aortic aneurysm)    Right coronary artery occlusion    Depression    Tracheostomy present    S/P percutaneous endoscopic gastrostomy (PEG) tube placement    CAD (coronary artery disease)    Hematoma of groin    Ileus        Past Medical History:   Diagnosis Date    AAA (abdominal aortic aneurysm)     Coronary artery disease     Depression     ESRD (end stage renal disease)     Hyperlipidemia     Hypertension     Polycystic kidney disease         Past Surgical History:   Procedure Laterality Date    CARDIAC CATHETERIZATION N/A 2025    Procedure: Coronary angiography;  Surgeon: Ambrose Mcnally MD;  Location:  [facility];  Service: Cardiovascular;  Laterality: N/A;    CARDIAC CATHETERIZATION N/A 2025    Procedure: Left Heart Cath;  Surgeon: Edin Boland MD;  Location:  [facility];  Service: Cardiovascular;  Laterality: N/A;    CARDIAC CATHETERIZATION N/A 2025    Procedure: Stent ELIECER coronary;  Surgeon: Edin Boland MD;  Location:  [facility];  Service: Cardiovascular;  Laterality: N/A;    INCISION AND DRAINAGE LEG Left 2025    Procedure: GROIN WOUND VACUUM CHANGE LEFT;  Surgeon: Remi Guerrero MD;  Location:  [facility] OR;  Service: Vascular;  Laterality: Left;    PSEUDO ANEURYSM REPAIR, EXTREMITY Left 2025    Procedure: REPAIR LEFT FEMORAL ARTERY, EVACUATION LEFT GROIN HEMATOMA, PLACEMENT OF NEGATIVE WOUND VACCUUM THERAPY;  Surgeon: Remi Guerrero MD;  Location:  [facility];  Service: Vascular;  Laterality: Left;     TRACHEOSTOMY AND PEG TUBE INSERTION N/A 4/29/2025    Procedure: TRACHEOSTOMY AND PERCUTANEOUS ENDOSCOPIC GASTROSTOMY TUBE INSERTION;  Surgeon: Kera Head MD;  Location: [facility] OR;  Service: General;  Laterality: N/A;           Wound Right gluteal (Active)   Closure Adhesive bandage 05/15/25 0800   Base nonblanchable;red 05/15/25 0800   Periwound excoriated;redness 05/15/25 0800   Periwound Temperature warm 05/15/25 0800   Periwound Skin Turgor soft 05/15/25 0800   Drainage Amount none 05/15/25 0800       Wound Other (See comments) nose (Active)   Closure Open to air;None 05/15/25 0800   Base clean;dry;pink 05/15/25 0800   Periwound dry;intact 05/15/25 0800   Periwound Temperature warm 05/15/25 0800   Periwound Skin Turgor soft 05/15/25 0800   Drainage Amount none 05/15/25 0800       Wound 05/09/25 1700 Left anterior groin Other (Comments) (Active)   Dressing Appearance dry;intact 05/15/25 0400   Dressing Removed Type foam;gauze 05/15/25 0400   Closure Adhesive bandage 05/15/25 0800   Base red;black;necrotic 05/15/25 1007   Periwound ecchymotic;edematous;redness;swelling 05/15/25 0800   Periwound Temperature warm 05/15/25 0800   Periwound Skin Turgor firm 05/15/25 0800   Wound Length (cm) 5 cm 05/15/25 1007   Wound Width (cm) 11 cm 05/15/25 1007   Wound Depth (cm) 1 cm 05/15/25 1007   Wound Surface Area (cm^2) 43.2 cm^2 05/15/25 1007   Wound Volume (cm^3) 28.798 cm^3 05/15/25 1007   Drainage Characteristics/Odor serosanguineous 05/15/25 1007   Drainage Amount large 05/15/25 1007   Care, Wound cleansed with;wound cleanser;negative pressure wound therapy 05/15/25 1007   Dressing Care dressing changed 05/15/25 1007   Periwound Care cleansed with pH balanced cleanser 05/15/25 1007   Wound Output (mL) 150 05/14/25 1700       NPWT (Negative Pressure Wound Therapy) 05/09/25 left groin (Active)   Therapy Setting continuous therapy 05/15/25 1007   Dressing foam, black 05/15/25 1007   Pressure Setting 125 mmHg  05/15/25 1007   Sponges Inserted 1 05/15/25 1007   Sponges Removed other (see comments) 05/15/25 1007   Finger sweep complete Yes 05/15/25 1007         WOUND DEBRIDEMENT  Total area of Debridement: 6cm2  Debridement Site 1  Location- Site 1: L groin  Selective Debridement- Site 1: Wound Surface <20cmsq  Instruments- Site 1: tweezers  Excised Tissue Description- Site 1: maximum, other (comment) (clotting)  Bleeding- Site 1: none               PT Assessment (Last 12 Hours)       PT Evaluation and Treatment       Row Name 05/15/25 1007          Physical Therapy Time and Intention    Subjective Information no complaints  -KW     Document Type wound care;therapy note (daily note)  -KW     Mode of Treatment physical therapy  -KW       Row Name 05/15/25 1007          General Information    Patient Profile Reviewed yes  -KW       Row Name             Wound Right gluteal    Wound - Properties Group Present on Original Admission: Y  -ER Side: Right  -ER Location: gluteal  -ER    Retired Wound - Properties Group Present on Original Admission: Y  -ER Side: Right  -ER Location: gluteal  -ER    Retired Wound - Properties Group Present on Original Admission: Y  -ER Side: Right  -ER Location: gluteal  -ER    Retired Wound - Properties Group Present on Original Admission: Y  -ER Side: Right  -ER Location: gluteal  -ER      Row Name             Wound Other (See comments) nose    Wound - Properties Group Present on Original Admission: Y  -ER Side: Other (See comments)  -ER Location: nose  -ER    Retired Wound - Properties Group Present on Original Admission: Y  -ER Side: Other (See comments)  -ER Location: nose  -ER    Retired Wound - Properties Group Present on Original Admission: Y  -ER Side: Other (See comments)  -ER Location: nose  -ER    Retired Wound - Properties Group Present on Original Admission: Y  -ER Side: Other (See comments)  -ER Location: nose  -ER      Row Name 05/15/25 1007          Wound 05/09/25 1700 Left anterior  groin Other (Comments)    Wound - Properties Group Placement Date: 05/09/25  [facility] Placement Time: 1700 - Side: Left  - Orientation: anterior  - Location: groin  - Primary Wound Type: Other  -MH, hematoma     Base red;black;necrotic  -KW     Wound Length (cm) 5 cm  -KW     Wound Width (cm) 11 cm  -KW     Wound Depth (cm) 1 cm  -KW     Wound Surface Area (cm^2) 43.2 cm^2  -KW     Wound Volume (cm^3) 28.798 cm^3  -KW     Drainage Characteristics/Odor serosanguineous  -KW     Drainage Amount large  -KW     Care, Wound cleansed with;wound cleanser;negative pressure wound therapy  -KW     Dressing Care dressing changed  -KW     Periwound Care cleansed with pH balanced cleanser  -KW     Retired Wound - Properties Group Placement Date: 05/09/25  [facility] Placement Time: 1700 - Side: Left  - Orientation: anterior  - Location: groin  -MH    Retired Wound - Properties Group Placement Date: 05/09/25  [facility] Placement Time: 1700 - Side: Left  - Orientation: anterior  - Location: groin  -MH    Retired Wound - Properties Group Date first assessed: 05/09/25  - Time first assessed: 1700 - Side: Left  - Location: groin  -      Row Name 05/15/25 [facility]          NPWT (Negative Pressure Wound Therapy) 05/09/25 left groin    NPWT (Negative Pressure Wound Therapy) - Properties Group Placement Date: 05/09/25  - Location: left groin  -MEET    Therapy Setting continuous therapy  -KW     Dressing foam, black  -KW     Pressure Setting 125 mmHg  -KW     Sponges Inserted 1  -KW     Sponges Removed other (see comments)  new vac placed, removed kerlix  -KW     Finger sweep complete Yes  -KW     Retired NPWT (Negative Pressure Wound Therapy) - Properties Group Placement Date: 05/09/25  -MEET Location: left groin  -MEET    Retired NPWT (Negative Pressure Wound Therapy) - Properties Group Placement Date: 05/09/25  -MEET Location: left groin  -MEET    Retired NPWT (Negative Pressure Wound Therapy) - Properties Group Placement Date:  05/09/25  -MEET Location: left groin  -MEET      Row Name 05/15/25 1007          Plan of Care Review    Plan of Care Reviewed With patient  -KW     Progress no change  -KW     Outcome Evaluation Patient with slowed bleeding today, appears hemostasis reached. PT attempted to place vac again. Patient continues to drain significantly, however vac seal achieved. PT left draping for patching in room if leak occurs. Vascular surgeon approved to place vac suction at 125 mmhg. Patient with high fear/ pain response requiring assistance to place vac as she would frequently roll/ grab therapist. Premedication prior to change. Patient would continue to benefit from skilled PT services and NPWT to improve granulation tissue compared to other basic dressings, decrease bioburden, manage exudate, and improve wound healing.  -KW       Row Name 05/15/25 1007          Positioning and Restraints    Pre-Treatment Position in bed  -KW     Post Treatment Position bed  -KW     In Bed supine;call light within reach;encouraged to call for assist  -KW               User Key  (r) = Recorded By, (t) = Taken By, (c) = Cosigned By      Initials Name Provider Type    María aMza, RN Registered Nurse    Yao Shine RN Registered Nurse    Ce Santo RN Registered Nurse    Yaima Anguiano, PT Physical Therapist                  Physical Therapy Education       Title: PT OT SLP Therapies (In Progress)       Topic: Physical Therapy (In Progress)       Point: Mobility training (In Progress)       Learning Progress Summary            Patient Acceptance, E,TB, NR by CH at 5/14/2025 1717    Acceptance, E, NR by KG at 5/12/2025 1046    Acceptance, E, NR by KG at 5/7/2025 1115    Acceptance, E, NR by MARIFER at 5/6/2025 1430                      Point: Home exercise program (In Progress)       Learning Progress Summary            Patient Acceptance, E,TB, NR by CH at 5/14/2025 1717    Acceptance, E, NR by KG at 5/12/2025 1046    Acceptance,  E, NR by KG at 5/7/2025 1115    Acceptance, E, NR by MARIFER at 5/6/2025 1430                      Point: Body mechanics (In Progress)       Learning Progress Summary            Patient Acceptance, E,TB, NR by  at 5/14/2025 1717    Acceptance, E, NR by KG at 5/12/2025 1046    Acceptance, E, NR by KG at 5/7/2025 1115    Acceptance, E, NR by MARIFER at 5/6/2025 1430                      Point: Precautions (In Progress)       Learning Progress Summary            Patient Acceptance, E,TB, NR by CH at 5/14/2025 1717    Acceptance, E, NR by KG at 5/12/2025 1046    Acceptance, E, NR by KG at 5/7/2025 1115    Acceptance, E, NR by MARIFER at 5/6/2025 1430                                      User Key       Initials Effective Dates Name Provider Type Discipline     02/03/23 -  Sandra Pruitt, PT Physical Therapist PT     06/16/21 -  Gertrudis Bird RN Registered Nurse Nurse     05/22/20 -  Dorothy Frazier PT Physical Therapist PT                    Recommendation and Plan  Anticipated Discharge Disposition (PT): extended care facility  Planned Therapy Interventions (PT): balance training, bed mobility training, home exercise program, strengthening, transfer training  Therapy Frequency (PT): daily  Plan of Care Reviewed With: patient   Progress: no change       Progress: no change  Outcome Evaluation: Patient with slowed bleeding today, appears hemostasis reached. PT attempted to place vac again. Patient continues to drain significantly, however vac seal achieved. PT left draping for patching in room if leak occurs. Vascular surgeon approved to place vac suction at 125 mmhg. Patient with high fear/ pain response requiring assistance to place vac as she would frequently roll/ grab therapist. Premedication prior to change. Patient would continue to benefit from skilled PT services and NPWT to improve granulation tissue compared to other basic dressings, decrease bioburden, manage exudate, and improve wound  healing.  Plan of Care Reviewed With: patient            Time Calculation   PT Charges       Row Name 05/15/25 1007             Time Calculation    Start Time 1007  -KW         Untimed Charges    57481-Ivo Pressure wound to 50 sqcm 59  -KW         Total Minutes    Untimed Charges Total Minutes 59  -KW       Total Minutes 59  -KW                User Key  (r) = Recorded By, (t) = Taken By, (c) = Cosigned By      Initials Name Provider Type    Yaima Anguiano, JARETT Physical Therapist                      Therapy Charges for Today       Code Description Service Date Service Provider Modifiers Qty    39610847512 HC PT RE-EVAL ESTABLISHED PLAN 2 5/14/2025 Yaima Parisi, PT GP 1    35500609161 HC PT NEG PRESS WOUND TO 50SQCM DME2 5/14/2025 Yaima Parisi, PT GP 1    33912915088 HC PT NEG PRESS WOUND TO 50SQCM DME2 5/14/2025 Yaima Parisi, PT GP 1    39133072131 HC TRACY DEBRIDE OPEN WOUND UP TO 20CM 5/14/2025 Yaima Parisi, PT GP 1    [id number]  PT NEG PRESS WOUND TO 50SQCM DME4 5/15/2025 Yaima Parisi, PT  1              PT G-Codes  Outcome Measure Options: AM-PAC 6 Clicks Basic Mobility (PT)  AM-PAC 6 Clicks Score (PT): 9  AM-PAC 6 Clicks Score (OT): 6       Yaima Parisi PT  5/15/2025

## 2025-05-15 NOTE — CASE MANAGEMENT/SOCIAL WORK
Continued Stay Note  [facility]     Patient Name: Estefania Connor  MRN: [id number]  Today's Date: 5/15/2025    Admit Date: 5/5/2025    Plan: [facility] when stable to transfer   Discharge Plan       Row Name 05/15/25 1423       Plan    Plan [facility] when stable to transfer    Plan Comments  will update Reginald at [facility] when patient stable to transfer to their long term acute care hospital.   I reviewed [employer] election form with patient's spouse, Bebeto,  as he is here visting today.   He has elected to use [employer] reserve days and signed form. This will be scanned into EPIC.    Final Discharge Disposition Code 63 - [facility]                   Discharge Codes    No documentation.                 Expected Discharge Date and Time       Expected Discharge Date Expected Discharge Time    May 9, 2025               Chrissie Terrell RN

## 2025-05-15 NOTE — PROGRESS NOTES
[facility]  Vascular Surgery Progress Note    Patient Name: Estefania Connor  : 1955  MRN: [id number]  Date of admission: 2025  Surgical Procedures Since Admission:  Procedure(s):  Left Heart Cath  Stent ELIECER coronary  Surgeon:  Edin Boland MD  Status:  8 Days Post-Op  -------------------    Procedure(s):  REPAIR LEFT FEMORAL ARTERY, EVACUATION LEFT GROIN HEMATOMA, PLACEMENT OF NEGATIVE WOUND VACCUUM THERAPY  Surgeon:  Remi Guerrero MD  Status:  6 Days Post-Op  -------------------    Procedure(s):  GROIN WOUND VACUUM CHANGE LEFT  Surgeon:  Remi Guerrero MD  Status:  2 Days Post-Op  -------------------    Subjective   Subjective     Subjective: Patient remains on ventilation via trach, not sedated, comfortable.        Objective   Objective     Vitals:   Temp:  [98 °F (36.7 °C)-99.6 °F (37.6 °C)] 99.5 °F (37.5 °C)  Heart Rate:  [65-98] 80  Resp:  [16-22] 16  BP: ()/() 153/64  FiO2 (%):  [40 %-45 %] 40 %  Output by Drain (mL) 25 0701 - 25 1900 25 1901 - 05/15/25 0700 05/15/25 0701 - 05/15/25 1007 Range Total   Closed/Suction Drain 1 Left;Proximal Thigh Bulb 19 Fr.       Gastrostomy/Enterostomy Percutaneous endoscopic gastrostomy (PEG) 20 Fr.    300       Physical Exam    Alert, trach on vent, no family at bedside  Abdomen: soft, non-tender  LEFT Groin: Pressure dressing intact with significant drainage, wound vac removed yesterday due to clot/bleeding.       Result Review    Result Review:  I have personally reviewed the results from the time of this admission to 5/15/2025 10:07 EDT and agree with these findings:  [x]  Laboratory  []  Microbiology  []  Radiology  []  EKG/Telemetry   []  Cardiology/Vascular   []  Pathology  []  Old records  []  Other:        Assessment / Plan     Brief Patient Summary:  Estefania Connor is a 69 y.o. female who underwent left groin hematoma evacuation and femoral artery repair on 2025.  Later  she required further femoral hematoma evacuation which was performed on 5/13/2025 with Dr. Guerrero.    Plan:   - Wound vac removed yesterday due to bleeding/clot  - Pressure dressing in place, PT Wound care to attempt reapplication of wound vac today  - She will require long-term negative pressure wound therapy as the wound could not be closed due to size and tissue quality  - No additional vascular procedures to be performed, after speaking with Dr. Guerrero, given nothing further to do from the vascular team, vascular surgery will sign off but will be available to answer any additional questions or concerns.

## 2025-05-15 NOTE — PROGRESS NOTES
"   LOS: 10 days    Patient Care Team:  Sergio Randall MD as PCP - General (Cardiology)    Subjective     HD yesterday tolerated well. Wound care changing dressing today. In lot of pain during the process. Labs reviewed. Stable otherwise.     Objective     Vital Signs:  Blood pressure 118/56, pulse 71, temperature 99 °F (37.2 °C), temperature source Axillary, resp. rate 16, height 160 cm (62.99\"), weight 69.4 kg (153 lb), SpO2 91%.      Intake/Output Summary (Last 24 hours) at 5/15/2025 1316  Last data filed at 5/15/2025 1000  Gross per 24 hour   Intake 562 ml   Output 225 ml   Net 337 ml        05/14 0701 - 05/15 0700  In: 606 [I.V.:204]  Out: 3425     Physical Exam:  GENERAL: WDWF NAD  NEURO: Sedate on vent  PSYCHIATRIC: Unable to evaluate  EYE: PE, no icterus, no conjunctivitis  ENT: OMM dry, + trach  NECK: Supple , No JVD discernable  CV: Trace edema, RRR  LUNGS:  Quiet,  Nonlabored resp.  Symmetrical expansion  ABDOMEN: + PEG, nondistended  : No Pereira, no palp bladder  SKIN: Warm and dry without rash.  Left groin wound with wound VAC    Labs:  Results from last 7 days   Lab Units 05/15/25  0820 05/15/25  0427 05/15/25  0044 05/14/25  1142 05/14/25  0320 05/13/25  0403   WBC 10*3/mm3  --  16.01*  --   --  17.25* 13.70*   HEMOGLOBIN g/dL 7.6* 8.3* 7.5*   < > 8.0* 8.7*   PLATELETS 10*3/mm3  --  296  --   --  268 203    < > = values in this interval not displayed.     Results from last 7 days   Lab Units 05/15/25  0427 05/14/25  0320 05/13/25  0403 05/12/25  0315 05/10/25  0828 05/10/25  0005   SODIUM mmol/L 136 134* 133* 135*   < > 137   POTASSIUM mmol/L 3.9 3.8 3.5 3.6   < > 3.8   CHLORIDE mmol/L 101 101 101 102   < > 103   CO2 mmol/L 22.0 20.0* 22.0 20.0*   < > 21.0*   BUN mg/dL 19 33* 29* 49*   < > 20   CREATININE mg/dL 2.64* 3.13* 2.53* 3.78*   < > 2.50*   CALCIUM mg/dL 8.6 8.8 8.7 8.5*   < > 8.3*   PHOSPHORUS mg/dL 3.4  --   --   --   --   --    MAGNESIUM mg/dL 1.8  --   --   --   --  1.8   ALBUMIN g/dL  " --   --   --   --   --  2.5*    < > = values in this interval not displayed.     Results from last 7 days   Lab Units 05/10/25  0005   ALK PHOS U/L 84   BILIRUBIN mg/dL 0.4   ALT (SGPT) U/L 14   AST (SGOT) U/L 23     Results from last 7 days   Lab Units 05/13/25  0358   PH, ARTERIAL pH units 7.430   PO2 ART mm Hg 66.5*   PCO2, ARTERIAL mm Hg 37.6   HCO3 ART mmol/L 24.9       A/P:    ESRD: On HD with Dr. Kaur at [facility].   - Continue HD on MW schedule.    HTN: Blood pressure stabilizing with further adjustment of blood pressure medications.   - Give albumin for hypotension.  - Cover with as needed medications for hypertension.  - Watch with UF on HD.    Electrolytes: Potassium has been low requiring 4K bath.  Overall stable with HD correction  - Monitor and adjust as needed with HD.    Metabolic acidosis: pH has been stable.  Bicarb stable.    -Adjust with HD.    Anemia: Hemoglobin dropped to 5 with femoral artery bleed.  Patient was transfused 4 units packed red blood cells.    -Continue Epogen.  -Continue to transfuse as indicated.    Volume: Stable.    -UF with HD as tolerated.    Respiratory failure: Trach on vent    Left femoral pseudoaneurysm: Developed at cardiac cath site.  Required repair 5/9/2025.  Wound VAC in place.      High risk and complexity patient.      Romulo Gonzalez MD  05/15/25  13:16 EDT

## 2025-05-15 NOTE — PLAN OF CARE
Goal Outcome Evaluation:  Plan of Care Reviewed With: patient        Progress: no change  Outcome Evaluation: Patient with slowed bleeding today, appears hemostasis reached. PT attempted to place vac again. Patient continues to drain significantly, however vac seal achieved. PT left draping for patching in room if leak occurs. Vascular surgeon approved to place vac suction at 125 mmhg. Patient with high fear/ pain response requiring assistance to place vac as she would frequently roll/ grab therapist. Premedication prior to change. Patient would continue to benefit from skilled PT services and NPWT to improve granulation tissue compared to other basic dressings, decrease bioburden, manage exudate, and improve wound healing.

## 2025-05-15 NOTE — PROGRESS NOTES
Critical Care Note     LOS: 10 days   Patient Care Team:  Sergio Randall MD as PCP - General (Cardiology)    Chief Complaint/Reason for visit:     CAD (coronary artery disease)    ESRD on HD    Polycystic kidney disease    HTN (hypertension)    Dyslipidemia    AAA (abdominal aortic aneurysm)    Right coronary artery occlusion    Depression    Tracheostomy present    S/P percutaneous endoscopic gastrostomy (PEG) tube placement      Subjective     Interval History:     Staff reports that she continued to bleed from her left groin site to the point where they had to remove the wound VAC and put a pressure dressing.  She received additional unit of packed red cells overnight.  Vascular surgery PA did see her this morning and requested that PT attempt to put the wound VAC back in place.  No further vascular procedures are planned.  She vomited yesterday and tube feedings have been off for 24 hours.  She also received hemodialysis yesterday with 2.3 L removed.  She is maintaining sinus rhythm.  Temperature 99 degrees.  On 50% FiO2, rate of 16, PEEP of 5, tidal volume 365 her saturations were only 91%.  She is off sedation.    Review of Systems:    All systems were reviewed and negative except as noted in subjective.    Medical history, surgical history, social history, family history reviewed    Objective     Intake/Output:    Intake/Output Summary (Last 24 hours) at 5/15/2025 1314  Last data filed at 5/15/2025 1000  Gross per 24 hour   Intake 562 ml   Output 225 ml   Net 337 ml       Nutrition:  No diet orders on file    Infusions:  dexmedetomidine, 0.2-1.5 mcg/kg/hr, Last Rate: Stopped (05/15/25 0000)  niCARdipine, 5-15 mg/hr, Last Rate: 2.5 mg/hr (05/13/25 0618)        Mechanical ventilation            Resp Rate (Set): 16     FiO2 (%): 50 %  PEEP/CPAP (cm H2O): 5 cm H20    Minute Ventilation (L/min) (Obs): 6.96 L/min  Resp Rate (Observed) Vent: 16  I:E Ratio (Set): 1:3.17  I:E Ratio (Obs): 1:3.2    PIP Observed (cm  "H2O): 26 cm H2O  Plateau Pressure (cm H2O): (S) 22 cm H2O    Telemetry: Sinus rhythm             Vital Signs  Blood pressure 118/56, pulse 71, temperature 99 °F (37.2 °C), temperature source Axillary, resp. rate 16, height 160 cm (62.99\"), weight 69.4 kg (153 lb), SpO2 91%.    Physical Exam:  General Appearance:  Obese woman supine in bed, calm and awake   Head:  No visible trauma   Eyes:          Pupils are equal, conjunctiva pale   Ears:     Throat: Oral mucosa moist   Neck: Tracheostomy in place, left IJ central line, right tunneled access   Back:      Lungs:   Symmetric chest expansion.  Coarse rhonchi bilaterally, no wheeze    Heart:  Regular rhythm, S1, S2 auscultated   Abdomen:   Bowel sounds present, slightly distended but nontender, PEG tube in place   Rectal:   Deferred   Extremities: Left groin wound with a pressure dressing   Pulses: Palpable pedal pulses, but weak   Skin: No rash   Lymph nodes:    Neurologic: Awake and moving all extremities, follows commands today      Results Review:     I reviewed the patient's new clinical results.   Results from last 7 days   Lab Units 05/15/25  0427 05/14/25  0320 05/13/25  0403 05/10/25  0828 05/10/25  0005   SODIUM mmol/L 136 134* 133*   < > 137   POTASSIUM mmol/L 3.9 3.8 3.5   < > 3.8   CHLORIDE mmol/L 101 101 101   < > 103   CO2 mmol/L 22.0 20.0* 22.0   < > 21.0*   BUN mg/dL 19 33* 29*   < > 20   CREATININE mg/dL 2.64* 3.13* 2.53*   < > 2.50*   CALCIUM mg/dL 8.6 8.8 8.7   < > 8.3*   BILIRUBIN mg/dL  --   --   --   --  0.4   ALK PHOS U/L  --   --   --   --  84   ALT (SGPT) U/L  --   --   --   --  14   AST (SGOT) U/L  --   --   --   --  23   GLUCOSE mg/dL 76 87 115*   < > 116*    < > = values in this interval not displayed.     Results from last 7 days   Lab Units 05/15/25  0820 05/15/25  0427 05/15/25  0044 05/14/25  1142 05/14/25  0320 05/13/25  0403   WBC 10*3/mm3  --  16.01*  --   --  17.25* 13.70*   HEMOGLOBIN g/dL 7.6* 8.3* 7.5*   < > 8.0* 8.7* " "  HEMATOCRIT % 23.4* 26.4* 23.6*   < > 25.0* 26.5*   PLATELETS 10*3/mm3  --  296  --   --  268 203    < > = values in this interval not displayed.     Results from last 7 days   Lab Units 05/13/25  0358   PH, ARTERIAL pH units 7.430   PO2 ART mm Hg 66.5*   PCO2, ARTERIAL mm Hg 37.6   HCO3 ART mmol/L 24.9     Lab Results   Component Value Date    BLOODCX No growth at 5 days 04/24/2025     Respiratory Culture - Sputum, ET Suction [[id number]]    (Abnormal)   Sputum from ET Suction    Final result Component Value   Respiratory Culture Moderate growth (3+) Klebsiella pneumoniae ssp pneumoniae Abnormal     No Normal Respiratory Geraldine Abnormal    Gram Stain Rare (1+) Epithelial cells per low power field    Moderate (3+) WBCs per low power field    No organisms seen         Susceptibility     Klebsiella pneumoniae ssp pneumoniae     GRACE     Amoxicillin + Clavulanate <=2 Susceptible     Ampicillin >=32 Resistant     Ampicillin + Sulbactam 8 Susceptible     Cefazolin (Non Urine) 2 Susceptible     Cefepime <=0.12 Susceptible     Ceftazidime <=0.5 Susceptible     Ceftriaxone <=0.25 Susceptible     Gentamicin <=1 Susceptible     Levofloxacin <=0.12 Susceptible     Piperacillin + Tazobactam <=4 Susceptible     Tetracycline <=1 Susceptible     Trimethoprim + Sulfamethoxazole <=20 Susceptible                    No results found for: \"URINECX\"    I reviewed the patient's new imaging including images and reports.    XR CHEST 1 VW    Date of Exam: 5/15/2025 5:06 AM EDT    Indication: ventilator management    Comparison: 5/13/2025    Findings:  Tracheostomy tube present. Right IJ dialysis catheter and left IJ central line remain in place. Cardiac silhouette stable. Stable prominence of the central pulmonary vasculature. Mild increased interstitial markings most notably in the lower right lung.  Overall increased distinctness of the peripheral pulmonary vessels opacity in the left lung base obscuring the left hemidiaphragm. No " pneumothorax   Impression:     Impression:    1. Findings of pulmonary vascular congestion and mild edema which are felt to be slightly improved  2. Persistent left basilar opacity compatible with airspace disease and possible small effusion      Electronically Signed: Chidi Natarajan   5/15/2025 7:46 AM EDT       Interpretation Summary right upper extremity duplex         Acute right upper extremity superficial thrombophlebitis noted in the cephalic (forearm).    All other right sided vessels appear normal.    FINAL IMPRESSION: Left heart catheterization May 7, 2025  Successful PTCA/stenting of the RCA with placement of 3 overlapping drug-eluting stents reducing 95% stenosis to 0%.     RECOMMENDATIONS:  Dual antiplatelet therapy for at least 1 year.  Optimize medical therapy and risk factor management per guidelines.    Interpretation Summary, echocardiogram May 1, 2025, [place]         The study is technically difficult for diagnosis. The quality of the study is limited with poor acoustic windows.    Sinus bradycardia in high 50s was the predominant rhythm observed during the procedure.    Normal left ventricular cavity size noted. Left ventricular wall thickness is consistent with mild concentric hypertrophy.    Left ventricular systolic function is mildly decreased. Left ventricular ejection fraction appears to be 41 - 45%.    Left ventricular diastolic function is consistent with (grade II w/high LAP) pseudonormalization.    There is mild calcification of the aortic valve. No significant aortic valve regurgitation is present. No hemodynamically significant aortic valve stenosis is present.    There is mild calcification of the mitral valve posterior leaflet(s). Mild mitral valve regurgitation is present. No significant mitral valve stenosis is present.    There is a small (<1cm) pericardial effusion. There is no evidence of cardiac tamponade.      All medications reviewed.   amiodarone, 200 mg, Per PEG Tube,  Q12H  amLODIPine, 10 mg, Per PEG Tube, Q24H  aspirin, 81 mg, Per PEG Tube, Daily  atorvastatin, 40 mg, Per PEG Tube, Nightly  carvedilol, 12.5 mg, Per PEG Tube, Q12H  cloNIDine, 0.1 mg, Per PEG Tube, Q8H  clopidogrel, 75 mg, Per PEG Tube, Daily  heparin (porcine), 5,000 Units, Subcutaneous, Q8H  hydrALAZINE, 50 mg, Per PEG Tube, Q8H  ipratropium-albuterol, 3 mL, Nebulization, 4x Daily - RT  lansoprazole, 15 mg, Per PEG Tube, QAM AC  miconazole, 1 Application, Topical, Q12H  nitroglycerin, 1 patch, Transdermal, Daily  pyridostigmine, 60 mg, Per PEG Tube, Q8H  sacubitril-valsartan, 1 tablet, Per PEG Tube, Q12H          Assessment & Plan       CAD (coronary artery disease)    ESRD on HD    Polycystic kidney disease    HTN (hypertension)    Dyslipidemia    AAA (abdominal aortic aneurysm)    Right coronary artery occlusion    Depression    Tracheostomy present    S/P percutaneous endoscopic gastrostomy (PEG) tube placement    Hematoma of groin    Ileus    69 y.o. female with history of HTN, HLD, CKD on HD, polycystic kidney disease, abdominal aortic aneurysm, CAD with prior stent, depression, transferred from [facility] in [place] on 5/5/2025 after she suffered a V-fib arrest with resuscitation and underwent left heart catheterization showing 100% occlusion of the RCA with failed intervention.     Patient was initially admitted to [facility] on 3/20/2025 with volume overload secondary to missing 2 hemodialysis sessions.  She also has followed up possible pneumonia and had anemia and was evaluated by GI with no evidence of GI bleed.  She had bilateral pleural effusions and underwent 1550 mL thoracentesis, but despite thoracentesis required intubation on 4/10/2025 was transferred to [facility] on 4/11/2025.  Patient ultimately underwent tracheostomy and PEG placement on 4/29/2025.  She deteriorated on 5/2/2025 suffering a Vfib arrest.    May 7 she underwent left heart catheterization with stent  of her occluded RCA.  She was noted to have a swollen right upper extremity.  Duplex revealed superficial thrombophlebitis.      Patient developed a hematoma at her left femoral artery site on the evening of May 9 requiring urgent surgery with femoral artery repair, evacuation of a hematoma, 2 units of packed red cells.  Wound VAC was placed.  May 13 she went back to surgery for a washout and replacement of the wound VAC.  She received additional unit of packed red cells on May 14.  Hemoglobin is back down to 7.6 today    She continues to have large pleural effusions and upper extremity pitting edema, volume overload.  Chest x-ray is worse today with pulmonary edema.  FiO2 has been increased to 50%.  She may need additional dialysis.    Respiratory cultures were positive for Klebsiella without evidence of pneumonia consistent with a tracheobronchitis.  She is receiving Rocephin.    BP continues to be labile and oral antihypertensives have been increased.      #1 V-fib arrest/coronary artery disease-occluded RCA,    - Maintaining sinus rhythm, still clinically volume overloaded  .  LVEF 41 to 45%  - Successful stent to RCA May 9  - Aspirin, statin, Plavix, beta-blocker  - Entresto  mg twice daily  - Amiodarone 200 mg every 12 hours  - Moderate bilateral pleural effusions    #2 left femoral artery pseudoaneurysm with hematoma  - OR for repair of the left femoral artery and evacuation of a hematoma, wound VAC placement, May 9   -Return to the OR May 13 for a washout and replacement of the wound VAC  - Total of 4 units packed red blood cells  - Hemoglobin 7.6, has drifted back down from 8.7.  Will plan on additional packed cell today  - Wound VAC removed overnight because of clot and bleeding.  Vascular surgery plans no further surgical intervention.      #3 respiratory failure  - Tracheostomy, PEG tube placement in [place], April 29, 2025  - Initially hospitalized March 20, 2025 for volume overload after missing  dialysis, intubated April 10 in [place] and then transferred to LTAC in [place]  - Pleural effusions postthoracentesis April 4, moderate to large effusions present on CT of the abdomen May 9  - Previous tobacco per records, details not available  - Klebsiella pneumoniae positive respiratory culture, suspect tracheobronchitis  - Ceftriaxone started May 6, completed May 13  -WBC 16 today  - Assist-control 16, tidal volume 365, PEEP 5, 50 % FiO2, saturation only 90%.  Yesterday she was in the mid 90s.  Will increase PEEP  -Clinically, her volume status has never been adequately controlled as she continues to have pitting edema and moderate pleural effusions, and now pulmonary edema on chest x-ray    #4 chronic kidney disease, history of polycystic kidneys  - End-stage renal disease on hemodialysis  - Nephrology following with intermittent dialysis  - Appears volume overloaded with effusions and upper extremity pitting edema  - BUN 19, creatinine 2.64, potassium 3.9    #5 hypertension, difficult to control  - Amlodipine 10 mg daily  - Hydralazine 50 mg every 8 hours  - Metoprolol 25 mg twice daily, will change to carvedilol and started 12.5 mg but increase to 25 mg twice daily which is her home dose  - Entresto  mg twice daily  -NTG patch  - Off and on Cardene drip, oral antihypertensives increased and currently off    #6 nutrition  -Vomited this morning and tube feeding on hold  - Previously on Novasource renal 35 mL/h, on hold  - Frequent diarrhea    - Generalized ileus on KUB  -  Mestinon  - [place] KUB    VTE Prophylaxis: subcutaneous heparin    Stress Ulcer Prophylaxis: Prevacid suspension    Nuris Aquino MD  05/15/25  13:14 EDT      Time: Critical care 35 min  I personally provided care to this critically ill patient as documented above.  Critical care time does not include time spent on separately billed procedures.  None of my critical care time was concurrent with other critical care providers.

## 2025-05-16 ENCOUNTER — APPOINTMENT (OUTPATIENT)
Dept: GENERAL RADIOLOGY | Facility: HOSPITAL | Age: 70
End: 2025-05-16
Payer: MEDICARE

## 2025-05-16 ENCOUNTER — APPOINTMENT (OUTPATIENT)
Dept: NEPHROLOGY | Facility: HOSPITAL | Age: 70
End: 2025-05-16
Payer: MEDICARE

## 2025-05-16 LAB
ANION GAP SERPL CALCULATED.3IONS-SCNC: 13 MMOL/L (ref 5–15)
BH BB BLOOD EXPIRATION DATE: NORMAL
BH BB BLOOD TYPE BARCODE: 9500
BH BB DISPENSE STATUS: NORMAL
BH BB PRODUCT CODE: NORMAL
BH BB UNIT NUMBER: NORMAL
BUN SERPL-MCNC: 26 MG/DL (ref 8–23)
BUN/CREAT SERPL: 7.7 (ref 7–25)
CA-I SERPL ISE-MCNC: 1.24 MMOL/L (ref 1.15–1.3)
CALCIUM SPEC-SCNC: 9.1 MG/DL (ref 8.6–10.5)
CHLORIDE SERPL-SCNC: 101 MMOL/L (ref 98–107)
CO2 SERPL-SCNC: 23 MMOL/L (ref 22–29)
CREAT SERPL-MCNC: 3.38 MG/DL (ref 0.57–1)
CROSSMATCH INTERPRETATION: NORMAL
DEPRECATED RDW RBC AUTO: 54.8 FL (ref 37–54)
EGFRCR SERPLBLD CKD-EPI 2021: 14.1 ML/MIN/1.73
ERYTHROCYTE [DISTWIDTH] IN BLOOD BY AUTOMATED COUNT: 16.5 % (ref 12.3–15.4)
GLUCOSE BLDC GLUCOMTR-MCNC: 121 MG/DL (ref 70–130)
GLUCOSE BLDC GLUCOMTR-MCNC: 67 MG/DL (ref 70–130)
GLUCOSE BLDC GLUCOMTR-MCNC: 70 MG/DL (ref 70–130)
GLUCOSE BLDC GLUCOMTR-MCNC: 75 MG/DL (ref 70–130)
GLUCOSE BLDC GLUCOMTR-MCNC: 81 MG/DL (ref 70–130)
GLUCOSE SERPL-MCNC: 68 MG/DL (ref 65–99)
HCT VFR BLD AUTO: 27.8 % (ref 34–46.6)
HGB BLD-MCNC: 9 G/DL (ref 12–15.9)
MCH RBC QN AUTO: 30 PG (ref 26.6–33)
MCHC RBC AUTO-ENTMCNC: 32.4 G/DL (ref 31.5–35.7)
MCV RBC AUTO: 92.7 FL (ref 79–97)
PLATELET # BLD AUTO: 287 10*3/MM3 (ref 140–450)
PMV BLD AUTO: 10.4 FL (ref 6–12)
POTASSIUM SERPL-SCNC: 3.7 MMOL/L (ref 3.5–5.2)
RBC # BLD AUTO: 3 10*6/MM3 (ref 3.77–5.28)
SODIUM SERPL-SCNC: 137 MMOL/L (ref 136–145)
UNIT  ABO: NORMAL
UNIT  RH: NORMAL
WBC NRBC COR # BLD AUTO: 14.44 10*3/MM3 (ref 3.4–10.8)

## 2025-05-16 PROCEDURE — 74018 RADEX ABDOMEN 1 VIEW: CPT

## 2025-05-16 PROCEDURE — 99233 SBSQ HOSP IP/OBS HIGH 50: CPT | Performed by: INTERNAL MEDICINE

## 2025-05-16 PROCEDURE — 94799 UNLISTED PULMONARY SVC/PX: CPT

## 2025-05-16 PROCEDURE — 94664 DEMO&/EVAL PT USE INHALER: CPT

## 2025-05-16 PROCEDURE — 25010000002 HYDROMORPHONE 1 MG/ML SOLUTION: Performed by: NURSE PRACTITIONER

## 2025-05-16 PROCEDURE — 94761 N-INVAS EAR/PLS OXIMETRY MLT: CPT

## 2025-05-16 PROCEDURE — 82948 REAGENT STRIP/BLOOD GLUCOSE: CPT

## 2025-05-16 PROCEDURE — 25010000002 ALBUMIN HUMAN 25% PER 50 ML: Performed by: INTERNAL MEDICINE

## 2025-05-16 PROCEDURE — 25010000002 HEPARIN (PORCINE) PER 1000 UNITS: Performed by: SURGERY

## 2025-05-16 PROCEDURE — 94003 VENT MGMT INPAT SUBQ DAY: CPT

## 2025-05-16 PROCEDURE — P9047 ALBUMIN (HUMAN), 25%, 50ML: HCPCS | Performed by: INTERNAL MEDICINE

## 2025-05-16 PROCEDURE — 97605 NEG PRS WND THER DME<=50SQCM: CPT

## 2025-05-16 PROCEDURE — 99232 SBSQ HOSP IP/OBS MODERATE 35: CPT | Performed by: PHYSICIAN ASSISTANT

## 2025-05-16 RX ORDER — ALBUMIN (HUMAN) 12.5 G/50ML
12.5 SOLUTION INTRAVENOUS AS NEEDED
Status: DISPENSED | OUTPATIENT
Start: 2025-05-16 | End: 2025-05-17

## 2025-05-16 RX ORDER — HEPARIN SODIUM 1000 [USP'U]/ML
2000 INJECTION, SOLUTION INTRAVENOUS; SUBCUTANEOUS AS NEEDED
Status: DISCONTINUED | OUTPATIENT
Start: 2025-05-16 | End: 2025-05-22 | Stop reason: HOSPADM

## 2025-05-16 RX ORDER — TOBRAMYCIN AND DEXAMETHASONE 3; 1 MG/ML; MG/ML
2 SUSPENSION/ DROPS OPHTHALMIC
Status: COMPLETED | OUTPATIENT
Start: 2025-05-16 | End: 2025-05-19

## 2025-05-16 RX ADMIN — IPRATROPIUM BROMIDE AND ALBUTEROL SULFATE 3 ML: 2.5; .5 SOLUTION RESPIRATORY (INHALATION) at 12:34

## 2025-05-16 RX ADMIN — AMLODIPINE BESYLATE 10 MG: 10 TABLET ORAL at 11:56

## 2025-05-16 RX ADMIN — OXYCODONE HYDROCHLORIDE 10 MG: 10 TABLET ORAL at 17:30

## 2025-05-16 RX ADMIN — HYDROMORPHONE HYDROCHLORIDE 1 MG: 1 INJECTION, SOLUTION INTRAMUSCULAR; INTRAVENOUS; SUBCUTANEOUS at 00:21

## 2025-05-16 RX ADMIN — HEPARIN SODIUM 5000 UNITS: 5000 INJECTION INTRAVENOUS; SUBCUTANEOUS at 21:17

## 2025-05-16 RX ADMIN — AMIODARONE HYDROCHLORIDE 200 MG: 200 TABLET ORAL at 11:55

## 2025-05-16 RX ADMIN — DEXMEDETOMIDINE HYDROCHLORIDE 0.2 MCG/KG/HR: 4 INJECTION, SOLUTION INTRAVENOUS at 07:31

## 2025-05-16 RX ADMIN — SACUBITRIL AND VALSARTAN 1 TABLET: 97; 103 TABLET, FILM COATED ORAL at 11:56

## 2025-05-16 RX ADMIN — HEPARIN SODIUM 5000 UNITS: 5000 INJECTION INTRAVENOUS; SUBCUTANEOUS at 14:11

## 2025-05-16 RX ADMIN — DIAZEPAM 5 MG: 5 TABLET ORAL at 05:08

## 2025-05-16 RX ADMIN — CARVEDILOL 12.5 MG: 12.5 TABLET, FILM COATED ORAL at 11:56

## 2025-05-16 RX ADMIN — MICONAZOLE NITRATE 1 APPLICATION: 20 POWDER TOPICAL at 21:17

## 2025-05-16 RX ADMIN — PYRIDOSTIGMINE BROMIDE 60 MG: 60 TABLET ORAL at 05:08

## 2025-05-16 RX ADMIN — LANSOPRAZOLE 15 MG: 15 TABLET, ORALLY DISINTEGRATING ORAL at 11:55

## 2025-05-16 RX ADMIN — OXYCODONE HYDROCHLORIDE 10 MG: 10 TABLET ORAL at 23:31

## 2025-05-16 RX ADMIN — HYDRALAZINE HYDROCHLORIDE 50 MG: 50 TABLET ORAL at 05:08

## 2025-05-16 RX ADMIN — DIAZEPAM 5 MG: 5 TABLET ORAL at 14:09

## 2025-05-16 RX ADMIN — NITROGLYCERIN 1 PATCH: 0.4 PATCH TRANSDERMAL at 11:59

## 2025-05-16 RX ADMIN — ALBUMIN (HUMAN) 12.5 G: 0.25 INJECTION, SOLUTION INTRAVENOUS at 09:45

## 2025-05-16 RX ADMIN — IPRATROPIUM BROMIDE AND ALBUTEROL SULFATE 3 ML: 2.5; .5 SOLUTION RESPIRATORY (INHALATION) at 15:57

## 2025-05-16 RX ADMIN — TOBRAMYCIN AND DEXAMETHASONE 2 DROP: 1; 3 SUSPENSION/ DROPS OPHTHALMIC at 11:55

## 2025-05-16 RX ADMIN — ATORVASTATIN CALCIUM 40 MG: 40 TABLET, FILM COATED ORAL at 21:17

## 2025-05-16 RX ADMIN — HEPARIN SODIUM 5000 UNITS: 5000 INJECTION INTRAVENOUS; SUBCUTANEOUS at 05:08

## 2025-05-16 RX ADMIN — IPRATROPIUM BROMIDE AND ALBUTEROL SULFATE 3 ML: 2.5; .5 SOLUTION RESPIRATORY (INHALATION) at 07:09

## 2025-05-16 RX ADMIN — TOBRAMYCIN AND DEXAMETHASONE 2 DROP: 1; 3 SUSPENSION/ DROPS OPHTHALMIC at 21:18

## 2025-05-16 RX ADMIN — CARVEDILOL 12.5 MG: 12.5 TABLET, FILM COATED ORAL at 21:17

## 2025-05-16 RX ADMIN — TOBRAMYCIN AND DEXAMETHASONE 2 DROP: 1; 3 SUSPENSION/ DROPS OPHTHALMIC at 15:41

## 2025-05-16 RX ADMIN — TOBRAMYCIN AND DEXAMETHASONE 2 DROP: 1; 3 SUSPENSION/ DROPS OPHTHALMIC at 20:36

## 2025-05-16 RX ADMIN — CLONIDINE HYDROCHLORIDE 0.1 MG: 0.1 TABLET ORAL at 21:17

## 2025-05-16 RX ADMIN — HEPARIN SODIUM 2000 UNITS: 1000 INJECTION INTRAVENOUS; SUBCUTANEOUS at 11:35

## 2025-05-16 RX ADMIN — SACUBITRIL AND VALSARTAN 1 TABLET: 97; 103 TABLET, FILM COATED ORAL at 21:17

## 2025-05-16 RX ADMIN — DEXTROSE MONOHYDRATE 25 G: 25 INJECTION, SOLUTION INTRAVENOUS at 00:31

## 2025-05-16 RX ADMIN — MICONAZOLE NITRATE 1 APPLICATION: 20 POWDER TOPICAL at 11:57

## 2025-05-16 RX ADMIN — HYDRALAZINE HYDROCHLORIDE 50 MG: 50 TABLET ORAL at 21:17

## 2025-05-16 RX ADMIN — CLOPIDOGREL BISULFATE 75 MG: 75 TABLET, FILM COATED ORAL at 11:55

## 2025-05-16 RX ADMIN — DEXMEDETOMIDINE HYDROCHLORIDE 0.3 MCG/KG/HR: 4 INJECTION, SOLUTION INTRAVENOUS at 15:40

## 2025-05-16 RX ADMIN — ASPIRIN 81 MG 81 MG: 81 TABLET ORAL at 11:56

## 2025-05-16 RX ADMIN — AMIODARONE HYDROCHLORIDE 200 MG: 200 TABLET ORAL at 21:17

## 2025-05-16 RX ADMIN — ALBUMIN (HUMAN) 12.5 G: 0.25 INJECTION, SOLUTION INTRAVENOUS at 09:55

## 2025-05-16 RX ADMIN — IPRATROPIUM BROMIDE AND ALBUTEROL SULFATE 3 ML: 2.5; .5 SOLUTION RESPIRATORY (INHALATION) at 19:04

## 2025-05-16 RX ADMIN — CLONIDINE HYDROCHLORIDE 0.1 MG: 0.1 TABLET ORAL at 05:08

## 2025-05-16 NOTE — PROGRESS NOTES
"   LOS: 11 days    Patient Care Team:  Sergio Randall MD as PCP - General (Cardiology)    Subjective     Seen on HD tolerating treatment well.     Objective     Vital Signs:  Blood pressure 137/70, pulse 58, temperature 97.9 °F (36.6 °C), temperature source Axillary, resp. rate 16, height 160 cm (62.99\"), weight 69.4 kg (153 lb), SpO2 97%.      Intake/Output Summary (Last 24 hours) at 5/16/2025 1239  Last data filed at 5/16/2025 1124  Gross per 24 hour   Intake 1035 ml   Output 3800 ml   Net -2765 ml        05/15 0701 - 05/16 0700  In: 705   Out: 1000     Physical Exam:  GENERAL: WDWF NAD  NEURO: Sedate on vent  PSYCHIATRIC: Unable to evaluate  EYE: PE, no icterus, no conjunctivitis  ENT: OMM dry, + trach  NECK: Supple , No JVD discernable  CV: Trace edema, RRR  LUNGS:  Quiet,  Nonlabored resp.  Symmetrical expansion  ABDOMEN: + PEG, nondistended  : No Pereira, no palp bladder  SKIN: Warm and dry without rash.  Left groin wound with wound VAC    Labs:  Results from last 7 days   Lab Units 05/15/25  2349 05/15/25  1644 05/15/25  0820 05/15/25  0427 05/14/25  1142 05/14/25  0320   WBC 10*3/mm3 14.44*  --   --  16.01*  --  17.25*   HEMOGLOBIN g/dL 9.0* 8.3* 7.6* 8.3*   < > 8.0*   PLATELETS 10*3/mm3 287  --   --  296  --  268    < > = values in this interval not displayed.     Results from last 7 days   Lab Units 05/15/25  2349 05/15/25  0427 05/14/25  0320 05/13/25  0403 05/10/25  0828 05/10/25  0005   SODIUM mmol/L 137 136 134* 133*   < > 137   POTASSIUM mmol/L 3.7 3.9 3.8 3.5   < > 3.8   CHLORIDE mmol/L 101 101 101 101   < > 103   CO2 mmol/L 23.0 22.0 20.0* 22.0   < > 21.0*   BUN mg/dL 26* 19 33* 29*   < > 20   CREATININE mg/dL 3.38* 2.64* 3.13* 2.53*   < > 2.50*   CALCIUM mg/dL 9.1 8.6 8.8 8.7   < > 8.3*   PHOSPHORUS mg/dL  --  3.4  --   --   --   --    MAGNESIUM mg/dL  --  1.8  --   --   --  1.8   ALBUMIN g/dL  --   --   --   --   --  2.5*    < > = values in this interval not displayed.     Results from last 7 " days   Lab Units 05/10/25  0005   ALK PHOS U/L 84   BILIRUBIN mg/dL 0.4   ALT (SGPT) U/L 14   AST (SGOT) U/L 23     Results from last 7 days   Lab Units 05/13/25  0358   PH, ARTERIAL pH units 7.430   PO2 ART mm Hg 66.5*   PCO2, ARTERIAL mm Hg 37.6   HCO3 ART mmol/L 24.9       A/P:    ESRD: On HD with Dr. Kaur at [facility].   - Continue HD on MWF schedule.    HTN: Blood pressure stabilizing with further adjustment of blood pressure medications.   - Give albumin for hypotension.  - Cover with as needed medications for hypertension.  - Watch with UF on HD.    Electrolytes: Potassium has been low requiring 4K bath.  Overall stable with HD correction  - Monitor and adjust as needed with HD.    Metabolic acidosis: pH has been stable.  Bicarb stable.    -Adjust with HD.    Anemia: Hemoglobin dropped to 5 with femoral artery bleed.  Patient was transfused 4 units packed red blood cells.    -Continue Epogen.  -Continue to transfuse as indicated.    Volume: Stable.    -UF with HD as tolerated.    Respiratory failure: Trach on vent    Left femoral pseudoaneurysm: Developed at cardiac cath site.  Required repair 5/9/2025.  Wound VAC in place.      High risk and complexity patient.      Romulo Gonzalez MD  05/16/25  12:39 EDT

## 2025-05-16 NOTE — PLAN OF CARE
Problem: Hemodialysis  Goal: Safe, Effective Therapy Delivery  Outcome: Progressing  Goal: Effective Tissue Perfusion  Outcome: Progressing  Goal: Absence of Infection Signs and Symptoms  Outcome: Progressing   Goal Outcome Evaluation:   HD complete, blood reinfused without complication. 2 bags of albumin 12.5g given for hypotensive episode. UF goal of 2.5L met. Report called to primary RNJordyn.

## 2025-05-16 NOTE — CASE MANAGEMENT/SOCIAL WORK
Continued Stay Note  [facility]     Patient Name: Estefania Connor  MRN: [id number]  Today's Date: 5/16/2025    Admit Date: 5/5/2025    Plan: [facility] on Monday   Discharge Plan       Row Name 05/16/25 1135       Plan    Plan [facility] on Monday    Plan Comments Patient was discussed in ICU MDR earlier today.     reached out to [facility] as she was stable for transfer today.    Reginald with the facility spoke with their team and they would prefer to wait until Monday to make sure groin site is stable. I updated Dr. Aquino.    reached out to our dialysis team and requested her dialysis early on Monday, with hopes to transfer to [facility] after this.    Final Discharge Disposition Code 63 - [facility]                   Discharge Codes    No documentation.                 Expected Discharge Date and Time       Expected Discharge Date Expected Discharge Time    May 9, 2025               Chrissie Terrell RN

## 2025-05-16 NOTE — PROGRESS NOTES
"Critical Care Note     LOS: 11 days   Patient Care Team:  Sergio Randall MD as PCP - General (Cardiology)    Chief Complaint/Reason for visit:     CAD (coronary artery disease)    ESRD on HD    Polycystic kidney disease    HTN (hypertension)    Dyslipidemia    AAA (abdominal aortic aneurysm)    Right coronary artery occlusion    Depression    Tracheostomy present    S/P percutaneous endoscopic gastrostomy (PEG) tube placement      Subjective     Interval History:     Temperature 99.5.  Restless this morning and started back on Precedex 0.8 mics per kilogram per hour.  Wound VAC replaced on her groin.  100 mL output overnight.  Planning on hemodialysis this morning.  Current ventilator settings rate 16 tidal volume 365, PEEP 5, FiO2 45%.  Required 2 units of packed red cells yesterday for bleeding at that left groin site.  Today hemoglobin is 9    Review of Systems:    All systems were reviewed and negative except as noted in subjective.    Medical history, surgical history, social history, family history reviewed    Objective     Intake/Output:    Intake/Output Summary (Last 24 hours) at 5/16/2025 1059  Last data filed at 5/16/2025 0600  Gross per 24 hour   Intake 635 ml   Output 1000 ml   Net -365 ml       Nutrition:  NPO Diet NPO Type: Tube Feeding    Infusions:  dexmedetomidine, 0.2-1.5 mcg/kg/hr, Last Rate: 0.2 mcg/kg/hr (05/16/25 0731)        Mechanical ventilation            Resp Rate (Set): 16     FiO2 (%): 45 %  PEEP/CPAP (cm H2O): 5 cm H20    Minute Ventilation (L/min) (Obs): 6.69 L/min  Resp Rate (Observed) Vent: 17  I:E Ratio (Set): 1:3.17  I:E Ratio (Obs): 1:3.4    PIP Observed (cm H2O): 18 cm H2O  Plateau Pressure (cm H2O): (S) 16 cm H2O    Telemetry: Sinus rhythm             Vital Signs  Blood pressure 158/70, pulse 72, temperature 97.9 °F (36.6 °C), temperature source Axillary, resp. rate 17, height 160 cm (62.99\"), weight 69.4 kg (153 lb), SpO2 98%.    Physical Exam:  General Appearance:  Obese " woman supine in bed, calm and awake   Head:  No visible trauma   Eyes:          Pupils are equal, conjunctiva with erythema and some exudate   Ears:     Throat: Oral mucosa moist   Neck: Tracheostomy in place, left IJ central line, right tunneled access   Back:      Lungs:   Symmetric chest expansion.  Moderate expiratory rhonchi, prolonged expiration but no wheezing    Heart:  Regular rhythm, S1, S2 auscultated   Abdomen:   Bowel sounds present, PEG tube in place, abdominal distention has resolved and abdomen is soft   Rectal:   Deferred   Extremities: Left groin wound vac in place   Pulses: Palpable pedal pulses   Skin: No rash   Lymph nodes:    Neurologic: Awake and moving all extremities, follows commands today      Results Review:     I reviewed the patient's new clinical results.   Results from last 7 days   Lab Units 05/15/25  2349 05/15/25  0427 05/14/25  0320 05/10/25  0828 05/10/25  0005   SODIUM mmol/L 137 136 134*   < > 137   POTASSIUM mmol/L 3.7 3.9 3.8   < > 3.8   CHLORIDE mmol/L 101 101 101   < > 103   CO2 mmol/L 23.0 22.0 20.0*   < > 21.0*   BUN mg/dL 26* 19 33*   < > 20   CREATININE mg/dL 3.38* 2.64* 3.13*   < > 2.50*   CALCIUM mg/dL 9.1 8.6 8.8   < > 8.3*   BILIRUBIN mg/dL  --   --   --   --  0.4   ALK PHOS U/L  --   --   --   --  84   ALT (SGPT) U/L  --   --   --   --  14   AST (SGOT) U/L  --   --   --   --  23   GLUCOSE mg/dL 68 76 87   < > 116*    < > = values in this interval not displayed.     Results from last 7 days   Lab Units 05/15/25  2349 05/15/25  1644 05/15/25  0820 05/15/25  0427 05/14/25  1142 05/14/25  0320   WBC 10*3/mm3 14.44*  --   --  16.01*  --  17.25*   HEMOGLOBIN g/dL 9.0* 8.3* 7.6* 8.3*   < > 8.0*   HEMATOCRIT % 27.8* 26.2* 23.4* 26.4*   < > 25.0*   PLATELETS 10*3/mm3 287  --   --  296  --  268    < > = values in this interval not displayed.     Results from last 7 days   Lab Units 05/13/25  0358   PH, ARTERIAL pH units 7.430   PO2 ART mm Hg 66.5*   PCO2, ARTERIAL mm Hg 37.6  "  HCO3 ART mmol/L 24.9     Lab Results   Component Value Date    BLOODCX No growth at 5 days 04/24/2025     Respiratory Culture - Sputum, ET Suction [[id number]]    (Abnormal)   Sputum from ET Suction    Final result Component Value   Respiratory Culture Moderate growth (3+) Klebsiella pneumoniae ssp pneumoniae Abnormal     No Normal Respiratory Geraldine Abnormal    Gram Stain Rare (1+) Epithelial cells per low power field    Moderate (3+) WBCs per low power field    No organisms seen         Susceptibility     Klebsiella pneumoniae ssp pneumoniae     GRACE     Amoxicillin + Clavulanate <=2 Susceptible     Ampicillin >=32 Resistant     Ampicillin + Sulbactam 8 Susceptible     Cefazolin (Non Urine) 2 Susceptible     Cefepime <=0.12 Susceptible     Ceftazidime <=0.5 Susceptible     Ceftriaxone <=0.25 Susceptible     Gentamicin <=1 Susceptible     Levofloxacin <=0.12 Susceptible     Piperacillin + Tazobactam <=4 Susceptible     Tetracycline <=1 Susceptible     Trimethoprim + Sulfamethoxazole <=20 Susceptible                    No results found for: \"URINECX\"    I reviewed the patient's new imaging including images and reports.    XR ABDOMEN KUB    Date of Exam: 5/16/2025 2:11 AM EDT    Indication: ileus    Comparison: None available.    Findings:  There is a percutaneous gastrostomy tube in place. There are scattered pockets of large and small bowel gas in a nonspecific distribution. There is a left pleural effusion with left basilar airspace disease which is likely atelectasis.   Impression:     Impression:  Nonspecific bowel gas pattern.          Electronically Signed: Edin Hansen MD   5/16/2025 4:19 AM EDT       Interpretation Summary right upper extremity duplex         Acute right upper extremity superficial thrombophlebitis noted in the cephalic (forearm).    All other right sided vessels appear normal.    FINAL IMPRESSION: Left heart catheterization May 7, 2025  Successful PTCA/stenting of the RCA with placement of 3 " overlapping drug-eluting stents reducing 95% stenosis to 0%.     RECOMMENDATIONS:  Dual antiplatelet therapy for at least 1 year.  Optimize medical therapy and risk factor management per guidelines.    Interpretation Summary, echocardiogram May 1, 2025, [place]         The study is technically difficult for diagnosis. The quality of the study is limited with poor acoustic windows.    Sinus bradycardia in high 50s was the predominant rhythm observed during the procedure.    Normal left ventricular cavity size noted. Left ventricular wall thickness is consistent with mild concentric hypertrophy.    Left ventricular systolic function is mildly decreased. Left ventricular ejection fraction appears to be 41 - 45%.    Left ventricular diastolic function is consistent with (grade II w/high LAP) pseudonormalization.    There is mild calcification of the aortic valve. No significant aortic valve regurgitation is present. No hemodynamically significant aortic valve stenosis is present.    There is mild calcification of the mitral valve posterior leaflet(s). Mild mitral valve regurgitation is present. No significant mitral valve stenosis is present.    There is a small (<1cm) pericardial effusion. There is no evidence of cardiac tamponade.      All medications reviewed.   amiodarone, 200 mg, Per PEG Tube, Q12H  amLODIPine, 10 mg, Per PEG Tube, Q24H  aspirin, 81 mg, Per PEG Tube, Daily  atorvastatin, 40 mg, Per PEG Tube, Nightly  carvedilol, 12.5 mg, Per PEG Tube, Q12H  cloNIDine, 0.1 mg, Per PEG Tube, Q8H  clopidogrel, 75 mg, Per PEG Tube, Daily  heparin (porcine), 5,000 Units, Subcutaneous, Q8H  hydrALAZINE, 50 mg, Per PEG Tube, Q8H  ipratropium-albuterol, 3 mL, Nebulization, 4x Daily - RT  lansoprazole, 15 mg, Per PEG Tube, QAM AC  miconazole, 1 Application, Topical, Q12H  nitroglycerin, 1 patch, Transdermal, Daily  sacubitril-valsartan, 1 tablet, Per PEG Tube, Q12H  tobramycin-dexAMETHasone, 2 drop, Both Eyes, Q4H While  Awake          Assessment & Plan       CAD (coronary artery disease)    ESRD on HD    Polycystic kidney disease    HTN (hypertension)    Dyslipidemia    AAA (abdominal aortic aneurysm)    Right coronary artery occlusion    Depression    Tracheostomy present    S/P percutaneous endoscopic gastrostomy (PEG) tube placement    Hematoma of groin    Ileus    69 y.o. female with history of HTN, HLD, CKD on HD, polycystic kidney disease, abdominal aortic aneurysm, CAD with prior stent, depression, transferred from [facility] in [place] on 5/5/2025 after she suffered a V-fib arrest with resuscitation and underwent left heart catheterization showing 100% occlusion of the RCA with failed intervention.     Patient was initially admitted to [facility] on 3/20/2025 with volume overload secondary to missing 2 hemodialysis sessions.  She also has followed up possible pneumonia and had anemia and was evaluated by GI with no evidence of GI bleed.  She had bilateral pleural effusions and underwent 1550 mL thoracentesis, but despite thoracentesis required intubation on 4/10/2025 was transferred to [facility] on 4/11/2025.  Patient ultimately underwent tracheostomy and PEG placement on 4/29/2025.  She deteriorated on 5/2/2025 suffering a Vfib arrest.    May 7 she underwent left heart catheterization with stent of her occluded RCA.  She was noted to have a swollen right upper extremity.  Duplex revealed superficial thrombophlebitis.      Patient developed a hematoma at her left femoral artery site on the evening of May 9 requiring urgent surgery with femoral artery repair, evacuation of a hematoma, 2 units of packed red cells.  Wound VAC was placed.  May 13 she went back to surgery for a washout and replacement of the wound VAC.  She received additional unit of packed red cells on May 14.  Hemoglobin is back down to 7.6 today    She continues to have large pleural effusions and upper extremity pitting edema,  volume overload.  Chest x-ray is worse today with pulmonary edema.  FiO2 has been increased to 50%.  She may need additional dialysis.    Respiratory cultures were positive for Klebsiella without evidence of pneumonia consistent with a tracheobronchitis.  She is receiving Rocephin.    BP continues to be labile and oral antihypertensives have been increased.      #1 V-fib arrest/coronary artery disease-occluded RCA,    - Maintaining sinus rhythm  .  LVEF 41 to 45%  - Successful stent to RCA May 9  - Aspirin, statin, Plavix, beta-blocker  - Entresto  mg twice daily  - Amiodarone 200 mg every 12 hours  - Moderate bilateral pleural effusions    #2 left femoral artery pseudoaneurysm with hematoma  - OR for repair of the left femoral artery and evacuation of a hematoma, wound VAC placement, May 9   -Return to the OR May 13 for a washout and replacement of the wound VAC  - Total of 6 units packed red blood cells  - .  Hemoglobin 9  - Wound VAC replaced  - Palpable pedal pulses      #3 respiratory failure  - Tracheostomy, PEG tube placement in [place], April 29, 2025  - Initially hospitalized March 20, 2025 for volume overload after missing dialysis, intubated April 10 in [place] and then transferred to LTAC in [place]  - Pleural effusions postthoracentesis April 4, moderate to large effusions present on CT of the abdomen May 9  - Previous tobacco per records, details not available  - Klebsiella pneumoniae positive respiratory culture, suspect tracheobronchitis  - Ceftriaxone started May 6, completed May 13  -WBC 14 today  - Assist-control 16, tidal volume 365, PEEP 5, 45 % FiO2, saturation  98%.  Yesterday she was in the mid 90s.    -Clinically, her volume status has never been adequately controlled as she continues to have pitting edema and moderate pleural effusions, and now pulmonary edema on chest x-ray, getting dialysis today but I think she would benefit from daily dialysis until euvolemia achieved    #4 chronic  kidney disease, history of polycystic kidneys  - End-stage renal disease on hemodialysis, MWF  - Nephrology following with intermittent dialysis  - Appears volume overloaded with effusions and upper extremity pitting edema  - BUN 26, creatinine 3.38, potassium 3.7    #5 hypertension, difficult to control  - Amlodipine 10 mg daily  - Hydralazine 50 mg every 8 hours  - carvedilol and started 12.5 mg daily  - Entresto  mg twice daily  -NTG patch  - Off  Cardene drip    #6 nutrition  -Vomited May 14, tube feeding on hold  - Previously on Novasource renal 35 mL/h, on hold  - Frequent diarrhea, improved  - Abdominal exam nondistended today, bowel sounds active, will resume tube feeding  - Stop Mestinon    Clinically feel she can return to the LTAC.  They were notified and would like to wait till Monday to assure that she does not continue to bleed from her left groin    VTE Prophylaxis: subcutaneous heparin    Stress Ulcer Prophylaxis: Prevacid suspension    Nuris Aquino MD  05/16/25  10:59 EDT      Time: Critical care 35 min  I personally provided care to this critically ill patient as documented above.  Critical care time does not include time spent on separately billed procedures.  None of my critical care time was concurrent with other critical care providers.

## 2025-05-16 NOTE — PROGRESS NOTES
"  [facility]  INPATIENT PROGRESS NOTE    Date of Admission: 5/5/2025  Date of Service: 05/16/25    Identification: Estefania Connor is a 69 y.o. female   Primary Care Physician: Sergio Randall MD    Chief Complaint: Follow-up hypertension  Problem List:   CAD (coronary artery disease)    ESRD on HD    Polycystic kidney disease    HTN (hypertension)    Dyslipidemia    AAA (abdominal aortic aneurysm)    Right coronary artery occlusion    Depression    Tracheostomy present    S/P percutaneous endoscopic gastrostomy (PEG) tube placement    Hematoma of groin    Ileus          Subjective/Interval History    Spoke to nursing staff this morning patient was agitated requiring Precedex and 4 point restraints.  Patient doing better now.  Patient is arousable but not conversant this morning.            Objective   Vitals:  /48   Pulse 66   Temp 97.9 °F (36.6 °C) (Axillary)   Resp 17   Ht 160 cm (62.99\")   Wt 69.4 kg (153 lb)   SpO2 97%   BMI 27.11 kg/m²     Intake/Output Summary (Last 24 hours) at 5/16/2025 0931  Last data filed at 5/16/2025 0600  Gross per 24 hour   Intake 635 ml   Output 1000 ml   Net -365 ml       Current Medications:  amiodarone, 200 mg, Per PEG Tube, Q12H  amLODIPine, 10 mg, Per PEG Tube, Q24H  aspirin, 81 mg, Per PEG Tube, Daily  atorvastatin, 40 mg, Per PEG Tube, Nightly  carvedilol, 12.5 mg, Per PEG Tube, Q12H  cloNIDine, 0.1 mg, Per PEG Tube, Q8H  clopidogrel, 75 mg, Per PEG Tube, Daily  heparin (porcine), 5,000 Units, Subcutaneous, Q8H  hydrALAZINE, 50 mg, Per PEG Tube, Q8H  ipratropium-albuterol, 3 mL, Nebulization, 4x Daily - RT  lansoprazole, 15 mg, Per PEG Tube, QAM AC  miconazole, 1 Application, Topical, Q12H  nitroglycerin, 1 patch, Transdermal, Daily  sacubitril-valsartan, 1 tablet, Per PEG Tube, Q12H  tobramycin-dexAMETHasone, 2 drop, Both Eyes, Q4H While Awake      dexmedetomidine, 0.2-1.5 mcg/kg/hr, Last Rate: 0.2 mcg/kg/hr (05/16/25 " 0731)      Constitutional:       Appearance: Chronically ill-appearing and acutely ill-appearing.   Neck:      Comments: Tracheostomy in place  Pulmonary:      Comments: Diffuse bilateral rhonchi  Cardiovascular:      Normal rate. Regular rhythm.   Edema:     Peripheral edema absent.   Skin:     General: Skin is warm and dry.   Neurological:      Comments: Drowsy but arousable          Data Review:  Results from last 7 days   Lab Units 05/15/25  2349 05/15/25  1644 05/15/25  0820 05/15/25  0427 05/14/25  1142 05/14/25  0320   WBC 10*3/mm3 14.44*  --   --  16.01*  --  17.25*   HEMOGLOBIN g/dL 9.0* 8.3* 7.6* 8.3*   < > 8.0*   HEMATOCRIT % 27.8* 26.2* 23.4* 26.4*   < > 25.0*   PLATELETS 10*3/mm3 287  --   --  296  --  268    < > = values in this interval not displayed.     Results from last 7 days   Lab Units 05/15/25  2349 05/15/25  0427 05/14/25  0320   SODIUM mmol/L 137 136 134*   POTASSIUM mmol/L 3.7 3.9 3.8   CHLORIDE mmol/L 101 101 101   CO2 mmol/L 23.0 22.0 20.0*   BUN mg/dL 26* 19 33*   CREATININE mg/dL 3.38* 2.64* 3.13*   GLUCOSE mg/dL 68 76 87                      Results from last 7 days   Lab Units 05/10/25  0005 05/09/25  1913   PROTIME Seconds 15.2 15.4*   INR  1.13* 1.15*   APTT seconds  --  33.7               XR Abdomen KUB  Result Date: 5/16/2025  Impression: Nonspecific bowel gas pattern. Electronically Signed: Edin Hansen MD  5/16/2025 4:19 AM EDT  Workstation ID: [id number]    XR Chest 1 View  Result Date: 5/15/2025  Impression: 1. Findings of pulmonary vascular congestion and mild edema which are felt to be slightly improved 2. Persistent left basilar opacity compatible with airspace disease and possible small effusion Electronically Signed: Chidi Natarajan  5/15/2025 7:46 AM EDT  Workstation ID: [id number]      Results for orders placed during the hospital encounter of 04/11/25    Adult Transthoracic Echo Complete W/ Cont if Necessary Per Protocol    Interpretation Summary  Images from the original  result were not included.      The study is technically difficult for diagnosis. The quality of the study is limited with poor acoustic windows.    Sinus bradycardia in high 50s was the predominant rhythm observed during the procedure.    Normal left ventricular cavity size noted. Left ventricular wall thickness is consistent with mild concentric hypertrophy.    Left ventricular systolic function is mildly decreased. Left ventricular ejection fraction appears to be 41 - 45%.    Left ventricular diastolic function is consistent with (grade II w/high LAP) pseudonormalization.    There is mild calcification of the aortic valve. No significant aortic valve regurgitation is present. No hemodynamically significant aortic valve stenosis is present.    There is mild calcification of the mitral valve posterior leaflet(s). Mild mitral valve regurgitation is present. No significant mitral valve stenosis is present.    There is a small (<1cm) pericardial effusion. There is no evidence of cardiac tamponade.      RESULTS REVIEW:    I reviewed the patient's new clinical results.    I personally reviewed the patient's EKG/Telemetry data              Assessment:   V-fib arrest/CAD  LHC at outlying facility with ELIECER x3 to RCA  Ischemic cardiomyopathy, EF 41-45%  PAF, brief episode, not currently on anticoagulation due to thigh hematoma  Hypertension   Hyperlipidemia  Left groin hematoma with wound VAC in place  Chronic respiratory failure with tracheostomy  ESRD on HD            Plan:   Patient will receive all her antihypertensive medications after dialysis treatment.  Continue amlodipine 10 mg daily, carvedilol 12.5 mg twice daily, clonidine 0.1 mg every 8 hours, hydralazine 50 mg every 8 hours,And Entresto  twice daily and Nitropatch for hypertension.  Continue dual antiplatelet therapy with aspirin and Plavix given recent stent.  Can consider anticoagulation in the future given PAF while hospitalized but at this time due to  thigh hematoma will not place patient on anticoagulation given she is in normal sinus rhythm on amiodarone.  Continue atorvastatin 40 mg daily for hyperlipidemia.  Will follow as needed over the weekend and resume care on Monday.  Please call with any questions over the weekend as needed.           Electronically signed by Maribell Cid PA-C, 05/16/25, 2:01 PM EDT.

## 2025-05-16 NOTE — PROGRESS NOTES
Clinical Nutrition     Patient Name: Estefania Connor  YOB: 1955  MRN: [id number]  Date of Encounter: 05/16/25 09:02 EDT  Admission date: 5/5/2025  Reason for Visit: MDR, Follow-up protocol, EN    Assessment   Nutrition Assessment   Admission Diagnosis:  CAD (coronary artery disease) [I25.10]    Problem List:    CAD (coronary artery disease)    ESRD on HD    Polycystic kidney disease    HTN (hypertension)    Dyslipidemia    AAA (abdominal aortic aneurysm)    Right coronary artery occlusion    Depression    Tracheostomy present    S/P percutaneous endoscopic gastrostomy (PEG) tube placement    Hematoma of groin    Ileus      PMH:   She  has a past medical history of AAA (abdominal aortic aneurysm), Coronary artery disease, Depression, ESRD (end stage renal disease), Hyperlipidemia, Hypertension, and Polycystic kidney disease.    PSH:  She  has a past surgical history that includes tracheostomy and peg tube insertion (N/A, 4/29/2025); Cardiac catheterization (N/A, 5/2/2025); Cardiac catheterization (N/A, 5/7/2025); Cardiac catheterization (N/A, 5/7/2025); Pseudo Aneurysm Repair, Extremity (Left, 5/9/2025); and incision and drainage leg (Left, 5/13/2025).    Applicable Nutrition History:   Trach/PEG    (5/9) s/p repair L femoral artery, evacuation L groin hematoma + NPWT  (5/13) redo L femoral hematoma evacuation  (5/14) KUB - Probable general ileus. Gaseous distention of the stomach   (5/16) KUB - nonspecific bowel gas pattern    Labs    Labs Reviewed: Yes    Results from last 7 days   Lab Units 05/15/25  2349 05/15/25  0427 05/14/25  0320 05/10/25  0828 05/10/25  0005   GLUCOSE mg/dL 68 76 87   < > 116*   BUN mg/dL 26* 19 33*   < > 20   CREATININE mg/dL 3.38* 2.64* 3.13*   < > 2.50*   SODIUM mmol/L 137 136 134*   < > 137   CHLORIDE mmol/L 101 101 101   < > 103   POTASSIUM mmol/L 3.7 3.9 3.8   < > 3.8   PHOSPHORUS mg/dL  --  3.4  --   --   --    MAGNESIUM mg/dL  --  1.8  --   --  1.8  "  ALT (SGPT) U/L  --   --   --   --  14   LACTATE mmol/L  --   --   --   --  0.5    < > = values in this interval not displayed.       Results from last 7 days   Lab Units 05/15/25  2349 05/13/25  0403 05/10/25  0005   ALBUMIN g/dL  --   --  2.5*   PREALBUMIN mg/dL  --  14.9*  --    CRP mg/dL  --  6.07*  --    IONIZED CALCIUM mmol/L 1.24  --   --        Results from last 7 days   Lab Units 05/16/25  0518 05/16/25  0132 05/16/25  0017 05/15/25  1801 05/15/25  1128 05/15/25  0505   GLUCOSE mg/dL 70 121 67* 79 75 75     No results found for: \"HGBA1C\"            Medications    Medications Reviewed: yes    Scheduled Meds:amiodarone, 200 mg, Per PEG Tube, Q12H  amLODIPine, 10 mg, Per PEG Tube, Q24H  aspirin, 81 mg, Per PEG Tube, Daily  atorvastatin, 40 mg, Per PEG Tube, Nightly  carvedilol, 12.5 mg, Per PEG Tube, Q12H  cloNIDine, 0.1 mg, Per PEG Tube, Q8H  clopidogrel, 75 mg, Per PEG Tube, Daily  heparin (porcine), 5,000 Units, Subcutaneous, Q8H  hydrALAZINE, 50 mg, Per PEG Tube, Q8H  ipratropium-albuterol, 3 mL, Nebulization, 4x Daily - RT  lansoprazole, 15 mg, Per PEG Tube, QAM AC  miconazole, 1 Application, Topical, Q12H  nitroglycerin, 1 patch, Transdermal, Daily  pyridostigmine, 60 mg, Per PEG Tube, Q8H  sacubitril-valsartan, 1 tablet, Per PEG Tube, Q12H  tobramycin-dexAMETHasone, 2 drop, Both Eyes, Q4H While Awake      Continuous Infusions:dexmedetomidine, 0.2-1.5 mcg/kg/hr, Last Rate: 0.2 mcg/kg/hr (05/16/25 0731)  niCARdipine, 5-15 mg/hr, Last Rate: 2.5 mg/hr (05/13/25 0618)      PRN Meds:.  albumin human    albuterol    atropine    dextrose    diazePAM    glucagon (human recombinant)    heparin (porcine)    heparin (porcine)    hydrALAZINE    HYDROmorphone    labetalol    loperamide    nitroglycerin    ondansetron    oxyCODONE    Intake/Ouptut 24 hrs (0701 - 0700)   I&O's Reviewed: yes  Intake & Output (last day)         05/15 0701  05/16 0700 05/16 0701  05/17 0700    I.V. (mL/kg)      Blood 360     Other      " "NG/     IV Piggyback 50     Total Intake(mL/kg) 705 (10.2)     Urine (mL/kg/hr) 0 (0)     Emesis/NG output 700     Stool 0     Wound 300     Dialysis      Total Output 1000     Net -295           Urine Unmeasured Occurrence 2 x     Stool Unmeasured Occurrence 4 x           Anthropometrics     Height: Height: 160 cm (62.99\")  Last Filed Weight: Weight: 69.4 kg (153 lb) (05/14/25 1726)69.4kg (153lbs)  Method:  ?  BMI: BMI (Calculated): 27.127.11kg/m^2    UBW: Limited wt data available  Weight change:  unable to determine    Nutrition Focused Physical Exam     Date: 5/6    Unable to perform due to pt unable to consent. Visual assessment of pt completed, noted to have edema to BUE.*     Subjective   Reported/Observed/Food/Nutrition Related History:   5/16  Remains on MV - requiring precedex. EN has remained on hold - +BM x3 per RN. Received Mestinon; looks like ileus is resolving. MD states abdomen is less distended. Ok to restart trophic feeds today - hold mestinon per MD. HD planned for this morning. NPWT for L groin remains in place. D/C planning ongoing.     5/14  Pt remains on MV, sedation off - per RN, pt restless. KUB obtained yesterday indicating ileus - EN held. Noted Prosource TF d/c'd per MD. Continues with multiple loose BM - plan to start PO mestinon.     5/12  Remains intubated/sedated. Plan for HD today. EN remains at goal. Ongoing loose BM despite initiation of fiber - per MD, start Imodium.      5/8  Pt s/p RCA with stent placement. TF restarted. Not PAB low with elevated CRP. Plan for HD today and possible d/c back to LTACH later today vs tomorrow. Documented loose stool.     5/6  Pt transferred to [facility] for higher level of care from [facility] after extensive hospital stay at OSH. Has been on Nepro via PEG throughout admission to that facility. +HD. Discussed in MDR. OK to restart nutrition. NKFA    Needs Assessment   Date: 5/6; Reassessed: 5/12, 5/16    Height used:Height: 160 cm (62.99\")  Weights " used: 69.4kg CBW; 52.38kg IBW    Estimated Calorie needs: ~ 1325 Kcal/day  Method: 21-24 Kcals/KG = 2134-9772  Method:  MSJ (5513) x 1.1 = 1301  Method:  PSU = 1315  Ve: 8.47; Tmax: 36.7    Estimated Protein needs: ~ 83 g PRO/day  Method: 1.0-1.3g/Kg CBW = 69-90    Estimated Fluid needs: ~ ml/day   Per clinical status    Current Nutrition Prescription     PO: No diet orders on file  Oral Nutrition Supplement: N/A  Intake: N/A    EN: NovaSource Renal  Goal Rate: 35mL/hr  Water Flushes: 20mL/hr  Modular: Nutrisource Fiber 6/day  Route: PEG  Tube: Other     At goal over: 20Hrs/day     Rx will supply:   Goal Volume 700  mL/day     Flush Volume 400 mL/day     Energy 1490 Kcal/day 106 % Est Need   Protein 64 g/day 77 % Est Need   Fiber 18 g/day     Water in   mL     Total Water 904 mL     Meet DRI Yes        --------------------------------------------------------------------------  Product/Rate verified at bedside: Yes  Infusing Rate at time of visit: held    Average Delivery from Charting: N/A   Volume  mL/day   % Goal Vol.   Flush Volume  mL/day     Energy  Kcal/day  % Est Need   Protein  g/day  % Est Need   Fiber  g/day     Water in  EN  mL     Total Water  mL     Meet DRI N/A        Assessment & Plan   Nutrition Diagnosis   Date: 5/6 Updated: 5/8  Problem Inadequate oral intake    Etiology Dysphagia with trach/peg   Signs/Symptoms Chronic EN   Status: Active    Date:  5/14 Updated: 5/16  Problem Inadequate enteral nutrition infusion   Etiology Altered GI fxn   Signs/Symptoms Generalized ileus   Status: Active improving    Goal:   Nutrition to support treatment and Establish EN tolerance      Nutrition Intervention      Follow treatment progress, Care plan reviewed, Nutrition support order placed    Initiate trophic feeds  Novasource Renal @ 10mL/hr + 10mL/hr flush    Once appropriate per MD advance feeds  Novasource Renal @ 15mL/hr and adv by 10mL q8hrs as tolerated to goal of 35mL/hr. Flush of 20mL/hr.      Adjust flush per clinical status    If unable to advance feeds within next 48hrs would consider starting TPN due to suspected prolonged malabsorption on EN with loose BM since admission    Monitoring/Evaluation:   Per protocol, I&O, Pertinent labs, EN delivery/tolerance, Weight, GI status, Symptoms, POC/GOC, Swallow function, Hemodynamic stability    Mary Alvarado, MS,RD,LD  Time Spent: 30min

## 2025-05-17 LAB
ABO GROUP BLD: NORMAL
ALBUMIN SERPL-MCNC: 2.6 G/DL (ref 3.5–5.2)
ALBUMIN/GLOB SERPL: 1.2 G/DL
ALP SERPL-CCNC: 97 U/L (ref 39–117)
ALT SERPL W P-5'-P-CCNC: 9 U/L (ref 1–33)
ANION GAP SERPL CALCULATED.3IONS-SCNC: 10 MMOL/L (ref 5–15)
AST SERPL-CCNC: 9 U/L (ref 1–32)
BACTERIA SPEC RESP CULT: NORMAL
BASOPHILS # BLD AUTO: 0.03 10*3/MM3 (ref 0–0.2)
BASOPHILS NFR BLD AUTO: 0.3 % (ref 0–1.5)
BILIRUB SERPL-MCNC: 0.6 MG/DL (ref 0–1.2)
BLD GP AB SCN SERPL QL: NEGATIVE
BUN SERPL-MCNC: 16 MG/DL (ref 8–23)
BUN/CREAT SERPL: 6.3 (ref 7–25)
CALCIUM SPEC-SCNC: 8.7 MG/DL (ref 8.6–10.5)
CHLORIDE SERPL-SCNC: 102 MMOL/L (ref 98–107)
CO2 SERPL-SCNC: 23 MMOL/L (ref 22–29)
CREAT SERPL-MCNC: 2.56 MG/DL (ref 0.57–1)
DEPRECATED RDW RBC AUTO: 53.7 FL (ref 37–54)
EGFRCR SERPLBLD CKD-EPI 2021: 19.7 ML/MIN/1.73
EOSINOPHIL # BLD AUTO: 0.34 10*3/MM3 (ref 0–0.4)
EOSINOPHIL NFR BLD AUTO: 3.6 % (ref 0.3–6.2)
ERYTHROCYTE [DISTWIDTH] IN BLOOD BY AUTOMATED COUNT: 16 % (ref 12.3–15.4)
GLOBULIN UR ELPH-MCNC: 2.2 GM/DL
GLUCOSE BLDC GLUCOMTR-MCNC: 77 MG/DL (ref 70–130)
GLUCOSE BLDC GLUCOMTR-MCNC: 82 MG/DL (ref 70–130)
GLUCOSE BLDC GLUCOMTR-MCNC: 82 MG/DL (ref 70–130)
GLUCOSE BLDC GLUCOMTR-MCNC: 86 MG/DL (ref 70–130)
GLUCOSE BLDC GLUCOMTR-MCNC: 88 MG/DL (ref 70–130)
GLUCOSE SERPL-MCNC: 81 MG/DL (ref 65–99)
GRAM STN SPEC: NORMAL
GRAM STN SPEC: NORMAL
HCT VFR BLD AUTO: 24.6 % (ref 34–46.6)
HGB BLD-MCNC: 7.9 G/DL (ref 12–15.9)
IMM GRANULOCYTES # BLD AUTO: 0.06 10*3/MM3 (ref 0–0.05)
IMM GRANULOCYTES NFR BLD AUTO: 0.6 % (ref 0–0.5)
LYMPHOCYTES # BLD AUTO: 0.92 10*3/MM3 (ref 0.7–3.1)
LYMPHOCYTES NFR BLD AUTO: 9.8 % (ref 19.6–45.3)
MCH RBC QN AUTO: 29.7 PG (ref 26.6–33)
MCHC RBC AUTO-ENTMCNC: 32.1 G/DL (ref 31.5–35.7)
MCV RBC AUTO: 92.5 FL (ref 79–97)
MONOCYTES # BLD AUTO: 0.93 10*3/MM3 (ref 0.1–0.9)
MONOCYTES NFR BLD AUTO: 9.9 % (ref 5–12)
NEUTROPHILS NFR BLD AUTO: 7.15 10*3/MM3 (ref 1.7–7)
NEUTROPHILS NFR BLD AUTO: 75.8 % (ref 42.7–76)
NRBC BLD AUTO-RTO: 0 /100 WBC (ref 0–0.2)
PLATELET # BLD AUTO: 281 10*3/MM3 (ref 140–450)
PMV BLD AUTO: 10.4 FL (ref 6–12)
POTASSIUM SERPL-SCNC: 3.5 MMOL/L (ref 3.5–5.2)
PROT SERPL-MCNC: 4.8 G/DL (ref 6–8.5)
RBC # BLD AUTO: 2.66 10*6/MM3 (ref 3.77–5.28)
RH BLD: NEGATIVE
SODIUM SERPL-SCNC: 135 MMOL/L (ref 136–145)
T&S EXPIRATION DATE: NORMAL
WBC NRBC COR # BLD AUTO: 9.43 10*3/MM3 (ref 3.4–10.8)

## 2025-05-17 PROCEDURE — 94761 N-INVAS EAR/PLS OXIMETRY MLT: CPT

## 2025-05-17 PROCEDURE — 85025 COMPLETE CBC W/AUTO DIFF WBC: CPT | Performed by: INTERNAL MEDICINE

## 2025-05-17 PROCEDURE — 94003 VENT MGMT INPAT SUBQ DAY: CPT

## 2025-05-17 PROCEDURE — 86900 BLOOD TYPING SEROLOGIC ABO: CPT | Performed by: INTERNAL MEDICINE

## 2025-05-17 PROCEDURE — 86850 RBC ANTIBODY SCREEN: CPT | Performed by: INTERNAL MEDICINE

## 2025-05-17 PROCEDURE — 99233 SBSQ HOSP IP/OBS HIGH 50: CPT | Performed by: INTERNAL MEDICINE

## 2025-05-17 PROCEDURE — 97605 NEG PRS WND THER DME<=50SQCM: CPT

## 2025-05-17 PROCEDURE — 25010000002 HEPARIN (PORCINE) PER 1000 UNITS: Performed by: SURGERY

## 2025-05-17 PROCEDURE — 86901 BLOOD TYPING SEROLOGIC RH(D): CPT | Performed by: INTERNAL MEDICINE

## 2025-05-17 PROCEDURE — 86923 COMPATIBILITY TEST ELECTRIC: CPT

## 2025-05-17 PROCEDURE — 94799 UNLISTED PULMONARY SVC/PX: CPT

## 2025-05-17 PROCEDURE — 80053 COMPREHEN METABOLIC PANEL: CPT | Performed by: INTERNAL MEDICINE

## 2025-05-17 PROCEDURE — 82948 REAGENT STRIP/BLOOD GLUCOSE: CPT

## 2025-05-17 RX ADMIN — AMIODARONE HYDROCHLORIDE 200 MG: 200 TABLET ORAL at 21:06

## 2025-05-17 RX ADMIN — TOBRAMYCIN AND DEXAMETHASONE 2 DROP: 1; 3 SUSPENSION/ DROPS OPHTHALMIC at 17:32

## 2025-05-17 RX ADMIN — HYDRALAZINE HYDROCHLORIDE 50 MG: 50 TABLET ORAL at 05:00

## 2025-05-17 RX ADMIN — TOBRAMYCIN AND DEXAMETHASONE 2 DROP: 1; 3 SUSPENSION/ DROPS OPHTHALMIC at 13:57

## 2025-05-17 RX ADMIN — CARVEDILOL 12.5 MG: 12.5 TABLET, FILM COATED ORAL at 08:12

## 2025-05-17 RX ADMIN — OXYCODONE HYDROCHLORIDE 10 MG: 10 TABLET ORAL at 08:13

## 2025-05-17 RX ADMIN — DEXMEDETOMIDINE HYDROCHLORIDE 0.6 MCG/KG/HR: 4 INJECTION, SOLUTION INTRAVENOUS at 03:51

## 2025-05-17 RX ADMIN — HEPARIN SODIUM 5000 UNITS: 5000 INJECTION INTRAVENOUS; SUBCUTANEOUS at 05:00

## 2025-05-17 RX ADMIN — MICONAZOLE NITRATE 1 APPLICATION: 20 POWDER TOPICAL at 21:07

## 2025-05-17 RX ADMIN — IPRATROPIUM BROMIDE AND ALBUTEROL SULFATE 3 ML: 2.5; .5 SOLUTION RESPIRATORY (INHALATION) at 12:17

## 2025-05-17 RX ADMIN — OXYCODONE HYDROCHLORIDE 10 MG: 10 TABLET ORAL at 19:55

## 2025-05-17 RX ADMIN — TOBRAMYCIN AND DEXAMETHASONE 2 DROP: 1; 3 SUSPENSION/ DROPS OPHTHALMIC at 21:07

## 2025-05-17 RX ADMIN — CLONIDINE HYDROCHLORIDE 0.1 MG: 0.1 TABLET ORAL at 21:07

## 2025-05-17 RX ADMIN — AMIODARONE HYDROCHLORIDE 200 MG: 200 TABLET ORAL at 08:11

## 2025-05-17 RX ADMIN — HYDRALAZINE HYDROCHLORIDE 50 MG: 50 TABLET ORAL at 21:06

## 2025-05-17 RX ADMIN — TOBRAMYCIN AND DEXAMETHASONE 2 DROP: 1; 3 SUSPENSION/ DROPS OPHTHALMIC at 05:00

## 2025-05-17 RX ADMIN — LANSOPRAZOLE 15 MG: 15 TABLET, ORALLY DISINTEGRATING ORAL at 08:11

## 2025-05-17 RX ADMIN — IPRATROPIUM BROMIDE AND ALBUTEROL SULFATE 3 ML: 2.5; .5 SOLUTION RESPIRATORY (INHALATION) at 21:14

## 2025-05-17 RX ADMIN — NITROGLYCERIN 1 PATCH: 0.4 PATCH TRANSDERMAL at 08:12

## 2025-05-17 RX ADMIN — SACUBITRIL AND VALSARTAN 1 TABLET: 97; 103 TABLET, FILM COATED ORAL at 08:11

## 2025-05-17 RX ADMIN — IPRATROPIUM BROMIDE AND ALBUTEROL SULFATE 3 ML: 2.5; .5 SOLUTION RESPIRATORY (INHALATION) at 16:24

## 2025-05-17 RX ADMIN — CARVEDILOL 12.5 MG: 12.5 TABLET, FILM COATED ORAL at 21:06

## 2025-05-17 RX ADMIN — MICONAZOLE NITRATE 1 APPLICATION: 20 POWDER TOPICAL at 08:14

## 2025-05-17 RX ADMIN — OXYCODONE HYDROCHLORIDE 10 MG: 10 TABLET ORAL at 04:33

## 2025-05-17 RX ADMIN — HEPARIN SODIUM 5000 UNITS: 5000 INJECTION INTRAVENOUS; SUBCUTANEOUS at 13:57

## 2025-05-17 RX ADMIN — OXYCODONE HYDROCHLORIDE 10 MG: 10 TABLET ORAL at 15:43

## 2025-05-17 RX ADMIN — HYDRALAZINE HYDROCHLORIDE 50 MG: 50 TABLET ORAL at 13:57

## 2025-05-17 RX ADMIN — TOBRAMYCIN AND DEXAMETHASONE 2 DROP: 1; 3 SUSPENSION/ DROPS OPHTHALMIC at 09:59

## 2025-05-17 RX ADMIN — ATORVASTATIN CALCIUM 40 MG: 40 TABLET, FILM COATED ORAL at 21:06

## 2025-05-17 RX ADMIN — CLOPIDOGREL BISULFATE 75 MG: 75 TABLET, FILM COATED ORAL at 08:12

## 2025-05-17 RX ADMIN — AMLODIPINE BESYLATE 10 MG: 10 TABLET ORAL at 08:12

## 2025-05-17 RX ADMIN — CLONIDINE HYDROCHLORIDE 0.1 MG: 0.1 TABLET ORAL at 05:00

## 2025-05-17 RX ADMIN — IPRATROPIUM BROMIDE AND ALBUTEROL SULFATE 3 ML: 2.5; .5 SOLUTION RESPIRATORY (INHALATION) at 07:19

## 2025-05-17 RX ADMIN — HEPARIN SODIUM 5000 UNITS: 5000 INJECTION INTRAVENOUS; SUBCUTANEOUS at 21:07

## 2025-05-17 RX ADMIN — ASPIRIN 81 MG 81 MG: 81 TABLET ORAL at 08:12

## 2025-05-17 RX ADMIN — CLONIDINE HYDROCHLORIDE 0.1 MG: 0.1 TABLET ORAL at 13:57

## 2025-05-17 RX ADMIN — SACUBITRIL AND VALSARTAN 1 TABLET: 97; 103 TABLET, FILM COATED ORAL at 21:07

## 2025-05-17 NOTE — PROGRESS NOTES
"   LOS: 12 days    Patient Care Team:  Sergio Randall MD as PCP - General (Cardiology)    Subjective     HD yesterday tolerated well. Stable vent settings. Labs reviewed.     Objective     Vital Signs:  Blood pressure 117/42, pulse 73, temperature 99.2 °F (37.3 °C), temperature source Oral, resp. rate 18, height 160 cm (62.99\"), weight 69.4 kg (153 lb), SpO2 96%.      Intake/Output Summary (Last 24 hours) at 5/17/2025 1401  Last data filed at 5/17/2025 1200  Gross per 24 hour   Intake 856.59 ml   Output 450 ml   Net 406.59 ml        05/16 0701 - 05/17 0700  In: 990 [I.V.:139]  Out: 3250     Physical Exam:  GENERAL: WDWF NAD  NEURO: Sedate on vent  PSYCHIATRIC: Unable to evaluate  EYE: PE, no icterus, no conjunctivitis  ENT: OMM dry, + trach  NECK: Supple , No JVD discernable  CV: Trace edema, RRR  LUNGS:  Quiet,  Nonlabored resp.  Symmetrical expansion  ABDOMEN: + PEG, nondistended  : No Pereira, no palp bladder  SKIN: Warm and dry without rash.  Left groin wound with wound VAC    Labs:  Results from last 7 days   Lab Units 05/17/25  0305 05/15/25  2349 05/15/25  1644 05/15/25  0820 05/15/25  0427   WBC 10*3/mm3 9.43 14.44*  --   --  16.01*   HEMOGLOBIN g/dL 7.9* 9.0* 8.3*   < > 8.3*   PLATELETS 10*3/mm3 281 287  --   --  296    < > = values in this interval not displayed.     Results from last 7 days   Lab Units 05/17/25  0305 05/15/25  2349 05/15/25  0427 05/14/25  0320   SODIUM mmol/L 135* 137 136 134*   POTASSIUM mmol/L 3.5 3.7 3.9 3.8   CHLORIDE mmol/L 102 101 101 101   CO2 mmol/L 23.0 23.0 22.0 20.0*   BUN mg/dL 16 26* 19 33*   CREATININE mg/dL 2.56* 3.38* 2.64* 3.13*   CALCIUM mg/dL 8.7 9.1 8.6 8.8   PHOSPHORUS mg/dL  --   --  3.4  --    MAGNESIUM mg/dL  --   --  1.8  --    ALBUMIN g/dL 2.6*  --   --   --      Results from last 7 days   Lab Units 05/17/25  0305   ALK PHOS U/L 97   BILIRUBIN mg/dL 0.6   ALT (SGPT) U/L 9   AST (SGOT) U/L 9     Results from last 7 days   Lab Units 05/13/25  0358   PH, " ARTERIAL pH units 7.430   PO2 ART mm Hg 66.5*   PCO2, ARTERIAL mm Hg 37.6   HCO3 ART mmol/L 24.9       A/P:    ESRD: On HD with Dr. Kaur at [facility].   - Continue HD on MWF schedule. May need extra session next week for volume optimization.     HTN: Blood pressure stabilizing with further adjustment of blood pressure medications.   - Give albumin for hypotension.  - Cover with as needed medications for hypertension.  - Watch with UF on HD.    Electrolytes: Potassium has been low requiring 4K bath.  Overall stable with HD correction  - Monitor and adjust as needed with HD.    Metabolic acidosis: pH has been stable.  Bicarb stable.    -Adjust with HD.    Anemia: Hemoglobin dropped to 5 with femoral artery bleed.  Patient was transfused 4 units packed red blood cells.    -Continue Epogen.  -Continue to transfuse as indicated.    Volume: Stable.    -UF with HD as tolerated.    Respiratory failure: Trach on vent    Left femoral pseudoaneurysm: Developed at cardiac cath site.  Required repair 5/9/2025.  Wound VAC in place.      High risk and complexity patient.      Romulo Gonzalez MD  05/17/25  14:01 EDT

## 2025-05-17 NOTE — PROGRESS NOTES
"Critical Care Note     LOS: 12 days   Patient Care Team:  Sergio Randall MD as PCP - General (Cardiology)    Chief Complaint/Reason for visit:     CAD (coronary artery disease)    ESRD on HD    Polycystic kidney disease    HTN (hypertension)    Dyslipidemia    AAA (abdominal aortic aneurysm)    Right coronary artery occlusion    Depression    Tracheostomy present    S/P percutaneous endoscopic gastrostomy (PEG) tube placement      Subjective     Interval History:     Temperature 98.7.  Current ventilator settings rate 16 tidal volume 365 PEEP 5 FiO2 40%.  Hemodialysis yesterday with 2.8 L removed.  Wound VAC remains in place with 350 mL output yesterday.  Hemoglobin this morning is 7.9.  Tube feeding restarted yesterday.    Review of Systems:    All systems were reviewed and negative except as noted in subjective.    Medical history, surgical history, social history, family history reviewed    Objective     Intake/Output:    Intake/Output Summary (Last 24 hours) at 5/17/2025 1144  Last data filed at 5/17/2025 1000  Gross per 24 hour   Intake 790.09 ml   Output 450 ml   Net 340.09 ml       Nutrition:  NPO Diet NPO Type: Tube Feeding    Infusions:  dexmedetomidine, 0.2-1.5 mcg/kg/hr, Last Rate: 0.3 mcg/kg/hr (05/17/25 0601)        Mechanical ventilation            Resp Rate (Set): (S) 14 (Rate changed by other)  Pressure Support (cm H2O): 10 cm H20  FiO2 (%): 40 %  PEEP/CPAP (cm H2O): 5 cm H20 (PEEP of 5 per Dr. Machado, verbal order)    Minute Ventilation (L/min) (Obs): 5.85 L/min  Resp Rate (Observed) Vent: 19  I:E Ratio (Set): 1:3.77  I:E Ratio (Obs): 1:3.9    PIP Observed (cm H2O): 16 cm H2O  Plateau Pressure (cm H2O): (S) 12 cm H2O    Telemetry: Sinus rhythm             Vital Signs  Blood pressure 130/46, pulse 69, temperature 98.7 °F (37.1 °C), temperature source Oral, resp. rate 17, height 160 cm (62.99\"), weight 69.4 kg (153 lb), SpO2 95%.    Physical Exam:  General Appearance:  Obese woman supine in bed, " calm and awake   Head:  No visible trauma   Eyes:          Pupils are equal, conjunctiva with erythema and some exudate, improved today   Ears:     Throat: Oral mucosa moist   Neck: Tracheostomy in place, left IJ central line, right tunneled access   Back:      Lungs:   Symmetric chest expansion.  Rhonchi resolved but still markedly diminished breath sounds lower lobe posteriorly    Heart:  Regular rhythm, S1, S2 auscultated   Abdomen:   Bowel sounds present, PEG tube in place, abdominal distention has resolved and abdomen is soft   Rectal:   Deferred   Extremities: Left groin wound vac in place, upper extremity pitting edema   Pulses: Palpable pedal pulses   Skin: No rash   Lymph nodes:    Neurologic: Awake and moving all extremities, follows commands today      Results Review:     I reviewed the patient's new clinical results.   Results from last 7 days   Lab Units 05/17/25  0305 05/15/25  2349 05/15/25  0427   SODIUM mmol/L 135* 137 136   POTASSIUM mmol/L 3.5 3.7 3.9   CHLORIDE mmol/L 102 101 101   CO2 mmol/L 23.0 23.0 22.0   BUN mg/dL 16 26* 19   CREATININE mg/dL 2.56* 3.38* 2.64*   CALCIUM mg/dL 8.7 9.1 8.6   BILIRUBIN mg/dL 0.6  --   --    ALK PHOS U/L 97  --   --    ALT (SGPT) U/L 9  --   --    AST (SGOT) U/L 9  --   --    GLUCOSE mg/dL 81 68 76     Results from last 7 days   Lab Units 05/17/25  0305 05/15/25  2349 05/15/25  1644 05/15/25  0820 05/15/25  0427   WBC 10*3/mm3 9.43 14.44*  --   --  16.01*   HEMOGLOBIN g/dL 7.9* 9.0* 8.3*   < > 8.3*   HEMATOCRIT % 24.6* 27.8* 26.2*   < > 26.4*   PLATELETS 10*3/mm3 281 287  --   --  296    < > = values in this interval not displayed.     Results from last 7 days   Lab Units 05/13/25  0358   PH, ARTERIAL pH units 7.430   PO2 ART mm Hg 66.5*   PCO2, ARTERIAL mm Hg 37.6   HCO3 ART mmol/L 24.9     Lab Results   Component Value Date    BLOODCX No growth at 5 days 04/24/2025     Respiratory Culture - Sputum, ET Suction [[id number]]    (Abnormal)   Sputum from ET Suction  "   Final result Component Value   Respiratory Culture Moderate growth (3+) Klebsiella pneumoniae ssp pneumoniae Abnormal     No Normal Respiratory Geraldine Abnormal    Gram Stain Rare (1+) Epithelial cells per low power field    Moderate (3+) WBCs per low power field    No organisms seen         Susceptibility     Klebsiella pneumoniae ssp pneumoniae     GRACE     Amoxicillin + Clavulanate <=2 Susceptible     Ampicillin >=32 Resistant     Ampicillin + Sulbactam 8 Susceptible     Cefazolin (Non Urine) 2 Susceptible     Cefepime <=0.12 Susceptible     Ceftazidime <=0.5 Susceptible     Ceftriaxone <=0.25 Susceptible     Gentamicin <=1 Susceptible     Levofloxacin <=0.12 Susceptible     Piperacillin + Tazobactam <=4 Susceptible     Tetracycline <=1 Susceptible     Trimethoprim + Sulfamethoxazole <=20 Susceptible                    No results found for: \"URINECX\"    I reviewed the patient's new imaging including images and reports.        Interpretation Summary right upper extremity duplex         Acute right upper extremity superficial thrombophlebitis noted in the cephalic (forearm).    All other right sided vessels appear normal.    FINAL IMPRESSION: Left heart catheterization May 7, 2025  Successful PTCA/stenting of the RCA with placement of 3 overlapping drug-eluting stents reducing 95% stenosis to 0%.     RECOMMENDATIONS:  Dual antiplatelet therapy for at least 1 year.  Optimize medical therapy and risk factor management per guidelines.    Interpretation Summary, echocardiogram May 1, 2025, [place]         The study is technically difficult for diagnosis. The quality of the study is limited with poor acoustic windows.    Sinus bradycardia in high 50s was the predominant rhythm observed during the procedure.    Normal left ventricular cavity size noted. Left ventricular wall thickness is consistent with mild concentric hypertrophy.    Left ventricular systolic function is mildly decreased. Left ventricular ejection " fraction appears to be 41 - 45%.    Left ventricular diastolic function is consistent with (grade II w/high LAP) pseudonormalization.    There is mild calcification of the aortic valve. No significant aortic valve regurgitation is present. No hemodynamically significant aortic valve stenosis is present.    There is mild calcification of the mitral valve posterior leaflet(s). Mild mitral valve regurgitation is present. No significant mitral valve stenosis is present.    There is a small (<1cm) pericardial effusion. There is no evidence of cardiac tamponade.      All medications reviewed.   amiodarone, 200 mg, Per PEG Tube, Q12H  amLODIPine, 10 mg, Per PEG Tube, Q24H  aspirin, 81 mg, Per PEG Tube, Daily  atorvastatin, 40 mg, Per PEG Tube, Nightly  carvedilol, 12.5 mg, Per PEG Tube, Q12H  cloNIDine, 0.1 mg, Per PEG Tube, Q8H  clopidogrel, 75 mg, Per PEG Tube, Daily  heparin (porcine), 5,000 Units, Subcutaneous, Q8H  hydrALAZINE, 50 mg, Per PEG Tube, Q8H  ipratropium-albuterol, 3 mL, Nebulization, 4x Daily - RT  lansoprazole, 15 mg, Per PEG Tube, QAM AC  miconazole, 1 Application, Topical, Q12H  nitroglycerin, 1 patch, Transdermal, Daily  sacubitril-valsartan, 1 tablet, Per PEG Tube, Q12H  tobramycin-dexAMETHasone, 2 drop, Both Eyes, Q4H While Awake          Assessment & Plan       CAD (coronary artery disease)    ESRD on HD    Polycystic kidney disease    HTN (hypertension)    Dyslipidemia    AAA (abdominal aortic aneurysm)    Right coronary artery occlusion    Depression    Tracheostomy present    S/P percutaneous endoscopic gastrostomy (PEG) tube placement    Hematoma of groin    Ileus    69 y.o. female with history of HTN, HLD, CKD on HD, polycystic kidney disease, abdominal aortic aneurysm, CAD with prior stent, depression, transferred from [facility] in [place] on 5/5/2025 after she suffered a V-fib arrest with resuscitation and underwent left heart catheterization showing 100% occlusion of the RCA  with failed intervention.     Patient was initially admitted to [facility] on 3/20/2025 with volume overload secondary to missing 2 hemodialysis sessions.  She also has followed up possible pneumonia and had anemia and was evaluated by GI with no evidence of GI bleed.  She had bilateral pleural effusions and underwent 1550 mL thoracentesis, but despite thoracentesis required intubation on 4/10/2025 was transferred to [facility] on 4/11/2025.  Patient ultimately underwent tracheostomy and PEG placement on 4/29/2025.  She deteriorated on 5/2/2025 suffering a Vfib arrest.    May 7 she underwent left heart catheterization with stent of her occluded RCA.  She was noted to have a swollen right upper extremity.  Duplex revealed superficial thrombophlebitis.      Patient developed a hematoma at her left femoral artery site on the evening of May 9 requiring urgent surgery with femoral artery repair, evacuation of a hematoma, 2 units of packed red cells.  Wound VAC was placed.  May 13 she went back to surgery for a washout and replacement of the wound VAC.  She received additional unit of packed red cells on May 14.  Hemoglobin is 7.9.    She continues to have large pleural effusions and upper extremity pitting edema, volume overload.  Last hemodialysis May 16 with 2.8 L removed.  Current ventilator settings 40%, PEEP of 5    Respiratory cultures were positive for Klebsiella without evidence of pneumonia consistent with a tracheobronchitis.  She is receiving Rocephin.    BP continues to be labile and oral antihypertensives have been increased.      #1 V-fib arrest/coronary artery disease-occluded RCA,    - Maintaining sinus rhythm  .  LVEF 41 to 45%  - Successful stent to RCA May 9  - Aspirin, statin, Plavix, beta-blocker  - Entresto  mg twice daily  - Amiodarone 200 mg every 12 hours  - Moderate bilateral pleural effusions    #2 left femoral artery pseudoaneurysm with hematoma  - OR for repair of the  left femoral artery and evacuation of a hematoma, wound VAC placement, May 9   -Return to the OR May 13 for a washout and replacement of the wound VAC  - Total of 6 units packed red blood cells  - .  Hemoglobin 7.9  - Wound VAC replaced  - Palpable pedal pulses      #3 respiratory failure  - Tracheostomy, PEG tube placement in [place], April 29, 2025  - Initially hospitalized March 20, 2025 for volume overload after missing dialysis, intubated April 10 in [place] and then transferred to LTAC in [place]  - Pleural effusions postthoracentesis April 4, moderate to large effusions present on CT of the abdomen May 9  - Previous tobacco per records, details not available  - Klebsiella pneumoniae positive respiratory culture, suspect tracheobronchitis  - Ceftriaxone started May 6, completed May 13  -WBC 9.43 today  - Assist-control 16, tidal volume 365, PEEP 5, 40 % FiO2, saturation  95%.   -Clinically, her volume status has never been adequately controlled as she continues to have pitting edema and moderate pleural effusions, and now pulmonary edema on chest x-ray, getting dialysis today but I think she would benefit from daily dialysis until euvolemia achieved  - Will start some pressure support trials but ultimately I think she needs better volume control before we can make progress with her wean    #4 chronic kidney disease, history of polycystic kidneys  - End-stage renal disease on hemodialysis, MWF  - Nephrology following with intermittent dialysis  - Appears volume overloaded with effusions and upper extremity pitting edema, I wonder if we could dialyze her daily for several days to optimize her volume status  - BUN 16, creatinine 2.56, potassium 3.5    #5 hypertension, difficult to control  - Amlodipine 10 mg daily  - Hydralazine 50 mg every 8 hours  - carvedilol and started 12.5 mg daily  - Entresto  mg twice daily  -NTG patch  - Off  Cardene drip  - Systolic blood pressure currently 119-131    #6  nutrition  -Vomited May 14, tube feeding on hold  - Previously on Novasource renal 35 mL/h, on hold  - Frequent diarrhea, improved  - Abdominal exam nondistended today, bowel sounds active, will resume tube feeding  - Stop Mestinon  - Tube feeding restarted May 16    Clinically feel she can return to the LTAC.  They were notified and would like to wait till Monday to assure that she does not continue to bleed from her left groin    VTE Prophylaxis: subcutaneous heparin    Stress Ulcer Prophylaxis: Prevacid suspension    Nuris Aquino MD  05/17/25  11:44 EDT      Time: Critical care 35 min  I personally provided care to this critically ill patient as documented above.  Critical care time does not include time spent on separately billed procedures.  None of my critical care time was concurrent with other critical care providers.

## 2025-05-17 NOTE — PLAN OF CARE
Goal Outcome Evaluation:              Outcome Evaluation: vss. tolerating spontaneous trials.  tolerating low dose tube feed.  decreasing output in wound vac.

## 2025-05-18 LAB
ANION GAP SERPL CALCULATED.3IONS-SCNC: 11 MMOL/L (ref 5–15)
BUN SERPL-MCNC: 24 MG/DL (ref 8–23)
BUN/CREAT SERPL: 6.7 (ref 7–25)
CALCIUM SPEC-SCNC: 8.8 MG/DL (ref 8.6–10.5)
CHLORIDE SERPL-SCNC: 101 MMOL/L (ref 98–107)
CO2 SERPL-SCNC: 22 MMOL/L (ref 22–29)
CREAT SERPL-MCNC: 3.59 MG/DL (ref 0.57–1)
DEPRECATED RDW RBC AUTO: 55 FL (ref 37–54)
EGFRCR SERPLBLD CKD-EPI 2021: 13.1 ML/MIN/1.73
ERYTHROCYTE [DISTWIDTH] IN BLOOD BY AUTOMATED COUNT: 16.2 % (ref 12.3–15.4)
GLUCOSE BLDC GLUCOMTR-MCNC: 89 MG/DL (ref 70–130)
GLUCOSE BLDC GLUCOMTR-MCNC: 97 MG/DL (ref 70–130)
GLUCOSE BLDC GLUCOMTR-MCNC: 98 MG/DL (ref 70–130)
GLUCOSE SERPL-MCNC: 99 MG/DL (ref 65–99)
HCT VFR BLD AUTO: 22.5 % (ref 34–46.6)
HGB BLD-MCNC: 7.2 G/DL (ref 12–15.9)
MAGNESIUM SERPL-MCNC: 1.9 MG/DL (ref 1.6–2.4)
MCH RBC QN AUTO: 30.1 PG (ref 26.6–33)
MCHC RBC AUTO-ENTMCNC: 32 G/DL (ref 31.5–35.7)
MCV RBC AUTO: 94.1 FL (ref 79–97)
PHOSPHATE SERPL-MCNC: 3.3 MG/DL (ref 2.5–4.5)
PLATELET # BLD AUTO: 290 10*3/MM3 (ref 140–450)
PMV BLD AUTO: 10.7 FL (ref 6–12)
POTASSIUM SERPL-SCNC: 3.3 MMOL/L (ref 3.5–5.2)
RBC # BLD AUTO: 2.39 10*6/MM3 (ref 3.77–5.28)
SODIUM SERPL-SCNC: 134 MMOL/L (ref 136–145)
WBC NRBC COR # BLD AUTO: 8.31 10*3/MM3 (ref 3.4–10.8)

## 2025-05-18 PROCEDURE — 94799 UNLISTED PULMONARY SVC/PX: CPT

## 2025-05-18 PROCEDURE — 80048 BASIC METABOLIC PNL TOTAL CA: CPT

## 2025-05-18 PROCEDURE — 85027 COMPLETE CBC AUTOMATED: CPT

## 2025-05-18 PROCEDURE — 83735 ASSAY OF MAGNESIUM: CPT

## 2025-05-18 PROCEDURE — 82948 REAGENT STRIP/BLOOD GLUCOSE: CPT

## 2025-05-18 PROCEDURE — 25010000002 HEPARIN (PORCINE) PER 1000 UNITS: Performed by: SURGERY

## 2025-05-18 PROCEDURE — 99233 SBSQ HOSP IP/OBS HIGH 50: CPT | Performed by: INTERNAL MEDICINE

## 2025-05-18 PROCEDURE — 84100 ASSAY OF PHOSPHORUS: CPT

## 2025-05-18 PROCEDURE — 97605 NEG PRS WND THER DME<=50SQCM: CPT

## 2025-05-18 PROCEDURE — 94761 N-INVAS EAR/PLS OXIMETRY MLT: CPT

## 2025-05-18 PROCEDURE — 94003 VENT MGMT INPAT SUBQ DAY: CPT

## 2025-05-18 RX ADMIN — TOBRAMYCIN AND DEXAMETHASONE 2 DROP: 1; 3 SUSPENSION/ DROPS OPHTHALMIC at 14:09

## 2025-05-18 RX ADMIN — TOBRAMYCIN AND DEXAMETHASONE 2 DROP: 1; 3 SUSPENSION/ DROPS OPHTHALMIC at 22:35

## 2025-05-18 RX ADMIN — OXYCODONE HYDROCHLORIDE 10 MG: 10 TABLET ORAL at 07:43

## 2025-05-18 RX ADMIN — SACUBITRIL AND VALSARTAN 1 TABLET: 97; 103 TABLET, FILM COATED ORAL at 09:17

## 2025-05-18 RX ADMIN — ATORVASTATIN CALCIUM 40 MG: 40 TABLET, FILM COATED ORAL at 20:22

## 2025-05-18 RX ADMIN — OXYCODONE HYDROCHLORIDE 10 MG: 10 TABLET ORAL at 20:22

## 2025-05-18 RX ADMIN — CLOPIDOGREL BISULFATE 75 MG: 75 TABLET, FILM COATED ORAL at 09:17

## 2025-05-18 RX ADMIN — TOBRAMYCIN AND DEXAMETHASONE 2 DROP: 1; 3 SUSPENSION/ DROPS OPHTHALMIC at 17:17

## 2025-05-18 RX ADMIN — DEXMEDETOMIDINE HYDROCHLORIDE 0.5 MCG/KG/HR: 4 INJECTION, SOLUTION INTRAVENOUS at 00:04

## 2025-05-18 RX ADMIN — ASPIRIN 81 MG 81 MG: 81 TABLET ORAL at 09:17

## 2025-05-18 RX ADMIN — TOBRAMYCIN AND DEXAMETHASONE 2 DROP: 1; 3 SUSPENSION/ DROPS OPHTHALMIC at 05:36

## 2025-05-18 RX ADMIN — LANSOPRAZOLE 15 MG: 15 TABLET, ORALLY DISINTEGRATING ORAL at 07:44

## 2025-05-18 RX ADMIN — CARVEDILOL 12.5 MG: 12.5 TABLET, FILM COATED ORAL at 09:17

## 2025-05-18 RX ADMIN — TOBRAMYCIN AND DEXAMETHASONE 2 DROP: 1; 3 SUSPENSION/ DROPS OPHTHALMIC at 09:18

## 2025-05-18 RX ADMIN — HEPARIN SODIUM 5000 UNITS: 5000 INJECTION INTRAVENOUS; SUBCUTANEOUS at 14:08

## 2025-05-18 RX ADMIN — IPRATROPIUM BROMIDE AND ALBUTEROL SULFATE 3 ML: 2.5; .5 SOLUTION RESPIRATORY (INHALATION) at 19:36

## 2025-05-18 RX ADMIN — HEPARIN SODIUM 5000 UNITS: 5000 INJECTION INTRAVENOUS; SUBCUTANEOUS at 05:35

## 2025-05-18 RX ADMIN — MICONAZOLE NITRATE 1 APPLICATION: 20 POWDER TOPICAL at 20:23

## 2025-05-18 RX ADMIN — DIAZEPAM 5 MG: 5 TABLET ORAL at 20:22

## 2025-05-18 RX ADMIN — OXYCODONE HYDROCHLORIDE 10 MG: 10 TABLET ORAL at 12:18

## 2025-05-18 RX ADMIN — CLONIDINE HYDROCHLORIDE 0.1 MG: 0.1 TABLET ORAL at 05:35

## 2025-05-18 RX ADMIN — IPRATROPIUM BROMIDE AND ALBUTEROL SULFATE 3 ML: 2.5; .5 SOLUTION RESPIRATORY (INHALATION) at 07:32

## 2025-05-18 RX ADMIN — NITROGLYCERIN 1 PATCH: 0.4 PATCH TRANSDERMAL at 09:17

## 2025-05-18 RX ADMIN — MICONAZOLE NITRATE 1 APPLICATION: 20 POWDER TOPICAL at 09:17

## 2025-05-18 RX ADMIN — SACUBITRIL AND VALSARTAN 1 TABLET: 97; 103 TABLET, FILM COATED ORAL at 20:22

## 2025-05-18 RX ADMIN — CLONIDINE HYDROCHLORIDE 0.1 MG: 0.1 TABLET ORAL at 14:08

## 2025-05-18 RX ADMIN — AMIODARONE HYDROCHLORIDE 200 MG: 200 TABLET ORAL at 20:24

## 2025-05-18 RX ADMIN — DEXMEDETOMIDINE HYDROCHLORIDE 0.3 MCG/KG/HR: 4 INJECTION, SOLUTION INTRAVENOUS at 14:08

## 2025-05-18 RX ADMIN — HEPARIN SODIUM 5000 UNITS: 5000 INJECTION INTRAVENOUS; SUBCUTANEOUS at 22:35

## 2025-05-18 RX ADMIN — CARVEDILOL 12.5 MG: 12.5 TABLET, FILM COATED ORAL at 20:22

## 2025-05-18 RX ADMIN — AMIODARONE HYDROCHLORIDE 200 MG: 200 TABLET ORAL at 09:17

## 2025-05-18 RX ADMIN — HYDRALAZINE HYDROCHLORIDE 50 MG: 50 TABLET ORAL at 05:36

## 2025-05-18 RX ADMIN — IPRATROPIUM BROMIDE AND ALBUTEROL SULFATE 3 ML: 2.5; .5 SOLUTION RESPIRATORY (INHALATION) at 12:56

## 2025-05-18 RX ADMIN — OXYCODONE HYDROCHLORIDE 10 MG: 10 TABLET ORAL at 16:11

## 2025-05-18 RX ADMIN — AMLODIPINE BESYLATE 10 MG: 10 TABLET ORAL at 09:17

## 2025-05-18 NOTE — PROGRESS NOTES
"Critical Care Note     LOS: 13 days   Patient Care Team:  Sergio Randall MD as PCP - General (Cardiology)    Chief Complaint/Reason for visit:     CAD (coronary artery disease)    ESRD on HD    Polycystic kidney disease    HTN (hypertension)    Dyslipidemia    AAA (abdominal aortic aneurysm)    Right coronary artery occlusion    Depression    Tracheostomy present    S/P percutaneous endoscopic gastrostomy (PEG) tube placement      Subjective     Interval History:       Current ventilator settings rate 16 tidal volume 365 PEEP 5 FiO2 40%.  Tolerated PS trial yesterday.  Last hemodialysis May 16.  Tube feedings restarted and she is tolerating.  No bowel movement recorded yesterday.    Review of Systems:    All systems were reviewed and negative except as noted in subjective.    Medical history, surgical history, social history, family history reviewed    Objective     Intake/Output:    Intake/Output Summary (Last 24 hours) at 5/18/2025 1226  Last data filed at 5/18/2025 1000  Gross per 24 hour   Intake 782.77 ml   Output 380 ml   Net 402.77 ml       Nutrition:  NPO Diet NPO Type: Tube Feeding    Infusions:  dexmedetomidine, 0.2-1.5 mcg/kg/hr, Last Rate: 0.4 mcg/kg/hr (05/18/25 0800)        Mechanical ventilation            Resp Rate (Set): 14  Pressure Support (cm H2O): 10 cm H20  FiO2 (%): 40 %  PEEP/CPAP (cm H2O): 5 cm H20    Minute Ventilation (L/min) (Obs): 7.47 L/min  Resp Rate (Observed) Vent: 15  I:E Ratio (Set): 1:3.77  I:E Ratio (Obs): 1:4    PIP Observed (cm H2O): 11 cm H2O  Plateau Pressure (cm H2O): (S) 19 cm H2O    Telemetry: Sinus rhythm             Vital Signs  Blood pressure 123/45, pulse 65, temperature 99 °F (37.2 °C), temperature source Axillary, resp. rate 14, height 160 cm (62.99\"), weight 69.4 kg (153 lb), SpO2 96%.    Physical Exam:  General Appearance:  Obese woman supine in bed, calm and awake   Head:  No visible trauma   Eyes:          Pupils are equal, conjunctiva with erythema and some " exudate, improved today   Ears:     Throat: Oral mucosa moist   Neck: Tracheostomy in place, left IJ central line, right tunneled access   Back:      Lungs:   Symmetric chest expansion.  Scattered rhonchi, no wheezing, decreased posteriorly, bilateral    Heart:  Regular rhythm, S1, S2 auscultated   Abdomen:   Bowel sounds present, PEG tube in place, soft   Rectal:   Deferred   Extremities: Left groin wound vac in place, upper extremity pitting edema   Pulses: Palpable pedal pulses   Skin: No rash   Lymph nodes:    Neurologic: Awake and moving all extremities, follows commands today      Results Review:     I reviewed the patient's new clinical results.   Results from last 7 days   Lab Units 05/18/25 0312 05/17/25  0305 05/15/25  2349   SODIUM mmol/L 134* 135* 137   POTASSIUM mmol/L 3.3* 3.5 3.7   CHLORIDE mmol/L 101 102 101   CO2 mmol/L 22.0 23.0 23.0   BUN mg/dL 24* 16 26*   CREATININE mg/dL 3.59* 2.56* 3.38*   CALCIUM mg/dL 8.8 8.7 9.1   BILIRUBIN mg/dL  --  0.6  --    ALK PHOS U/L  --  97  --    ALT (SGPT) U/L  --  9  --    AST (SGOT) U/L  --  9  --    GLUCOSE mg/dL 99 81 68     Results from last 7 days   Lab Units 05/18/25 0312 05/17/25  0305 05/15/25  2349   WBC 10*3/mm3 8.31 9.43 14.44*   HEMOGLOBIN g/dL 7.2* 7.9* 9.0*   HEMATOCRIT % 22.5* 24.6* 27.8*   PLATELETS 10*3/mm3 290 281 287     Results from last 7 days   Lab Units 05/13/25  0358   PH, ARTERIAL pH units 7.430   PO2 ART mm Hg 66.5*   PCO2, ARTERIAL mm Hg 37.6   HCO3 ART mmol/L 24.9     Lab Results   Component Value Date    BLOODCX No growth at 5 days 04/24/2025     Respiratory Culture - Sputum, ET Suction [[id number]]    (Abnormal)   Sputum from ET Suction    Final result Component Value   Respiratory Culture Moderate growth (3+) Klebsiella pneumoniae ssp pneumoniae Abnormal     No Normal Respiratory Geraldine Abnormal    Gram Stain Rare (1+) Epithelial cells per low power field    Moderate (3+) WBCs per low power field    No organisms seen          "Susceptibility     Klebsiella pneumoniae ssp pneumoniae     GRACE     Amoxicillin + Clavulanate <=2 Susceptible     Ampicillin >=32 Resistant     Ampicillin + Sulbactam 8 Susceptible     Cefazolin (Non Urine) 2 Susceptible     Cefepime <=0.12 Susceptible     Ceftazidime <=0.5 Susceptible     Ceftriaxone <=0.25 Susceptible     Gentamicin <=1 Susceptible     Levofloxacin <=0.12 Susceptible     Piperacillin + Tazobactam <=4 Susceptible     Tetracycline <=1 Susceptible     Trimethoprim + Sulfamethoxazole <=20 Susceptible                    No results found for: \"URINECX\"    I reviewed the patient's new imaging including images and reports.        Interpretation Summary right upper extremity duplex         Acute right upper extremity superficial thrombophlebitis noted in the cephalic (forearm).    All other right sided vessels appear normal.    FINAL IMPRESSION: Left heart catheterization May 7, 2025  Successful PTCA/stenting of the RCA with placement of 3 overlapping drug-eluting stents reducing 95% stenosis to 0%.     RECOMMENDATIONS:  Dual antiplatelet therapy for at least 1 year.  Optimize medical therapy and risk factor management per guidelines.    Interpretation Summary, echocardiogram May 1, 2025Alcon         The study is technically difficult for diagnosis. The quality of the study is limited with poor acoustic windows.    Sinus bradycardia in high 50s was the predominant rhythm observed during the procedure.    Normal left ventricular cavity size noted. Left ventricular wall thickness is consistent with mild concentric hypertrophy.    Left ventricular systolic function is mildly decreased. Left ventricular ejection fraction appears to be 41 - 45%.    Left ventricular diastolic function is consistent with (grade II w/high LAP) pseudonormalization.    There is mild calcification of the aortic valve. No significant aortic valve regurgitation is present. No hemodynamically significant aortic valve stenosis is " present.    There is mild calcification of the mitral valve posterior leaflet(s). Mild mitral valve regurgitation is present. No significant mitral valve stenosis is present.    There is a small (<1cm) pericardial effusion. There is no evidence of cardiac tamponade.      All medications reviewed.   amiodarone, 200 mg, Per PEG Tube, Q12H  amLODIPine, 10 mg, Per PEG Tube, Q24H  aspirin, 81 mg, Per PEG Tube, Daily  atorvastatin, 40 mg, Per PEG Tube, Nightly  carvedilol, 12.5 mg, Per PEG Tube, Q12H  cloNIDine, 0.1 mg, Per PEG Tube, Q8H  clopidogrel, 75 mg, Per PEG Tube, Daily  heparin (porcine), 5,000 Units, Subcutaneous, Q8H  hydrALAZINE, 50 mg, Per PEG Tube, Q8H  ipratropium-albuterol, 3 mL, Nebulization, 4x Daily - RT  lansoprazole, 15 mg, Per PEG Tube, QAM AC  miconazole, 1 Application, Topical, Q12H  nitroglycerin, 1 patch, Transdermal, Daily  sacubitril-valsartan, 1 tablet, Per PEG Tube, Q12H  tobramycin-dexAMETHasone, 2 drop, Both Eyes, Q4H While Awake          Assessment & Plan       CAD (coronary artery disease)    ESRD on HD    Polycystic kidney disease    HTN (hypertension)    Dyslipidemia    AAA (abdominal aortic aneurysm)    Right coronary artery occlusion    Depression    Tracheostomy present    S/P percutaneous endoscopic gastrostomy (PEG) tube placement    Hematoma of groin    Ileus    69 y.o. female with history of HTN, HLD, CKD on HD, polycystic kidney disease, abdominal aortic aneurysm, CAD with prior stent, depression, transferred from [facility] on 5/5/2025 after she suffered a V-fib arrest with resuscitation and underwent left heart catheterization showing 100% occlusion of the RCA with failed intervention.     Patient was initially admitted to [facility] on 3/20/2025 with volume overload secondary to missing 2 hemodialysis sessions.  She also has followed up possible pneumonia and had anemia and was evaluated by GI with no evidence of GI bleed.  She had bilateral pleural  effusions and underwent 1550 mL thoracentesis, but despite thoracentesis required intubation on 4/10/2025 was transferred to [facility] on 4/11/2025.  Patient ultimately underwent tracheostomy and PEG placement on 4/29/2025.  She deteriorated on 5/2/2025 suffering a Vfib arrest.    May 7 she underwent left heart catheterization with stent of her occluded RCA.  She was noted to have a swollen right upper extremity.  Duplex revealed superficial thrombophlebitis.      Patient developed a hematoma at her left femoral artery site on the evening of May 9 requiring urgent surgery with femoral artery repair, evacuation of a hematoma, 2 units of packed red cells.  Wound VAC was placed.  May 13 she went back to surgery for a washout and replacement of the wound VAC.  She received additional unit of packed red cells on May 14.  Hemoglobin is 7.9.    She continues to have large pleural effusions and upper extremity pitting edema, volume overload.  Last hemodialysis May 16 with 2.8 L removed.  I do think we may need chest tubes for her effusions but would like nephrology to more aggressively dialyze her to dry weight first.  She did tolerate a pressure support trial yesterday.    Respiratory cultures were positive for Klebsiella without evidence of pneumonia consistent with a tracheobronchitis.  She is receiving Rocephin.    BP continues to be labile and oral antihypertensives have been increased.      #1 V-fib arrest/coronary artery disease-occluded RCA,    - Maintaining sinus rhythm  .  LVEF 41 to 45%  - Successful stent to RCA May 9  - Aspirin, statin, Plavix, beta-blocker  - Entresto  mg twice daily  - Amiodarone 200 mg every 12 hours  -Aspirin  - Moderate bilateral pleural effusions    #2 left femoral artery pseudoaneurysm with hematoma  - OR for repair of the left femoral artery and evacuation of a hematoma, wound VAC placement, May 9   -Return to the OR May 13 for a washout and replacement of the  wound VAC  - Total of 6 units packed red blood cells  - .  Hemoglobin 7.9  - Wound VAC replaced  - Palpable pedal pulses      #3 respiratory failure  - Tracheostomy, PEG tube placement in [place], April 29, 2025  - Initially hospitalized March 20, 2025 for volume overload after missing dialysis, intubated April 10 in [place] and then transferred to LTAC in [place]  - Pleural effusions postthoracentesis April 4, moderate to large effusions present on CT of the abdomen May 9  - Previous tobacco per records, details not available  - Klebsiella pneumoniae positive respiratory culture, suspect tracheobronchitis  - Ceftriaxone started May 6, completed May 13  -WBC 8.3 today  - Assist-control 16, tidal volume 365, PEEP 5, 40 % FiO2, saturation  95%.   -Clinically, her volume status has never been adequately controlled as she continues to have pitting edema and moderate pleural effusions, and now pulmonary edema on chest x-ray, getting dialysis today but I think she would benefit from daily dialysis until euvolemia achieved  - Manage pressure support trials to 4 hours twice daily as tolerates    #4 chronic kidney disease, history of polycystic kidneys  - End-stage renal disease on hemodialysis, MWF  - Nephrology following with intermittent dialysis  - Appears volume overloaded with effusions and upper extremity pitting edema, I wonder if we could dialyze her daily for several days to optimize her volume status, and achieve dry weight then perhaps we can drain her effusions and they will not reaccumulate  - BUN 24, creatinine 3.59, potassium 3.3    #5 hypertension, difficult to control  - Amlodipine 10 mg daily  - Hydralazine 50 mg every 8 hours  - carvedilol  25 mg daily  - Entresto  mg twice daily  -NTG patch  -Clonidine 0.1 mg every 8 hours  - Off  Cardene drip  - Systolic blood pressure currently 110-150    #6 nutrition  -Vomited May 14, tube feeding held  - Possible ileus, given Mestinon, KUB improved  - Tube feeding  resumed May 16    Clinically feel she can return to the LTAC.  They were notified and would like to wait till Monday to assure that she does not continue to bleed from her left groin    VTE Prophylaxis: subcutaneous heparin    Stress Ulcer Prophylaxis: Prevacid suspension    Nuris Aquino MD  05/18/25  12:26 EDT      Time: Critical care 35 min  I personally provided care to this critically ill patient as documented above.  Critical care time does not include time spent on separately billed procedures.  None of my critical care time was concurrent with other critical care providers.

## 2025-05-18 NOTE — THERAPY WOUND CARE TREATMENT
Acute Care - Wound/Debridement Treatment Note   [place]     Patient Name: Estefania Connor  : 1955  MRN: [id number]  Today's Date: 2025                Admit Date: 2025    Visit Dx:    ICD-10-CM ICD-9-CM   1. Hematoma of groin, initial encounter  S30.1XXA 922.2       Patient Active Problem List   Diagnosis    ESRD on HD    Polycystic kidney disease    HTN (hypertension)    Dyslipidemia    AAA (abdominal aortic aneurysm)    Right coronary artery occlusion    Depression    Tracheostomy present    S/P percutaneous endoscopic gastrostomy (PEG) tube placement    CAD (coronary artery disease)    Hematoma of groin    Ileus        Past Medical History:   Diagnosis Date    AAA (abdominal aortic aneurysm)     Coronary artery disease     Depression     ESRD (end stage renal disease)     Hyperlipidemia     Hypertension     Polycystic kidney disease         Past Surgical History:   Procedure Laterality Date    CARDIAC CATHETERIZATION N/A 2025    Procedure: Coronary angiography;  Surgeon: Ambrose Mcnally MD;  [facility]:  [facility];  Service: Cardiovascular;  Laterality: N/A;    CARDIAC CATHETERIZATION N/A 2025    Procedure: Left Heart Cath;  Surgeon: Edin Boland MD;  Location:  [facility];  Service: Cardiovascular;  Laterality: N/A;    CARDIAC CATHETERIZATION N/A 2025    Procedure: Stent ELIECER coronary;  Surgeon: Edin Boland MD;  Location:  [facility];  Service: Cardiovascular;  Laterality: N/A;    INCISION AND DRAINAGE LEG Left 2025    Procedure: GROIN WOUND VACUUM CHANGE LEFT;  Surgeon: Remi Guerrero MD;  Location:  [facility];  Service: Vascular;  Laterality: Left;    PSEUDO ANEURYSM REPAIR, EXTREMITY Left 2025    Procedure: REPAIR LEFT FEMORAL ARTERY, EVACUATION LEFT GROIN HEMATOMA, PLACEMENT OF NEGATIVE WOUND VACCUUM THERAPY;  Surgeon: Remi Guerrero MD;  Location:  [facility];  Service: Vascular;  Laterality: Left;     TRACHEOSTOMY AND PEG TUBE INSERTION N/A 4/29/2025    Procedure: TRACHEOSTOMY AND PERCUTANEOUS ENDOSCOPIC GASTROSTOMY TUBE INSERTION;  Surgeon: Kera Head MD;  Location: [facility];  Service: General;  Laterality: N/A;           Wound Right gluteal (Active)   Dressing Appearance dry;intact 05/18/25 0600   Closure Adhesive bandage 05/17/25 1200   Base nonblanchable;red 05/17/25 1200   Periwound excoriated 05/18/25 0600   Periwound Temperature warm 05/18/25 0600   Periwound Skin Turgor soft 05/18/25 0600   Drainage Amount none 05/18/25 0600   Care, Wound cleansed with;soap and water;antimicrobial agent applied 05/17/25 2000   Dressing Care silicone border foam 05/17/25 2000   Periwound Care barrier ointment applied 05/17/25 2000       Wound Other (See comments) nose (Active)   Closure Open to air;None 05/17/25 1600   Base clean;dry;pink 05/17/25 1600   Periwound dry;intact 05/17/25 1600   Periwound Temperature warm 05/17/25 1600   Periwound Skin Turgor soft 05/17/25 1600   Drainage Amount none 05/17/25 1600       Wound 05/09/25 1700 Left anterior groin Other (Comments) (Active)   Dressing Appearance intact;dry 05/18/25 0753   Dressing Removed Type other (see comments) 05/18/25 0753   Confirmed Empty Wound Bed Yes, visual inspection of wound bed;Yes, finger sweep performed 05/18/25 0753   Closure None 05/18/25 0600   Base moist;pink;red;white;yellow 05/18/25 0753   Periwound ecchymotic;edematous 05/18/25 0753   Periwound Temperature warm 05/18/25 0753   Periwound Skin Turgor firm 05/18/25 0753   Edges irregular;open 05/18/25 0753   Drainage Characteristics/Odor sanguineous 05/18/25 0753   Drainage Amount moderate 05/18/25 0753   Care, Wound cleansed with;wound cleanser;negative pressure wound therapy 05/18/25 0753   Dressing Care dressing changed 05/18/25 0753   Periwound Care cleansed with pH balanced cleanser;barrier film applied;other (see comments) 05/18/25 5909   Wound Output (mL) 300 05/18/25 0757        NPWT (Negative Pressure Wound Therapy) 05/09/25 left groin (Active)   Therapy Setting continuous therapy 05/18/25 0753   Dressing foam, black 05/18/25 0753   Pressure Setting 125 mmHg 05/18/25 0753   Sponges Inserted 1 05/18/25 0753   Sponges Removed 1 05/18/25 0753   Finger sweep complete Yes 05/18/25 0753         WOUND DEBRIDEMENT                     PT Assessment (Last 12 Hours)       PT Evaluation and Treatment       Row Name 05/18/25 0753          Physical Therapy Time and Intention    Subjective Information no complaints  -     Document Type wound care;therapy note (daily note)  -     Mode of Treatment physical therapy  -       Row Name 05/18/25 0753          General Information    Patient Observations cooperative;agree to therapy;decreased LOC  -       Row Name 05/18/25 0753          Pain    Pain Management Interventions nursing notified;positioning techniques utilized  -     Response to Pain Interventions nonverbal indicators present  -     Pre/Posttreatment Pain Comment Pt reaching out, attempting to grab staff hands/push staff away. RN/respiratory present to assist with calming patient, holding hands for support.  -       Row Name 05/18/25 0753          Pain Scale: FACES Pre/Post-Treatment    Pain: FACES Scale, Pretreatment 4-->hurts little more  -     Posttreatment Pain Rating 6-->hurts even more  -       Row Name 05/18/25 0753          Cognition    Orientation Status (Cognition) unable/difficult to assess  -       Row Name             Wound Right gluteal    Wound - Properties Group Present on Original Admission: Y  -ER Side: Right  -ER Location: gluteal  -ER    Retired Wound - Properties Group Present on Original Admission: Y  -ER Side: Right  -ER Location: gluteal  -ER    Retired Wound - Properties Group Present on Original Admission: Y  -ER Side: Right  -ER Location: gluteal  -ER    Retired Wound - Properties Group Present on Original Admission: Y  -ER Side: Right  -ER Location:  gluteal  -ER      Row Name             Wound Other (See comments) nose    Wound - Properties Group Present on Original Admission: Y  -ER Side: Other (See comments)  -ER Location: nose  -ER    Retired Wound - Properties Group Present on Original Admission: Y  -ER Side: Other (See comments)  -ER Location: nose  -ER    Retired Wound - Properties Group Present on Original Admission: Y  -ER Side: Other (See comments)  -ER Location: nose  -ER    Retired Wound - Properties Group Present on Original Admission: Y  -ER Side: Other (See comments)  -ER Location: nose  -ER      Row Name 05/18/25 0753          Wound 05/09/25 1700 Left anterior groin Other (Comments)    Wound - Properties Group Placement Date: 05/09/25  - Placement Time: 1700 -MH Side: Left  -MH Orientation: anterior  -MH Location: groin  -MH Primary Wound Type: Other  -MH, hematoma     Dressing Appearance intact;dry  -MC     Dressing Removed Type other (see comments)  vac  -MC     Confirmed Empty Wound Bed Yes, visual inspection of wound bed;Yes, finger sweep performed  -     Base moist;pink;red;white;yellow  -     Periwound ecchymotic;edematous  -     Periwound Temperature warm  -     Periwound Skin Turgor firm  -     Edges irregular;open  -     Drainage Characteristics/Odor sanguineous  -     Drainage Amount moderate  -     Care, Wound cleansed with;wound cleanser;negative pressure wound therapy  -     Dressing Care dressing changed  vac  -MC     Periwound Care cleansed with pH balanced cleanser;barrier film applied;other (see comments)  NoSting, stomapaste, drape border  -     Wound Output (mL) 300  total in canister  -MC     Retired Wound - Properties Group Placement Date: 05/09/25  - Placement Time: 1700 -MH Side: Left  -MH Orientation: anterior  -MH Location: groin  -MH    Retired Wound - Properties Group Placement Date: 05/09/25  - Placement Time: 1700 -MH Side: Left  -MH Orientation: anterior  -MH Location: groin  -MH     Retired Wound - Properties Group Date first assessed: 05/09/25  -MH Time first assessed: 1700  -MH Side: Left  -MH Location: groin  -MH      Row Name             Wound 05/15/25 1600 Left lateral greater trochanter Other (Comments)    Wound - Properties Group Placement Date: 05/15/25  -HM Placement Time: 1600  -HM Side: Left  -HM Orientation: lateral  -HM Location: greater trochanter  -HM Primary Wound Type: Other  -HM Secondary Wound Type - Other: --  -HM, skin tear     Retired Wound - Properties Group Placement Date: 05/15/25  -HM Placement Time: 1600  -HM Side: Left  -HM Orientation: lateral  -HM Location: greater trochanter  -HM    Retired Wound - Properties Group Placement Date: 05/15/25  -HM Placement Time: 1600  -HM Side: Left  -HM Orientation: lateral  -HM Location: greater trochanter  -HM    Retired Wound - Properties Group Date first assessed: 05/15/25  -HM Time first assessed: 1600  -HM Side: Left  -HM Location: greater trochanter  -HM      Row Name 05/18/25 0753          NPWT (Negative Pressure Wound Therapy) 05/09/25 left groin    NPWT (Negative Pressure Wound Therapy) - Properties Group Placement Date: 05/09/25  -MEET Location: left groin  -MEET    Therapy Setting continuous therapy  -MC     Dressing foam, black  -MC     Pressure Setting 125 mmHg  -MC     Sponges Inserted 1  -MC     Sponges Removed 1  -MC     Finger sweep complete Yes  -MC     Retired NPWT (Negative Pressure Wound Therapy) - Properties Group Placement Date: 05/09/25  -MEET Location: left groin  -MEET    Retired NPWT (Negative Pressure Wound Therapy) - Properties Group Placement Date: 05/09/25  -MEET Location: left groin  -MEET    Retired NPWT (Negative Pressure Wound Therapy) - Properties Group Placement Date: 05/09/25  -MEET Location: left groin  -MEET      Row Name 05/18/25 0753          Coping    Observed Emotional State anxious;restless  -MC     Verbalized Emotional State other (see comments)  SVITLANA  -MC       Row Name 05/18/25 0753          Plan of  Care Review    Plan of Care Reviewed With patient  -MC     Progress improving  -     Outcome Evaluation Pt with improving staus of L groin wound bed, with additional granulation tissue present and apparent hemostasis at this time. Pt may benefit from pre-medication for future vac changes when possible to assist with pain and/or restlessness. Anticipate next change in 2-3 days.  -       Row Name 05/18/25 0753          Positioning and Restraints    Pre-Treatment Position in bed  -     Post Treatment Position bed  -     In Bed supine;call light within reach;encouraged to call for assist;with nsg;with other staff  -               User Key  (r) = Recorded By, (t) = Taken By, (c) = Cosigned By      Initials Name Provider Type    María Maza, RN Registered Nurse    Jackie Trejo PT Physical Therapist    Yao Shine, RN Registered Nurse    Ce Santo RN Registered Nurse    Alma Mack, RN Registered Nurse                  Physical Therapy Education       Title: PT OT SLP Therapies (In Progress)       Topic: Physical Therapy (In Progress)       Point: Mobility training (In Progress)       Learning Progress Summary            Patient Acceptance, E,TB, NR by  at 5/14/2025 1717    Acceptance, E, NR by KG at 5/12/2025 1046    Acceptance, E, NR by KG at 5/7/2025 1115    Acceptance, E, NR by MARIFER at 5/6/2025 1430                      Point: Home exercise program (In Progress)       Learning Progress Summary            Patient Acceptance, E,TB, NR by  at 5/14/2025 1717    Acceptance, E, NR by KG at 5/12/2025 1046    Acceptance, E, NR by KG at 5/7/2025 1115    Acceptance, E, NR by MARIFER at 5/6/2025 1430                      Point: Body mechanics (In Progress)       Learning Progress Summary            Patient Acceptance, E,TB, NR by  at 5/14/2025 1717    Acceptance, E, NR by KG at 5/12/2025 1046    Acceptance, E, NR by KG at 5/7/2025 1115    Acceptance, E, NR by MARIFER at 5/6/2025 1430                       Point: Precautions (In Progress)       Learning Progress Summary            Patient Acceptance, E,TB, NR by  at 5/14/2025 1717    Acceptance, E, NR by KG at 5/12/2025 1046    Acceptance, E, NR by KG at 5/7/2025 1115    Acceptance, E, NR by MARIFER at 5/6/2025 1430                                      User Key       Initials Effective Dates Name Provider Type Discipline     02/03/23 -  Sandra Pruitt, PT Physical Therapist PT     06/16/21 -  Gertrudis Bird RN Registered Nurse Nurse     05/22/20 -  Dorothy Frazier PT Physical Therapist PT                    Recommendation and Plan  Anticipated Discharge Disposition (PT): extended care facility  Planned Therapy Interventions (PT): balance training, bed mobility training, home exercise program, strengthening, transfer training  Therapy Frequency (PT): daily  Plan of Care Reviewed With: patient   Progress: improving       Progress: improving  Outcome Evaluation: Pt with improving staus of L groin wound bed, with additional granulation tissue present and apparent hemostasis at this time. Pt may benefit from pre-medication for future vac changes when possible to assist with pain and/or restlessness. Anticipate next change in 2-3 days.  Plan of Care Reviewed With: patient            Time Calculation   PT Charges       Row Name 05/18/25 0758             Time Calculation    Start Time 0726  -MC      PT Goal Re-Cert Due Date 05/22/25  -         Untimed Charges    98580-Raf Pressure wound to 50 sqcm 40  -MC         Total Minutes    Untimed Charges Total Minutes 40  -MC       Total Minutes 40  -MC                User Key  (r) = Recorded By, (t) = Taken By, (c) = Cosigned By      Initials Name Provider Type    Jackie Trejo PT Physical Therapist                      Therapy Charges for Today       Code Description Service Date Service Provider Modifiers Qty    80410711743 HC PT NEG PRESS WOUND TO 50SQCM DME1 5/17/2025 Jackie Saravia  A, PT  1              PT G-Codes  Outcome Measure Options: AM-PAC 6 Clicks Basic Mobility (PT)  AM-PAC 6 Clicks Score (PT): 8  AM-PAC 6 Clicks Score (OT): 6       Jackie Saravia, PT  5/18/2025

## 2025-05-18 NOTE — PLAN OF CARE
Goal Outcome Evaluation:   Pt tolerated tid PST well. No vent changes made

## 2025-05-18 NOTE — PROGRESS NOTES
"   LOS: 13 days    Patient Care Team:  Sergio Randall MD as PCP - General (Cardiology)    Subjective     Stable.     Objective     Vital Signs:  Blood pressure 118/55, pulse 69, temperature 99.3 °F (37.4 °C), temperature source Axillary, resp. rate 15, height 160 cm (62.99\"), weight 69.4 kg (153 lb), SpO2 94%.      Intake/Output Summary (Last 24 hours) at 5/18/2025 1615  Last data filed at 5/18/2025 1400  Gross per 24 hour   Intake 798.67 ml   Output 380 ml   Net 418.67 ml        05/17 0701 - 05/18 0700  In: 866.7 [I.V.:131.7]  Out: 80 [Drains:80]    Physical Exam:  GENERAL: WDWF NAD  NEURO: Sedate on vent  PSYCHIATRIC: Unable to evaluate  EYE: PE, no icterus, no conjunctivitis  ENT: OMM dry, + trach  NECK: Supple , No JVD discernable  CV: Trace edema, RRR  LUNGS:  Quiet,  Nonlabored resp.  Symmetrical expansion  ABDOMEN: + PEG, nondistended  : No Pereira, no palp bladder  SKIN: Warm and dry without rash.  Left groin wound with wound VAC    Labs:  Results from last 7 days   Lab Units 05/18/25 0312 05/17/25  0305 05/15/25  2349   WBC 10*3/mm3 8.31 9.43 14.44*   HEMOGLOBIN g/dL 7.2* 7.9* 9.0*   PLATELETS 10*3/mm3 290 281 287     Results from last 7 days   Lab Units 05/18/25  0312 05/17/25  0305 05/15/25  2349 05/15/25  0427   SODIUM mmol/L 134* 135* 137 136   POTASSIUM mmol/L 3.3* 3.5 3.7 3.9   CHLORIDE mmol/L 101 102 101 101   CO2 mmol/L 22.0 23.0 23.0 22.0   BUN mg/dL 24* 16 26* 19   CREATININE mg/dL 3.59* 2.56* 3.38* 2.64*   CALCIUM mg/dL 8.8 8.7 9.1 8.6   PHOSPHORUS mg/dL 3.3  --   --  3.4   MAGNESIUM mg/dL 1.9  --   --  1.8   ALBUMIN g/dL  --  2.6*  --   --      Results from last 7 days   Lab Units 05/17/25  0305   ALK PHOS U/L 97   BILIRUBIN mg/dL 0.6   ALT (SGPT) U/L 9   AST (SGOT) U/L 9     Results from last 7 days   Lab Units 05/13/25  0358   PH, ARTERIAL pH units 7.430   PO2 ART mm Hg 66.5*   PCO2, ARTERIAL mm Hg 37.6   HCO3 ART mmol/L 24.9       A/P:    ESRD: On HD with Dr. Kaur at [facility].   - " Continue HD on MWF schedule. May need extra session next week for volume optimization.     HTN: Blood pressure stabilizing with further adjustment of blood pressure medications.   - Give albumin for hypotension.  - Cover with as needed medications for hypertension.  - Watch with UF on HD.    Electrolytes: Potassium has been low requiring 4K bath.  Overall stable with HD correction  - Monitor and adjust as needed with HD.    Metabolic acidosis: pH has been stable.  Bicarb stable.    -Adjust with HD.    Anemia: Hemoglobin dropped to 5 with femoral artery bleed.  Patient was transfused 4 units packed red blood cells.    -Continue Epogen.  -Continue to transfuse as indicated.    Volume: Stable.    -UF with HD as tolerated.    Respiratory failure: Trach on vent    Left femoral pseudoaneurysm: Developed at cardiac cath site.  Required repair 5/9/2025.  Wound VAC in place.      High risk and complexity patient.      Romulo Gonzalez MD  05/18/25  16:15 EDT

## 2025-05-18 NOTE — PLAN OF CARE
Goal Outcome Evaluation:  Plan of Care Reviewed With: patient           Outcome Evaluation: vss. remains on vent, tolerating ps trial.  on precedex. tolerating tube feed.  HD planned for am.

## 2025-05-18 NOTE — PLAN OF CARE
Goal Outcome Evaluation:  Plan of Care Reviewed With: patient        Progress: improving  Outcome Evaluation: Pt with improving staus of L groin wound bed, with additional granulation tissue present and apparent hemostasis at this time. Pt may benefit from pre-medication for future vac changes when possible to assist with pain and/or restlessness. Anticipate next change in 2-3 days.

## 2025-05-19 ENCOUNTER — APPOINTMENT (OUTPATIENT)
Dept: NEPHROLOGY | Facility: HOSPITAL | Age: 70
End: 2025-05-19
Payer: MEDICARE

## 2025-05-19 LAB
ALBUMIN SERPL-MCNC: 2.5 G/DL (ref 3.5–5.2)
ALBUMIN/GLOB SERPL: 1.1 G/DL
ALP SERPL-CCNC: 100 U/L (ref 39–117)
ALT SERPL W P-5'-P-CCNC: 8 U/L (ref 1–33)
ANION GAP SERPL CALCULATED.3IONS-SCNC: 9 MMOL/L (ref 5–15)
AST SERPL-CCNC: 9 U/L (ref 1–32)
BASOPHILS # BLD AUTO: 0.04 10*3/MM3 (ref 0–0.2)
BASOPHILS NFR BLD AUTO: 0.5 % (ref 0–1.5)
BILIRUB SERPL-MCNC: 0.3 MG/DL (ref 0–1.2)
BUN SERPL-MCNC: 29 MG/DL (ref 8–23)
BUN/CREAT SERPL: 6.4 (ref 7–25)
CALCIUM SPEC-SCNC: 8.8 MG/DL (ref 8.6–10.5)
CHLORIDE SERPL-SCNC: 100 MMOL/L (ref 98–107)
CO2 SERPL-SCNC: 23 MMOL/L (ref 22–29)
CREAT SERPL-MCNC: 4.55 MG/DL (ref 0.57–1)
CRP SERPL-MCNC: 5.64 MG/DL (ref 0–0.5)
DEPRECATED RDW RBC AUTO: 55.9 FL (ref 37–54)
EGFRCR SERPLBLD CKD-EPI 2021: 9.9 ML/MIN/1.73
EOSINOPHIL # BLD AUTO: 0.28 10*3/MM3 (ref 0–0.4)
EOSINOPHIL NFR BLD AUTO: 3.5 % (ref 0.3–6.2)
ERYTHROCYTE [DISTWIDTH] IN BLOOD BY AUTOMATED COUNT: 15.9 % (ref 12.3–15.4)
GLOBULIN UR ELPH-MCNC: 2.3 GM/DL
GLUCOSE BLDC GLUCOMTR-MCNC: 85 MG/DL (ref 70–130)
GLUCOSE BLDC GLUCOMTR-MCNC: 90 MG/DL (ref 70–130)
GLUCOSE BLDC GLUCOMTR-MCNC: 91 MG/DL (ref 70–130)
GLUCOSE BLDC GLUCOMTR-MCNC: 94 MG/DL (ref 70–130)
GLUCOSE BLDC GLUCOMTR-MCNC: 99 MG/DL (ref 70–130)
GLUCOSE SERPL-MCNC: 90 MG/DL (ref 65–99)
HCT VFR BLD AUTO: 23.5 % (ref 34–46.6)
HCT VFR BLD AUTO: 25.5 % (ref 34–46.6)
HGB BLD-MCNC: 7.2 G/DL (ref 12–15.9)
HGB BLD-MCNC: 8.4 G/DL (ref 12–15.9)
IMM GRANULOCYTES # BLD AUTO: 0.07 10*3/MM3 (ref 0–0.05)
IMM GRANULOCYTES NFR BLD AUTO: 0.9 % (ref 0–0.5)
LYMPHOCYTES # BLD AUTO: 1.01 10*3/MM3 (ref 0.7–3.1)
LYMPHOCYTES NFR BLD AUTO: 12.5 % (ref 19.6–45.3)
MAGNESIUM SERPL-MCNC: 1.9 MG/DL (ref 1.6–2.4)
MCH RBC QN AUTO: 29.5 PG (ref 26.6–33)
MCHC RBC AUTO-ENTMCNC: 30.6 G/DL (ref 31.5–35.7)
MCV RBC AUTO: 96.3 FL (ref 79–97)
MONOCYTES # BLD AUTO: 0.95 10*3/MM3 (ref 0.1–0.9)
MONOCYTES NFR BLD AUTO: 11.8 % (ref 5–12)
NEUTROPHILS NFR BLD AUTO: 5.73 10*3/MM3 (ref 1.7–7)
NEUTROPHILS NFR BLD AUTO: 70.8 % (ref 42.7–76)
NRBC BLD AUTO-RTO: 0 /100 WBC (ref 0–0.2)
PHOSPHATE SERPL-MCNC: 4 MG/DL (ref 2.5–4.5)
PLATELET # BLD AUTO: 298 10*3/MM3 (ref 140–450)
PMV BLD AUTO: 10.5 FL (ref 6–12)
POTASSIUM SERPL-SCNC: 3.4 MMOL/L (ref 3.5–5.2)
PREALB SERPL-MCNC: 11 MG/DL (ref 20–40)
PROT SERPL-MCNC: 4.8 G/DL (ref 6–8.5)
RBC # BLD AUTO: 2.44 10*6/MM3 (ref 3.77–5.28)
SODIUM SERPL-SCNC: 132 MMOL/L (ref 136–145)
TRIGL SERPL-MCNC: 165 MG/DL (ref 0–150)
WBC NRBC COR # BLD AUTO: 8.08 10*3/MM3 (ref 3.4–10.8)

## 2025-05-19 PROCEDURE — 25010000002 HEPARIN (PORCINE) PER 1000 UNITS: Performed by: INTERNAL MEDICINE

## 2025-05-19 PROCEDURE — 83735 ASSAY OF MAGNESIUM: CPT | Performed by: INTERNAL MEDICINE

## 2025-05-19 PROCEDURE — 86140 C-REACTIVE PROTEIN: CPT | Performed by: INTERNAL MEDICINE

## 2025-05-19 PROCEDURE — 97605 NEG PRS WND THER DME<=50SQCM: CPT

## 2025-05-19 PROCEDURE — 94799 UNLISTED PULMONARY SVC/PX: CPT

## 2025-05-19 PROCEDURE — 80053 COMPREHEN METABOLIC PANEL: CPT | Performed by: INTERNAL MEDICINE

## 2025-05-19 PROCEDURE — 85018 HEMOGLOBIN: CPT | Performed by: INTERNAL MEDICINE

## 2025-05-19 PROCEDURE — 94761 N-INVAS EAR/PLS OXIMETRY MLT: CPT

## 2025-05-19 PROCEDURE — 84100 ASSAY OF PHOSPHORUS: CPT | Performed by: INTERNAL MEDICINE

## 2025-05-19 PROCEDURE — 85025 COMPLETE CBC W/AUTO DIFF WBC: CPT | Performed by: INTERNAL MEDICINE

## 2025-05-19 PROCEDURE — 84478 ASSAY OF TRIGLYCERIDES: CPT | Performed by: INTERNAL MEDICINE

## 2025-05-19 PROCEDURE — 93010 ELECTROCARDIOGRAM REPORT: CPT | Performed by: INTERNAL MEDICINE

## 2025-05-19 PROCEDURE — 93005 ELECTROCARDIOGRAM TRACING: CPT | Performed by: PHYSICIAN ASSISTANT

## 2025-05-19 PROCEDURE — 25010000002 HEPARIN (PORCINE) PER 1000 UNITS: Performed by: SURGERY

## 2025-05-19 PROCEDURE — 99232 SBSQ HOSP IP/OBS MODERATE 35: CPT | Performed by: PHYSICIAN ASSISTANT

## 2025-05-19 PROCEDURE — 25010000002 METOCLOPRAMIDE PER 10 MG: Performed by: INTERNAL MEDICINE

## 2025-05-19 PROCEDURE — 94003 VENT MGMT INPAT SUBQ DAY: CPT

## 2025-05-19 PROCEDURE — 82948 REAGENT STRIP/BLOOD GLUCOSE: CPT

## 2025-05-19 PROCEDURE — P9016 RBC LEUKOCYTES REDUCED: HCPCS

## 2025-05-19 PROCEDURE — 99232 SBSQ HOSP IP/OBS MODERATE 35: CPT | Performed by: INTERNAL MEDICINE

## 2025-05-19 PROCEDURE — 84134 ASSAY OF PREALBUMIN: CPT | Performed by: INTERNAL MEDICINE

## 2025-05-19 PROCEDURE — 86900 BLOOD TYPING SEROLOGIC ABO: CPT

## 2025-05-19 PROCEDURE — 85014 HEMATOCRIT: CPT | Performed by: INTERNAL MEDICINE

## 2025-05-19 RX ORDER — METOCLOPRAMIDE HYDROCHLORIDE 5 MG/ML
5 INJECTION INTRAMUSCULAR; INTRAVENOUS EVERY 8 HOURS
Status: DISPENSED | OUTPATIENT
Start: 2025-05-19 | End: 2025-05-22

## 2025-05-19 RX ORDER — HYDRALAZINE HYDROCHLORIDE 50 MG/1
100 TABLET, FILM COATED ORAL EVERY 8 HOURS SCHEDULED
Status: DISCONTINUED | OUTPATIENT
Start: 2025-05-19 | End: 2025-05-22 | Stop reason: HOSPADM

## 2025-05-19 RX ADMIN — IPRATROPIUM BROMIDE AND ALBUTEROL SULFATE 3 ML: 2.5; .5 SOLUTION RESPIRATORY (INHALATION) at 06:56

## 2025-05-19 RX ADMIN — ASPIRIN 81 MG 81 MG: 81 TABLET ORAL at 11:58

## 2025-05-19 RX ADMIN — IPRATROPIUM BROMIDE AND ALBUTEROL SULFATE 3 ML: 2.5; .5 SOLUTION RESPIRATORY (INHALATION) at 11:00

## 2025-05-19 RX ADMIN — MICONAZOLE NITRATE 1 APPLICATION: 20 POWDER TOPICAL at 21:07

## 2025-05-19 RX ADMIN — AMIODARONE HYDROCHLORIDE 200 MG: 200 TABLET ORAL at 21:04

## 2025-05-19 RX ADMIN — MICONAZOLE NITRATE 1 APPLICATION: 20 POWDER TOPICAL at 12:01

## 2025-05-19 RX ADMIN — CLONIDINE HYDROCHLORIDE 0.1 MG: 0.1 TABLET ORAL at 21:04

## 2025-05-19 RX ADMIN — SACUBITRIL AND VALSARTAN 1 TABLET: 97; 103 TABLET, FILM COATED ORAL at 21:04

## 2025-05-19 RX ADMIN — HYDRALAZINE HYDROCHLORIDE 100 MG: 50 TABLET ORAL at 21:04

## 2025-05-19 RX ADMIN — HEPARIN SODIUM 5000 UNITS: 5000 INJECTION INTRAVENOUS; SUBCUTANEOUS at 05:16

## 2025-05-19 RX ADMIN — HEPARIN SODIUM 2000 UNITS: 1000 INJECTION INTRAVENOUS; SUBCUTANEOUS at 09:31

## 2025-05-19 RX ADMIN — CARVEDILOL 12.5 MG: 12.5 TABLET, FILM COATED ORAL at 21:04

## 2025-05-19 RX ADMIN — CLONIDINE HYDROCHLORIDE 0.1 MG: 0.1 TABLET ORAL at 05:16

## 2025-05-19 RX ADMIN — NITROGLYCERIN 1 PATCH: 0.4 PATCH TRANSDERMAL at 11:57

## 2025-05-19 RX ADMIN — CLONIDINE HYDROCHLORIDE 0.1 MG: 0.1 TABLET ORAL at 15:43

## 2025-05-19 RX ADMIN — CARVEDILOL 12.5 MG: 12.5 TABLET, FILM COATED ORAL at 11:57

## 2025-05-19 RX ADMIN — DEXMEDETOMIDINE HYDROCHLORIDE 0.4 MCG/KG/HR: 4 INJECTION, SOLUTION INTRAVENOUS at 18:16

## 2025-05-19 RX ADMIN — TOBRAMYCIN AND DEXAMETHASONE 2 DROP: 1; 3 SUSPENSION/ DROPS OPHTHALMIC at 05:20

## 2025-05-19 RX ADMIN — IPRATROPIUM BROMIDE AND ALBUTEROL SULFATE 3 ML: 2.5; .5 SOLUTION RESPIRATORY (INHALATION) at 19:40

## 2025-05-19 RX ADMIN — SACUBITRIL AND VALSARTAN 1 TABLET: 97; 103 TABLET, FILM COATED ORAL at 12:00

## 2025-05-19 RX ADMIN — IPRATROPIUM BROMIDE AND ALBUTEROL SULFATE 3 ML: 2.5; .5 SOLUTION RESPIRATORY (INHALATION) at 15:16

## 2025-05-19 RX ADMIN — DIAZEPAM 5 MG: 5 TABLET ORAL at 01:09

## 2025-05-19 RX ADMIN — HYDRALAZINE HYDROCHLORIDE 100 MG: 50 TABLET ORAL at 15:43

## 2025-05-19 RX ADMIN — HEPARIN SODIUM 5000 UNITS: 5000 INJECTION INTRAVENOUS; SUBCUTANEOUS at 21:04

## 2025-05-19 RX ADMIN — OXYCODONE HYDROCHLORIDE 10 MG: 10 TABLET ORAL at 05:16

## 2025-05-19 RX ADMIN — HEPARIN SODIUM 5000 UNITS: 5000 INJECTION INTRAVENOUS; SUBCUTANEOUS at 15:43

## 2025-05-19 RX ADMIN — AMIODARONE HYDROCHLORIDE 200 MG: 200 TABLET ORAL at 11:58

## 2025-05-19 RX ADMIN — OXYCODONE HYDROCHLORIDE 10 MG: 10 TABLET ORAL at 00:32

## 2025-05-19 RX ADMIN — DEXMEDETOMIDINE HYDROCHLORIDE 0.4 MCG/KG/HR: 4 INJECTION, SOLUTION INTRAVENOUS at 06:12

## 2025-05-19 RX ADMIN — ATORVASTATIN CALCIUM 40 MG: 40 TABLET, FILM COATED ORAL at 21:04

## 2025-05-19 RX ADMIN — METOCLOPRAMIDE 5 MG: 5 INJECTION, SOLUTION INTRAMUSCULAR; INTRAVENOUS at 18:15

## 2025-05-19 RX ADMIN — AMLODIPINE BESYLATE 10 MG: 10 TABLET ORAL at 11:58

## 2025-05-19 RX ADMIN — OXYCODONE HYDROCHLORIDE 10 MG: 10 TABLET ORAL at 21:04

## 2025-05-19 RX ADMIN — CLOPIDOGREL BISULFATE 75 MG: 75 TABLET, FILM COATED ORAL at 11:57

## 2025-05-19 RX ADMIN — LANSOPRAZOLE 15 MG: 15 TABLET, ORALLY DISINTEGRATING ORAL at 12:00

## 2025-05-19 NOTE — THERAPY WOUND CARE TREATMENT
Acute Care - Wound/Debridement Treatment Note   [place]     Patient Name: Estefania Connor  : 1955  MRN: [id number]  Today's Date: 2025                Admit Date: 2025    Visit Dx:    ICD-10-CM ICD-9-CM   1. Hematoma of groin, initial encounter  S30.1XXA 922.2       Patient Active Problem List   Diagnosis    ESRD on HD    Polycystic kidney disease    HTN (hypertension)    Dyslipidemia    AAA (abdominal aortic aneurysm)    Right coronary artery occlusion    Depression    Tracheostomy present    S/P percutaneous endoscopic gastrostomy (PEG) tube placement    CAD S/P ELIECER RCA    Hematoma of groin    Ileus        Past Medical History:   Diagnosis Date    AAA (abdominal aortic aneurysm)     Coronary artery disease     Depression     ESRD (end stage renal disease)     Hyperlipidemia     Hypertension     Polycystic kidney disease         Past Surgical History:   Procedure Laterality Date    CARDIAC CATHETERIZATION N/A 2025    Procedure: Coronary angiography;  Surgeon: Ambrose Mcnally MD;  Location:  [facility];  Service: Cardiovascular;  Laterality: N/A;    CARDIAC CATHETERIZATION N/A 2025    Procedure: Left Heart Cath;  Surgeon: Edin Boland MD;  Location:  [facility];  Service: Cardiovascular;  Laterality: N/A;    CARDIAC CATHETERIZATION N/A 2025    Procedure: Stent ELIECER coronary;  Surgeon: Edin Boland MD;  Location:  [facility];  Service: Cardiovascular;  Laterality: N/A;    INCISION AND DRAINAGE LEG Left 2025    Procedure: GROIN WOUND VACUUM CHANGE LEFT;  Surgeon: Remi Guerrero MD;  Location:  [facility] OR;  Service: Vascular;  Laterality: Left;    PSEUDO ANEURYSM REPAIR, EXTREMITY Left 2025    Procedure: REPAIR LEFT FEMORAL ARTERY, EVACUATION LEFT GROIN HEMATOMA, PLACEMENT OF NEGATIVE WOUND VACCUUM THERAPY;  Surgeon: Remi Guerrero MD;  Location:  [facility];  Service: Vascular;  Laterality: Left;    TRACHEOSTOMY  AND PEG TUBE INSERTION N/A 4/29/2025    Procedure: TRACHEOSTOMY AND PERCUTANEOUS ENDOSCOPIC GASTROSTOMY TUBE INSERTION;  Surgeon: Kera Head MD;  Location: [facility] OR;  Service: General;  Laterality: N/A;           Wound Right gluteal (Active)   Dressing Appearance dry;intact 05/19/25 0800   Closure Adhesive bandage 05/19/25 0800   Base nonblanchable;red 05/19/25 0800   Periwound excoriated 05/19/25 0800   Periwound Temperature warm 05/19/25 0800   Periwound Skin Turgor soft 05/19/25 0600   Drainage Amount none 05/19/25 0800   Care, Wound cleansed with;soap and water 05/18/25 2000   Periwound Care barrier ointment applied 05/18/25 2000       Wound Other (See comments) nose (Active)   Dressing Appearance open to air 05/19/25 0600   Closure Open to air 05/19/25 0600       Wound 05/09/25 1700 Left anterior groin Other (Comments) (Active)   Dressing Appearance dry;intact 05/19/25 0921   Closure None 05/19/25 0600   Base unable to visualize 05/19/25 0921   Periwound ecchymotic;edematous 05/19/25 0600   Periwound Temperature warm 05/19/25 0600   Periwound Skin Turgor firm 05/19/25 0600   Drainage Characteristics/Odor serosanguineous;sanguineous 05/19/25 0921   Drainage Amount moderate 05/19/25 0921   Care, Wound negative pressure wound therapy 05/19/25 0600   Wound Output (mL) 580 05/19/25 0921       Wound 05/15/25 1600 Left lateral greater trochanter Other (Comments) (Active)   Dressing Appearance dry;intact 05/19/25 0600   Dressing Removed Type foam 05/18/25 2000   Closure None 05/19/25 0600   Drainage Amount none 05/19/25 0600   Care, Wound cleansed with;soap and water 05/18/25 2000   Dressing Care dressing changed;foam 05/18/25 2000       NPWT (Negative Pressure Wound Therapy) 05/09/25 left groin (Active)   Therapy Setting continuous therapy 05/19/25 0921   Dressing foam, black 05/19/25 0800   Pressure Setting 125 mmHg 05/19/25 0921         WOUND DEBRIDEMENT                     PT Assessment (Last 12 Hours)        PT Evaluation and Treatment       Row Name 05/19/25 0921          Physical Therapy Time and Intention    Subjective Information no complaints  -     Document Type therapy note (daily note);wound care  -     Mode of Treatment individual therapy  -       Row Name 05/19/25 0921          General Information    Patient Observations cooperative;agree to therapy  -       Row Name 05/19/25 0921          Pain    Pain Location groin  -     Pain Side/Orientation left  -     Pain Management Interventions positioning techniques utilized  -     Response to Pain Interventions no change per patient report  -       Row Name 05/19/25 0921          Pain Scale: FACES Pre/Post-Treatment    Pain: FACES Scale, Pretreatment 2-->hurts little bit  -     Posttreatment Pain Rating 2-->hurts little bit  -       Row Name 05/19/25 0921          Cognition    Orientation Status (Cognition) unable/difficult to assess  -       Row Name             Wound Right gluteal    Wound - Properties Group Present on Original Admission: Y  -ER Side: Right  -ER Location: gluteal  -ER    Retired Wound - Properties Group Present on Original Admission: Y  -ER Side: Right  -ER Location: gluteal  -ER    Retired Wound - Properties Group Present on Original Admission: Y  -ER Side: Right  -ER Location: gluteal  -ER    Retired Wound - Properties Group Present on Original Admission: Y  -ER Side: Right  -ER Location: gluteal  -ER      Row Name             Wound Other (See comments) nose    Wound - Properties Group Present on Original Admission: Y  -ER Side: Other (See comments)  -ER Location: nose  -ER    Retired Wound - Properties Group Present on Original Admission: Y  -ER Side: Other (See comments)  -ER Location: nose  -ER    Retired Wound - Properties Group Present on Original Admission: Y  -ER Side: Other (See comments)  -ER Location: nose  -ER    Retired Wound - Properties Group Present on Original Admission: Y  -ER Side: Other (See  comments)  -ER Location: nose  -ER      Row Name 05/19/25 0921          Wound 05/09/25 1700 Left anterior groin Other (Comments)    Wound - Properties Group Placement Date: 05/09/25  - Placement Time: 1700  -MH Side: Left  -MH Orientation: anterior  -MH Location: groin  -MH Primary Wound Type: Other  -MH, hematoma     Dressing Appearance dry;intact  -LH     Base unable to visualize  -LH     Drainage Characteristics/Odor serosanguineous;sanguineous  -LH     Drainage Amount moderate  -LH     Wound Output (mL) 580  -LH     Retired Wound - Properties Group Placement Date: 05/09/25  - Placement Time: 1700  -MH Side: Left  -MH Orientation: anterior  -MH Location: groin  -MH    Retired Wound - Properties Group Placement Date: 05/09/25  -MH Placement Time: 1700  -MH Side: Left  -MH Orientation: anterior  -MH Location: groin  -MH    Retired Wound - Properties Group Date first assessed: 05/09/25  - Time first assessed: 1700  -MH Side: Left  -MH Location: groin  -MH      Row Name             Wound 05/15/25 1600 Left lateral greater trochanter Other (Comments)    Wound - Properties Group Placement Date: 05/15/25  -HM Placement Time: 1600  -HM Side: Left  -HM Orientation: lateral  -HM Location: greater trochanter  -HM Primary Wound Type: Other  -HM Secondary Wound Type - Other: --  -HM, skin tear     Retired Wound - Properties Group Placement Date: 05/15/25  -HM Placement Time: 1600  -HM Side: Left  -HM Orientation: lateral  -HM Location: greater trochanter  -HM    Retired Wound - Properties Group Placement Date: 05/15/25  -HM Placement Time: 1600  -HM Side: Left  -HM Orientation: lateral  -HM Location: greater trochanter  -HM    Retired Wound - Properties Group Date first assessed: 05/15/25  -HM Time first assessed: 1600  -HM Side: Left  -HM Location: greater trochanter  -HM      Row Name 05/19/25 0921          NPWT (Negative Pressure Wound Therapy) 05/09/25 left groin    NPWT (Negative Pressure Wound Therapy) -  Properties Group Placement Date: 05/09/25  - Location: left groin  -MEET    Therapy Setting continuous therapy  -     Pressure Setting 125 mmHg  -     Retired NPWT (Negative Pressure Wound Therapy) - Properties Group Placement Date: 05/09/25  - Location: left groin  -MEET    Retired NPWT (Negative Pressure Wound Therapy) - Properties Group Placement Date: 05/09/25  - Location: left groin  -MEET    Retired NPWT (Negative Pressure Wound Therapy) - Properties Group Placement Date: 05/09/25  - Location: left groin  -MEET      Row Name 05/19/25 0921          Coping    Trust Relationship/Rapport care explained  -       Row Name 05/19/25 0921          Plan of Care Review    Plan of Care Reviewed With patient  -     Progress no change  -     Outcome Evaluation Wound vac check complete. Vac remains intact with no issues noted. Pt due for next wound vac dressing change in 1-2 days.  -       Row Name 05/19/25 0921          Positioning and Restraints    Pre-Treatment Position in bed  -     Post Treatment Position bed  -     In Bed supine;call light within reach;encouraged to call for assist;with [facility]  -               User Key  (r) = Recorded By, (t) = Taken By, (c) = Cosigned By      Initials Name Provider Type    María Maza, RN Registered Nurse    Yao Shine, RN Registered Nurse    Ce Santo, RN Registered Nurse    Wicho Faria, PT Physical Therapist    HM Alma Rogers, RN Registered Nurse                  Physical Therapy Education       Title: PT OT SLP Therapies (In Progress)       Topic: Physical Therapy (In Progress)       Point: Mobility training (In Progress)       Learning Progress Summary            Patient Acceptance, E,TB, NR by CH at 5/14/2025 1717    Acceptance, E, NR by KG at 5/12/2025 1046    Acceptance, E, NR by KG at 5/7/2025 1115    Acceptance, E, NR by MARIFER at 5/6/2025 1430                      Point: Home exercise program (In Progress)       Learning Progress  Summary            Patient Acceptance, E,TB, NR by CH at 5/14/2025 1717    Acceptance, E, NR by KG at 5/12/2025 1046    Acceptance, E, NR by KG at 5/7/2025 1115    Acceptance, E, NR by MARIFER at 5/6/2025 1430                      Point: Body mechanics (In Progress)       Learning Progress Summary            Patient Acceptance, E,TB, NR by CH at 5/14/2025 1717    Acceptance, E, NR by KG at 5/12/2025 1046    Acceptance, E, NR by KG at 5/7/2025 1115    Acceptance, E, NR by MARIFER at 5/6/2025 1430                      Point: Precautions (In Progress)       Learning Progress Summary            Patient Acceptance, E,TB, NR by  at 5/14/2025 1717    Acceptance, E, NR by KG at 5/12/2025 1046    Acceptance, E, NR by KG at 5/7/2025 1115    Acceptance, E, NR by MARIFER at 5/6/2025 1430                                      User Key       Initials Effective Dates Name Provider Type Discipline     02/03/23 -  Sandra Pruitt, PT Physical Therapist PT     06/16/21 -  Gertrudis Bird, RN Registered Nurse Nurse     05/22/20 -  Dorothy Frazier, PT Physical Therapist PT                    Recommendation and Plan  Anticipated Discharge Disposition (PT): extended care facility  Planned Therapy Interventions (PT): balance training, bed mobility training, home exercise program, strengthening, transfer training  Therapy Frequency (PT): daily  Plan of Care Reviewed With: patient   Progress: no change       Progress: no change  Outcome Evaluation: Wound vac check complete. Vac remains intact with no issues noted. Pt due for next wound vac dressing change in 1-2 days.  Plan of Care Reviewed With: patient            Time Calculation   PT Charges       Row Name 05/19/25 1351             Time Calculation    Start Time 0921  -LH      PT Goal Re-Cert Due Date 05/22/25  -         Untimed Charges    15380-Rzw Pressure wound to 50 sqcm 10  -LH         Total Minutes    Untimed Charges Total Minutes 10  -LH       Total Minutes 10  -LH                 User Key  (r) = Recorded By, (t) = Taken By, (c) = Cosigned By      Initials Name Provider Type    LH Wicho Means, PT Physical Therapist                      Therapy Charges for Today       Code Description Service Date Service Provider Modifiers Qty    71292894111 HC PT NEG PRESS WOUND TO 50SQCM DME1 5/19/2025 Wicho Means, PT  1              PT G-Codes  Outcome Measure Options: AM-PAC 6 Clicks Basic Mobility (PT)  AM-PAC 6 Clicks Score (PT): 6  AM-PAC 6 Clicks Score (OT): 6       Wicho Means PT  5/19/2025

## 2025-05-19 NOTE — PLAN OF CARE
Pt dislodged trach around 0100 - back in BUE soft restraints and Precedex at 0.4. PRN Valium and oxy given. Tolerating TF. NPWT still intact.       Problem: Adult Inpatient Plan of Care  Goal: Plan of Care Review  Outcome: Progressing   Goal Outcome Evaluation:

## 2025-05-19 NOTE — PROGRESS NOTES
Clinical Nutrition     Patient Name: Estefania Connor  YOB: 1955  MRN: [id number]  Date of Encounter: 05/19/25 09:22 EDT  Admission date: 5/5/2025  Reason for Visit: MDR, Follow-up protocol, EN    Assessment   Nutrition Assessment   Admission Diagnosis:  CAD (coronary artery disease) [I25.10]    Problem List:    CAD S/P ELIECER RCA    ESRD on HD    Polycystic kidney disease    HTN (hypertension)    Dyslipidemia    AAA (abdominal aortic aneurysm)    Right coronary artery occlusion    Depression    Tracheostomy present    S/P percutaneous endoscopic gastrostomy (PEG) tube placement    Hematoma of groin    Ileus      PMH:   She  has a past medical history of AAA (abdominal aortic aneurysm), Coronary artery disease, Depression, ESRD (end stage renal disease), Hyperlipidemia, Hypertension, and Polycystic kidney disease.    PSH:  She  has a past surgical history that includes tracheostomy and peg tube insertion (N/A, 4/29/2025); Cardiac catheterization (N/A, 5/2/2025); Cardiac catheterization (N/A, 5/7/2025); Cardiac catheterization (N/A, 5/7/2025); Pseudo Aneurysm Repair, Extremity (Left, 5/9/2025); and incision and drainage leg (Left, 5/13/2025).    Applicable Nutrition History:   Trach/PEG    (5/9) s/p repair L femoral artery, evacuation L groin hematoma + NPWT  (5/13) redo L femoral hematoma evacuation  (5/14) KUB - Probable general ileus. Gaseous distention of the stomach   (5/16) KUB - nonspecific bowel gas pattern    Labs    Labs Reviewed: Yes    Results from last 7 days   Lab Units 05/19/25  0353 05/18/25  0312 05/17/25  0305 05/15/25  2349 05/15/25  0427   GLUCOSE mg/dL 90 99 81   < > 76   BUN mg/dL 29* 24* 16   < > 19   CREATININE mg/dL 4.55* 3.59* 2.56*   < > 2.64*   SODIUM mmol/L 132* 134* 135*   < > 136   CHLORIDE mmol/L 100 101 102   < > 101   POTASSIUM mmol/L 3.4* 3.3* 3.5   < > 3.9   PHOSPHORUS mg/dL 4.0 3.3  --   --  3.4   MAGNESIUM mg/dL 1.9 1.9  --   --  1.8   ALT (SGPT) U/L 8  " --  9  --   --     < > = values in this interval not displayed.       Results from last 7 days   Lab Units 05/19/25  0353 05/17/25  0305 05/15/25  2349 05/13/25  0403   ALBUMIN g/dL 2.5* 2.6*  --   --    PREALBUMIN mg/dL  --   --   --  14.9*   CRP mg/dL 5.64*  --   --  6.07*   IONIZED CALCIUM mmol/L  --   --  1.24  --    TRIGLYCERIDES mg/dL 165*  --   --   --        Results from last 7 days   Lab Units 05/19/25  0511 05/19/25  0025 05/18/25  1802 05/18/25  1213 05/18/25  0516 05/17/25  2312   GLUCOSE mg/dL 99 90 89 98 97 82     No results found for: \"HGBA1C\"            Medications    Medications Reviewed: yes    Scheduled Meds:amiodarone, 200 mg, Per PEG Tube, Q12H  amLODIPine, 10 mg, Per PEG Tube, Q24H  aspirin, 81 mg, Per PEG Tube, Daily  atorvastatin, 40 mg, Per PEG Tube, Nightly  carvedilol, 12.5 mg, Per PEG Tube, Q12H  cloNIDine, 0.1 mg, Per PEG Tube, Q8H  clopidogrel, 75 mg, Per PEG Tube, Daily  heparin (porcine), 5,000 Units, Subcutaneous, Q8H  hydrALAZINE, 50 mg, Per PEG Tube, Q8H  ipratropium-albuterol, 3 mL, Nebulization, 4x Daily - RT  lansoprazole, 15 mg, Per PEG Tube, QAM AC  miconazole, 1 Application, Topical, Q12H  nitroglycerin, 1 patch, Transdermal, Daily  sacubitril-valsartan, 1 tablet, Per PEG Tube, Q12H      Continuous Infusions:dexmedetomidine, 0.2-1.5 mcg/kg/hr, Last Rate: 0.4 mcg/kg/hr (05/19/25 0612)      PRN Meds:.  albuterol    atropine    dextrose    diazePAM    glucagon (human recombinant)    heparin (porcine)    heparin (porcine)    hydrALAZINE    labetalol    loperamide    nitroglycerin    ondansetron    oxyCODONE    Intake/Ouptut 24 hrs (0701 - 0700)   I&O's Reviewed: yes  Intake & Output (last day)         05/18 0701 05/19 0700 05/19 0701 05/20 0700    I.V. (mL/kg) 153.5 (2.2)     Other 241     NG/     Total Intake(mL/kg) 811.5 (11.7)     Urine (mL/kg/hr) 0 (0)     Drains      Wound 500     Total Output 500     Net +311.5           Urine Unmeasured Occurrence 1 x       " "    Anthropometrics     Height: Height: 160 cm (62.99\")  Last Filed Weight: Weight: 69.4 kg (153 lb) (05/14/25 1726)69.4kg (153lbs)  Method:  ?  BMI: BMI (Calculated): 27.127.11kg/m^2    UBW: Limited wt data available  Weight change:  unable to determine    Nutrition Focused Physical Exam     Date: 5/6    Unable to perform due to pt unable to consent. Visual assessment of pt completed, noted to have edema to BUE.*     Subjective   Reported/Observed/Food/Nutrition Related History:   5/19  Trophic feeds continued throughout the weekend; ? Complete resolution of ileus. [facility] MD to consider repeat KUB and begin advancing feeds back to goal. Remains on MV + precedex. HD today.     5/16  Remains on MV - requiring precedex. EN has remained on hold - +BM x3 per RN. Received Mestinon; looks like ileus is resolving. MD states abdomen is less distended. Ok to restart trophic feeds today - hold mestinon per MD. HD planned for this morning. NPWT for L groin remains in place. D/C planning ongoing.     5/14  Pt remains on MV, sedation off - per RN, pt restless. KUB obtained yesterday indicating ileus - EN held. Noted [employer] TF d/c'd per MD. Continues with multiple loose BM - plan to start PO mestinon.     5/12  Remains intubated/sedated. Plan for HD today. EN remains at goal. Ongoing loose BM despite initiation of fiber - per MD, start Imodium.      5/8  Pt s/p RCA with stent placement. TF restarted. Not PAB low with elevated CRP. Plan for HD today and possible d/c back to [facility] later today vs tomorrow. Documented loose stool.     5/6  Pt transferred to [facility] for higher level of care from [facility] after extensive hospital stay at [facility]SH. Has been on Nepro via PEG throughout admission to that facility. +HD. Discussed in MDR. OK to restart nutrition. NKFA    Needs Assessment   Date: 5/6; Reassessed: 5/12, 5/16    Height used:Height: 160 cm (62.99\")  Weights used: 69.4kg CBW; 52.38kg IBW    Estimated Calorie " needs: ~ 1325 Kcal/day  Method: 21-24 Kcals/KG = 7706-0021  Method:  MSJ (8643) x 1.1 = 1301  Method:  PSU = 1315  Ve: 8.47; Tmax: 36.7    Estimated Protein needs: ~ 83 g PRO/day  Method: 1.0-1.3g/Kg CBW = 69-90    Estimated Fluid needs: ~ ml/day   Per clinical status    Current Nutrition Prescription     PO: NPO Diet NPO Type: Tube Feeding  Oral Nutrition Supplement: N/A  Intake: N/A    EN: [employer] Renal  Goal Rate: 35mL/hr  Water Flushes: 20mL/hr  Modular: Nutrisource Fiber 6/day  Route: PEG  Tube: Other     At goal over: 20Hrs/day     Rx will supply:   Goal Volume 700  mL/day     Flush Volume 400 mL/day     Energy 1490 Kcal/day 106 % Est Need   Protein 64 g/day 77 % Est Need   Fiber 18 g/day     Water in   mL     Total Water 904 mL     Meet DRI Yes        --------------------------------------------------------------------------  Product/Rate verified at bedside: Yes  Infusing Rate at time of visit: 10mL/hr + 10mL/hr    Average Delivery from [facility] Days:   Volume 460 mL/day   % Goal Vol.   Flush Volume 237 mL/day     Energy  Kcal/day  % Est Need   Protein  g/day  % Est Need   Fiber  g/day     Water in  EN  mL     Total Water  mL     Meet DRI N/A        Assessment & Plan   Nutrition Diagnosis   Date:  Updated:   Problem Inadequate oral intake    Etiology Dysphagia with trach/peg   Signs/Symptoms Chronic EN   Status: Active    Date:   Updated: ,   Problem Inadequate enteral nutrition infusion   Etiology Altered GI fxn   Signs/Symptoms Generalized ileus   Status: Active improving?    Goal:   Nutrition to support treatment and Establish EN tolerance      Nutrition Intervention      Follow treatment progress, Care plan reviewed, Nutrition support order placed    Continue trophic feeds  Novasource Renal @ 10mL/hr + 10mL/hr flush      Pending POC, recommend:  Obtain repeat KUB  Once appropriate per MD advance feeds  Novasource Renal @ 20mL/hr and adv by 10mL in 12hrs as tolerated to goal  of 35mL/hr. Flush of 20mL/hr.     Adjust flush per clinical status    If unable to advance feeds within next 48hrs would consider starting TPN due to suspected prolonged malabsorption on EN with loose BM since admission    Monitoring/Evaluation:   Per protocol, I&O, Pertinent labs, EN delivery/tolerance, Weight, GI status, Symptoms, POC/GOC, Swallow function, Hemodynamic stability    Mary Alvarado, MS,RD,LD  Time Spent: 30min

## 2025-05-19 NOTE — PLAN OF CARE
Goal Outcome Evaluation: Pt not able to be weaned down anymore. Pt did well on last tid trial of 5/18. Pt partially  dislodged trach 5/19/25. Trach was reinserted with minimal blood.

## 2025-05-19 NOTE — CASE MANAGEMENT/SOCIAL WORK
Continued Stay Note  [facility]     Patient Name: Estefania Connor  MRN: [id number]  Today's Date: 5/19/2025    Admit Date: 5/5/2025    Plan: [facility] [place]   Discharge Plan       Row Name 05/19/25 1252       Plan    Plan [facility] [place]    Plan Comments Patient discussed in ICU MDR today.   [facility], Dr Justino Dickens [phone] would like to speak with physician before receiving  patient and have asked Dr. Chandra to reach out to him.   Patient's spouse aware of possible transfer. IMM given over phone. If good to go today, EMS will be contacted after summary and nurse calling a report is completed.    Final Discharge Disposition Code 63 - [facility]                   Discharge Codes    No documentation.                 Expected Discharge Date and Time       Expected Discharge Date Expected Discharge Time    May 9, 2025               Chrissie Terrell RN

## 2025-05-19 NOTE — PROGRESS NOTES
Enter Query Response Below      Query Response: Left groin hematoma not due to heparin or Plavix             If applicable, please update the problem list.

## 2025-05-19 NOTE — PROGRESS NOTES
INTENSIVIST   PROGRESS NOTE        SUBJECTIVE     Estefania 69 y.o. female is followed for: No chief complaint on file.       CAD (coronary artery disease)    ESRD on HD    Polycystic kidney disease    HTN (hypertension)    Dyslipidemia    AAA (abdominal aortic aneurysm)    Right coronary artery occlusion    Depression    Tracheostomy present    S/P percutaneous endoscopic gastrostomy (PEG) tube placement    Hematoma of groin    Ileus    As an Intensivist, we have completed an accredited Critical Care program and maintain Board Certification and dedicate most of our professional work to critical/intensive care. We serve as the  or member of an interprofessional team, coordinating and guiding healthcare professionals to provide specialized care for critically ill patients. We manage and resuscitate patients with, or at risk for, critical illness and organ failure and initiate interventions to prevent imminent deterioration. (2024 Consensus Statement from the Society of Critical Care Medicine Defining Intensivist Task Force. [facility] 2025. DOI: 10.1097/[facility].[id number]     Interval History:  POD: 6 Days Post-Op  Procedure(s) (LRB):  GROIN WOUND VACUUM CHANGE LEFT (Left)     iHD done today.    Last Bowel Movement: 05/16/25 (05/18/25 2000)    Temp  Min: 98.9 °F (37.2 °C)  Max: 99.6 °F (37.6 °C)       History     Last Reviewed by Toy Chandra MD on 5/19/2025 at  8:00 AM    Sections Reviewed    Medical, Surgical, Family, Tobacco, Alcohol, Drug Use, Sexual Activity,   Social Documentation    Problem list reviewed by Toy Chandra MD on 5/19/2025 at  8:00 AM  Medicines reviewed by Toy Chandra MD on 5/19/2025 at  8:00 AM  Allergies reviewed by Toy Chandra MD on 5/19/2025 at  8:00 AM       The patient's relevant past medical, surgical and social history were reviewed and updated in Epic as appropriate.        OBJECTIVE     Physical Examination    Vitals:  Temp: 98.9 °F (37.2 °C) (05/19/25 0400) Temp  Min:  98.9 °F (37.2 °C)  Max: 99.6 °F (37.6 °C)   Temp core:      BP: 157/61 (25) BP  Min: 107/43  Max: 165/72   MAP (non-invasive) Noninvasive MAP (mmHg): 109 (25) Noninvasive MAP (mmHg)  Av.2  Min: 53  Max: 175   Pulse: 69 (25) Pulse  Min: 56  Max: 70   Resp: 15 (25) Resp  Min: 10  Max: 29   SpO2: 97 % (25) SpO2  Min: 90 %  Max: 100 %   Device: ventilator (25)    Flow Rate: 40 (25) Flow (L/min) (Oxygen Therapy)  Min: 40  Max: 40     Intake/Ouptut 24 hrs (7:00AM - 6:59 AM)  Intake & Output (last 2 days)          07 07 07 07 07    I.V. (mL/kg) 131.7 (1.9) 153.5 (2.2)     Other 232 241     NG/ 417     Total Intake(mL/kg) 866.7 (12.5) 811.5 (11.7)     Urine (mL/kg/hr)  0 (0)     Emesis/NG output       Drains 80      Stool       Wound  500     Dialysis       Total Output 80 500     Net +786.7 +311.5            Urine Unmeasured Occurrence  1 x             Medications (drips):  dexmedetomidine, Last Rate: 0.4 mcg/kg/hr (25)            25  1726   Weight: 69.4 kg (153 lb)        Invasive Mechanical Ventilator   Settings: Observed:   Mode: VC+/AC (25)    Resp Rate (Set): 14 (25) Resp Rate (Observed) Vent: 14 (25)   Vt (Set, mL): 365 mL (25) Vt, Exp (observed, mL): 501 mL (25)    Minute Ventilation (L/min) (Obs): 6.21 L/min (25)    I:E Ratio (Obs): 1:3.3 (25)       FiO2 (%): 40 % (25) Plateau Pressure (cm H2O): 14 cm H2O (25)   PEEP/CPAP (cm H2O): 5 cm H20 (25) Driving Pressure (cm H2O): 9.4 cm H2O (25)    Static Compliance (L/cm H2O): 42 (25)      Telemetry:  Rhythm: normal sinus rhythm (25)         Constitutional:  No acute distress.   Cardiovascular: RRR.    Respiratory: Normal breath sounds  No adventitious sounds   Abdominal:   Soft with no tenderness.   Extremities: No Edema  WoundVac to Left groin area.   Neurological:   Awake.  Best Eye Response: 4-->(E4) spontaneous (05/19/25 0600)  Best Motor Response: 6-->(M6) obeys commands (05/19/25 0600)  Best Verbal Response: 1-->(V1) none (05/19/25 0600)  Waldron Coma Scale Score: 11 (05/19/25 0600)     Results Reviewed:  Laboratory  Microbiology  Radiology  Pathology    Hematology:  Results from last 7 days   Lab Units 05/19/25 0353 05/18/25 0312 05/17/25  0305   WBC 10*3/mm3 8.08 8.31 9.43   HEMOGLOBIN g/dL 7.2* 7.2* 7.9*   MCV fL 96.3 94.1 92.5   PLATELETS 10*3/mm3 298 290 281     Results from last 7 days   Lab Units 05/19/25 0353 05/17/25  0305   NEUTROS ABS 10*3/mm3 5.73 7.15*   LYMPHS ABS 10*3/mm3 1.01 0.92   EOS ABS 10*3/mm3 0.28 0.34     Chemistry:  Estimated Creatinine Clearance: 10.8 mL/min (A) (by C-G formula based on SCr of 4.55 mg/dL (H)).    Results from last 7 days   Lab Units 05/19/25 0353 05/18/25 0312   SODIUM mmol/L 132* 134*   POTASSIUM mmol/L 3.4* 3.3*   CHLORIDE mmol/L 100 101   CO2 mmol/L 23.0 22.0   BUN mg/dL 29* 24*   CREATININE mg/dL 4.55* 3.59*   GLUCOSE mg/dL 90 99     Results from last 7 days   Lab Units 05/19/25 0353 05/18/25 0312 05/17/25  0305 05/15/25  2349   IONIZED CALCIUM mmol/L  --   --   --  1.24   CALCIUM mg/dL 8.8 8.8   < > 9.1   MAGNESIUM mg/dL 1.9 1.9  --   --    PHOSPHORUS mg/dL 4.0 3.3  --   --     < > = values in this interval not displayed.     Hepatic Panel:  Results from last 7 days   Lab Units 05/19/25 0353 05/17/25  0305   ALBUMIN g/dL 2.5* 2.6*   TOTAL PROTEIN g/dL 4.8* 4.8*   BILIRUBIN mg/dL 0.3 0.6   AST (SGOT) U/L 9 9   ALT (SGPT) U/L 8 9   ALK PHOS U/L 100 97     Biomarkers:  Results from last 7 days   Lab Units 05/19/25  0353 05/13/25  0403   CRP mg/dL 5.64* 6.07*     Arterial Blood Gases:  Results from last 7 days   Lab Units 05/13/25  0358   PH, ARTERIAL pH units 7.430   PCO2, ARTERIAL mm Hg 37.6   PO2 ART mm Hg 66.5*   FIO2 % 35        Images:  No radiology results for the last day    Echo:  Results for orders placed during the hospital encounter of 04/11/25    Adult Transthoracic Echo Complete W/ Cont if Necessary Per Protocol    Interpretation Summary  Images from the original result were not included.      The study is technically difficult for diagnosis. The quality of the study is limited with poor acoustic windows.    Sinus bradycardia in high 50s was the predominant rhythm observed during the procedure.    Normal left ventricular cavity size noted. Left ventricular wall thickness is consistent with mild concentric hypertrophy.    Left ventricular systolic function is mildly decreased. Left ventricular ejection fraction appears to be 41 - 45%.    Left ventricular diastolic function is consistent with (grade II w/high LAP) pseudonormalization.    There is mild calcification of the aortic valve. No significant aortic valve regurgitation is present. No hemodynamically significant aortic valve stenosis is present.    There is mild calcification of the mitral valve posterior leaflet(s). Mild mitral valve regurgitation is present. No significant mitral valve stenosis is present.    There is a small (<1cm) pericardial effusion. There is no evidence of cardiac tamponade.      Results: Reviewed.  I reviewed the patient's new laboratory and imaging results.  I independently reviewed the patient's new images.    Medications: Reviewed.    Assessment   A/P     Assessment/Management/Treatment Plan:    Hospital:  LOS: 14 days   ICU: 13d 19h     Active Hospital Problems    Diagnosis  POA    **CAD (coronary artery disease) [I25.10]  Yes    Ileus [K56.7]  No    ESRD on HD [N18.6]  Unknown    Polycystic kidney disease [Q61.3]  Not Applicable    HTN (hypertension) [I10]  Unknown    Dyslipidemia [E78.5]  Unknown    AAA (abdominal aortic aneurysm) [I71.40]  Unknown    Right coronary artery occlusion [I24.0]  Unknown    Depression [F32.A]  Unknown     Tracheostomy present [Z93.0]  Not Applicable    S/P percutaneous endoscopic gastrostomy (PEG) tube placement [Z93.1]  Not Applicable    Hematoma of groin [S30.1XXA]  Unknown     Estefania is a 69 y.o. female transferred from [facility] in [place] on 5/5/2025 after she suffered a V-fib arrest with resuscitation and underwent left heart catheterization showing 100% occlusion of the RCA with failed intervention.    She was initially admitted to [facility] on 3/20/2025 with volume overload secondary to missing 2 hemodialysis sessions.  She also has followed up possible pneumonia and had anemia and was evaluated by GI with no evidence of GI bleed.  She had bilateral pleural effusions and underwent 1550 mL thoracentesis, but despite thoracentesis required intubation on 4/10/2025 was transferred to [facility] on 4/11/2025.      Patient ultimately underwent tracheostomy and PEG placement on 4/29/2025.      She deteriorated on 5/2/2025 suffering a Vfib arrest.     05/07/25 she underwent left heart catheterization with stent of her occluded RCA.  She was noted to have a swollen right upper extremity.  Duplex revealed superficial thrombophlebitis.       05/09/25 She developed a hematoma at her left femoral artery site requiring urgent surgery with femoral artery repair, evacuation of a hematoma, 2 units of packed red cells.  Wound VAC was placed.      05/13/25 she went back to surgery for a washout and replacement of the wound VAC.  She received additional unit of packed red cells on May 14.  Hemoglobin is 7.9.     She stills look volume overload with pleural effusions and upper extremity pitting edema.       Respiratory cultures were positive for Klebsiella without evidence of pneumonia consistent with a tracheobronchitis.  She is receiving Rocephin.    Comorbidities: HTN, HLD, CKD on HD, polycystic kidney disease, abdominal aortic aneurysm, CAD with prior stent, depression      Respiratory Failure,  "chronic  Trach (Done a Alcon 04/29/25)  Bilateral pleural effusions.  Cardiovascular  V Fib arrest, occluded RCA.  P A Fib. Amiodarone. No anticoagulation due to femoral hematoma and subsequent surgical repair.  CAD  Ischemic cardiomyopathy EF 41-45%  [facility] 3 to RCA  Femoral Pseudoaneurysm  S/P Surgical Repair 05/09/25, and follow up washout on 05/09/25  HTN  Dyslipidemia ? Treatment with statin  Renal  CKD. Polycystic Kidneys.  ESRD  Nutrition Support   Tube Feeding: Formula: Novasource Renal (Electrolyte Restricted); Feeding Type: Continuous; Start at: 10 mL/hr; Then Advance By: Do Not Advance; Water Flush: 10 mL; Every: 1 hour; Water Bolus: None  Gastrostomy/Enterostomy Percutaneous endoscopic gastrostomy (PEG) 20 Fr. RUQ-Tube Feeding Rate (mL/hr): 10 mL/HR (Based on a feeding duration of 20 h/d)    Lab Results   Component Value Date    PREALBUMIN 14.9 (L) 05/13/2025    PREALBUMIN 15.5 (L) 05/06/2025    CRP 5.64 (H) 05/19/2025    CRP 6.07 (H) 05/13/2025    CRP 8.27 (H) 05/06/2025    CRP 6.44 (H) 05/05/2025     Endocrine   Body mass index is 27.11 kg/m². Overweight: 25.0-29.9kg/m2   No history of Diabetes    No results found for: \"HGBA1C\"    Results from last 7 days   Lab Units 05/19/25  0511 05/19/25  0025 05/18/25  1802 05/18/25  1213 05/18/25  0516 05/17/25  2312 05/17/25  1729 05/17/25  1136   GLUCOSE mg/dL 99 90 89 98 97 82 86 77       Diet: NPO Diet NPO Type: Tube Feeding   Advance Directives: Code Status and Medical Interventions: CPR (Attempt to Resuscitate); Full Support   Ordered at: 05/06/25 1629     Code Status (Patient has no pulse and is not breathing):    CPR (Attempt to Resuscitate)     Medical Interventions (Patient has pulse or is breathing):    Full Support        VTE Prophylaxis:  Pharmacologic & mechanical VTE prophylaxis orders are present.         In brief:  Transfusion 1 PRBCs during dialysis, today.  Continue DAPT as per Cardiology, for her stents.  CXR and " KUB in AM  Advance N Renal to 25 mL/h  iHD: M/W/F as per [facility].  Metoclopramide  5 mg IV q 8h  Goal: Glucose < 180 mg/dL.   Disposition: Transfer to LTAC soon.    Plan of care and goals reviewed during interdisciplinary rounds.  I discussed the patient's findings and my recommendations with nursing staff    MDM:    Problem(s) High due to: Acute or Chronic illness or injury that may poses a threat to life or bodily function  Data: Moderate due to: Review of prior external records from each unique source, Review or results of each unique test, and Ordering of each unique test    Moderate    [x] Primary Attending Intensive Care Medicine - Nutrition Support   [] Consultant    Copied text in this note has been reviewed and is accurate as of 05/19/25

## 2025-05-19 NOTE — PLAN OF CARE
Goal Outcome Evaluation:  Plan of Care Reviewed With: patient        Progress: no change  Outcome Evaluation: Wound vac check complete. Vac remains intact with no issues noted. Pt due for next wound vac dressing change in 1-2 days.

## 2025-05-19 NOTE — PLAN OF CARE
Problem: Hemodialysis  Goal: Safe, Effective Therapy Delivery  Outcome: Progressing  Goal: Effective Tissue Perfusion  Outcome: Progressing  Goal: Absence of Infection Signs and Symptoms  Outcome: Progressing     HD completed. Tolerated scheduled 4 hrs well. Access functioned well, after reversing of lines. UF goal of 3.5 liters reached, total net removal was 3550 ml and total liters processed was 84.2 liters.  One unit of packed red blood cells administered, with client tolerating infusion well,no reaction observed . Blood returned well with no complications. Report given to Gabby rabago RN. Goal Outcome Evaluation:

## 2025-05-19 NOTE — PROGRESS NOTES
"   LOS: 14 days    Patient Care Team:  Sergio Randall MD as PCP - General (Cardiology)    Subjective     Stable.     Objective     Vital Signs:  Blood pressure 165/66, pulse 62, temperature 99 °F (37.2 °C), temperature source Axillary, resp. rate 14, height 160 cm (62.99\"), weight 69.4 kg (153 lb), SpO2 98%.      Intake/Output Summary (Last 24 hours) at 5/19/2025 0901  Last data filed at 5/19/2025 0600  Gross per 24 hour   Intake 685.8 ml   Output 200 ml   Net 485.8 ml        05/18 0701 - 05/19 0700  In: 811.5 [I.V.:153.5]  Out: 500     Physical Exam:    Gen: Alert, NAD   HENT: NC, AT, EOMI   NECK: Supple, no JVD, Trachea midline   LUNGS: CTA bilaterally, non labored respirtation   CVS: S1/S2 audible, RRR, no murmur   Abd: Soft, NT, ND, PEG tube +   Ext: No pedal edema, no cyanosis   CNS: Alert, No focal deficit noted grossly  Psy: Cooperative  Skin: Warm, dry and intact      Labs:  Results from last 7 days   Lab Units 05/19/25  0353 05/18/25  0312 05/17/25  0305   WBC 10*3/mm3 8.08 8.31 9.43   HEMOGLOBIN g/dL 7.2* 7.2* 7.9*   PLATELETS 10*3/mm3 298 290 281     Results from last 7 days   Lab Units 05/19/25  0353 05/18/25  0312 05/17/25  0305 05/15/25  2349 05/15/25  0427   SODIUM mmol/L 132* 134* 135* 137 136   POTASSIUM mmol/L 3.4* 3.3* 3.5 3.7 3.9   CHLORIDE mmol/L 100 101 102 101 101   CO2 mmol/L 23.0 22.0 23.0 23.0 22.0   BUN mg/dL 29* 24* 16 26* 19   CREATININE mg/dL 4.55* 3.59* 2.56* 3.38* 2.64*   CALCIUM mg/dL 8.8 8.8 8.7 9.1 8.6   PHOSPHORUS mg/dL 4.0 3.3  --   --  3.4   MAGNESIUM mg/dL 1.9 1.9  --   --  1.8   ALBUMIN g/dL 2.5*  --  2.6*  --   --      Results from last 7 days   Lab Units 05/19/25  0353   ALK PHOS U/L 100   BILIRUBIN mg/dL 0.3   ALT (SGPT) U/L 8   AST (SGOT) U/L 9     Results from last 7 days   Lab Units 05/13/25  0358   PH, ARTERIAL pH units 7.430   PO2 ART mm Hg 66.5*   PCO2, ARTERIAL mm Hg 37.6   HCO3 ART mmol/L 24.9       A/P:    ESRD: MWF - On HD with Dr. Kaur at [facility].   HTN: "   Hyperkalemia   Metabolic acidosis  Anemia: Hemoglobin dropped to 5 with femoral artery bleed.  Patient was transfused 4 units packed red blood cells.    Respiratory failure: Trach on vent  Left femoral pseudoaneurysm: Developed at cardiac cath site.  Required repair 5/9/2025.  Wound VAC in place.      Plan:  - Seen on dialysis, UF as tolerated.   - Renal diet   - VANCE with HD   - Electrolytes will be corrected with HD       High risk and complexity patient.      Arabella Morales MD  05/19/25  09:01 EDT

## 2025-05-19 NOTE — PROGRESS NOTES
"  [facility]  INPATIENT PROGRESS NOTE    Date of Admission: 5/5/2025  Date of Service: 05/19/25    Identification: Estefania Connor is a 69 y.o. female   Primary Care Physician: Sergio Randall MD    Chief Complaint: Follow-up hypertension  Problem List:   CAD S/P ELIECER RCA    ESRD on HD    Polycystic kidney disease    HTN (hypertension)    Dyslipidemia    AAA (abdominal aortic aneurysm)    Right coronary artery occlusion    Depression    Tracheostomy present    S/P percutaneous endoscopic gastrostomy (PEG) tube placement    Hematoma of groin    Ileus          Subjective/Interval History    Patient receiving dialysis this morning.  Stable over the weekend.  Hoping to discharge patient back to LTAC soon.            Objective   Vitals:  /70 (BP Location: Right arm, Patient Position: Lying)   Pulse 75   Temp 98.6 °F (37 °C) (Oral)   Resp 16   Ht 160 cm (62.99\")   Wt 69.4 kg (153 lb)   SpO2 100%   BMI 27.11 kg/m²     Intake/Output Summary (Last 24 hours) at 5/19/2025 1107  Last data filed at 5/19/2025 1055  Gross per 24 hour   Intake 910.8 ml   Output 200 ml   Net 710.8 ml       Current Medications:  amiodarone, 200 mg, Per PEG Tube, Q12H  amLODIPine, 10 mg, Per PEG Tube, Q24H  aspirin, 81 mg, Per PEG Tube, Daily  atorvastatin, 40 mg, Per PEG Tube, Nightly  carvedilol, 12.5 mg, Per PEG Tube, Q12H  cloNIDine, 0.1 mg, Per PEG Tube, Q8H  clopidogrel, 75 mg, Per PEG Tube, Daily  heparin (porcine), 5,000 Units, Subcutaneous, Q8H  hydrALAZINE, 100 mg, Per PEG Tube, Q8H  ipratropium-albuterol, 3 mL, Nebulization, 4x Daily - RT  lansoprazole, 15 mg, Per PEG Tube, QAM AC  miconazole, 1 Application, Topical, Q12H  nitroglycerin, 1 patch, Transdermal, Daily  sacubitril-valsartan, 1 tablet, Per PEG Tube, Q12H      dexmedetomidine, 0.2-1.5 mcg/kg/hr, Last Rate: 0.4 mcg/kg/hr (05/19/25 0800)      Constitutional:       Appearance: Chronically ill-appearing.   Neck:      Comments: " Tracheostomy in place  Pulmonary:      Comments: Diffuse bilateral rhonchi  Cardiovascular:      Normal rate. Regular rhythm.   Edema:     Peripheral edema absent.   Skin:     General: Skin is warm and dry.   Neurological:      Mental Status: Alert.          Data Review:  Results from last 7 days   Lab Units 05/19/25  0353 05/18/25  0312 05/17/25  0305   WBC 10*3/mm3 8.08 8.31 9.43   HEMOGLOBIN g/dL 7.2* 7.2* 7.9*   HEMATOCRIT % 23.5* 22.5* 24.6*   PLATELETS 10*3/mm3 298 290 281     Results from last 7 days   Lab Units 05/19/25  0353 05/18/25  0312 05/17/25  0305   SODIUM mmol/L 132* 134* 135*   POTASSIUM mmol/L 3.4* 3.3* 3.5   CHLORIDE mmol/L 100 101 102   CO2 mmol/L 23.0 22.0 23.0   BUN mg/dL 29* 24* 16   CREATININE mg/dL 4.55* 3.59* 2.56*   GLUCOSE mg/dL 90 99 81          Results from last 7 days   Lab Units 05/19/25  0353   TRIGLYCERIDES mg/dL 165*                             No radiology results for the last day      Results for orders placed during the hospital encounter of 04/11/25    Adult Transthoracic Echo Complete W/ Cont if Necessary Per Protocol    Interpretation Summary  Images from the original result were not included.      The study is technically difficult for diagnosis. The quality of the study is limited with poor acoustic windows.    Sinus bradycardia in high 50s was the predominant rhythm observed during the procedure.    Normal left ventricular cavity size noted. Left ventricular wall thickness is consistent with mild concentric hypertrophy.    Left ventricular systolic function is mildly decreased. Left ventricular ejection fraction appears to be 41 - 45%.    Left ventricular diastolic function is consistent with (grade II w/high LAP) pseudonormalization.    There is mild calcification of the aortic valve. No significant aortic valve regurgitation is present. No hemodynamically significant aortic valve stenosis is present.    There is mild calcification of the mitral valve posterior  leaflet(s). Mild mitral valve regurgitation is present. No significant mitral valve stenosis is present.    There is a small (<1cm) pericardial effusion. There is no evidence of cardiac tamponade.      RESULTS REVIEW:    I reviewed the patient's new clinical results.    I personally reviewed the patient's EKG/Telemetry data              Assessment:   V-fib arrest/CAD  LHC at outlying facility with ELIECER x3 to RCA  Ischemic cardiomyopathy, EF 41-45%  PAF,   Brief episode, not currently on anticoagulation due to thigh hematoma  Hypertension   Hyperlipidemia  Left groin hematoma with wound VAC in place  Chronic respiratory failure with tracheostomy  ESRD on HD            Plan:   Patient again will receive all her antihypertensive medications after dialysis treatment.  Continue amlodipine 10 mg daily, carvedilol 12.5 mg twice daily, clonidine 0.1 mg every 8 hours,And Entresto  twice daily and Nitropatch (in place of Imdur given patient has a PEG tube in (for hypertension.  I will increase her hydralazine to 100 mg 3 times a day given that her blood pressure has trended up over the weekend.  Continue dual antiplatelet therapy with aspirin and Plavix given recent stent.   Continue amiodarone 200 mg twice daily, will check an EKG with a QTc measurement today.  No recurrent A-fib episodes, initially appears to have been placed on amiodarone after patient's V-fib arrest.  Will likely be able to transition down to 200 mg daily upon discharge for about a month.  Continue atorvastatin 40 mg daily for hyperlipidemia.  Patient is okay to be discharged back to LTAC from a cardiac perspective.           Electronically signed by Maribell Cid PA-C, 05/19/25, 11:12 AM EDT.

## 2025-05-20 ENCOUNTER — APPOINTMENT (OUTPATIENT)
Dept: GENERAL RADIOLOGY | Facility: HOSPITAL | Age: 70
End: 2025-05-20
Payer: MEDICARE

## 2025-05-20 LAB
ALBUMIN SERPL-MCNC: 2.5 G/DL (ref 3.5–5.2)
ANION GAP SERPL CALCULATED.3IONS-SCNC: 8 MMOL/L (ref 5–15)
ARTERIAL PATENCY WRIST A: POSITIVE
ATMOSPHERIC PRESS: ABNORMAL MM[HG]
BASE EXCESS BLDA CALC-SCNC: 2.4 MMOL/L (ref 0–2)
BASOPHILS # BLD AUTO: 0.03 10*3/MM3 (ref 0–0.2)
BASOPHILS NFR BLD AUTO: 0.4 % (ref 0–1.5)
BDY SITE: ABNORMAL
BH BB BLOOD EXPIRATION DATE: NORMAL
BH BB BLOOD TYPE BARCODE: 9500
BH BB DISPENSE STATUS: NORMAL
BH BB PRODUCT CODE: NORMAL
BH BB UNIT NUMBER: NORMAL
BODY TEMPERATURE: 37
BUN SERPL-MCNC: 18 MG/DL (ref 8–23)
BUN/CREAT SERPL: 5.9 (ref 7–25)
CALCIUM SPEC-SCNC: 8.8 MG/DL (ref 8.6–10.5)
CHLORIDE SERPL-SCNC: 98 MMOL/L (ref 98–107)
CO2 BLDA-SCNC: 28.2 MMOL/L (ref 22–33)
CO2 SERPL-SCNC: 26 MMOL/L (ref 22–29)
COHGB MFR BLD: 1.9 % (ref 0–2)
CREAT SERPL-MCNC: 3.05 MG/DL (ref 0.57–1)
CROSSMATCH INTERPRETATION: NORMAL
DEPRECATED RDW RBC AUTO: 56.8 FL (ref 37–54)
EGFRCR SERPLBLD CKD-EPI 2021: 15.9 ML/MIN/1.73
EOSINOPHIL # BLD AUTO: 0.29 10*3/MM3 (ref 0–0.4)
EOSINOPHIL NFR BLD AUTO: 3.9 % (ref 0.3–6.2)
EPAP: 0
ERYTHROCYTE [DISTWIDTH] IN BLOOD BY AUTOMATED COUNT: 17.2 % (ref 12.3–15.4)
GLUCOSE BLDC GLUCOMTR-MCNC: 105 MG/DL (ref 70–130)
GLUCOSE BLDC GLUCOMTR-MCNC: 109 MG/DL (ref 70–130)
GLUCOSE BLDC GLUCOMTR-MCNC: 121 MG/DL (ref 70–130)
GLUCOSE BLDC GLUCOMTR-MCNC: 90 MG/DL (ref 70–130)
GLUCOSE SERPL-MCNC: 128 MG/DL (ref 65–99)
HCO3 BLDA-SCNC: 27 MMOL/L (ref 20–26)
HCT VFR BLD AUTO: 24.9 % (ref 34–46.6)
HCT VFR BLD CALC: 26.3 % (ref 38–51)
HGB BLD-MCNC: 8 G/DL (ref 12–15.9)
HGB BLDA-MCNC: 8.6 G/DL (ref 14–18)
IMM GRANULOCYTES # BLD AUTO: 0.05 10*3/MM3 (ref 0–0.05)
IMM GRANULOCYTES NFR BLD AUTO: 0.7 % (ref 0–0.5)
INHALED O2 CONCENTRATION: 40 %
IPAP: 0
LYMPHOCYTES # BLD AUTO: 0.96 10*3/MM3 (ref 0.7–3.1)
LYMPHOCYTES NFR BLD AUTO: 13 % (ref 19.6–45.3)
MCH RBC QN AUTO: 29.3 PG (ref 26.6–33)
MCHC RBC AUTO-ENTMCNC: 32.1 G/DL (ref 31.5–35.7)
MCV RBC AUTO: 91.2 FL (ref 79–97)
METHGB BLD QL: 0.3 % (ref 0–1.5)
MODALITY: ABNORMAL
MONOCYTES # BLD AUTO: 0.96 10*3/MM3 (ref 0.1–0.9)
MONOCYTES NFR BLD AUTO: 13 % (ref 5–12)
NEUTROPHILS NFR BLD AUTO: 5.1 10*3/MM3 (ref 1.7–7)
NEUTROPHILS NFR BLD AUTO: 69 % (ref 42.7–76)
NRBC BLD AUTO-RTO: 0 /100 WBC (ref 0–0.2)
OXYHGB MFR BLDV: 96.6 % (ref 94–99)
PAW @ PEAK INSP FLOW SETTING VENT: 0 CMH2O
PCO2 BLDA: 40.8 MM HG (ref 35–45)
PCO2 TEMP ADJ BLD: 40.8 MM HG (ref 35–45)
PEEP RESPIRATORY: 5 CM[H2O]
PH BLDA: 7.43 PH UNITS (ref 7.35–7.45)
PH, TEMP CORRECTED: 7.43 PH UNITS
PHOSPHATE SERPL-MCNC: 3 MG/DL (ref 2.5–4.5)
PLATELET # BLD AUTO: 301 10*3/MM3 (ref 140–450)
PMV BLD AUTO: 10.4 FL (ref 6–12)
PO2 BLDA: 91.3 MM HG (ref 83–108)
PO2 TEMP ADJ BLD: 91.3 MM HG (ref 83–108)
POTASSIUM SERPL-SCNC: 3.4 MMOL/L (ref 3.5–5.2)
RBC # BLD AUTO: 2.73 10*6/MM3 (ref 3.77–5.28)
SODIUM SERPL-SCNC: 132 MMOL/L (ref 136–145)
TOTAL RATE: 14 BREATHS/MINUTE
UNIT  ABO: NORMAL
UNIT  RH: NORMAL
VENTILATOR MODE: ABNORMAL
VT ON VENT VENT: 0.36 ML
WBC NRBC COR # BLD AUTO: 7.39 10*3/MM3 (ref 3.4–10.8)

## 2025-05-20 PROCEDURE — 71045 X-RAY EXAM CHEST 1 VIEW: CPT

## 2025-05-20 PROCEDURE — 97602 WOUND(S) CARE NON-SELECTIVE: CPT

## 2025-05-20 PROCEDURE — 97605 NEG PRS WND THER DME<=50SQCM: CPT

## 2025-05-20 PROCEDURE — 82375 ASSAY CARBOXYHB QUANT: CPT

## 2025-05-20 PROCEDURE — 74018 RADEX ABDOMEN 1 VIEW: CPT

## 2025-05-20 PROCEDURE — 83050 HGB METHEMOGLOBIN QUAN: CPT

## 2025-05-20 PROCEDURE — 94003 VENT MGMT INPAT SUBQ DAY: CPT

## 2025-05-20 PROCEDURE — 99232 SBSQ HOSP IP/OBS MODERATE 35: CPT | Performed by: INTERNAL MEDICINE

## 2025-05-20 PROCEDURE — 82805 BLOOD GASES W/O2 SATURATION: CPT

## 2025-05-20 PROCEDURE — 80069 RENAL FUNCTION PANEL: CPT | Performed by: INTERNAL MEDICINE

## 2025-05-20 PROCEDURE — 25010000002 METOCLOPRAMIDE PER 10 MG: Performed by: INTERNAL MEDICINE

## 2025-05-20 PROCEDURE — 85025 COMPLETE CBC W/AUTO DIFF WBC: CPT | Performed by: INTERNAL MEDICINE

## 2025-05-20 PROCEDURE — 36600 WITHDRAWAL OF ARTERIAL BLOOD: CPT

## 2025-05-20 PROCEDURE — 82948 REAGENT STRIP/BLOOD GLUCOSE: CPT

## 2025-05-20 PROCEDURE — 94799 UNLISTED PULMONARY SVC/PX: CPT

## 2025-05-20 PROCEDURE — 25010000002 HEPARIN (PORCINE) PER 1000 UNITS: Performed by: SURGERY

## 2025-05-20 RX ORDER — LACTULOSE 10 G/15ML
20 SOLUTION ORAL 3 TIMES DAILY
Status: DISCONTINUED | OUTPATIENT
Start: 2025-05-20 | End: 2025-05-21

## 2025-05-20 RX ADMIN — METOCLOPRAMIDE 5 MG: 5 INJECTION, SOLUTION INTRAMUSCULAR; INTRAVENOUS at 11:17

## 2025-05-20 RX ADMIN — HYDRALAZINE HYDROCHLORIDE 100 MG: 50 TABLET ORAL at 14:24

## 2025-05-20 RX ADMIN — AMIODARONE HYDROCHLORIDE 200 MG: 200 TABLET ORAL at 20:09

## 2025-05-20 RX ADMIN — AMIODARONE HYDROCHLORIDE 200 MG: 200 TABLET ORAL at 08:49

## 2025-05-20 RX ADMIN — CLOPIDOGREL BISULFATE 75 MG: 75 TABLET, FILM COATED ORAL at 08:49

## 2025-05-20 RX ADMIN — MICONAZOLE NITRATE 1 APPLICATION: 20 POWDER TOPICAL at 08:50

## 2025-05-20 RX ADMIN — SACUBITRIL AND VALSARTAN 1 TABLET: 97; 103 TABLET, FILM COATED ORAL at 21:32

## 2025-05-20 RX ADMIN — HEPARIN SODIUM 5000 UNITS: 5000 INJECTION INTRAVENOUS; SUBCUTANEOUS at 14:24

## 2025-05-20 RX ADMIN — METOCLOPRAMIDE 5 MG: 5 INJECTION, SOLUTION INTRAMUSCULAR; INTRAVENOUS at 02:27

## 2025-05-20 RX ADMIN — HYDRALAZINE HYDROCHLORIDE 100 MG: 50 TABLET ORAL at 05:29

## 2025-05-20 RX ADMIN — IPRATROPIUM BROMIDE AND ALBUTEROL SULFATE 3 ML: 2.5; .5 SOLUTION RESPIRATORY (INHALATION) at 12:16

## 2025-05-20 RX ADMIN — IPRATROPIUM BROMIDE AND ALBUTEROL SULFATE 3 ML: 2.5; .5 SOLUTION RESPIRATORY (INHALATION) at 19:06

## 2025-05-20 RX ADMIN — CARVEDILOL 12.5 MG: 12.5 TABLET, FILM COATED ORAL at 08:49

## 2025-05-20 RX ADMIN — CLONIDINE HYDROCHLORIDE 0.1 MG: 0.1 TABLET ORAL at 21:32

## 2025-05-20 RX ADMIN — AMLODIPINE BESYLATE 10 MG: 10 TABLET ORAL at 08:49

## 2025-05-20 RX ADMIN — MICONAZOLE NITRATE 1 APPLICATION: 20 POWDER TOPICAL at 20:09

## 2025-05-20 RX ADMIN — IPRATROPIUM BROMIDE AND ALBUTEROL SULFATE 3 ML: 2.5; .5 SOLUTION RESPIRATORY (INHALATION) at 07:38

## 2025-05-20 RX ADMIN — IPRATROPIUM BROMIDE AND ALBUTEROL SULFATE 3 ML: 2.5; .5 SOLUTION RESPIRATORY (INHALATION) at 14:57

## 2025-05-20 RX ADMIN — CLONIDINE HYDROCHLORIDE 0.1 MG: 0.1 TABLET ORAL at 05:29

## 2025-05-20 RX ADMIN — HYDRALAZINE HYDROCHLORIDE 100 MG: 50 TABLET ORAL at 21:32

## 2025-05-20 RX ADMIN — LACTULOSE 20 G: 10 SOLUTION ORAL at 20:55

## 2025-05-20 RX ADMIN — CARVEDILOL 12.5 MG: 12.5 TABLET, FILM COATED ORAL at 20:09

## 2025-05-20 RX ADMIN — OXYCODONE HYDROCHLORIDE 10 MG: 10 TABLET ORAL at 11:17

## 2025-05-20 RX ADMIN — DIAZEPAM 5 MG: 5 TABLET ORAL at 20:08

## 2025-05-20 RX ADMIN — NITROGLYCERIN 1 PATCH: 0.4 PATCH TRANSDERMAL at 08:48

## 2025-05-20 RX ADMIN — CLONIDINE HYDROCHLORIDE 0.1 MG: 0.1 TABLET ORAL at 14:24

## 2025-05-20 RX ADMIN — ATORVASTATIN CALCIUM 40 MG: 40 TABLET, FILM COATED ORAL at 20:08

## 2025-05-20 RX ADMIN — LANSOPRAZOLE 15 MG: 15 TABLET, ORALLY DISINTEGRATING ORAL at 08:48

## 2025-05-20 RX ADMIN — HEPARIN SODIUM 5000 UNITS: 5000 INJECTION INTRAVENOUS; SUBCUTANEOUS at 21:32

## 2025-05-20 RX ADMIN — ASPIRIN 81 MG 81 MG: 81 TABLET ORAL at 08:49

## 2025-05-20 RX ADMIN — HEPARIN SODIUM 5000 UNITS: 5000 INJECTION INTRAVENOUS; SUBCUTANEOUS at 05:29

## 2025-05-20 RX ADMIN — SACUBITRIL AND VALSARTAN 1 TABLET: 97; 103 TABLET, FILM COATED ORAL at 08:49

## 2025-05-20 NOTE — CASE MANAGEMENT/SOCIAL WORK
Continued Stay Note  [facility]     Patient Name: Estefania Connor  MRN: [id number]  Today's Date: 5/20/2025    Admit Date: 5/5/2025    Plan: [facility]   Discharge Plan       Row Name 05/20/25 1407       Plan    Plan [facility]    Plan Comments I have given additional information to Reginald with [facility] today.    They will let me know tomorrow, if they can have her transfer. I spoke with Janey with dialysis and they will do the run early tomorrow.     Final Discharge Disposition Code 63 - LTCH                   Discharge Codes    No documentation.                 Expected Discharge Date and Time       Expected Discharge Date Expected Discharge Time    May 9, 2025               Chrissie Terrell RN

## 2025-05-20 NOTE — THERAPY WOUND CARE TREATMENT
Acute Care - Wound/Debridement Treatment Note   [place]     Patient Name: Estefania Connor  : 1955  MRN: [id number]  Today's Date: 2025                Admit Date: 2025    Visit Dx:    ICD-10-CM ICD-9-CM   1. Hematoma of groin, initial encounter  S30.1XXA 922.2       Patient Active Problem List   Diagnosis    ESRD on HD    Polycystic kidney disease    HTN (hypertension)    Dyslipidemia    AAA (abdominal aortic aneurysm)    Right coronary artery occlusion    Depression    Tracheostomy present    S/P percutaneous endoscopic gastrostomy (PEG) tube placement    CAD S/P ELIECER RCA    Hematoma of groin    Ileus        Past Medical History:   Diagnosis Date    AAA (abdominal aortic aneurysm)     Coronary artery disease     Depression     ESRD (end stage renal disease)     Hyperlipidemia     Hypertension     Polycystic kidney disease         Past Surgical History:   Procedure Laterality Date    CARDIAC CATHETERIZATION N/A 2025    Procedure: Coronary angiography;  Surgeon: Ambrose Mcnally MD;  Location:  [facility];  Service: Cardiovascular;  Laterality: N/A;    CARDIAC CATHETERIZATION N/A 2025    Procedure: Left Heart Cath;  Surgeon: Edin Boland MD;  Location:  [facility];  Service: Cardiovascular;  Laterality: N/A;    CARDIAC CATHETERIZATION N/A 2025    Procedure: Stent ELIECER coronary;  Surgeon: Edin Boland MD;  Location:  [facility];  Service: Cardiovascular;  Laterality: N/A;    INCISION AND DRAINAGE LEG Left 2025    Procedure: GROIN WOUND VACUUM CHANGE LEFT;  Surgeon: Remi Guerrero MD;  Location:  [facility];  Service: Vascular;  Laterality: Left;    PSEUDO ANEURYSM REPAIR, EXTREMITY Left 2025    Procedure: REPAIR LEFT FEMORAL ARTERY, EVACUATION LEFT GROIN HEMATOMA, PLACEMENT OF NEGATIVE WOUND VACCUUM THERAPY;  Surgeon: Remi Guerrero MD;  Location:  [facility];  Service: Vascular;  Laterality: Left;    TRACHEOSTOMY  AND PEG TUBE INSERTION N/A 4/29/2025    Procedure: TRACHEOSTOMY AND PERCUTANEOUS ENDOSCOPIC GASTROSTOMY TUBE INSERTION;  Surgeon: Kera Head MD;  Location: [facility];  Service: General;  Laterality: N/A;           Wound Right gluteal (Active)   Dressing Appearance dry;intact 05/20/25 0800   Closure Adhesive bandage 05/20/25 0800   Base nonblanchable;blanchable;red;pink 05/20/25 0800   Periwound excoriated 05/20/25 0800   Periwound Temperature warm 05/20/25 0800   Periwound Skin Turgor soft 05/20/25 0800   Drainage Amount none 05/20/25 0800   Dressing Care silicone border foam 05/20/25 0800   Periwound Care barrier film applied 05/20/25 0800       Wound 05/20/25 1130 Left anterior groin Surgical (Active)   Wound Image   05/20/25 1130   Dressing Appearance intact 05/20/25 1130   Dressing Removed Type foam 05/20/25 1130   Base moist;pink;red;slough;yellow 05/20/25 1130   Periwound intact;dry 05/20/25 1130   Periwound Temperature warm 05/20/25 1130   Periwound Skin Turgor soft 05/20/25 1130   Edges irregular 05/20/25 1130   Drainage Characteristics/Odor serosanguineous 05/20/25 1130   Drainage Amount small 05/20/25 1130   Care, Wound irrigated with;wound cleanser 05/20/25 1130   Dressing Care foam;low-adherent 05/20/25 1130   Periwound Care cleansed with pH balanced cleanser 05/20/25 1130       NPWT (Negative Pressure Wound Therapy) 05/09/25 left groin (Active)   Therapy Setting vacuum off 05/20/25 1130   Dressing foam, black 05/20/25 0800   Contact Layer other (see comments) 05/20/25 0800   Pressure Setting 125 mmHg 05/20/25 0800   Sponges Removed 1 05/20/25 1130   Finger sweep complete Yes 05/20/25 1130         WOUND DEBRIDEMENT                     PT Assessment (Last 12 Hours)       PT Evaluation and Treatment       Row Name 05/20/25 1130          Physical Therapy Time and Intention    Subjective Information complains of;weakness;fatigue;pain  -MF     Document Type therapy note (daily note);wound care  -MF      Mode of Treatment individual therapy;physical therapy  -MF       Row Name 05/20/25 1130          Pain Scale: FACES Pre/Post-Treatment    Pain: FACES Scale, Pretreatment 6-->hurts even more  -MF     Posttreatment Pain Rating 6-->hurts even more  -MF       Row Name             Wound Right gluteal    Wound - Properties Group Present on Original Admission: Y  -ER Side: Right  -ER Location: gluteal  -ER    Retired Wound - Properties Group Present on Original Admission: Y  -ER Side: Right  -ER Location: gluteal  -ER    Retired Wound - Properties Group Present on Original Admission: Y  -ER Side: Right  -ER Location: gluteal  -ER    Retired Wound - Properties Group Present on Original Admission: Y  -ER Side: Right  -ER Location: gluteal  -ER      Row Name 05/20/25 1130          Wound 05/20/25 1130 Left anterior groin Surgical    Wound - Properties Group Placement Date: 05/20/25  - Placement Time: 1130  -MF Side: Left  -MF Orientation: anterior  -MF Location: groin  -MF Primary Wound Type: Surgical  -MF    Wound Image Images linked: 1  -MF     Dressing Appearance intact  -MF     Dressing Removed Type foam  -MF     Base moist;pink;red;slough;yellow  -MF     Periwound intact;dry  -MF     Periwound Temperature warm  -MF     Periwound Skin Turgor soft  -MF     Edges irregular  -MF     Drainage Characteristics/Odor serosanguineous  -MF     Drainage Amount small  -MF     Care, Wound irrigated with;wound cleanser  -MF     Dressing Care foam;low-adherent  HFBc with optifoam  -MF     Periwound Care cleansed with pH balanced cleanser  -MF     Retired Wound - Properties Group Placement Date: 05/20/25  - Placement Time: 1130  -MF Side: Left  -MF Orientation: anterior  -MF Location: groin  -MF    Retired Wound - Properties Group Placement Date: 05/20/25  - Placement Time: 1130  -MF Side: Left  -MF Orientation: anterior  -MF Location: groin  -MF    Retired Wound - Properties Group Date first assessed: 05/20/25  - Time first  assessed: 1130  -MF Side: Left  -MF Location: groin  -MF      Row Name 05/20/25 1130          NPWT (Negative Pressure Wound Therapy) 05/09/25 left groin    NPWT (Negative Pressure Wound Therapy) - Properties Group Placement Date: 05/09/25  -MEET Location: left groin  -MEET    Therapy Setting vacuum off  -MF     Sponges Removed 1  -MF     Finger sweep complete Yes  -MF     Retired NPWT (Negative Pressure Wound Therapy) - Properties Group Placement Date: 05/09/25  -MEET Location: left groin  -MEET    Retired NPWT (Negative Pressure Wound Therapy) - Properties Group Placement Date: 05/09/25  -MEET Location: left groin  -MEET    Retired NPWT (Negative Pressure Wound Therapy) - Properties Group Placement Date: 05/09/25  -MEET Location: left groin  -MEET      Row Name 05/20/25 1130          Coping    Observed Emotional State calm;cooperative  -MF     Verbalized Emotional State acceptance  -MF     Trust Relationship/Rapport care explained  -MF       Row Name 05/20/25 1130          Plan of Care Review    Plan of Care Reviewed With patient  -MF     Outcome Evaluation wound vac dressing removed to allow for transfer to rehab. wound base with good granulation and no fluid build up noted. Pt will cont to benefit from wound vac at rehab to help further improve healing potential.  -MF       Row Name 05/20/25 1130          Positioning and Restraints    Pre-Treatment Position in bed  -MF     Post Treatment Position bed  -MF     In Bed supine;call light within reach  -               User Key  (r) = Recorded By, (t) = Taken By, (c) = Cosigned By      Initials Name Provider Type    MF Ryan Rocha, PT Physical Therapist    María Maza RN Registered Nurse    Ce Santo RN Registered Nurse                  Physical Therapy Education       Title: PT OT SLP Therapies (In Progress)       Topic: Physical Therapy (In Progress)       Point: Mobility training (In Progress)       Learning Progress Summary            Patient Acceptance,  E,TB, NR by CH at 5/14/2025 1717    Acceptance, E, NR by KG at 5/12/2025 1046    Acceptance, E, NR by KG at 5/7/2025 1115    Acceptance, E, NR by MARIFER at 5/6/2025 1430                      Point: Home exercise program (In Progress)       Learning Progress Summary            Patient Acceptance, E,TB, NR by CH at 5/14/2025 1717    Acceptance, E, NR by KG at 5/12/2025 1046    Acceptance, E, NR by KG at 5/7/2025 1115    Acceptance, E, NR by MARIFER at 5/6/2025 1430                      Point: Body mechanics (In Progress)       Learning Progress Summary            Patient Acceptance, E,TB, NR by CH at 5/14/2025 1717    Acceptance, E, NR by KG at 5/12/2025 1046    Acceptance, E, NR by KG at 5/7/2025 1115    Acceptance, E, NR by MARIFER at 5/6/2025 1430                      Point: Precautions (In Progress)       Learning Progress Summary            Patient Acceptance, E,TB, NR by CH at 5/14/2025 1717    Acceptance, E, NR by KG at 5/12/2025 1046    Acceptance, E, NR by KG at 5/7/2025 1115    Acceptance, E, NR by MARIFER at 5/6/2025 1430                                      User Key       Initials Effective Dates Name Provider Type Discipline     02/03/23 -  Sandra Pruitt, PT Physical Therapist PT     06/16/21 -  Gertrudis Bird RN Registered Nurse Nurse     05/22/20 -  Dorothy Frazier PT Physical Therapist PT                    Recommendation and Plan  Anticipated Discharge Disposition (PT): extended care facility  Planned Therapy Interventions (PT): balance training, bed mobility training, home exercise program, strengthening, transfer training  Therapy Frequency (PT): daily  Plan of Care Reviewed With: patient           Outcome Evaluation: wound vac dressing removed to allow for transfer to rehab. wound base with good granulation and no fluid build up noted. Pt will cont to benefit from wound vac at rehab to help further improve healing potential.  Plan of Care Reviewed With: patient            Time Calculation   PT  Charges       Row Name 05/20/25 1130             Time Calculation    Start Time 1130  -      PT Goal Re-Cert Due Date 05/22/25  -         Untimed Charges    Wound Care 90497 Non-selective debridement  -      97602-Non-selective debridement 25  -MF         Total Minutes    Untimed Charges Total Minutes 25  -MF       Total Minutes 25  -MF                User Key  (r) = Recorded By, (t) = Taken By, (c) = Cosigned By      Initials Name Provider Type    Ryan Rush, PT Physical Therapist                            PT G-Codes  Outcome Measure Options: AM-PAC 6 Clicks Basic Mobility (PT)  AM-PAC 6 Clicks Score (PT): 6  AM-PAC 6 Clicks Score (OT): 6       Ryan Rocha, PT  5/20/2025

## 2025-05-20 NOTE — PROGRESS NOTES
INTENSIVIST   PROGRESS NOTE        SUBJECTIVE     Setefania 69 y.o. female is followed for: No chief complaint on file.       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 We serve as the  or member of an interprofessional team, coordinating and guiding healthcare professionals to provide specialized care for critically ill patients. We manage and resuscitate patients with, or at risk for, critical illness and organ failure and initiate interventions to prevent imminent deterioration. (2024 Consensus Statement from the Society of Critical Care Medicine Defining Intensivist Task Force. [facility] 2025. DOI: 10.1097/[facility].[id number]     Interval History:  POD: 7 Days Post-Op  Procedure(s) (LRB):  GROIN WOUND VACUUM CHANGE LEFT (Left)     Uneventful night.    Still on Dexmedetomidine     Last Bowel Movement: 05/16/25 (05/18/25 2000)    Temp  Min: 98.3 °F (36.8 °C)  Max: 99.5 °F (37.5 °C)       History     Last Reviewed by Toy Chandra MD on 5/19/2025 at  8:00 AM    Sections Reviewed    Medical, Surgical, Family, Tobacco, Alcohol, Drug Use, Sexual Activity,   Social Documentation    Problem list reviewed by Toy Chandra MD on 5/19/2025 at  8:00 AM  Medicines reviewed by Toy Chandra MD on 5/19/2025 at  8:00 AM  Allergies reviewed by Toy Chandra MD on 5/19/2025 at  8:00 AM       The patient's relevant past medical, surgical and social history were reviewed and updated in Epic as appropriate.        OBJECTIVE     Physical Examination    Vitals:  Temp: 99.5 °F (37.5 °C) (05/20/25  400) Temp  Min: 98.3 °F (36.8 °C)  Max: 99.5 °F (37.5 °C)   Temp core:      BP: 125/54 (25) BP  Min: 108/46  Max: 185/74   MAP (non-invasive) Noninvasive MAP (mmHg): 84 (25) Noninvasive MAP (mmHg)  Av.9  Min: 53  Max: 175   Pulse: 59 (25) Pulse  Min: 59  Max: 87   Resp: 18 (25) Resp  Min: 13  Max: 18   SpO2: 95 % (25) SpO2  Min: 79 %  Max: 100 %   Device: ventilator (25)    Flow Rate: 40 (25) Flow (L/min) (Oxygen Therapy)  Min: 40  Max: 40     Intake/Ouptut 24 hrs (7:00AM - 6:59 AM)  Intake & Output (last 2 days)          07 07    I.V. (mL/kg) 153.5 (2.2) 483.9 (7)     Blood  282     Other 241 496     NG/ 397     Total Intake(mL/kg) 811.5 (11.7) 1658.9 (23.9)     Urine (mL/kg/hr) 0 (0)      Drains       Wound 500 580     Dialysis  3850     Total Output 500 4430     Net +311.5 -2771.1            Urine Unmeasured Occurrence 1 x              Medications (drips):  dexmedetomidine, Last Rate: 0.4 mcg/kg/hr (25 0326)            25  1726   Weight: 69.4 kg (153 lb)        Invasive Mechanical Ventilator   Settings: Observed:   Mode: VC+/AC (25)    Resp Rate (Set): 14 (25) Resp Rate (Observed) Vent: 15 (25)   Vt (Set, mL): 365 mL (25) Vt, Exp (observed, mL): 477 mL (25)    Minute Ventilation (L/min) (Obs): 8.17 L/min (25)    I:E Ratio (Obs): 1:3.8 (25)       FiO2 (%): 40 % (25) Plateau Pressure (cm H2O): (S) 12 cm H2O (25)   PEEP/CPAP (cm H2O): 5 cm H20 (25) Driving Pressure (cm H2O): 7.1 cm H2O (25)    Static Compliance (L/cm H2O): 52 (25)      Telemetry:  Rhythm: normal sinus rhythm (25 0600)     QTc Interval (Sec): 0.43 (25 0800)   Constitutional:  No acute distress.   Cardiovascular: RRR.    Respiratory: Normal breath  sounds  No adventitious sounds   Abdominal:  Soft with no tenderness.   Extremities: No Edema  Wound Left groin area.   Neurological:   Awake.  Best Eye Response: 4-->(E4) spontaneous (05/20/25 0600)  Best Motor Response: 6-->(M6) obeys commands (05/20/25 0600)  Best Verbal Response: 1-->(V1) none (05/20/25 0600)  Violeta Coma Scale Score: 11 (05/20/25 0600)     Results Reviewed:  Laboratory  Microbiology  Radiology  Pathology    Hematology:  Results from last 7 days   Lab Units 05/20/25 0356 05/19/25  1810 05/19/25 0353 05/18/25  0312   WBC 10*3/mm3 7.39  --  8.08 8.31   HEMOGLOBIN g/dL 8.0* 8.4* 7.2* 7.2*   MCV fL 91.2  --  96.3 94.1   PLATELETS 10*3/mm3 301  --  298 290     Results from last 7 days   Lab Units 05/20/25 0356 05/19/25 0353 05/17/25  0305   NEUTROS ABS 10*3/mm3 5.10 5.73 7.15*   LYMPHS ABS 10*3/mm3 0.96 1.01 0.92   EOS ABS 10*3/mm3 0.29 0.28 0.34     Chemistry:  Estimated Creatinine Clearance: 16 mL/min (A) (by C-G formula based on SCr of 3.05 mg/dL (H)).    Results from last 7 days   Lab Units 05/20/25 0358 05/19/25  0353   SODIUM mmol/L 132* 132*   POTASSIUM mmol/L 3.4* 3.4*   CHLORIDE mmol/L 98 100   CO2 mmol/L 26.0 23.0   BUN mg/dL 18 29*   CREATININE mg/dL 3.05* 4.55*   GLUCOSE mg/dL 128* 90     Results from last 7 days   Lab Units 05/20/25  0358 05/19/25  0353 05/18/25  0312 05/17/25  0305 05/15/25  2349   IONIZED CALCIUM mmol/L  --   --   --   --  1.24   CALCIUM mg/dL 8.8 8.8 8.8   < > 9.1   MAGNESIUM mg/dL  --  1.9 1.9  --   --    PHOSPHORUS mg/dL 3.0 4.0 3.3  --   --     < > = values in this interval not displayed.     Hepatic Panel:  Results from last 7 days   Lab Units 05/20/25  0358 05/19/25  0353 05/17/25  0305   ALBUMIN g/dL 2.5* 2.5* 2.6*   TOTAL PROTEIN g/dL  --  4.8* 4.8*   BILIRUBIN mg/dL  --  0.3 0.6   AST (SGOT) U/L  --  9 9   ALT (SGPT) U/L  --  8 9   ALK PHOS U/L  --  100 97     Biomarkers:  Results from last 7 days   Lab Units 05/19/25  0353   CRP mg/dL 5.64*     Arterial  Blood Gases:  Results from last 7 days   Lab Units 05/20/25  0352   PH, ARTERIAL pH units 7.428   PCO2, ARTERIAL mm Hg 40.8   PO2 ART mm Hg 91.3   FIO2 % 40       Images:  No radiology results for the last day    Echo:  Results for orders placed during the hospital encounter of 04/11/25    Adult Transthoracic Echo Complete W/ Cont if Necessary Per Protocol    Interpretation Summary  Images from the original result were not included.      The study is technically difficult for diagnosis. The quality of the study is limited with poor acoustic windows.    Sinus bradycardia in high 50s was the predominant rhythm observed during the procedure.    Normal left ventricular cavity size noted. Left ventricular wall thickness is consistent with mild concentric hypertrophy.    Left ventricular systolic function is mildly decreased. Left ventricular ejection fraction appears to be 41 - 45%.    Left ventricular diastolic function is consistent with (grade II w/high LAP) pseudonormalization.    There is mild calcification of the aortic valve. No significant aortic valve regurgitation is present. No hemodynamically significant aortic valve stenosis is present.    There is mild calcification of the mitral valve posterior leaflet(s). Mild mitral valve regurgitation is present. No significant mitral valve stenosis is present.    There is a small (<1cm) pericardial effusion. There is no evidence of cardiac tamponade.      Results: Reviewed.  I reviewed the patient's new laboratory and imaging results.  I independently reviewed the patient's new images.    Medications: Reviewed.    Assessment   A/P     Assessment/Management/Treatment Plan:    Hospital:  LOS: 15 days   ICU: 14d 18h     Active Hospital Problems    Diagnosis  POA    **CAD S/P ELIECER RCA [I25.10]  Yes    Ileus [K56.7]  No    ESRD on HD [N18.6]  Unknown    Polycystic kidney disease [Q61.3]  Not Applicable    HTN (hypertension) [I10]  Unknown    Dyslipidemia [E78.5]  Unknown     AAA (abdominal aortic aneurysm) [I71.40]  Unknown    Right coronary artery occlusion [I24.0]  Unknown    Depression [F32.A]  Unknown    Tracheostomy present [Z93.0]  Not Applicable    S/P percutaneous endoscopic gastrostomy (PEG) tube placement [Z93.1]  Not Applicable    Hematoma of groin [S30.1XXA]  Unknown     Estefania is a 69 y.o. female transferred from [facility] on 5/5/2025 after she suffered a V-fib arrest with resuscitation and underwent left heart catheterization showing 100% occlusion of the RCA with failed intervention.    She was initially admitted to [facility] on 3/20/2025 with volume overload secondary to missing 2 hemodialysis sessions.  She also has followed up possible pneumonia and had anemia and was evaluated by GI with no evidence of GI bleed.  She had bilateral pleural effusions and underwent 1550 mL thoracentesis, but despite thoracentesis required intubation on 4/10/2025 was transferred to [facility] on 4/11/2025.      Patient ultimately underwent tracheostomy and PEG placement on 4/29/2025.      She deteriorated on 5/2/2025 suffering a Vfib arrest.     05/07/25 she underwent left heart catheterization with stent of her occluded RCA.  She was noted to have a swollen right upper extremity.  Duplex revealed superficial thrombophlebitis.       05/09/25 She developed a hematoma at her left femoral artery site requiring urgent surgery with femoral artery repair, evacuation of a hematoma, 2 units of packed red cells.  Wound VAC was placed.      05/13/25 she went back to surgery for a washout and replacement of the wound VAC.  She received additional unit of packed red cells on May 14.  Hemoglobin is 7.9.     She stills look volume overload with pleural effusions and upper extremity pitting edema.       Respiratory cultures were positive for Klebsiella without evidence of pneumonia consistent with a tracheobronchitis.  She is receiving Rocephin.    Comorbidities:  "HTN, HLD, CKD on HD, polycystic kidney disease, abdominal aortic aneurysm, CAD with prior stent, depression      Respiratory Failure, chronic  Trach (Done a Alcon 04/29/25)  Bilateral pleural effusions.  Cardiovascular  V Fib arrest, occluded RCA.  P A Fib. Amiodarone. No anticoagulation due to femoral hematoma and subsequent surgical repair.  CAD  Ischemic cardiomyopathy EF 41-45%  [facility] 3 to RCA  Femoral Pseudoaneurysm  S/P Surgical Repair of left femoral artery 05/09/25, and excisional debridement left groin and application of negative pressure wound therapy on 05/13/25. Wound measures 17 x 10 x 4 cm.  HTN  Dyslipidemia ? Treatment with statin  Renal  CKD. Polycystic Kidneys.  ESRD  Hematology  NC Anemia  Nutrition Support   Tube Feeding: Formula: Novasource Renal (Electrolyte Restricted); Feeding Type: Continuous; Start at: 25 mL/hr; Then Advance By: Do Not Advance; Water Flush: 30 mL; Every: 2 hours; Water Bolus: None  Gastrostomy/Enterostomy Percutaneous endoscopic gastrostomy (PEG) 20 Fr. RUQ-Tube Feeding Rate (mL/hr): 35 mL/HR (Based on a feeding duration of 20 h/d)    Lab Results   Component Value Date    PREALBUMIN 11.0 (L) 05/19/2025    PREALBUMIN 14.9 (L) 05/13/2025    PREALBUMIN 15.5 (L) 05/06/2025    CRP 5.64 (H) 05/19/2025    CRP 6.07 (H) 05/13/2025    CRP 8.27 (H) 05/06/2025    CRP 6.44 (H) 05/05/2025     Endocrine   Body mass index is 27.11 kg/m². Overweight: 25.0-29.9kg/m2   No history of Diabetes    No results found for: \"HGBA1C\"    Results from last 7 days   Lab Units 05/20/25  0526 05/19/25  2304 05/19/25  1705 05/19/25  1135 05/19/25  0511 05/19/25  0025 05/18/25  1802 05/18/25  1213   GLUCOSE mg/dL 121 91 94 85 99 90 89 98       Diet: NPO Diet NPO Type: Tube Feeding   Advance Directives: Code Status and Medical Interventions: CPR (Attempt to Resuscitate); Full Support   Ordered at: 05/06/25 7448     Code Status (Patient has no pulse and is not breathing):    CPR " (Attempt to Resuscitate)     Medical Interventions (Patient has pulse or is breathing):    Full Support        VTE Prophylaxis:  Pharmacologic & mechanical VTE prophylaxis orders are present.         In brief:  Discussed with Dr. Dickens, at [facility]. Evaluation in progress to see if she is candidate to be transferred to continue her treatment.  Constipation: Continue Metoclopramide. Add lactulose.    Continue DAPT as per Cardiology, for her stents.  CXR and KUB in AM  N Renal to 25 mL/h  iHD: M/W/F as per [facility].  Goal: Glucose < 180 mg/dL.   Disposition: Transfer to LTAC soon. ([facility])    Plan of care and goals reviewed during interdisciplinary rounds.  I discussed the patient's findings and my recommendations with nursing staff    MDM:    Problem(s) High due to: Acute or Chronic illness or injury that may poses a threat to life or bodily function  Data: Moderate due to: Review of prior external records from each unique source, Review or results of each unique test, and Ordering of each unique test    Moderate    [x] Primary Attending Intensive Care Medicine - Nutrition Support   [] Consultant    Copied text in this note has been reviewed and is accurate as of 05/20/25

## 2025-05-20 NOTE — PLAN OF CARE
Goal Outcome Evaluation: Pt is currently unable to wean down any more at this time.

## 2025-05-20 NOTE — PROGRESS NOTES
Clinical Nutrition     Patient Name: Estefania Connor  YOB: 1955  MRN: [id number]  Date of Encounter: 05/20/25 09:16 EDT  Admission date: 5/5/2025  Reason for Visit: MDR, Follow-up protocol, EN    Assessment   Nutrition Assessment   Admission Diagnosis:  CAD (coronary artery disease) [I25.10]    Problem List:    CAD S/P ELIECER RCA    ESRD on HD    Polycystic kidney disease    HTN (hypertension)    Dyslipidemia    AAA (abdominal aortic aneurysm)    Right coronary artery occlusion    Depression    Tracheostomy present    S/P percutaneous endoscopic gastrostomy (PEG) tube placement    Hematoma of groin    Ileus      PMH:   She  has a past medical history of AAA (abdominal aortic aneurysm), Coronary artery disease, Depression, ESRD (end stage renal disease), Hyperlipidemia, Hypertension, and Polycystic kidney disease.    PSH:  She  has a past surgical history that includes tracheostomy and peg tube insertion (N/A, 4/29/2025); Cardiac catheterization (N/A, 5/2/2025); Cardiac catheterization (N/A, 5/7/2025); Cardiac catheterization (N/A, 5/7/2025); Pseudo Aneurysm Repair, Extremity (Left, 5/9/2025); and incision and drainage leg (Left, 5/13/2025).    Applicable Nutrition History:   Trach/PEG    (5/9) s/p repair L femoral artery, evacuation L groin hematoma + NPWT  (5/13) redo L femoral hematoma evacuation  (5/14) KUB - Probable general ileus. Gaseous distention of the stomach   (5/16) KUB - nonspecific bowel gas pattern  (5/20) XR abd - Mild decrease in degree of distention of the gas-filled loops of small bowel and colon predominantly within the left hemiabdomen     Labs    Labs Reviewed: Yes    Results from last 7 days   Lab Units 05/20/25  0358 05/19/25  0353 05/18/25  0312 05/17/25  0305 05/15/25  2349 05/15/25  0427   GLUCOSE mg/dL 128* 90 99 81   < > 76   BUN mg/dL 18 29* 24* 16   < > 19   CREATININE mg/dL 3.05* 4.55* 3.59* 2.56*   < > 2.64*   SODIUM mmol/L 132* 132* 134* 135*   < > 136  "  CHLORIDE mmol/L 98 100 101 102   < > 101   POTASSIUM mmol/L 3.4* 3.4* 3.3* 3.5   < > 3.9   PHOSPHORUS mg/dL 3.0 4.0 3.3  --   --  3.4   MAGNESIUM mg/dL  --  1.9 1.9  --   --  1.8   ALT (SGPT) U/L  --  8  --  9  --   --     < > = values in this interval not displayed.       Results from last 7 days   Lab Units 05/20/25  0358 05/19/25  0353 05/17/25  0305 05/15/25  2349   ALBUMIN g/dL 2.5* 2.5* 2.6*  --    PREALBUMIN mg/dL  --  11.0*  --   --    CRP mg/dL  --  5.64*  --   --    IONIZED CALCIUM mmol/L  --   --   --  1.24   TRIGLYCERIDES mg/dL  --  165*  --   --        Results from last 7 days   Lab Units 05/20/25  0526 05/19/25  2304 05/19/25  1705 05/19/25  1135 05/19/25  0511 05/19/25  0025   GLUCOSE mg/dL 121 91 94 85 99 90     No results found for: \"HGBA1C\"            Medications    Medications Reviewed: yes    Scheduled Meds:amiodarone, 200 mg, Per PEG Tube, Q12H  amLODIPine, 10 mg, Per PEG Tube, Q24H  aspirin, 81 mg, Per PEG Tube, Daily  atorvastatin, 40 mg, Per PEG Tube, Nightly  carvedilol, 12.5 mg, Per PEG Tube, Q12H  cloNIDine, 0.1 mg, Per PEG Tube, Q8H  clopidogrel, 75 mg, Per PEG Tube, Daily  heparin (porcine), 5,000 Units, Subcutaneous, Q8H  hydrALAZINE, 100 mg, Per PEG Tube, Q8H  ipratropium-albuterol, 3 mL, Nebulization, 4x Daily - RT  lansoprazole, 15 mg, Per PEG Tube, QAM AC  metoclopramide, 5 mg, Intravenous, Q8H  miconazole, 1 Application, Topical, Q12H  nitroglycerin, 1 patch, Transdermal, Daily  sacubitril-valsartan, 1 tablet, Per PEG Tube, Q12H      Continuous Infusions:dexmedetomidine, 0.2-1.5 mcg/kg/hr, Last Rate: 0.4 mcg/kg/hr (05/20/25 0326)      PRN Meds:.  albuterol    dextrose    diazePAM    glucagon (human recombinant)    heparin (porcine)    heparin (porcine)    labetalol    ondansetron    oxyCODONE    Intake/Ouptut 24 hrs (0701 - 0700)   I&O's Reviewed: yes  Intake & Output (last day)         05/19 0701 05/20 0700 05/20 0701 05/21 0700    I.V. (mL/kg) 483.9 (7)     Blood 282     " "Other 496     NG/     Total Intake(mL/kg) 1658.9 (23.9)     Urine (mL/kg/hr)      Wound 580     Dialysis 3850     Total Output 4430     Net -2771.1                 Anthropometrics     Height: Height: 160 cm (62.99\")  Last Filed Weight: Weight: 69.4 kg (153 lb) (05/14/25 1726)69.4kg (153lbs)  Method:  ?  BMI: BMI (Calculated): 27.127.11kg/m^2    UBW: Limited wt data available  Weight change:  unable to determine    Nutrition Focused Physical Exam     Date: 5/6    Unable to perform due to pt unable to consent. Visual assessment of pt completed, noted to have edema to BUE.*     Subjective   Reported/Observed/Food/Nutrition Related History:   5/20  EN increased to 25mL/hr, seems to be tolerating. Bowel gas ongoing - MD started Reglan. LBM 5/16. D/C planning back to [facility] today vs tomorrow.     5/19  Trophic feeds continued throughout the weekend; ? Complete resolution of ileus. Pending POC for d/c back to LTACH, MD to consider repeat KUB and begin advancing feeds back to goal. Remains on MV + precedex. HD today.     5/16  Remains on MV - requiring precedex. EN has remained on hold - +BM x3 per RN. Received Mestinon; looks like ileus is resolving. MD states abdomen is less distended. Ok to restart trophic feeds today - hold mestinon per MD. HD planned for this morning. NPWT for L groin remains in place. D/C planning ongoing.     5/14  Pt remains on MV, sedation off - per RN, pt restless. KUB obtained yesterday indicating ileus - EN held. Noted Prosource TF d/c'd per MD. Continues with multiple loose BM - plan to start PO mestinon.     5/12  Remains intubated/sedated. Plan for HD today. EN remains at goal. Ongoing loose BM despite initiation of fiber - per MD, start Imodium.      5/8  Pt s/p RCA with stent placement. TF restarted. Not PAB low with elevated CRP. Plan for HD today and possible d/c back to LTACH later today vs tomorrow. Documented loose stool.     5/6  Pt transferred to [facility] for higher level of care " "from LTACH after extensive hospital stay at OSH. Has been on Nepro via PEG throughout admission to that facility. +HD. Discussed in MDR. OK to restart nutrition. NKFA    Needs Assessment   Date: ; Reassessed: ,     Height used:Height: 160 cm (62.99\")  Weights used: 69.4kg CBW; 52.38kg IBW    Estimated Calorie needs: ~ 1325 Kcal/day  Method: 21-24 Kcals/KG = 6110-3671  Method:  MSJ (1183) x 1.1 = 1301  Method:  PSU = 1315  Ve: 8.47; Tmax: 36.7    Estimated Protein needs: ~ 83 g PRO/day  Method: 1.0-1.3g/Kg CBW = 69-90    Estimated Fluid needs: ~ ml/day   Per clinical status    Current Nutrition Prescription     PO: NPO Diet NPO Type: Tube Feeding  Oral Nutrition Supplement: N/A  Intake: N/A    EN: [employer] Renal  Goal Rate: 35mL/hr  Water Flushes: 20mL/hr  Modular: Nutrisource Fiber 6/day  Route: PEG  Tube: Other     At goal over: 20Hrs/day     Rx will supply:   Goal Volume 700  mL/day     Flush Volume 400 mL/day     Energy 1490 Kcal/day 106 % Est Need   Protein 64 g/day 77 % Est Need   Fiber 18 g/day     Water in   mL     Total Water 904 mL     Meet DRI Yes        --------------------------------------------------------------------------  Product/Rate verified at bedside: Yes  Infusing Rate at time of visit: 25mL/hr + 30mL q2hr    Average Delivery from [facility] Day:   Volume 397 mL/day   % Goal Vol.   Flush Volume 496 mL/day     Energy  Kcal/day  % Est Need   Protein  g/day  % Est Need   Fiber  g/day     Water in  EN  mL     Total Water  mL     Meet DRI N/A        Assessment & Plan   Nutrition Diagnosis   Date:  Updated:   Problem Inadequate oral intake    Etiology Dysphagia with trach/peg   Signs/Symptoms Chronic EN   Status: Active    Date:   Updated: ,   Problem Inadequate enteral nutrition infusion   Etiology Altered GI fxn   Signs/Symptoms Generalized ileus   Status: Active improving?    Goal:   Nutrition to support treatment and Establish EN tolerance      Nutrition " Intervention      Follow treatment progress, Care plan reviewed, Nutrition support order placed    Continue advancing feeds per MD  Novasource Renal @ 25mL/hr + 30mL q2hr flush      Pending POC, recommend:  Once appropriate per MD advance feeds  Novasource Renal @ 20mL/hr and adv by 10mL in 12hrs as tolerated to goal of 35mL/hr. Flush of 20mL/hr.     Adjust flush per clinical status      Monitoring/Evaluation:   Per protocol, I&O, Pertinent labs, EN delivery/tolerance, Weight, GI status, Symptoms, POC/GOC, Swallow function, Hemodynamic stability    Mary Alvarado MS,RD,LD  Time Spent: 30min

## 2025-05-20 NOTE — PROGRESS NOTES
"   LOS: 15 days    Patient Care Team:  Sergio Randall MD as PCP - General (Cardiology)    Subjective     Stable.     Objective     Vital Signs:  Blood pressure 125/54, pulse 67, temperature 99.5 °F (37.5 °C), temperature source Axillary, resp. rate 18, height 160 cm (62.99\"), weight 69.4 kg (153 lb), SpO2 96%.      Intake/Output Summary (Last 24 hours) at 5/20/2025 0929  Last data filed at 5/20/2025 0600  Gross per 24 hour   Intake 1658.93 ml   Output 3850 ml   Net -2191.07 ml        05/19 0701 - 05/20 0700  In: 1658.9 [I.V.:483.9]  Out: 4430     Physical Exam:    Gen: Alert, NAD   HENT: NC, AT, EOMI   NECK: Supple, no JVD, Trachea midline   LUNGS: CTA bilaterally, non labored respirtation   CVS: S1/S2 audible, RRR, no murmur   Abd: Soft, NT, ND, PEG tube +   Ext: No pedal edema, no cyanosis   CNS: Alert, No focal deficit noted grossly  Psy: Cooperative  Skin: Warm, dry and intact      Labs:  Results from last 7 days   Lab Units 05/20/25  0356 05/19/25  1810 05/19/25  0353 05/18/25  0312   WBC 10*3/mm3 7.39  --  8.08 8.31   HEMOGLOBIN g/dL 8.0* 8.4* 7.2* 7.2*   PLATELETS 10*3/mm3 301  --  298 290     Results from last 7 days   Lab Units 05/20/25  0358 05/19/25  0353 05/18/25  0312 05/17/25  0305 05/15/25  2349 05/15/25  0427   SODIUM mmol/L 132* 132* 134* 135*   < > 136   POTASSIUM mmol/L 3.4* 3.4* 3.3* 3.5   < > 3.9   CHLORIDE mmol/L 98 100 101 102   < > 101   CO2 mmol/L 26.0 23.0 22.0 23.0   < > 22.0   BUN mg/dL 18 29* 24* 16   < > 19   CREATININE mg/dL 3.05* 4.55* 3.59* 2.56*   < > 2.64*   CALCIUM mg/dL 8.8 8.8 8.8 8.7   < > 8.6   PHOSPHORUS mg/dL 3.0 4.0 3.3  --   --  3.4   MAGNESIUM mg/dL  --  1.9 1.9  --   --  1.8   ALBUMIN g/dL 2.5* 2.5*  --  2.6*  --   --     < > = values in this interval not displayed.     Results from last 7 days   Lab Units 05/19/25  0353   ALK PHOS U/L 100   BILIRUBIN mg/dL 0.3   ALT (SGPT) U/L 8   AST (SGOT) U/L 9     Results from last 7 days   Lab Units 05/20/25  0352   PH, " ARTERIAL pH units 7.428   PO2 ART mm Hg 91.3   PCO2, ARTERIAL mm Hg 40.8   HCO3 ART mmol/L 27.0*       A/P:    ESRD: MWF - On HD with Dr. Kaur at [facility].   HTN:   Hyperkalemia   Metabolic acidosis  Anemia: Hemoglobin dropped to 5 with femoral artery bleed.  Patient was transfused 4 units packed red blood cells.    Respiratory failure: Trach on vent  Left femoral pseudoaneurysm: Developed at cardiac cath site.  Required repair 5/9/2025.  Wound VAC in place.      Plan:  - HD tomorrow per schedule, UF as tolerated.   - Renal diet   - VANCE with HD   - Electrolytes will be corrected with HD       High risk and complexity patient.      Arabella Morales MD  05/20/25  09:29 EDT

## 2025-05-20 NOTE — PLAN OF CARE
Goal Outcome Evaluation:  Plan of Care Reviewed With: patient           Outcome Evaluation: wound vac dressing removed to allow for transfer to rehab. wound base with good granulation and no fluid build up noted. Pt will cont to benefit from wound vac at rehab to help further improve healing potential.

## 2025-05-21 ENCOUNTER — APPOINTMENT (OUTPATIENT)
Dept: NEPHROLOGY | Facility: HOSPITAL | Age: 70
End: 2025-05-21
Payer: MEDICARE

## 2025-05-21 ENCOUNTER — APPOINTMENT (OUTPATIENT)
Dept: GENERAL RADIOLOGY | Facility: HOSPITAL | Age: 70
End: 2025-05-21
Payer: MEDICARE

## 2025-05-21 ENCOUNTER — APPOINTMENT (OUTPATIENT)
Dept: CT IMAGING | Facility: HOSPITAL | Age: 70
End: 2025-05-21
Payer: MEDICARE

## 2025-05-21 LAB
ALBUMIN SERPL-MCNC: 2.5 G/DL (ref 3.5–5.2)
ANION GAP SERPL CALCULATED.3IONS-SCNC: 8 MMOL/L (ref 5–15)
BASOPHILS # BLD AUTO: 0.04 10*3/MM3 (ref 0–0.2)
BASOPHILS NFR BLD AUTO: 0.3 % (ref 0–1.5)
BUN SERPL-MCNC: 26 MG/DL (ref 8–23)
BUN/CREAT SERPL: 6.6 (ref 7–25)
CALCIUM SPEC-SCNC: 9.1 MG/DL (ref 8.6–10.5)
CHLORIDE SERPL-SCNC: 99 MMOL/L (ref 98–107)
CO2 SERPL-SCNC: 24 MMOL/L (ref 22–29)
CREAT SERPL-MCNC: 3.96 MG/DL (ref 0.57–1)
DEPRECATED RDW RBC AUTO: 56.9 FL (ref 37–54)
EGFRCR SERPLBLD CKD-EPI 2021: 11.6 ML/MIN/1.73
EOSINOPHIL # BLD AUTO: 0.35 10*3/MM3 (ref 0–0.4)
EOSINOPHIL NFR BLD AUTO: 2.7 % (ref 0.3–6.2)
ERYTHROCYTE [DISTWIDTH] IN BLOOD BY AUTOMATED COUNT: 16.9 % (ref 12.3–15.4)
GLUCOSE BLDC GLUCOMTR-MCNC: 102 MG/DL (ref 70–130)
GLUCOSE BLDC GLUCOMTR-MCNC: 103 MG/DL (ref 70–130)
GLUCOSE SERPL-MCNC: 124 MG/DL (ref 65–99)
HCT VFR BLD AUTO: 26.9 % (ref 34–46.6)
HGB BLD-MCNC: 8.6 G/DL (ref 12–15.9)
IMM GRANULOCYTES # BLD AUTO: 0.07 10*3/MM3 (ref 0–0.05)
IMM GRANULOCYTES NFR BLD AUTO: 0.5 % (ref 0–0.5)
LYMPHOCYTES # BLD AUTO: 1.05 10*3/MM3 (ref 0.7–3.1)
LYMPHOCYTES NFR BLD AUTO: 8.2 % (ref 19.6–45.3)
MCH RBC QN AUTO: 29.5 PG (ref 26.6–33)
MCHC RBC AUTO-ENTMCNC: 32 G/DL (ref 31.5–35.7)
MCV RBC AUTO: 92.1 FL (ref 79–97)
MONOCYTES # BLD AUTO: 1.18 10*3/MM3 (ref 0.1–0.9)
MONOCYTES NFR BLD AUTO: 9.3 % (ref 5–12)
NEUTROPHILS NFR BLD AUTO: 10.05 10*3/MM3 (ref 1.7–7)
NEUTROPHILS NFR BLD AUTO: 79 % (ref 42.7–76)
NRBC BLD AUTO-RTO: 0 /100 WBC (ref 0–0.2)
PHOSPHATE SERPL-MCNC: 3 MG/DL (ref 2.5–4.5)
PLATELET # BLD AUTO: 337 10*3/MM3 (ref 140–450)
PMV BLD AUTO: 10.4 FL (ref 6–12)
POTASSIUM SERPL-SCNC: 3.6 MMOL/L (ref 3.5–5.2)
RBC # BLD AUTO: 2.92 10*6/MM3 (ref 3.77–5.28)
SODIUM SERPL-SCNC: 131 MMOL/L (ref 136–145)
WBC NRBC COR # BLD AUTO: 12.74 10*3/MM3 (ref 3.4–10.8)

## 2025-05-21 PROCEDURE — 74018 RADEX ABDOMEN 1 VIEW: CPT

## 2025-05-21 PROCEDURE — 82948 REAGENT STRIP/BLOOD GLUCOSE: CPT

## 2025-05-21 PROCEDURE — 80069 RENAL FUNCTION PANEL: CPT | Performed by: INTERNAL MEDICINE

## 2025-05-21 PROCEDURE — 25010000002 ALBUMIN HUMAN 25% PER 50 ML: Performed by: INTERNAL MEDICINE

## 2025-05-21 PROCEDURE — 25010000002 METOCLOPRAMIDE PER 10 MG: Performed by: INTERNAL MEDICINE

## 2025-05-21 PROCEDURE — 94799 UNLISTED PULMONARY SVC/PX: CPT

## 2025-05-21 PROCEDURE — 71045 X-RAY EXAM CHEST 1 VIEW: CPT

## 2025-05-21 PROCEDURE — 94003 VENT MGMT INPAT SUBQ DAY: CPT

## 2025-05-21 PROCEDURE — 74176 CT ABD & PELVIS W/O CONTRAST: CPT

## 2025-05-21 PROCEDURE — 94761 N-INVAS EAR/PLS OXIMETRY MLT: CPT

## 2025-05-21 PROCEDURE — 97606 NEG PRS WND THER DME>50 SQCM: CPT

## 2025-05-21 PROCEDURE — 71250 CT THORAX DX C-: CPT

## 2025-05-21 PROCEDURE — 25010000002 HEPARIN (PORCINE) PER 1000 UNITS: Performed by: INTERNAL MEDICINE

## 2025-05-21 PROCEDURE — 25510000002 DIATRIZOATE MEGLUMINE & SODIUM PER 1 ML: Performed by: INTERNAL MEDICINE

## 2025-05-21 PROCEDURE — 25010000002 HEPARIN (PORCINE) PER 1000 UNITS: Performed by: SURGERY

## 2025-05-21 PROCEDURE — P9047 ALBUMIN (HUMAN), 25%, 50ML: HCPCS | Performed by: INTERNAL MEDICINE

## 2025-05-21 PROCEDURE — 85025 COMPLETE CBC W/AUTO DIFF WBC: CPT | Performed by: INTERNAL MEDICINE

## 2025-05-21 PROCEDURE — 99232 SBSQ HOSP IP/OBS MODERATE 35: CPT | Performed by: INTERNAL MEDICINE

## 2025-05-21 RX ORDER — DIATRIZOATE MEGLUMINE AND DIATRIZOATE SODIUM 660; 100 MG/ML; MG/ML
100 SOLUTION ORAL; RECTAL ONCE
Status: COMPLETED | OUTPATIENT
Start: 2025-05-21 | End: 2025-05-21

## 2025-05-21 RX ORDER — NOREPINEPHRINE BITARTRATE 0.03 MG/ML
.02-.3 INJECTION, SOLUTION INTRAVENOUS
Status: DISCONTINUED | OUTPATIENT
Start: 2025-05-21 | End: 2025-05-22 | Stop reason: HOSPADM

## 2025-05-21 RX ORDER — LACTULOSE 10 G/15ML
20 SOLUTION ORAL 3 TIMES DAILY
Status: DISCONTINUED | OUTPATIENT
Start: 2025-05-21 | End: 2025-05-22

## 2025-05-21 RX ORDER — OXYCODONE HYDROCHLORIDE 5 MG/1
5 TABLET ORAL EVERY 6 HOURS PRN
Refills: 0 | Status: DISCONTINUED | OUTPATIENT
Start: 2025-05-21 | End: 2025-05-22

## 2025-05-21 RX ORDER — ALBUMIN (HUMAN) 12.5 G/50ML
12.5 SOLUTION INTRAVENOUS AS NEEDED
Status: DISPENSED | OUTPATIENT
Start: 2025-05-21 | End: 2025-05-21

## 2025-05-21 RX ORDER — ACETAMINOPHEN 500 MG
1000 TABLET ORAL EVERY 6 HOURS PRN
Status: DISCONTINUED | OUTPATIENT
Start: 2025-05-21 | End: 2025-05-22

## 2025-05-21 RX ORDER — DIAZEPAM 5 MG/1
5 TABLET ORAL EVERY 6 HOURS PRN
Status: DISCONTINUED | OUTPATIENT
Start: 2025-05-21 | End: 2025-05-22 | Stop reason: HOSPADM

## 2025-05-21 RX ORDER — AMIODARONE HYDROCHLORIDE 200 MG/1
200 TABLET ORAL
Status: DISCONTINUED | OUTPATIENT
Start: 2025-05-22 | End: 2025-05-22 | Stop reason: HOSPADM

## 2025-05-21 RX ADMIN — ASPIRIN 81 MG 81 MG: 81 TABLET ORAL at 09:58

## 2025-05-21 RX ADMIN — HEPARIN SODIUM 2000 UNITS: 1000 INJECTION INTRAVENOUS; SUBCUTANEOUS at 11:33

## 2025-05-21 RX ADMIN — HEPARIN SODIUM 5000 UNITS: 5000 INJECTION INTRAVENOUS; SUBCUTANEOUS at 05:16

## 2025-05-21 RX ADMIN — LACTULOSE 20 G: 20 SOLUTION ORAL at 16:13

## 2025-05-21 RX ADMIN — SACUBITRIL AND VALSARTAN 1 TABLET: 97; 103 TABLET, FILM COATED ORAL at 10:00

## 2025-05-21 RX ADMIN — HYDRALAZINE HYDROCHLORIDE 100 MG: 50 TABLET ORAL at 05:16

## 2025-05-21 RX ADMIN — LACTULOSE 20 G: 20 SOLUTION ORAL at 20:26

## 2025-05-21 RX ADMIN — ALBUMIN (HUMAN) 12.5 G: 0.25 INJECTION, SOLUTION INTRAVENOUS at 13:21

## 2025-05-21 RX ADMIN — HEPARIN SODIUM 5000 UNITS: 5000 INJECTION INTRAVENOUS; SUBCUTANEOUS at 13:48

## 2025-05-21 RX ADMIN — CLOPIDOGREL BISULFATE 75 MG: 75 TABLET, FILM COATED ORAL at 09:58

## 2025-05-21 RX ADMIN — IPRATROPIUM BROMIDE AND ALBUTEROL SULFATE 3 ML: 2.5; .5 SOLUTION RESPIRATORY (INHALATION) at 12:57

## 2025-05-21 RX ADMIN — DIATRIZOATE MEGLUMINE AND DIATRIZOATE SODIUM 105 ML: 660; 100 LIQUID ORAL; RECTAL at 20:00

## 2025-05-21 RX ADMIN — CARVEDILOL 12.5 MG: 12.5 TABLET, FILM COATED ORAL at 21:56

## 2025-05-21 RX ADMIN — NOREPINEPHRINE BITARTRATE 0.02 MCG/KG/MIN: 0.03 INJECTION, SOLUTION INTRAVENOUS at 13:46

## 2025-05-21 RX ADMIN — METOCLOPRAMIDE 5 MG: 5 INJECTION, SOLUTION INTRAMUSCULAR; INTRAVENOUS at 17:32

## 2025-05-21 RX ADMIN — CLONIDINE HYDROCHLORIDE 0.1 MG: 0.1 TABLET ORAL at 05:16

## 2025-05-21 RX ADMIN — ATORVASTATIN CALCIUM 40 MG: 40 TABLET, FILM COATED ORAL at 20:26

## 2025-05-21 RX ADMIN — AMIODARONE HYDROCHLORIDE 200 MG: 200 TABLET ORAL at 09:58

## 2025-05-21 RX ADMIN — LANSOPRAZOLE 15 MG: 15 TABLET, ORALLY DISINTEGRATING ORAL at 09:59

## 2025-05-21 RX ADMIN — IPRATROPIUM BROMIDE AND ALBUTEROL SULFATE 3 ML: 2.5; .5 SOLUTION RESPIRATORY (INHALATION) at 07:42

## 2025-05-21 RX ADMIN — IPRATROPIUM BROMIDE AND ALBUTEROL SULFATE 3 ML: 2.5; .5 SOLUTION RESPIRATORY (INHALATION) at 18:58

## 2025-05-21 RX ADMIN — IPRATROPIUM BROMIDE AND ALBUTEROL SULFATE 3 ML: 2.5; .5 SOLUTION RESPIRATORY (INHALATION) at 15:45

## 2025-05-21 RX ADMIN — METOCLOPRAMIDE 5 MG: 5 INJECTION, SOLUTION INTRAMUSCULAR; INTRAVENOUS at 09:59

## 2025-05-21 RX ADMIN — NITROGLYCERIN 1 PATCH: 0.4 PATCH TRANSDERMAL at 09:59

## 2025-05-21 RX ADMIN — MICONAZOLE NITRATE 1 APPLICATION: 20 POWDER TOPICAL at 10:00

## 2025-05-21 RX ADMIN — SACUBITRIL AND VALSARTAN 1 TABLET: 97; 103 TABLET, FILM COATED ORAL at 21:56

## 2025-05-21 RX ADMIN — HEPARIN SODIUM 5000 UNITS: 5000 INJECTION INTRAVENOUS; SUBCUTANEOUS at 21:56

## 2025-05-21 RX ADMIN — METOCLOPRAMIDE 5 MG: 5 INJECTION, SOLUTION INTRAMUSCULAR; INTRAVENOUS at 02:26

## 2025-05-21 NOTE — PLAN OF CARE
Goal Outcome Evaluation:  Plan of Care Reviewed With: patient           Outcome Evaluation: Pt presented today with moderate bleeding from L groin wound after wound vac removal yesterday and canceled transfer.  PT replaced the wound vac and added sorbact and irrigation to help limit further potential bleeding with dressing changes. PT did not note any active bleeding while changing dressing today and the moderate blood noted in the dressing may have been due to a small capillary bleed.

## 2025-05-21 NOTE — PROGRESS NOTES
INTENSIVIST   PROGRESS NOTE        SUBJECTIVE     Estefania 69 y.o. female is followed for: No chief complaint on file.       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 We serve as the  or member of an interprofessional team, coordinating and guiding healthcare professionals to provide specialized care for critically ill patients. We manage and resuscitate patients with, or at risk for, critical illness and organ failure and initiate interventions to prevent imminent deterioration. (2024 Consensus Statement from the Society of Critical Care Medicine Defining Intensivist Task Force. [facility] 2025. DOI: 10.1097/[facility].[id number]     Interval History:  POD: 8 Days Post-Op  Procedure(s) (LRB):  GROIN WOUND VACUUM CHANGE LEFT (Left)     Left femoral wound cleaned by PT today.          Overall no new problems.    iHD today.    Last Bowel Movement: 05/16/25 (05/18/25 2000)    Temp  Min: 99 °F (37.2 °C)  Max: 100.2 °F (37.9 °C)       History     Last Reviewed by Toy Chandra MD on 5/19/2025 at  8:00 AM    Sections Reviewed    Medical, Surgical, Family, Tobacco, Alcohol, Drug Use, Sexual Activity,   Social Documentation    Problem list reviewed by Toy Chandra MD on 5/19/2025 at  8:00 AM  Medicines reviewed by Toy Chandra MD on 5/19/2025 at  8:00 AM  Allergies reviewed by Toy Chandra MD on 5/19/2025 at  8:00 AM       The patient's relevant past medical, surgical and social history were reviewed and updated in Epic as appropriate.        OBJECTIVE     Physical  Examination    Vitals:  Temp: 99 °F (37.2 °C) (25 0735) Temp  Min: 99 °F (37.2 °C)  Max: 100.2 °F (37.9 °C)   Temp core:      BP: 124/54 (2545) BP  Min: 101/47  Max: 161/73   MAP (non-invasive) Noninvasive MAP (mmHg): 80 (25) Noninvasive MAP (mmHg)  Av.6  Min: 53  Max: 175   Pulse: 75 (25) Pulse  Min: 60  Max: 83   Resp: 18 (25) Resp  Min: 14  Max: 20   SpO2: 97 % (25) SpO2  Min: 91 %  Max: 100 %   Device: ventilator (25)    Flow Rate: 40 (25 0400) No data recorded     Intake/Ouptut 24 hrs (7:00AM - 6:59 AM)  Intake & Output (last 2 days)             I.V. (mL/kg) 483.9 (7)      Blood 282      Other 496 240     NG/ 343     Total Intake(mL/kg) 1658.9 (23.9) 583 (8.4)     Urine (mL/kg/hr)       Wound 580      Dialysis 3850      Total Output 4430      Net -2771.1 +583                    Medications (drips):  dexmedetomidine, Last Rate: 0.4 mcg/kg/hr (25 0326)            25  1726   Weight: 69.4 kg (153 lb)        Invasive Mechanical Ventilator   Settings: Observed:   Mode: VC+/AC (25)    Resp Rate (Set): 14 (25) Resp Rate (Observed) Vent: 15 (25)   Vt (Set, mL): 365 mL (25) Vt, Exp (observed, mL): 566 mL (25)    Minute Ventilation (L/min) (Obs): 7.7 L/min (05/21/25 0742)    I:E Ratio (Obs): 1:3.8 (25 07)       FiO2 (%): 40 % (25 07) Plateau Pressure (cm H2O): (S) 17 cm H2O (2542)   PEEP/CPAP (cm H2O): 5 cm H20 (25 07) Driving Pressure (cm H2O): 11.7 cm H2O (25)    Static Compliance (L/cm H2O): 75 (25 1906)      Telemetry:  Rhythm: normal sinus rhythm (25 0735)     QTc Interval (Sec): 0.43 (25 0800)   Constitutional:  No acute distress.   Cardiovascular: RRR.    Respiratory: Normal breath sounds  No adventitious sounds   Abdominal:  Soft with  no tenderness.   Extremities: No Edema  Wound Left groin area.   Neurological:   Awake.  Best Eye Response: 4-->(E4) spontaneous (05/21/25 0600)  Best Motor Response: 6-->(M6) obeys commands (05/21/25 0600)  Best Verbal Response: 1-->(V1) none (05/21/25 0600)  Violeta Coma Scale Score: 11 (05/21/25 0600)     Results Reviewed:  Laboratory  Microbiology  Radiology  Pathology    Hematology:  Results from last 7 days   Lab Units 05/21/25 0226 05/20/25 0356 05/19/25  1810 05/19/25 0353   WBC 10*3/mm3 12.74* 7.39  --  8.08   HEMOGLOBIN g/dL 8.6* 8.0* 8.4* 7.2*   MCV fL 92.1 91.2  --  96.3   PLATELETS 10*3/mm3 337 301  --  298     Results from last 7 days   Lab Units 05/21/25 0226 05/20/25 0356 05/19/25 0353   NEUTROS ABS 10*3/mm3 10.05* 5.10 5.73   LYMPHS ABS 10*3/mm3 1.05 0.96 1.01   EOS ABS 10*3/mm3 0.35 0.29 0.28     Chemistry:  Estimated Creatinine Clearance: 12.4 mL/min (A) (by C-G formula based on SCr of 3.96 mg/dL (H)).    Results from last 7 days   Lab Units 05/21/25 0226 05/20/25 0358   SODIUM mmol/L 131* 132*   POTASSIUM mmol/L 3.6 3.4*   CHLORIDE mmol/L 99 98   CO2 mmol/L 24.0 26.0   BUN mg/dL 26* 18   CREATININE mg/dL 3.96* 3.05*   GLUCOSE mg/dL 124* 128*     Results from last 7 days   Lab Units 05/21/25 0226 05/20/25 0358 05/19/25  0353 05/18/25  0312 05/17/25  0305 05/15/25  2349   IONIZED CALCIUM mmol/L  --   --   --   --   --  1.24   CALCIUM mg/dL 9.1 8.8 8.8 8.8   < > 9.1   MAGNESIUM mg/dL  --   --  1.9 1.9  --   --    PHOSPHORUS mg/dL 3.0 3.0 4.0 3.3  --   --     < > = values in this interval not displayed.     Hepatic Panel:  Results from last 7 days   Lab Units 05/21/25  0226 05/20/25  0358 05/19/25  0353 05/17/25  0305   ALBUMIN g/dL 2.5* 2.5* 2.5* 2.6*   TOTAL PROTEIN g/dL  --   --  4.8* 4.8*   BILIRUBIN mg/dL  --   --  0.3 0.6   AST (SGOT) U/L  --   --  9 9   ALT (SGPT) U/L  --   --  8 9   ALK PHOS U/L  --   --  100 97     Biomarkers:  Results from last 7 days   Lab Units 05/19/25  0353    CRP mg/dL 5.64*     Arterial Blood Gases:  Results from last 7 days   Lab Units 05/20/25  0352   PH, ARTERIAL pH units 7.428   PCO2, ARTERIAL mm Hg 40.8   PO2 ART mm Hg 91.3   FIO2 % 40       Images:  XR Chest 1 View  Result Date: 5/20/2025  Impression: 1.Support devices appear in expected position. 2.Stable cardiomegaly with increased interstitial markings throughout both lungs, most notable within the right lung base. No significant change from prior exam. Electronically Signed: Guanaco Ayala MD  5/20/2025 7:42 AM EDT  Workstation ID: [id number]    XR Abdomen 1 View  Result Date: 5/20/2025  Impression: 1.Mild decrease in degree of distention of the gas-filled loops of small bowel and colon predominantly within the left hemiabdomen. 2.Percutaneous gastrostomy tube projects at the gastric bubble. Electronically Signed: Guanaco Ayala MD  5/20/2025 7:36 AM EDT  Workstation ID: [id number]      Echo:  Results for orders placed during the hospital encounter of 04/11/25    Adult Transthoracic Echo Complete W/ Cont if Necessary Per Protocol    Interpretation Summary  Images from the original result were not included.      The study is technically difficult for diagnosis. The quality of the study is limited with poor acoustic windows.    Sinus bradycardia in high 50s was the predominant rhythm observed during the procedure.    Normal left ventricular cavity size noted. Left ventricular wall thickness is consistent with mild concentric hypertrophy.    Left ventricular systolic function is mildly decreased. Left ventricular ejection fraction appears to be 41 - 45%.    Left ventricular diastolic function is consistent with (grade II w/high LAP) pseudonormalization.    There is mild calcification of the aortic valve. No significant aortic valve regurgitation is present. No hemodynamically significant aortic valve stenosis is present.    There is mild calcification of the mitral valve posterior leaflet(s). Mild mitral valve  regurgitation is present. No significant mitral valve stenosis is present.    There is a small (<1cm) pericardial effusion. There is no evidence of cardiac tamponade.      Results: Reviewed.  I reviewed the patient's new laboratory and imaging results.  I independently reviewed the patient's new images.    Medications: Reviewed.    Assessment   A/P     Assessment/Management/Treatment Plan:    Hospital:  LOS: 16 days   ICU: 15d 19h     Active Hospital Problems    Diagnosis  POA    **CAD S/P ELIECER RCA [I25.10]  Yes    Ileus [K56.7]  No    ESRD on HD [N18.6]  Unknown    Polycystic kidney disease [Q61.3]  Not Applicable    HTN (hypertension) [I10]  Unknown    Dyslipidemia [E78.5]  Unknown    AAA (abdominal aortic aneurysm) [I71.40]  Unknown    Right coronary artery occlusion [I24.0]  Unknown    Depression [F32.A]  Unknown    Tracheostomy present [Z93.0]  Not Applicable    S/P percutaneous endoscopic gastrostomy (PEG) tube placement [Z93.1]  Not Applicable    Hematoma of groin [S30.1XXA]  Unknown     Estefania is a 69 y.o. female transferred from [facility] on 5/5/2025 after she suffered a V-fib arrest with resuscitation and underwent left heart catheterization showing 100% occlusion of the RCA with failed intervention.    She was initially admitted to [facility] on 3/20/2025 with volume overload secondary to missing 2 hemodialysis sessions.  She also has followed up possible pneumonia and had anemia and was evaluated by GI with no evidence of GI bleed.  She had bilateral pleural effusions and underwent 1550 mL thoracentesis, but despite thoracentesis required intubation on 4/10/2025 was transferred to [facility] on 4/11/2025.      Patient ultimately underwent tracheostomy and PEG placement on 4/29/2025.      She deteriorated on 5/2/2025 suffering a Vfib arrest.     05/07/25 she underwent left heart catheterization with stent of her occluded RCA.  She was noted to have a swollen right  upper extremity.  Duplex revealed superficial thrombophlebitis.       05/09/25 She developed a hematoma at her left femoral artery site requiring urgent surgery with femoral artery repair, evacuation of a hematoma, 2 units of packed red cells.  Wound VAC was placed.      05/13/25 she went back to surgery for a washout and replacement of the wound VAC.  She received additional unit of packed red cells on May 14.  Hemoglobin is 7.9.     She stills look volume overload with pleural effusions and upper extremity pitting edema.       Respiratory cultures were positive for Klebsiella without evidence of pneumonia consistent with a tracheobronchitis.  She is receiving Rocephin.    Comorbidities: HTN, HLD, CKD on HD, polycystic kidney disease, abdominal aortic aneurysm, CAD with prior stent, depression      Respiratory Failure, chronic  Trach (Done a Alcon 04/29/25)  Bilateral pleural effusions.  Cardiovascular  V Fib arrest, occluded RCA.  P A Fib. Amiodarone. No anticoagulation due to femoral hematoma and subsequent surgical repair.  CAD  Ischemic cardiomyopathy EF 41-45%  C at [facility] facility with ELIECER c 3 to RCA  Femoral Pseudoaneurysm  S/P Surgical Repair of left femoral artery 05/09/25, and excisional debridement left groin and application of negative pressure wound therapy on 05/13/25. Wound measures 17 x 10 x 4 cm.  HTN  Dyslipidemia ? Treatment with statin  Renal  CKD. Polycystic Kidneys.  ESRD  Hematology  NC Anemia  Nutrition Support   Tube Feeding: Formula: Novasource Renal (Electrolyte Restricted); Feeding Type: Continuous; Start at: 25 mL/hr; Then Advance By: Do Not Advance; Water Flush: 30 mL; Every: 2 hours; Water Bolus: None  Gastrostomy/Enterostomy Percutaneous endoscopic gastrostomy (PEG) 20 Fr. RUQ-Tube Feeding Rate (mL/hr): 35 mL/HR (Based on a feeding duration of 20 h/d)    Lab Results   Component Value Date    PREALBUMIN 11.0 (L) 05/19/2025    PREALBUMIN 14.9 (L) 05/13/2025    PREALBUMIN 15.5 (L)  "05/06/2025    CRP 5.64 (H) 05/19/2025    CRP 6.07 (H) 05/13/2025    CRP 8.27 (H) 05/06/2025    CRP 6.44 (H) 05/05/2025     Endocrine   Body mass index is 27.11 kg/m². Overweight: 25.0-29.9kg/m2   No history of Diabetes    No results found for: \"HGBA1C\"    Results from last 7 days   Lab Units 05/21/25  0508 05/20/25  2306 05/20/25  1722 05/20/25  1143 05/20/25  0526 05/19/25  2304 05/19/25  1705 05/19/25  1135   GLUCOSE mg/dL 103 105 109 90 121 91 94 85       Diet: NPO Diet NPO Type: Tube Feeding   Advance Directives: Code Status and Medical Interventions: CPR (Attempt to Resuscitate); Full Support   Ordered at: 05/06/25 1629     Code Status (Patient has no pulse and is not breathing):    CPR (Attempt to Resuscitate)     Medical Interventions (Patient has pulse or is breathing):    Full Support        VTE Prophylaxis:  Pharmacologic & mechanical VTE prophylaxis orders are present.         In brief:  CT Chest, Abd/P  ECHO due to hypotension  Continue DAPT as per Cardiology, for her stents.  N Renal to 25 mL/h  iHD: M/W/F as per [facility].  Goal: Glucose < 180 mg/dL.   Disposition: Transfer to LTAC awaiting aproval by receiving team.    Plan of care and goals reviewed during interdisciplinary rounds.  I discussed the patient's findings and my recommendations with nursing staff    MDM:    Problem(s) High due to: Acute or Chronic illness or injury that may poses a threat to life or bodily function  Data: Moderate due to: Review of prior external records from each unique source, Review or results of each unique test, and Ordering of each unique test    Moderate    [x] Primary Attending Intensive Care Medicine - Nutrition Support   [] Consultant    Copied text in this note has been reviewed and is accurate as of 05/21/25      "

## 2025-05-21 NOTE — PLAN OF CARE
Problem: Hemodialysis  Goal: Safe, Effective Therapy Delivery  Outcome: Progressing  Goal: Effective Tissue Perfusion  Outcome: Progressing  Goal: Absence of Infection Signs and Symptoms  Outcome: Progressing   Goal Outcome Evaluation:            HD complete, blood reinfused. Hypotension during HD, minimum UFR utilized. Dr. Morales aware of hypotension, albumin ordered PRN. Report given to primary RN Luis.        Fluid removal: 3L

## 2025-05-21 NOTE — CASE MANAGEMENT/SOCIAL WORK
Continued Stay Note  [facility]     Patient Name: Estefania Connor  MRN: [id number]  Today's Date: 5/21/2025    Admit Date: 5/5/2025    [facility]: [facility]   Discharge Plan       Row Name 05/21/25 1408       Plan    Plan [facility]    Plan Comments  notified [facility] of no transfer today.   [facility] did inform me today, they will receive her back when stable. If further cardiovascular intervention would be needed, they would need to send back to us in [place] as they have limited services in [place].    Final Discharge Disposition Code 63 - LTCH                   Discharge Codes    No documentation.                 Expected Discharge Date and Time       Expected Discharge Date Expected Discharge Time    May 9, 2025               Chrissie Terrell RN  Continued Stay Note  [facility]     Patient Name: Estefania Connor  MRN: [id number]  Today's Date: 5/21/2025    Admit Date: 5/5/2025    [facility]: [facility]   Discharge Plan       Row Name 05/21/25 1408       Plan    Plan [facility]    Plan Comments  notified [facility] of no transfer today.   [facility] did inform me today, they will receive her back when stable. If further cardiovascular intervention would be needed, they would need to send back to us in [place] as they have limited services in [place].    Final Discharge Disposition Code 63 - LTCH                   Discharge Codes    No documentation.                 Expected Discharge Date and Time       Expected Discharge Date Expected Discharge Time    May 9, 2025               Chrissie Terrell RN

## 2025-05-21 NOTE — PROGRESS NOTES
"   LOS: 16 days    Patient Care Team:  Sergio Randall MD as PCP - General (Cardiology)    Subjective     Stable.     Objective     Vital Signs:  Blood pressure 116/58, pulse 84, temperature 99 °F (37.2 °C), temperature source Axillary, resp. rate 18, height 160 cm (62.99\"), weight 69.4 kg (153 lb), SpO2 98%.      Intake/Output Summary (Last 24 hours) at 5/21/2025 0828  Last data filed at 5/20/2025 1800  Gross per 24 hour   Intake 583 ml   Output --   Net 583 ml        05/20 0701 - 05/21 0700  In: 583   Out: -     Physical Exam:    Gen: Alert, NAD   HENT: NC, AT, EOMI   NECK: Supple, no JVD, Trachea midline   LUNGS: CTA bilaterally, non labored respirtation   CVS: S1/S2 audible, RRR, no murmur   Abd: Soft, NT, ND, PEG tube +   Ext: No pedal edema, no cyanosis   CNS: Alert, No focal deficit noted grossly  Psy: Cooperative  Skin: Warm, dry and intact      Labs:  Results from last 7 days   Lab Units 05/21/25  0226 05/20/25  0356 05/19/25  1810 05/19/25  0353   WBC 10*3/mm3 12.74* 7.39  --  8.08   HEMOGLOBIN g/dL 8.6* 8.0* 8.4* 7.2*   PLATELETS 10*3/mm3 337 301  --  298     Results from last 7 days   Lab Units 05/21/25  0226 05/20/25  0358 05/19/25  0353 05/18/25  0312 05/17/25  0305 05/15/25  2349 05/15/25  0427   SODIUM mmol/L 131* 132* 132* 134* 135*   < > 136   POTASSIUM mmol/L 3.6 3.4* 3.4* 3.3* 3.5   < > 3.9   CHLORIDE mmol/L 99 98 100 101 102   < > 101   CO2 mmol/L 24.0 26.0 23.0 22.0 23.0   < > 22.0   BUN mg/dL 26* 18 29* 24* 16   < > 19   CREATININE mg/dL 3.96* 3.05* 4.55* 3.59* 2.56*   < > 2.64*   CALCIUM mg/dL 9.1 8.8 8.8 8.8 8.7   < > 8.6   PHOSPHORUS mg/dL 3.0 3.0 4.0 3.3  --   --  3.4   MAGNESIUM mg/dL  --   --  1.9 1.9  --   --  1.8   ALBUMIN g/dL 2.5* 2.5* 2.5*  --  2.6*  --   --     < > = values in this interval not displayed.     Results from last 7 days   Lab Units 05/19/25  0353   ALK PHOS U/L 100   BILIRUBIN mg/dL 0.3   ALT (SGPT) U/L 8   AST (SGOT) U/L 9     Results from last 7 days   Lab Units " 05/20/25  0352   PH, ARTERIAL pH units 7.428   PO2 ART mm Hg 91.3   PCO2, ARTERIAL mm Hg 40.8   HCO3 ART mmol/L 27.0*       A/P:    ESRD: MWF - On HD with Dr. Kaur at [facility].   HTN:   Hyperkalemia   Metabolic acidosis  Anemia: Hemoglobin dropped to 5 with femoral artery bleed.  Patient was transfused 4 units packed red blood cells.    Respiratory failure: Trach on vent  Left femoral pseudoaneurysm: Developed at cardiac cath site.  Required repair 5/9/2025.  Wound VAC in place.      Plan:  - Seen on dialysis, UF as tolerated.   - Renal diet   - VANCE with HD   - Electrolytes will be corrected with HD       High risk and complexity patient.      Arabella Morales MD  05/21/25  08:28 EDT

## 2025-05-21 NOTE — THERAPY WOUND CARE TREATMENT
Acute Care - Wound/Debridement Treatment Note   [place]     Patient Name: Estefania Connor  : 1955  MRN: [id number]  Today's Date: 2025                Admit Date: 2025    Visit Dx:    ICD-10-CM ICD-9-CM   1. Hematoma of groin, initial encounter  S30.1XXA 922.2       Patient Active Problem List   Diagnosis    ESRD on HD    Polycystic kidney disease    HTN (hypertension)    Dyslipidemia    AAA (abdominal aortic aneurysm)    Right coronary artery occlusion    Depression    Tracheostomy present    S/P percutaneous endoscopic gastrostomy (PEG) tube placement    CAD S/P ELIECER RCA    Hematoma of groin    Ileus        Past Medical History:   Diagnosis Date    AAA (abdominal aortic aneurysm)     Coronary artery disease     Depression     ESRD (end stage renal disease)     Hyperlipidemia     Hypertension     Polycystic kidney disease         Past Surgical History:   Procedure Laterality Date    CARDIAC CATHETERIZATION N/A 2025    Procedure: Coronary angiography;  Surgeon: Ambrose Mcnally MD;  Location:  [facility];  Service: Cardiovascular;  Laterality: N/A;    CARDIAC CATHETERIZATION N/A 2025    Procedure: Left Heart Cath;  Surgeon: Edin Boland MD;  Location:  [facility];  Service: Cardiovascular;  Laterality: N/A;    CARDIAC CATHETERIZATION N/A 2025    Procedure: Stent ELIECER coronary;  Surgeon: Edin Boland MD;  Location:  [facility];  Service: Cardiovascular;  Laterality: N/A;    INCISION AND DRAINAGE LEG Left 2025    Procedure: GROIN WOUND VACUUM CHANGE LEFT;  Surgeon: Remi Guerrero MD;  Location:  [facility] OR;  Service: Vascular;  Laterality: Left;    PSEUDO ANEURYSM REPAIR, EXTREMITY Left 2025    Procedure: REPAIR LEFT FEMORAL ARTERY, EVACUATION LEFT GROIN HEMATOMA, PLACEMENT OF NEGATIVE WOUND VACCUUM THERAPY;  Surgeon: Remi Guerrero MD;  Location:  [facility];  Service: Vascular;  Laterality: Left;    TRACHEOSTOMY  AND PEG TUBE INSERTION N/A 4/29/2025    Procedure: TRACHEOSTOMY AND PERCUTANEOUS ENDOSCOPIC GASTROSTOMY TUBE INSERTION;  Surgeon: Kera Head MD;  Location: [facility] OR;  Service: General;  Laterality: N/A;           Wound 05/20/25 1130 Left anterior groin Surgical (Active)   Wound Image   05/21/25 0900   Dressing Appearance dry;intact;no drainage 05/21/25 1200   Dressing Removed Type foam 05/21/25 1200   Confirmed Empty Wound Bed Yes, finger sweep performed;Yes, visual inspection of wound bed 05/21/25 0900   Closure Unable to assess 05/21/25 1200   Base unable to visualize 05/21/25 1200   Periwound moist 05/21/25 1200   Periwound Temperature warm 05/21/25 1200   Periwound Skin Turgor soft 05/21/25 1200   Edges irregular 05/21/25 0900   Wound Length (cm) 10 cm 05/21/25 0900   Wound Width (cm) 7 cm 05/21/25 0900   Wound Depth (cm) 1 cm 05/21/25 0900   Wound Surface Area (cm^2) 54.98 cm^2 05/21/25 0900   Wound Volume (cm^3) 36.652 cm^3 05/21/25 0900   Drainage Characteristics/Odor serosanguineous 05/21/25 1000   Drainage Amount large 05/21/25 1000   Care, Wound irrigated with;wound cleanser;negative pressure wound therapy 05/21/25 0900   Dressing Care dressing changed 05/21/25 0900   Periwound Care dry periwound area maintained 05/21/25 0900       NPWT (Negative Pressure Wound Therapy) 05/09/25 left groin (Active)   Therapy Setting continuous therapy 05/21/25 1200   Dressing foam, black 05/21/25 1200   Contact Layer other (see comments) 05/21/25 0900   Instillation normal saline 05/21/25 0900   Pressure Setting 125 mmHg 05/21/25 1200   Sponges Inserted 1;other (see comments) 05/21/25 0900   Sponges Removed other (see comments) 05/21/25 0900   Finger sweep complete Yes 05/21/25 0900         WOUND DEBRIDEMENT                     PT Assessment (Last 12 Hours)       PT Evaluation and Treatment       Row Name 05/21/25 0900          Physical Therapy Time and Intention    Subjective Information complains  of;weakness;fatigue;pain  -MF     Document Type therapy note (daily note);wound care  -MF     Mode of Treatment individual therapy;physical therapy  -       Row Name 05/21/25 0900          Pain Scale: FACES Pre/Post-Treatment    Pain: FACES Scale, Pretreatment 6-->hurts even more  -MF     Posttreatment Pain Rating 6-->hurts even more  -MF       Row Name             [REMOVED] Wound Right gluteal    Wound - Properties Group Present on Original Admission: Y  -ER Side: Right  -ER Location: gluteal  -ER Wound Outcome: Healed  -KT Removal Date: 05/21/25  -KT Removal Time: 0700  -KT    Retired Wound - Properties Group Present on Original Admission: Y  -ER Side: Right  -ER Location: gluteal  -ER Wound Outcome: Healed  -KT Removal Date: 05/21/25  -KT Removal Time: 0700  -KT    Retired Wound - Properties Group Present on Original Admission: Y  -ER Side: Right  -ER Location: gluteal  -ER Removal Date: 05/21/25  -KT Removal Time: 0700  -KT Wound Outcome: Healed  -KT    Retired Wound - Properties Group Present on Original Admission: Y  -ER Side: Right  -ER Location: gluteal  -ER Resolution Date: 05/21/25  -KT Resolution Time: 0700  -KT Wound Outcome: Healed  -KT      Row Name 05/21/25 0900          Wound 05/20/25 1130 Left anterior groin Surgical    Wound - Properties Group Placement Date: 05/20/25  -MF Placement Time: 1130  -MF Side: Left  -MF Orientation: anterior  -MF Location: groin  -MF Primary Wound Type: Surgical  -MF    Wound Image Images linked: 1  -MF     Dressing Appearance intact;other (see comments)  moderate bleeding  -MF     Dressing Removed Type gauze  -     Confirmed Empty Wound Bed Yes, finger sweep performed;Yes, visual inspection of wound bed  -     Base moist;pink;red;subcutaneous;yellow  -MF     Periwound moist  -MF     Periwound Temperature warm  -MF     Periwound Skin Turgor soft  -MF     Edges irregular  -MF     Wound Length (cm) 10 cm  -MF     Wound Width (cm) 7 cm  -MF     Wound Depth (cm) 1 cm   -MF     Wound Surface Area (cm^2) 54.98 cm^2  -MF     Wound Volume (cm^3) 36.652 cm^3  -MF     Drainage Characteristics/Odor serosanguineous  -MF     Drainage Amount small  -MF     Care, Wound irrigated with;wound cleanser;negative pressure wound therapy  -MF     Dressing Care dressing changed  -MF     Periwound Care dry periwound area maintained  -MF     Retired Wound - Properties Group Placement Date: 05/20/25  -MF Placement Time: 1130  -MF Side: Left  -MF Orientation: anterior  -MF Location: groin  -MF    Retired Wound - Properties Group Placement Date: 05/20/25  -MF Placement Time: 1130  -MF Side: Left  -MF Orientation: anterior  -MF Location: groin  -MF    Retired Wound - Properties Group Date first assessed: 05/20/25  - Time first assessed: 1130  -MF Side: Left  -MF Location: groin  -MF      Row Name 05/21/25 0900          NPWT (Negative Pressure Wound Therapy) 05/09/25 left groin    NPWT (Negative Pressure Wound Therapy) - Properties Group Placement Date: 05/09/25  -MEET Location: left groin  -MEET    Therapy Setting continuous therapy  -MF     Dressing foam, black  -MF     Contact Layer other (see comments)  tierra Ag with sorbact  -MF     Instillation normal saline  -MF     Pressure Setting 125 mmHg  -MF     Sponges Inserted 1;other (see comments)  1 sorbact 1 black  -MF     Sponges Removed other (see comments)  gauze  -MF     Finger sweep complete Yes  -MF     Retired NPWT (Negative Pressure Wound Therapy) - Properties Group Placement Date: 05/09/25  -MEET Location: left groin  -MEET    Retired NPWT (Negative Pressure Wound Therapy) - Properties Group Placement Date: 05/09/25  -MEET Location: left groin  -MEET    Retired NPWT (Negative Pressure Wound Therapy) - Properties Group Placement Date: 05/09/25  -MEET Location: left groin  -MEET      Row Name 05/21/25 0900          Coping    Observed Emotional State calm;cooperative  -MF     Verbalized Emotional State acceptance  -MF     Trust Relationship/Rapport care explained   -       Row Name 05/21/25 0900          Plan of Care Review    Plan of Care Reviewed With patient  -     Outcome Evaluation Pt presented today with moderate bleeding from L groin wound after wound vac removal yesterday and canceled transfer.  PT replaced the wound vac and added sorbact and irrigation to help limit further potential bleeding with dressing changes. PT did not note any active bleeding while changing dressing today and the moderate blood noted in the dressing may have been due to a small capillary bleed.  -       Row Name 05/21/25 0900          Positioning and Restraints    Pre-Treatment Position in bed  -     Post Treatment Position bed  -MF     In Bed supine;call light within reach  -               User Key  (r) = Recorded By, (t) = Taken By, (c) = Cosigned By      Initials Name Provider Type    MF Ryan Rocha, PT Physical Therapist    María Maza, RN Registered Nurse    Luis Jensen RN Registered Nurse    Ce Santo RN Registered Nurse                  Physical Therapy Education       Title: PT OT SLP Therapies (In Progress)       Topic: Physical Therapy (In Progress)       Point: Mobility training (In Progress)       Learning Progress Summary            Patient Acceptance, E,TB, NR by  at 5/14/2025 1717    Acceptance, E, NR by KG at 5/12/2025 1046    Acceptance, E, NR by KG at 5/7/2025 1115    Acceptance, E, NR by MARIFER at 5/6/2025 1430                      Point: Home exercise program (In Progress)       Learning Progress Summary            Patient Acceptance, E,TB, NR by CH at 5/14/2025 1717    Acceptance, E, NR by KG at 5/12/2025 1046    Acceptance, E, NR by KG at 5/7/2025 1115    Acceptance, E, NR by MARIFER at 5/6/2025 1430                      Point: Body mechanics (In Progress)       Learning Progress Summary            Patient Acceptance, E,TB, NR by  at 5/14/2025 1717    Acceptance, E, NR by KG at 5/12/2025 1046    Acceptance, E, NR by KG at 5/7/2025 1115     Acceptance, E, NR by MARIFER at 5/6/2025 1430                      Point: Precautions (In Progress)       Learning Progress Summary            Patient Acceptance, E,TB, NR by  at 5/14/2025 1717    Acceptance, E, NR by KG at 5/12/2025 1046    Acceptance, E, NR by KG at 5/7/2025 1115    Acceptance, E, NR by MARIFER at 5/6/2025 1430                                      User Key       Initials Effective Dates Name Provider Type Discipline     02/03/23 -  Sandra Pruitt, PT Physical Therapist PT     06/16/21 -  Gertrudis Bird, RN Registered Nurse Nurse     05/22/20 -  Dorothy Frazier PT Physical Therapist PT                    Recommendation and Plan  Anticipated Discharge Disposition (PT): extended care facility  Planned Therapy Interventions (PT): balance training, bed mobility training, home exercise program, strengthening, transfer training  Therapy Frequency (PT): daily  Plan of Care Reviewed With: patient           Outcome Evaluation: Pt presented today with moderate bleeding from L groin wound after wound vac removal yesterday and canceled transfer.  PT replaced the wound vac and added sorbact and irrigation to help limit further potential bleeding with dressing changes. PT did not note any active bleeding while changing dressing today and the moderate blood noted in the dressing may have been due to a small capillary bleed.  Plan of Care Reviewed With: patient            Time Calculation   PT Charges       Row Name 05/21/25 0900             Time Calculation    Start Time 0900  -MF      PT Goal Re-Cert Due Date 05/22/25  -MF         Untimed Charges    21588-Bnh Pressure wound to 50 sqcm 40  -MF         Total Minutes    Untimed Charges Total Minutes 40  -MF       Total Minutes 40  -MF                User Key  (r) = Recorded By, (t) = Taken By, (c) = Cosigned By      Initials Name Provider Type    Ryan Rush, PT Physical Therapist                      Therapy Charges for Today       Code  Description Service Date Service Provider Modifiers Qty    [id number] HC NONSELECTIVE DEBRIDEMENT 5/20/2025 Ryan Rocha, PT GP 1              PT G-Codes  Outcome Measure Options: AM-PAC 6 Clicks Basic Mobility (PT)  AM-PAC 6 Clicks Score (PT): 8  AM-PAC 6 Clicks Score (OT): 6       Ryan Rocha, PT  5/21/2025

## 2025-05-22 ENCOUNTER — APPOINTMENT (OUTPATIENT)
Dept: CARDIOLOGY | Facility: HOSPITAL | Age: 70
End: 2025-05-22
Payer: MEDICARE

## 2025-05-22 ENCOUNTER — APPOINTMENT (OUTPATIENT)
Dept: GENERAL RADIOLOGY | Facility: HOSPITAL | Age: 70
End: 2025-05-22
Payer: MEDICARE

## 2025-05-22 ENCOUNTER — HOSPITAL ENCOUNTER (OUTPATIENT)
Facility: HOSPITAL | Age: 70
Discharge: NURSING, REHAB OR LONG-TERM CARE | End: 2025-06-24
Attending: INTERNAL MEDICINE | Admitting: INTERNAL MEDICINE
Payer: COMMERCIAL

## 2025-05-22 VITALS
RESPIRATION RATE: 20 BRPM | HEIGHT: 63 IN | WEIGHT: 153 LBS | TEMPERATURE: 99.2 F | SYSTOLIC BLOOD PRESSURE: 111 MMHG | BODY MASS INDEX: 27.11 KG/M2 | HEART RATE: 85 BPM | DIASTOLIC BLOOD PRESSURE: 46 MMHG | OXYGEN SATURATION: 99 %

## 2025-05-22 LAB
ALBUMIN SERPL-MCNC: 2.9 G/DL (ref 3.5–5.2)
ANION GAP SERPL CALCULATED.3IONS-SCNC: 11 MMOL/L (ref 5–15)
AV MEAN PRESS GRAD SYS DOP V1V2: 11 MMHG
AV VMAX SYS DOP: 233 CM/SEC
BASOPHILS # BLD AUTO: 0.03 10*3/MM3 (ref 0–0.2)
BASOPHILS NFR BLD AUTO: 0.3 % (ref 0–1.5)
BH CV ECHO MEAS - AO MAX PG: 21.7 MMHG
BH CV ECHO MEAS - AO V2 VTI: 37.3 CM
BH CV ECHO MEAS - EDV(CUBED): 79.5 ML
BH CV ECHO MEAS - EDV(MOD-SP2): 85.5 ML
BH CV ECHO MEAS - EDV(MOD-SP4): 60.8 ML
BH CV ECHO MEAS - EF(MOD-SP2): 58.6 %
BH CV ECHO MEAS - EF(MOD-SP4): 63.8 %
BH CV ECHO MEAS - ESV(CUBED): 19.7 ML
BH CV ECHO MEAS - ESV(MOD-SP2): 35.4 ML
BH CV ECHO MEAS - ESV(MOD-SP4): 22 ML
BH CV ECHO MEAS - FS: 37.2 %
BH CV ECHO MEAS - IVS/LVPW: 1 CM
BH CV ECHO MEAS - IVSD: 1.3 CM
BH CV ECHO MEAS - LV DIASTOLIC VOL/BSA (35-75): 35.2 CM2
BH CV ECHO MEAS - LV MASS(C)D: 207.8 GRAMS
BH CV ECHO MEAS - LV SYSTOLIC VOL/BSA (12-30): 12.7 CM2
BH CV ECHO MEAS - LVIDD: 4.3 CM
BH CV ECHO MEAS - LVIDS: 2.7 CM
BH CV ECHO MEAS - LVPWD: 1.3 CM
BH CV ECHO MEAS - PA V2 MAX: 89.3 CM/SEC
BH CV ECHO MEAS - SV(MOD-SP2): 50.1 ML
BH CV ECHO MEAS - SV(MOD-SP4): 38.8 ML
BH CV ECHO MEAS - SVI(MOD-SP2): 29 ML/M2
BH CV ECHO MEAS - SVI(MOD-SP4): 22.5 ML/M2
BH CV ECHO MEAS - TAPSE (>1.6): 1.78 CM
BUN SERPL-MCNC: 16 MG/DL (ref 8–23)
BUN/CREAT SERPL: 5.5 (ref 7–25)
CALCIUM SPEC-SCNC: 9 MG/DL (ref 8.6–10.5)
CHLORIDE SERPL-SCNC: 102 MMOL/L (ref 98–107)
CO2 SERPL-SCNC: 26 MMOL/L (ref 22–29)
CREAT SERPL-MCNC: 2.93 MG/DL (ref 0.57–1)
DEPRECATED RDW RBC AUTO: 56.8 FL (ref 37–54)
EGFRCR SERPLBLD CKD-EPI 2021: 16.7 ML/MIN/1.73
EOSINOPHIL # BLD AUTO: 0.22 10*3/MM3 (ref 0–0.4)
EOSINOPHIL NFR BLD AUTO: 2.1 % (ref 0.3–6.2)
ERYTHROCYTE [DISTWIDTH] IN BLOOD BY AUTOMATED COUNT: 16.9 % (ref 12.3–15.4)
GLUCOSE BLDC GLUCOMTR-MCNC: 100 MG/DL (ref 70–130)
GLUCOSE BLDC GLUCOMTR-MCNC: 103 MG/DL (ref 70–130)
GLUCOSE BLDC GLUCOMTR-MCNC: 114 MG/DL (ref 70–130)
GLUCOSE SERPL-MCNC: 117 MG/DL (ref 65–99)
HCT VFR BLD AUTO: 23.6 % (ref 34–46.6)
HGB BLD-MCNC: 7.5 G/DL (ref 12–15.9)
IMM GRANULOCYTES # BLD AUTO: 0.06 10*3/MM3 (ref 0–0.05)
IMM GRANULOCYTES NFR BLD AUTO: 0.6 % (ref 0–0.5)
LV EF BIPLANE MOD: 60.9 %
LYMPHOCYTES # BLD AUTO: 1.02 10*3/MM3 (ref 0.7–3.1)
LYMPHOCYTES NFR BLD AUTO: 9.8 % (ref 19.6–45.3)
MCH RBC QN AUTO: 29.4 PG (ref 26.6–33)
MCHC RBC AUTO-ENTMCNC: 31.8 G/DL (ref 31.5–35.7)
MCV RBC AUTO: 92.5 FL (ref 79–97)
MONOCYTES # BLD AUTO: 1.17 10*3/MM3 (ref 0.1–0.9)
MONOCYTES NFR BLD AUTO: 11.3 % (ref 5–12)
NEUTROPHILS NFR BLD AUTO: 7.87 10*3/MM3 (ref 1.7–7)
NEUTROPHILS NFR BLD AUTO: 75.9 % (ref 42.7–76)
NRBC BLD AUTO-RTO: 0 /100 WBC (ref 0–0.2)
PHOSPHATE SERPL-MCNC: 2.1 MG/DL (ref 2.5–4.5)
PLATELET # BLD AUTO: 286 10*3/MM3 (ref 140–450)
PMV BLD AUTO: 10.7 FL (ref 6–12)
POTASSIUM SERPL-SCNC: 3.5 MMOL/L (ref 3.5–5.2)
QT INTERVAL: 422 MS
QTC INTERVAL: 474 MS
RBC # BLD AUTO: 2.55 10*6/MM3 (ref 3.77–5.28)
SODIUM SERPL-SCNC: 139 MMOL/L (ref 136–145)
WBC NRBC COR # BLD AUTO: 10.37 10*3/MM3 (ref 3.4–10.8)

## 2025-05-22 PROCEDURE — 82948 REAGENT STRIP/BLOOD GLUCOSE: CPT

## 2025-05-22 PROCEDURE — 93325 DOPPLER ECHO COLOR FLOW MAPG: CPT

## 2025-05-22 PROCEDURE — 85025 COMPLETE CBC W/AUTO DIFF WBC: CPT | Performed by: INTERNAL MEDICINE

## 2025-05-22 PROCEDURE — 94003 VENT MGMT INPAT SUBQ DAY: CPT

## 2025-05-22 PROCEDURE — 93308 TTE F-UP OR LMTD: CPT | Performed by: INTERNAL MEDICINE

## 2025-05-22 PROCEDURE — 97605 NEG PRS WND THER DME<=50SQCM: CPT

## 2025-05-22 PROCEDURE — 93321 DOPPLER ECHO F-UP/LMTD STD: CPT | Performed by: INTERNAL MEDICINE

## 2025-05-22 PROCEDURE — 71045 X-RAY EXAM CHEST 1 VIEW: CPT

## 2025-05-22 PROCEDURE — 94799 UNLISTED PULMONARY SVC/PX: CPT

## 2025-05-22 PROCEDURE — 94761 N-INVAS EAR/PLS OXIMETRY MLT: CPT

## 2025-05-22 PROCEDURE — 97602 WOUND(S) CARE NON-SELECTIVE: CPT

## 2025-05-22 PROCEDURE — 93308 TTE F-UP OR LMTD: CPT

## 2025-05-22 PROCEDURE — 99232 SBSQ HOSP IP/OBS MODERATE 35: CPT | Performed by: INTERNAL MEDICINE

## 2025-05-22 PROCEDURE — 25010000002 HEPARIN (PORCINE) PER 1000 UNITS: Performed by: SURGERY

## 2025-05-22 PROCEDURE — 80069 RENAL FUNCTION PANEL: CPT | Performed by: INTERNAL MEDICINE

## 2025-05-22 PROCEDURE — 93325 DOPPLER ECHO COLOR FLOW MAPG: CPT | Performed by: INTERNAL MEDICINE

## 2025-05-22 PROCEDURE — 93321 DOPPLER ECHO F-UP/LMTD STD: CPT

## 2025-05-22 RX ORDER — ACETAMINOPHEN 160 MG/5ML
1000 SOLUTION ORAL EVERY 6 HOURS PRN
Status: DISCONTINUED | OUTPATIENT
Start: 2025-05-22 | End: 2025-05-22

## 2025-05-22 RX ORDER — ACETAMINOPHEN 160 MG/5ML
1000 SOLUTION ORAL EVERY 6 HOURS PRN
Status: DISCONTINUED | OUTPATIENT
Start: 2025-05-22 | End: 2025-05-22 | Stop reason: HOSPADM

## 2025-05-22 RX ORDER — METOCLOPRAMIDE HYDROCHLORIDE 5 MG/ML
5 INJECTION INTRAMUSCULAR; INTRAVENOUS EVERY 8 HOURS
Status: ON HOLD
Start: 2025-05-22 | End: 2025-05-23

## 2025-05-22 RX ORDER — HEPARIN SODIUM 5000 [USP'U]/ML
5000 INJECTION, SOLUTION INTRAVENOUS; SUBCUTANEOUS EVERY 8 HOURS SCHEDULED
Status: ON HOLD
Start: 2025-05-22

## 2025-05-22 RX ORDER — OXYCODONE HYDROCHLORIDE 5 MG/1
5 TABLET ORAL EVERY 6 HOURS PRN
Refills: 0 | Status: DISCONTINUED | OUTPATIENT
Start: 2025-05-22 | End: 2025-05-22 | Stop reason: HOSPADM

## 2025-05-22 RX ORDER — NITROGLYCERIN 80 MG/1
1 PATCH TRANSDERMAL DAILY
Status: ON HOLD
Start: 2025-05-23

## 2025-05-22 RX ORDER — NOREPINEPHRINE BITARTRATE 0.03 MG/ML
.02-.3 INJECTION, SOLUTION INTRAVENOUS
Status: ON HOLD
Start: 2025-05-22

## 2025-05-22 RX ORDER — IPRATROPIUM BROMIDE AND ALBUTEROL SULFATE 2.5; .5 MG/3ML; MG/3ML
3 SOLUTION RESPIRATORY (INHALATION)
Status: ON HOLD
Start: 2025-05-22

## 2025-05-22 RX ORDER — AMIODARONE HYDROCHLORIDE 200 MG/1
200 TABLET ORAL
Status: ON HOLD
Start: 2025-05-23

## 2025-05-22 RX ORDER — LACTULOSE 10 G/15ML
20 SOLUTION ORAL 3 TIMES DAILY PRN
Status: ON HOLD
Start: 2025-05-22

## 2025-05-22 RX ORDER — LACTULOSE 10 G/15ML
20 SOLUTION ORAL 3 TIMES DAILY PRN
Status: DISCONTINUED | OUTPATIENT
Start: 2025-05-22 | End: 2025-05-22 | Stop reason: HOSPADM

## 2025-05-22 RX ADMIN — IPRATROPIUM BROMIDE AND ALBUTEROL SULFATE 3 ML: 2.5; .5 SOLUTION RESPIRATORY (INHALATION) at 12:50

## 2025-05-22 RX ADMIN — OXYCODONE 5 MG: 5 TABLET ORAL at 00:03

## 2025-05-22 RX ADMIN — MICONAZOLE NITRATE 1 APPLICATION: 20 POWDER TOPICAL at 00:03

## 2025-05-22 RX ADMIN — IPRATROPIUM BROMIDE AND ALBUTEROL SULFATE 3 ML: 2.5; .5 SOLUTION RESPIRATORY (INHALATION) at 07:46

## 2025-05-22 RX ADMIN — DIAZEPAM 5 MG: 5 TABLET ORAL at 19:36

## 2025-05-22 RX ADMIN — HEPARIN SODIUM 5000 UNITS: 5000 INJECTION INTRAVENOUS; SUBCUTANEOUS at 14:00

## 2025-05-22 RX ADMIN — MICONAZOLE NITRATE 1 APPLICATION: 20 POWDER TOPICAL at 09:18

## 2025-05-22 RX ADMIN — SACUBITRIL AND VALSARTAN 1 TABLET: 97; 103 TABLET, FILM COATED ORAL at 08:15

## 2025-05-22 RX ADMIN — CLOPIDOGREL BISULFATE 75 MG: 75 TABLET, FILM COATED ORAL at 08:15

## 2025-05-22 RX ADMIN — IPRATROPIUM BROMIDE AND ALBUTEROL SULFATE 3 ML: 2.5; .5 SOLUTION RESPIRATORY (INHALATION) at 15:39

## 2025-05-22 RX ADMIN — OXYCODONE HYDROCHLORIDE 5 MG: 5 TABLET ORAL at 17:53

## 2025-05-22 RX ADMIN — ASPIRIN 81 MG 81 MG: 81 TABLET ORAL at 08:15

## 2025-05-22 RX ADMIN — OXYCODONE 5 MG: 5 TABLET ORAL at 08:14

## 2025-05-22 RX ADMIN — NITROGLYCERIN 1 PATCH: 0.4 PATCH TRANSDERMAL at 08:14

## 2025-05-22 RX ADMIN — LANSOPRAZOLE 15 MG: 15 TABLET, ORALLY DISINTEGRATING ORAL at 08:15

## 2025-05-22 RX ADMIN — AMIODARONE HYDROCHLORIDE 200 MG: 200 TABLET ORAL at 08:15

## 2025-05-22 RX ADMIN — HEPARIN SODIUM 5000 UNITS: 5000 INJECTION INTRAVENOUS; SUBCUTANEOUS at 05:25

## 2025-05-22 RX ADMIN — IPRATROPIUM BROMIDE AND ALBUTEROL SULFATE 3 ML: 2.5; .5 SOLUTION RESPIRATORY (INHALATION) at 19:16

## 2025-05-22 RX ADMIN — DIAZEPAM 5 MG: 5 TABLET ORAL at 05:25

## 2025-05-22 NOTE — PROGRESS NOTES
INTENSIVIST   PROGRESS NOTE        SUBJECTIVE     Estefania 69 y.o. female is followed for: No chief complaint on file.       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 We serve as the  or member of an interprofessional team, coordinating and guiding healthcare professionals to provide specialized care for critically ill patients. We manage and resuscitate patients with, or at risk for, critical illness and organ failure and initiate interventions to prevent imminent deterioration. (2024 Consensus Statement from the Society of Critical Care Medicine Defining Intensivist Task Force. [facility] 20[facility]. DOI: 10.1097/[facility].[id number]     Interval History:  POD: 9 Days Post-Op    Procedure(s) (LRB):  GROIN WOUND VACUUM CHANGE LEFT (Left)     Family at bedside.    She is happy to go back to [place] today.    Weaning pressors (Norepi) which was started yesterday after her dialysis.    Picture: 05/21/25        Last Bowel Movement: 05/16/25 (05/18/25 2000)    Temp  Min: 99 °F (37.2 °C)  Max: 100 °F (37.8 °C)       History     Last Reviewed by Toy Chandra MD on 5/19/2025 at  8:00 AM    Sections Reviewed    Medical, Surgical, Family, Tobacco, Alcohol, Drug Use, Sexual Activity,   Social Documentation    Problem list reviewed by Toy Chandra MD on 5/19/2025 at  8:00 AM  Medicines reviewed by Toy Chandra MD on 5/19/2025 at  8:00 AM  Allergies reviewed by Toy Chandra MD on 5/19/2025 at  8:00 AM       The patient's relevant past medical, surgical and social history were reviewed and  updated in Epic as appropriate.        OBJECTIVE     Physical Examination    Vitals:  Temp: 99 °F (37.2 °C) (25 0400) Temp  Min: 99 °F (37.2 °C)  Max: 100 °F (37.8 °C)   Temp core:      BP: 114/56 (25 0814) BP  Min: 80/45  Max: 138/62   MAP (non-invasive) Noninvasive MAP (mmHg): 67 (25 0600) Noninvasive MAP (mmHg)  Av.6  Min: 53  Max: 175   Pulse: 84 (25 0814) Pulse  Min: 67  Max: 98   Resp: 16 (25) Resp  Min: 16  Max: 25   SpO2: 99 % (25) SpO2  Min: 91 %  Max: 100 %   Device: ventilator (25)    Flow Rate: 40 (25) Flow (L/min) (Oxygen Therapy)  Min: 40  Max: 40     Intake/Ouptut 24 hrs (7:00AM - 6:59 AM)  Intake & Output (last 2 days)          07 07 07    I.V. (mL/kg)  43.1 (0.6)     Blood       Other 240 841     NG/ 834     Total Intake(mL/kg) 583 (8.4) 1718.1 (24.8)     Wound       Dialysis  3300     Total Output  3300     Net +583 -1581.9            Stool Unmeasured Occurrence  3 x             Medications (drips):  dexmedetomidine, Last Rate: 0.4 mcg/kg/hr (25 0326)  norepinephrine, Last Rate: 0.02 mcg/kg/min (25 0412)            25  1726   Weight: 69.4 kg (153 lb)        Invasive Mechanical Ventilator   Settings: Observed:   Mode: VC+/AC (25)    Resp Rate (Set): 14 (25) Resp Rate (Observed) Vent: 18 (25)   Vt (Set, mL): 365 mL (25) Vt, Exp (observed, mL): 417 mL (25)    Minute Ventilation (L/min) (Obs): 8.3 L/min (25)    I:E Ratio (Obs): 1:2.5 (25)       FiO2 (%): 40 % (25) Plateau Pressure (cm H2O): (S) 23 cm H2O (25)   PEEP/CPAP (cm H2O): 5 cm H20 (25) Driving Pressure (cm H2O): 18.3 cm H2O (25)    Static Compliance (L/cm H2O): 49 (25)      Telemetry:  Rhythm: normal sinus rhythm (25 0600)     QTc Interval (Sec): 0.43  (05/20/25 0800)   Constitutional:  No acute distress.   Cardiovascular: RRR.    Respiratory: Normal breath sounds  No adventitious sounds   Abdominal:  Soft with no tenderness.   Extremities: No Edema  Wound Left groin area. WoundVac   Neurological:   Awake.  Best Eye Response: 3-->(E3) to speech (05/22/25 0600)  Best Motor Response: 6-->(M6) obeys commands (05/22/25 0600)  Best Verbal Response: 1-->(V1) none (05/22/25 0600)  Violeta Coma Scale Score: 10 (05/22/25 0600)     Results Reviewed:  Laboratory  Microbiology  Radiology  Pathology    Hematology:  Results from last 7 days   Lab Units 05/22/25 0323 05/21/25 0226 05/20/25 0356   WBC 10*3/mm3 10.37 12.74* 7.39   HEMOGLOBIN g/dL 7.5* 8.6* 8.0*   MCV fL 92.5 92.1 91.2   PLATELETS 10*3/mm3 286 337 301     Results from last 7 days   Lab Units 05/22/25 0323 05/21/25 0226 05/20/25 0356   NEUTROS ABS 10*3/mm3 7.87* 10.05* 5.10   LYMPHS ABS 10*3/mm3 1.02 1.05 0.96   EOS ABS 10*3/mm3 0.22 0.35 0.29     Chemistry:  Estimated Creatinine Clearance: 16.7 mL/min (A) (by C-G formula based on SCr of 2.93 mg/dL (H)).    Results from last 7 days   Lab Units 05/22/25 0323 05/21/25 0226   SODIUM mmol/L 139 131*   POTASSIUM mmol/L 3.5 3.6   CHLORIDE mmol/L 102 99   CO2 mmol/L 26.0 24.0   BUN mg/dL 16 26*   CREATININE mg/dL 2.93* 3.96*   GLUCOSE mg/dL 117* 124*     Results from last 7 days   Lab Units 05/22/25 0323 05/21/25 0226 05/20/25  0358 05/19/25  0353 05/18/25  0312 05/17/25  0305 05/15/25  2349   IONIZED CALCIUM mmol/L  --   --   --   --   --   --  1.24   CALCIUM mg/dL 9.0 9.1   < > 8.8 8.8   < > 9.1   MAGNESIUM mg/dL  --   --   --  1.9 1.9  --   --    PHOSPHORUS mg/dL 2.1* 3.0   < > 4.0 3.3   < >  --     < > = values in this interval not displayed.     Hepatic Panel:  Results from last 7 days   Lab Units 05/22/25  0323 05/21/25  0226 05/20/25  0358 05/19/25  0353 05/17/25  0305   ALBUMIN g/dL 2.9* 2.5*   < > 2.5* 2.6*   TOTAL PROTEIN g/dL  --   --   --  4.8* 4.8*    BILIRUBIN mg/dL  --   --   --  0.3 0.6   AST (SGOT) U/L  --   --   --  9 9   ALT (SGPT) U/L  --   --   --  8 9   ALK PHOS U/L  --   --   --  100 97    < > = values in this interval not displayed.     Biomarkers:  Results from last 7 days   Lab Units 05/19/25  0353   CRP mg/dL 5.64*     Arterial Blood Gases:  Results from last 7 days   Lab Units 05/20/25  0352   PH, ARTERIAL pH units 7.428   PCO2, ARTERIAL mm Hg 40.8   PO2 ART mm Hg 91.3   FIO2 % 40       Images:  XR Chest 1 View  Result Date: 5/22/2025  Impression: Mild perihilar interstitial edema which is overall improved from the prior study. There is persistent left basilar opacity felt to represent combination of pleural effusion and atelectasis. There is suspected to be posterior layering pleural effusion on the right Electronically Signed: Chidi Natarajan  5/22/2025 7:52 AM EDT  Workstation ID: [id number]    CT Chest Without Contrast Diagnostic  Result Date: 5/21/2025  Impression: CHEST: Large bilateral pleural effusions. Consolidation adjacent to the pleural effusions in the lower lobes, likely atelectasis. Superimposed infection to be excluded clinically. Acute right lateral fourth through eighth rib fractures. Acute left lateral third through sixth rib fractures. No pneumothorax. Small pericardial effusion. ABDOMEN/PELVIS: Mildly dilated loops of small bowel in the upper abdomen with gradual appearing transition, suggestive of ileus over the possibility of small bowel obstruction. Interval development of a right rectus sheath hematoma measuring up to 6.2 cm. Significant interval decrease size of a left groin hematoma which now measures up to 4.6 cm. There is fat stranding/inflammatory change involving the left groin and imaged left lower extremity, nonspecific, and may be postprocedural. Similar-appearing infrarenal abdominal aortic aneurysm measuring up to 4.2 cm. Electronically Signed: Sal Seth MD  5/21/2025 11:05 PM EDT  Workstation ID:  [id number]    CT Abdomen Pelvis Without Contrast  Result Date: 5/21/2025  Impression: CHEST: Large bilateral pleural effusions. Consolidation adjacent to the pleural effusions in the lower lobes, likely atelectasis. Superimposed infection to be excluded clinically. Acute right lateral fourth through eighth rib fractures. Acute left lateral third through sixth rib fractures. No pneumothorax. Small pericardial effusion. ABDOMEN/PELVIS: Mildly dilated loops of small bowel in the upper abdomen with gradual appearing transition, suggestive of ileus over the possibility of small bowel obstruction. Interval development of a right rectus sheath hematoma measuring up to 6.2 cm. Significant interval decrease size of a left groin hematoma which now measures up to 4.6 cm. There is fat stranding/inflammatory change involving the left groin and imaged left lower extremity, nonspecific, and may be postprocedural. Similar-appearing infrarenal abdominal aortic aneurysm measuring up to 4.2 cm. Electronically Signed: Sal Seth MD  5/21/2025 11:05 PM EDT  Workstation ID: [id number]    XR Chest 1 View  Result Date: 5/21/2025  Impression: 1. Grossly stable bowel gas pattern, suggesting possible mild ileus. 2. Similar appearance of the chest including cardiomegaly, pleural effusions and interstitial pulmonary edema with presumed mild bibasal atelectasis. Electronically Signed: Terry Baca MD  5/21/2025 9:13 AM EDT  Workstation ID: [id number]    XR Abdomen KUB  Result Date: 5/21/2025  Impression: 1. Grossly stable bowel gas pattern, suggesting possible mild ileus. 2. Similar appearance of the chest including cardiomegaly, pleural effusions and interstitial pulmonary edema with presumed mild bibasal atelectasis. Electronically Signed: Terry Baca MD  5/21/2025 9:13 AM EDT  Workstation ID: [id number]      Echo:  Results for orders placed during the hospital encounter of 04/11/25    Adult Transthoracic Echo Complete W/ Cont if  Necessary Per Protocol    Interpretation Summary  Images from the original result were not included.      The study is technically difficult for diagnosis. The quality of the study is limited with poor acoustic windows.    Sinus bradycardia in high 50s was the predominant rhythm observed during the procedure.    Normal left ventricular cavity size noted. Left ventricular wall thickness is consistent with mild concentric hypertrophy.    Left ventricular systolic function is mildly decreased. Left ventricular ejection fraction appears to be 41 - 45%.    Left ventricular diastolic function is consistent with (grade II w/high LAP) pseudonormalization.    There is mild calcification of the aortic valve. No significant aortic valve regurgitation is present. No hemodynamically significant aortic valve stenosis is present.    There is mild calcification of the mitral valve posterior leaflet(s). Mild mitral valve regurgitation is present. No significant mitral valve stenosis is present.    There is a small (<1cm) pericardial effusion. There is no evidence of cardiac tamponade.      Results: Reviewed.  I reviewed the patient's new laboratory and imaging results.  I independently reviewed the patient's new images.    Medications: Reviewed.    Assessment   A/P     Assessment/Management/Treatment Plan:    Hospital:  LOS: 17 days   ICU: 16d 19h     Active Hospital Problems    Diagnosis  POA    **CAD S/P ELIECER RCA [I25.10]  Yes    Ileus [K56.7]  No    ESRD on HD [N18.6]  Unknown    Polycystic kidney disease [Q61.3]  Not Applicable    HTN (hypertension) [I10]  Unknown    Dyslipidemia [E78.5]  Unknown    AAA (abdominal aortic aneurysm) [I71.40]  Unknown    Right coronary artery occlusion [I24.0]  Unknown    Depression [F32.A]  Unknown    Tracheostomy present [Z93.0]  Not Applicable    S/P percutaneous endoscopic gastrostomy (PEG) tube placement [Z93.1]  Not Applicable    Hematoma of groin [S30.1XXA]  Unknown     Estefania is a 69 y.o.  female transferred from [facility] on 5/5/2025 after she suffered a V-fib arrest with resuscitation and underwent left heart catheterization showing 100% occlusion of the RCA with failed intervention.    She was initially admitted to [facility] on 3/20/2025 with volume overload secondary to missing 2 hemodialysis sessions.  She also has followed up possible pneumonia and had anemia and was evaluated by GI with no evidence of GI bleed.  She had bilateral pleural effusions and underwent 1550 mL thoracentesis, but despite thoracentesis required intubation on 4/10/2025 was transferred to [facility] on 4/11/2025.      Patient ultimately underwent tracheostomy and PEG placement on 4/29/2025.      She deteriorated on 5/2/2025 suffering a Vfib arrest.     05/07/25 she underwent left heart catheterization with stent of her occluded RCA.  She was noted to have a swollen right upper extremity.  Duplex revealed superficial thrombophlebitis.       05/09/25 She developed a hematoma at her left femoral artery site requiring urgent surgery with femoral artery repair, evacuation of a hematoma, 2 units of packed red cells.  Wound VAC was placed.      05/13/25 she went back to surgery for a washout and replacement of the wound VAC.  She received additional unit of packed red cells on May 14.  Hemoglobin is 7.9.     She stills look volume overload with pleural effusions and upper extremity pitting edema.       Respiratory cultures were positive for Klebsiella without evidence of pneumonia consistent with a tracheobronchitis.  She is receiving Rocephin.    Comorbidities: HTN, HLD, CKD on HD, polycystic kidney disease, abdominal aortic aneurysm, CAD with prior stent, depression      Respiratory Failure, chronic  Trach (Done a [place] 04/29/25)  Bilateral pleural effusions.  Cardiovascular  V Fib arrest, occluded RCA.  P A Fib. Amiodarone. No anticoagulation due to femoral hematoma and subsequent  "surgical repair.  CAD  Ischemic cardiomyopathy EF 41-45%  C at [facility] with ELIECER c 3 to RCA  Femoral Pseudoaneurysm  S/P Surgical Repair of left femoral artery 05/09/25, and excisional debridement left groin and application of negative pressure wound therapy on 05/13/25. Wound measures 17 x 10 x 4 cm.  HTN  Dyslipidemia ? Treatment with statin  Renal  CKD. Polycystic Kidneys.  ESRD  Hematology  NC Anemia  Nutrition Support   Tube Feeding: Formula: Novasource Renal (Electrolyte Restricted); Feeding Type: Continuous; Start at: 25 mL/hr; Then Advance By: Do Not Advance; Water Flush: 30 mL; Every: 2 hours; Water Bolus: None  Gastrostomy/Enterostomy Percutaneous endoscopic gastrostomy (PEG) 20 Fr. RUQ-Tube Feeding Rate (mL/hr): 35 mL/HR (Based on a feeding duration of 20 h/d)    Lab Results   Component Value Date    PREALBUMIN 11.0 (L) 05/19/2025    PREALBUMIN 14.9 (L) 05/13/2025    PREALBUMIN 15.5 (L) 05/06/2025    CRP 5.64 (H) 05/19/2025    CRP 6.07 (H) 05/13/2025    CRP 8.27 (H) 05/06/2025    CRP 6.44 (H) 05/05/2025     Endocrine   Body mass index is 27.11 kg/m². Overweight: 25.0-29.9kg/m2   No history of Diabetes    No results found for: \"HGBA1C\"    Results from last 7 days   Lab Units 05/22/25  0456 05/21/25  2256 05/21/25  1133 05/21/25  0508 05/20/25  2306 05/20/25  1722 05/20/25  1143 05/20/25  0526   GLUCOSE mg/dL 114 103 102 103 105 109 90 121       Diet: NPO Diet NPO Type: Tube Feeding   Advance Directives: Code Status and Medical Interventions: CPR (Attempt to Resuscitate); Full Support   Ordered at: 05/06/25 1629     Code Status (Patient has no pulse and is not breathing):    CPR (Attempt to Resuscitate)     Medical Interventions (Patient has pulse or is breathing):    Full Support        VTE Prophylaxis:  Pharmacologic & mechanical VTE prophylaxis orders are present.         In brief:  CT Chest, Abd/P: Bilateral pleural effusions R>L. Ribs fractures.  ECHO} Pending  Continue DAPT as per " Cardiology, for her stents.  N Renal to 35 mL/h  iHD: M/W/F as per [facility].  Goal: Glucose < 180 mg/dL.   Disposition: Transfer to LTAC    Plan of care and goals reviewed during interdisciplinary rounds.  I discussed the patient's findings and my recommendations with nursing staff    MDM:    Problem(s) High due to: Acute or Chronic illness or injury that may poses a threat to life or bodily function  Data: Moderate due to: Review of prior external records from each unique source, Review or results of each unique test, and Ordering of each unique test    Moderate    [x] Primary Attending Intensive Care Medicine - Nutrition Support   [] Consultant    Copied text in this note has been reviewed and is accurate as of 05/22/25

## 2025-05-22 NOTE — PLAN OF CARE
Goal Outcome Evaluation:  Plan of Care Reviewed With: patient        Progress: improving  Outcome Evaluation: Pt with improving wound bed status vs this therapist's last assessment. Pt with some loosening of periwound drape due to incontinent episode. Pt with notable yeast rash to the periwound area today, treated with OTC antifungal under the wound dressings. Removed wound vac due to anticipated transfer today, and replaced with antimicrobial dressings that can stay in place up to 3-4 days.

## 2025-05-22 NOTE — PROGRESS NOTES
"   LOS: 17 days    Patient Care Team:  Sergio Randall MD as PCP - General (Cardiology)    Subjective     No acute events overnight. No new complaints   Family at bedside. Feeling better    Objective     Vital Signs:  Blood pressure 107/49, pulse 77, temperature 97.9 °F (36.6 °C), temperature source Oral, resp. rate 16, height 160 cm (62.99\"), weight 69.4 kg (153 lb), SpO2 97%.      Intake/Output Summary (Last 24 hours) at 5/22/2025 0924  Last data filed at 5/22/2025 0600  Gross per 24 hour   Intake 1718.07 ml   Output 3300 ml   Net -1581.93 ml        05/21 0701 - 05/22 0700  In: 1718.1 [I.V.:43.1]  Out: 3300     Physical Exam:    Gen: Alert, NAD   HENT: NC, AT, EOMI   NECK: Supple, no JVD, Trach +  LUNGS: CTA bilaterally, non labored respirtation   CVS: S1/S2 audible, RRR, no murmur   Abd: Soft, NT, ND, PEG tube +   Ext: No pedal edema, no cyanosis   CNS: Alert, No focal deficit noted grossly  Psy: Cooperative  Skin: Warm, dry and intact      Labs:  Results from last 7 days   Lab Units 05/22/25  0323 05/21/25 0226 05/20/25  0356   WBC 10*3/mm3 10.37 12.74* 7.39   HEMOGLOBIN g/dL 7.5* 8.6* 8.0*   PLATELETS 10*3/mm3 286 337 301     Results from last 7 days   Lab Units 05/22/25  0323 05/21/25  0226 05/20/25  0358 05/19/25  0353 05/18/25  0312   SODIUM mmol/L 139 131* 132* 132* 134*   POTASSIUM mmol/L 3.5 3.6 3.4* 3.4* 3.3*   CHLORIDE mmol/L 102 99 98 100 101   CO2 mmol/L 26.0 24.0 26.0 23.0 22.0   BUN mg/dL 16 26* 18 29* 24*   CREATININE mg/dL 2.93* 3.96* 3.05* 4.55* 3.59*   CALCIUM mg/dL 9.0 9.1 8.8 8.8 8.8   PHOSPHORUS mg/dL 2.1* 3.0 3.0 4.0 3.3   MAGNESIUM mg/dL  --   --   --  1.9 1.9   ALBUMIN g/dL 2.9* 2.5* 2.5* 2.5*  --      Results from last 7 days   Lab Units 05/19/25  0353   ALK PHOS U/L 100   BILIRUBIN mg/dL 0.3   ALT (SGPT) U/L 8   AST (SGOT) U/L 9     Results from last 7 days   Lab Units 05/20/25  0352   PH, ARTERIAL pH units 7.428   PO2 ART mm Hg 91.3   PCO2, ARTERIAL mm Hg 40.8   HCO3 ART mmol/L " 27.0*       Intake/Output Summary (Last 24 hours) at 5/22/2025 0924  Last data filed at 5/22/2025 0600  Gross per 24 hour   Intake 1718.07 ml   Output 3300 ml   Net -1581.93 ml         A/P:    ESRD: MWF - On HD with Dr. Kaur at [facility].   HTN:   Hyperkalemia   Metabolic acidosis  Anemia: Hemoglobin dropped to 5 with femoral artery bleed.  Patient was transfused 4 units packed red blood cells.    Respiratory failure: Trach on vent  Left femoral pseudoaneurysm: Developed at cardiac cath site.  Required repair 5/9/2025.  Wound VAC in place.      Plan:  - HD tomorrow, UF as tolerated.   - Renal diet   - VANCE with HD   - Electrolytes will be corrected with HD       High risk and complexity patient.      Arabella Morales MD  05/22/25  09:24 EDT

## 2025-05-22 NOTE — DISCHARGE SUMMARY
DISCHARGE SUMMARY    Patient Name: Estefania Connor  : 1955  MRN: [id number]    Date of Admission: 2025 12:55 PM  Date of Discharge: 25  Primary Care Physician: Sergio Randall MD    Reason for hospitalization     HPI:  Estefania Connor is a 69 y.o. female was admitted on 2025 with the initial diagnosis of CAD (coronary artery disease) [I25.10].    She was transferred from [facility] 25 for higher level of care in the setting of recent V-fib arrest.      Patient has documented medical diagnoses including ESRD, PKD, HTN, dyslipidemia, AAA, CAD s/p stenting, and depression.      She was additionally recently admitted to [facility] on 3/20/25 with what sounds like volume overload and JOSÉ ANTONIO superimposed on CKD in the setting of missing 2 HD appointments. Additional concerns for pneumonia as well as found to be anemic requiring PRBC transfusion was reportedly evaluated by GI who found no evidence of bleeding. Worsened from a respiratory standpoint with CT showing enlarging pleural effusions despite undergoing thoracentesis (1550 cc fluid removed) ultimately required intubation on 4/10 with transfer to [facility] on  for for further management.      Patient was unable to wean from mechanical ventilatory support at OSH and ultimately underwent tracheostomy and PEG tube placement on . Deteriorated on Friday () with V-fib arrest was ultimately resuscitated and taken to Cath Lab where she was found to have 100% occlusion of the RCA. Attempted intervention was unsuccessful and Cardiology (Dr. Angulo) was contacted who recommended transfer to [facility] for repeat PCI attempt.   (End of copied text).   Significant findings.  Discharge diagnosis/problems:     Active Hospital Problems    Diagnosis  POA    **CAD S/P ELIECER RCA [I25.10]  Yes    Ileus [K56.7]  No    ESRD on HD [N18.6]  Unknown    Polycystic kidney disease [Q61.3]  Not Applicable    HTN (hypertension) [I10]  Unknown    Dyslipidemia [E78.5]  Unknown     AAA (abdominal aortic aneurysm) [I71.40]  Unknown    Right coronary artery occlusion [I24.0]  Unknown    Depression [F32.A]  Unknown    Tracheostomy present [Z93.0]  Not Applicable    S/P percutaneous endoscopic gastrostomy (PEG) tube placement [Z93.1]  Not Applicable    Hematoma of groin [S30.1XXA]  Unknown      Resolved Hospital Problems   No resolved problems to display.        Physical Examination and Data     Vitals:  Temp: 97.9 °F (36.6 °C) (05/22/25 0800) Temp  Min: 97.9 °F (36.6 °C)  Max: 100 °F (37.8 °C)   Temp core:      BP: 108/57 (05/22/25 1000) BP  Min: 80/45  Max: 138/62   Pulse: 75 (05/22/25 1250) Pulse  Min: 67  Max: 91   Resp: 16 (05/22/25 0800) Resp  Min: 16  Max: 17   SpO2: 100 % (05/22/25 1250) SpO2  Min: 91 %  Max: 100 %   Device: ventilator (05/22/25 1250)    Flow Rate: 40 (05/22/25 0800) Flow (L/min) (Oxygen Therapy)  Min: 40  Max: 40     Invasive Mechanical Ventilator   Settings: Observed:   Mode: VC+/AC (05/22/25 1250)    Resp Rate (Set): 14 (05/22/25 1250) Resp Rate (Observed) Vent: 15 (05/22/25 1250)   Vt (Set, mL): 365 mL (05/22/25 1250) Vt, Exp (observed, mL): 450 mL (05/22/25 1250)    Minute Ventilation (L/min) (Obs): 6.28 L/min (05/22/25 1250)    I:E Ratio (Obs): 1:3.8 (05/22/25 1250)       FiO2 (%): 40 % (05/22/25 1250) Plateau Pressure (cm H2O): (S) 23 cm H2O (05/22/25 0744)   PEEP/CPAP (cm H2O): 5 cm H20 (05/22/25 1250) Driving Pressure (cm H2O): 18.3 cm H2O (05/22/25 0744)    Static Compliance (L/cm H2O): 49 (05/21/25 1858)      Physical Examination     Telemetry:  Rhythm: normal sinus rhythm (05/22/25 0600)  QTc Interval (Sec): 0.43 (05/20/25 0800)   Constitutional:  No acute distress.   Cardiovascular: RRR.    Respiratory: Normal breath sounds  No adventitious sounds   Abdominal:  Soft with no tenderness.   Extremities: No Edema  Wound Left groin area.   Neurological:             Awake.  Best Eye Response: 3-->(E3) to speech (05/22/25 0600)  Best Motor Response: 6-->(M6) obeys  commands (05/22/25 0600)  Best Verbal Response: 1-->(V1) none (05/22/25 0600)  [place] Coma Scale Score: 10 (05/22/25 0600)       Results Reviewed:  Laboratory  Microbiology  Radiology  Pathology    Hematology:  Results from last 7 days   Lab Units 05/22/25 0323 05/21/25 0226 05/20/25 0356   WBC 10*3/mm3 10.37 12.74* 7.39   HEMOGLOBIN g/dL 7.5* 8.6* 8.0*   MCV fL 92.5 92.1 91.2   PLATELETS 10*3/mm3 286 337 301     Results from last 7 days   Lab Units 05/22/25 0323 05/21/25 0226 05/20/25 0356   NEUTROS ABS 10*3/mm3 7.87* 10.05* 5.10   LYMPHS ABS 10*3/mm3 1.02 1.05 0.96   EOS ABS 10*3/mm3 0.22 0.35 0.29     Chemistry:  Estimated Creatinine Clearance: 16.7 mL/min (A) (by C-G formula based on SCr of 2.93 mg/dL (H)).    Results from last 7 days   Lab Units 05/22/25 0323 05/21/25 0226   SODIUM mmol/L 139 131*   POTASSIUM mmol/L 3.5 3.6   CHLORIDE mmol/L 102 99   CO2 mmol/L 26.0 24.0   BUN mg/dL 16 26*   CREATININE mg/dL 2.93* 3.96*   GLUCOSE mg/dL 117* 124*     Results from last 7 days   Lab Units 05/22/25 0323 05/21/25 0226 05/20/25  0358 05/19/25  0353 05/18/25  0312 05/17/25  0305 05/15/25  2349   IONIZED CALCIUM mmol/L  --   --   --   --   --   --  1.24   CALCIUM mg/dL 9.0 9.1   < > 8.8 8.8   < > 9.1   MAGNESIUM mg/dL  --   --   --  1.9 1.9  --   --    PHOSPHORUS mg/dL 2.1* 3.0   < > 4.0 3.3   < >  --     < > = values in this interval not displayed.       Hepatic Panel:  Results from last 7 days   Lab Units 05/22/25 0323 05/21/25 0226 05/20/25  0358 05/19/25  0353 05/17/25  0305   ALBUMIN g/dL 2.9* 2.5*   < > 2.5* 2.6*   TOTAL PROTEIN g/dL  --   --   --  4.8* 4.8*   BILIRUBIN mg/dL  --   --   --  0.3 0.6   AST (SGOT) U/L  --   --   --  9 9   ALT (SGPT) U/L  --   --   --  8 9   ALK PHOS U/L  --   --   --  100 97    < > = values in this interval not displayed.       Biomarkers:  Results from last 7 days   Lab Units 05/19/25  0353   CRP mg/dL 5.64*       Arterial Blood Gases:  Results from last 7 days   Lab  Units 05/20/25  0352   PH, ARTERIAL pH units 7.428   PCO2, ARTERIAL mm Hg 40.8   PO2 ART mm Hg 91.3   FIO2 % 40       Images:  XR Chest 1 View  Result Date: 5/22/2025  Impression: Mild perihilar interstitial edema which is overall improved from the prior study. There is persistent left basilar opacity felt to represent combination of pleural effusion and atelectasis. There is suspected to be posterior layering pleural effusion on the right Electronically Signed: Chidi Natarajan  5/22/2025 7:52 AM EDT  Workstation ID: [id number]    CT Chest Without Contrast Diagnostic  Result Date: 5/21/2025  Impression: CHEST: Large bilateral pleural effusions. Consolidation adjacent to the pleural effusions in the lower lobes, likely atelectasis. Superimposed infection to be excluded clinically. Acute right lateral fourth through eighth rib fractures. Acute left lateral third through sixth rib fractures. No pneumothorax. Small pericardial effusion. ABDOMEN/PELVIS: Mildly dilated loops of small bowel in the upper abdomen with gradual appearing transition, suggestive of ileus over the possibility of small bowel obstruction. Interval development of a right rectus sheath hematoma measuring up to 6.2 cm. Significant interval decrease size of a left groin hematoma which now measures up to 4.6 cm. There is fat stranding/inflammatory change involving the left groin and imaged left lower extremity, nonspecific, and may be postprocedural. Similar-appearing infrarenal abdominal aortic aneurysm measuring up to 4.2 cm. Electronically Signed: Sal Seth MD  5/21/2025 11:05 PM EDT  Workstation ID: [id number]    CT Abdomen Pelvis Without Contrast  Result Date: 5/21/2025  Impression: CHEST: Large bilateral pleural effusions. Consolidation adjacent to the pleural effusions in the lower lobes, likely atelectasis. Superimposed infection to be excluded clinically. Acute right lateral fourth through eighth rib fractures. Acute left lateral third through  sixth rib fractures. No pneumothorax. Small pericardial effusion. ABDOMEN/PELVIS: Mildly dilated loops of small bowel in the upper abdomen with gradual appearing transition, suggestive of ileus over the possibility of small bowel obstruction. Interval development of a right rectus sheath hematoma measuring up to 6.2 cm. Significant interval decrease size of a left groin hematoma which now measures up to 4.6 cm. There is fat stranding/inflammatory change involving the left groin and imaged left lower extremity, nonspecific, and may be postprocedural. Similar-appearing infrarenal abdominal aortic aneurysm measuring up to 4.2 cm. Electronically Signed: Sal Seth MD  5/21/2025 11:05 PM EDT  Workstation ID: [id number]    XR Chest 1 View  Result Date: 5/21/2025  Impression: 1. Grossly stable bowel gas pattern, suggesting possible mild ileus. 2. Similar appearance of the chest including cardiomegaly, pleural effusions and interstitial pulmonary edema with presumed mild bibasal atelectasis. Electronically Signed: Terry Baca MD  5/21/2025 9:13 AM EDT  Workstation ID: [id number]    XR Abdomen KUB  Result Date: 5/21/2025  Impression: 1. Grossly stable bowel gas pattern, suggesting possible mild ileus. 2. Similar appearance of the chest including cardiomegaly, pleural effusions and interstitial pulmonary edema with presumed mild bibasal atelectasis. Electronically Signed: Terry Baca MD  5/21/2025 9:13 AM EDT  Workstation ID: [id number]    XR Chest 1 View  Result Date: 5/20/2025  Impression: 1.Support devices appear in expected position. 2.Stable cardiomegaly with increased interstitial markings throughout both lungs, most notable within the right lung base. No significant change from prior exam. Electronically Signed: Guanaco Ayala MD  5/20/2025 7:42 AM EDT  Workstation ID: [id number]    XR Abdomen 1 View  Result Date: 5/20/2025  Impression: 1.Mild decrease in degree of distention of the gas-filled loops of small  bowel and colon predominantly within the left hemiabdomen. 2.Percutaneous gastrostomy tube projects at the gastric bubble. Electronically Signed: Guanaco Ayala MD  5/20/2025 7:36 AM EDT  Workstation ID: [id number]    XR Abdomen KUB  Result Date: 5/16/2025  Impression: Nonspecific bowel gas pattern. Electronically Signed: Edin Hansen MD  5/16/2025 4:19 AM EDT  Workstation ID: [id number]    XR Chest 1 View  Result Date: 5/15/2025  Impression: 1. Findings of pulmonary vascular congestion and mild edema which are felt to be slightly improved 2. Persistent left basilar opacity compatible with airspace disease and possible small effusion Electronically Signed: Chidi Natarajan  5/15/2025 7:46 AM EDT  Workstation ID: [id number]    XR Abdomen KUB  Result Date: 5/14/2025  Probable generalized ileus. Gaseous distention of the stomach is noted. Electronically Signed: Modesto Warner MD  5/14/2025 6:52 AM EDT  Workstation ID: [id number]    XR Chest 1 View  Result Date: 5/13/2025  Impression: 1. Combination interstitial and alveolar disease changes within both lungs, thought to be most dense or consolidated in the retrocardiac left lower lobe, without significant interval change. 2. Suspected small left pleural effusion, unchanged. 3. No visible pneumothorax. Support line and tube placements as described. Electronically Signed: Penelope Gipson MD  5/13/2025 7:42 AM EDT  Workstation ID: [id number]    CT Angiogram Abdomen Pelvis  Result Date: 5/9/2025  Impression: 1.Infrarenal abdominal aortic aneurysm measuring 4.2 cm in transverse dimension. No evidence of aortic dissection or periaortic hemorrhage. 2.Large left groin hematoma measuring 4.3 x 12 cm with evidence of contrast extravasation within the hematoma consistent with active bleeding. A pseudoaneurysm is not visualized. Findings discussed with vascular surgery at the time of dictation. 3.Advanced aortoiliac atheromatous changes as described above. 4.High-grade stenosis at the origin  of the celiac axis secondary to calcified plaque. Multifocal moderate stenosis throughout the SMA. BELINDA is not visualized. 5.Moderate to large bilateral pleural effusions are visualized with adjacent compressive atelectasis. 6.Gastrostomy tube is in appropriate position. There is nonspecific gastric distention. An obstructing lesion is not visualized however, evaluation is limited due to motion artifact. Gastric distention may be secondary to gastroparesis. If there is concern for an obstructing lesion, correlate with the endoscopy. 7.Additional findings per body of the report. Electronically Signed: John Thomson MD  5/9/2025 8:47 PM EDT  Workstation ID: [id number]    XR Chest 1 View  Result Date: 5/8/2025  Impression: 1. Stable lines and support tubes. 2. No pneumothorax. 3. Unchanged mixed interstitial and airspace opacities bilaterally which may relate to pulmonary edema. 4. Small bilateral pleural effusions. Electronically Signed: Thaddeus Toledo MD  5/8/2025 7:52 AM EDT  Workstation ID: [id number]    Cardiac Catheterization/Vascular Study  Result Date: 5/7/2025  Successful PTCA/stenting of the RCA with placement of 3 overlapping drug-eluting stents reducing 95% stenosis to 0%. RECOMMENDATIONS: Dual antiplatelet therapy for at least 1 year. Optimize medical therapy and risk factor management per guidelines. Indications: Status post VF arrest/non-STEMI. Access: Left femoral Procedures: PTCA/stenting of the posterolateral branch of the right coronary artery. Stenting of the mid to distal RCA with overlapping drug-eluting stents due to nonflow limiting dissection. Procedure narrative: The patient is a 69-year-old female with severe stenosis of the posterolateral branch of the RCA and a extremely heavily calcified vessel.  She was recently admitted to [facility] with ACS/VF arrest.  Angiogram was performed and PCI of the posterolateral branch of the RCA was attempted and was unsuccessful due to failure to deliver the  devices.  Subsequently patient's condition decompensated and she was transferred to [facility].  She was brought to the Cath Lab today for planned PCI of the posterolateral branch of the RCA. The patient was brought to the catheterization lab in a fasting condition.  Right femoral access site was prepped and draped in standard sterile fashion.  Lidocaine was injected and arterial access was obtained by percutaneous anterior wall puncture technique.  A 6 [place] arterial sheath was placed.  Heavy calcification and moderate to severe stenosis of the right iliac artery was noted and guidewire could not be advanced.  At this time we proceeded via the left femoral approach.  Angiomax was started.  The right coronary artery was engaged with F R4 guide catheter.  Angiogram revealed diffusely calcified right coronary artery with a 95% stenosis in the large posterolateral branch.  The lesion was crossed with a versa turn wire and a 2.0 mm balloon was advanced and balloon PTCA was performed.  A guide liner was advanced to facilitate balloon delivery.  Subsequently delivery of a 2.5 x 18 mm ELIECER was quite difficult and required deep intubation of the right coronary artery with a guide liner.  Stent was successfully delivered and deployed as well as postdilated with satisfactory results however post stent deployment we noted a nonflow limiting dissection extending from the distal RCA into the more distal segment beyond the crux up to the right PDA.  At this time a 3.0 x 20 mm NC balloon was advanced and expanded in the dissected segment.  The guide liner was further advanced to facilitate delivery of stents.  A 3.5 x 48 mm ELIECER was delivered from mid to distal RCA and a 3.0 x 33 mm ELIECER was delivered from distal RCA to the EBENEZER covering the entire segment with overlapping stents.  All stents were deployed and postdilated with satisfactory results and no significant residual uncovered dissection and no significant  residual stenosis.  Final angiograms were taken and the guide catheter was removed.  BRAXTON flow was grade 3 before and after PCI.  At the conclusion the arterial sheath were secured and the patient was transferred to her room in a stable condition.      XR Chest 1 View  Result Date: 5/7/2025  Impression: Persistent pulmonary vascular congestion, left basilar atelectasis and effusion. Significantly increased right basilar atelectasis +/- effusion. Electronically Signed: Arley Duong MD  5/7/2025 7:12 AM EDT  Workstation ID: [id number]    XR Chest 1 View  Result Date: 5/6/2025  Impression: 1.The tracheostomy tube is noted in the midline. 2.Stable appearance of diffuse interstitial changes throughout the lungs with airspace infiltrates in the mid to lower lungs bilaterally. Small bilateral pleural effusions are noted. 3.The left IJ central line and right dialysis catheter are stable in position. Electronically Signed: Enzo Anna MD  5/6/2025 7:38 AM EDT  Workstation ID: [id number]    XR Chest 1 View  Result Date: 5/5/2025  Impression: Interval decreased pulmonary edema. Similar small right and trace left pleural effusions, with likely adjacent atelectasis. Superimposed infection to be excluded clinically. Support devices as above. Electronically Signed: Sal Seth MD  5/5/2025 3:07 PM EDT  Workstation ID: [id number]    XR Chest 1 View  Result Date: 5/2/2025  1.  Tiny bilateral pleural effusions. 2.  Mild cardiomegaly.  This report was finalized on 5/2/2025 10:28 AM by Dr. Leander Faria MD.      XR Chest 1 View  Result Date: 4/24/2025  1.  Decreased right pleural effusion. 2.  Otherwise stable changes of CHF/edema. 3.  Support devices as above.  This report was finalized on 4/24/2025 3:19 PM by Dr. Edin Peace MD.        Echo:  Results for orders placed during the hospital encounter of 04/11/25    Adult Transthoracic Echo Complete W/ Cont if Necessary Per Protocol    Interpretation Summary  Images from the original  result were not included.      The study is technically difficult for diagnosis. The quality of the study is limited with poor acoustic windows.    Sinus bradycardia in high 50s was the predominant rhythm observed during the procedure.    Normal left ventricular cavity size noted. Left ventricular wall thickness is consistent with mild concentric hypertrophy.    Left ventricular systolic function is mildly decreased. Left ventricular ejection fraction appears to be 41 - 45%.    Left ventricular diastolic function is consistent with (grade II w/high LAP) pseudonormalization.    There is mild calcification of the aortic valve. No significant aortic valve regurgitation is present. No hemodynamically significant aortic valve stenosis is present.    There is mild calcification of the mitral valve posterior leaflet(s). Mild mitral valve regurgitation is present. No significant mitral valve stenosis is present.    There is a small (<1cm) pericardial effusion. There is no evidence of cardiac tamponade.      Results: Reviewed.  I reviewed the patient's new laboratory and imaging results.  I independently reviewed the patient's new images.    Medications: Reviewed.    Hospital Course, Consults and Procedures  provided:   Patient was admitted to [facility] ICU 2* the above listed problems in the HPI. Underwent LHC on 5/7/25 with 2 overlapping ELIECER with stenosis reduced from 95 to 0. Recommendations are 1 year of DAPT and avoidance of anticoagulation due to severe bleeding. LVEF noted to be 41-45% and GDMT added as tolerated. Antihypertensives adjusted during stay. Amiodarone for V-fib arrest has been able to be weaned down.     RUE noted to be swollen and repeat duplex showed superficial thrombophlebitis of cephalic vein.     Found to have tracheobronchitis. Cultures grew Klebsiella susceptible to Rocephin. Received 7 days of Rocephin.     Patient was to be discharged back to [facility] however she developed a hematoma at her left femoral  artery site requiring urgent surgery with femoral artery repair, evacuation of hematoma, and 2 units PRBCs. Wound VAC placed. She was taken back to surgery on 5/13/25 for washout and replacement of wound VAC. Additional PRBCs received on May 14th. PT wound Care has been following with every other day wound vac changes per Vascular Surgery's recommendations. She will require long term negative pressure wound therapy as wound could not be closed due to size and tissue quality.     The remaining duration of the patient's stay has been uneventful. She has received iHD per Nephrology. She has appeared to be volume overloaded with pleural effusions and upper extremity pitting edema. Last HD was 5/21/25 in which she had 3 L fluid removed. She became hypotensive following HD and is on low-dose norepinephrine. Antihypertensives currently on hold. ECHO pending.    Repeat imaging obtained on 5/21 showing large bilateral pleural effusions with likely atelectasis, acute right lateral fourth through eighth rib fractures and acute lateral third through sixth rib fractures with small pericardial effusion. Additionally noted findings concerning of ileus, interval development of right rectus sheath hematoma measuring up to 6.2 cm, significant interval decrease of left groin hematoma measuring up to 4.6 cm, and similar appearing infrarenal AAA measuring 4.2 cm. Reglan initiated and PRN lactulose.. She has had 4 documented bowel movements since yesterday.     It has been determined by all primary and consulting parties that the patient has reached the maximum benefit of her inpatient stay and is ready for d/c to [facility] in [place]. Consulting parties d/c recommendations are reflected below.     Consults:  Consults       Date and Time Order Name Status Description    5/9/2025  9:00 PM Inpatient Vascular Surgery Consult Completed     5/5/2025  1:27 PM Inpatient Nephrology Consult Completed     5/5/2025  1:27 PM Inpatient Cardiology Consult  Completed             Procedures:  Procedure(s):  5/9/25 Left femoral artery repair, evacuation of left groin hematoma, placement of negative wound vacuum therapy  5/13/25 I&D LLE      Condition on discharge     Improving with treatment.    Patient and family instructions     Discharge Disposition: LTAC: [facility]    CODE STATUS:   Code Status and Medical Interventions: CPR (Attempt to Resuscitate); Full Support   Ordered at: 05/06/25 1629     Code Status (Patient has no pulse and is not breathing):    CPR (Attempt to Resuscitate)     Medical Interventions (Patient has pulse or is breathing):    Full Support       No Known Allergies    Discharge Medications:     Discharge Medications        New Medications        Instructions Start Date   albuterol (2.5 MG/3ML) 0.083% nebulizer solution  Commonly known as: PROVENTIL   2.5 mg, Nebulization, Every 4 Hours PRN      amiodarone 200 MG tablet  Commonly known as: PACERONE   200 mg, Per G Tube, Every 24 Hours Scheduled   Start Date: May 23, 2025     dexmedetomidine 400 MCG/100ML solution infusion  Commonly known as: PRECEDEX   0.2-1.5 mcg/kg/hr (13..1 mcg/hr), Intravenous, Titrated      heparin (porcine) 5000 UNIT/ML injection   5,000 Units, Subcutaneous, Every 8 Hours Scheduled      ipratropium-albuterol 0.5-2.5 mg/3 ml nebulizer  Commonly known as: DUO-NEB   3 mL, Nebulization, 4 Times Daily - RT      lactulose 10 GM/15ML solution  Commonly known as: CHRONULAC   20 g, Per PEG Tube, 3 Times Daily PRN      lansoprazole 15 MG Tablet Delayed Release Dispersible disintegrating tablet  Commonly known as: PREVACID SOLUTAB   15 mg, Per PEG Tube, Every Morning Before Breakfast   Start Date: May 23, 2025     metoclopramide 5 MG/ML injection  Commonly known as: REGLAN   5 mg, Intravenous, Every 8 Hours      miconazole 2 % powder  Commonly known as: MICOTIN   1 Application, Topical, Every 12 Hours Scheduled      nitroglycerin 0.4 MG/HR patch  Commonly known as: NITRODUR   1 patch,  Transdermal, Daily   Start Date: May 23, 2025     Norepinephrine-Sodium Chloride 8-0.9 MG/250ML-% solution infusion  Commonly known as: LEVOPHED   0.02-0.3 mcg/kg/min (1.388-20.82 mcg/min), Intravenous, Titrated             Continue These Medications        Instructions Start Date   aspirin 81 MG EC tablet   81 mg, Oral, Daily      atorvastatin 40 MG tablet  Commonly known as: LIPITOR   40 mg, Per G Tube, Nightly      clopidogrel 75 MG tablet  Commonly known as: PLAVIX   75 mg, Oral, Daily             Stop These Medications      amLODIPine 10 MG tablet  Commonly known as: NORVASC     bumetanide 2 MG tablet  Commonly known as: BUMEX     busPIRone 10 MG tablet  Commonly known as: BUSPAR     calcitriol 0.25 MCG capsule  Commonly known as: ROCALTROL     carvedilol 25 MG tablet  Commonly known as: COREG     doxazosin 2 MG tablet  Commonly known as: CARDURA     Eliquis 2.5 MG tablet tablet  Generic drug: apixaban     epoetin vera-epbx 3000 UNIT/ML injection  Commonly known as: RETACRIT     ezetimibe 10 MG tablet  Commonly known as: ZETIA     fenofibrate 54 MG tablet  Commonly known as: TRICOR     hydrALAZINE 50 MG tablet  Commonly known as: APRESOLINE     hydrOXYzine 25 MG tablet  Commonly known as: ATARAX     isosorbide mononitrate 60 MG 24 hr tablet  Commonly known as: IMDUR     sertraline 25 MG tablet  Commonly known as: ZOLOFT              Activity:       Activity Instructions       Activity as Tolerated                Diet: NPO Diet NPO Type: Tube Feeding     Diet Instructions       Diet: Nothing By Mouth      Discharge Diet: Nothing By Mouth           Tube Feeding: Formula: Novasource Renal (Electrolyte Restricted); Feeding Type: Continuous; Start at: 35 mL/hr; Then Advance By: Do Not Advance; Water Flush: 30 mL; Every: 2 hours; Water Bolus: None     Additional Instructions for the Follow-ups that You Need to Schedule       Discharge Follow-up with PCP   As directed       Currently Documented PCP:    Sergio Randall  MD DESIRE    PCP Phone Number:    [phone]     Follow Up Details: 1 week post-discharge        Discharge Follow-up with Specialty: Cardiology; 1 Month   As directed      Specialty: Cardiology   Follow Up: 1 Month                     Time Spent on Discharge:    I spent 20 minutes on this discharge activity which included: face-to-face encounter with the patient, reviewing the data in the system, coordination of the care with the nursing staff as well as consultants, documentation, and entering orders.    Electronically signed by NEDRA Sadler, 05/22/25, 12:03 PM EDT.

## 2025-05-22 NOTE — THERAPY WOUND CARE TREATMENT
Acute Care - Wound/Debridement Treatment Note   [place]     Patient Name: Estefania Connor  : 1955  MRN: [id number]  Today's Date: 2025                Admit Date: 2025    Visit Dx:    ICD-10-CM ICD-9-CM   1. Hematoma of groin, initial encounter  S30.1XXA 922.2       Patient Active Problem List   Diagnosis    ESRD on HD    Polycystic kidney disease    HTN (hypertension)    Dyslipidemia    AAA (abdominal aortic aneurysm)    Right coronary artery occlusion    Depression    Tracheostomy present    S/P percutaneous endoscopic gastrostomy (PEG) tube placement    CAD S/P ELIECER RCA    Hematoma of groin    Ileus        Past Medical History:   Diagnosis Date    AAA (abdominal aortic aneurysm)     Coronary artery disease     Depression     ESRD (end stage renal disease)     Hyperlipidemia     Hypertension     Polycystic kidney disease         Past Surgical History:   Procedure Laterality Date    CARDIAC CATHETERIZATION N/A 2025    Procedure: Coronary angiography;  Surgeon: Ambrose Mcnally MD;  Location:  [facility];  Service: Cardiovascular;  Laterality: N/A;    CARDIAC CATHETERIZATION N/A 2025    Procedure: Left Heart Cath;  Surgeon: Edin Boland MD;  Location:  [facility];  Service: Cardiovascular;  Laterality: N/A;    CARDIAC CATHETERIZATION N/A 2025    Procedure: Stent ELIECER coronary;  Surgeon: Edin Boland MD;  Location:  [facility];  Service: Cardiovascular;  Laterality: N/A;    INCISION AND DRAINAGE LEG Left 2025    Procedure: GROIN WOUND VACUUM CHANGE LEFT;  Surgeon: Remi Guerrero MD;  Location:  [facility];  Service: Vascular;  Laterality: Left;    PSEUDO ANEURYSM REPAIR, EXTREMITY Left 2025    Procedure: REPAIR LEFT FEMORAL ARTERY, EVACUATION LEFT GROIN HEMATOMA, PLACEMENT OF NEGATIVE WOUND VACCUUM THERAPY;  Surgeon: Remi Guerrero MD;  Location:  [facility];  Service: Vascular;  Laterality: Left;    TRACHEOSTOMY  AND PEG TUBE INSERTION N/A 4/29/2025    Procedure: TRACHEOSTOMY AND PERCUTANEOUS ENDOSCOPIC GASTROSTOMY TUBE INSERTION;  Surgeon: Kera Head MD;  Location: [facility];  Service: General;  Laterality: N/A;           Wound 05/20/25 1130 Left anterior groin Surgical (Active)   Dressing Appearance intact;moist drainage;dressing loose 05/22/25 1343   Dressing Removed Type other (see comments) 05/22/25 1343   Confirmed Empty Wound Bed Yes, visual inspection of wound bed;Yes, finger sweep performed 05/22/25 1343   Closure Unable to assess 05/22/25 0800   Base moist;pink;red;white;yellow;subcutaneous;slough 05/22/25 1343   Periwound yeast;redness;warm;moist 05/22/25 1343   Periwound Temperature warm 05/22/25 1343   Periwound Skin Turgor soft 05/22/25 1343   Edges irregular;open 05/22/25 1343   Drainage Characteristics/Odor serosanguineous 05/22/25 1343   Drainage Amount small 05/22/25 1343   Care, Wound cleansed with;wound cleanser;debrided 05/22/25 1343   Dressing Care dressing applied;antimicrobial agent applied;foam;silver impregnated;low-adherent 05/22/25 1343   Periwound Care cleansed with pH balanced cleanser;topical treatment applied 05/22/25 1343   Wound Output (mL) 200 05/22/25 1343       NPWT (Negative Pressure Wound Therapy) 05/09/25 left groin (Active)   Therapy Setting vacuum off 05/22/25 1343   Dressing foam, black 05/22/25 0800   Instillation normal saline 05/21/25 2000   Pressure Setting 125 mmHg 05/22/25 0800   Sponges Removed 1;other (see comments) 05/22/25 1343   Finger sweep complete Yes 05/22/25 1343         WOUND DEBRIDEMENT  Total area of Debridement: nonselective  Debridement Site 1  Location- Site 1: L groin  Selective Debridement- Site 1: Wound Surface <20cmsq  Instruments- Site 1: other (comment) (moist gauze, cleaning wipes)  Excised Tissue Description- Site 1: minimum, slough  Bleeding- Site 1: none               PT Assessment (Last 12 Hours)       PT Evaluation and Treatment       Row  Name 05/22/25 1343          Physical Therapy Time and Intention    Subjective Information no complaints  -     Document Type wound care;therapy note (daily note)  -     Mode of Treatment physical therapy;individual therapy  -       Row Name 05/22/25 1343          General Information    Patient Observations alert;cooperative;agree to therapy  -       Row Name 05/22/25 1343          Pain Scale: FACES Pre/Post-Treatment    Pain: FACES Scale, Pretreatment 0-->no hurt  -     Posttreatment Pain Rating 0-->no hurt  -     Pre/Posttreatment Pain Comment some discomfort with dressings removal, premedicated per RN  -       Row Name 05/22/25 1343          Cognition    Orientation Status (Cognition) oriented to;person;unable/difficult to assess  -       Row Name 05/22/25 1343          Wound 05/20/25 1130 Left anterior groin Surgical    Wound - Properties Group Placement Date: 05/20/25  -MF Placement Time: 1130  -MF Side: Left  - Orientation: anterior  - Location: groin  - Primary Wound Type: Surgical  -    Dressing Appearance intact;moist drainage;dressing loose  some fecal matter beneath lifted drape  -     Dressing Removed Type other (see comments)  vac  -     Confirmed Empty Wound Bed Yes, visual inspection of wound bed;Yes, finger sweep performed  -     Base moist;pink;red;white;yellow;subcutaneous;slough  -     Periwound yeast;redness;warm;moist  notable yeast rash to periphery  -     Periwound Temperature warm  -     Periwound Skin Turgor soft  -     Edges irregular;open  -     Drainage Characteristics/Odor serosanguineous  -     Drainage Amount small  -     Care, Wound cleansed with;wound cleanser;debrided  -     Dressing Care dressing applied;antimicrobial agent applied;foam;silver impregnated;low-adherent  saline-moist HFBc, optifoam Ag, PF tape  -     Periwound Care cleansed with pH balanced cleanser;topical treatment applied  topical antifungal  -     Wound Output (mL)  200  -     Retired Wound - Properties Group Placement Date: 05/20/25  -MF Placement Time: 1130  -MF Side: Left  -MF Orientation: anterior  -MF Location: groin  -MF    Retired Wound - Properties Group Placement Date: 05/20/25  -MF Placement Time: 1130  -MF Side: Left  -MF Orientation: anterior  -MF Location: groin  -MF    Retired Wound - Properties Group Date first assessed: 05/20/25  -MF Time first assessed: 1130  -MF Side: Left  -MF Location: groin  -MF      Row Name 05/22/25 1343          NPWT (Negative Pressure Wound Therapy) 05/09/25 left groin    NPWT (Negative Pressure Wound Therapy) - Properties Group Placement Date: 05/09/25  -MEET Location: left groin  -MEET    Therapy Setting vacuum off  discontinued for transfer  -     Sponges Removed 1;other (see comments)  1 sorbact, 1 black  -     Finger sweep complete Yes  -     Retired NPWT (Negative Pressure Wound Therapy) - Properties Group Placement Date: 05/09/25  -MEET Location: left groin  -MEET    Retired NPWT (Negative Pressure Wound Therapy) - Properties Group Placement Date: 05/09/25  -MEET Location: left groin  -MEET    Retired NPWT (Negative Pressure Wound Therapy) - Properties Group Placement Date: 05/09/25  -MEET Location: left groin  -MEET      Row Name 05/22/25 1343          Coping    Observed Emotional State calm;cooperative  -     Verbalized Emotional State acceptance  -       Row Name 05/22/25 1343          Plan of Care Review    Plan of Care Reviewed With patient  -     Progress improving  -     Outcome Evaluation Pt with improving wound bed status vs this therapist's last assessment. Pt with some loosening of periwound drape due to incontinent episode. Pt with notable yeast rash to the periwound area today, treated with OTC antifungal under the wound dressings. Removed wound vac due to anticipated transfer today, and replaced with antimicrobial dressings that can stay in place up to 3-4 days.  -       Row Name 05/22/25 1343          Positioning  and Restraints    Pre-Treatment Position in bed  -MC     Post Treatment Position bed  -MC     In Bed supine;call light within reach;encouraged to call for assist;with nsg  -MC               User Key  (r) = Recorded By, (t) = Taken By, (c) = Cosigned By      Initials Name Provider Type     Ryan Rocha, PT Physical Therapist    María Maza, RN Registered Nurse    Jackie Trejo PT Physical Therapist                  Physical Therapy Education       Title: PT OT SLP Therapies (In Progress)       Topic: Physical Therapy (In Progress)       Point: Mobility training (In Progress)       Learning Progress Summary            Patient Acceptance, E,TB, NR by  at 5/14/2025 1717    Acceptance, E, NR by KG at 5/12/2025 1046    Acceptance, E, NR by KG at 5/7/2025 1115    Acceptance, E, NR by MARIFER at 5/6/2025 1430                      Point: Home exercise program (In Progress)       Learning Progress Summary            Patient Acceptance, E,TB, NR by  at 5/14/2025 1717    Acceptance, E, NR by KG at 5/12/2025 1046    Acceptance, E, NR by KG at 5/7/2025 1115    Acceptance, E, NR by MARIFER at 5/6/2025 1430                      Point: Body mechanics (In Progress)       Learning Progress Summary            Patient Acceptance, E,TB, NR by  at 5/14/2025 1717    Acceptance, E, NR by KG at 5/12/2025 1046    Acceptance, E, NR by KG at 5/7/2025 1115    Acceptance, E, NR by MARIFER at 5/6/2025 1430                      Point: Precautions (In Progress)       Learning Progress Summary            Patient Acceptance, E,TB, NR by  at 5/14/2025 1717    Acceptance, E, NR by KG at 5/12/2025 1046    Acceptance, E, NR by KG at 5/7/2025 1115    Acceptance, E, NR by MARIFER at 5/6/2025 1430                                      User Key       Initials Effective Dates Name Provider Type Discipline     02/03/23 -  Sandra Pruitt PT Physical Therapist PT     06/16/21 -  Gertrudis Bird RN Registered Nurse Nurse    KG 05/22/20 -   Dorothy Frazier, PT Physical Therapist PT                    Recommendation and Plan  Anticipated Discharge Disposition (PT): extended care facility  Planned Therapy Interventions (PT): balance training, bed mobility training, home exercise program, strengthening, transfer training  Therapy Frequency (PT): daily  Plan of Care Reviewed With: patient   Progress: improving       Progress: improving  Outcome Evaluation: Pt with improving wound bed status vs this therapist's last assessment. Pt with some loosening of periwound drape due to incontinent episode. Pt with notable yeast rash to the periwound area today, treated with OTC antifungal under the wound dressings. Removed wound vac due to anticipated transfer today, and replaced with antimicrobial dressings that can stay in place up to 3-4 days.  Plan of Care Reviewed With: patient            Time Calculation   PT Charges       Row Name 05/22/25 1347             Time Calculation    Start Time 1315  -MC      PT Goal Re-Cert Due Date 06/01/25  -MC         Untimed Charges    97602-Non-selective debridement 25  -MC         Total Minutes    Untimed Charges Total Minutes 25  -MC       Total Minutes 25  -MC                User Key  (r) = Recorded By, (t) = Taken By, (c) = Cosigned By      Initials Name Provider Type    Jackie Trejo, PT Physical Therapist                      Therapy Charges for Today       Code Description Service Date Service Provider Modifiers Qty    28279985305 HC PT NEG PRESS WOUND TO 50SQCM DME1 5/22/2025 Jackie Saravia, PT  1              PT G-Codes  Outcome Measure Options: AM-PAC 6 Clicks Basic Mobility (PT)  AM-PAC 6 Clicks Score (PT): 8  AM-PAC 6 Clicks Score (OT): 6       Jackie Saravia, JARETT  5/22/2025

## 2025-05-22 NOTE — CASE MANAGEMENT/SOCIAL WORK
Case Management Discharge Note      Final Note: Patient discussed in ICU MDR earlier today.  reached out to [facility] liaison, Reginald to let him know, levo gtt is now on, physician is monitoring R lung for possible thoracentesis at some point, no issues with groin wound currently. [facility] can receive back today. Nursing to call report to [phone]. fax records to [phone]. 24 hour MAR is faxed. Once report is called after transfer summary,  will request EMS transport. Will speak with spouse, who is at bedside. IMM issued today         Selected Continued Care - Admitted Since 5/5/2025       Destination Coordination complete.      Service Provider Services Address Phone Fax Patient Preferred    [address] [address] [phone] --              Durable Medical Equipment    No services have been selected for the patient.                Dialysis/Infusion    No services have been selected for the patient.                Home Medical Care    No services have been selected for the patient.                Therapy    No services have been selected for the patient.                Community Resources    No services have been selected for the patient.                Community & DME    No services have been selected for the patient.                    Transportation Services  Ambulance: [facility]  [facility] Ambulance Status: Accepted    Final Discharge Disposition Code: 63 - [facility]

## 2025-05-22 NOTE — DISCHARGE PLACEMENT REQUEST
"Miranda Connor (69 y.o. Female)       Date of Birth   1955    Social Security Number       Address   [address] [place] [address]    Home Phone   [phone]    MRN   [id number]       Quaker   Unknown    Marital Status                               Admission Date   5/5/2025    Admission Type   Urgent    Admitting Provider   Toy Chandra MD    Attending Provider   Toy Chandra MD    Department, Room/Bed   [facility]       Discharge Date       Discharge Disposition       Discharge Destination                                 Attending Provider: Toy Chandra MD    Allergies: No Known Allergies    Isolation: None   Infection: None   Code Status: CPR    Ht: 160 cm (62.99\")   Wt: 69.4 kg (153 lb)    Admission Cmt: None   Principal Problem: CAD S/P ELIECER RCA [I25.10]                   Active Insurance as of 5/5/2025       Primary Coverage       Payor Plan Insurance Group Employer/Plan Group    MEDICARE MEDICARE A & B        Payor Plan Address Payor Plan Phone Number Payor Plan Fax Number Effective Dates    [address] [phone]  6/1/2019 - None Entered    [facility] [id number]         Subscriber Name Subscriber Birth Date Member ID       MIRANDA CONNOR 1955 [id number]               Secondary Coverage       Payor Plan Insurance Group Employer/Plan Group    CONTINUECARE - ([place] ONLY) CONTINUECARE        Payor Plan Address Payor Plan Phone Number Payor Plan Fax Number Effective Dates    [address] [address]   4/11/2025 - None Entered    [facility] [phone]         Subscriber Name Subscriber Birth Date Member ID       MIRANDA CONNOR 1955 [id number]               Tertiary Coverage       Payor Plan Insurance Group Employer/Plan Group    [place]        Payor Plan Address Payor Plan Phone Number Payor Plan Fax Number Effective Dates    [address] [phone]  4/11/2025 - None Entered    [facility] [address]         Subscriber Name " Subscriber Birth Date Member ID       MIRANDA CONNOR 1955 [id number]                     Emergency Contacts        (Rel.) Home Phone Work Phone Mobile Phone    vita brannon (Spouse) [phone] -- [phone]              Insurance Information                  MEDICARE/MEDICARE A & B Phone: [phone]    Subscriber: Miranda Connor Subscriber#: [id number]    Group#: -- Precert#: --    Authorization#: -- Effective Date: --        CONTINUECARE - ([place] ONLY)/CONTINUECARE Phone: --    Subscriber: Miranda Connor Subscriber#: [id number]    Group#: -- Precert#: --    Authorization#: -- Effective Date: --        [place] Phone: [phone]    Subscriber: Miranda Connor Subscriber#: [id number]    Group#: -- Precert#: --    Authorization#: -- Effective Date: --               All medication doses during the admission are shown, including meds that are no longer on order.  Scheduled Meds Sorted by Name  for Miranda Connor as of 05/13/25 through 5/22/25    1 Day 3 Days 7 Days 10 Days < Today >   Legend:       Medications 05/13 05/14 05/15 05/16 05/17 05/18 05/19 05/20 05/21 05/22   albumin human 25 % IV SOLN 25 g  Dose: 25 g  Freq: Once Route: IV  Indications of Use: HEMODIALYSIS PROCEDURE  Start: 05/09/25 0945 End: 05/09/25 0935                amiodarone (PACERONE) tablet 200 mg  Dose: 200 mg  Freq: Every 24 Hours Scheduled Route: PEG TUBE  Start: 05/22/25 0900   Admin Instructions:                0815        amiodarone (PACERONE) tablet 200 mg  Dose: 200 mg  Freq: Every 12 Hours Scheduled Route: PEG TUBE  Start: 05/05/25 2100 End: 05/21/25 1347   Admin Instructions:       0836     2040      1225     2058      0812     2123      1155     2117      0811     2106      0917     2024      1158     2104      0849     2009 0958     1347-D/C'd       amLODIPine (NORVASC) tablet 10 mg  Dose: 10 mg  Freq: Every 24 Hours Scheduled Route: PEG TUBE  Start: 05/13/25 0900   Admin  Instructions:       0836      (0071) 1642      1159      0812      0917      1158      0849      (7127) (2817) [C]     0951 [C]        amLODIPine (NORVASC) tablet 5 mg  Dose: 5 mg  Freq: Every 24 Hours Scheduled Route: PEG TUBE  Start: 05/10/25 1115 End: 05/13/25 0704   Admin Instructions:       0704-D/C'd               aspirin chewable tablet 81 mg  Dose: 81 mg  Freq: Daily Route: PEG TUBE  Start: 05/06/25 0900   Admin Instructions:       0836     1438     1519      1225      0819      1156      0812      0917      1158      0849      0958      0815        atorvastatin (LIPITOR) tablet 40 mg  Dose: 40 mg  Freq: Nightly Route: PEG TUBE  Start: 05/05/25 2100   Admin Instructions:       1438     1519     2040      2058      2123 2117 2106 2022 2104 2008 2026 2100        carvedilol (COREG) tablet 12.5 mg  Dose: 12.5 mg  Freq: Every 12 Hours Scheduled Route: PEG TUBE  Start: 05/15/25 1100   Admin Instructions:         1032     2123      1156     2117 0812     2106 0917 2022 1157 2104 0849     2009      (8480)     2156      (3759) [C]     0951 [C]     2100        carvedilol (COREG) tablet 12.5 mg  Dose: 12.5 mg  Freq: Every 12 Hours Scheduled Route: PO  Start: 05/15/25 1100 End: 05/15/25 1015   Admin Instructions:         1015-D/C'd             cefTRIAXone (ROCEPHIN) 2,000 mg in sodium chloride 0.9 % 100 mL MBP  Dose: 2,000 mg  Freq: Every 24 Hours Route: IV  Indications of Use: EMPIRIC,PNEUMONIA  Last Dose: 2,000 mg (05/12/25 2123)  Start: 05/06/25 2100 End: 05/12/25 2153   Admin Instructions:                   cloNIDine (CATAPRES) tablet 0.1 mg  Dose: 0.1 mg  Freq: Every 8 Hours Scheduled Route: PEG TUBE  Start: 05/11/25 1400   Admin Instructions:       0618     1423 [C]     2102      0517     (1306)     2100      0631     1329     2123      0508     (1413) [C]     2117      0500     1357     2107      0535     1408     2231      0516     1543      2104      0529     1424     2132      0523 (2961) (7232) (4371) 8985 [C]     1400     2207        cloNIDine (CATAPRES) tablet 0.1 mg  Dose: 0.1 mg  Freq: Every 8 Hours Scheduled Route: PEG TUBE  Start: 05/05/25 1845 End: 05/10/25 1003   Admin Instructions:                   cloNIDine (CATAPRES) tablet 0.1 mg  Dose: 0.1 mg  Freq: Every 8 Hours Scheduled Route: PEG TUBE  Start: 05/05/25 1515 End: 05/05/25 1754   Admin Instructions:                   cloNIDine (CATAPRES) tablet 0.1 mg  Dose: 0.1 mg  Freq: Every 8 Hours Scheduled Route: PO  Start: 05/05/25 1515 End: 05/05/25 1429   Admin Instructions:                   cloNIDine (CATAPRES) tablet 0.2 mg  Dose: 0.2 mg  Freq: Every 8 Hours Scheduled Route: PEG TUBE  Start: 05/10/25 1400 End: 05/11/25 0825   Admin Instructions:                   clopidogrel (PLAVIX) tablet 75 mg  Dose: 75 mg  Freq: Daily Route: PEG TUBE  Start: 05/06/25 0900    0836     1438     1519      1226      0813      1155      0812      0917      1157      0849      0958      0815        diatrizoate meglumine-sodium (GASTROGRAFIN) 66-10 % oral solution 100 mL  Dose: 100 mL  Freq: Once Route: PO  Indications of Use: RADIOGRAPHY  Start: 05/21/25 1630 End: 05/21/25 2000   Admin Instructions:               2000         fentaNYL citrate (PF) (SUBLIMAZE) injection 25 mcg  Dose: 25 mcg  Freq: Once Route: IV  Start: 05/09/25 2130 End: 05/09/25 2100   Admin Instructions:                   fentaNYL citrate (PF) (SUBLIMAZE) injection 25 mcg  Dose: 25 mcg  Freq: Once Route: IV  Start: 05/09/25 2030 End: 05/09/25 2029   Admin Instructions:                   heparin (porcine) 5000 UNIT/ML injection 5,000 Units  Dose: 5,000 Units  Freq: Every 8 Hours Scheduled Route: SC  Indications of Use: PROPHYLAXIS OF VENOUS THROMBOEMBOLISM  Start: 05/09/25 0600    0618     1400 [C]     1438     1519     2101      0517     1338     2100      0630     1329     2123      0508     1411     2117      0500      1357     2107      0535     1408     2235      0516     1543     2104      0529     1424     2132      0516     1348     2156      0525     1400     2200        heparin (porcine) injection 1,700 Units  Dose: 1,700 Units  Freq: Once Route: IV  Indications of Use: ACUTE CORONARY SYNDROME,Other - full anticoagulation  Start: 05/06/25 0900 End: 05/06/25 0928                heparin (porcine) injection 1,700 Units  Dose: 1,700 Units  Freq: Once Route: IV  Indications Comment: Heparin Bolus  Start: 05/06/25 0000 End: 05/05/25 2320                heparin (porcine) injection 2,000 Units  Dose: 2,000 Units  Freq: Once Route: IV  Indications of Use: ACUTE CORONARY SYNDROME  Start: 05/05/25 1630 End: 05/05/25 1620                hydrALAZINE (APRESOLINE) tablet 100 mg  Dose: 100 mg  Freq: Every 8 Hours Scheduled Route: PEG TUBE  Start: 05/19/25 1400   Admin Instructions:             1543     2104      0529     1424     2132      0516     1322)      0000) (9236) 4541 [C]     1400     2200        hydrALAZINE (APRESOLINE) tablet 25 mg  Dose: 25 mg  Freq: Every 8 Hours Scheduled Route: PEG TUBE  Start: 05/05/25 1700 End: 05/10/25 0926   Admin Instructions:                   hydrALAZINE (APRESOLINE) tablet 25 mg  Dose: 25 mg  Freq: Every 8 Hours Scheduled Route: PEG TUBE  Start: 05/05/25 1515 End: 05/05/25 1610   Admin Instructions:                   hydrALAZINE (APRESOLINE) tablet 25 mg  Dose: 25 mg  Freq: Every 8 Hours Scheduled Route: PO  Start: 05/05/25 1515 End: 05/05/25 1429   Admin Instructions:                   hydrALAZINE (APRESOLINE) tablet 50 mg  Dose: 50 mg  Freq: Every 8 Hours Scheduled Route: PEG TUBE  Start: 05/10/25 1400 End: 05/19/25 1036   Admin Instructions:       0618     1400 [C]     2102      0517     1306)     2100      0631     1329     2123      0508     (1412) [C]     2117      0500     1357     2106      0502 (6422) 3127 (2573)     1036-D/C'd         HYDROmorphone (DILAUDID)  injection 1 mg  Dose: 1 mg  Freq: Once Route: IV  Start: 05/15/25 0445 End: 05/15/25 0425   Admin Instructions:         0425               iopamidol (ISOVUE-370) 76 % injection 100 mL  Dose: 100 mL  Freq: Once in Imaging Route: IV  Start: 05/09/25 2100 End: 05/09/25 2013                ipratropium-albuterol (DUO-NEB) nebulizer solution 3 mL  Dose: 3 mL  Freq: 4 Times Daily - RT Route: NEBULIZATION  Start: 05/15/25 1230      1208     1534     1841           0709          1234     1557     1904           0719          1217          1624     2114      0732     1256     1936     (2022) [C]      0656          1100          1516          1940      0738     1216     1457          1906           0742     1257     1545     1858           0746     1230     1630     2030        lactulose (CHRONULAC) 10 GM/15ML solution 20 g  Dose: 20 g  Freq: 3 Times Daily Route: PEG TUBE  Indications of Use: CONSTIPATION  Start: 05/21/25 1600 End: 05/22/25 0906   Admin Instructions:               1613     2026      0906-D/C'd  (0918)        lactulose (CHRONULAC) 10 GM/15ML solution 20 g  Dose: 20 g  Freq: 3 Times Daily Route: PO  Indications of Use: CONSTIPATION  Start: 05/20/25 2100 End: 05/21/25 0905   Admin Instructions:              2055      0905-D/C'd  (1002)         lansoprazole (PREVACID SOLUTAB) disintegrating tablet Tablet Delayed Release Dispersible 15 mg  Dose: 15 mg  Freq: Every Morning Before Breakfast Route: PEG TUBE  Start: 05/11/25 0730   Admin Instructions:       0836     1438     1519      0821      0813      1155      0811      0744      1200      0848      0959      0815        lansoprazole (PREVACID SOLUTAB) disintegrating tablet Tablet Delayed Release Dispersible 15 mg  Dose: 15 mg  Freq: Every Morning Before Breakfast Route: PEG TUBE  Start: 05/10/25 1015 End: 05/10/25 0917   Admin Instructions:                   metoclopramide (REGLAN) injection 10 mg  Dose: 10 mg  Freq: Every 6 Hours Route: IV  Start: 05/14/25 1045  End: 05/14/25 0955   Admin Instructions:        0955-D/C'd              metoclopramide (REGLAN) injection 5 mg  Dose: 5 mg  Freq: Every 8 Hours Route: IV  Start: 05/19/25 1830 End: 05/22/25 1829   Admin Instructions:             1815      0227     1117      0226          0999     1737 (1165) 8585     1820-D/C'd      metoprolol tartrate (LOPRESSOR) tablet 25 mg  Dose: 25 mg  Freq: Every 12 Hours Scheduled Route: PEG TUBE  Start: 05/05/25 2100 End: 05/15/25 1014   Admin Instructions:       0836     2040      0821     2054      0881     1014-D/C'd             miconazole (MICOTIN) 2 % powder 1 Application  Dose: 1 Application  Freq: Every 12 Hours Scheduled Route: TOP  Start: 05/05/25 2315   Admin Instructions:       (0837) [C]     1438     1519     2041      0822     2142      0814     2123      1157     2117      0814     2107      0917     2023      1201     2107      0850     2009 1000 0003     0918     2100        midazolam (VERSED) injection 2 mg  Dose: 2 mg  Freq: Once Route: IV  Start: 05/14/25 0200 End: 05/14/25 0116   Admin Instructions:        0116                mupirocin (BACTROBAN) 2 % nasal ointment 1 Application  Dose: 1 Application  Freq: 2 Times Daily Route: EACH NARE  Start: 05/05/25 1415 End: 05/10/25 0748   Admin Instructions:                   nitroglycerin (NITRODUR) 0.1 MG/HR patch 1 patch  Dose: 1 patch  Freq: Daily Route: TD  Start: 05/08/25 0900 End: 05/12/25 1822   Admin Instructions:                   nitroglycerin (NITRODUR) 0.4 MG/HR patch 1 patch  Dose: 1 patch  Freq: Daily Route: TD  Start: 05/12/25 1930   Admin Instructions:       0836     0838     1438     1519     2041      0823     2142      0813     2046      1159     2331      0812     2107      0917     2039      1157     2358      0848     2010      0959     2205      0814     2014        Nutrisource fiber pack 2 packet  Dose: 2 packet  Freq: 3 Times Daily Route: PER G TUBE  Start: 05/12/25 1600 End: 05/15/25  1016    (5687) [C]     1432 2167 8197 (0405) 2103 (9372) (5983) (2641)     1016-D/C'd             Nutrisource fiber pack 2 packet  Dose: 2 packet  Freq: Daily Route: PER G TUBE  Start: 05/08/25 1700 End: 05/12/25 1358                nystatin (MYCOSTATIN) 100,000 unit/mL suspension 500,000 Units  Dose: 5 mL  Freq: 4 Times Daily Route: SWISH & SPIT  Start: 05/10/25 1200 End: 05/10/25 0916   Admin Instructions:                   nystatin (MYCOSTATIN) 100,000 unit/mL suspension 500,000 Units  Dose: 5 mL  Freq: 4 Times Daily Route: PO  Start: 05/10/25 1200 End: 05/10/25 0915   Admin Instructions:                   nystatin (MYCOSTATIN) 100,000 unit/mL suspension 500,000 Units  Dose: 5 mL  Freq: 4 Times Daily Route: MT  Start: 05/05/25 1800 End: 05/10/25 0915   Admin Instructions:                   pantoprazole (PROTONIX) injection 40 mg  Dose: 40 mg  Freq: Every 24 Hours Scheduled Route: IV  Indications of Use: STRESS ULCER PROPHYLAXIS  Start: 05/05/25 1500 End: 05/10/25 0916   Admin Instructions:                   Pharmacy Consult - MT  Freq: Daily Route: XX  Start: 05/05/25 1515 End: 05/08/25 0908   Admin Instructions:                   potassium chloride (KLOR-CON) packet 20 mEq  Dose: 20 mEq  Freq: Once Route: PO  Start: 05/09/25 0730 End: 05/09/25 0650   Admin Instructions:                   potassium chloride (KLOR-CON) packet 40 mEq  Dose: 40 mEq  Freq: Once Route: PO  Start: 05/07/25 1745 End: 05/07/25 1713   Admin Instructions:                   potassium chloride (KLOR-CON) packet 40 mEq  Dose: 40 mEq  Freq: Once Route: PO  Start: 05/07/25 1230 End: 05/07/25 1645   Admin Instructions:                   ProSource TF oral liquid 45 mL  Dose: 45 mL  Freq: 2 Times Daily Route: PER G TUBE  Start: 05/06/25 1245 End: 05/12/25 161                pyridostigmine (MESTINON) tablet 60 mg  Dose: 60 mg  Freq: Every 8 Hours Scheduled Route: PEG TUBE  Start: 05/15/25 1400 End: 05/16/25 0903       1329     2126      0508     0903-D/C'd            pyridostigmine (MESTINON) tablet 60 mg  Dose: 60 mg  Freq: Every 8 Hours Scheduled Route: NG  Start: 05/15/25 1400 End: 05/15/25 1016      1016-D/C'd             pyridostigmine (MESTINON) tablet 60 mg  Dose: 60 mg  Freq: Every 8 Hours Scheduled Route: PO  Start: 05/14/25 1400 End: 05/15/25 1014     1338     2100      0631     1014-D/C'd             sacubitril-valsartan (ENTRESTO) 24-26 MG tablet 1 tablet  Dose: 1 tablet  Freq: Once Route: PEG TUBE  Start: 05/12/25 1015 End: 05/12/25 1057   Order specific questions:                   sacubitril-valsartan (ENTRESTO) 24-26 MG tablet 1 tablet  Dose: 1 tablet  Freq: Every 12 Hours Scheduled Route: PEG TUBE  Start: 05/05/25 2100 End: 05/12/25 0925   Order specific questions:                   sacubitril-valsartan (ENTRESTO) 49-51 MG tablet 1 tablet  Dose: 1 tablet  Freq: Every 12 Hours Scheduled Route: PEG TUBE  Start: 05/12/25 2100 End: 05/13/25 0705   Order specific questions:       0705-D/C'd               sacubitril-valsartan (ENTRESTO)  MG tablet 1 tablet  Dose: 1 tablet  Freq: Every 12 Hours Scheduled Route: PEG TUBE  Start: 05/13/25 0900   Order specific questions:       0859     1438     1519     2040      0821     2058      0813     2123      1156     2117      0811     2107      0917     2022      1200     2104      0849     2132      1000     2156      0815     0951 [C]     2100        sacubitril-valsartan (ENTRESTO)  MG tablet 1 tablet  Dose: 1 tablet  Freq: Every 12 Hours Scheduled Route: PO  Start: 05/13/25 0900 End: 05/13/25 0754   Order specific questions:       0754-D/C'd               sodium chloride 0.9 % flush 10 mL  Dose: 10 mL  Freq: Every 12 Hours Scheduled Route: IV  Start: 05/05/25 1415 End: 05/08/25 1449                sterile water (preservative free) injection  Start: 05/09/25 1302 End: 05/09/25 1307   Admin Instructions:                   sterile water (preservative free)  injection  Start: 05/09/25 1223 End: 05/09/25 1307   Admin Instructions:                   tobramycin-dexAMETHasone (TOBRADEX) 0.3-0.1 % ophthalmic suspension 2 drop  Dose: 2 drop  Freq: Every 4 Hours While Awake Route: Both Eyes  Start: 05/16/25 1000 End: 05/19/25 0520   Admin Instructions:          1155     1541     2036     2118      0500     0959     1357     1732     2107      0536     0918     1409     1717     2235      0520                       Continuous Meds Sorted by Name  for Estefania Connor as of 05/13/25 through 5/22/25  Legend:       Medications 05/13 05/14 05/15 05/16 05/17 05/18 05/19 05/20 05/21 05/22   Bivalirudin Trifluoroacetate (ANGIOMAX) 250 mg in sodium chloride 0.9 % 50 mL (5 mg/mL) infusion  Freq: Code / Trauma / Sedation Continuous Med  Last Dose: Stopped (05/07/25 1513)  Start: 05/07/25 1513 End: 05/07/25 1602                dexmedetomidine (PRECEDEX) 400 mcg in 100 mL NS infusion  Rate: 3.5-26 mL/hr Dose: 0.2-1.5 mcg/kg/hr  Weight Dosing Info: 69.4 kg  Freq: Titrated Route: IV  Last Dose: 0.4 mcg/kg/hr (05/20/25 0326)  Start: 05/05/25 1430   Admin Instructions:      Order specific questions:       0254     0649     1158     1530     1620     2040     2330      0000     0300     0832     0845     0900     0915     0930     0945     1300     1337     2115      0000      0731     1540     2210      0243     0351     0450     0601      0004     0248     0800     1245     1408     1509     2000     2200      0000     0100     0200     0400     0600     0612     0800     1816     2141      0326               heparin 59641 units/250 mL (100 units/mL) in 0.45 % NaCl infusion  Rate: 15.84 mL/hr Dose: 22 Units/kg/hr  Weight Dosing Info: 72 kg (Order-Specific)  Freq: Titrated Route: IV  Indications Comment: Cardiac or other NOT VTE  Last Dose: Stopped (05/07/25 1455)  Start: 05/05/25 1500 End: 05/07/25 1646   Admin Instructions:                   niCARdipine (CARDENE) 25mg in 250mL NS  infusion  Rate:  mL/hr Dose: 5-15 mg/hr  Freq: Titrated Route: IV  Last Dose: Stopped (05/16/25 1103)  Start: 05/06/25 0030 End: 05/16/25 0903   Admin Instructions:       0618        0903-D/C'd  1103 [C]              norepinephrine (LEVOPHED) 8 mg in 250 mL NS infusion (premix)  Rate: 2.6-39.04 mL/hr Dose: 0.02-0.3 mcg/kg/min  Weight Dosing Info: 69.4 kg  Freq: Titrated Route: IV  Last Dose: 0.02 mcg/kg/min (05/22/25 0412)  Start: 05/21/25 1430   Admin Instructions:               1346     1349     1450     1501     1600     1615     1800     2047      0412        norepinephrine (LEVOPHED) 8 mg in 250 mL NS infusion (premix)  Rate: 2.6-39.04 mL/hr Dose: 0.02-0.3 mcg/kg/min  Weight Dosing Info: 69.4 kg  Freq: Titrated Route: IV  Last Dose: Stopped (05/09/25 0923)  Start: 05/09/25 0945 End: 05/11/25 0954   Admin Instructions:                   Pharmacy to Dose Heparin  Freq: Continuous PRN Route: XX  PRN Reason: Consult  Indications Comment: Cardiac or Other (NOT VTE)  Start: 05/05/25 1358 End: 05/07/25 1646   Admin Instructions:                               PRN Meds Sorted by Name  for Estefania Connor as of 05/13/25 through 5/22/25  Legend:       Medications 05/13 05/14 05/15 05/16 05/17 05/18 05/19 05/20 05/21 05/22   acetaminophen (TYLENOL) 160 MG/5ML oral solution 1,000 mg  Dose: 1,000 mg  Freq: Every 6 Hours PRN Route: PEG TUBE  PRN Reasons: Mild Pain,Moderate Pain,Fever  Start: 05/22/25 0848   Admin Instructions:                   acetaminophen (TYLENOL) 160 MG/5ML oral solution 1,000 mg  Dose: 1,000 mg  Freq: Every 6 Hours PRN Route: NG  PRN Reasons: Mild Pain,Moderate Pain,Fever  Start: 05/22/25 0847 End: 05/22/25 0848   Admin Instructions:                0848-D/C'd      acetaminophen (TYLENOL) tablet 1,000 mg  Dose: 1,000 mg  Freq: Every 6 Hours PRN Route: PO  PRN Reasons: Mild Pain,Moderate Pain,Headache,Fever  Start: 05/21/25 2242 End: 05/22/25 0847   Admin Instructions:                0847-D/C'd       albumin human 25 % IV SOLN 12.5 g  Dose: 12.5 g  Freq: As Needed Route: IV  PRN Comment: Hypotension During Dialysis  Indications of Use: HEMODIALYSIS PROCEDURE  Start: 05/21/25 0745 End: 05/21/25 2359   Admin Instructions:               1321     2359-D/C'd       albumin human 25 % IV SOLN 12.5 g  Dose: 12.5 g  Freq: As Needed Route: IV  PRN Comment: Hypotension During Dialysis  Indications of Use: HEMODIALYSIS PROCEDURE  Start: 05/16/25 0825 End: 05/17/25 2359   Admin Instructions:          0945     0955      2359-D/C'd           albuterol (PROVENTIL) nebulizer solution 0.083% 2.5 mg/3mL  Dose: 2.5 mg  Freq: Every 4 Hours PRN Route: NEBULIZATION  PRN Reason: Wheezing  Start: 05/05/25 1407   Admin Instructions:       1438     1519                 alteplase (CATHFLO/ACTIVASE) injection 2 mg  Dose: 2 mg  Freq: As Needed Route: IK  PRN Comment: Thrombotic Occlusion of Catheter  Start: 05/09/25 0905 End: 05/09/25 1309   Admin Instructions:                   atropine sulfate injection 0.5 mg  Dose: 0.5 mg  Freq: Every 5 Minutes PRN Route: IV  PRN Reason: Bradycardia  PRN Comment: HR less than 60 bpm  Start: 05/07/25 1632 End: 05/19/25 1736   Admin Instructions:       1438     1519           1736-D/C'd         bivalirudin (ANGIOMAX) bolus from bag solution  Freq: Code / Trauma / Sedation Medication  Start: 05/07/25 1510 End: 05/07/25 1602                Bivalirudin Trifluoroacetate (ANGIOMAX) 250 mg in sodium chloride 0.9 % 50 mL (5 mg/mL) infusion  Freq: Code / Trauma / Sedation Continuous Med  Start: 05/07/25 1513 End: 05/07/25 1602                dextrose (D50W) (25 g/50 mL) IV injection 25 g  Dose: 25 g  Freq: Every 15 Minutes PRN Route: IV  PRN Reason: Low Blood Sugar  PRN Comment: Blood Sugar Less Than 70, Patient Has IV Access - Unresponsive, NPO or Unable To Safely Swallow  Start: 05/15/25 0949       0031              dextrose (GLUTOSE) oral gel 15 g  Dose: 15 g  Freq: Every 15 Minutes PRN Route: PO  PRN Reason:  Low Blood Sugar  PRN Comment: Blood Sugar Less Than 70, Patient Alert, Is Not NPO & Can Safely Swallow  Start: 05/15/25 0949 End: 05/15/25 1014      1014-D/C'd             diazePAM (VALIUM) tablet 5 mg  Dose: 5 mg  Freq: Every 6 Hours PRN Route: PEG TUBE  PRN Reason: Anxiety  Start: 05/21/25 2240 End: 05/26/25 2239   Admin Instructions:                0525        diazePAM (VALIUM) tablet 5 mg  Dose: 5 mg  Freq: Every 6 Hours PRN Route: PEG TUBE  PRN Reason: Anxiety  Start: 05/12/25 0923 End: 05/21/25 1115   Admin Instructions:       1438     1519      0822     2057      0402     1028      0508     1409       2022 0109 2008 1115-D/C'd       fentaNYL citrate (PF) (SUBLIMAZE) injection  Freq: Code / Trauma / Sedation Medication  Start: 05/07/25 1507 End: 05/07/25 1602                fentaNYL citrate (PF) (SUBLIMAZE) injection 25 mcg  Dose: 25 mcg  Freq: Every 1 Hour PRN Route: IV  PRN Reason: Severe Pain  Start: 05/09/25 2049 End: 05/13/25 2154   Admin Instructions:       0422     1425     1438     1519     1720     2058     2154-D/C'd               fentaNYL citrate (PF) (SUBLIMAZE) injection 50 mcg  Dose: 50 mcg  Freq: Every 5 Minutes PRN Route: IV  PRN Reason: Moderate Pain  Start: 05/13/25 1541 End: 05/13/25 1544   Admin Instructions:       1544-D/C'd               glucagon (GLUCAGEN) injection 1 mg  Dose: 1 mg  Freq: Every 15 Minutes PRN Route: SC  PRN Reason: Low Blood Sugar  PRN Comment: Blood Glucose Less Than 70 - Patient Without IV Access - Unresponsive, NPO or Unable To Safely Swallow  Start: 05/15/25 0949   Admin Instructions:                   heparin (porcine) injection 2,000 Units  Dose: 2,000 Units  Freq: As Needed Route: IK  PRN Comment: For Dialysis Catheter Care.  Indications Comment: cath care  Start: 05/16/25 0825          0931 [C]       1133         heparin (porcine) injection 2,000 Units  Dose: 2,000 Units  Freq: As Needed Route: IK  PRN Comment: To be instill via venous and  arterial ports of dialysis catheter upon completion of tx.  Indications of Use: Other - full anticoagulation  Start: 05/06/25 1130    1438     1519      1150       1135 [C]              hydrALAZINE (APRESOLINE) injection 20 mg  Dose: 20 mg  Freq: Every 4 Hours PRN Route: IV  PRN Reason: High Blood Pressure  PRN Comment: 160  Start: 05/09/25 2253 End: 05/19/25 1736   Admin Instructions:             1736-D/C'd         hydrALAZINE (APRESOLINE) injection 20 mg  Dose: 20 mg  Freq: Every 6 Hours PRN Route: IV  PRN Reason: High Blood Pressure  PRN Comment: For SBP greater than 175  Start: 05/05/25 2216 End: 05/09/25 2253   Admin Instructions:                   HYDROmorphone (DILAUDID) injection 0.5 mg  Dose: 0.5 mg  Freq: Every 5 Minutes PRN Route: IV  PRN Reason: Severe Pain  Start: 05/13/25 1541 End: 05/13/25 1544   Admin Instructions:       1544-D/C'd               HYDROmorphone (DILAUDID) injection 1 mg  Dose: 1 mg  Freq: Every 3 Hours PRN Route: IV  PRN Reason: Severe Pain  Start: 05/13/25 2154 End: 05/16/25 1117   Admin Instructions:        0012     0119     0909     1225     1601     2300      0319     1028     1617     2124      0021     1117-D/C'd            iopamidol (ISOVUE-370) 76 % injection  Freq: Code / Trauma / Sedation Medication  Start: 05/07/25 1537 End: 05/07/25 1602                labetalol (NORMODYNE,TRANDATE) injection 20 mg  Dose: 20 mg  Freq: Every 4 Hours PRN Route: IV  PRN Reason: High Blood Pressure  Start: 05/05/25 1610   Admin Instructions:                   lactated ringers bolus 500 mL  Dose: 500 mL  Freq: Once As Needed Route: IV  PRN Comment: for hypovolemia, call anesthesiologist before initiating  Indications of Use: HYPOVOLEMIA  Start: 05/13/25 1541 End: 05/13/25 1544    1544-D/C'd               lactulose (CHRONULAC) 10 GM/15ML solution 20 g  Dose: 20 g  Freq: 3 Times Daily PRN Route: PEG TUBE  PRN Comment: constipation  Indications of Use: CONSTIPATION  Start: 05/22/25 0915   Admin  Instructions:                   lidocaine PF 1% (XYLOCAINE) injection  Freq: Code / Trauma / Sedation Medication  Start: 05/07/25 1448 End: 05/07/25 1602                loperamide (IMODIUM A-D) 1 MG/7.5ML oral solution 2 mg  Dose: 2 mg  Freq: 4 Times Daily PRN Route: PEG TUBE  PRN Comment: diarrhea  Start: 05/12/25 0925 End: 05/19/25 1736   Admin Instructions:       1438     1519           1736-D/C'd         midazolam (VERSED) injection  Freq: Code / Trauma / Sedation Medication  Start: 05/07/25 1449 End: 05/07/25 1602                nitroglycerin (NITROSTAT) SL tablet 0.4 mg  Dose: 0.4 mg  Freq: Every 5 Minutes PRN Route: SL  PRN Reason: Chest Pain  Start: 05/05/25 1327 End: 05/19/25 1736   Admin Instructions:       1438     1519           1736-D/C'd         ondansetron (ZOFRAN) injection 4 mg  Dose: 4 mg  Freq: Every 6 Hours PRN Route: IV  PRN Reasons: Nausea,Vomiting  Start: 05/14/25 0535   Admin Instructions:        0556                ondansetron (ZOFRAN) injection 4 mg  Dose: 4 mg  Freq: Once As Needed Route: IV  PRN Reasons: Nausea,Vomiting  PRN Comment: If not given pre-op/intra-op  Start: 05/13/25 1541 End: 05/13/25 1544   Admin Instructions:       1544-D/C'd               oxyCODONE (ROXICODONE) immediate release tablet 10 mg  Dose: 10 mg  Freq: Every 4 Hours PRN Route: PEG TUBE  PRN Reason: Moderate Pain  Start: 05/13/25 2154 End: 05/20/25 1115   Admin Instructions:        0108     0517     2057      0223     1054     1810      1730     2331      0433     0813     1543 [C]     1955      0743     1218     1611 [C]     2022      0032     0516     2104      1115-D/C'd  1117          oxyCODONE (ROXICODONE) immediate release tablet 5 mg  Dose: 5 mg  Freq: Every 6 Hours PRN Route: PEG TUBE  PRN Reasons: Moderate Pain,Severe Pain  Start: 05/22/25 0848 End: 05/26/25 2242   Admin Instructions:                   oxyCODONE (ROXICODONE) immediate release tablet 5 mg  Dose: 5 mg  Freq: Every 6 Hours PRN Route: PO  PRN  Reasons: Moderate Pain,Severe Pain  Start: 05/21/25 2243 End: 05/22/25 0848   Admin Instructions:                0003     0814     0848-D/C'd      oxyCODONE (ROXICODONE) immediate release tablet 5 mg  Dose: 5 mg  Freq: Every 4 Hours PRN Route: PEG TUBE  PRN Reason: Moderate Pain  Start: 05/11/25 0953 End: 05/13/25 2154   Admin Instructions:       0008     0618     1438     1519     1546     2039     2154-D/C'd               oxyCODONE (ROXICODONE) immediate release tablet 5 mg  Dose: 5 mg  Freq: Every 4 Hours PRN Route: PO  PRN Reason: Moderate Pain  Start: 05/10/25 1007 End: 05/11/25 0953   Admin Instructions:                   Pharmacy to Dose Heparin  Freq: Continuous PRN Route: XX  PRN Reason: Consult  Indications Comment: Cardiac or Other (NOT VTE)  Start: 05/05/25 1358 End: 05/07/25 1646   Admin Instructions:                   sodium chloride 0.9 % solution  Freq: As Needed  Start: 05/09/25 2159 End: 05/09/25 2251                sodium chloride 1,000 mL with heparin (porcine) 5000 UNIT/ML 10,000 Units mixture  Freq: As Needed  Start: 05/09/25 2217 End: 05/09/25 2251                sterile water (preservative free) injection  Start: 05/09/25 1302 End: 05/09/25 1307   Admin Instructions:                   sterile water (preservative free) injection  Start: 05/09/25 1223 End: 05/09/25 1307   Admin Instructions:                   sterile water irrigation solution  Freq: As Needed  Start: 05/13/25 1506 End: 05/13/25 1530    1506     1530-D/C'd               vashe wound therapy external solution solution  Freq: As Needed  Start: 05/13/25 1506 End: 05/13/25 1530    1506 [C]     1530-D/C'd

## 2025-05-22 NOTE — DISCHARGE PLACEMENT REQUEST
"Miranda Connor (69 y.o. Female)       Date of Birth   1955    Social Security Number       Address   [address] [place] [address]    Home Phone   [phone]    MRN   [id number]       Jew   Unknown    Marital Status                               Admission Date   5/5/2025    Admission Type   Urgent    Admitting Provider   Toy Chandra MD    Attending Provider   Toy Chandra MD    Department, Room/Bed   [facility], S257/1       Discharge Date       Discharge Disposition   Long Term Care (DC - External)    Discharge Destination                                 Attending Provider: Toy Chandra MD    Allergies: No Known Allergies    Isolation: None   Infection: None   Code Status: CPR    Ht: 160 cm (62.99\")   Wt: 69.4 kg (153 lb)    Admission Cmt: None   Principal Problem: CAD S/P ELIECER RCA [I25.10]                   Active Insurance as of 5/5/2025       Primary Coverage       Payor Plan Insurance Group Employer/Plan Group    MEDICARE MEDICARE A & B        Payor Plan Address Payor Plan Phone Number Payor Plan Fax Number Effective Dates    [address] [phone]  6/1/2019 - None Entered    [facility] [address]         Subscriber Name Subscriber Birth Date Member ID       MIRANDA CONNOR 1955 [id number]               Secondary Coverage       Payor Plan Insurance Group Employer/Plan Group    CONTINUECARE - ([place] ONLY) CONTINUECARE        Payor Plan Address Payor Plan Phone Number Payor Plan Fax Number Effective Dates    [address] [address]   4/11/2025 - None Entered    [facility] [address]         Subscriber Name Subscriber Birth Date Member ID       MIRANDA CONNOR 1955 [id number]               Tertiary Coverage       Payor Plan Insurance Group Employer/Plan Group    [place]        Payor Plan Address Payor Plan Phone Number Payor Plan Fax Number Effective Dates    [address] [phone]  4/11/2025 - None Entered    [facility] " [address]         Subscriber Name Subscriber Birth Date Member ID       MIRANDA CONNOR 1955 [id number]                     Emergency Contacts        (Rel.) Home Phone Work Phone Mobile Phone    vita brannon (Spouse) [phone] -- [phone]                   Discharge Summary        Terry Antonio MD at 25 0711            DISCHARGE SUMMARY       Patient name: Miranda Connor  CSN: [id number]  MRN: [id number]  : 1955    Date of Admission: 2025  Date of Discharge:  2025    Admitting Physician:    Terry Antonio MD     Primary Care Provider: Sergio Randall MD  Consultations:   Arabella Angulo MD Cardiology  Zafar Espana MD Nephrology    Admission Diagnosis:   CAD    Discharge Diagnoses:     CAD (coronary artery disease)    ESRD on HD    Polycystic kidney disease    HTN (hypertension)    Dyslipidemia    AAA (abdominal aortic aneurysm)    Right coronary artery occlusion    Depression    Tracheostomy present    S/P percutaneous endoscopic gastrostomy (PEG) tube placement      Procedures:  Procedure(s):  25 [facility]     Imaging:  XR Chest 1 View  Result Date: 2025  Impression: 1. Stable lines and support tubes. 2. No pneumothorax. 3. Unchanged mixed interstitial and airspace opacities bilaterally which may relate to pulmonary edema. 4. Small bilateral pleural effusions. Electronically Signed: Thaddeus Toledo MD  2025 7:52 AM EDT  Workstation ID: [id number]    Cardiac Catheterization/Vascular Study  Result Date: 2025  Successful PTCA/stenting of the RCA with placement of 3 overlapping drug-eluting stents reducing 95% stenosis to 0%. RECOMMENDATIONS: Dual antiplatelet therapy for at least 1 year. Optimize medical therapy and risk factor management per guidelines. Indications: Status post VF arrest/non-STEMI. Access: Left femoral Procedures: PTCA/stenting of the posterolateral branch of the right coronary artery. Stenting of the mid to distal RCA with  overlapping drug-eluting stents due to nonflow limiting dissection. Procedure narrative: The patient is a 69-year-old female with severe stenosis of the posterolateral branch of the RCA and a extremely heavily calcified vessel.  She was recently admitted to [facility] with ACS/VF arrest.  Angiogram was performed and PCI of the posterolateral branch of the RCA was attempted and was unsuccessful due to failure to deliver the devices.  Subsequently patient's condition decompensated and she was transferred to [facility].  She was brought to the Cath Lab today for planned PCI of the posterolateral branch of the RCA. The patient was brought to the catheterization lab in a fasting condition.  Right femoral access site was prepped and draped in standard sterile fashion.  Lidocaine was injected and arterial access was obtained by percutaneous anterior wall puncture technique.  A 6 Turkish arterial sheath was placed.  Heavy calcification and moderate to severe stenosis of the right iliac artery was noted and guidewire could not be advanced.  At this time we proceeded via the left femoral approach.  Angiomax was started.  The right coronary artery was engaged with F R4 guide catheter.  Angiogram revealed diffusely calcified right coronary artery with a 95% stenosis in the large posterolateral branch.  The lesion was crossed with a versa turn wire and a 2.0 mm balloon was advanced and balloon PTCA was performed.  A guide liner was advanced to facilitate balloon delivery.  Subsequently delivery of a 2.5 x 18 mm ELIECER was quite difficult and required deep intubation of the right coronary artery with a guide liner.  Stent was successfully delivered and deployed as well as postdilated with satisfactory results however post stent deployment we noted a nonflow limiting dissection extending from the distal RCA into the more distal segment beyond the crux up to the right PDA.  At this time a 3.0 x 20 mm NC balloon was  advanced and expanded in the dissected segment.  The guide liner was further advanced to facilitate delivery of stents.  A 3.5 x 48 mm ELIECER was delivered from mid to distal RCA and a 3.0 x 33 mm ELIECER was delivered from distal RCA to the EBENEZER covering the entire segment with overlapping stents.  All stents were deployed and postdilated with satisfactory results and no significant residual uncovered dissection and no significant residual stenosis.  Final angiograms were taken and the guide catheter was removed.  BRAXTON flow was grade 3 before and after PCI.  At the conclusion the arterial sheath were secured and the patient was transferred to her room in a stable condition.      XR Chest 1 View  Result Date: 5/7/2025  Impression: Persistent pulmonary vascular congestion, left basilar atelectasis and effusion. Significantly increased right basilar atelectasis +/- effusion. Electronically Signed: Arley Duong MD  5/7/2025 7:12 AM EDT  Workstation ID: [id number]    XR Chest 1 View  Result Date: 5/6/2025  Impression: 1.The tracheostomy tube is noted in the midline. 2.Stable appearance of diffuse interstitial changes throughout the lungs with airspace infiltrates in the mid to lower lungs bilaterally. Small bilateral pleural effusions are noted. 3.The left IJ central line and right dialysis catheter are stable in position. Electronically Signed: Enzo Anna MD  5/6/2025 7:38 AM EDT  Workstation ID: [id number]    XR Chest 1 View  Result Date: 5/5/2025  Impression: Interval decreased pulmonary edema. Similar small right and trace left pleural effusions, with likely adjacent atelectasis. Superimposed infection to be excluded clinically. Support devices as above. Electronically Signed: Sal Seth MD  5/5/2025 3:07 PM EDT  Workstation ID: [id number]    XR Chest 1 View  Result Date: 5/2/2025  1.  Tiny bilateral pleural effusions. 2.  Mild cardiomegaly.  This report was finalized on 5/2/2025 10:28 AM by Dr. Leander Faria MD.      XR  Chest 1 View  Result Date: 4/24/2025  1.  Decreased right pleural effusion. 2.  Otherwise stable changes of CHF/edema. 3.  Support devices as above.  This report was finalized on 4/24/2025 3:19 PM by Dr. Edin Peace MD.      US Venous Doppler Upper Extremity Right (duplex)  Result Date: 4/17/2025  1.  No evidence of DVT. 2.  No flow and no compressibility in the right cephalic vein. This may represent thrombosis or may be due to prior venous cutdown.  This report was finalized on 4/17/2025 7:42 AM by Dr. Delfino Lepe MD.      XR Chest 1 View  Result Date: 4/16/2025  Central line tip in the superior vena cava. No pneumothorax    This report was finalized on 4/16/2025 4:35 PM by Dr. Delfino Lepe MD.      XR Chest 1 View  Result Date: 4/16/2025   1. Bilateral effusions and bilateral airspace disease as well as mild degree of pulmonary congestion    This report was finalized on 4/16/2025 9:57 AM by Dr. Delfino Lepe MD.      XR Chest 1 View  Result Date: 4/12/2025   Enlarged heart size. Coarsened bronchovascular pattern to the lungs. Small right and small left pleural effusion. Mild compressive atelectasis at the lung bases. Satisfactory position of the tubes and lines.   This report was finalized on 4/12/2025 2:58 AM by Modesto Minor MD.        History of Present Illness:  Estefania Connor is a 69 y.o. female who is admitted to [facility] as a transfer [facility]25 for higher level of care in the setting of recent V-fib arrest.      Patient has documented medical diagnoses including ESRD, PKD, HTN, dyslipidemia, AAA, CAD s/p stenting, and depression.      She was additionally recently admitted to [facility] on 3/20/25 with what sounds like volume overload and JOSÉ ANTONIO superimposed on CKD in the setting of missing 2 HD appointments. Additional concerns for pneumonia as well as found to be anemic requiring PRBC transfusion was reportedly evaluated by GI who found no evidence of bleeding. Worsened from a respiratory  "standpoint with CT showing enlarging pleural effusions despite undergoing thoracentesis (1550 cc fluid removed) ultimately required intubation on 4/10 with transfer to [facility] on 4/11 for for further management.      Patient was unable to wean from mechanical ventilatory support at OSH and ultimately underwent tracheostomy and PEG tube placement on 4/29. Deteriorated on Friday (5/2) with V-fib arrest was ultimately resuscitated and taken to Cath Lab where she was found to have 100% occlusion of the RCA. Attempted intervention was unsuccessful and Cardiology (Dr. Angulo) was contacted who recommended transfer to [facility] for repeat PCI attempt.   (End of copied text).     Hospital Course:  Patient admitted to the ICU 2* the above listed problems in the HPI. Underwent LHC on 5/7/25 with 3 overlapping drug-eluting stents. Stenosis reduced from 95% to 0%. Recommendations are 1 year of DAPT and avoidance of anticoagulation due to severe bleeding. LVEF noted to be 41-45% / GDMT added as tolerated however CKD limits this.     Sheaths pulled the following day without incidence. RUE noted to be swollen and repeat RUE duplex showed superficial thrombophlebitis in the cephalic vein.     Nephrology followed. Patient received IHD. Last session was today, 5/9/25 with ~ 3 L removed.     Found to have tracheobronchitis. Cultures grew Klebsiella susceptible to Rocephin. Rocephin initiated on 5/6/25 with plans for 7 day course.    It has been determined by all primary and consulting providers that the patient has reached the maximum benefit of their inpatient stay and is ready for d/c back to [facility]. She is hemodynamically stable.     I would recommend proceeding with aggressive ventilator weaning when she is back at her facility.  She appears to be ready for weaning after PCI.    Vitals:  /64   Pulse 117   Temp 97.8 °F (36.6 °C) (Axillary)   Resp 18   Ht 160 cm (62.99\")   SpO2 98%   BMI 27.11 kg/m²     Physical Exam:              " "  Constitutional:    Drowsy but arousable and a bit restless on ventilator, in no acute distress   Head:    Normocephalic, atraumatic   Eyes:            Lids and lashes normal, conjunctivae and sclerae normal.  PER   ENMT:   Ears appear intact with no abnormalities noted       Lips normal.     Neck:   Tracheostomy in place on mechanical ventilator, no JVD   Lungs/Resp:     Normal effort, symmetric chest rise, no crepitus, clear to      auscultation bilaterally.               Heart/CV:    Regular rhythm and normal rate, no murmur   Abdomen/GI:     Soft, nontender, nondistended   :     Deferred   Extremities/MSK:   No clubbing or cyanosis.  Trace bilateral lower extremity edema.  Right upper extremity is swollen.   Pulses:   Pulses palpable and equal bilaterally   Skin:   No bleeding, bruising or rash   Heme/Lymph:   No cervical or supraclavicular adenopathy.   Neurologic:     Psychiatric:      Moves all extremities with no obvious focal motor deficit.  Cranial nerves 2 - 12 grossly intact  Non-agitated, normal affect.    The above physical exam findings were reviewed and reflect my exam findings as of today's exam.   Electronically signed by:  Terry Antonio MD  05/09/25  11:54 EDT     Labs:  Results from last 7 days   Lab Units 05/09/25  0329   WBC 10*3/mm3 11.60*   HEMOGLOBIN g/dL 8.5*   HEMATOCRIT % 27.1*   PLATELETS 10*3/mm3 285     Results from last 7 days   Lab Units 05/09/25  0329 05/05/25  1440 05/05/25  0143   SODIUM mmol/L 136   < > 130*   POTASSIUM mmol/L 3.3*   < > 3.2*   CHLORIDE mmol/L 102   < > 93*   CO2 mmol/L 22.0   < > 24.6   BUN mg/dL 32*   < > 25*   CREATININE mg/dL 3.75*   < > 4.63*   CALCIUM mg/dL 8.7   < > 9.1   BILIRUBIN mg/dL  --   --  0.2   ALK PHOS U/L  --   --  112   ALT (SGPT) U/L  --   --  9   AST (SGOT) U/L  --   --  11   GLUCOSE mg/dL 114*   < > 108*    < > = values in this interval not displayed.         No results found for: \"MG\", \"PHOS\"          PT Outcome Summary:  Progress: " declining (05/07/25 1429)  Outcome Evaluation: All EOB/OOB mobility deferred. Pt tolerated bed level ther ex, but did not follow any commands to actively participate. Continue to recommend PT skilled care and D/C to ECF. (05/07/25 1429)    OT Outcome Summary:  Plan of Care Reviewed With: patient (05/07/25 1206)  Progress: no change (05/07/25 1206)  Outcome Evaluation: Pt presents below her functional baseline with deficits including generalized weakness, impaired cognition, decreased activity tolerance limiting independence with ADLs and mobility warranting OT services. Bed level evaluation completed due to current deficits, vent, and poor command following. Rec ECF at [place]. (05/07/25 1206)      Discharge Medications:     Discharge Medications        New Medications        Instructions Start Date   albuterol (2.5 MG/3ML) 0.083% nebulizer solution  Commonly known as: PROVENTIL   2.5 mg, Nebulization, Every 4 Hours PRN      amiodarone 200 MG tablet  Commonly known as: PACERONE   200 mg, Per G Tube, Every 12 Hours Scheduled      cefTRIAXone 2,000 mg in sodium chloride 0.9 % 100 mL IVPB   2,000 mg, Intravenous, Every 24 Hours      cloNIDine 0.1 MG tablet  Commonly known as: CATAPRES   0.1 mg, Per G Tube, Every 8 Hours Scheduled      dexmedetomidine 400 MCG/100ML solution infusion  Commonly known as: PRECEDEX   0.2-1.5 mcg/kg/hr (13..1 mcg/hr), Intravenous, Titrated      miconazole 2 % powder  Commonly known as: MICOTIN   1 Application, Topical, Every 12 Hours Scheduled      nitroglycerin 0.1 MG/HR patch  Commonly known as: NITRODUR   1 patch, Transdermal, Daily      Nutrisource fiber pack pack   2 packets, Per G Tube, Daily      nystatin 100,000 unit/mL suspension  Commonly known as: MYCOSTATIN   500,000 Units, Swish & Spit, 4 Times Daily      pantoprazole 40 MG injection  Commonly known as: PROTONIX   40 mg, Intravenous, Every 24 Hours Scheduled      ProSource TF liquid oral liquid   45 mL, Per G Tube, 2 Times  Daily             Changes to Medications        Instructions Start Date   hydrALAZINE 25 MG tablet  Commonly known as: APRESOLINE  What changed:   medication strength  how much to take  how to take this  when to take this   25 mg, Per G Tube, Every 8 Hours Scheduled             Continue These Medications        Instructions Start Date   aspirin 81 MG EC tablet   81 mg, Oral, Daily      atorvastatin 40 MG tablet  Commonly known as: LIPITOR   40 mg, Per G Tube, Nightly      clopidogrel 75 MG tablet  Commonly known as: PLAVIX   75 mg, Oral, Daily      ezetimibe 10 MG tablet  Commonly known as: ZETIA   10 mg, Daily      fenofibrate 54 MG tablet  Commonly known as: TRICOR   54 mg, Daily             Stop These Medications      amLODIPine 10 MG tablet  Commonly known as: NORVASC     bumetanide 2 MG tablet  Commonly known as: BUMEX     busPIRone 10 MG tablet  Commonly known as: BUSPAR     calcitriol 0.25 MCG capsule  Commonly known as: ROCALTROL     carvedilol 25 MG tablet  Commonly known as: COREG     doxazosin 2 MG tablet  Commonly known as: CARDURA     Eliquis 2.5 MG tablet tablet  Generic drug: apixaban     epoetin vera-epbx 3000 UNIT/ML injection  Commonly known as: RETACRIT     hydrOXYzine 25 MG tablet  Commonly known as: ATARAX     isosorbide mononitrate 60 MG 24 hr tablet  Commonly known as: IMDUR     sertraline 25 MG tablet  Commonly known as: ZOLOFT              Discharge Diet:   Diet Instructions       Diet: Nothing By Mouth      Discharge Diet: Nothing By Mouth          Tube Feeding: Formula: Novasource Renal (Electrolyte Restricted); Feeding Type: Continuous; Start at: 35 mL/hr; Then Advance By: Do Not Advance; Water Flush: 20 mL; Every: 1 hour; Water Bolus: None     Activity at Discharge:    Activity Instructions       Activity as Tolerated              Follow-up Appointments  No future appointments.  Additional Instructions for the Follow-ups that You Need to Schedule       Discharge Follow-up with PCP   As  directed       Currently Documented PCP:    Sergio Randall MD    PCP Phone Number:    [phone]     Follow Up Details: 1 week post-discharge        Discharge Follow-up with Specialty: Cardiology; 1 Month   As directed      Specialty: Cardiology   Follow Up: 1 Month                Discharge Instructions:  D/c to [facility]  Medications as above  Follow-ups as above  Further d/c instructions as above  Please call 911 or proceed to nearest ED if symptoms worsen or reoccur     Current Code Status       Date Active Code Status Order ID Comments User Context       2025 1629 CPR (Attempt to Resuscitate) [id number]  Terry Antonio MD Inpatient        Question Answer    Code Status (Patient has no pulse and is not breathing) CPR (Attempt to Resuscitate)    Medical Interventions (Patient has pulse or is breathing) Full Support                  Inder Brody. MSN, APRN, [facility]  Pulmonary and Critical Care Medicine  25     I performed an independent history and physical examination. Portions of the history were obtained by APRN and were modified by me according to my findings. The above note reflects my findings, assessment, and plan.    Terry Antonio MD    Please see my discharge day progress note for additional details.  I spent 35 minutes on discharge.    Electronically signed by:  Terry Antonio MD  25  12:02 EDT      CC: Sergio Randall MD    Please note that portions of this note were completed with a voice recognition program.       Electronically signed by Terry Antonio MD at 25 1202       Inder Brody APRN at 25 1045          DISCHARGE SUMMARY    Patient Name: Estefania Connor  : 1955  MRN: [id number]    Date of Admission: 2025 12:55 PM  Date of Discharge:    Primary Care Physician: Sergio Randall MD    Reason for hospitalization     HPI:  Estefania Connor is a 69 y.o. female was admitted on 2025 with the initial diagnosis of CAD (coronary  artery disease) [I25.10].    She was transferred from [facility] 05/05/25 for higher level of care in the setting of recent V-fib arrest.      Patient has documented medical diagnoses including ESRD, PKD, HTN, dyslipidemia, AAA, CAD s/p stenting, and depression.      She was additionally recently admitted to [facility] on 3/20/25 with what sounds like volume overload and JOSÉ ANTONIO superimposed on CKD in the setting of missing 2 HD appointments. Additional concerns for pneumonia as well as found to be anemic requiring PRBC transfusion was reportedly evaluated by GI who found no evidence of bleeding. Worsened from a respiratory standpoint with CT showing enlarging pleural effusions despite undergoing thoracentesis (1550 cc fluid removed) ultimately required intubation on 4/10 with transfer to [facility] on 4/11 for for further management.      Patient was unable to wean from mechanical ventilatory support at OSH and ultimately underwent tracheostomy and PEG tube placement on 4/29. Deteriorated on Friday (5/2) with V-fib arrest was ultimately resuscitated and taken to Cath Lab where she was found to have 100% occlusion of the RCA. Attempted intervention was unsuccessful and Cardiology (Dr. Angulo) was contacted who recommended transfer to [facility] for repeat PCI attempt.   (End of copied text).   Significant findings.  Discharge diagnosis/problems:     Active Hospital Problems    Diagnosis  POA    **CAD S/P ELIECER RCA [I25.10]  Yes    Ileus [K56.7]  No    ESRD on HD [N18.6]  Unknown    Polycystic kidney disease [Q61.3]  Not Applicable    HTN (hypertension) [I10]  Unknown    Dyslipidemia [E78.5]  Unknown    AAA (abdominal aortic aneurysm) [I71.40]  Unknown    Right coronary artery occlusion [I24.0]  Unknown    Depression [F32.A]  Unknown    Tracheostomy present [Z93.0]  Not Applicable    S/P percutaneous endoscopic gastrostomy (PEG) tube placement [Z93.1]  Not Applicable    Hematoma of groin [S30.1XXA]  Unknown      Resolved Hospital  Problems   No resolved problems to display.        Physical Examination and Data     Vitals:  Temp: 97.9 °F (36.6 °C) (05/22/25 0800) Temp  Min: 97.9 °F (36.6 °C)  Max: 100 °F (37.8 °C)   Temp core:      BP: 108/57 (05/22/25 1000) BP  Min: 80/45  Max: 138/62   Pulse: 74 (05/22/25 1000) Pulse  Min: 67  Max: 91   Resp: 16 (05/22/25 0800) Resp  Min: 16  Max: 17   SpO2: 99 % (05/22/25 1000) SpO2  Min: 91 %  Max: 100 %   Device: ventilator (05/22/25 1000)    Flow Rate: 40 (05/22/25 0800) Flow (L/min) (Oxygen Therapy)  Min: 40  Max: 40     Invasive Mechanical Ventilator   Settings: Observed:   Mode: VC+/AC (05/22/25 1000)    Resp Rate (Set): 14 (05/22/25 1000) Resp Rate (Observed) Vent: 17 (05/22/25 1000)   Vt (Set, mL): 365 mL (05/22/25 1000) Vt, Exp (observed, mL): 1199 mL (05/22/25 1000)    Minute Ventilation (L/min) (Obs): 7.86 L/min (05/22/25 1000)    I:E Ratio (Obs): 1:3.8 (05/22/25 1000)       FiO2 (%): 40 % (05/22/25 1000) Plateau Pressure (cm H2O): (S) 23 cm H2O (05/22/25 0744)   PEEP/CPAP (cm H2O): 5 cm H20 (05/22/25 1000) Driving Pressure (cm H2O): 18.3 cm H2O (05/22/25 0744)    Static Compliance (L/cm H2O): 49 (05/21/25 1858)      NIV:   Settings: Observed:                                                  Oxygen Concentration (%): 40 (05/22/25 1000)        Physical Examination     Telemetry:  Rhythm: normal sinus rhythm (05/22/25 0600)  QTc Interval (Sec): 0.43 (05/20/25 0800)   Constitutional:  No acute distress.   Cardiovascular: RRR.    Respiratory: Normal breath sounds  No adventitious sounds   Abdominal:  Soft with no tenderness.   Extremities: No Edema  Wound Left groin area.   Neurological:             Awake.  Best Eye Response: 3-->(E3) to speech (05/22/25 0600)  Best Motor Response: 6-->(M6) obeys commands (05/22/25 0600)  Best Verbal Response: 1-->(V1) none (05/22/25 0600)  [place] Coma Scale Score: 10 (05/22/25 0600)       Results  Reviewed:  Laboratory  Microbiology  Radiology  Pathology    Hematology:  Results from last 7 days   Lab Units 05/22/25 0323 05/21/25 0226 05/20/25 0356   WBC 10*3/mm3 10.37 12.74* 7.39   HEMOGLOBIN g/dL 7.5* 8.6* 8.0*   MCV fL 92.5 92.1 91.2   PLATELETS 10*3/mm3 286 337 301     Results from last 7 days   Lab Units 05/22/25 0323 05/21/25 0226 05/20/25 0356   NEUTROS ABS 10*3/mm3 7.87* 10.05* 5.10   LYMPHS ABS 10*3/mm3 1.02 1.05 0.96   EOS ABS 10*3/mm3 0.22 0.35 0.29     Chemistry:  Estimated Creatinine Clearance: 16.7 mL/min (A) (by C-G formula based on SCr of 2.93 mg/dL (H)).    Results from last 7 days   Lab Units 05/22/25 0323 05/21/25 0226   SODIUM mmol/L 139 131*   POTASSIUM mmol/L 3.5 3.6   CHLORIDE mmol/L 102 99   CO2 mmol/L 26.0 24.0   BUN mg/dL 16 26*   CREATININE mg/dL 2.93* 3.96*   GLUCOSE mg/dL 117* 124*     Results from last 7 days   Lab Units 05/22/25  0323 05/21/25  0226 05/20/25  0358 05/19/25  0353 05/18/25  0312 05/17/25  0305 05/15/25  2349   IONIZED CALCIUM mmol/L  --   --   --   --   --   --  1.24   CALCIUM mg/dL 9.0 9.1   < > 8.8 8.8   < > 9.1   MAGNESIUM mg/dL  --   --   --  1.9 1.9  --   --    PHOSPHORUS mg/dL 2.1* 3.0   < > 4.0 3.3   < >  --     < > = values in this interval not displayed.       Hepatic Panel:  Results from last 7 days   Lab Units 05/22/25 0323 05/21/25 0226 05/20/25 0358 05/19/25 0353 05/17/25 0305   ALBUMIN g/dL 2.9* 2.5*   < > 2.5* 2.6*   TOTAL PROTEIN g/dL  --   --   --  4.8* 4.8*   BILIRUBIN mg/dL  --   --   --  0.3 0.6   AST (SGOT) U/L  --   --   --  9 9   ALT (SGPT) U/L  --   --   --  8 9   ALK PHOS U/L  --   --   --  100 97    < > = values in this interval not displayed.       Biomarkers:  Results from last 7 days   Lab Units 05/19/25  0353   CRP mg/dL 5.64*       Arterial Blood Gases:  Results from last 7 days   Lab Units 05/20/25  0352   PH, ARTERIAL pH units 7.428   PCO2, ARTERIAL mm Hg 40.8   PO2 ART mm Hg 91.3   FIO2 % 40       Images:  XR Chest 1  View  Result Date: 5/22/2025  Impression: Mild perihilar interstitial edema which is overall improved from the prior study. There is persistent left basilar opacity felt to represent combination of pleural effusion and atelectasis. There is suspected to be posterior layering pleural effusion on the right Electronically Signed: Chidi Natarajan  5/22/2025 7:52 AM EDT  Workstation ID: [id number]    CT Chest Without Contrast Diagnostic  Result Date: 5/21/2025  Impression: CHEST: Large bilateral pleural effusions. Consolidation adjacent to the pleural effusions in the lower lobes, likely atelectasis. Superimposed infection to be excluded clinically. Acute right lateral fourth through eighth rib fractures. Acute left lateral third through sixth rib fractures. No pneumothorax. Small pericardial effusion. ABDOMEN/PELVIS: Mildly dilated loops of small bowel in the upper abdomen with gradual appearing transition, suggestive of ileus over the possibility of small bowel obstruction. Interval development of a right rectus sheath hematoma measuring up to 6.2 cm. Significant interval decrease size of a left groin hematoma which now measures up to 4.6 cm. There is fat stranding/inflammatory change involving the left groin and imaged left lower extremity, nonspecific, and may be postprocedural. Similar-appearing infrarenal abdominal aortic aneurysm measuring up to 4.2 cm. Electronically Signed: Sal Seth MD  5/21/2025 11:05 PM EDT  Workstation ID: [id number]    CT Abdomen Pelvis Without Contrast  Result Date: 5/21/2025  Impression: CHEST: Large bilateral pleural effusions. Consolidation adjacent to the pleural effusions in the lower lobes, likely atelectasis. Superimposed infection to be excluded clinically. Acute right lateral fourth through eighth rib fractures. Acute left lateral third through sixth rib fractures. No pneumothorax. Small pericardial effusion. ABDOMEN/PELVIS: Mildly dilated loops of small bowel in the upper  abdomen with gradual appearing transition, suggestive of ileus over the possibility of small bowel obstruction. Interval development of a right rectus sheath hematoma measuring up to 6.2 cm. Significant interval decrease size of a left groin hematoma which now measures up to 4.6 cm. There is fat stranding/inflammatory change involving the left groin and imaged left lower extremity, nonspecific, and may be postprocedural. Similar-appearing infrarenal abdominal aortic aneurysm measuring up to 4.2 cm. Electronically Signed: Sal Seth MD  5/21/2025 11:05 PM EDT  Workstation ID: [id number]    XR Chest 1 View  Result Date: 5/21/2025  Impression: 1. Grossly stable bowel gas pattern, suggesting possible mild ileus. 2. Similar appearance of the chest including cardiomegaly, pleural effusions and interstitial pulmonary edema with presumed mild bibasal atelectasis. Electronically Signed: Terry Baca MD  5/21/2025 9:13 AM EDT  Workstation ID: [id number]    XR Abdomen KUB  Result Date: 5/21/2025  Impression: 1. Grossly stable bowel gas pattern, suggesting possible mild ileus. 2. Similar appearance of the chest including cardiomegaly, pleural effusions and interstitial pulmonary edema with presumed mild bibasal atelectasis. Electronically Signed: Terry Baca MD  5/21/2025 9:13 AM EDT  Workstation ID: [id number]    XR Chest 1 View  Result Date: 5/20/2025  Impression: 1.Support devices appear in expected position. 2.Stable cardiomegaly with increased interstitial markings throughout both lungs, most notable within the right lung base. No significant change from prior exam. Electronically Signed: Guanaco Ayala MD  5/20/2025 7:42 AM EDT  Workstation ID: [id number]    XR Abdomen 1 View  Result Date: 5/20/2025  Impression: 1.Mild decrease in degree of distention of the gas-filled loops of small bowel and colon predominantly within the left hemiabdomen. 2.Percutaneous gastrostomy tube projects at the gastric bubble.  Electronically Signed: Guanaco Ayala MD  5/20/2025 7:36 AM EDT  Workstation ID: [id number]    XR Abdomen KUB  Result Date: 5/16/2025  Impression: Nonspecific bowel gas pattern. Electronically Signed: Edin Hansen MD  5/16/2025 4:19 AM EDT  Workstation ID: [id number]    XR Chest 1 View  Result Date: 5/15/2025  Impression: 1. Findings of pulmonary vascular congestion and mild edema which are felt to be slightly improved 2. Persistent left basilar opacity compatible with airspace disease and possible small effusion Electronically Signed: Chidi Natarajan  5/15/2025 7:46 AM EDT  Workstation ID: [id number]    XR Abdomen KUB  Result Date: 5/14/2025  Probable generalized ileus. Gaseous distention of the stomach is noted. Electronically Signed: Modesto Warner MD  5/14/2025 6:52 AM EDT  Workstation ID: [id number]    XR Chest 1 View  Result Date: 5/13/2025  Impression: 1. Combination interstitial and alveolar disease changes within both lungs, thought to be most dense or consolidated in the retrocardiac left lower lobe, without significant interval change. 2. Suspected small left pleural effusion, unchanged. 3. No visible pneumothorax. Support line and tube placements as described. Electronically Signed: Penelope Gipson MD  5/13/2025 7:42 AM EDT  Workstation ID: [id number]    CT Angiogram Abdomen Pelvis  Result Date: 5/9/2025  Impression: 1.Infrarenal abdominal aortic aneurysm measuring 4.2 cm in transverse dimension. No evidence of aortic dissection or periaortic hemorrhage. 2.Large left groin hematoma measuring 4.3 x 12 cm with evidence of contrast extravasation within the hematoma consistent with active bleeding. A pseudoaneurysm is not visualized. Findings discussed with vascular surgery at the time of dictation. 3.Advanced aortoiliac atheromatous changes as described above. 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 Multifocal moderate stenosis throughout the SMA. BELINDA is not visualized.  5.Moderate to large bilateral pleural effusions are visualized with adjacent compressive atelectasis. 6.Gastrostomy tube is in appropriate position. There is nonspecific gastric distention. An obstructing lesion is not visualized however, evaluation is limited due to motion artifact. Gastric distention may be secondary to gastroparesis. If there is concern for an obstructing lesion, correlate with the endoscopy. 7.Additional findings per body of the report. Electronically Signed: John Thomson MD  5/9/2025 8:47 PM EDT  Workstation ID: [id number]    XR Chest 1 View  Result Date: 5/8/2025  Impression: 1. Stable lines and support tubes. 2. No pneumothorax. 3. Unchanged mixed interstitial and airspace opacities bilaterally which may relate to pulmonary edema. 4. Small bilateral pleural effusions. Electronically Signed: Thaddeus Toledo MD  5/8/2025 7:52 AM EDT  Workstation ID: [id number]    Cardiac Catheterization/Vascular Study  Result Date: 5/7/2025  Successful PTCA/stenting of the RCA with placement of 3 overlapping drug-eluting stents reducing 95% stenosis to 0%. RECOMMENDATIONS: Dual antiplatelet therapy for at least 1 year. Optimize medical therapy and risk factor management per guidelines. Indications: Status post VF arrest/non-STEMI. Access: Left femoral Procedures: PTCA/stenting of the posterolateral branch of the right coronary artery. Stenting of the mid to distal RCA with overlapping drug-eluting stents due to nonflow limiting dissection. Procedure narrative: The patient is a 69-year-old female with severe stenosis of the posterolateral branch of the RCA and a extremely heavily calcified vessel.  She was recently admitted to [facility] with ACS/VF arrest.  Angiogram was performed and PCI of the posterolateral branch of the RCA was attempted and was unsuccessful due to failure to deliver the devices.  Subsequently patient's condition decompensated and she was transferred to [facility].  She was  brought to the Cath Lab today for planned PCI of the posterolateral branch of the RCA. The patient was brought to the catheterization lab in a fasting condition.  Right femoral access site was prepped and draped in standard sterile fashion.  Lidocaine was injected and arterial access was obtained by percutaneous anterior wall puncture technique.  A 6 Turkish arterial sheath was placed.  Heavy calcification and moderate to severe stenosis of the right iliac artery was noted and guidewire could not be advanced.  At this time we proceeded via the left femoral approach.  Angiomax was started.  The right coronary artery was engaged with F R4 guide catheter.  Angiogram revealed diffusely calcified right coronary artery with a 95% stenosis in the large posterolateral branch.  The lesion was crossed with a versa turn wire and a 2.0 mm balloon was advanced and balloon PTCA was performed.  A guide liner was advanced to facilitate balloon delivery.  Subsequently delivery of a 2.5 x 18 mm ELIECER was quite difficult and required deep intubation of the right coronary artery with a guide liner.  Stent was successfully delivered and deployed as well as postdilated with satisfactory results however post stent deployment we noted a nonflow limiting dissection extending from the distal RCA into the more distal segment beyond the crux up to the right PDA.  At this time a 3.0 x 20 mm NC balloon was advanced and expanded in the dissected segment.  The guide liner was further advanced to facilitate delivery of stents.  A 3.5 x 48 mm ELIECER was delivered from mid to distal RCA and a 3.0 x 33 mm ELIECER was delivered from distal RCA to the EBENEZER covering the entire segment with overlapping stents.  All stents were deployed and postdilated with satisfactory results and no significant residual uncovered dissection and no significant residual stenosis.  Final angiograms were taken and the guide catheter was removed.  BRAXTON flow was grade 3 before and after  PCI.  At the conclusion the arterial sheath were secured and the patient was transferred to her room in a stable condition.      XR Chest 1 View  Result Date: 5/7/2025  Impression: Persistent pulmonary vascular congestion, left basilar atelectasis and effusion. Significantly increased right basilar atelectasis +/- effusion. Electronically Signed: Arley Duong MD  5/7/2025 7:12 AM EDT  Workstation ID: [id number]    XR Chest 1 View  Result Date: 5/6/2025  Impression: 1.The tracheostomy tube is noted in the midline. 2.Stable appearance of diffuse interstitial changes throughout the lungs with airspace infiltrates in the mid to lower lungs bilaterally. Small bilateral pleural effusions are noted. 3.The left IJ central line and right dialysis catheter are stable in position. Electronically Signed: Enzo Anna MD  5/6/2025 7:38 AM EDT  Workstation ID: [id number]    XR Chest 1 View  Result Date: 5/5/2025  Impression: Interval decreased pulmonary edema. Similar small right and trace left pleural effusions, with likely adjacent atelectasis. Superimposed infection to be excluded clinically. Support devices as above. Electronically Signed: Sal Seth MD  5/5/2025 3:07 PM EDT  Workstation ID: [id number]    XR Chest 1 View  Result Date: 5/2/2025  1.  Tiny bilateral pleural effusions. 2.  Mild cardiomegaly.  This report was finalized on 5/2/2025 10:28 AM by Dr. Leander Faria MD.      XR Chest 1 View  Result Date: 4/24/2025  1.  Decreased right pleural effusion. 2.  Otherwise stable changes of CHF/edema. 3.  Support devices as above.  This report was finalized on 4/24/2025 3:19 PM by Dr. Edin Peace MD.        Echo:  Results for orders placed during the hospital encounter of 04/11/25    Adult Transthoracic Echo Complete W/ Cont if Necessary Per Protocol    Interpretation Summary  Images from the original result were not included.      The study is technically difficult for diagnosis. The quality of the study is limited with  poor acoustic windows.    Sinus bradycardia in high 50s was the predominant rhythm observed during the procedure.    Normal left ventricular cavity size noted. Left ventricular wall thickness is consistent with mild concentric hypertrophy.    Left ventricular systolic function is mildly decreased. Left ventricular ejection fraction appears to be 41 - 45%.    Left ventricular diastolic function is consistent with (grade II w/high LAP) pseudonormalization.    There is mild calcification of the aortic valve. No significant aortic valve regurgitation is present. No hemodynamically significant aortic valve stenosis is present.    There is mild calcification of the mitral valve posterior leaflet(s). Mild mitral valve regurgitation is present. No significant mitral valve stenosis is present.    There is a small (<1cm) pericardial effusion. There is no evidence of cardiac tamponade.      Results: Reviewed.  I reviewed the patient's new laboratory and imaging results.  I independently reviewed the patient's new images.    Medications: Reviewed.    Hospital Course, Consults and Procedures  provided:   Patient was admitted to [facility] ICU 2* the above listed problems in the HPI. Underwent LHC on 5/7/25 with 2 overlapping ELIECER with stenosis reduced from 95 to 0. Recommendations are 1 year of DAPT and avoidance of anticoagulation due to severe bleeding. LVEF noted to be 41-45% and GDMT added as tolerated. Antihypertensives adjusted during stay. Amiodarone for V-fib arrest has been able to be weaned down.     RUE noted to be swollen and repeat duplex showed superficial thrombophlebitis of cephalic vein.     Found to have tracheobronchitis. Cultures grew Klebsiella susceptible to Rocephin. Received 7 days of Rocephin.     Patient was to be discharged back to [facility] however she developed a hematoma at her left femoral artery site requiring urgent surgery with femoral artery repair, evacuation of hematoma, and 2 units PRBCs. Wound VAC  placed. She was taken back to surgery on 5/13/25 for washout and replacement of wound VAC. Additional PRBCs received on May 14th. PT wound Care has been following with every other day wound vac changes per Vascular Surgery's recommendations. She will require long term negative pressure wound therapy as wound could not be closed due to size and tissue quality.     The remaining duration of the patient's stay has been uneventful. She has received iHD per Nephrology. She has appeared to be volume overloaded with pleural effusions and upper extremity pitting edema. Last HD was 5/21/25 in which she had 3 L fluid removed. She became hypotensive following HD and is on low-dose norepinephrine. Antihypertensives currently on hold. ECHO pending.    Repeat imaging obtained on 5/21 showing large bilateral pleural effusions with likely atelectasis, acute right lateral fourth through eighth rib fractures and acute lateral third through sixth rib fractures with small pericardial effusion. Additionally noted findings concerning of ileus, interval development of right rectus sheath hematoma measuring up to 6.2 cm, significant interval decrease of left groin hematoma measuring up to 4.6 cm, and similar appearing infrarenal AAA measuring 4.2 cm. Reglan initiated and PRN lactulose.. She has had 4 documented bowel movements since yesterday.     It has been determined by all primary and consulting parties that the patient has reached the maximum benefit of her inpatient stay and is ready for d/c to [facility] in [place]. Consulting parties d/c recommendations are reflected below.     Consults:  Consults       Date and Time Order Name Status Description    5/9/2025  9:00 PM Inpatient Vascular Surgery Consult Completed     5/5/2025  1:27 PM Inpatient Nephrology Consult Completed     5/5/2025  1:27 PM Inpatient Cardiology Consult Completed             Procedures:  Procedure(s):  5/9/25 Left femoral artery repair, evacuation of left groin hematoma,  placement of negative wound vacuum therapy  5/13/25 I&D LLE      Condition on discharge     Improving with treatment.    Patient and family instructions     Discharge Disposition: LTAC: [facility]    CODE STATUS:   Code Status and Medical Interventions: CPR (Attempt to Resuscitate); Full Support   Ordered at: 05/06/25 1629     Code Status (Patient has no pulse and is not breathing):    CPR (Attempt to Resuscitate)     Medical Interventions (Patient has pulse or is breathing):    Full Support       No Known Allergies    Discharge Medications:     Discharge Medications        New Medications        Instructions Start Date   albuterol (2.5 MG/3ML) 0.083% nebulizer solution  Commonly known as: PROVENTIL   2.5 mg, Nebulization, Every 4 Hours PRN      amiodarone 200 MG tablet  Commonly known as: PACERONE   200 mg, Per G Tube, Every 24 Hours Scheduled   Start Date: May 23, 2025     dexmedetomidine 400 MCG/100ML solution infusion  Commonly known as: PRECEDEX   0.2-1.5 mcg/kg/hr (13..1 mcg/hr), Intravenous, Titrated      heparin (porcine) 5000 UNIT/ML injection   5,000 Units, Subcutaneous, Every 8 Hours Scheduled      ipratropium-albuterol 0.5-2.5 mg/3 ml nebulizer  Commonly known as: DUO-NEB   3 mL, Nebulization, 4 Times Daily - RT      lactulose 10 GM/15ML solution  Commonly known as: CHRONULAC   20 g, Per PEG Tube, 3 Times Daily PRN      lansoprazole 15 MG Tablet Delayed Release Dispersible disintegrating tablet  Commonly known as: PREVACID SOLUTAB   15 mg, Per PEG Tube, Every Morning Before Breakfast   Start Date: May 23, 2025     metoclopramide 5 MG/ML injection  Commonly known as: REGLAN   5 mg, Intravenous, Every 8 Hours      miconazole 2 % powder  Commonly known as: MICOTIN   1 Application, Topical, Every 12 Hours Scheduled      nitroglycerin 0.4 MG/HR patch  Commonly known as: NITRODUR   1 patch, Transdermal, Daily   Start Date: May 23, 2025     Norepinephrine-Sodium Chloride 8-0.9 MG/250ML-% solution  infusion  Commonly known as: LEVOPHED   0.02-0.3 mcg/kg/min (1.388-20.82 mcg/min), Intravenous, Titrated             Continue These Medications        Instructions Start Date   aspirin 81 MG EC tablet   81 mg, Oral, Daily      atorvastatin 40 MG tablet  Commonly known as: LIPITOR   40 mg, Per G Tube, Nightly      clopidogrel 75 MG tablet  Commonly known as: PLAVIX   75 mg, Oral, Daily             Stop These Medications      amLODIPine 10 MG tablet  Commonly known as: NORVASC     bumetanide 2 MG tablet  Commonly known as: BUMEX     busPIRone 10 MG tablet  Commonly known as: BUSPAR     calcitriol 0.25 MCG capsule  Commonly known as: ROCALTROL     carvedilol 25 MG tablet  Commonly known as: COREG     doxazosin 2 MG tablet  Commonly known as: CARDURA     Eliquis 2.5 MG tablet tablet  Generic drug: apixaban     epoetin vera-epbx 3000 UNIT/ML injection  Commonly known as: RETACRIT     ezetimibe 10 MG tablet  Commonly known as: ZETIA     fenofibrate 54 MG tablet  Commonly known as: TRICOR     hydrALAZINE 50 MG tablet  Commonly known as: APRESOLINE     hydrOXYzine 25 MG tablet  Commonly known as: ATARAX     isosorbide mononitrate 60 MG 24 hr tablet  Commonly known as: IMDUR     sertraline 25 MG tablet  Commonly known as: ZOLOFT              Activity:       Activity Instructions       Activity as Tolerated                Diet: NPO Diet NPO Type: Tube Feeding     Diet Instructions       Diet: Nothing By Mouth      Discharge Diet: Nothing By Mouth           Tube Feeding: Formula: Novasource Renal (Electrolyte Restricted); Feeding Type: Continuous; Start at: 25 mL/hr; Then Advance By: Do Not Advance; Water Flush: 30 mL; Every: 2 hours; Water Bolus: None     Additional Instructions for the Follow-ups that You Need to Schedule       Discharge Follow-up with PCP   As directed       Currently Documented PCP:    Sergio Randall MD    PCP Phone Number:    [phone]     Follow Up Details: 1 week post-discharge        Discharge  Follow-up with Specialty: Cardiology; 1 Month   As directed      Specialty: Cardiology   Follow Up: 1 Month                     Time Spent on Discharge:    I spent 20 minutes on this discharge activity which included: face-to-face encounter with the patient, reviewing the data in the system, coordination of the care with the nursing staff as well as consultants, documentation, and entering orders.    Electronically signed by NEDRA Sadler, 05/22/25, 12:03 PM EDT.         Electronically signed by Inder Brody APRN at 05/22/25 1202

## 2025-05-22 NOTE — PLAN OF CARE
Goal Outcome Evaluation:  Can not wean ventilator settings further at this time.

## 2025-05-23 ENCOUNTER — APPOINTMENT (OUTPATIENT)
Dept: GENERAL RADIOLOGY | Facility: HOSPITAL | Age: 70
End: 2025-05-23
Payer: MEDICARE

## 2025-05-23 ENCOUNTER — OUTSIDE FACILITY SERVICE (OUTPATIENT)
Dept: INFECTIOUS DISEASES | Facility: CLINIC | Age: 70
End: 2025-05-23
Payer: MEDICARE

## 2025-05-23 ENCOUNTER — OUTSIDE FACILITY SERVICE (OUTPATIENT)
Dept: PULMONOLOGY | Facility: CLINIC | Age: 70
End: 2025-05-23
Payer: MEDICARE

## 2025-05-23 LAB
A-A DO2: 108.8 MMHG (ref 0–300)
ALBUMIN SERPL-MCNC: 2.9 G/DL (ref 3.5–5.2)
ALBUMIN/GLOB SERPL: 1.1 G/DL
ALP SERPL-CCNC: 110 U/L (ref 39–117)
ALT SERPL W P-5'-P-CCNC: 8 U/L (ref 1–33)
ANION GAP SERPL CALCULATED.3IONS-SCNC: 10.5 MMOL/L (ref 5–15)
ARTERIAL PATENCY WRIST A: POSITIVE
AST SERPL-CCNC: 12 U/L (ref 1–32)
ATMOSPHERIC PRESS: 727 MMHG
BASE EXCESS BLDA CALC-SCNC: 1.7 MMOL/L (ref 0–2)
BASOPHILS # BLD AUTO: 0.05 10*3/MM3 (ref 0–0.2)
BASOPHILS NFR BLD AUTO: 0.5 % (ref 0–1.5)
BDY SITE: ABNORMAL
BILIRUB SERPL-MCNC: 0.3 MG/DL (ref 0–1.2)
BUN SERPL-MCNC: 27 MG/DL (ref 8–23)
BUN/CREAT SERPL: 6.2 (ref 7–25)
CALCIUM SPEC-SCNC: 9 MG/DL (ref 8.6–10.5)
CHLORIDE SERPL-SCNC: 103 MMOL/L (ref 98–107)
CO2 BLDA-SCNC: 28.1 MMOL/L (ref 22–33)
CO2 SERPL-SCNC: 23.5 MMOL/L (ref 22–29)
COHGB MFR BLD: 1.4 % (ref 0–5)
CREAT SERPL-MCNC: 4.35 MG/DL (ref 0.57–1)
CRP SERPL-MCNC: 4.05 MG/DL (ref 0–0.5)
DEPRECATED RDW RBC AUTO: 59 FL (ref 37–54)
EGFRCR SERPLBLD CKD-EPI 2021: 10.4 ML/MIN/1.73
EOSINOPHIL # BLD AUTO: 0.21 10*3/MM3 (ref 0–0.4)
EOSINOPHIL NFR BLD AUTO: 2.3 % (ref 0.3–6.2)
ERYTHROCYTE [DISTWIDTH] IN BLOOD BY AUTOMATED COUNT: 17.1 % (ref 12.3–15.4)
FERRITIN SERPL-MCNC: 2412 NG/ML (ref 13–150)
GLOBULIN UR ELPH-MCNC: 2.7 GM/DL
GLUCOSE SERPL-MCNC: 97 MG/DL (ref 65–99)
HCO3 BLDA-SCNC: 26.8 MMOL/L (ref 20–26)
HCT VFR BLD AUTO: 24.7 % (ref 34–46.6)
HCT VFR BLD CALC: 23.1 % (ref 38–51)
HGB BLD-MCNC: 7.7 G/DL (ref 12–15.9)
HGB BLDA-MCNC: 7.6 G/DL (ref 13.5–17.5)
IMM GRANULOCYTES # BLD AUTO: 0.05 10*3/MM3 (ref 0–0.05)
IMM GRANULOCYTES NFR BLD AUTO: 0.5 % (ref 0–0.5)
INHALED O2 CONCENTRATION: 40 %
IRON 24H UR-MRATE: 25 MCG/DL (ref 37–145)
IRON SATN MFR SERPL: 14 % (ref 20–50)
LYMPHOCYTES # BLD AUTO: 1.17 10*3/MM3 (ref 0.7–3.1)
LYMPHOCYTES NFR BLD AUTO: 12.7 % (ref 19.6–45.3)
Lab: ABNORMAL
MCH RBC QN AUTO: 29.8 PG (ref 26.6–33)
MCHC RBC AUTO-ENTMCNC: 31.2 G/DL (ref 31.5–35.7)
MCV RBC AUTO: 95.7 FL (ref 79–97)
METHGB BLD QL: 0.4 % (ref 0–3)
MODALITY: ABNORMAL
MONOCYTES # BLD AUTO: 1.2 10*3/MM3 (ref 0.1–0.9)
MONOCYTES NFR BLD AUTO: 13.1 % (ref 5–12)
NEUTROPHILS NFR BLD AUTO: 6.51 10*3/MM3 (ref 1.7–7)
NEUTROPHILS NFR BLD AUTO: 70.9 % (ref 42.7–76)
NOTE: ABNORMAL
NOTIFIED BY: ABNORMAL
NOTIFIED WHO: ABNORMAL
NRBC BLD AUTO-RTO: 0 /100 WBC (ref 0–0.2)
OXYHGB MFR BLDV: 97.3 % (ref 94–99)
PCO2 BLDA: 43.6 MM HG (ref 35–45)
PCO2 TEMP ADJ BLD: ABNORMAL MM[HG]
PEEP RESPIRATORY: 5 CM[H2O]
PH BLDA: 7.4 PH UNITS (ref 7.35–7.45)
PH, TEMP CORRECTED: ABNORMAL
PLATELET # BLD AUTO: 282 10*3/MM3 (ref 140–450)
PMV BLD AUTO: 10.6 FL (ref 6–12)
PO2 BLDA: 115 MM HG (ref 83–108)
PO2 TEMP ADJ BLD: ABNORMAL MM[HG]
POTASSIUM SERPL-SCNC: 3.2 MMOL/L (ref 3.5–5.2)
PREALB SERPL-MCNC: 14.1 MG/DL (ref 20–40)
PROT SERPL-MCNC: 5.6 G/DL (ref 6–8.5)
RBC # BLD AUTO: 2.58 10*6/MM3 (ref 3.77–5.28)
SAO2 % BLDCOA: 99.1 % (ref 94–99)
SET MECH RESP RATE: 14
SODIUM SERPL-SCNC: 137 MMOL/L (ref 136–145)
TIBC SERPL-MCNC: 173 MCG/DL (ref 298–536)
TRANSFERRIN SERPL-MCNC: 116 MG/DL (ref 200–360)
VENTILATOR MODE: ABNORMAL
VT ON VENT VENT: 360 ML
WBC NRBC COR # BLD AUTO: 9.19 10*3/MM3 (ref 3.4–10.8)

## 2025-05-23 PROCEDURE — 83050 HGB METHEMOGLOBIN QUAN: CPT

## 2025-05-23 PROCEDURE — 84134 ASSAY OF PREALBUMIN: CPT | Performed by: INTERNAL MEDICINE

## 2025-05-23 PROCEDURE — 83540 ASSAY OF IRON: CPT | Performed by: INTERNAL MEDICINE

## 2025-05-23 PROCEDURE — 82375 ASSAY CARBOXYHB QUANT: CPT

## 2025-05-23 PROCEDURE — 97163 PT EVAL HIGH COMPLEX 45 MIN: CPT

## 2025-05-23 PROCEDURE — 71045 X-RAY EXAM CHEST 1 VIEW: CPT

## 2025-05-23 PROCEDURE — 87205 SMEAR GRAM STAIN: CPT | Performed by: INTERNAL MEDICINE

## 2025-05-23 PROCEDURE — 36415 COLL VENOUS BLD VENIPUNCTURE: CPT | Performed by: INTERNAL MEDICINE

## 2025-05-23 PROCEDURE — 87070 CULTURE OTHR SPECIMN AEROBIC: CPT | Performed by: INTERNAL MEDICINE

## 2025-05-23 PROCEDURE — 86140 C-REACTIVE PROTEIN: CPT | Performed by: INTERNAL MEDICINE

## 2025-05-23 PROCEDURE — 85025 COMPLETE CBC W/AUTO DIFF WBC: CPT | Performed by: INTERNAL MEDICINE

## 2025-05-23 PROCEDURE — 82805 BLOOD GASES W/O2 SATURATION: CPT

## 2025-05-23 PROCEDURE — 87205 SMEAR GRAM STAIN: CPT | Performed by: PHYSICIAN ASSISTANT

## 2025-05-23 PROCEDURE — 71045 X-RAY EXAM CHEST 1 VIEW: CPT | Performed by: RADIOLOGY

## 2025-05-23 PROCEDURE — 97167 OT EVAL HIGH COMPLEX 60 MIN: CPT

## 2025-05-23 PROCEDURE — 80053 COMPREHEN METABOLIC PANEL: CPT | Performed by: INTERNAL MEDICINE

## 2025-05-23 PROCEDURE — 84466 ASSAY OF TRANSFERRIN: CPT | Performed by: INTERNAL MEDICINE

## 2025-05-23 PROCEDURE — 87070 CULTURE OTHR SPECIMN AEROBIC: CPT | Performed by: PHYSICIAN ASSISTANT

## 2025-05-23 PROCEDURE — 82728 ASSAY OF FERRITIN: CPT | Performed by: INTERNAL MEDICINE

## 2025-05-23 NOTE — PROGRESS NOTES
Order received for Phase II Cardiac Rehab. Patient qualifies for program but is not an appropriate candidate for Phase II Cardiac Rehab at this time. Staff available if additional assistance needed.

## 2025-05-24 ENCOUNTER — OUTSIDE FACILITY SERVICE (OUTPATIENT)
Dept: PULMONOLOGY | Facility: CLINIC | Age: 70
End: 2025-05-24
Payer: MEDICARE

## 2025-05-24 LAB
ALBUMIN SERPL-MCNC: 2.5 G/DL (ref 3.5–5.2)
ALBUMIN/GLOB SERPL: 1 G/DL
ALP SERPL-CCNC: 108 U/L (ref 39–117)
ALT SERPL W P-5'-P-CCNC: 8 U/L (ref 1–33)
ANION GAP SERPL CALCULATED.3IONS-SCNC: 10.3 MMOL/L (ref 5–15)
AST SERPL-CCNC: 10 U/L (ref 1–32)
BASOPHILS # BLD AUTO: 0.05 10*3/MM3 (ref 0–0.2)
BASOPHILS NFR BLD AUTO: 0.5 % (ref 0–1.5)
BILIRUB SERPL-MCNC: 0.3 MG/DL (ref 0–1.2)
BUN SERPL-MCNC: 38 MG/DL (ref 8–23)
BUN/CREAT SERPL: 6.7 (ref 7–25)
CALCIUM SPEC-SCNC: 9.5 MG/DL (ref 8.6–10.5)
CHLORIDE SERPL-SCNC: 104 MMOL/L (ref 98–107)
CO2 SERPL-SCNC: 22.7 MMOL/L (ref 22–29)
CREAT SERPL-MCNC: 5.67 MG/DL (ref 0.57–1)
CRP SERPL-MCNC: 4.8 MG/DL (ref 0–0.5)
DEPRECATED RDW RBC AUTO: 59.6 FL (ref 37–54)
EGFRCR SERPLBLD CKD-EPI 2021: 7.6 ML/MIN/1.73
EOSINOPHIL # BLD AUTO: 0.44 10*3/MM3 (ref 0–0.4)
EOSINOPHIL NFR BLD AUTO: 4.5 % (ref 0.3–6.2)
ERYTHROCYTE [DISTWIDTH] IN BLOOD BY AUTOMATED COUNT: 16.6 % (ref 12.3–15.4)
GLOBULIN UR ELPH-MCNC: 2.6 GM/DL
GLUCOSE SERPL-MCNC: 90 MG/DL (ref 65–99)
HAV IGM SERPL QL IA: NORMAL
HBV CORE IGM SERPL QL IA: NORMAL
HBV SURFACE AG SERPL QL IA: NORMAL
HCT VFR BLD AUTO: 24.3 % (ref 34–46.6)
HCV AB SER QL: NORMAL
HGB BLD-MCNC: 7.3 G/DL (ref 12–15.9)
IMM GRANULOCYTES # BLD AUTO: 0.05 10*3/MM3 (ref 0–0.05)
IMM GRANULOCYTES NFR BLD AUTO: 0.5 % (ref 0–0.5)
LYMPHOCYTES # BLD AUTO: 1.31 10*3/MM3 (ref 0.7–3.1)
LYMPHOCYTES NFR BLD AUTO: 13.5 % (ref 19.6–45.3)
MCH RBC QN AUTO: 29.3 PG (ref 26.6–33)
MCHC RBC AUTO-ENTMCNC: 30 G/DL (ref 31.5–35.7)
MCV RBC AUTO: 97.6 FL (ref 79–97)
MONOCYTES # BLD AUTO: 1.28 10*3/MM3 (ref 0.1–0.9)
MONOCYTES NFR BLD AUTO: 13.2 % (ref 5–12)
NEUTROPHILS NFR BLD AUTO: 6.6 10*3/MM3 (ref 1.7–7)
NEUTROPHILS NFR BLD AUTO: 67.8 % (ref 42.7–76)
NRBC BLD AUTO-RTO: 0 /100 WBC (ref 0–0.2)
PLATELET # BLD AUTO: 263 10*3/MM3 (ref 140–450)
PMV BLD AUTO: 10.9 FL (ref 6–12)
POTASSIUM SERPL-SCNC: 4.2 MMOL/L (ref 3.5–5.2)
PROT SERPL-MCNC: 5.1 G/DL (ref 6–8.5)
RBC # BLD AUTO: 2.49 10*6/MM3 (ref 3.77–5.28)
SODIUM SERPL-SCNC: 137 MMOL/L (ref 136–145)
WBC NRBC COR # BLD AUTO: 9.73 10*3/MM3 (ref 3.4–10.8)

## 2025-05-24 PROCEDURE — 80053 COMPREHEN METABOLIC PANEL: CPT | Performed by: INTERNAL MEDICINE

## 2025-05-24 PROCEDURE — 85025 COMPLETE CBC W/AUTO DIFF WBC: CPT | Performed by: INTERNAL MEDICINE

## 2025-05-24 PROCEDURE — 36415 COLL VENOUS BLD VENIPUNCTURE: CPT | Performed by: INTERNAL MEDICINE

## 2025-05-24 PROCEDURE — 80074 ACUTE HEPATITIS PANEL: CPT | Performed by: INTERNAL MEDICINE

## 2025-05-24 PROCEDURE — 86140 C-REACTIVE PROTEIN: CPT | Performed by: INTERNAL MEDICINE

## 2025-05-25 ENCOUNTER — OUTSIDE FACILITY SERVICE (OUTPATIENT)
Dept: PULMONOLOGY | Facility: CLINIC | Age: 70
End: 2025-05-25
Payer: MEDICARE

## 2025-05-25 LAB
A-A DO2: 87.4 MMHG (ref 0–300)
ALBUMIN SERPL-MCNC: 2.8 G/DL (ref 3.5–5.2)
ALBUMIN/GLOB SERPL: 1 G/DL
ALP SERPL-CCNC: 111 U/L (ref 39–117)
ALT SERPL W P-5'-P-CCNC: 7 U/L (ref 1–33)
ANION GAP SERPL CALCULATED.3IONS-SCNC: 9.2 MMOL/L (ref 5–15)
ARTERIAL PATENCY WRIST A: POSITIVE
AST SERPL-CCNC: 19 U/L (ref 1–32)
ATMOSPHERIC PRESS: 731 MMHG
BACTERIA SPEC AEROBE CULT: NORMAL
BACTERIA SPEC RESP CULT: NORMAL
BASE EXCESS BLDA CALC-SCNC: 4.2 MMOL/L (ref 0–2)
BASOPHILS # BLD AUTO: 0.05 10*3/MM3 (ref 0–0.2)
BASOPHILS NFR BLD AUTO: 0.4 % (ref 0–1.5)
BDY SITE: ABNORMAL
BILIRUB SERPL-MCNC: 0.3 MG/DL (ref 0–1.2)
BUN SERPL-MCNC: 24 MG/DL (ref 8–23)
BUN/CREAT SERPL: 6.5 (ref 7–25)
CALCIUM SPEC-SCNC: 9.1 MG/DL (ref 8.6–10.5)
CHLORIDE SERPL-SCNC: 102 MMOL/L (ref 98–107)
CO2 BLDA-SCNC: 30.4 MMOL/L (ref 22–33)
CO2 SERPL-SCNC: 24.8 MMOL/L (ref 22–29)
COHGB MFR BLD: 1.9 % (ref 0–5)
CREAT SERPL-MCNC: 3.69 MG/DL (ref 0.57–1)
CRP SERPL-MCNC: 4.82 MG/DL (ref 0–0.5)
DEPRECATED RDW RBC AUTO: 58.6 FL (ref 37–54)
EGFRCR SERPLBLD CKD-EPI 2021: 12.7 ML/MIN/1.73
EOSINOPHIL # BLD AUTO: 0.37 10*3/MM3 (ref 0–0.4)
EOSINOPHIL NFR BLD AUTO: 3 % (ref 0.3–6.2)
ERYTHROCYTE [DISTWIDTH] IN BLOOD BY AUTOMATED COUNT: 16.6 % (ref 12.3–15.4)
GLOBULIN UR ELPH-MCNC: 2.9 GM/DL
GLUCOSE SERPL-MCNC: 132 MG/DL (ref 65–99)
GRAM STN SPEC: NORMAL
HCO3 BLDA-SCNC: 29.1 MMOL/L (ref 20–26)
HCT VFR BLD AUTO: 25.7 % (ref 34–46.6)
HCT VFR BLD CALC: 24.3 % (ref 38–51)
HGB BLD-MCNC: 7.8 G/DL (ref 12–15.9)
HGB BLDA-MCNC: 7.9 G/DL (ref 13.5–17.5)
IMM GRANULOCYTES # BLD AUTO: 0.06 10*3/MM3 (ref 0–0.05)
IMM GRANULOCYTES NFR BLD AUTO: 0.5 % (ref 0–0.5)
INHALED O2 CONCENTRATION: 30 %
LYMPHOCYTES # BLD AUTO: 1.24 10*3/MM3 (ref 0.7–3.1)
LYMPHOCYTES NFR BLD AUTO: 10.2 % (ref 19.6–45.3)
Lab: ABNORMAL
MCH RBC QN AUTO: 29.2 PG (ref 26.6–33)
MCHC RBC AUTO-ENTMCNC: 30.4 G/DL (ref 31.5–35.7)
MCV RBC AUTO: 96.3 FL (ref 79–97)
METHGB BLD QL: 0.5 % (ref 0–3)
MODALITY: ABNORMAL
MONOCYTES # BLD AUTO: 1.26 10*3/MM3 (ref 0.1–0.9)
MONOCYTES NFR BLD AUTO: 10.3 % (ref 5–12)
NEUTROPHILS NFR BLD AUTO: 75.6 % (ref 42.7–76)
NEUTROPHILS NFR BLD AUTO: 9.22 10*3/MM3 (ref 1.7–7)
NOTE: ABNORMAL
NOTIFIED BY: ABNORMAL
NOTIFIED WHO: ABNORMAL
NRBC BLD AUTO-RTO: 0 /100 WBC (ref 0–0.2)
OXYHGB MFR BLDV: 93.5 % (ref 94–99)
PCO2 BLDA: 44.2 MM HG (ref 35–45)
PCO2 TEMP ADJ BLD: ABNORMAL MM[HG]
PEEP RESPIRATORY: 5 CM[H2O]
PH BLDA: 7.43 PH UNITS (ref 7.35–7.45)
PH, TEMP CORRECTED: ABNORMAL
PLATELET # BLD AUTO: 271 10*3/MM3 (ref 140–450)
PMV BLD AUTO: 11.2 FL (ref 6–12)
PO2 BLDA: 68.4 MM HG (ref 83–108)
PO2 TEMP ADJ BLD: ABNORMAL MM[HG]
POTASSIUM SERPL-SCNC: 4.3 MMOL/L (ref 3.5–5.2)
PROT SERPL-MCNC: 5.7 G/DL (ref 6–8.5)
PSV: 8 CMH2O
RBC # BLD AUTO: 2.67 10*6/MM3 (ref 3.77–5.28)
SAO2 % BLDCOA: 95.8 % (ref 94–99)
SODIUM SERPL-SCNC: 136 MMOL/L (ref 136–145)
VENTILATOR MODE: ABNORMAL
WBC NRBC COR # BLD AUTO: 12.2 10*3/MM3 (ref 3.4–10.8)

## 2025-05-25 PROCEDURE — 82805 BLOOD GASES W/O2 SATURATION: CPT

## 2025-05-25 PROCEDURE — 80053 COMPREHEN METABOLIC PANEL: CPT | Performed by: INTERNAL MEDICINE

## 2025-05-25 PROCEDURE — 36415 COLL VENOUS BLD VENIPUNCTURE: CPT | Performed by: INTERNAL MEDICINE

## 2025-05-25 PROCEDURE — 83050 HGB METHEMOGLOBIN QUAN: CPT

## 2025-05-25 PROCEDURE — 86140 C-REACTIVE PROTEIN: CPT | Performed by: INTERNAL MEDICINE

## 2025-05-25 PROCEDURE — 82375 ASSAY CARBOXYHB QUANT: CPT

## 2025-05-25 PROCEDURE — 85025 COMPLETE CBC W/AUTO DIFF WBC: CPT | Performed by: INTERNAL MEDICINE

## 2025-05-26 ENCOUNTER — OUTSIDE FACILITY SERVICE (OUTPATIENT)
Dept: PULMONOLOGY | Facility: CLINIC | Age: 70
End: 2025-05-26
Payer: MEDICARE

## 2025-05-26 LAB
ALBUMIN SERPL-MCNC: 2.8 G/DL (ref 3.5–5.2)
ALBUMIN/GLOB SERPL: 1 G/DL
ALP SERPL-CCNC: 105 U/L (ref 39–117)
ALT SERPL W P-5'-P-CCNC: 9 U/L (ref 1–33)
ANION GAP SERPL CALCULATED.3IONS-SCNC: 11.2 MMOL/L (ref 5–15)
AST SERPL-CCNC: 14 U/L (ref 1–32)
BASOPHILS # BLD AUTO: 0.04 10*3/MM3 (ref 0–0.2)
BASOPHILS NFR BLD AUTO: 0.5 % (ref 0–1.5)
BILIRUB SERPL-MCNC: 0.2 MG/DL (ref 0–1.2)
BUN SERPL-MCNC: 40 MG/DL (ref 8–23)
BUN/CREAT SERPL: 7.7 (ref 7–25)
CALCIUM SPEC-SCNC: 9.1 MG/DL (ref 8.6–10.5)
CHLORIDE SERPL-SCNC: 101 MMOL/L (ref 98–107)
CO2 SERPL-SCNC: 23.8 MMOL/L (ref 22–29)
CREAT SERPL-MCNC: 5.17 MG/DL (ref 0.57–1)
CRP SERPL-MCNC: 3.23 MG/DL (ref 0–0.5)
DEPRECATED RDW RBC AUTO: 59.2 FL (ref 37–54)
EGFRCR SERPLBLD CKD-EPI 2021: 8.5 ML/MIN/1.73
EOSINOPHIL # BLD AUTO: 0.32 10*3/MM3 (ref 0–0.4)
EOSINOPHIL NFR BLD AUTO: 4.2 % (ref 0.3–6.2)
ERYTHROCYTE [DISTWIDTH] IN BLOOD BY AUTOMATED COUNT: 16.7 % (ref 12.3–15.4)
GLOBULIN UR ELPH-MCNC: 2.8 GM/DL
GLUCOSE SERPL-MCNC: 95 MG/DL (ref 65–99)
HCT VFR BLD AUTO: 25.5 % (ref 34–46.6)
HGB BLD-MCNC: 7.7 G/DL (ref 12–15.9)
IMM GRANULOCYTES # BLD AUTO: 0.05 10*3/MM3 (ref 0–0.05)
IMM GRANULOCYTES NFR BLD AUTO: 0.7 % (ref 0–0.5)
LYMPHOCYTES # BLD AUTO: 1.45 10*3/MM3 (ref 0.7–3.1)
LYMPHOCYTES NFR BLD AUTO: 19.2 % (ref 19.6–45.3)
MCH RBC QN AUTO: 29.3 PG (ref 26.6–33)
MCHC RBC AUTO-ENTMCNC: 30.2 G/DL (ref 31.5–35.7)
MCV RBC AUTO: 97 FL (ref 79–97)
MONOCYTES # BLD AUTO: 1.02 10*3/MM3 (ref 0.1–0.9)
MONOCYTES NFR BLD AUTO: 13.5 % (ref 5–12)
NEUTROPHILS NFR BLD AUTO: 4.69 10*3/MM3 (ref 1.7–7)
NEUTROPHILS NFR BLD AUTO: 61.9 % (ref 42.7–76)
NRBC BLD AUTO-RTO: 0 /100 WBC (ref 0–0.2)
PLATELET # BLD AUTO: 264 10*3/MM3 (ref 140–450)
PMV BLD AUTO: 11.3 FL (ref 6–12)
POTASSIUM SERPL-SCNC: 5 MMOL/L (ref 3.5–5.2)
PROT SERPL-MCNC: 5.6 G/DL (ref 6–8.5)
RBC # BLD AUTO: 2.63 10*6/MM3 (ref 3.77–5.28)
SODIUM SERPL-SCNC: 136 MMOL/L (ref 136–145)
WBC NRBC COR # BLD AUTO: 7.57 10*3/MM3 (ref 3.4–10.8)

## 2025-05-26 PROCEDURE — 85025 COMPLETE CBC W/AUTO DIFF WBC: CPT | Performed by: INTERNAL MEDICINE

## 2025-05-26 PROCEDURE — 80053 COMPREHEN METABOLIC PANEL: CPT | Performed by: INTERNAL MEDICINE

## 2025-05-26 PROCEDURE — 36415 COLL VENOUS BLD VENIPUNCTURE: CPT | Performed by: INTERNAL MEDICINE

## 2025-05-26 PROCEDURE — 86140 C-REACTIVE PROTEIN: CPT | Performed by: INTERNAL MEDICINE

## 2025-05-27 ENCOUNTER — OUTSIDE FACILITY SERVICE (OUTPATIENT)
Dept: PULMONOLOGY | Facility: CLINIC | Age: 70
End: 2025-05-27
Payer: MEDICARE

## 2025-05-27 ENCOUNTER — OUTSIDE FACILITY SERVICE (OUTPATIENT)
Dept: INFECTIOUS DISEASES | Facility: CLINIC | Age: 70
End: 2025-05-27
Payer: MEDICARE

## 2025-05-27 LAB
ALBUMIN SERPL-MCNC: 3 G/DL (ref 3.5–5.2)
ALBUMIN/GLOB SERPL: 1 G/DL
ALP SERPL-CCNC: 121 U/L (ref 39–117)
ALT SERPL W P-5'-P-CCNC: 11 U/L (ref 1–33)
ANION GAP SERPL CALCULATED.3IONS-SCNC: 9.7 MMOL/L (ref 5–15)
AST SERPL-CCNC: 21 U/L (ref 1–32)
BASOPHILS # BLD AUTO: 0.05 10*3/MM3 (ref 0–0.2)
BASOPHILS NFR BLD AUTO: 0.6 % (ref 0–1.5)
BILIRUB SERPL-MCNC: 0.2 MG/DL (ref 0–1.2)
BUN SERPL-MCNC: 50 MG/DL (ref 8–23)
BUN/CREAT SERPL: 8.1 (ref 7–25)
CALCIUM SPEC-SCNC: 9.4 MG/DL (ref 8.6–10.5)
CHLORIDE SERPL-SCNC: 102 MMOL/L (ref 98–107)
CO2 SERPL-SCNC: 25.3 MMOL/L (ref 22–29)
CREAT SERPL-MCNC: 6.16 MG/DL (ref 0.57–1)
CRP SERPL-MCNC: 2.45 MG/DL (ref 0–0.5)
DEPRECATED RDW RBC AUTO: 57.1 FL (ref 37–54)
EGFRCR SERPLBLD CKD-EPI 2021: 6.9 ML/MIN/1.73
EOSINOPHIL # BLD AUTO: 0.48 10*3/MM3 (ref 0–0.4)
EOSINOPHIL NFR BLD AUTO: 5.4 % (ref 0.3–6.2)
ERYTHROCYTE [DISTWIDTH] IN BLOOD BY AUTOMATED COUNT: 16.2 % (ref 12.3–15.4)
GLOBULIN UR ELPH-MCNC: 2.9 GM/DL
GLUCOSE SERPL-MCNC: 103 MG/DL (ref 65–99)
HCT VFR BLD AUTO: 27.2 % (ref 34–46.6)
HGB BLD-MCNC: 8.3 G/DL (ref 12–15.9)
IMM GRANULOCYTES # BLD AUTO: 0.05 10*3/MM3 (ref 0–0.05)
IMM GRANULOCYTES NFR BLD AUTO: 0.6 % (ref 0–0.5)
LYMPHOCYTES # BLD AUTO: 1.47 10*3/MM3 (ref 0.7–3.1)
LYMPHOCYTES NFR BLD AUTO: 16.4 % (ref 19.6–45.3)
MCH RBC QN AUTO: 29.4 PG (ref 26.6–33)
MCHC RBC AUTO-ENTMCNC: 30.5 G/DL (ref 31.5–35.7)
MCV RBC AUTO: 96.5 FL (ref 79–97)
MONOCYTES # BLD AUTO: 1.15 10*3/MM3 (ref 0.1–0.9)
MONOCYTES NFR BLD AUTO: 12.8 % (ref 5–12)
NEUTROPHILS NFR BLD AUTO: 5.75 10*3/MM3 (ref 1.7–7)
NEUTROPHILS NFR BLD AUTO: 64.2 % (ref 42.7–76)
NRBC BLD AUTO-RTO: 0 /100 WBC (ref 0–0.2)
PLATELET # BLD AUTO: 297 10*3/MM3 (ref 140–450)
PMV BLD AUTO: 11.3 FL (ref 6–12)
POTASSIUM SERPL-SCNC: 5.7 MMOL/L (ref 3.5–5.2)
PROT SERPL-MCNC: 5.9 G/DL (ref 6–8.5)
RBC # BLD AUTO: 2.82 10*6/MM3 (ref 3.77–5.28)
SODIUM SERPL-SCNC: 137 MMOL/L (ref 136–145)
WBC NRBC COR # BLD AUTO: 8.95 10*3/MM3 (ref 3.4–10.8)

## 2025-05-27 PROCEDURE — 36415 COLL VENOUS BLD VENIPUNCTURE: CPT | Performed by: INTERNAL MEDICINE

## 2025-05-27 PROCEDURE — 85025 COMPLETE CBC W/AUTO DIFF WBC: CPT | Performed by: INTERNAL MEDICINE

## 2025-05-27 PROCEDURE — 86140 C-REACTIVE PROTEIN: CPT | Performed by: INTERNAL MEDICINE

## 2025-05-27 PROCEDURE — 80053 COMPREHEN METABOLIC PANEL: CPT | Performed by: INTERNAL MEDICINE

## 2025-05-28 ENCOUNTER — OUTSIDE FACILITY SERVICE (OUTPATIENT)
Dept: INFECTIOUS DISEASES | Facility: CLINIC | Age: 70
End: 2025-05-28

## 2025-05-28 ENCOUNTER — OUTSIDE FACILITY SERVICE (OUTPATIENT)
Dept: PULMONOLOGY | Facility: CLINIC | Age: 70
End: 2025-05-28
Payer: MEDICARE

## 2025-05-28 LAB
ALBUMIN SERPL-MCNC: 3.3 G/DL (ref 3.5–5.2)
ALBUMIN/GLOB SERPL: 1.1 G/DL
ALP SERPL-CCNC: 131 U/L (ref 39–117)
ALT SERPL W P-5'-P-CCNC: 10 U/L (ref 1–33)
ANION GAP SERPL CALCULATED.3IONS-SCNC: 9.8 MMOL/L (ref 5–15)
AST SERPL-CCNC: 14 U/L (ref 1–32)
ATMOSPHERIC PRESS: 726 MMHG
BASE EXCESS BLDV CALC-SCNC: 13.8 MMOL/L (ref 0–2)
BASOPHILS # BLD AUTO: 0.05 10*3/MM3 (ref 0–0.2)
BASOPHILS NFR BLD AUTO: 0.5 % (ref 0–1.5)
BDY SITE: ABNORMAL
BILIRUB SERPL-MCNC: 0.3 MG/DL (ref 0–1.2)
BUN SERPL-MCNC: 22 MG/DL (ref 8–23)
BUN/CREAT SERPL: 6.6 (ref 7–25)
CALCIUM SPEC-SCNC: 9.2 MG/DL (ref 8.6–10.5)
CHLORIDE SERPL-SCNC: 94 MMOL/L (ref 98–107)
CO2 BLDA-SCNC: 40 MMOL/L (ref 22–33)
CO2 SERPL-SCNC: 31.2 MMOL/L (ref 22–29)
COHGB MFR BLD: 1.5 % (ref 0–5)
CREAT SERPL-MCNC: 3.31 MG/DL (ref 0.57–1)
CRP SERPL-MCNC: 2.34 MG/DL (ref 0–0.5)
DEPRECATED RDW RBC AUTO: 55 FL (ref 37–54)
EGFRCR SERPLBLD CKD-EPI 2021: 14.4 ML/MIN/1.73
EOSINOPHIL # BLD AUTO: 0.39 10*3/MM3 (ref 0–0.4)
EOSINOPHIL NFR BLD AUTO: 3.5 % (ref 0.3–6.2)
ERYTHROCYTE [DISTWIDTH] IN BLOOD BY AUTOMATED COUNT: 16 % (ref 12.3–15.4)
GAS FLOW AIRWAY: 12 LPM
GLOBULIN UR ELPH-MCNC: 3.1 GM/DL
GLUCOSE SERPL-MCNC: 102 MG/DL (ref 65–99)
HCO3 BLDV-SCNC: 38.5 MMOL/L (ref 22–28)
HCT VFR BLD AUTO: 28.8 % (ref 34–46.6)
HGB BLD-MCNC: 9 G/DL (ref 12–15.9)
HGB BLDA-MCNC: 9.3 G/DL (ref 13.5–17.5)
IMM GRANULOCYTES # BLD AUTO: 0.07 10*3/MM3 (ref 0–0.05)
IMM GRANULOCYTES NFR BLD AUTO: 0.6 % (ref 0–0.5)
INHALED O2 CONCENTRATION: 30 %
LYMPHOCYTES # BLD AUTO: 1.48 10*3/MM3 (ref 0.7–3.1)
LYMPHOCYTES NFR BLD AUTO: 13.5 % (ref 19.6–45.3)
Lab: ABNORMAL
Lab: ABNORMAL
MCH RBC QN AUTO: 29.6 PG (ref 26.6–33)
MCHC RBC AUTO-ENTMCNC: 31.3 G/DL (ref 31.5–35.7)
MCV RBC AUTO: 94.7 FL (ref 79–97)
METHGB BLD QL: 0.8 % (ref 0–3)
MODALITY: ABNORMAL
MONOCYTES # BLD AUTO: 1.15 10*3/MM3 (ref 0.1–0.9)
MONOCYTES NFR BLD AUTO: 10.5 % (ref 5–12)
NEUTROPHILS NFR BLD AUTO: 7.85 10*3/MM3 (ref 1.7–7)
NEUTROPHILS NFR BLD AUTO: 71.4 % (ref 42.7–76)
NOTIFIED BY: ABNORMAL
NOTIFIED WHO: ABNORMAL
NRBC BLD AUTO-RTO: 0 /100 WBC (ref 0–0.2)
OXYHGB MFR BLDV: 41 % (ref 45–75)
PCO2 BLDV: 48.6 MM HG (ref 41–51)
PH BLDV: 7.51 PH UNITS (ref 7.32–7.42)
PLATELET # BLD AUTO: 361 10*3/MM3 (ref 140–450)
PMV BLD AUTO: 10.8 FL (ref 6–12)
PO2 BLDV: 21.3 MM HG (ref 27–53)
POTASSIUM SERPL-SCNC: 5 MMOL/L (ref 3.5–5.2)
PROT SERPL-MCNC: 6.4 G/DL (ref 6–8.5)
RBC # BLD AUTO: 3.04 10*6/MM3 (ref 3.77–5.28)
SAO2 % BLDCOV: 42 % (ref 45–75)
SODIUM SERPL-SCNC: 135 MMOL/L (ref 136–145)
TOTAL RATE: 18 BREATHS/MINUTE
WBC NRBC COR # BLD AUTO: 10.99 10*3/MM3 (ref 3.4–10.8)

## 2025-05-28 PROCEDURE — 36415 COLL VENOUS BLD VENIPUNCTURE: CPT | Performed by: INTERNAL MEDICINE

## 2025-05-28 PROCEDURE — 86140 C-REACTIVE PROTEIN: CPT | Performed by: INTERNAL MEDICINE

## 2025-05-28 PROCEDURE — 80053 COMPREHEN METABOLIC PANEL: CPT | Performed by: INTERNAL MEDICINE

## 2025-05-28 PROCEDURE — 82820 HEMOGLOBIN-OXYGEN AFFINITY: CPT

## 2025-05-28 PROCEDURE — 85025 COMPLETE CBC W/AUTO DIFF WBC: CPT | Performed by: INTERNAL MEDICINE

## 2025-05-28 PROCEDURE — 82805 BLOOD GASES W/O2 SATURATION: CPT

## 2025-05-28 PROCEDURE — OUTSIDEPOS PR OUTSIDE POS PLACEHOLDER: Performed by: INTERNAL MEDICINE

## 2025-05-29 ENCOUNTER — OUTSIDE FACILITY SERVICE (OUTPATIENT)
Dept: PULMONOLOGY | Facility: CLINIC | Age: 70
End: 2025-05-29
Payer: MEDICARE

## 2025-05-29 ENCOUNTER — OUTSIDE FACILITY SERVICE (OUTPATIENT)
Dept: INFECTIOUS DISEASES | Facility: CLINIC | Age: 70
End: 2025-05-29

## 2025-05-29 LAB
ALBUMIN SERPL-MCNC: 2.9 G/DL (ref 3.5–5.2)
ALBUMIN/GLOB SERPL: 1 G/DL
ALP SERPL-CCNC: 115 U/L (ref 39–117)
ALT SERPL W P-5'-P-CCNC: 8 U/L (ref 1–33)
ANION GAP SERPL CALCULATED.3IONS-SCNC: 9 MMOL/L (ref 5–15)
AST SERPL-CCNC: 22 U/L (ref 1–32)
BASOPHILS # BLD AUTO: 0.05 10*3/MM3 (ref 0–0.2)
BASOPHILS NFR BLD AUTO: 0.5 % (ref 0–1.5)
BILIRUB SERPL-MCNC: 0.3 MG/DL (ref 0–1.2)
BUN SERPL-MCNC: 39.5 MG/DL (ref 8–23)
BUN/CREAT SERPL: 8.1 (ref 7–25)
CALCIUM SPEC-SCNC: 8.9 MG/DL (ref 8.6–10.5)
CHLORIDE SERPL-SCNC: 94 MMOL/L (ref 98–107)
CO2 SERPL-SCNC: 30 MMOL/L (ref 22–29)
CREAT SERPL-MCNC: 4.85 MG/DL (ref 0.57–1)
CRP SERPL-MCNC: 2.17 MG/DL (ref 0–0.5)
DEPRECATED RDW RBC AUTO: 57.1 FL (ref 37–54)
EGFRCR SERPLBLD CKD-EPI 2021: 9.1 ML/MIN/1.73
EOSINOPHIL # BLD AUTO: 0.32 10*3/MM3 (ref 0–0.4)
EOSINOPHIL NFR BLD AUTO: 3.4 % (ref 0.3–6.2)
ERYTHROCYTE [DISTWIDTH] IN BLOOD BY AUTOMATED COUNT: 16.1 % (ref 12.3–15.4)
GLOBULIN UR ELPH-MCNC: 3 GM/DL
GLUCOSE SERPL-MCNC: 111 MG/DL (ref 65–99)
HCT VFR BLD AUTO: 26.7 % (ref 34–46.6)
HGB BLD-MCNC: 8.2 G/DL (ref 12–15.9)
IMM GRANULOCYTES # BLD AUTO: 0.05 10*3/MM3 (ref 0–0.05)
IMM GRANULOCYTES NFR BLD AUTO: 0.5 % (ref 0–0.5)
LYMPHOCYTES # BLD AUTO: 1.58 10*3/MM3 (ref 0.7–3.1)
LYMPHOCYTES NFR BLD AUTO: 16.9 % (ref 19.6–45.3)
MCH RBC QN AUTO: 29.5 PG (ref 26.6–33)
MCHC RBC AUTO-ENTMCNC: 30.7 G/DL (ref 31.5–35.7)
MCV RBC AUTO: 96 FL (ref 79–97)
MONOCYTES # BLD AUTO: 1.2 10*3/MM3 (ref 0.1–0.9)
MONOCYTES NFR BLD AUTO: 12.8 % (ref 5–12)
NEUTROPHILS NFR BLD AUTO: 6.15 10*3/MM3 (ref 1.7–7)
NEUTROPHILS NFR BLD AUTO: 65.9 % (ref 42.7–76)
NRBC BLD AUTO-RTO: 0 /100 WBC (ref 0–0.2)
PLATELET # BLD AUTO: 376 10*3/MM3 (ref 140–450)
PMV BLD AUTO: 11 FL (ref 6–12)
POTASSIUM SERPL-SCNC: 5.9 MMOL/L (ref 3.5–5.2)
PROT SERPL-MCNC: 5.9 G/DL (ref 6–8.5)
RBC # BLD AUTO: 2.78 10*6/MM3 (ref 3.77–5.28)
SODIUM SERPL-SCNC: 133 MMOL/L (ref 136–145)
WBC NRBC COR # BLD AUTO: 9.35 10*3/MM3 (ref 3.4–10.8)

## 2025-05-29 PROCEDURE — 86140 C-REACTIVE PROTEIN: CPT | Performed by: INTERNAL MEDICINE

## 2025-05-29 PROCEDURE — 80053 COMPREHEN METABOLIC PANEL: CPT | Performed by: INTERNAL MEDICINE

## 2025-05-29 PROCEDURE — OUTSIDEPOS PR OUTSIDE POS PLACEHOLDER: Performed by: INTERNAL MEDICINE

## 2025-05-29 PROCEDURE — 85025 COMPLETE CBC W/AUTO DIFF WBC: CPT | Performed by: INTERNAL MEDICINE

## 2025-05-29 PROCEDURE — 36415 COLL VENOUS BLD VENIPUNCTURE: CPT | Performed by: INTERNAL MEDICINE

## 2025-05-30 ENCOUNTER — OUTSIDE FACILITY SERVICE (OUTPATIENT)
Dept: PULMONOLOGY | Facility: CLINIC | Age: 70
End: 2025-05-30
Payer: MEDICARE

## 2025-05-30 ENCOUNTER — OUTSIDE FACILITY SERVICE (OUTPATIENT)
Dept: INFECTIOUS DISEASES | Facility: CLINIC | Age: 70
End: 2025-05-30

## 2025-05-30 LAB
ALBUMIN SERPL-MCNC: 3 G/DL (ref 3.5–5.2)
ALBUMIN/GLOB SERPL: 1 G/DL
ALP SERPL-CCNC: 115 U/L (ref 39–117)
ALT SERPL W P-5'-P-CCNC: 9 U/L (ref 1–33)
ANION GAP SERPL CALCULATED.3IONS-SCNC: 9.8 MMOL/L (ref 5–15)
AST SERPL-CCNC: 14 U/L (ref 1–32)
BASOPHILS # BLD AUTO: 0.07 10*3/MM3 (ref 0–0.2)
BASOPHILS NFR BLD AUTO: 0.6 % (ref 0–1.5)
BILIRUB SERPL-MCNC: 0.3 MG/DL (ref 0–1.2)
BUN SERPL-MCNC: 27.8 MG/DL (ref 8–23)
BUN/CREAT SERPL: 7.7 (ref 7–25)
CALCIUM SPEC-SCNC: 9.2 MG/DL (ref 8.6–10.5)
CHLORIDE SERPL-SCNC: 91 MMOL/L (ref 98–107)
CO2 SERPL-SCNC: 29.2 MMOL/L (ref 22–29)
CREAT SERPL-MCNC: 3.6 MG/DL (ref 0.57–1)
CRP SERPL-MCNC: 1.64 MG/DL (ref 0–0.5)
DEPRECATED RDW RBC AUTO: 57.3 FL (ref 37–54)
EGFRCR SERPLBLD CKD-EPI 2021: 13.1 ML/MIN/1.73
EOSINOPHIL # BLD AUTO: 0.38 10*3/MM3 (ref 0–0.4)
EOSINOPHIL NFR BLD AUTO: 3.3 % (ref 0.3–6.2)
ERYTHROCYTE [DISTWIDTH] IN BLOOD BY AUTOMATED COUNT: 16.3 % (ref 12.3–15.4)
GLOBULIN UR ELPH-MCNC: 3.1 GM/DL
GLUCOSE SERPL-MCNC: 95 MG/DL (ref 65–99)
HCT VFR BLD AUTO: 27.7 % (ref 34–46.6)
HGB BLD-MCNC: 8.5 G/DL (ref 12–15.9)
IMM GRANULOCYTES # BLD AUTO: 0.08 10*3/MM3 (ref 0–0.05)
IMM GRANULOCYTES NFR BLD AUTO: 0.7 % (ref 0–0.5)
LYMPHOCYTES # BLD AUTO: 1.94 10*3/MM3 (ref 0.7–3.1)
LYMPHOCYTES NFR BLD AUTO: 16.8 % (ref 19.6–45.3)
MCH RBC QN AUTO: 29.3 PG (ref 26.6–33)
MCHC RBC AUTO-ENTMCNC: 30.7 G/DL (ref 31.5–35.7)
MCV RBC AUTO: 95.5 FL (ref 79–97)
MONOCYTES # BLD AUTO: 1.44 10*3/MM3 (ref 0.1–0.9)
MONOCYTES NFR BLD AUTO: 12.4 % (ref 5–12)
NEUTROPHILS NFR BLD AUTO: 66.2 % (ref 42.7–76)
NEUTROPHILS NFR BLD AUTO: 7.67 10*3/MM3 (ref 1.7–7)
NRBC BLD AUTO-RTO: 0 /100 WBC (ref 0–0.2)
PLATELET # BLD AUTO: 386 10*3/MM3 (ref 140–450)
PMV BLD AUTO: 10.6 FL (ref 6–12)
POTASSIUM SERPL-SCNC: 5.3 MMOL/L (ref 3.5–5.2)
PREALB SERPL-MCNC: 23.3 MG/DL (ref 20–40)
PROT SERPL-MCNC: 6.1 G/DL (ref 6–8.5)
RBC # BLD AUTO: 2.9 10*6/MM3 (ref 3.77–5.28)
SODIUM SERPL-SCNC: 130 MMOL/L (ref 136–145)
WBC NRBC COR # BLD AUTO: 11.58 10*3/MM3 (ref 3.4–10.8)

## 2025-05-30 PROCEDURE — 80053 COMPREHEN METABOLIC PANEL: CPT | Performed by: INTERNAL MEDICINE

## 2025-05-30 PROCEDURE — OUTSIDEPOS PR OUTSIDE POS PLACEHOLDER: Performed by: INTERNAL MEDICINE

## 2025-05-30 PROCEDURE — 86140 C-REACTIVE PROTEIN: CPT | Performed by: INTERNAL MEDICINE

## 2025-05-30 PROCEDURE — 85025 COMPLETE CBC W/AUTO DIFF WBC: CPT | Performed by: INTERNAL MEDICINE

## 2025-05-30 PROCEDURE — 84134 ASSAY OF PREALBUMIN: CPT | Performed by: PHYSICIAN ASSISTANT

## 2025-05-30 PROCEDURE — 92610 EVALUATE SWALLOWING FUNCTION: CPT

## 2025-05-30 PROCEDURE — 36415 COLL VENOUS BLD VENIPUNCTURE: CPT | Performed by: PHYSICIAN ASSISTANT

## 2025-05-31 ENCOUNTER — OUTSIDE FACILITY SERVICE (OUTPATIENT)
Dept: PULMONOLOGY | Facility: CLINIC | Age: 70
End: 2025-05-31
Payer: MEDICARE

## 2025-05-31 LAB
ALBUMIN SERPL-MCNC: 3.1 G/DL (ref 3.5–5.2)
ALBUMIN/GLOB SERPL: 1 G/DL
ALP SERPL-CCNC: 119 U/L (ref 39–117)
ALT SERPL W P-5'-P-CCNC: 10 U/L (ref 1–33)
ANION GAP SERPL CALCULATED.3IONS-SCNC: 11.9 MMOL/L (ref 5–15)
AST SERPL-CCNC: 15 U/L (ref 1–32)
BASOPHILS # BLD AUTO: 0.1 10*3/MM3 (ref 0–0.2)
BASOPHILS NFR BLD AUTO: 0.7 % (ref 0–1.5)
BILIRUB SERPL-MCNC: 0.3 MG/DL (ref 0–1.2)
BUN SERPL-MCNC: 44 MG/DL (ref 8–23)
BUN/CREAT SERPL: 8.3 (ref 7–25)
CALCIUM SPEC-SCNC: 9.2 MG/DL (ref 8.6–10.5)
CHLORIDE SERPL-SCNC: 91 MMOL/L (ref 98–107)
CO2 SERPL-SCNC: 28.1 MMOL/L (ref 22–29)
CREAT SERPL-MCNC: 5.29 MG/DL (ref 0.57–1)
CRP SERPL-MCNC: 1.73 MG/DL (ref 0–0.5)
DEPRECATED RDW RBC AUTO: 59.6 FL (ref 37–54)
EGFRCR SERPLBLD CKD-EPI 2021: 8.2 ML/MIN/1.73
EOSINOPHIL # BLD AUTO: 0.53 10*3/MM3 (ref 0–0.4)
EOSINOPHIL NFR BLD AUTO: 3.9 % (ref 0.3–6.2)
ERYTHROCYTE [DISTWIDTH] IN BLOOD BY AUTOMATED COUNT: 16.4 % (ref 12.3–15.4)
GLOBULIN UR ELPH-MCNC: 3.1 GM/DL
GLUCOSE SERPL-MCNC: 111 MG/DL (ref 65–99)
HCT VFR BLD AUTO: 29.8 % (ref 34–46.6)
HGB BLD-MCNC: 8.9 G/DL (ref 12–15.9)
IMM GRANULOCYTES # BLD AUTO: 0.09 10*3/MM3 (ref 0–0.05)
IMM GRANULOCYTES NFR BLD AUTO: 0.7 % (ref 0–0.5)
LYMPHOCYTES # BLD AUTO: 1.55 10*3/MM3 (ref 0.7–3.1)
LYMPHOCYTES NFR BLD AUTO: 11.4 % (ref 19.6–45.3)
MCH RBC QN AUTO: 29.4 PG (ref 26.6–33)
MCHC RBC AUTO-ENTMCNC: 29.9 G/DL (ref 31.5–35.7)
MCV RBC AUTO: 98.3 FL (ref 79–97)
MONOCYTES # BLD AUTO: 1.45 10*3/MM3 (ref 0.1–0.9)
MONOCYTES NFR BLD AUTO: 10.7 % (ref 5–12)
NEUTROPHILS NFR BLD AUTO: 72.6 % (ref 42.7–76)
NEUTROPHILS NFR BLD AUTO: 9.84 10*3/MM3 (ref 1.7–7)
NRBC BLD AUTO-RTO: 0 /100 WBC (ref 0–0.2)
PLATELET # BLD AUTO: 385 10*3/MM3 (ref 140–450)
PMV BLD AUTO: 10.9 FL (ref 6–12)
POTASSIUM SERPL-SCNC: 5.4 MMOL/L (ref 3.5–5.2)
PROT SERPL-MCNC: 6.2 G/DL (ref 6–8.5)
RBC # BLD AUTO: 3.03 10*6/MM3 (ref 3.77–5.28)
SODIUM SERPL-SCNC: 131 MMOL/L (ref 136–145)
WBC NRBC COR # BLD AUTO: 13.56 10*3/MM3 (ref 3.4–10.8)

## 2025-05-31 PROCEDURE — 36415 COLL VENOUS BLD VENIPUNCTURE: CPT | Performed by: INTERNAL MEDICINE

## 2025-05-31 PROCEDURE — 86140 C-REACTIVE PROTEIN: CPT | Performed by: INTERNAL MEDICINE

## 2025-05-31 PROCEDURE — 85025 COMPLETE CBC W/AUTO DIFF WBC: CPT | Performed by: INTERNAL MEDICINE

## 2025-05-31 PROCEDURE — 80053 COMPREHEN METABOLIC PANEL: CPT | Performed by: INTERNAL MEDICINE

## 2025-06-01 ENCOUNTER — OUTSIDE FACILITY SERVICE (OUTPATIENT)
Dept: PULMONOLOGY | Facility: CLINIC | Age: 70
End: 2025-06-01
Payer: MEDICARE

## 2025-06-01 LAB
ALBUMIN SERPL-MCNC: 3.4 G/DL (ref 3.5–5.2)
ALBUMIN/GLOB SERPL: 1.1 G/DL
ALP SERPL-CCNC: 125 U/L (ref 39–117)
ALT SERPL W P-5'-P-CCNC: 9 U/L (ref 1–33)
ANION GAP SERPL CALCULATED.3IONS-SCNC: 10.7 MMOL/L (ref 5–15)
ANION GAP SERPL CALCULATED.3IONS-SCNC: 13.1 MMOL/L (ref 5–15)
AST SERPL-CCNC: 17 U/L (ref 1–32)
BASOPHILS # BLD AUTO: 0.08 10*3/MM3 (ref 0–0.2)
BASOPHILS NFR BLD AUTO: 0.6 % (ref 0–1.5)
BILIRUB SERPL-MCNC: 0.4 MG/DL (ref 0–1.2)
BUN SERPL-MCNC: 27.6 MG/DL (ref 8–23)
BUN SERPL-MCNC: 35.1 MG/DL (ref 8–23)
BUN/CREAT SERPL: 7.9 (ref 7–25)
BUN/CREAT SERPL: 8.5 (ref 7–25)
CALCIUM SPEC-SCNC: 9.3 MG/DL (ref 8.6–10.5)
CALCIUM SPEC-SCNC: 9.4 MG/DL (ref 8.6–10.5)
CHLORIDE SERPL-SCNC: 88 MMOL/L (ref 98–107)
CHLORIDE SERPL-SCNC: 88 MMOL/L (ref 98–107)
CO2 SERPL-SCNC: 25.9 MMOL/L (ref 22–29)
CO2 SERPL-SCNC: 31.3 MMOL/L (ref 22–29)
CREAT SERPL-MCNC: 3.5 MG/DL (ref 0.57–1)
CREAT SERPL-MCNC: 4.13 MG/DL (ref 0.57–1)
CRP SERPL-MCNC: 2.35 MG/DL (ref 0–0.5)
DEPRECATED RDW RBC AUTO: 56.9 FL (ref 37–54)
EGFRCR SERPLBLD CKD-EPI 2021: 11.1 ML/MIN/1.73
EGFRCR SERPLBLD CKD-EPI 2021: 13.5 ML/MIN/1.73
EOSINOPHIL # BLD AUTO: 0.41 10*3/MM3 (ref 0–0.4)
EOSINOPHIL NFR BLD AUTO: 2.9 % (ref 0.3–6.2)
ERYTHROCYTE [DISTWIDTH] IN BLOOD BY AUTOMATED COUNT: 16.2 % (ref 12.3–15.4)
GLOBULIN UR ELPH-MCNC: 3.2 GM/DL
GLUCOSE SERPL-MCNC: 114 MG/DL (ref 65–99)
GLUCOSE SERPL-MCNC: 96 MG/DL (ref 65–99)
HCT VFR BLD AUTO: 28.4 % (ref 34–46.6)
HGB BLD-MCNC: 8.9 G/DL (ref 12–15.9)
IMM GRANULOCYTES # BLD AUTO: 0.1 10*3/MM3 (ref 0–0.05)
IMM GRANULOCYTES NFR BLD AUTO: 0.7 % (ref 0–0.5)
LYMPHOCYTES # BLD AUTO: 2.25 10*3/MM3 (ref 0.7–3.1)
LYMPHOCYTES NFR BLD AUTO: 15.8 % (ref 19.6–45.3)
MCH RBC QN AUTO: 29.9 PG (ref 26.6–33)
MCHC RBC AUTO-ENTMCNC: 31.3 G/DL (ref 31.5–35.7)
MCV RBC AUTO: 95.3 FL (ref 79–97)
MONOCYTES # BLD AUTO: 1.7 10*3/MM3 (ref 0.1–0.9)
MONOCYTES NFR BLD AUTO: 11.9 % (ref 5–12)
NEUTROPHILS NFR BLD AUTO: 68.1 % (ref 42.7–76)
NEUTROPHILS NFR BLD AUTO: 9.7 10*3/MM3 (ref 1.7–7)
NRBC BLD AUTO-RTO: 0 /100 WBC (ref 0–0.2)
PLATELET # BLD AUTO: 400 10*3/MM3 (ref 140–450)
PMV BLD AUTO: 10.8 FL (ref 6–12)
POTASSIUM SERPL-SCNC: 4.9 MMOL/L (ref 3.5–5.2)
POTASSIUM SERPL-SCNC: 5 MMOL/L (ref 3.5–5.2)
PROT SERPL-MCNC: 6.6 G/DL (ref 6–8.5)
RBC # BLD AUTO: 2.98 10*6/MM3 (ref 3.77–5.28)
SODIUM SERPL-SCNC: 127 MMOL/L (ref 136–145)
SODIUM SERPL-SCNC: 130 MMOL/L (ref 136–145)
WBC NRBC COR # BLD AUTO: 14.24 10*3/MM3 (ref 3.4–10.8)

## 2025-06-01 PROCEDURE — 86140 C-REACTIVE PROTEIN: CPT | Performed by: INTERNAL MEDICINE

## 2025-06-01 PROCEDURE — 85025 COMPLETE CBC W/AUTO DIFF WBC: CPT | Performed by: INTERNAL MEDICINE

## 2025-06-01 PROCEDURE — 80053 COMPREHEN METABOLIC PANEL: CPT | Performed by: INTERNAL MEDICINE

## 2025-06-01 PROCEDURE — 36415 COLL VENOUS BLD VENIPUNCTURE: CPT | Performed by: INTERNAL MEDICINE

## 2025-06-02 ENCOUNTER — OUTSIDE FACILITY SERVICE (OUTPATIENT)
Dept: INFECTIOUS DISEASES | Facility: CLINIC | Age: 70
End: 2025-06-02
Payer: MEDICARE

## 2025-06-02 ENCOUNTER — OUTSIDE FACILITY SERVICE (OUTPATIENT)
Dept: PULMONOLOGY | Facility: CLINIC | Age: 70
End: 2025-06-02
Payer: MEDICARE

## 2025-06-02 ENCOUNTER — APPOINTMENT (OUTPATIENT)
Dept: GENERAL RADIOLOGY | Facility: HOSPITAL | Age: 70
End: 2025-06-02
Payer: COMMERCIAL

## 2025-06-02 LAB
ALBUMIN SERPL-MCNC: 3.3 G/DL (ref 3.5–5.2)
ALBUMIN/GLOB SERPL: 1.1 G/DL
ALP SERPL-CCNC: 128 U/L (ref 39–117)
ALT SERPL W P-5'-P-CCNC: 8 U/L (ref 1–33)
ANION GAP SERPL CALCULATED.3IONS-SCNC: 13.9 MMOL/L (ref 5–15)
ANION GAP SERPL CALCULATED.3IONS-SCNC: 13.9 MMOL/L (ref 5–15)
AST SERPL-CCNC: 18 U/L (ref 1–32)
BASOPHILS # BLD AUTO: 0.06 10*3/MM3 (ref 0–0.2)
BASOPHILS NFR BLD AUTO: 0.4 % (ref 0–1.5)
BILIRUB SERPL-MCNC: 0.3 MG/DL (ref 0–1.2)
BUN SERPL-MCNC: 48.3 MG/DL (ref 8–23)
BUN SERPL-MCNC: 48.3 MG/DL (ref 8–23)
BUN/CREAT SERPL: 9.3 (ref 7–25)
BUN/CREAT SERPL: 9.3 (ref 7–25)
CALCIUM SPEC-SCNC: 9.5 MG/DL (ref 8.6–10.5)
CALCIUM SPEC-SCNC: 9.5 MG/DL (ref 8.6–10.5)
CHLORIDE SERPL-SCNC: 88 MMOL/L (ref 98–107)
CHLORIDE SERPL-SCNC: 88 MMOL/L (ref 98–107)
CO2 SERPL-SCNC: 28.1 MMOL/L (ref 22–29)
CO2 SERPL-SCNC: 28.1 MMOL/L (ref 22–29)
CREAT SERPL-MCNC: 5.17 MG/DL (ref 0.57–1)
CREAT SERPL-MCNC: 5.17 MG/DL (ref 0.57–1)
CRP SERPL-MCNC: 1.5 MG/DL (ref 0–0.5)
DEPRECATED RDW RBC AUTO: 57.7 FL (ref 37–54)
EGFRCR SERPLBLD CKD-EPI 2021: 8.5 ML/MIN/1.73
EGFRCR SERPLBLD CKD-EPI 2021: 8.5 ML/MIN/1.73
EOSINOPHIL # BLD AUTO: 0.54 10*3/MM3 (ref 0–0.4)
EOSINOPHIL NFR BLD AUTO: 3.8 % (ref 0.3–6.2)
ERYTHROCYTE [DISTWIDTH] IN BLOOD BY AUTOMATED COUNT: 16.4 % (ref 12.3–15.4)
GLOBULIN UR ELPH-MCNC: 3.1 GM/DL
GLUCOSE SERPL-MCNC: 92 MG/DL (ref 65–99)
GLUCOSE SERPL-MCNC: 92 MG/DL (ref 65–99)
HCT VFR BLD AUTO: 27.6 % (ref 34–46.6)
HGB BLD-MCNC: 8.7 G/DL (ref 12–15.9)
IMM GRANULOCYTES # BLD AUTO: 0.09 10*3/MM3 (ref 0–0.05)
IMM GRANULOCYTES NFR BLD AUTO: 0.6 % (ref 0–0.5)
LYMPHOCYTES # BLD AUTO: 2.37 10*3/MM3 (ref 0.7–3.1)
LYMPHOCYTES NFR BLD AUTO: 16.9 % (ref 19.6–45.3)
MCH RBC QN AUTO: 30.3 PG (ref 26.6–33)
MCHC RBC AUTO-ENTMCNC: 31.5 G/DL (ref 31.5–35.7)
MCV RBC AUTO: 96.2 FL (ref 79–97)
MONOCYTES # BLD AUTO: 1.4 10*3/MM3 (ref 0.1–0.9)
MONOCYTES NFR BLD AUTO: 10 % (ref 5–12)
NEUTROPHILS NFR BLD AUTO: 68.3 % (ref 42.7–76)
NEUTROPHILS NFR BLD AUTO: 9.6 10*3/MM3 (ref 1.7–7)
NRBC BLD AUTO-RTO: 0 /100 WBC (ref 0–0.2)
PLATELET # BLD AUTO: 390 10*3/MM3 (ref 140–450)
PMV BLD AUTO: 11.1 FL (ref 6–12)
POTASSIUM SERPL-SCNC: 5 MMOL/L (ref 3.5–5.2)
POTASSIUM SERPL-SCNC: 5 MMOL/L (ref 3.5–5.2)
PROT SERPL-MCNC: 6.4 G/DL (ref 6–8.5)
RBC # BLD AUTO: 2.87 10*6/MM3 (ref 3.77–5.28)
SODIUM SERPL-SCNC: 130 MMOL/L (ref 136–145)
SODIUM SERPL-SCNC: 130 MMOL/L (ref 136–145)
WBC NRBC COR # BLD AUTO: 14.06 10*3/MM3 (ref 3.4–10.8)

## 2025-06-02 PROCEDURE — 80053 COMPREHEN METABOLIC PANEL: CPT | Performed by: INTERNAL MEDICINE

## 2025-06-02 PROCEDURE — 71045 X-RAY EXAM CHEST 1 VIEW: CPT

## 2025-06-02 PROCEDURE — 71045 X-RAY EXAM CHEST 1 VIEW: CPT | Performed by: RADIOLOGY

## 2025-06-02 PROCEDURE — 74230 X-RAY XM SWLNG FUNCJ C+: CPT | Performed by: RADIOLOGY

## 2025-06-02 PROCEDURE — 86140 C-REACTIVE PROTEIN: CPT | Performed by: INTERNAL MEDICINE

## 2025-06-02 PROCEDURE — 36415 COLL VENOUS BLD VENIPUNCTURE: CPT | Performed by: INTERNAL MEDICINE

## 2025-06-02 PROCEDURE — 74230 X-RAY XM SWLNG FUNCJ C+: CPT

## 2025-06-02 PROCEDURE — 92611 MOTION FLUOROSCOPY/SWALLOW: CPT

## 2025-06-02 PROCEDURE — 85025 COMPLETE CBC W/AUTO DIFF WBC: CPT | Performed by: INTERNAL MEDICINE

## 2025-06-02 NOTE — MBS/VFSS/FEES
Long Term Care - Speech Language Pathology   Swallow Re-Evaluation  [facility]  MODIFIED BARIUM SWALLOW STUDY     Patient Name: Estefania Connor  : 1955  MRN: [id number]  Today's Date: 2025             Admit Date: 2025    Visit Dx:   No diagnosis found.  Patient Active Problem List   Diagnosis    ESRD on HD    Polycystic kidney disease    HTN (hypertension)    Dyslipidemia    AAA (abdominal aortic aneurysm)    Right coronary artery occlusion    Depression    Tracheostomy present    S/P percutaneous endoscopic gastrostomy (PEG) tube placement    CAD S/P ELIECER RCA    Hematoma of groin    Ileus     Past Medical History:   Diagnosis Date    AAA (abdominal aortic aneurysm)     Coronary artery disease     Depression     ESRD (end stage renal disease)     Hyperlipidemia     Hypertension     Polycystic kidney disease      Past Surgical History:   Procedure Laterality Date    CARDIAC CATHETERIZATION N/A 2025    Procedure: Coronary angiography;  Surgeon: Ambrose Mcnally MD;  Location:  [facility];  Service: Cardiovascular;  Laterality: N/A;    CARDIAC CATHETERIZATION N/A 2025    Procedure: Left Heart Cath;  Surgeon: Edin Boland MD;  Location:  [facility];  Service: Cardiovascular;  Laterality: N/A;    CARDIAC CATHETERIZATION N/A 2025    Procedure: Stent ELIECER coronary;  Surgeon: Edin Boland MD;  Location:  [facility];  Service: Cardiovascular;  Laterality: N/A;    INCISION AND DRAINAGE LEG Left 2025    Procedure: GROIN WOUND VACUUM CHANGE LEFT;  Surgeon: Remi Guerrero MD;  Location:  [facility];  Service: Vascular;  Laterality: Left;    PSEUDO ANEURYSM REPAIR, EXTREMITY Left 2025    Procedure: REPAIR LEFT FEMORAL ARTERY, EVACUATION LEFT GROIN HEMATOMA, PLACEMENT OF NEGATIVE WOUND VACCUUM THERAPY;  Surgeon: Remi Guerrero MD;  Location:  [facility];  Service: Vascular;  Laterality: Left;    TRACHEOSTOMY AND PEG TUBE INSERTION N/A  "4/29/2025    Procedure: TRACHEOSTOMY AND PERCUTANEOUS ENDOSCOPIC GASTROSTOMY TUBE INSERTION;  Surgeon: Kera Head MD;  Location: [facility];  Service: General;  Laterality: N/A;     Estefania Connor  presented to the radiology suite this am from [facility] to participate in an instrumental MBS to objectively evaluate safety/efficacy of swallowing fnx, determine safest/least restrictive diet, candidacy for po intake.    Per chart review, patient \"presented as a transfer from [place] from [facility] to [facility] on March 20,2025 after missing 2 hemodialysis appointments due to weakness. Chest x-ray showed a small left pleural effusion with basilar atelectasis versus pneumonia. Patient was admitted for pneumonia, anemia, chronic kidney disease, acute kidney injury superimposed on CKD, CAD, hypertension, polycystic kidney disease and generalized weakness. Patient underwent dialysis on the 20th and 21st. Also received unitsof blood.Chest x-ray worsening on 3/22/2025.CT abdomen pelvis showed small pleural effusions with consolidation of the left lower lobe, patchy opacities in the basal aspect of the right upper lobe, heavy coronary artery calcification, moderate pericardial effusion, prominent bilateral renal cyst, generalized atherosclerotic changes, infrarenal abdominal aortic aneurysm, and polyarticular degenerative changes. Patient evaluated by GI who did not find any evidence of acute GI bleed. Oxygen requirements started worsening despite chest x-ray showing improvement. Weakness and fatigue also worsened. Pulmonology evaluated patient and recommended AVAPS at night and during the day as needed. CT of the chest showed a moderate right and a trace left pleural effusions, mixed atelectasis and consolidation involving the majority of the right lower lobe and cannot rule out superimposed pneumonia, areas of minor atelectasis in the right middle lobe and left lower lobe, cardiomegaly and a trace pericardial " "effusion. As of 4/4/2025, patient's chestx-ray showed worsening pulmonary edema. Underwent thoracentesis with 1550 cc of fluid removed. Patient reported feeling much better afterwards. However, chest x-ray showed increasing opacities throughout the left and right lung favoring enlarging pleural effusions. Respiratory status progressively worsened over the next few days and ultimately was intubated on4/10/2025. Patient then transferred to [facility] on 4/11/ 2025 for further management.\" \"Around 5th of may was transferred for MI, received stents in RCA and is transferred back to our hospital.\"    She has since weaned from mechanical ventilation, s/p decannulation 5/29/25 and referred for dysphagia assessment.     She participated in initial clinical dysphagia assessment 5/30/25 w/ \"a mild oral, suspect at least a trace-mild pharyngeal dysphagia. However, no overt s/s aspiration, no obvious s/s indicative of silent aspiration but cannot objectively r/o silent aspiration per this subjective assessment. She is felt to most benefit from instrumental MBS to objectively r/o aspiration and silent aspiration, determine safest/least restrictive po diet per prolonged NPO status.    Recommend continuing NPO except ice chip/water protocol for oral hydration/comfort, instrumental MBS tentatively planned for 6/2/25.\"      Social History     Socioeconomic History    Marital status:       Imaging:  Narrative & Impression   EXAM:    XR Chest, 1 View     EXAM DATE:    6/2/2025 11:39 AM     CLINICAL HISTORY:    r/o pnuemonia     TECHNIQUE:    Frontal view of the chest.     COMPARISON:    5/23/2025     FINDINGS:    Lungs and pleural spaces:  Improvement in bibasilar airspace disease.   Marked improvement left pleural effusion.  No consolidation.  No  pneumothorax.    Heart:  Mild cardiac enlargement.    Mediastinum:  Unremarkable.  Normal mediastinal contour.    Bones/joints:  Unremarkable.  No acute fracture.    " Vasculature:  Left IJ deep line with tip in the SVC region.    Tubes, lines and devices:  Right tunneled dialysis catheter noted.  Stable.     IMPRESSION:  1.  Interval improvement in edema and left effusion.  2.  Support devices as above.  3.  Otherwise stable chest.     This report was finalized on 6/2/2025 11:49 AM by Dr. Edin Peace MD.     Labs:  Sodium  130  06/02 0208  +  Potassium  5.0  06/02 0208  +  Chloride  88  06/02 0208  +  CO2  28.1  06/02 0208  +  BUN  48.3  06/02 0208  +  Creatinine  5.17  06/02 0208  +  Glucose  92  06/02 0208  +  Hemoglobin  8.7  06/02 0208  Hematocrit  27.6  06/02 0208  WBC  14.06  06/02 0208  Platelets  390  06/02 0208     Diet Orders (active) (From admission, onward)       Start     Ordered    05/30/25 1516  NPO Diet NPO Type: Ice Chips  Diet Effective Now        Comments: Ice chip/water protocol    05/30/25 1516    05/29/25 1141  Tube Feeding: Formula: Novasource Renal (Nepro); Feeding Type: Continuous; Start at: 35 mL/hr; Then Advance By: Do Not Advance; Water Flush: None; Water Bolus: None  Diet Effective Now         05/29/25 1141                  She was observed on 2L O2 via NC w/o complications.     Risks and benefits of the procedure were explained w/ patient verbalizing understanding/agreement to participate. Proceed per protocol.     Patient was positioned upright and centered in a soft strap supportive chair to accept multiple po presentations of solid cracker, puree, honey thick, nectar thick, and thin liquids via spoon, cup and straw, along w/ whole placebo pill in puree. Patient was able to self provide po trials.      All views are from the lateral plane.     Facial/oral structures were symmetrical upon observation w/o lingual deviation upon protrusion. Oral mucosa were moist, pink and clean. Secretions were clear, thin, and well controlled. OROM/STEFANO was wfl to imitate oral postures. Gag was not assessed. Volitional cough was adequate in intensity, clear in  quality, nonproductive. Vocal quality was adequate in intensity, clear in quality w/ intelligible speech. Patient was a/a and cooperative to participate, oriented to person, place, and time, followed simple directives, and participated in simple conversational exchanges.     Upon po presentations, adequate bolus anticipation w/ good labial seal for bolus clearance via spoon bowl, cup rim stability and suction via straw. Bolus formation, manipulation, and control were wfl w/ rotary mastication pattern. A-p transit was timely w/o significant oral residue. Tongue base retraction and linguavelar seal were slightly weak w/ intermittent vallecular pooling and premature spillage to the pyriform sinuses across liquid consistencies. No laryngeal penetration or aspiration evidenced before the swallow.     Pharyngeal swallow was timely w/ adequate hyolaryngeal elevation and epiglottic inversion. Transient laryngeal penetration occurred during the swallow w/ thin liquids via cup and straw, clearing upon completion of the swallow w/o significant residue. Pharyngeal contraction was adequate w/o significant residue. No other laryngeal penetration or aspiration evidenced during or after the swallow.     She was able to manipulate and swallow whole placebo pill in puree w/o difficulty.                Penetration-Aspiration Scale Scoring  Consistency: Teaspoon Bolus Cup Bolus Straw Bolus   Puree 1     Honey 1     Nectar 1  1   Thin 1 2 2   Solid 1       *Reference*      Full esophageal sweep revealed no obvious mucosal abnormalities. Motility appeared adequate w/o retrograde flow noted.      Impression: Per this evaluation, Ms. Connor presented w/ wfl oropharyngeal swallow w/ only intermittent transient laryngeal penetration of thin liquids during the swallow, no evidence of aspiration across this evaluation.     She is felt to most benefit least restrictive regular consistencies, thin liquids, meds whole in puree, upright and  centered for all po intake.     SLP Recommendation and Plan  1. Regular consistencies, thin liquids.   2. Medications whole in puree/thins.   3. JADEN precautions.   4. Oral care protocol.   5. Upright and centered for all po intake.   SLP to f/u for diet safety/acceptance.     D/w patient results and recommendations w/ verbal understanding and agreement.     D/w RN results and recommendations w/ verbal understanding and agreement.     Thank you for allowing me to participate in the care of your patient-  Elinor Stiles M.A., CCC-SLP      EDUCATION  The patient has been educated in the following areas:   Dysphagia (Swallowing Impairment) Oral Care/Hydration.      Time Calculation:    Time Calculation- SLP       Row Name 06/02/25 1201             Time Calculation- SLP    SLP - Next Appointment 06/03/25  -OLINDA                User Key  (r) = Recorded By, (t) = Taken By, (c) = Cosigned By      Initials Name Provider Type     Elinor Stiles MA,CCC-SLP Speech and Language Pathologist                  Therapy Charges for Today       Code Description Service Date Service Provider Modifiers Qty    17012279859 HC ST MOTION FLUORO EVAL SWALLOW 8 6/2/2025 Elinor Stiles MA,CCC-SLP GN 1          Elinor Stiles MA,CCC-SLP  6/2/2025

## 2025-06-03 ENCOUNTER — OUTSIDE FACILITY SERVICE (OUTPATIENT)
Dept: PULMONOLOGY | Facility: CLINIC | Age: 70
End: 2025-06-03
Payer: MEDICARE

## 2025-06-03 ENCOUNTER — OUTSIDE FACILITY SERVICE (OUTPATIENT)
Dept: INFECTIOUS DISEASES | Facility: CLINIC | Age: 70
End: 2025-06-03

## 2025-06-03 LAB
ALBUMIN SERPL-MCNC: 3.2 G/DL (ref 3.5–5.2)
ALBUMIN/GLOB SERPL: 1 G/DL
ALP SERPL-CCNC: 119 U/L (ref 39–117)
ALT SERPL W P-5'-P-CCNC: 11 U/L (ref 1–33)
ANION GAP SERPL CALCULATED.3IONS-SCNC: 12.3 MMOL/L (ref 5–15)
AST SERPL-CCNC: 19 U/L (ref 1–32)
BASOPHILS # BLD AUTO: 0.08 10*3/MM3 (ref 0–0.2)
BASOPHILS NFR BLD AUTO: 0.6 % (ref 0–1.5)
BILIRUB SERPL-MCNC: 0.4 MG/DL (ref 0–1.2)
BUN SERPL-MCNC: 63.5 MG/DL (ref 8–23)
BUN/CREAT SERPL: 9.5 (ref 7–25)
CALCIUM SPEC-SCNC: 9.3 MG/DL (ref 8.6–10.5)
CHLORIDE SERPL-SCNC: 87 MMOL/L (ref 98–107)
CO2 SERPL-SCNC: 27.7 MMOL/L (ref 22–29)
CREAT SERPL-MCNC: 6.65 MG/DL (ref 0.57–1)
CRP SERPL-MCNC: 1.1 MG/DL (ref 0–0.5)
DEPRECATED RDW RBC AUTO: 57.5 FL (ref 37–54)
EGFRCR SERPLBLD CKD-EPI 2021: 6.3 ML/MIN/1.73
EOSINOPHIL # BLD AUTO: 0.67 10*3/MM3 (ref 0–0.4)
EOSINOPHIL NFR BLD AUTO: 4.9 % (ref 0.3–6.2)
ERYTHROCYTE [DISTWIDTH] IN BLOOD BY AUTOMATED COUNT: 16.1 % (ref 12.3–15.4)
GLOBULIN UR ELPH-MCNC: 3.2 GM/DL
GLUCOSE SERPL-MCNC: 78 MG/DL (ref 65–99)
HCT VFR BLD AUTO: 27 % (ref 34–46.6)
HGB BLD-MCNC: 8.2 G/DL (ref 12–15.9)
IMM GRANULOCYTES # BLD AUTO: 0.12 10*3/MM3 (ref 0–0.05)
IMM GRANULOCYTES NFR BLD AUTO: 0.9 % (ref 0–0.5)
LYMPHOCYTES # BLD AUTO: 2.67 10*3/MM3 (ref 0.7–3.1)
LYMPHOCYTES NFR BLD AUTO: 19.6 % (ref 19.6–45.3)
MCH RBC QN AUTO: 29.2 PG (ref 26.6–33)
MCHC RBC AUTO-ENTMCNC: 30.4 G/DL (ref 31.5–35.7)
MCV RBC AUTO: 96.1 FL (ref 79–97)
MONOCYTES # BLD AUTO: 1.24 10*3/MM3 (ref 0.1–0.9)
MONOCYTES NFR BLD AUTO: 9.1 % (ref 5–12)
NEUTROPHILS NFR BLD AUTO: 64.9 % (ref 42.7–76)
NEUTROPHILS NFR BLD AUTO: 8.84 10*3/MM3 (ref 1.7–7)
NRBC BLD AUTO-RTO: 0 /100 WBC (ref 0–0.2)
PLATELET # BLD AUTO: 357 10*3/MM3 (ref 140–450)
PMV BLD AUTO: 11.1 FL (ref 6–12)
POTASSIUM SERPL-SCNC: 5.8 MMOL/L (ref 3.5–5.2)
PROT SERPL-MCNC: 6.4 G/DL (ref 6–8.5)
RBC # BLD AUTO: 2.81 10*6/MM3 (ref 3.77–5.28)
SODIUM SERPL-SCNC: 127 MMOL/L (ref 136–145)
WBC NRBC COR # BLD AUTO: 13.62 10*3/MM3 (ref 3.4–10.8)

## 2025-06-03 PROCEDURE — 36415 COLL VENOUS BLD VENIPUNCTURE: CPT | Performed by: INTERNAL MEDICINE

## 2025-06-03 PROCEDURE — 80053 COMPREHEN METABOLIC PANEL: CPT | Performed by: INTERNAL MEDICINE

## 2025-06-03 PROCEDURE — 86140 C-REACTIVE PROTEIN: CPT | Performed by: INTERNAL MEDICINE

## 2025-06-03 PROCEDURE — 85025 COMPLETE CBC W/AUTO DIFF WBC: CPT | Performed by: INTERNAL MEDICINE

## 2025-06-03 PROCEDURE — 92610 EVALUATE SWALLOWING FUNCTION: CPT

## 2025-06-03 PROCEDURE — OUTSIDEPOS PR OUTSIDE POS PLACEHOLDER: Performed by: INTERNAL MEDICINE

## 2025-06-04 ENCOUNTER — OUTSIDE FACILITY SERVICE (OUTPATIENT)
Dept: PULMONOLOGY | Facility: CLINIC | Age: 70
End: 2025-06-04
Payer: MEDICARE

## 2025-06-04 ENCOUNTER — OUTSIDE FACILITY SERVICE (OUTPATIENT)
Dept: INFECTIOUS DISEASES | Facility: CLINIC | Age: 70
End: 2025-06-04

## 2025-06-04 LAB
ALBUMIN SERPL-MCNC: 3.6 G/DL (ref 3.5–5.2)
ALBUMIN/GLOB SERPL: 1.2 G/DL
ALP SERPL-CCNC: 114 U/L (ref 39–117)
ALT SERPL W P-5'-P-CCNC: 13 U/L (ref 1–33)
ANION GAP SERPL CALCULATED.3IONS-SCNC: 12.6 MMOL/L (ref 5–15)
AST SERPL-CCNC: 22 U/L (ref 1–32)
BASOPHILS # BLD AUTO: 0.07 10*3/MM3 (ref 0–0.2)
BASOPHILS NFR BLD AUTO: 0.7 % (ref 0–1.5)
BILIRUB SERPL-MCNC: 0.4 MG/DL (ref 0–1.2)
BUN SERPL-MCNC: 27.3 MG/DL (ref 8–23)
BUN/CREAT SERPL: 6.9 (ref 7–25)
CALCIUM SPEC-SCNC: 9.6 MG/DL (ref 8.6–10.5)
CHLORIDE SERPL-SCNC: 93 MMOL/L (ref 98–107)
CO2 SERPL-SCNC: 29.4 MMOL/L (ref 22–29)
CREAT SERPL-MCNC: 3.93 MG/DL (ref 0.57–1)
CRP SERPL-MCNC: 1.7 MG/DL (ref 0–0.5)
DEPRECATED RDW RBC AUTO: 57.7 FL (ref 37–54)
EGFRCR SERPLBLD CKD-EPI 2021: 11.8 ML/MIN/1.73
EOSINOPHIL # BLD AUTO: 0.36 10*3/MM3 (ref 0–0.4)
EOSINOPHIL NFR BLD AUTO: 3.7 % (ref 0.3–6.2)
ERYTHROCYTE [DISTWIDTH] IN BLOOD BY AUTOMATED COUNT: 16.4 % (ref 12.3–15.4)
GLOBULIN UR ELPH-MCNC: 3 GM/DL
GLUCOSE SERPL-MCNC: 107 MG/DL (ref 65–99)
HCT VFR BLD AUTO: 26.3 % (ref 34–46.6)
HGB BLD-MCNC: 8.2 G/DL (ref 12–15.9)
IMM GRANULOCYTES # BLD AUTO: 0.09 10*3/MM3 (ref 0–0.05)
IMM GRANULOCYTES NFR BLD AUTO: 0.9 % (ref 0–0.5)
LYMPHOCYTES # BLD AUTO: 1.62 10*3/MM3 (ref 0.7–3.1)
LYMPHOCYTES NFR BLD AUTO: 16.6 % (ref 19.6–45.3)
MCH RBC QN AUTO: 30 PG (ref 26.6–33)
MCHC RBC AUTO-ENTMCNC: 31.2 G/DL (ref 31.5–35.7)
MCV RBC AUTO: 96.3 FL (ref 79–97)
MONOCYTES # BLD AUTO: 1.16 10*3/MM3 (ref 0.1–0.9)
MONOCYTES NFR BLD AUTO: 11.9 % (ref 5–12)
NEUTROPHILS NFR BLD AUTO: 6.44 10*3/MM3 (ref 1.7–7)
NEUTROPHILS NFR BLD AUTO: 66.2 % (ref 42.7–76)
NRBC BLD AUTO-RTO: 0 /100 WBC (ref 0–0.2)
PLATELET # BLD AUTO: 325 10*3/MM3 (ref 140–450)
PMV BLD AUTO: 11.2 FL (ref 6–12)
POTASSIUM SERPL-SCNC: 5 MMOL/L (ref 3.5–5.2)
PROT SERPL-MCNC: 6.6 G/DL (ref 6–8.5)
RBC # BLD AUTO: 2.73 10*6/MM3 (ref 3.77–5.28)
SODIUM SERPL-SCNC: 135 MMOL/L (ref 136–145)
WBC NRBC COR # BLD AUTO: 9.74 10*3/MM3 (ref 3.4–10.8)

## 2025-06-04 PROCEDURE — 85025 COMPLETE CBC W/AUTO DIFF WBC: CPT | Performed by: INTERNAL MEDICINE

## 2025-06-04 PROCEDURE — 99233 SBSQ HOSP IP/OBS HIGH 50: CPT | Performed by: INTERNAL MEDICINE

## 2025-06-04 PROCEDURE — 86140 C-REACTIVE PROTEIN: CPT | Performed by: INTERNAL MEDICINE

## 2025-06-04 PROCEDURE — 36415 COLL VENOUS BLD VENIPUNCTURE: CPT | Performed by: INTERNAL MEDICINE

## 2025-06-04 PROCEDURE — 80053 COMPREHEN METABOLIC PANEL: CPT | Performed by: INTERNAL MEDICINE

## 2025-06-04 PROCEDURE — OUTSIDEPOS PR OUTSIDE POS PLACEHOLDER: Performed by: INTERNAL MEDICINE

## 2025-06-05 ENCOUNTER — OUTSIDE FACILITY SERVICE (OUTPATIENT)
Dept: INFECTIOUS DISEASES | Facility: CLINIC | Age: 70
End: 2025-06-05

## 2025-06-05 ENCOUNTER — OUTSIDE FACILITY SERVICE (OUTPATIENT)
Dept: PULMONOLOGY | Facility: CLINIC | Age: 70
End: 2025-06-05
Payer: MEDICARE

## 2025-06-05 ENCOUNTER — APPOINTMENT (OUTPATIENT)
Dept: GENERAL RADIOLOGY | Facility: HOSPITAL | Age: 70
End: 2025-06-05
Payer: COMMERCIAL

## 2025-06-05 LAB
ALBUMIN SERPL-MCNC: 3.2 G/DL (ref 3.5–5.2)
ALBUMIN/GLOB SERPL: 1.2 G/DL
ALP SERPL-CCNC: 103 U/L (ref 39–117)
ALT SERPL W P-5'-P-CCNC: 7 U/L (ref 1–33)
ANION GAP SERPL CALCULATED.3IONS-SCNC: 11.8 MMOL/L (ref 5–15)
AST SERPL-CCNC: 12 U/L (ref 1–32)
BASOPHILS # BLD AUTO: 0.06 10*3/MM3 (ref 0–0.2)
BASOPHILS NFR BLD AUTO: 0.6 % (ref 0–1.5)
BILIRUB SERPL-MCNC: 0.3 MG/DL (ref 0–1.2)
BUN SERPL-MCNC: 41.5 MG/DL (ref 8–23)
BUN/CREAT SERPL: 7.4 (ref 7–25)
CALCIUM SPEC-SCNC: 9.1 MG/DL (ref 8.6–10.5)
CHLORIDE SERPL-SCNC: 94 MMOL/L (ref 98–107)
CO2 SERPL-SCNC: 26.2 MMOL/L (ref 22–29)
CREAT SERPL-MCNC: 5.61 MG/DL (ref 0.57–1)
CRP SERPL-MCNC: 1.47 MG/DL (ref 0–0.5)
DEPRECATED RDW RBC AUTO: 58.8 FL (ref 37–54)
EGFRCR SERPLBLD CKD-EPI 2021: 7.7 ML/MIN/1.73
EOSINOPHIL # BLD AUTO: 0.43 10*3/MM3 (ref 0–0.4)
EOSINOPHIL NFR BLD AUTO: 4.4 % (ref 0.3–6.2)
ERYTHROCYTE [DISTWIDTH] IN BLOOD BY AUTOMATED COUNT: 16.5 % (ref 12.3–15.4)
GLOBULIN UR ELPH-MCNC: 2.7 GM/DL
GLUCOSE SERPL-MCNC: 94 MG/DL (ref 65–99)
HCT VFR BLD AUTO: 25.5 % (ref 34–46.6)
HGB BLD-MCNC: 7.7 G/DL (ref 12–15.9)
IMM GRANULOCYTES # BLD AUTO: 0.1 10*3/MM3 (ref 0–0.05)
IMM GRANULOCYTES NFR BLD AUTO: 1 % (ref 0–0.5)
LYMPHOCYTES # BLD AUTO: 1.79 10*3/MM3 (ref 0.7–3.1)
LYMPHOCYTES NFR BLD AUTO: 18.2 % (ref 19.6–45.3)
MCH RBC QN AUTO: 29.7 PG (ref 26.6–33)
MCHC RBC AUTO-ENTMCNC: 30.2 G/DL (ref 31.5–35.7)
MCV RBC AUTO: 98.5 FL (ref 79–97)
MONOCYTES # BLD AUTO: 1.12 10*3/MM3 (ref 0.1–0.9)
MONOCYTES NFR BLD AUTO: 11.4 % (ref 5–12)
NEUTROPHILS NFR BLD AUTO: 6.31 10*3/MM3 (ref 1.7–7)
NEUTROPHILS NFR BLD AUTO: 64.4 % (ref 42.7–76)
NRBC BLD AUTO-RTO: 0 /100 WBC (ref 0–0.2)
PLATELET # BLD AUTO: 290 10*3/MM3 (ref 140–450)
PMV BLD AUTO: 10.9 FL (ref 6–12)
POTASSIUM SERPL-SCNC: 5.3 MMOL/L (ref 3.5–5.2)
PROT SERPL-MCNC: 5.9 G/DL (ref 6–8.5)
RBC # BLD AUTO: 2.59 10*6/MM3 (ref 3.77–5.28)
SODIUM SERPL-SCNC: 132 MMOL/L (ref 136–145)
WBC NRBC COR # BLD AUTO: 9.81 10*3/MM3 (ref 3.4–10.8)

## 2025-06-05 PROCEDURE — OUTSIDEPOS PR OUTSIDE POS PLACEHOLDER: Performed by: INTERNAL MEDICINE

## 2025-06-05 PROCEDURE — 74018 RADEX ABDOMEN 1 VIEW: CPT | Performed by: RADIOLOGY

## 2025-06-05 PROCEDURE — 86140 C-REACTIVE PROTEIN: CPT | Performed by: INTERNAL MEDICINE

## 2025-06-05 PROCEDURE — 85025 COMPLETE CBC W/AUTO DIFF WBC: CPT | Performed by: INTERNAL MEDICINE

## 2025-06-05 PROCEDURE — 74018 RADEX ABDOMEN 1 VIEW: CPT

## 2025-06-05 PROCEDURE — 80053 COMPREHEN METABOLIC PANEL: CPT | Performed by: INTERNAL MEDICINE

## 2025-06-05 PROCEDURE — 36415 COLL VENOUS BLD VENIPUNCTURE: CPT | Performed by: INTERNAL MEDICINE

## 2025-06-06 ENCOUNTER — OUTSIDE FACILITY SERVICE (OUTPATIENT)
Dept: PULMONOLOGY | Facility: CLINIC | Age: 70
End: 2025-06-06
Payer: MEDICARE

## 2025-06-06 ENCOUNTER — OUTSIDE FACILITY SERVICE (OUTPATIENT)
Dept: INFECTIOUS DISEASES | Facility: CLINIC | Age: 70
End: 2025-06-06

## 2025-06-06 LAB
ALBUMIN SERPL-MCNC: 3.7 G/DL (ref 3.5–5.2)
ALBUMIN/GLOB SERPL: 1.3 G/DL
ALP SERPL-CCNC: 102 U/L (ref 39–117)
ALT SERPL W P-5'-P-CCNC: 9 U/L (ref 1–33)
ANION GAP SERPL CALCULATED.3IONS-SCNC: 13 MMOL/L (ref 5–15)
AST SERPL-CCNC: 14 U/L (ref 1–32)
BASOPHILS # BLD AUTO: 0.07 10*3/MM3 (ref 0–0.2)
BASOPHILS NFR BLD AUTO: 0.8 % (ref 0–1.5)
BILIRUB SERPL-MCNC: 0.4 MG/DL (ref 0–1.2)
BUN SERPL-MCNC: 20.5 MG/DL (ref 8–23)
BUN/CREAT SERPL: 5.1 (ref 7–25)
CALCIUM SPEC-SCNC: 9.4 MG/DL (ref 8.6–10.5)
CHLORIDE SERPL-SCNC: 91 MMOL/L (ref 98–107)
CO2 SERPL-SCNC: 26 MMOL/L (ref 22–29)
CREAT SERPL-MCNC: 3.99 MG/DL (ref 0.57–1)
CRP SERPL-MCNC: 1.55 MG/DL (ref 0–0.5)
DEPRECATED RDW RBC AUTO: 57.6 FL (ref 37–54)
EGFRCR SERPLBLD CKD-EPI 2021: 11.5 ML/MIN/1.73
EOSINOPHIL # BLD AUTO: 0.42 10*3/MM3 (ref 0–0.4)
EOSINOPHIL NFR BLD AUTO: 4.8 % (ref 0.3–6.2)
ERYTHROCYTE [DISTWIDTH] IN BLOOD BY AUTOMATED COUNT: 16.4 % (ref 12.3–15.4)
GLOBULIN UR ELPH-MCNC: 2.8 GM/DL
GLUCOSE SERPL-MCNC: 80 MG/DL (ref 65–99)
HCT VFR BLD AUTO: 26.6 % (ref 34–46.6)
HGB BLD-MCNC: 8 G/DL (ref 12–15.9)
IMM GRANULOCYTES # BLD AUTO: 0.08 10*3/MM3 (ref 0–0.05)
IMM GRANULOCYTES NFR BLD AUTO: 0.9 % (ref 0–0.5)
LYMPHOCYTES # BLD AUTO: 1.92 10*3/MM3 (ref 0.7–3.1)
LYMPHOCYTES NFR BLD AUTO: 21.7 % (ref 19.6–45.3)
MCH RBC QN AUTO: 29.4 PG (ref 26.6–33)
MCHC RBC AUTO-ENTMCNC: 30.1 G/DL (ref 31.5–35.7)
MCV RBC AUTO: 97.8 FL (ref 79–97)
MONOCYTES # BLD AUTO: 1.12 10*3/MM3 (ref 0.1–0.9)
MONOCYTES NFR BLD AUTO: 12.7 % (ref 5–12)
NEUTROPHILS NFR BLD AUTO: 5.22 10*3/MM3 (ref 1.7–7)
NEUTROPHILS NFR BLD AUTO: 59.1 % (ref 42.7–76)
NRBC BLD AUTO-RTO: 0 /100 WBC (ref 0–0.2)
PLATELET # BLD AUTO: 287 10*3/MM3 (ref 140–450)
PMV BLD AUTO: 10.7 FL (ref 6–12)
POTASSIUM SERPL-SCNC: 5 MMOL/L (ref 3.5–5.2)
PREALB SERPL-MCNC: 22.3 MG/DL (ref 20–40)
PROT SERPL-MCNC: 6.5 G/DL (ref 6–8.5)
RBC # BLD AUTO: 2.72 10*6/MM3 (ref 3.77–5.28)
SODIUM SERPL-SCNC: 130 MMOL/L (ref 136–145)
WBC NRBC COR # BLD AUTO: 8.83 10*3/MM3 (ref 3.4–10.8)

## 2025-06-06 PROCEDURE — 84134 ASSAY OF PREALBUMIN: CPT | Performed by: PHYSICIAN ASSISTANT

## 2025-06-06 PROCEDURE — 36415 COLL VENOUS BLD VENIPUNCTURE: CPT | Performed by: INTERNAL MEDICINE

## 2025-06-06 PROCEDURE — 85025 COMPLETE CBC W/AUTO DIFF WBC: CPT | Performed by: INTERNAL MEDICINE

## 2025-06-06 PROCEDURE — OUTSIDEPOS PR OUTSIDE POS PLACEHOLDER: Performed by: INTERNAL MEDICINE

## 2025-06-06 PROCEDURE — 80053 COMPREHEN METABOLIC PANEL: CPT | Performed by: INTERNAL MEDICINE

## 2025-06-06 PROCEDURE — 86140 C-REACTIVE PROTEIN: CPT | Performed by: INTERNAL MEDICINE

## 2025-06-07 ENCOUNTER — OUTSIDE FACILITY SERVICE (OUTPATIENT)
Dept: PULMONOLOGY | Facility: CLINIC | Age: 70
End: 2025-06-07
Payer: MEDICARE

## 2025-06-07 LAB
ALBUMIN SERPL-MCNC: 3.2 G/DL (ref 3.5–5.2)
ALBUMIN/GLOB SERPL: 1.1 G/DL
ALP SERPL-CCNC: 103 U/L (ref 39–117)
ALT SERPL W P-5'-P-CCNC: 8 U/L (ref 1–33)
ANION GAP SERPL CALCULATED.3IONS-SCNC: 11.3 MMOL/L (ref 5–15)
AST SERPL-CCNC: 11 U/L (ref 1–32)
BASOPHILS # BLD AUTO: 0.06 10*3/MM3 (ref 0–0.2)
BASOPHILS NFR BLD AUTO: 0.7 % (ref 0–1.5)
BILIRUB SERPL-MCNC: 0.3 MG/DL (ref 0–1.2)
BUN SERPL-MCNC: 30.7 MG/DL (ref 8–23)
BUN/CREAT SERPL: 5.4 (ref 7–25)
CALCIUM SPEC-SCNC: 9 MG/DL (ref 8.6–10.5)
CHLORIDE SERPL-SCNC: 94 MMOL/L (ref 98–107)
CO2 SERPL-SCNC: 26.7 MMOL/L (ref 22–29)
CREAT SERPL-MCNC: 5.64 MG/DL (ref 0.57–1)
CRP SERPL-MCNC: 1.4 MG/DL (ref 0–0.5)
DEPRECATED RDW RBC AUTO: 58.9 FL (ref 37–54)
EGFRCR SERPLBLD CKD-EPI 2021: 7.6 ML/MIN/1.73
EOSINOPHIL # BLD AUTO: 0.41 10*3/MM3 (ref 0–0.4)
EOSINOPHIL NFR BLD AUTO: 4.6 % (ref 0.3–6.2)
ERYTHROCYTE [DISTWIDTH] IN BLOOD BY AUTOMATED COUNT: 16.9 % (ref 12.3–15.4)
GLOBULIN UR ELPH-MCNC: 2.8 GM/DL
GLUCOSE SERPL-MCNC: 87 MG/DL (ref 65–99)
HCT VFR BLD AUTO: 26.7 % (ref 34–46.6)
HGB BLD-MCNC: 8.2 G/DL (ref 12–15.9)
IMM GRANULOCYTES # BLD AUTO: 0.07 10*3/MM3 (ref 0–0.05)
IMM GRANULOCYTES NFR BLD AUTO: 0.8 % (ref 0–0.5)
LYMPHOCYTES # BLD AUTO: 1.51 10*3/MM3 (ref 0.7–3.1)
LYMPHOCYTES NFR BLD AUTO: 16.8 % (ref 19.6–45.3)
MCH RBC QN AUTO: 30.1 PG (ref 26.6–33)
MCHC RBC AUTO-ENTMCNC: 30.7 G/DL (ref 31.5–35.7)
MCV RBC AUTO: 98.2 FL (ref 79–97)
MONOCYTES # BLD AUTO: 0.83 10*3/MM3 (ref 0.1–0.9)
MONOCYTES NFR BLD AUTO: 9.2 % (ref 5–12)
NEUTROPHILS NFR BLD AUTO: 6.12 10*3/MM3 (ref 1.7–7)
NEUTROPHILS NFR BLD AUTO: 67.9 % (ref 42.7–76)
NRBC BLD AUTO-RTO: 0 /100 WBC (ref 0–0.2)
PLATELET # BLD AUTO: 266 10*3/MM3 (ref 140–450)
PMV BLD AUTO: 10.3 FL (ref 6–12)
POTASSIUM SERPL-SCNC: 5.7 MMOL/L (ref 3.5–5.2)
PROT SERPL-MCNC: 6 G/DL (ref 6–8.5)
RBC # BLD AUTO: 2.72 10*6/MM3 (ref 3.77–5.28)
SODIUM SERPL-SCNC: 132 MMOL/L (ref 136–145)
WBC NRBC COR # BLD AUTO: 9 10*3/MM3 (ref 3.4–10.8)

## 2025-06-07 PROCEDURE — 86140 C-REACTIVE PROTEIN: CPT | Performed by: INTERNAL MEDICINE

## 2025-06-07 PROCEDURE — 80053 COMPREHEN METABOLIC PANEL: CPT | Performed by: INTERNAL MEDICINE

## 2025-06-07 PROCEDURE — 36415 COLL VENOUS BLD VENIPUNCTURE: CPT | Performed by: INTERNAL MEDICINE

## 2025-06-07 PROCEDURE — 85025 COMPLETE CBC W/AUTO DIFF WBC: CPT | Performed by: INTERNAL MEDICINE

## 2025-06-08 ENCOUNTER — OUTSIDE FACILITY SERVICE (OUTPATIENT)
Dept: PULMONOLOGY | Facility: CLINIC | Age: 70
End: 2025-06-08
Payer: MEDICARE

## 2025-06-08 LAB
ALBUMIN SERPL-MCNC: 3.2 G/DL (ref 3.5–5.2)
ALBUMIN/GLOB SERPL: 1.1 G/DL
ALP SERPL-CCNC: 103 U/L (ref 39–117)
ALT SERPL W P-5'-P-CCNC: 7 U/L (ref 1–33)
ANION GAP SERPL CALCULATED.3IONS-SCNC: 13.3 MMOL/L (ref 5–15)
AST SERPL-CCNC: 13 U/L (ref 1–32)
BASOPHILS # BLD AUTO: 0.05 10*3/MM3 (ref 0–0.2)
BASOPHILS NFR BLD AUTO: 0.5 % (ref 0–1.5)
BILIRUB SERPL-MCNC: 0.3 MG/DL (ref 0–1.2)
BUN SERPL-MCNC: 16 MG/DL (ref 8–23)
BUN/CREAT SERPL: 4.5 (ref 7–25)
CALCIUM SPEC-SCNC: 9 MG/DL (ref 8.6–10.5)
CHLORIDE SERPL-SCNC: 96 MMOL/L (ref 98–107)
CO2 SERPL-SCNC: 23.7 MMOL/L (ref 22–29)
CREAT SERPL-MCNC: 3.55 MG/DL (ref 0.57–1)
CRP SERPL-MCNC: 3.65 MG/DL (ref 0–0.5)
DEPRECATED RDW RBC AUTO: 61.7 FL (ref 37–54)
EGFRCR SERPLBLD CKD-EPI 2021: 13.3 ML/MIN/1.73
EOSINOPHIL # BLD AUTO: 0.09 10*3/MM3 (ref 0–0.4)
EOSINOPHIL NFR BLD AUTO: 1 % (ref 0.3–6.2)
ERYTHROCYTE [DISTWIDTH] IN BLOOD BY AUTOMATED COUNT: 17.2 % (ref 12.3–15.4)
GLOBULIN UR ELPH-MCNC: 3 GM/DL
GLUCOSE SERPL-MCNC: 84 MG/DL (ref 65–99)
HCT VFR BLD AUTO: 28.6 % (ref 34–46.6)
HGB BLD-MCNC: 8.3 G/DL (ref 12–15.9)
IMM GRANULOCYTES # BLD AUTO: 0.05 10*3/MM3 (ref 0–0.05)
IMM GRANULOCYTES NFR BLD AUTO: 0.5 % (ref 0–0.5)
LYMPHOCYTES # BLD AUTO: 1.7 10*3/MM3 (ref 0.7–3.1)
LYMPHOCYTES NFR BLD AUTO: 18.3 % (ref 19.6–45.3)
MCH RBC QN AUTO: 29.6 PG (ref 26.6–33)
MCHC RBC AUTO-ENTMCNC: 29 G/DL (ref 31.5–35.7)
MCV RBC AUTO: 102.1 FL (ref 79–97)
MONOCYTES # BLD AUTO: 1.09 10*3/MM3 (ref 0.1–0.9)
MONOCYTES NFR BLD AUTO: 11.7 % (ref 5–12)
NEUTROPHILS NFR BLD AUTO: 6.32 10*3/MM3 (ref 1.7–7)
NEUTROPHILS NFR BLD AUTO: 68 % (ref 42.7–76)
NRBC BLD AUTO-RTO: 0 /100 WBC (ref 0–0.2)
PLATELET # BLD AUTO: 268 10*3/MM3 (ref 140–450)
PMV BLD AUTO: 10.7 FL (ref 6–12)
POTASSIUM SERPL-SCNC: 4.5 MMOL/L (ref 3.5–5.2)
PROT SERPL-MCNC: 6.2 G/DL (ref 6–8.5)
RBC # BLD AUTO: 2.8 10*6/MM3 (ref 3.77–5.28)
SODIUM SERPL-SCNC: 133 MMOL/L (ref 136–145)
WBC NRBC COR # BLD AUTO: 9.3 10*3/MM3 (ref 3.4–10.8)

## 2025-06-08 PROCEDURE — 36415 COLL VENOUS BLD VENIPUNCTURE: CPT | Performed by: INTERNAL MEDICINE

## 2025-06-08 PROCEDURE — 86140 C-REACTIVE PROTEIN: CPT | Performed by: INTERNAL MEDICINE

## 2025-06-08 PROCEDURE — 80053 COMPREHEN METABOLIC PANEL: CPT | Performed by: INTERNAL MEDICINE

## 2025-06-08 PROCEDURE — 85025 COMPLETE CBC W/AUTO DIFF WBC: CPT | Performed by: INTERNAL MEDICINE

## 2025-06-09 ENCOUNTER — OUTSIDE FACILITY SERVICE (OUTPATIENT)
Dept: PULMONOLOGY | Facility: CLINIC | Age: 70
End: 2025-06-09
Payer: MEDICARE

## 2025-06-09 LAB
ALBUMIN SERPL-MCNC: 3.2 G/DL (ref 3.5–5.2)
ALBUMIN/GLOB SERPL: 1.1 G/DL
ALP SERPL-CCNC: 99 U/L (ref 39–117)
ALT SERPL W P-5'-P-CCNC: 7 U/L (ref 1–33)
ANION GAP SERPL CALCULATED.3IONS-SCNC: 13.8 MMOL/L (ref 5–15)
AST SERPL-CCNC: 13 U/L (ref 1–32)
BASOPHILS # BLD AUTO: 0.04 10*3/MM3 (ref 0–0.2)
BASOPHILS NFR BLD AUTO: 0.5 % (ref 0–1.5)
BILIRUB SERPL-MCNC: 0.3 MG/DL (ref 0–1.2)
BUN SERPL-MCNC: 31.3 MG/DL (ref 8–23)
BUN/CREAT SERPL: 5.9 (ref 7–25)
CALCIUM SPEC-SCNC: 8.9 MG/DL (ref 8.6–10.5)
CHLORIDE SERPL-SCNC: 93 MMOL/L (ref 98–107)
CO2 SERPL-SCNC: 24.2 MMOL/L (ref 22–29)
CREAT SERPL-MCNC: 5.31 MG/DL (ref 0.57–1)
CRP SERPL-MCNC: 7.42 MG/DL (ref 0–0.5)
DEPRECATED RDW RBC AUTO: 61.7 FL (ref 37–54)
EGFRCR SERPLBLD CKD-EPI 2021: 8.2 ML/MIN/1.73
EOSINOPHIL # BLD AUTO: 0.28 10*3/MM3 (ref 0–0.4)
EOSINOPHIL NFR BLD AUTO: 3.2 % (ref 0.3–6.2)
ERYTHROCYTE [DISTWIDTH] IN BLOOD BY AUTOMATED COUNT: 17.2 % (ref 12.3–15.4)
GLOBULIN UR ELPH-MCNC: 3 GM/DL
GLUCOSE SERPL-MCNC: 88 MG/DL (ref 65–99)
HCT VFR BLD AUTO: 25.4 % (ref 34–46.6)
HGB BLD-MCNC: 7.8 G/DL (ref 12–15.9)
IMM GRANULOCYTES # BLD AUTO: 0.04 10*3/MM3 (ref 0–0.05)
IMM GRANULOCYTES NFR BLD AUTO: 0.5 % (ref 0–0.5)
LYMPHOCYTES # BLD AUTO: 1.8 10*3/MM3 (ref 0.7–3.1)
LYMPHOCYTES NFR BLD AUTO: 20.3 % (ref 19.6–45.3)
MCH RBC QN AUTO: 30.5 PG (ref 26.6–33)
MCHC RBC AUTO-ENTMCNC: 30.7 G/DL (ref 31.5–35.7)
MCV RBC AUTO: 99.2 FL (ref 79–97)
MONOCYTES # BLD AUTO: 1.08 10*3/MM3 (ref 0.1–0.9)
MONOCYTES NFR BLD AUTO: 12.2 % (ref 5–12)
NEUTROPHILS NFR BLD AUTO: 5.64 10*3/MM3 (ref 1.7–7)
NEUTROPHILS NFR BLD AUTO: 63.3 % (ref 42.7–76)
NRBC BLD AUTO-RTO: 0 /100 WBC (ref 0–0.2)
PLATELET # BLD AUTO: 239 10*3/MM3 (ref 140–450)
PMV BLD AUTO: 10.7 FL (ref 6–12)
POTASSIUM SERPL-SCNC: 5.3 MMOL/L (ref 3.5–5.2)
PROT SERPL-MCNC: 6.2 G/DL (ref 6–8.5)
RBC # BLD AUTO: 2.56 10*6/MM3 (ref 3.77–5.28)
SODIUM SERPL-SCNC: 131 MMOL/L (ref 136–145)
WBC NRBC COR # BLD AUTO: 8.88 10*3/MM3 (ref 3.4–10.8)

## 2025-06-09 PROCEDURE — 85025 COMPLETE CBC W/AUTO DIFF WBC: CPT | Performed by: INTERNAL MEDICINE

## 2025-06-09 PROCEDURE — 86140 C-REACTIVE PROTEIN: CPT | Performed by: INTERNAL MEDICINE

## 2025-06-09 PROCEDURE — 80053 COMPREHEN METABOLIC PANEL: CPT | Performed by: INTERNAL MEDICINE

## 2025-06-09 PROCEDURE — 36415 COLL VENOUS BLD VENIPUNCTURE: CPT | Performed by: INTERNAL MEDICINE

## 2025-06-10 ENCOUNTER — OUTSIDE FACILITY SERVICE (OUTPATIENT)
Dept: PULMONOLOGY | Facility: CLINIC | Age: 70
End: 2025-06-10
Payer: MEDICARE

## 2025-06-10 ENCOUNTER — APPOINTMENT (OUTPATIENT)
Dept: GENERAL RADIOLOGY | Facility: HOSPITAL | Age: 70
End: 2025-06-10
Payer: COMMERCIAL

## 2025-06-10 LAB
ALBUMIN SERPL-MCNC: 3.1 G/DL (ref 3.5–5.2)
ALBUMIN/GLOB SERPL: 1 G/DL
ALP SERPL-CCNC: 98 U/L (ref 39–117)
ALT SERPL W P-5'-P-CCNC: 6 U/L (ref 1–33)
ANION GAP SERPL CALCULATED.3IONS-SCNC: 15.9 MMOL/L (ref 5–15)
AST SERPL-CCNC: 11 U/L (ref 1–32)
BASOPHILS # BLD AUTO: 0.03 10*3/MM3 (ref 0–0.2)
BASOPHILS NFR BLD AUTO: 0.5 % (ref 0–1.5)
BILIRUB SERPL-MCNC: 0.3 MG/DL (ref 0–1.2)
BUN SERPL-MCNC: 40.5 MG/DL (ref 8–23)
BUN/CREAT SERPL: 5.5 (ref 7–25)
CALCIUM SPEC-SCNC: 9.3 MG/DL (ref 8.6–10.5)
CHLORIDE SERPL-SCNC: 93 MMOL/L (ref 98–107)
CO2 SERPL-SCNC: 21.1 MMOL/L (ref 22–29)
CREAT SERPL-MCNC: 7.32 MG/DL (ref 0.57–1)
CRP SERPL-MCNC: 6.09 MG/DL (ref 0–0.5)
DEPRECATED RDW RBC AUTO: 65.2 FL (ref 37–54)
EGFRCR SERPLBLD CKD-EPI 2021: 5.6 ML/MIN/1.73
EOSINOPHIL # BLD AUTO: 0.16 10*3/MM3 (ref 0–0.4)
EOSINOPHIL NFR BLD AUTO: 2.8 % (ref 0.3–6.2)
ERYTHROCYTE [DISTWIDTH] IN BLOOD BY AUTOMATED COUNT: 17 % (ref 12.3–15.4)
GLOBULIN UR ELPH-MCNC: 3 GM/DL
GLUCOSE BLDC GLUCOMTR-MCNC: 89 MG/DL (ref 70–130)
GLUCOSE SERPL-MCNC: 79 MG/DL (ref 65–99)
HCT VFR BLD AUTO: 29.7 % (ref 34–46.6)
HGB BLD-MCNC: 8.4 G/DL (ref 12–15.9)
IMM GRANULOCYTES # BLD AUTO: 0.03 10*3/MM3 (ref 0–0.05)
IMM GRANULOCYTES NFR BLD AUTO: 0.5 % (ref 0–0.5)
LYMPHOCYTES # BLD AUTO: 1.11 10*3/MM3 (ref 0.7–3.1)
LYMPHOCYTES NFR BLD AUTO: 19.6 % (ref 19.6–45.3)
MCH RBC QN AUTO: 30.2 PG (ref 26.6–33)
MCHC RBC AUTO-ENTMCNC: 28.3 G/DL (ref 31.5–35.7)
MCV RBC AUTO: 106.8 FL (ref 79–97)
MONOCYTES # BLD AUTO: 0.69 10*3/MM3 (ref 0.1–0.9)
MONOCYTES NFR BLD AUTO: 12.2 % (ref 5–12)
NEUTROPHILS NFR BLD AUTO: 3.65 10*3/MM3 (ref 1.7–7)
NEUTROPHILS NFR BLD AUTO: 64.4 % (ref 42.7–76)
NRBC BLD AUTO-RTO: 0 /100 WBC (ref 0–0.2)
PLATELET # BLD AUTO: 214 10*3/MM3 (ref 140–450)
PMV BLD AUTO: 10.4 FL (ref 6–12)
POTASSIUM SERPL-SCNC: 5.4 MMOL/L (ref 3.5–5.2)
PROT SERPL-MCNC: 6.1 G/DL (ref 6–8.5)
RBC # BLD AUTO: 2.78 10*6/MM3 (ref 3.77–5.28)
SODIUM SERPL-SCNC: 130 MMOL/L (ref 136–145)
WBC NRBC COR # BLD AUTO: 5.67 10*3/MM3 (ref 3.4–10.8)

## 2025-06-10 PROCEDURE — 97530 THERAPEUTIC ACTIVITIES: CPT

## 2025-06-10 PROCEDURE — 82948 REAGENT STRIP/BLOOD GLUCOSE: CPT

## 2025-06-10 PROCEDURE — 87040 BLOOD CULTURE FOR BACTERIA: CPT | Performed by: INTERNAL MEDICINE

## 2025-06-10 PROCEDURE — 36415 COLL VENOUS BLD VENIPUNCTURE: CPT | Performed by: INTERNAL MEDICINE

## 2025-06-10 PROCEDURE — 71045 X-RAY EXAM CHEST 1 VIEW: CPT | Performed by: RADIOLOGY

## 2025-06-10 PROCEDURE — 85025 COMPLETE CBC W/AUTO DIFF WBC: CPT | Performed by: INTERNAL MEDICINE

## 2025-06-10 PROCEDURE — 97164 PT RE-EVAL EST PLAN CARE: CPT

## 2025-06-10 PROCEDURE — 86140 C-REACTIVE PROTEIN: CPT | Performed by: INTERNAL MEDICINE

## 2025-06-10 PROCEDURE — 87076 CULTURE ANAEROBE IDENT EACH: CPT | Performed by: INTERNAL MEDICINE

## 2025-06-10 PROCEDURE — 80053 COMPREHEN METABOLIC PANEL: CPT | Performed by: INTERNAL MEDICINE

## 2025-06-10 PROCEDURE — 71045 X-RAY EXAM CHEST 1 VIEW: CPT

## 2025-06-11 ENCOUNTER — OUTSIDE FACILITY SERVICE (OUTPATIENT)
Dept: PULMONOLOGY | Facility: CLINIC | Age: 70
End: 2025-06-11
Payer: MEDICARE

## 2025-06-11 LAB
ALBUMIN SERPL-MCNC: 3.4 G/DL (ref 3.5–5.2)
ALBUMIN/GLOB SERPL: 1.3 G/DL
ALP SERPL-CCNC: 93 U/L (ref 39–117)
ALT SERPL W P-5'-P-CCNC: 7 U/L (ref 1–33)
ANION GAP SERPL CALCULATED.3IONS-SCNC: 13.8 MMOL/L (ref 5–15)
AST SERPL-CCNC: 12 U/L (ref 1–32)
BASOPHILS # BLD AUTO: 0.03 10*3/MM3 (ref 0–0.2)
BASOPHILS NFR BLD AUTO: 0.5 % (ref 0–1.5)
BILIRUB SERPL-MCNC: 0.3 MG/DL (ref 0–1.2)
BUN SERPL-MCNC: 18.7 MG/DL (ref 8–23)
BUN/CREAT SERPL: 4 (ref 7–25)
CALCIUM SPEC-SCNC: 9.2 MG/DL (ref 8.6–10.5)
CHLORIDE SERPL-SCNC: 89 MMOL/L (ref 98–107)
CO2 SERPL-SCNC: 30.2 MMOL/L (ref 22–29)
CREAT SERPL-MCNC: 4.7 MG/DL (ref 0.57–1)
CRP SERPL-MCNC: 4.35 MG/DL (ref 0–0.5)
DEPRECATED RDW RBC AUTO: 58.7 FL (ref 37–54)
EGFRCR SERPLBLD CKD-EPI 2021: 9.5 ML/MIN/1.73
EOSINOPHIL # BLD AUTO: 0.2 10*3/MM3 (ref 0–0.4)
EOSINOPHIL NFR BLD AUTO: 3.3 % (ref 0.3–6.2)
ERYTHROCYTE [DISTWIDTH] IN BLOOD BY AUTOMATED COUNT: 16.6 % (ref 12.3–15.4)
GLOBULIN UR ELPH-MCNC: 2.7 GM/DL
GLUCOSE SERPL-MCNC: 81 MG/DL (ref 65–99)
HCT VFR BLD AUTO: 25.5 % (ref 34–46.6)
HGB BLD-MCNC: 7.9 G/DL (ref 12–15.9)
IMM GRANULOCYTES # BLD AUTO: 0.03 10*3/MM3 (ref 0–0.05)
IMM GRANULOCYTES NFR BLD AUTO: 0.5 % (ref 0–0.5)
LYMPHOCYTES # BLD AUTO: 1.41 10*3/MM3 (ref 0.7–3.1)
LYMPHOCYTES NFR BLD AUTO: 23.5 % (ref 19.6–45.3)
MCH RBC QN AUTO: 29.9 PG (ref 26.6–33)
MCHC RBC AUTO-ENTMCNC: 31 G/DL (ref 31.5–35.7)
MCV RBC AUTO: 96.6 FL (ref 79–97)
MONOCYTES # BLD AUTO: 1.01 10*3/MM3 (ref 0.1–0.9)
MONOCYTES NFR BLD AUTO: 16.9 % (ref 5–12)
NEUTROPHILS NFR BLD AUTO: 3.31 10*3/MM3 (ref 1.7–7)
NEUTROPHILS NFR BLD AUTO: 55.3 % (ref 42.7–76)
NRBC BLD AUTO-RTO: 0 /100 WBC (ref 0–0.2)
PLATELET # BLD AUTO: 216 10*3/MM3 (ref 140–450)
PMV BLD AUTO: 10.5 FL (ref 6–12)
POTASSIUM SERPL-SCNC: 4.3 MMOL/L (ref 3.5–5.2)
PROT SERPL-MCNC: 6.1 G/DL (ref 6–8.5)
RBC # BLD AUTO: 2.64 10*6/MM3 (ref 3.77–5.28)
SODIUM SERPL-SCNC: 133 MMOL/L (ref 136–145)
WBC NRBC COR # BLD AUTO: 5.99 10*3/MM3 (ref 3.4–10.8)

## 2025-06-11 PROCEDURE — 36415 COLL VENOUS BLD VENIPUNCTURE: CPT | Performed by: INTERNAL MEDICINE

## 2025-06-11 PROCEDURE — 86140 C-REACTIVE PROTEIN: CPT | Performed by: INTERNAL MEDICINE

## 2025-06-11 PROCEDURE — 80053 COMPREHEN METABOLIC PANEL: CPT | Performed by: INTERNAL MEDICINE

## 2025-06-11 PROCEDURE — 85025 COMPLETE CBC W/AUTO DIFF WBC: CPT | Performed by: INTERNAL MEDICINE

## 2025-06-11 PROCEDURE — 99232 SBSQ HOSP IP/OBS MODERATE 35: CPT | Performed by: INTERNAL MEDICINE

## 2025-06-12 ENCOUNTER — OUTSIDE FACILITY SERVICE (OUTPATIENT)
Dept: PULMONOLOGY | Facility: CLINIC | Age: 70
End: 2025-06-12
Payer: MEDICARE

## 2025-06-12 LAB
ALBUMIN SERPL-MCNC: 3.4 G/DL (ref 3.5–5.2)
ALBUMIN/GLOB SERPL: 1.3 G/DL
ALP SERPL-CCNC: 93 U/L (ref 39–117)
ALT SERPL W P-5'-P-CCNC: 6 U/L (ref 1–33)
ANION GAP SERPL CALCULATED.3IONS-SCNC: 16.7 MMOL/L (ref 5–15)
AST SERPL-CCNC: 13 U/L (ref 1–32)
BACTERIA SPEC AEROBE CULT: ABNORMAL
BASOPHILS # BLD AUTO: 0.02 10*3/MM3 (ref 0–0.2)
BASOPHILS NFR BLD AUTO: 0.4 % (ref 0–1.5)
BILIRUB SERPL-MCNC: 0.3 MG/DL (ref 0–1.2)
BUN SERPL-MCNC: 28.8 MG/DL (ref 8–23)
BUN/CREAT SERPL: 4.5 (ref 7–25)
CALCIUM SPEC-SCNC: 9.2 MG/DL (ref 8.6–10.5)
CHLORIDE SERPL-SCNC: 91 MMOL/L (ref 98–107)
CO2 SERPL-SCNC: 26.3 MMOL/L (ref 22–29)
CREAT SERPL-MCNC: 6.42 MG/DL (ref 0.57–1)
CRP SERPL-MCNC: 4.62 MG/DL (ref 0–0.5)
DEPRECATED RDW RBC AUTO: 59 FL (ref 37–54)
EGFRCR SERPLBLD CKD-EPI 2021: 6.5 ML/MIN/1.73
EOSINOPHIL # BLD AUTO: 0.08 10*3/MM3 (ref 0–0.4)
EOSINOPHIL NFR BLD AUTO: 1.5 % (ref 0.3–6.2)
ERYTHROCYTE [DISTWIDTH] IN BLOOD BY AUTOMATED COUNT: 16.8 % (ref 12.3–15.4)
GLOBULIN UR ELPH-MCNC: 2.7 GM/DL
GLUCOSE SERPL-MCNC: 94 MG/DL (ref 65–99)
GRAM STN SPEC: ABNORMAL
HCT VFR BLD AUTO: 25.6 % (ref 34–46.6)
HGB BLD-MCNC: 7.9 G/DL (ref 12–15.9)
IMM GRANULOCYTES # BLD AUTO: 0.02 10*3/MM3 (ref 0–0.05)
IMM GRANULOCYTES NFR BLD AUTO: 0.4 % (ref 0–0.5)
ISOLATED FROM: ABNORMAL
LYMPHOCYTES # BLD AUTO: 0.9 10*3/MM3 (ref 0.7–3.1)
LYMPHOCYTES NFR BLD AUTO: 16.6 % (ref 19.6–45.3)
MCH RBC QN AUTO: 29.8 PG (ref 26.6–33)
MCHC RBC AUTO-ENTMCNC: 30.9 G/DL (ref 31.5–35.7)
MCV RBC AUTO: 96.6 FL (ref 79–97)
MONOCYTES # BLD AUTO: 0.54 10*3/MM3 (ref 0.1–0.9)
MONOCYTES NFR BLD AUTO: 10 % (ref 5–12)
NEUTROPHILS NFR BLD AUTO: 3.86 10*3/MM3 (ref 1.7–7)
NEUTROPHILS NFR BLD AUTO: 71.1 % (ref 42.7–76)
NRBC BLD AUTO-RTO: 0 /100 WBC (ref 0–0.2)
PLATELET # BLD AUTO: 200 10*3/MM3 (ref 140–450)
PMV BLD AUTO: 10.4 FL (ref 6–12)
POTASSIUM SERPL-SCNC: 4.6 MMOL/L (ref 3.5–5.2)
PROT SERPL-MCNC: 6.1 G/DL (ref 6–8.5)
RBC # BLD AUTO: 2.65 10*6/MM3 (ref 3.77–5.28)
SODIUM SERPL-SCNC: 134 MMOL/L (ref 136–145)
WBC NRBC COR # BLD AUTO: 5.42 10*3/MM3 (ref 3.4–10.8)

## 2025-06-12 PROCEDURE — 86140 C-REACTIVE PROTEIN: CPT | Performed by: INTERNAL MEDICINE

## 2025-06-12 PROCEDURE — 99232 SBSQ HOSP IP/OBS MODERATE 35: CPT | Performed by: INTERNAL MEDICINE

## 2025-06-12 PROCEDURE — 36415 COLL VENOUS BLD VENIPUNCTURE: CPT | Performed by: INTERNAL MEDICINE

## 2025-06-12 PROCEDURE — 85025 COMPLETE CBC W/AUTO DIFF WBC: CPT | Performed by: INTERNAL MEDICINE

## 2025-06-12 PROCEDURE — 80053 COMPREHEN METABOLIC PANEL: CPT | Performed by: INTERNAL MEDICINE

## 2025-06-13 ENCOUNTER — OUTSIDE FACILITY SERVICE (OUTPATIENT)
Dept: PULMONOLOGY | Facility: CLINIC | Age: 70
End: 2025-06-13
Payer: MEDICARE

## 2025-06-13 LAB
ALBUMIN SERPL-MCNC: 3.4 G/DL (ref 3.5–5.2)
ALBUMIN/GLOB SERPL: 1.1 G/DL
ALP SERPL-CCNC: 90 U/L (ref 39–117)
ALT SERPL W P-5'-P-CCNC: 7 U/L (ref 1–33)
ANION GAP SERPL CALCULATED.3IONS-SCNC: 15.4 MMOL/L (ref 5–15)
AST SERPL-CCNC: 13 U/L (ref 1–32)
BACTERIA SPEC AEROBE CULT: ABNORMAL
BASOPHILS # BLD AUTO: 0.03 10*3/MM3 (ref 0–0.2)
BASOPHILS NFR BLD AUTO: 0.5 % (ref 0–1.5)
BILIRUB SERPL-MCNC: 0.3 MG/DL (ref 0–1.2)
BUN SERPL-MCNC: 20.1 MG/DL (ref 8–23)
BUN/CREAT SERPL: 4.3 (ref 7–25)
CALCIUM SPEC-SCNC: 9 MG/DL (ref 8.6–10.5)
CHLORIDE SERPL-SCNC: 88 MMOL/L (ref 98–107)
CO2 SERPL-SCNC: 29.6 MMOL/L (ref 22–29)
CREAT SERPL-MCNC: 4.7 MG/DL (ref 0.57–1)
CRP SERPL-MCNC: 4.07 MG/DL (ref 0–0.5)
DEPRECATED RDW RBC AUTO: 57.4 FL (ref 37–54)
EGFRCR SERPLBLD CKD-EPI 2021: 9.5 ML/MIN/1.73
EOSINOPHIL # BLD AUTO: 0.1 10*3/MM3 (ref 0–0.4)
EOSINOPHIL NFR BLD AUTO: 1.5 % (ref 0.3–6.2)
ERYTHROCYTE [DISTWIDTH] IN BLOOD BY AUTOMATED COUNT: 16.4 % (ref 12.3–15.4)
GLOBULIN UR ELPH-MCNC: 3.1 GM/DL
GLUCOSE BLDC GLUCOMTR-MCNC: 97 MG/DL (ref 70–130)
GLUCOSE SERPL-MCNC: 89 MG/DL (ref 65–99)
GRAM STN SPEC: ABNORMAL
HCT VFR BLD AUTO: 26.8 % (ref 34–46.6)
HGB BLD-MCNC: 8.3 G/DL (ref 12–15.9)
IMM GRANULOCYTES # BLD AUTO: 0.02 10*3/MM3 (ref 0–0.05)
IMM GRANULOCYTES NFR BLD AUTO: 0.3 % (ref 0–0.5)
ISOLATED FROM: ABNORMAL
LYMPHOCYTES # BLD AUTO: 0.82 10*3/MM3 (ref 0.7–3.1)
LYMPHOCYTES NFR BLD AUTO: 12.4 % (ref 19.6–45.3)
MCH RBC QN AUTO: 29.7 PG (ref 26.6–33)
MCHC RBC AUTO-ENTMCNC: 31 G/DL (ref 31.5–35.7)
MCV RBC AUTO: 96.1 FL (ref 79–97)
MONOCYTES # BLD AUTO: 0.56 10*3/MM3 (ref 0.1–0.9)
MONOCYTES NFR BLD AUTO: 8.5 % (ref 5–12)
NEUTROPHILS NFR BLD AUTO: 5.09 10*3/MM3 (ref 1.7–7)
NEUTROPHILS NFR BLD AUTO: 76.8 % (ref 42.7–76)
NRBC BLD AUTO-RTO: 0 /100 WBC (ref 0–0.2)
PLATELET # BLD AUTO: 206 10*3/MM3 (ref 140–450)
PMV BLD AUTO: 10.3 FL (ref 6–12)
POTASSIUM SERPL-SCNC: 4.2 MMOL/L (ref 3.5–5.2)
PREALB SERPL-MCNC: 17.1 MG/DL (ref 20–40)
PROT SERPL-MCNC: 6.5 G/DL (ref 6–8.5)
RBC # BLD AUTO: 2.79 10*6/MM3 (ref 3.77–5.28)
SODIUM SERPL-SCNC: 133 MMOL/L (ref 136–145)
WBC NRBC COR # BLD AUTO: 6.62 10*3/MM3 (ref 3.4–10.8)

## 2025-06-13 PROCEDURE — 87040 BLOOD CULTURE FOR BACTERIA: CPT | Performed by: INTERNAL MEDICINE

## 2025-06-13 PROCEDURE — 85025 COMPLETE CBC W/AUTO DIFF WBC: CPT | Performed by: INTERNAL MEDICINE

## 2025-06-13 PROCEDURE — 36415 COLL VENOUS BLD VENIPUNCTURE: CPT | Performed by: INTERNAL MEDICINE

## 2025-06-13 PROCEDURE — 84134 ASSAY OF PREALBUMIN: CPT | Performed by: PHYSICIAN ASSISTANT

## 2025-06-13 PROCEDURE — 36410 VNPNXR 3YR/> PHY/QHP DX/THER: CPT

## 2025-06-13 PROCEDURE — 82948 REAGENT STRIP/BLOOD GLUCOSE: CPT

## 2025-06-13 PROCEDURE — 86140 C-REACTIVE PROTEIN: CPT | Performed by: INTERNAL MEDICINE

## 2025-06-13 PROCEDURE — 87076 CULTURE ANAEROBE IDENT EACH: CPT | Performed by: INTERNAL MEDICINE

## 2025-06-13 PROCEDURE — 97165 OT EVAL LOW COMPLEX 30 MIN: CPT

## 2025-06-13 PROCEDURE — 80053 COMPREHEN METABOLIC PANEL: CPT | Performed by: INTERNAL MEDICINE

## 2025-06-14 ENCOUNTER — OUTSIDE FACILITY SERVICE (OUTPATIENT)
Dept: PULMONOLOGY | Facility: CLINIC | Age: 70
End: 2025-06-14
Payer: MEDICARE

## 2025-06-14 LAB
ALBUMIN SERPL-MCNC: 3.2 G/DL (ref 3.5–5.2)
ALBUMIN/GLOB SERPL: 1.3 G/DL
ALP SERPL-CCNC: 76 U/L (ref 39–117)
ALT SERPL W P-5'-P-CCNC: 8 U/L (ref 1–33)
ANION GAP SERPL CALCULATED.3IONS-SCNC: 20 MMOL/L (ref 5–15)
AST SERPL-CCNC: 15 U/L (ref 1–32)
BASOPHILS # BLD AUTO: 0.02 10*3/MM3 (ref 0–0.2)
BASOPHILS NFR BLD AUTO: 0.5 % (ref 0–1.5)
BILIRUB SERPL-MCNC: 0.3 MG/DL (ref 0–1.2)
BUN SERPL-MCNC: 36.2 MG/DL (ref 8–23)
BUN/CREAT SERPL: 5.1 (ref 7–25)
CALCIUM SPEC-SCNC: 8.8 MG/DL (ref 8.6–10.5)
CHLORIDE SERPL-SCNC: 89 MMOL/L (ref 98–107)
CO2 SERPL-SCNC: 24 MMOL/L (ref 22–29)
CREAT SERPL-MCNC: 7.09 MG/DL (ref 0.57–1)
CRP SERPL-MCNC: 2.84 MG/DL (ref 0–0.5)
DEPRECATED RDW RBC AUTO: 59.3 FL (ref 37–54)
EGFRCR SERPLBLD CKD-EPI 2021: 5.8 ML/MIN/1.73
EOSINOPHIL # BLD AUTO: 0.26 10*3/MM3 (ref 0–0.4)
EOSINOPHIL NFR BLD AUTO: 5.9 % (ref 0.3–6.2)
ERYTHROCYTE [DISTWIDTH] IN BLOOD BY AUTOMATED COUNT: 16.4 % (ref 12.3–15.4)
GLOBULIN UR ELPH-MCNC: 2.5 GM/DL
GLUCOSE SERPL-MCNC: 78 MG/DL (ref 65–99)
HCT VFR BLD AUTO: 24.6 % (ref 34–46.6)
HGB BLD-MCNC: 7.5 G/DL (ref 12–15.9)
IMM GRANULOCYTES # BLD AUTO: 0.02 10*3/MM3 (ref 0–0.05)
IMM GRANULOCYTES NFR BLD AUTO: 0.5 % (ref 0–0.5)
LYMPHOCYTES # BLD AUTO: 1.13 10*3/MM3 (ref 0.7–3.1)
LYMPHOCYTES NFR BLD AUTO: 25.7 % (ref 19.6–45.3)
MCH RBC QN AUTO: 29.9 PG (ref 26.6–33)
MCHC RBC AUTO-ENTMCNC: 30.5 G/DL (ref 31.5–35.7)
MCV RBC AUTO: 98 FL (ref 79–97)
MONOCYTES # BLD AUTO: 0.76 10*3/MM3 (ref 0.1–0.9)
MONOCYTES NFR BLD AUTO: 17.3 % (ref 5–12)
NEUTROPHILS NFR BLD AUTO: 2.2 10*3/MM3 (ref 1.7–7)
NEUTROPHILS NFR BLD AUTO: 50.1 % (ref 42.7–76)
NRBC BLD AUTO-RTO: 0 /100 WBC (ref 0–0.2)
PLATELET # BLD AUTO: 164 10*3/MM3 (ref 140–450)
PMV BLD AUTO: 10.8 FL (ref 6–12)
POTASSIUM SERPL-SCNC: 4.5 MMOL/L (ref 3.5–5.2)
PROT SERPL-MCNC: 5.7 G/DL (ref 6–8.5)
RBC # BLD AUTO: 2.51 10*6/MM3 (ref 3.77–5.28)
SODIUM SERPL-SCNC: 133 MMOL/L (ref 136–145)
WBC NRBC COR # BLD AUTO: 4.39 10*3/MM3 (ref 3.4–10.8)

## 2025-06-14 PROCEDURE — 85025 COMPLETE CBC W/AUTO DIFF WBC: CPT | Performed by: INTERNAL MEDICINE

## 2025-06-14 PROCEDURE — 86140 C-REACTIVE PROTEIN: CPT | Performed by: INTERNAL MEDICINE

## 2025-06-14 PROCEDURE — 80053 COMPREHEN METABOLIC PANEL: CPT | Performed by: INTERNAL MEDICINE

## 2025-06-14 PROCEDURE — 36415 COLL VENOUS BLD VENIPUNCTURE: CPT | Performed by: INTERNAL MEDICINE

## 2025-06-15 ENCOUNTER — OUTSIDE FACILITY SERVICE (OUTPATIENT)
Dept: PULMONOLOGY | Facility: CLINIC | Age: 70
End: 2025-06-15
Payer: MEDICARE

## 2025-06-15 LAB
ALBUMIN SERPL-MCNC: 3.2 G/DL (ref 3.5–5.2)
ALBUMIN/GLOB SERPL: 1.3 G/DL
ALP SERPL-CCNC: 71 U/L (ref 39–117)
ALT SERPL W P-5'-P-CCNC: 8 U/L (ref 1–33)
ANION GAP SERPL CALCULATED.3IONS-SCNC: 14.7 MMOL/L (ref 5–15)
AST SERPL-CCNC: 14 U/L (ref 1–32)
BACTERIA SPEC AEROBE CULT: ABNORMAL
BASOPHILS # BLD AUTO: 0.02 10*3/MM3 (ref 0–0.2)
BASOPHILS NFR BLD AUTO: 0.5 % (ref 0–1.5)
BILIRUB SERPL-MCNC: 0.3 MG/DL (ref 0–1.2)
BUN SERPL-MCNC: 20.9 MG/DL (ref 8–23)
BUN/CREAT SERPL: 4.9 (ref 7–25)
CALCIUM SPEC-SCNC: 8.7 MG/DL (ref 8.6–10.5)
CHLORIDE SERPL-SCNC: 94 MMOL/L (ref 98–107)
CO2 SERPL-SCNC: 28.3 MMOL/L (ref 22–29)
CREAT SERPL-MCNC: 4.28 MG/DL (ref 0.57–1)
CRP SERPL-MCNC: 2.22 MG/DL (ref 0–0.5)
DEPRECATED RDW RBC AUTO: 58.9 FL (ref 37–54)
EGFRCR SERPLBLD CKD-EPI 2021: 10.6 ML/MIN/1.73
EOSINOPHIL # BLD AUTO: 0.23 10*3/MM3 (ref 0–0.4)
EOSINOPHIL NFR BLD AUTO: 5.5 % (ref 0.3–6.2)
ERYTHROCYTE [DISTWIDTH] IN BLOOD BY AUTOMATED COUNT: 16.4 % (ref 12.3–15.4)
GLOBULIN UR ELPH-MCNC: 2.5 GM/DL
GLUCOSE SERPL-MCNC: 78 MG/DL (ref 65–99)
GRAM STN SPEC: ABNORMAL
HCT VFR BLD AUTO: 24.3 % (ref 34–46.6)
HGB BLD-MCNC: 7.2 G/DL (ref 12–15.9)
IMM GRANULOCYTES # BLD AUTO: 0.01 10*3/MM3 (ref 0–0.05)
IMM GRANULOCYTES NFR BLD AUTO: 0.2 % (ref 0–0.5)
ISOLATED FROM: ABNORMAL
LYMPHOCYTES # BLD AUTO: 1.25 10*3/MM3 (ref 0.7–3.1)
LYMPHOCYTES NFR BLD AUTO: 30 % (ref 19.6–45.3)
MCH RBC QN AUTO: 29.3 PG (ref 26.6–33)
MCHC RBC AUTO-ENTMCNC: 29.6 G/DL (ref 31.5–35.7)
MCV RBC AUTO: 98.8 FL (ref 79–97)
MONOCYTES # BLD AUTO: 0.71 10*3/MM3 (ref 0.1–0.9)
MONOCYTES NFR BLD AUTO: 17 % (ref 5–12)
NEUTROPHILS NFR BLD AUTO: 1.95 10*3/MM3 (ref 1.7–7)
NEUTROPHILS NFR BLD AUTO: 46.8 % (ref 42.7–76)
NRBC BLD AUTO-RTO: 0 /100 WBC (ref 0–0.2)
PLATELET # BLD AUTO: 185 10*3/MM3 (ref 140–450)
PMV BLD AUTO: 10.9 FL (ref 6–12)
POTASSIUM SERPL-SCNC: 4.3 MMOL/L (ref 3.5–5.2)
PROT SERPL-MCNC: 5.7 G/DL (ref 6–8.5)
RBC # BLD AUTO: 2.46 10*6/MM3 (ref 3.77–5.28)
SODIUM SERPL-SCNC: 137 MMOL/L (ref 136–145)
WBC NRBC COR # BLD AUTO: 4.17 10*3/MM3 (ref 3.4–10.8)

## 2025-06-15 PROCEDURE — 36415 COLL VENOUS BLD VENIPUNCTURE: CPT | Performed by: INTERNAL MEDICINE

## 2025-06-15 PROCEDURE — 86140 C-REACTIVE PROTEIN: CPT | Performed by: INTERNAL MEDICINE

## 2025-06-15 PROCEDURE — 85025 COMPLETE CBC W/AUTO DIFF WBC: CPT | Performed by: INTERNAL MEDICINE

## 2025-06-15 PROCEDURE — 87040 BLOOD CULTURE FOR BACTERIA: CPT | Performed by: INTERNAL MEDICINE

## 2025-06-15 PROCEDURE — 80053 COMPREHEN METABOLIC PANEL: CPT | Performed by: INTERNAL MEDICINE

## 2025-06-16 ENCOUNTER — OUTSIDE FACILITY SERVICE (OUTPATIENT)
Dept: PULMONOLOGY | Facility: CLINIC | Age: 70
End: 2025-06-16
Payer: MEDICARE

## 2025-06-16 LAB
ABO GROUP BLD: NORMAL
ALBUMIN SERPL-MCNC: 2.9 G/DL (ref 3.5–5.2)
ALBUMIN/GLOB SERPL: 1.2 G/DL
ALP SERPL-CCNC: 70 U/L (ref 39–117)
ALT SERPL W P-5'-P-CCNC: 7 U/L (ref 1–33)
ANION GAP SERPL CALCULATED.3IONS-SCNC: 15.5 MMOL/L (ref 5–15)
AST SERPL-CCNC: 12 U/L (ref 1–32)
BASOPHILS # BLD AUTO: 0.03 10*3/MM3 (ref 0–0.2)
BASOPHILS NFR BLD AUTO: 0.5 % (ref 0–1.5)
BILIRUB SERPL-MCNC: 0.3 MG/DL (ref 0–1.2)
BLD GP AB SCN SERPL QL: NEGATIVE
BUN SERPL-MCNC: 30.6 MG/DL (ref 8–23)
BUN/CREAT SERPL: 5.2 (ref 7–25)
CALCIUM SPEC-SCNC: 8.5 MG/DL (ref 8.6–10.5)
CHLORIDE SERPL-SCNC: 95 MMOL/L (ref 98–107)
CO2 SERPL-SCNC: 24.5 MMOL/L (ref 22–29)
CREAT SERPL-MCNC: 5.9 MG/DL (ref 0.57–1)
CRP SERPL-MCNC: 1.74 MG/DL (ref 0–0.5)
DEPRECATED RDW RBC AUTO: 60.2 FL (ref 37–54)
EGFRCR SERPLBLD CKD-EPI 2021: 7.2 ML/MIN/1.73
EOSINOPHIL # BLD AUTO: 0.4 10*3/MM3 (ref 0–0.4)
EOSINOPHIL NFR BLD AUTO: 6.8 % (ref 0.3–6.2)
ERYTHROCYTE [DISTWIDTH] IN BLOOD BY AUTOMATED COUNT: 16.3 % (ref 12.3–15.4)
GLOBULIN UR ELPH-MCNC: 2.5 GM/DL
GLUCOSE SERPL-MCNC: 83 MG/DL (ref 65–99)
HCT VFR BLD AUTO: 24 % (ref 34–46.6)
HCT VFR BLD AUTO: 25.7 % (ref 34–46.6)
HGB BLD-MCNC: 7 G/DL (ref 12–15.9)
HGB BLD-MCNC: 8.2 G/DL (ref 12–15.9)
IMM GRANULOCYTES # BLD AUTO: 0.03 10*3/MM3 (ref 0–0.05)
IMM GRANULOCYTES NFR BLD AUTO: 0.5 % (ref 0–0.5)
LYMPHOCYTES # BLD AUTO: 1.78 10*3/MM3 (ref 0.7–3.1)
LYMPHOCYTES NFR BLD AUTO: 30.2 % (ref 19.6–45.3)
MCH RBC QN AUTO: 29.2 PG (ref 26.6–33)
MCHC RBC AUTO-ENTMCNC: 29.2 G/DL (ref 31.5–35.7)
MCV RBC AUTO: 100 FL (ref 79–97)
MONOCYTES # BLD AUTO: 0.66 10*3/MM3 (ref 0.1–0.9)
MONOCYTES NFR BLD AUTO: 11.2 % (ref 5–12)
NEUTROPHILS NFR BLD AUTO: 2.99 10*3/MM3 (ref 1.7–7)
NEUTROPHILS NFR BLD AUTO: 50.8 % (ref 42.7–76)
NRBC BLD AUTO-RTO: 0 /100 WBC (ref 0–0.2)
PHOSPHATE SERPL-MCNC: 6.1 MG/DL (ref 2.5–4.5)
PLATELET # BLD AUTO: 210 10*3/MM3 (ref 140–450)
PMV BLD AUTO: 10.8 FL (ref 6–12)
POTASSIUM SERPL-SCNC: 4.7 MMOL/L (ref 3.5–5.2)
PROT SERPL-MCNC: 5.4 G/DL (ref 6–8.5)
RBC # BLD AUTO: 2.4 10*6/MM3 (ref 3.77–5.28)
RH BLD: NEGATIVE
SODIUM SERPL-SCNC: 135 MMOL/L (ref 136–145)
T&S EXPIRATION DATE: NORMAL
WBC NRBC COR # BLD AUTO: 5.89 10*3/MM3 (ref 3.4–10.8)

## 2025-06-16 PROCEDURE — 86901 BLOOD TYPING SEROLOGIC RH(D): CPT | Performed by: PHYSICIAN ASSISTANT

## 2025-06-16 PROCEDURE — 86923 COMPATIBILITY TEST ELECTRIC: CPT

## 2025-06-16 PROCEDURE — 85018 HEMOGLOBIN: CPT | Performed by: INTERNAL MEDICINE

## 2025-06-16 PROCEDURE — 36415 COLL VENOUS BLD VENIPUNCTURE: CPT | Performed by: PHYSICIAN ASSISTANT

## 2025-06-16 PROCEDURE — P9016 RBC LEUKOCYTES REDUCED: HCPCS

## 2025-06-16 PROCEDURE — 86900 BLOOD TYPING SEROLOGIC ABO: CPT

## 2025-06-16 PROCEDURE — 36415 COLL VENOUS BLD VENIPUNCTURE: CPT | Performed by: INTERNAL MEDICINE

## 2025-06-16 PROCEDURE — 86140 C-REACTIVE PROTEIN: CPT | Performed by: INTERNAL MEDICINE

## 2025-06-16 PROCEDURE — 86900 BLOOD TYPING SEROLOGIC ABO: CPT | Performed by: PHYSICIAN ASSISTANT

## 2025-06-16 PROCEDURE — 84100 ASSAY OF PHOSPHORUS: CPT | Performed by: INTERNAL MEDICINE

## 2025-06-16 PROCEDURE — 85025 COMPLETE CBC W/AUTO DIFF WBC: CPT | Performed by: INTERNAL MEDICINE

## 2025-06-16 PROCEDURE — 85014 HEMATOCRIT: CPT | Performed by: INTERNAL MEDICINE

## 2025-06-16 PROCEDURE — 86850 RBC ANTIBODY SCREEN: CPT | Performed by: PHYSICIAN ASSISTANT

## 2025-06-16 PROCEDURE — 80053 COMPREHEN METABOLIC PANEL: CPT | Performed by: INTERNAL MEDICINE

## 2025-06-17 ENCOUNTER — OUTSIDE FACILITY SERVICE (OUTPATIENT)
Dept: PULMONOLOGY | Facility: CLINIC | Age: 70
End: 2025-06-17
Payer: MEDICARE

## 2025-06-17 LAB
ALBUMIN SERPL-MCNC: 3 G/DL (ref 3.5–5.2)
ALBUMIN/GLOB SERPL: 1.2 G/DL
ALP SERPL-CCNC: 72 U/L (ref 39–117)
ALT SERPL W P-5'-P-CCNC: 8 U/L (ref 1–33)
ANION GAP SERPL CALCULATED.3IONS-SCNC: 17.3 MMOL/L (ref 5–15)
AST SERPL-CCNC: 13 U/L (ref 1–32)
BASOPHILS # BLD AUTO: 0.03 10*3/MM3 (ref 0–0.2)
BASOPHILS NFR BLD AUTO: 0.4 % (ref 0–1.5)
BH BB BLOOD EXPIRATION DATE: NORMAL
BH BB BLOOD TYPE BARCODE: 9500
BH BB DISPENSE STATUS: NORMAL
BH BB PRODUCT CODE: NORMAL
BH BB UNIT NUMBER: NORMAL
BILIRUB SERPL-MCNC: 0.3 MG/DL (ref 0–1.2)
BUN SERPL-MCNC: 37.4 MG/DL (ref 8–23)
BUN/CREAT SERPL: 4.8 (ref 7–25)
CALCIUM SPEC-SCNC: 8.6 MG/DL (ref 8.6–10.5)
CHLORIDE SERPL-SCNC: 93 MMOL/L (ref 98–107)
CO2 SERPL-SCNC: 22.7 MMOL/L (ref 22–29)
CREAT SERPL-MCNC: 7.77 MG/DL (ref 0.57–1)
CROSSMATCH INTERPRETATION: NORMAL
CRP SERPL-MCNC: 1.58 MG/DL (ref 0–0.5)
DEPRECATED RDW RBC AUTO: 55 FL (ref 37–54)
EGFRCR SERPLBLD CKD-EPI 2021: 5.2 ML/MIN/1.73
EOSINOPHIL # BLD AUTO: 0.45 10*3/MM3 (ref 0–0.4)
EOSINOPHIL NFR BLD AUTO: 6 % (ref 0.3–6.2)
ERYTHROCYTE [DISTWIDTH] IN BLOOD BY AUTOMATED COUNT: 16.1 % (ref 12.3–15.4)
GLOBULIN UR ELPH-MCNC: 2.5 GM/DL
GLUCOSE SERPL-MCNC: 76 MG/DL (ref 65–99)
HCT VFR BLD AUTO: 26.9 % (ref 34–46.6)
HGB BLD-MCNC: 8.5 G/DL (ref 12–15.9)
IMM GRANULOCYTES # BLD AUTO: 0.05 10*3/MM3 (ref 0–0.05)
IMM GRANULOCYTES NFR BLD AUTO: 0.7 % (ref 0–0.5)
LYMPHOCYTES # BLD AUTO: 1.82 10*3/MM3 (ref 0.7–3.1)
LYMPHOCYTES NFR BLD AUTO: 24.2 % (ref 19.6–45.3)
MCH RBC QN AUTO: 29.8 PG (ref 26.6–33)
MCHC RBC AUTO-ENTMCNC: 31.6 G/DL (ref 31.5–35.7)
MCV RBC AUTO: 94.4 FL (ref 79–97)
MONOCYTES # BLD AUTO: 0.74 10*3/MM3 (ref 0.1–0.9)
MONOCYTES NFR BLD AUTO: 9.8 % (ref 5–12)
NEUTROPHILS NFR BLD AUTO: 4.44 10*3/MM3 (ref 1.7–7)
NEUTROPHILS NFR BLD AUTO: 58.9 % (ref 42.7–76)
NRBC BLD AUTO-RTO: 0 /100 WBC (ref 0–0.2)
PLATELET # BLD AUTO: 230 10*3/MM3 (ref 140–450)
PMV BLD AUTO: 10.5 FL (ref 6–12)
POTASSIUM SERPL-SCNC: 5.4 MMOL/L (ref 3.5–5.2)
PROT SERPL-MCNC: 5.5 G/DL (ref 6–8.5)
RBC # BLD AUTO: 2.85 10*6/MM3 (ref 3.77–5.28)
SODIUM SERPL-SCNC: 133 MMOL/L (ref 136–145)
UNIT  ABO: NORMAL
UNIT  RH: NORMAL
VANCOMYCIN TROUGH SERPL-MCNC: 20.7 MCG/ML (ref 5–20)
WBC NRBC COR # BLD AUTO: 7.53 10*3/MM3 (ref 3.4–10.8)

## 2025-06-17 PROCEDURE — 80053 COMPREHEN METABOLIC PANEL: CPT | Performed by: INTERNAL MEDICINE

## 2025-06-17 PROCEDURE — 36415 COLL VENOUS BLD VENIPUNCTURE: CPT | Performed by: INTERNAL MEDICINE

## 2025-06-17 PROCEDURE — 85025 COMPLETE CBC W/AUTO DIFF WBC: CPT | Performed by: INTERNAL MEDICINE

## 2025-06-17 PROCEDURE — 80202 ASSAY OF VANCOMYCIN: CPT | Performed by: INTERNAL MEDICINE

## 2025-06-17 PROCEDURE — 86140 C-REACTIVE PROTEIN: CPT | Performed by: INTERNAL MEDICINE

## 2025-06-18 ENCOUNTER — OUTSIDE FACILITY SERVICE (OUTPATIENT)
Dept: PULMONOLOGY | Facility: CLINIC | Age: 70
End: 2025-06-18
Payer: MEDICARE

## 2025-06-18 LAB
ALBUMIN SERPL-MCNC: 3 G/DL (ref 3.5–5.2)
ALBUMIN/GLOB SERPL: 1.4 G/DL
ALP SERPL-CCNC: 72 U/L (ref 39–117)
ALT SERPL W P-5'-P-CCNC: 8 U/L (ref 1–33)
ANION GAP SERPL CALCULATED.3IONS-SCNC: 12.2 MMOL/L (ref 5–15)
AST SERPL-CCNC: 12 U/L (ref 1–32)
BACTERIA SPEC AEROBE CULT: NORMAL
BASOPHILS # BLD AUTO: 0.03 10*3/MM3 (ref 0–0.2)
BASOPHILS NFR BLD AUTO: 0.4 % (ref 0–1.5)
BILIRUB SERPL-MCNC: 0.3 MG/DL (ref 0–1.2)
BUN SERPL-MCNC: 21.8 MG/DL (ref 8–23)
BUN/CREAT SERPL: 4.1 (ref 7–25)
CALCIUM SPEC-SCNC: 8.8 MG/DL (ref 8.6–10.5)
CHLORIDE SERPL-SCNC: 92 MMOL/L (ref 98–107)
CO2 SERPL-SCNC: 26.8 MMOL/L (ref 22–29)
CREAT SERPL-MCNC: 5.35 MG/DL (ref 0.57–1)
CRP SERPL-MCNC: 2.95 MG/DL (ref 0–0.5)
DEPRECATED RDW RBC AUTO: 53.9 FL (ref 37–54)
EGFRCR SERPLBLD CKD-EPI 2021: 8.1 ML/MIN/1.73
EOSINOPHIL # BLD AUTO: 0.37 10*3/MM3 (ref 0–0.4)
EOSINOPHIL NFR BLD AUTO: 5.3 % (ref 0.3–6.2)
ERYTHROCYTE [DISTWIDTH] IN BLOOD BY AUTOMATED COUNT: 15.9 % (ref 12.3–15.4)
GLOBULIN UR ELPH-MCNC: 2.2 GM/DL
GLUCOSE SERPL-MCNC: 79 MG/DL (ref 65–99)
HCT VFR BLD AUTO: 26.8 % (ref 34–46.6)
HGB BLD-MCNC: 8.3 G/DL (ref 12–15.9)
IMM GRANULOCYTES # BLD AUTO: 0.03 10*3/MM3 (ref 0–0.05)
IMM GRANULOCYTES NFR BLD AUTO: 0.4 % (ref 0–0.5)
LYMPHOCYTES # BLD AUTO: 2.29 10*3/MM3 (ref 0.7–3.1)
LYMPHOCYTES NFR BLD AUTO: 32.8 % (ref 19.6–45.3)
MCH RBC QN AUTO: 29.3 PG (ref 26.6–33)
MCHC RBC AUTO-ENTMCNC: 31 G/DL (ref 31.5–35.7)
MCV RBC AUTO: 94.7 FL (ref 79–97)
MONOCYTES # BLD AUTO: 1.01 10*3/MM3 (ref 0.1–0.9)
MONOCYTES NFR BLD AUTO: 14.5 % (ref 5–12)
NEUTROPHILS NFR BLD AUTO: 3.25 10*3/MM3 (ref 1.7–7)
NEUTROPHILS NFR BLD AUTO: 46.6 % (ref 42.7–76)
NRBC BLD AUTO-RTO: 0 /100 WBC (ref 0–0.2)
PLATELET # BLD AUTO: 240 10*3/MM3 (ref 140–450)
PMV BLD AUTO: 10.5 FL (ref 6–12)
POTASSIUM SERPL-SCNC: 4.5 MMOL/L (ref 3.5–5.2)
PROT SERPL-MCNC: 5.2 G/DL (ref 6–8.5)
RBC # BLD AUTO: 2.83 10*6/MM3 (ref 3.77–5.28)
SODIUM SERPL-SCNC: 131 MMOL/L (ref 136–145)
WBC NRBC COR # BLD AUTO: 6.98 10*3/MM3 (ref 3.4–10.8)

## 2025-06-18 PROCEDURE — 36415 COLL VENOUS BLD VENIPUNCTURE: CPT | Performed by: INTERNAL MEDICINE

## 2025-06-18 PROCEDURE — 86140 C-REACTIVE PROTEIN: CPT | Performed by: INTERNAL MEDICINE

## 2025-06-18 PROCEDURE — 99232 SBSQ HOSP IP/OBS MODERATE 35: CPT | Performed by: INTERNAL MEDICINE

## 2025-06-18 PROCEDURE — 80053 COMPREHEN METABOLIC PANEL: CPT | Performed by: INTERNAL MEDICINE

## 2025-06-18 PROCEDURE — 85025 COMPLETE CBC W/AUTO DIFF WBC: CPT | Performed by: INTERNAL MEDICINE

## 2025-06-19 ENCOUNTER — OUTSIDE FACILITY SERVICE (OUTPATIENT)
Dept: PULMONOLOGY | Facility: CLINIC | Age: 70
End: 2025-06-19
Payer: MEDICARE

## 2025-06-19 LAB
ALBUMIN SERPL-MCNC: 2.9 G/DL (ref 3.5–5.2)
ALBUMIN/GLOB SERPL: 1.2 G/DL
ALP SERPL-CCNC: 75 U/L (ref 39–117)
ALT SERPL W P-5'-P-CCNC: 6 U/L (ref 1–33)
ANION GAP SERPL CALCULATED.3IONS-SCNC: 16.2 MMOL/L (ref 5–15)
AST SERPL-CCNC: 13 U/L (ref 1–32)
BASOPHILS # BLD AUTO: 0.03 10*3/MM3 (ref 0–0.2)
BASOPHILS NFR BLD AUTO: 0.4 % (ref 0–1.5)
BILIRUB SERPL-MCNC: 0.2 MG/DL (ref 0–1.2)
BUN SERPL-MCNC: 28.8 MG/DL (ref 8–23)
BUN/CREAT SERPL: 4.2 (ref 7–25)
CALCIUM SPEC-SCNC: 9 MG/DL (ref 8.6–10.5)
CHLORIDE SERPL-SCNC: 91 MMOL/L (ref 98–107)
CO2 SERPL-SCNC: 25.8 MMOL/L (ref 22–29)
CREAT SERPL-MCNC: 6.9 MG/DL (ref 0.57–1)
CRP SERPL-MCNC: 2.95 MG/DL (ref 0–0.5)
DEPRECATED RDW RBC AUTO: 54.4 FL (ref 37–54)
EGFRCR SERPLBLD CKD-EPI 2021: 6 ML/MIN/1.73
EOSINOPHIL # BLD AUTO: 0.43 10*3/MM3 (ref 0–0.4)
EOSINOPHIL NFR BLD AUTO: 5.4 % (ref 0.3–6.2)
ERYTHROCYTE [DISTWIDTH] IN BLOOD BY AUTOMATED COUNT: 15.9 % (ref 12.3–15.4)
GLOBULIN UR ELPH-MCNC: 2.5 GM/DL
GLUCOSE SERPL-MCNC: 81 MG/DL (ref 65–99)
HCT VFR BLD AUTO: 27.5 % (ref 34–46.6)
HGB BLD-MCNC: 8.5 G/DL (ref 12–15.9)
IMM GRANULOCYTES # BLD AUTO: 0.03 10*3/MM3 (ref 0–0.05)
IMM GRANULOCYTES NFR BLD AUTO: 0.4 % (ref 0–0.5)
LYMPHOCYTES # BLD AUTO: 2.06 10*3/MM3 (ref 0.7–3.1)
LYMPHOCYTES NFR BLD AUTO: 25.9 % (ref 19.6–45.3)
MCH RBC QN AUTO: 30 PG (ref 26.6–33)
MCHC RBC AUTO-ENTMCNC: 30.9 G/DL (ref 31.5–35.7)
MCV RBC AUTO: 97.2 FL (ref 79–97)
MONOCYTES # BLD AUTO: 0.94 10*3/MM3 (ref 0.1–0.9)
MONOCYTES NFR BLD AUTO: 11.8 % (ref 5–12)
NEUTROPHILS NFR BLD AUTO: 4.46 10*3/MM3 (ref 1.7–7)
NEUTROPHILS NFR BLD AUTO: 56.1 % (ref 42.7–76)
NRBC BLD AUTO-RTO: 0 /100 WBC (ref 0–0.2)
PLATELET # BLD AUTO: 299 10*3/MM3 (ref 140–450)
PMV BLD AUTO: 10.3 FL (ref 6–12)
POTASSIUM SERPL-SCNC: 4.7 MMOL/L (ref 3.5–5.2)
PROT SERPL-MCNC: 5.4 G/DL (ref 6–8.5)
RBC # BLD AUTO: 2.83 10*6/MM3 (ref 3.77–5.28)
SODIUM SERPL-SCNC: 133 MMOL/L (ref 136–145)
VANCOMYCIN TROUGH SERPL-MCNC: 20.3 MCG/ML (ref 5–20)
WBC NRBC COR # BLD AUTO: 7.95 10*3/MM3 (ref 3.4–10.8)

## 2025-06-19 PROCEDURE — 85025 COMPLETE CBC W/AUTO DIFF WBC: CPT | Performed by: INTERNAL MEDICINE

## 2025-06-19 PROCEDURE — 80053 COMPREHEN METABOLIC PANEL: CPT | Performed by: INTERNAL MEDICINE

## 2025-06-19 PROCEDURE — 36415 COLL VENOUS BLD VENIPUNCTURE: CPT | Performed by: INTERNAL MEDICINE

## 2025-06-19 PROCEDURE — 80202 ASSAY OF VANCOMYCIN: CPT | Performed by: INTERNAL MEDICINE

## 2025-06-19 PROCEDURE — 86140 C-REACTIVE PROTEIN: CPT | Performed by: INTERNAL MEDICINE

## 2025-06-19 PROCEDURE — 99232 SBSQ HOSP IP/OBS MODERATE 35: CPT | Performed by: INTERNAL MEDICINE

## 2025-06-20 ENCOUNTER — OUTSIDE FACILITY SERVICE (OUTPATIENT)
Dept: PULMONOLOGY | Facility: CLINIC | Age: 70
End: 2025-06-20
Payer: MEDICARE

## 2025-06-20 LAB
ALBUMIN SERPL-MCNC: 3.1 G/DL (ref 3.5–5.2)
ALBUMIN/GLOB SERPL: 1.3 G/DL
ALP SERPL-CCNC: 79 U/L (ref 39–117)
ALT SERPL W P-5'-P-CCNC: 8 U/L (ref 1–33)
ANION GAP SERPL CALCULATED.3IONS-SCNC: 11.9 MMOL/L (ref 5–15)
AST SERPL-CCNC: 11 U/L (ref 1–32)
BACTERIA SPEC AEROBE CULT: NORMAL
BACTERIA SPEC AEROBE CULT: NORMAL
BASOPHILS # BLD AUTO: 0.02 10*3/MM3 (ref 0–0.2)
BASOPHILS NFR BLD AUTO: 0.3 % (ref 0–1.5)
BILIRUB SERPL-MCNC: 0.2 MG/DL (ref 0–1.2)
BUN SERPL-MCNC: 15 MG/DL (ref 8–23)
BUN/CREAT SERPL: 3.4 (ref 7–25)
CALCIUM SPEC-SCNC: 8.9 MG/DL (ref 8.6–10.5)
CHLORIDE SERPL-SCNC: 90 MMOL/L (ref 98–107)
CO2 SERPL-SCNC: 31.1 MMOL/L (ref 22–29)
CREAT SERPL-MCNC: 4.39 MG/DL (ref 0.57–1)
CRP SERPL-MCNC: 3.45 MG/DL (ref 0–0.5)
DEPRECATED RDW RBC AUTO: 53.6 FL (ref 37–54)
EGFRCR SERPLBLD CKD-EPI 2021: 10.3 ML/MIN/1.73
EOSINOPHIL # BLD AUTO: 0.43 10*3/MM3 (ref 0–0.4)
EOSINOPHIL NFR BLD AUTO: 5.8 % (ref 0.3–6.2)
ERYTHROCYTE [DISTWIDTH] IN BLOOD BY AUTOMATED COUNT: 15.9 % (ref 12.3–15.4)
GLOBULIN UR ELPH-MCNC: 2.4 GM/DL
GLUCOSE SERPL-MCNC: 96 MG/DL (ref 65–99)
HCT VFR BLD AUTO: 26.8 % (ref 34–46.6)
HGB BLD-MCNC: 8.3 G/DL (ref 12–15.9)
IMM GRANULOCYTES # BLD AUTO: 0.03 10*3/MM3 (ref 0–0.05)
IMM GRANULOCYTES NFR BLD AUTO: 0.4 % (ref 0–0.5)
LYMPHOCYTES # BLD AUTO: 1.59 10*3/MM3 (ref 0.7–3.1)
LYMPHOCYTES NFR BLD AUTO: 21.5 % (ref 19.6–45.3)
MCH RBC QN AUTO: 29.3 PG (ref 26.6–33)
MCHC RBC AUTO-ENTMCNC: 31 G/DL (ref 31.5–35.7)
MCV RBC AUTO: 94.7 FL (ref 79–97)
MONOCYTES # BLD AUTO: 0.87 10*3/MM3 (ref 0.1–0.9)
MONOCYTES NFR BLD AUTO: 11.8 % (ref 5–12)
NEUTROPHILS NFR BLD AUTO: 4.46 10*3/MM3 (ref 1.7–7)
NEUTROPHILS NFR BLD AUTO: 60.2 % (ref 42.7–76)
NRBC BLD AUTO-RTO: 0 /100 WBC (ref 0–0.2)
PLATELET # BLD AUTO: 307 10*3/MM3 (ref 140–450)
PMV BLD AUTO: 10.2 FL (ref 6–12)
POTASSIUM SERPL-SCNC: 3.9 MMOL/L (ref 3.5–5.2)
PREALB SERPL-MCNC: 16.9 MG/DL (ref 20–40)
PROT SERPL-MCNC: 5.5 G/DL (ref 6–8.5)
RBC # BLD AUTO: 2.83 10*6/MM3 (ref 3.77–5.28)
SODIUM SERPL-SCNC: 133 MMOL/L (ref 136–145)
WBC NRBC COR # BLD AUTO: 7.4 10*3/MM3 (ref 3.4–10.8)

## 2025-06-20 PROCEDURE — 36415 COLL VENOUS BLD VENIPUNCTURE: CPT | Performed by: INTERNAL MEDICINE

## 2025-06-20 PROCEDURE — 97535 SELF CARE MNGMENT TRAINING: CPT

## 2025-06-20 PROCEDURE — 86140 C-REACTIVE PROTEIN: CPT | Performed by: INTERNAL MEDICINE

## 2025-06-20 PROCEDURE — 84134 ASSAY OF PREALBUMIN: CPT | Performed by: PHYSICIAN ASSISTANT

## 2025-06-20 PROCEDURE — 85025 COMPLETE CBC W/AUTO DIFF WBC: CPT | Performed by: INTERNAL MEDICINE

## 2025-06-20 PROCEDURE — 80053 COMPREHEN METABOLIC PANEL: CPT | Performed by: INTERNAL MEDICINE

## 2025-06-21 ENCOUNTER — OUTSIDE FACILITY SERVICE (OUTPATIENT)
Dept: PULMONOLOGY | Facility: CLINIC | Age: 70
End: 2025-06-21
Payer: MEDICARE

## 2025-06-21 LAB
ALBUMIN SERPL-MCNC: 3.1 G/DL (ref 3.5–5.2)
ALBUMIN/GLOB SERPL: 1.2 G/DL
ALP SERPL-CCNC: 80 U/L (ref 39–117)
ALT SERPL W P-5'-P-CCNC: 6 U/L (ref 1–33)
ANION GAP SERPL CALCULATED.3IONS-SCNC: 15.9 MMOL/L (ref 5–15)
AST SERPL-CCNC: 10 U/L (ref 1–32)
BASOPHILS # BLD AUTO: 0.03 10*3/MM3 (ref 0–0.2)
BASOPHILS NFR BLD AUTO: 0.3 % (ref 0–1.5)
BILIRUB SERPL-MCNC: 0.3 MG/DL (ref 0–1.2)
BUN SERPL-MCNC: 28.6 MG/DL (ref 8–23)
BUN/CREAT SERPL: 4.9 (ref 7–25)
CALCIUM SPEC-SCNC: 9.1 MG/DL (ref 8.6–10.5)
CHLORIDE SERPL-SCNC: 88 MMOL/L (ref 98–107)
CO2 SERPL-SCNC: 26.1 MMOL/L (ref 22–29)
CREAT SERPL-MCNC: 5.87 MG/DL (ref 0.57–1)
CRP SERPL-MCNC: 3.28 MG/DL (ref 0–0.5)
DEPRECATED RDW RBC AUTO: 53.7 FL (ref 37–54)
EGFRCR SERPLBLD CKD-EPI 2021: 7.2 ML/MIN/1.73
EOSINOPHIL # BLD AUTO: 0.46 10*3/MM3 (ref 0–0.4)
EOSINOPHIL NFR BLD AUTO: 5 % (ref 0.3–6.2)
ERYTHROCYTE [DISTWIDTH] IN BLOOD BY AUTOMATED COUNT: 15.8 % (ref 12.3–15.4)
GLOBULIN UR ELPH-MCNC: 2.5 GM/DL
GLUCOSE SERPL-MCNC: 96 MG/DL (ref 65–99)
HCT VFR BLD AUTO: 27.7 % (ref 34–46.6)
HGB BLD-MCNC: 8.5 G/DL (ref 12–15.9)
IMM GRANULOCYTES # BLD AUTO: 0.05 10*3/MM3 (ref 0–0.05)
IMM GRANULOCYTES NFR BLD AUTO: 0.5 % (ref 0–0.5)
LYMPHOCYTES # BLD AUTO: 1.97 10*3/MM3 (ref 0.7–3.1)
LYMPHOCYTES NFR BLD AUTO: 21.2 % (ref 19.6–45.3)
MCH RBC QN AUTO: 29.4 PG (ref 26.6–33)
MCHC RBC AUTO-ENTMCNC: 30.7 G/DL (ref 31.5–35.7)
MCV RBC AUTO: 95.8 FL (ref 79–97)
MONOCYTES # BLD AUTO: 0.95 10*3/MM3 (ref 0.1–0.9)
MONOCYTES NFR BLD AUTO: 10.2 % (ref 5–12)
NEUTROPHILS NFR BLD AUTO: 5.83 10*3/MM3 (ref 1.7–7)
NEUTROPHILS NFR BLD AUTO: 62.8 % (ref 42.7–76)
NRBC BLD AUTO-RTO: 0 /100 WBC (ref 0–0.2)
PLATELET # BLD AUTO: 343 10*3/MM3 (ref 140–450)
PMV BLD AUTO: 10 FL (ref 6–12)
POTASSIUM SERPL-SCNC: 4.7 MMOL/L (ref 3.5–5.2)
PROT SERPL-MCNC: 5.6 G/DL (ref 6–8.5)
RBC # BLD AUTO: 2.89 10*6/MM3 (ref 3.77–5.28)
SODIUM SERPL-SCNC: 130 MMOL/L (ref 136–145)
WBC NRBC COR # BLD AUTO: 9.29 10*3/MM3 (ref 3.4–10.8)

## 2025-06-21 PROCEDURE — 85025 COMPLETE CBC W/AUTO DIFF WBC: CPT | Performed by: INTERNAL MEDICINE

## 2025-06-21 PROCEDURE — 86140 C-REACTIVE PROTEIN: CPT | Performed by: INTERNAL MEDICINE

## 2025-06-21 PROCEDURE — 36415 COLL VENOUS BLD VENIPUNCTURE: CPT | Performed by: INTERNAL MEDICINE

## 2025-06-21 PROCEDURE — 80053 COMPREHEN METABOLIC PANEL: CPT | Performed by: INTERNAL MEDICINE

## 2025-06-22 ENCOUNTER — OUTSIDE FACILITY SERVICE (OUTPATIENT)
Dept: PULMONOLOGY | Facility: CLINIC | Age: 70
End: 2025-06-22
Payer: MEDICARE

## 2025-06-22 LAB
ALBUMIN SERPL-MCNC: 2.8 G/DL (ref 3.5–5.2)
ALBUMIN/GLOB SERPL: 1.1 G/DL
ALP SERPL-CCNC: 72 U/L (ref 39–117)
ALT SERPL W P-5'-P-CCNC: <5 U/L (ref 1–33)
ANION GAP SERPL CALCULATED.3IONS-SCNC: 10.9 MMOL/L (ref 5–15)
AST SERPL-CCNC: 10 U/L (ref 1–32)
BASOPHILS # BLD AUTO: 0.04 10*3/MM3 (ref 0–0.2)
BASOPHILS NFR BLD AUTO: 0.6 % (ref 0–1.5)
BILIRUB SERPL-MCNC: 0.3 MG/DL (ref 0–1.2)
BUN SERPL-MCNC: 18.4 MG/DL (ref 8–23)
BUN/CREAT SERPL: 4.4 (ref 7–25)
CALCIUM SPEC-SCNC: 8.8 MG/DL (ref 8.6–10.5)
CHLORIDE SERPL-SCNC: 95 MMOL/L (ref 98–107)
CO2 SERPL-SCNC: 30.1 MMOL/L (ref 22–29)
CREAT SERPL-MCNC: 4.2 MG/DL (ref 0.57–1)
CRP SERPL-MCNC: 3.63 MG/DL (ref 0–0.5)
DEPRECATED RDW RBC AUTO: 56.1 FL (ref 37–54)
EGFRCR SERPLBLD CKD-EPI 2021: 10.8 ML/MIN/1.73
EOSINOPHIL # BLD AUTO: 0.28 10*3/MM3 (ref 0–0.4)
EOSINOPHIL NFR BLD AUTO: 4 % (ref 0.3–6.2)
ERYTHROCYTE [DISTWIDTH] IN BLOOD BY AUTOMATED COUNT: 16 % (ref 12.3–15.4)
GLOBULIN UR ELPH-MCNC: 2.6 GM/DL
GLUCOSE SERPL-MCNC: 76 MG/DL (ref 65–99)
HCT VFR BLD AUTO: 26 % (ref 34–46.6)
HGB BLD-MCNC: 8 G/DL (ref 12–15.9)
IMM GRANULOCYTES # BLD AUTO: 0.02 10*3/MM3 (ref 0–0.05)
IMM GRANULOCYTES NFR BLD AUTO: 0.3 % (ref 0–0.5)
LYMPHOCYTES # BLD AUTO: 1.71 10*3/MM3 (ref 0.7–3.1)
LYMPHOCYTES NFR BLD AUTO: 24.4 % (ref 19.6–45.3)
MCH RBC QN AUTO: 30 PG (ref 26.6–33)
MCHC RBC AUTO-ENTMCNC: 30.8 G/DL (ref 31.5–35.7)
MCV RBC AUTO: 97.4 FL (ref 79–97)
MONOCYTES # BLD AUTO: 0.92 10*3/MM3 (ref 0.1–0.9)
MONOCYTES NFR BLD AUTO: 13.1 % (ref 5–12)
NEUTROPHILS NFR BLD AUTO: 4.04 10*3/MM3 (ref 1.7–7)
NEUTROPHILS NFR BLD AUTO: 57.6 % (ref 42.7–76)
NRBC BLD AUTO-RTO: 0 /100 WBC (ref 0–0.2)
PLATELET # BLD AUTO: 348 10*3/MM3 (ref 140–450)
PMV BLD AUTO: 9.8 FL (ref 6–12)
POTASSIUM SERPL-SCNC: 4.8 MMOL/L (ref 3.5–5.2)
PROT SERPL-MCNC: 5.4 G/DL (ref 6–8.5)
RBC # BLD AUTO: 2.67 10*6/MM3 (ref 3.77–5.28)
SODIUM SERPL-SCNC: 136 MMOL/L (ref 136–145)
WBC NRBC COR # BLD AUTO: 7.01 10*3/MM3 (ref 3.4–10.8)

## 2025-06-22 PROCEDURE — 85025 COMPLETE CBC W/AUTO DIFF WBC: CPT | Performed by: INTERNAL MEDICINE

## 2025-06-22 PROCEDURE — 36415 COLL VENOUS BLD VENIPUNCTURE: CPT | Performed by: INTERNAL MEDICINE

## 2025-06-22 PROCEDURE — 80053 COMPREHEN METABOLIC PANEL: CPT | Performed by: INTERNAL MEDICINE

## 2025-06-22 PROCEDURE — 86140 C-REACTIVE PROTEIN: CPT | Performed by: INTERNAL MEDICINE

## 2025-06-23 ENCOUNTER — OUTSIDE FACILITY SERVICE (OUTPATIENT)
Dept: PULMONOLOGY | Facility: CLINIC | Age: 70
End: 2025-06-23
Payer: MEDICARE

## 2025-06-24 ENCOUNTER — OUTSIDE FACILITY SERVICE (OUTPATIENT)
Dept: PULMONOLOGY | Facility: CLINIC | Age: 70
End: 2025-06-24
Payer: MEDICARE

## 2025-06-24 LAB
ALBUMIN SERPL-MCNC: 2.8 G/DL (ref 3.5–5.2)
ALBUMIN SERPL-MCNC: 2.8 G/DL (ref 3.5–5.2)
ALBUMIN/GLOB SERPL: 1.1 G/DL
ALBUMIN/GLOB SERPL: 1.1 G/DL
ALP SERPL-CCNC: 73 U/L (ref 39–117)
ALP SERPL-CCNC: 73 U/L (ref 39–117)
ALT SERPL W P-5'-P-CCNC: <5 U/L (ref 1–33)
ALT SERPL W P-5'-P-CCNC: <5 U/L (ref 1–33)
ANION GAP SERPL CALCULATED.3IONS-SCNC: 13.6 MMOL/L (ref 5–15)
ANION GAP SERPL CALCULATED.3IONS-SCNC: 15.1 MMOL/L (ref 5–15)
AST SERPL-CCNC: 10 U/L (ref 1–32)
AST SERPL-CCNC: 9 U/L (ref 1–32)
BASOPHILS # BLD AUTO: 0.02 10*3/MM3 (ref 0–0.2)
BASOPHILS # BLD AUTO: 0.03 10*3/MM3 (ref 0–0.2)
BASOPHILS NFR BLD AUTO: 0.2 % (ref 0–1.5)
BASOPHILS NFR BLD AUTO: 0.4 % (ref 0–1.5)
BILIRUB SERPL-MCNC: 0.3 MG/DL (ref 0–1.2)
BILIRUB SERPL-MCNC: 0.3 MG/DL (ref 0–1.2)
BUN SERPL-MCNC: 23 MG/DL (ref 8–23)
BUN SERPL-MCNC: 38.4 MG/DL (ref 8–23)
BUN/CREAT SERPL: 5.2 (ref 7–25)
BUN/CREAT SERPL: 5.3 (ref 7–25)
CALCIUM SPEC-SCNC: 8.5 MG/DL (ref 8.6–10.5)
CALCIUM SPEC-SCNC: 8.8 MG/DL (ref 8.6–10.5)
CHLORIDE SERPL-SCNC: 91 MMOL/L (ref 98–107)
CHLORIDE SERPL-SCNC: 97 MMOL/L (ref 98–107)
CO2 SERPL-SCNC: 25.9 MMOL/L (ref 22–29)
CO2 SERPL-SCNC: 28.4 MMOL/L (ref 22–29)
CREAT SERPL-MCNC: 4.42 MG/DL (ref 0.57–1)
CREAT SERPL-MCNC: 7.21 MG/DL (ref 0.57–1)
CRP SERPL-MCNC: 7.31 MG/DL (ref 0–0.5)
CRP SERPL-MCNC: 8.01 MG/DL (ref 0–0.5)
DEPRECATED RDW RBC AUTO: 54.1 FL (ref 37–54)
DEPRECATED RDW RBC AUTO: 54.6 FL (ref 37–54)
EGFRCR SERPLBLD CKD-EPI 2021: 10.1 ML/MIN/1.73
EGFRCR SERPLBLD CKD-EPI 2021: 5.6 ML/MIN/1.73
EOSINOPHIL # BLD AUTO: 0.18 10*3/MM3 (ref 0–0.4)
EOSINOPHIL # BLD AUTO: 0.2 10*3/MM3 (ref 0–0.4)
EOSINOPHIL NFR BLD AUTO: 2.5 % (ref 0.3–6.2)
EOSINOPHIL NFR BLD AUTO: 2.7 % (ref 0.3–6.2)
ERYTHROCYTE [DISTWIDTH] IN BLOOD BY AUTOMATED COUNT: 15.7 % (ref 12.3–15.4)
ERYTHROCYTE [DISTWIDTH] IN BLOOD BY AUTOMATED COUNT: 15.8 % (ref 12.3–15.4)
GLOBULIN UR ELPH-MCNC: 2.5 GM/DL
GLOBULIN UR ELPH-MCNC: 2.6 GM/DL
GLUCOSE SERPL-MCNC: 80 MG/DL (ref 65–99)
GLUCOSE SERPL-MCNC: 88 MG/DL (ref 65–99)
HCT VFR BLD AUTO: 24.7 % (ref 34–46.6)
HCT VFR BLD AUTO: 25.2 % (ref 34–46.6)
HGB BLD-MCNC: 7.5 G/DL (ref 12–15.9)
HGB BLD-MCNC: 7.8 G/DL (ref 12–15.9)
IMM GRANULOCYTES # BLD AUTO: 0.02 10*3/MM3 (ref 0–0.05)
IMM GRANULOCYTES # BLD AUTO: 0.03 10*3/MM3 (ref 0–0.05)
IMM GRANULOCYTES NFR BLD AUTO: 0.3 % (ref 0–0.5)
IMM GRANULOCYTES NFR BLD AUTO: 0.4 % (ref 0–0.5)
LYMPHOCYTES # BLD AUTO: 1.57 10*3/MM3 (ref 0.7–3.1)
LYMPHOCYTES # BLD AUTO: 1.68 10*3/MM3 (ref 0.7–3.1)
LYMPHOCYTES NFR BLD AUTO: 20.8 % (ref 19.6–45.3)
LYMPHOCYTES NFR BLD AUTO: 23.2 % (ref 19.6–45.3)
MCH RBC QN AUTO: 28.8 PG (ref 26.6–33)
MCH RBC QN AUTO: 29.4 PG (ref 26.6–33)
MCHC RBC AUTO-ENTMCNC: 30.4 G/DL (ref 31.5–35.7)
MCHC RBC AUTO-ENTMCNC: 31 G/DL (ref 31.5–35.7)
MCV RBC AUTO: 95 FL (ref 79–97)
MCV RBC AUTO: 95.1 FL (ref 79–97)
MONOCYTES # BLD AUTO: 0.68 10*3/MM3 (ref 0.1–0.9)
MONOCYTES # BLD AUTO: 1.11 10*3/MM3 (ref 0.1–0.9)
MONOCYTES NFR BLD AUTO: 10 % (ref 5–12)
MONOCYTES NFR BLD AUTO: 13.7 % (ref 5–12)
NEUTROPHILS NFR BLD AUTO: 4.29 10*3/MM3 (ref 1.7–7)
NEUTROPHILS NFR BLD AUTO: 5.04 10*3/MM3 (ref 1.7–7)
NEUTROPHILS NFR BLD AUTO: 62.4 % (ref 42.7–76)
NEUTROPHILS NFR BLD AUTO: 63.4 % (ref 42.7–76)
NRBC BLD AUTO-RTO: 0 /100 WBC (ref 0–0.2)
NRBC BLD AUTO-RTO: 0 /100 WBC (ref 0–0.2)
PLATELET # BLD AUTO: 335 10*3/MM3 (ref 140–450)
PLATELET # BLD AUTO: 360 10*3/MM3 (ref 140–450)
PMV BLD AUTO: 9.3 FL (ref 6–12)
PMV BLD AUTO: 9.5 FL (ref 6–12)
POTASSIUM SERPL-SCNC: 3.8 MMOL/L (ref 3.5–5.2)
POTASSIUM SERPL-SCNC: 5.4 MMOL/L (ref 3.5–5.2)
PROT SERPL-MCNC: 5.3 G/DL (ref 6–8.5)
PROT SERPL-MCNC: 5.4 G/DL (ref 6–8.5)
RBC # BLD AUTO: 2.6 10*6/MM3 (ref 3.77–5.28)
RBC # BLD AUTO: 2.65 10*6/MM3 (ref 3.77–5.28)
SODIUM SERPL-SCNC: 132 MMOL/L (ref 136–145)
SODIUM SERPL-SCNC: 139 MMOL/L (ref 136–145)
WBC NRBC COR # BLD AUTO: 6.77 10*3/MM3 (ref 3.4–10.8)
WBC NRBC COR # BLD AUTO: 8.08 10*3/MM3 (ref 3.4–10.8)

## 2025-06-24 PROCEDURE — 85025 COMPLETE CBC W/AUTO DIFF WBC: CPT | Performed by: PHYSICIAN ASSISTANT

## 2025-06-24 PROCEDURE — 86140 C-REACTIVE PROTEIN: CPT | Performed by: PHYSICIAN ASSISTANT

## 2025-06-24 PROCEDURE — 36415 COLL VENOUS BLD VENIPUNCTURE: CPT | Performed by: PHYSICIAN ASSISTANT

## 2025-06-24 PROCEDURE — 80053 COMPREHEN METABOLIC PANEL: CPT | Performed by: PHYSICIAN ASSISTANT

## (undated) DEVICE — PATIENT RETURN ELECTRODE, SINGLE-USE, CONTACT QUALITY MONITORING, ADULT, WITH 9FT CORD, FOR PATIENTS WEIGING OVER 33LBS. (15KG): Brand: MEGADYNE

## (undated) DEVICE — SUT ETHLN 2/0 FS 18IN 664H

## (undated) DEVICE — GLV SURG BIOGELULTRATOUCH POLYISPRN PF LF SZ7 STRL

## (undated) DEVICE — NC TREK NEO™ CORONARY DILATATION CATHETER 2.50 X 12 MM / RAPID-EXCHANGE: Brand: NC TREK NEO™

## (undated) DEVICE — RADIFOCUS GLIDEWIRE: Brand: GLIDEWIRE

## (undated) DEVICE — SNAP KOVER: Brand: UNBRANDED

## (undated) DEVICE — GLV SURG PREMIERPRO MIC LTX PF SZ7.5 BRN

## (undated) DEVICE — SPNG DRN AMD EXCILON 6PLY 4X4IN PK/2

## (undated) DEVICE — SAFESECURE,SECUREMENT,FOLEY CATH,STERILE: Brand: MEDLINE

## (undated) DEVICE — PK CATH CARD 70

## (undated) DEVICE — Device

## (undated) DEVICE — RESERVOIR,SUCTION,100CC,SILICONE: Brand: MEDLINE

## (undated) DEVICE — PAD, DEFIB, ADULT, RADIOTRANS, ZOLL: Brand: MEDLINE

## (undated) DEVICE — PENCL ES MEGADINE EZ/CLEAN BUTN W/HOLSTR 10FT

## (undated) DEVICE — DEV INFL MONARCH 25W

## (undated) DEVICE — ST ACC MICROPUNCTURE .018 TRANSLSS/SS/TP 5F/10CM 21G/7CM

## (undated) DEVICE — MINI TREK CORONARY DILATATION CATHETER 2.0 MM X 12 MM / RAPID-EXCHANGE: Brand: MINI TREK

## (undated) DEVICE — HOLDER: Brand: DEROYAL

## (undated) DEVICE — 6F .070 JR4 ECO PK: Brand: VISTA BRITE TIP

## (undated) DEVICE — HI-TORQUE VERSATURN F GUIDE WIRE FULLY COATED .014 STRAIGHT TIP 190 CM: Brand: HI-TORQUE VERSATURN

## (undated) DEVICE — PK CATH CARD 10

## (undated) DEVICE — CVR PROB ULTRASND/TRANSD W/GEL 18X120CM STRL

## (undated) DEVICE — DRSNG WND VAC GRANUFOAM SENSATRAC LG

## (undated) DEVICE — CATH F5 INF JR 4 100CM: Brand: INFINITI

## (undated) DEVICE — GUIDELINER CATHETERS ARE INTENDED TO BE USED IN CONJUNCTION WITH GUIDE CATHETERS TO ACCESS DISCRETE REGIONS OF THE CORONARY AND/OR PERIPHERAL VASCULATURE, AND TO FACILITATE PLACEMENT OF INTERVENTIONAL DEVICES.: Brand: GUIDELINER® V3 CATHETER

## (undated) DEVICE — SUT SILK 2/0 TIES 18IN A185H

## (undated) DEVICE — PK ATS CUST W CARDIOTOMY RESEVOIR

## (undated) DEVICE — TRAP FLD MINIVAC MEGADYNE 100ML

## (undated) DEVICE — PENCL SMOKE/EVAC MEGADYNE TELESCP 10FT

## (undated) DEVICE — GW INQW FIX/CORE PTFE J/3MM .035 260CM

## (undated) DEVICE — SUT SILK 3/0 TIES 18IN A184H

## (undated) DEVICE — ADULT, W/LG. BACK PAD, RADIOTRANSPARENT ELEMENT AND LEAD WIRE COMPATIBLE W/: Brand: DEFIBRILLATION ELECTRODES

## (undated) DEVICE — PENCL ROCKRSWCH MEGADYNE W/HOLSTR 10FT SS

## (undated) DEVICE — A2000 MULTI-USE SYRINGE KIT, P/N 701277-003KIT CONTENTS: 100ML CONTRAST RESERVOIR AND TUBING WITH CONTRAST SPIKE AND CLAMP: Brand: A2000 MULTI-USE SYRINGE KIT

## (undated) DEVICE — CATH F5 INF JL 3.5 100CM: Brand: INFINITI

## (undated) DEVICE — ANTIBACTERIAL UNDYED BRAIDED (POLYGLACTIN 910), SYNTHETIC ABSORBABLE SUTURE: Brand: COATED VICRYL

## (undated) DEVICE — SUT VIC 2/0 TIES 18IN J111T

## (undated) DEVICE — JP 19FR SILICONE DRAIN: Brand: CARDINAL HEALTH

## (undated) DEVICE — ST INF PRI SMRTSTE 20DRP 2VLV 24ML 117

## (undated) DEVICE — GW PERIPH VASC ADX J/TP SS .035 150CM 3MM

## (undated) DEVICE — PK HD AND NK 70

## (undated) DEVICE — PINNACLE INTRODUCER SHEATH: Brand: PINNACLE

## (undated) DEVICE — 6F .070 JL4 ECO PK: Brand: VISTA BRITE TIP

## (undated) DEVICE — MICRO HVTSA, 0.5G AND HVTSA SOURCEMARK PRODUCT CODE M1206 AND M1206-01: Brand: EXOFIN MICRO HVTSA, 0.5G

## (undated) DEVICE — KT VLV HEMO MAP ACC PLS LG/BORE MTL/INTRO W/TORQ/DEV

## (undated) DEVICE — SUT PROLN 6/0 C1 D/A 30IN 8706H

## (undated) DEVICE — GOWN,SIRUS,NONRNF,SETINSLV,2XL,18/CS: Brand: MEDLINE

## (undated) DEVICE — HYPODERMIC SAFETY NEEDLE: Brand: MONOJECT

## (undated) DEVICE — SUT PROLN 5/0 C1 D/A 36IN 8720H

## (undated) DEVICE — KT CANSTR VAC WND W/ISOLYSER SENSATRAC 500CC 10CS

## (undated) DEVICE — KT PEG ENDOVIVE ENFIT SFTY PULL 20F 1P/U

## (undated) DEVICE — Device: Brand: ASAHI SION BLUE

## (undated) DEVICE — GUIDE CATHETER: Brand: MACH1™

## (undated) DEVICE — ST INTRO TRACH BLURHINO NO/TRACH 6.5/7.0/7.5/8.0MM

## (undated) DEVICE — ADULT DISPOSABLE SINGLE-PATIENT USE PULSE OXIMETER SENSOR: Brand: NONIN

## (undated) DEVICE — CATH IMG IVUS EAGLE EYE RAPDXNG PLAT SHT/TP 5F .014IN

## (undated) DEVICE — NDL PERC 1PRT THNWALL W/BASEPLT 18G 7CM

## (undated) DEVICE — SUT MNCRYL PLS ANTIB UD 4/0 PS2 18IN

## (undated) DEVICE — NC TREK NEO™ CORONARY DILATATION CATHETER 3.00 MM X 20 MM / RAPID-EXCHANGE: Brand: NC TREK NEO™

## (undated) DEVICE — CLTH CLENS READYCLEANSE PERI CARE PK/5

## (undated) DEVICE — MODEL AT P65, P/N 701554-001KIT CONTENTS: HAND CONTROLLER, 3-WAY HIGH-PRESSURE STOPCOCK WITH ROTATING END AND PREMIUM HIGH-PRESSURE TUBING: Brand: ANGIOTOUCH® KIT

## (undated) DEVICE — VAC WHITEFOAM DRESSING LARGE: Brand: V.A.C. WHITEFOAM™

## (undated) DEVICE — CVR HNDL LIGHT RIGID

## (undated) DEVICE — MODEL BT2000 P/N 700287-012KIT CONTENTS: MANIFOLD WITH SALINE AND CONTRAST PORTS, SALINE TUBING WITH SPIKE AND HAND SYRINGE, TRANSDUCER: Brand: BT2000 AUTOMATED MANIFOLD KIT

## (undated) DEVICE — ST EXT IV SMRTSTE 2VLV FIX M LL 6ML 41

## (undated) DEVICE — SHROD PENCL MEGADYNE ATTACHAVAC W/CONN/22MM

## (undated) DEVICE — ELECTRD NDL EZ CLN MOD 2.75IN

## (undated) DEVICE — SYR 3CC SFTY GLD 25G 5/8IN

## (undated) DEVICE — GLV SURG PREMIERPRO MIC LTX PF SZ7 BRN

## (undated) DEVICE — SUCTION CANISTER 2500CC: Brand: DEROYAL

## (undated) DEVICE — PK VASC 10

## (undated) DEVICE — DRSNG SURESITE WNDW 4X4.5

## (undated) DEVICE — BALN EUPHORA 2X15MM

## (undated) DEVICE — 3M™ IOBAN™ 2 ANTIMICROBIAL INCISE DRAPE 6651EZ: Brand: IOBAN™ 2

## (undated) DEVICE — RADIFOCUS GLIDEWIRE ADVANTAGE GUIDEWIRE: Brand: GLIDEWIRE ADVANTAGE

## (undated) DEVICE — LN INJ CONTRST FLXCIL HP F/M LL 1200PSI10

## (undated) DEVICE — LN FLTR ORL/NASL MICROSTREAM NONINTUB A/LNG

## (undated) DEVICE — GLV SURG BIOGEL LTX PF 7 1/2

## (undated) DEVICE — SYR CONTRL LUERLOK 10CC

## (undated) DEVICE — JELLY,LUBE,STERILE,FLIP TOP,TUBE,4-OZ: Brand: MEDLINE